# Patient Record
Sex: MALE | Race: WHITE | NOT HISPANIC OR LATINO | URBAN - METROPOLITAN AREA
[De-identification: names, ages, dates, MRNs, and addresses within clinical notes are randomized per-mention and may not be internally consistent; named-entity substitution may affect disease eponyms.]

---

## 2018-03-05 ENCOUNTER — INPATIENT (INPATIENT)
Facility: HOSPITAL | Age: 53
LOS: 1 days | Discharge: HOME HEALTH | DRG: 563 | End: 2018-03-07
Attending: HOSPITALIST | Admitting: HOSPITALIST
Payer: MEDICARE

## 2018-03-05 VITALS
HEART RATE: 78 BPM | RESPIRATION RATE: 18 BRPM | SYSTOLIC BLOOD PRESSURE: 137 MMHG | OXYGEN SATURATION: 100 % | TEMPERATURE: 98 F | DIASTOLIC BLOOD PRESSURE: 96 MMHG

## 2018-03-05 DIAGNOSIS — S82.899A OTHER FRACTURE OF UNSPECIFIED LOWER LEG, INITIAL ENCOUNTER FOR CLOSED FRACTURE: Chronic | ICD-10-CM

## 2018-03-05 DIAGNOSIS — M79.606 PAIN IN LEG, UNSPECIFIED: ICD-10-CM

## 2018-03-05 LAB
ALBUMIN SERPL ELPH-MCNC: 4.2 G/DL — SIGNIFICANT CHANGE UP (ref 3.3–5.2)
ALP SERPL-CCNC: 40 U/L — SIGNIFICANT CHANGE UP (ref 40–120)
ALT FLD-CCNC: 30 U/L — SIGNIFICANT CHANGE UP
ANION GAP SERPL CALC-SCNC: 14 MMOL/L — SIGNIFICANT CHANGE UP (ref 5–17)
ANISOCYTOSIS BLD QL: SIGNIFICANT CHANGE UP
AST SERPL-CCNC: 26 U/L — SIGNIFICANT CHANGE UP
BASOPHILS # BLD AUTO: 0 K/UL — SIGNIFICANT CHANGE UP (ref 0–0.2)
BASOPHILS NFR BLD AUTO: 0.5 % — SIGNIFICANT CHANGE UP (ref 0–2)
BILIRUB SERPL-MCNC: 0.3 MG/DL — LOW (ref 0.4–2)
BUN SERPL-MCNC: 12 MG/DL — SIGNIFICANT CHANGE UP (ref 8–20)
CALCIUM SERPL-MCNC: 9.1 MG/DL — SIGNIFICANT CHANGE UP (ref 8.6–10.2)
CHLORIDE SERPL-SCNC: 94 MMOL/L — LOW (ref 98–107)
CO2 SERPL-SCNC: 26 MMOL/L — SIGNIFICANT CHANGE UP (ref 22–29)
CREAT SERPL-MCNC: 0.53 MG/DL — SIGNIFICANT CHANGE UP (ref 0.5–1.3)
DACRYOCYTES BLD QL SMEAR: SLIGHT — SIGNIFICANT CHANGE UP
EOSINOPHIL # BLD AUTO: 0.2 K/UL — SIGNIFICANT CHANGE UP (ref 0–0.5)
EOSINOPHIL NFR BLD AUTO: 2.1 % — SIGNIFICANT CHANGE UP (ref 0–5)
GLUCOSE BLDC GLUCOMTR-MCNC: 141 MG/DL — HIGH (ref 70–99)
GLUCOSE BLDC GLUCOMTR-MCNC: 153 MG/DL — HIGH (ref 70–99)
GLUCOSE SERPL-MCNC: 124 MG/DL — HIGH (ref 70–115)
HCT VFR BLD CALC: 34.4 % — LOW (ref 42–52)
HGB BLD-MCNC: 10.8 G/DL — LOW (ref 14–18)
HYPOCHROMIA BLD QL: SLIGHT — SIGNIFICANT CHANGE UP
LYMPHOCYTES # BLD AUTO: 2.2 K/UL — SIGNIFICANT CHANGE UP (ref 1–4.8)
LYMPHOCYTES # BLD AUTO: 24.2 % — SIGNIFICANT CHANGE UP (ref 20–55)
MCHC RBC-ENTMCNC: 24.4 PG — LOW (ref 27–31)
MCHC RBC-ENTMCNC: 31.4 G/DL — LOW (ref 32–36)
MCV RBC AUTO: 77.8 FL — LOW (ref 80–94)
MONOCYTES # BLD AUTO: 0.9 K/UL — HIGH (ref 0–0.8)
MONOCYTES NFR BLD AUTO: 9.7 % — SIGNIFICANT CHANGE UP (ref 3–10)
NEUTROPHILS # BLD AUTO: 5.9 K/UL — SIGNIFICANT CHANGE UP (ref 1.8–8)
NEUTROPHILS NFR BLD AUTO: 63.2 % — SIGNIFICANT CHANGE UP (ref 37–73)
PLAT MORPH BLD: NORMAL — SIGNIFICANT CHANGE UP
PLATELET # BLD AUTO: 237 K/UL — SIGNIFICANT CHANGE UP (ref 150–400)
POIKILOCYTOSIS BLD QL AUTO: SLIGHT — SIGNIFICANT CHANGE UP
POTASSIUM SERPL-MCNC: 3.9 MMOL/L — SIGNIFICANT CHANGE UP (ref 3.5–5.3)
POTASSIUM SERPL-SCNC: 3.9 MMOL/L — SIGNIFICANT CHANGE UP (ref 3.5–5.3)
PROT SERPL-MCNC: 7 G/DL — SIGNIFICANT CHANGE UP (ref 6.6–8.7)
RBC # BLD: 4.42 M/UL — LOW (ref 4.6–6.2)
RBC # FLD: 21.5 % — HIGH (ref 11–15.6)
RBC BLD AUTO: ABNORMAL
SODIUM SERPL-SCNC: 134 MMOL/L — LOW (ref 135–145)
WBC # BLD: 9.3 K/UL — SIGNIFICANT CHANGE UP (ref 4.8–10.8)
WBC # FLD AUTO: 9.3 K/UL — SIGNIFICANT CHANGE UP (ref 4.8–10.8)

## 2018-03-05 PROCEDURE — 99223 1ST HOSP IP/OBS HIGH 75: CPT | Mod: AI

## 2018-03-05 PROCEDURE — 73590 X-RAY EXAM OF LOWER LEG: CPT | Mod: 26,LT

## 2018-03-05 PROCEDURE — 99285 EMERGENCY DEPT VISIT HI MDM: CPT

## 2018-03-05 PROCEDURE — 73562 X-RAY EXAM OF KNEE 3: CPT | Mod: 26,LT

## 2018-03-05 RX ORDER — SIMVASTATIN 20 MG/1
40 TABLET, FILM COATED ORAL AT BEDTIME
Qty: 0 | Refills: 0 | Status: DISCONTINUED | OUTPATIENT
Start: 2018-03-05 | End: 2018-03-07

## 2018-03-05 RX ORDER — OMEGA-3 ACID ETHYL ESTERS 1 G
2 CAPSULE ORAL
Qty: 0 | Refills: 0 | Status: DISCONTINUED | OUTPATIENT
Start: 2018-03-05 | End: 2018-03-07

## 2018-03-05 RX ORDER — MORPHINE SULFATE 50 MG/1
2 CAPSULE, EXTENDED RELEASE ORAL EVERY 6 HOURS
Qty: 0 | Refills: 0 | Status: DISCONTINUED | OUTPATIENT
Start: 2018-03-05 | End: 2018-03-07

## 2018-03-05 RX ORDER — LEVOTHYROXINE SODIUM 125 MCG
100 TABLET ORAL DAILY
Qty: 0 | Refills: 0 | Status: DISCONTINUED | OUTPATIENT
Start: 2018-03-05 | End: 2018-03-07

## 2018-03-05 RX ORDER — ENOXAPARIN SODIUM 100 MG/ML
40 INJECTION SUBCUTANEOUS EVERY 24 HOURS
Qty: 0 | Refills: 0 | Status: DISCONTINUED | OUTPATIENT
Start: 2018-03-05 | End: 2018-03-07

## 2018-03-05 RX ORDER — IBUPROFEN 200 MG
600 TABLET ORAL ONCE
Qty: 0 | Refills: 0 | Status: COMPLETED | OUTPATIENT
Start: 2018-03-05 | End: 2018-03-05

## 2018-03-05 RX ORDER — IBUPROFEN 200 MG
400 TABLET ORAL EVERY 6 HOURS
Qty: 0 | Refills: 0 | Status: DISCONTINUED | OUTPATIENT
Start: 2018-03-05 | End: 2018-03-07

## 2018-03-05 RX ORDER — DEXTROSE 50 % IN WATER 50 %
25 SYRINGE (ML) INTRAVENOUS ONCE
Qty: 0 | Refills: 0 | Status: DISCONTINUED | OUTPATIENT
Start: 2018-03-05 | End: 2018-03-07

## 2018-03-05 RX ORDER — PINDOLOL 10 MG
1 TABLET ORAL
Qty: 0 | Refills: 0 | COMMUNITY

## 2018-03-05 RX ORDER — RISPERIDONE 4 MG/1
3 TABLET ORAL AT BEDTIME
Qty: 0 | Refills: 0 | Status: DISCONTINUED | OUTPATIENT
Start: 2018-03-05 | End: 2018-03-07

## 2018-03-05 RX ORDER — DEXTROSE 50 % IN WATER 50 %
1 SYRINGE (ML) INTRAVENOUS ONCE
Qty: 0 | Refills: 0 | Status: DISCONTINUED | OUTPATIENT
Start: 2018-03-05 | End: 2018-03-07

## 2018-03-05 RX ORDER — ASCORBIC ACID 60 MG
500 TABLET,CHEWABLE ORAL DAILY
Qty: 0 | Refills: 0 | Status: DISCONTINUED | OUTPATIENT
Start: 2018-03-05 | End: 2018-03-07

## 2018-03-05 RX ORDER — DEXTROSE 50 % IN WATER 50 %
12.5 SYRINGE (ML) INTRAVENOUS ONCE
Qty: 0 | Refills: 0 | Status: DISCONTINUED | OUTPATIENT
Start: 2018-03-05 | End: 2018-03-07

## 2018-03-05 RX ORDER — SODIUM CHLORIDE 9 MG/ML
1000 INJECTION, SOLUTION INTRAVENOUS
Qty: 0 | Refills: 0 | Status: DISCONTINUED | OUTPATIENT
Start: 2018-03-05 | End: 2018-03-07

## 2018-03-05 RX ORDER — CLOZAPINE 150 MG/1
200 TABLET, ORALLY DISINTEGRATING ORAL AT BEDTIME
Qty: 0 | Refills: 0 | Status: DISCONTINUED | OUTPATIENT
Start: 2018-03-05 | End: 2018-03-07

## 2018-03-05 RX ORDER — FERROUS SULFATE 325(65) MG
325 TABLET ORAL DAILY
Qty: 0 | Refills: 0 | Status: DISCONTINUED | OUTPATIENT
Start: 2018-03-05 | End: 2018-03-07

## 2018-03-05 RX ORDER — METOPROLOL TARTRATE 50 MG
25 TABLET ORAL
Qty: 0 | Refills: 0 | Status: DISCONTINUED | OUTPATIENT
Start: 2018-03-05 | End: 2018-03-07

## 2018-03-05 RX ORDER — MORPHINE SULFATE 50 MG/1
4 CAPSULE, EXTENDED RELEASE ORAL ONCE
Qty: 0 | Refills: 0 | Status: DISCONTINUED | OUTPATIENT
Start: 2018-03-05 | End: 2018-03-05

## 2018-03-05 RX ORDER — SENNA PLUS 8.6 MG/1
2 TABLET ORAL AT BEDTIME
Qty: 0 | Refills: 0 | Status: DISCONTINUED | OUTPATIENT
Start: 2018-03-05 | End: 2018-03-07

## 2018-03-05 RX ORDER — DOCUSATE SODIUM 100 MG
1 CAPSULE ORAL
Qty: 0 | Refills: 0 | COMMUNITY

## 2018-03-05 RX ORDER — INSULIN LISPRO 100/ML
VIAL (ML) SUBCUTANEOUS
Qty: 0 | Refills: 0 | Status: DISCONTINUED | OUTPATIENT
Start: 2018-03-05 | End: 2018-03-07

## 2018-03-05 RX ORDER — DOCUSATE SODIUM 100 MG
100 CAPSULE ORAL AT BEDTIME
Qty: 0 | Refills: 0 | Status: DISCONTINUED | OUTPATIENT
Start: 2018-03-05 | End: 2018-03-07

## 2018-03-05 RX ORDER — GLUCAGON INJECTION, SOLUTION 0.5 MG/.1ML
1 INJECTION, SOLUTION SUBCUTANEOUS ONCE
Qty: 0 | Refills: 0 | Status: DISCONTINUED | OUTPATIENT
Start: 2018-03-05 | End: 2018-03-07

## 2018-03-05 RX ADMIN — SENNA PLUS 2 TABLET(S): 8.6 TABLET ORAL at 22:46

## 2018-03-05 RX ADMIN — Medication 400 MILLIGRAM(S): at 20:00

## 2018-03-05 RX ADMIN — Medication 400 MILLIGRAM(S): at 18:33

## 2018-03-05 RX ADMIN — Medication 600 MILLIGRAM(S): at 14:03

## 2018-03-05 RX ADMIN — Medication 100 MILLIGRAM(S): at 22:46

## 2018-03-05 RX ADMIN — Medication 25 MILLIGRAM(S): at 18:33

## 2018-03-05 RX ADMIN — RISPERIDONE 3 MILLIGRAM(S): 4 TABLET ORAL at 22:46

## 2018-03-05 RX ADMIN — Medication 0.25 MILLIGRAM(S): at 19:40

## 2018-03-05 RX ADMIN — CLOZAPINE 200 MILLIGRAM(S): 150 TABLET, ORALLY DISINTEGRATING ORAL at 22:47

## 2018-03-05 RX ADMIN — SIMVASTATIN 40 MILLIGRAM(S): 20 TABLET, FILM COATED ORAL at 22:46

## 2018-03-05 NOTE — ED PROVIDER NOTE - OBJECTIVE STATEMENT
51 y/o M presents to the ED s/p trip and fall c/o L calf pain. Unable to ambulate. Pt heard a pop when he went down. No cp, no head trauma/hit, and no abd pain. No other complaints at this time.

## 2018-03-05 NOTE — CONSULT NOTE ADULT - ATTENDING COMMENTS
I made multiple visits over several hours including placement of ACE compression and positioning of ICE pack - most likely soft tissue injury without compartment syndrome.    Call [phone] with additional questions or concerns.

## 2018-03-05 NOTE — ED ADULT NURSE REASSESSMENT NOTE - NS ED NURSE REASSESS COMMENT FT1
MSo4 offered  pt declined,  pt very anxious about whether or not his leg is fractured  reassurance prov.     waiting Dr Hammonds urinal at bedside

## 2018-03-05 NOTE — CONSULT NOTE ADULT - ASSESSMENT
Attending note: See my separate progress note.  No mechanism, history or exam findings that would be consistent with compartment syndrome in my view.  CALEB

## 2018-03-05 NOTE — H&P ADULT - NSHPPHYSICALEXAM_GEN_ALL_CORE
PHYSICAL EXAM:    GENERAL: NAD, short stature  HEAD:  Atraumatic, Normocephalic  EYES: EOMI, PERRLA, conjunctiva and sclera clear  ENMT: Moist mucous membranes, poor dentition, No lesions  NERVOUS SYSTEM:  Alert & Oriented X3, Good concentration;   CHEST/LUNG: Clear to percussion bilaterally; No rales, rhonchi  HEART: Regular rate and rhythm; No murmurs  ABDOMEN: Soft, Nontender, Nondistended; Bowel sounds present  EXTREMITIES:  2+ Peripheral Pulses, No clubbing, cyanosis. non-pitting edema noted on his calf, wrapped in ACE wrap  SKIN: No rashes or lesions

## 2018-03-05 NOTE — ED ADULT NURSE NOTE - OBJECTIVE STATEMENT
pt was walking in apt and tripped over an [employer] box, 'all my weight went onto my left leg" "I cant bend it all the way straight' 'I cannot put weight on it'  denies  hitting head

## 2018-03-05 NOTE — ED ADULT NURSE REASSESSMENT NOTE - NS ED NURSE REASSESS COMMENT FT1
dr archer at bedside to r/o capartment syndrome    as per Dr Archer  'he doesn't have that"  dr florence aware

## 2018-03-05 NOTE — H&P ADULT - HISTORY OF PRESENT ILLNESS
52yr old male with PMH of HTN, DM< HPL, Osteogenesis imperfecta, h/o Disruptive behaviour, schizoaffective disorder presented to ER after he tripped over a box and hurt his LEft leg. He immediately noted swelling and severe pain inhis calf muscle. He was evaluated by vascular and ortho in ER< xrays Leg and Knee were negative for any fracture but there is a concern for gastrocnemius muscle rupture and possible Compartment syndrome. Advised to Observe overnight. He is being admitted for further care.

## 2018-03-05 NOTE — ED ADULT TRIAGE NOTE - CHIEF COMPLAINT QUOTE
PT BIBA s/p trip and fall. pt state he tripped over a box. Pt c/o left calf pain, states he felt something pop. Pt denies LOC, denies hitting head, denies blood thinners. Pt is A & Ox3, respirations even & unlabored.

## 2018-03-05 NOTE — PROGRESS NOTE ADULT - SUBJECTIVE AND OBJECTIVE BOX
Patient is a 52y Male presenting to the emergency department with a chief complaint of left calf pain after falling around 11a.m. this morning at his community where he lives in [facility] apartment by himself. Patient states when he fell his leg went out from under him and stretched out the back of his leg and pulled the muscle. Patient does note some mild tingling in foot. Patient has h/o of osteogenic imperfecta. Patient denies pain anywhere else in his body, denies knee pain.      REVIEW OF SYSTEMS    General: denies fever, chills    Skin/Breast: denies rash  	  Respiratory and Thorax: denies SOB  	  Cardiovascular:	denies CP    Gastrointestinal:	denies abdominal pain  	  Musculoskeletal:	 left calf pain    Neurological:	+paresthesias left foot    Psychiatric:	schizoaffective disorder      PAST MEDICAL & SURGICAL HISTORY:  Hypothyroidism  High cholesterol  Diabetes  Osteogenesis imperfecta  Schizo-affective psychosis  Tachycardia      Allergies: amoxicillin (Unknown)  Lamictal (Unknown)  penicillin (Unknown)      Medications: see med rec    FAMILY HISTORY:  : non-contributory    Social History: see above  ETOH: denies  Smoking: denies    Ambulation: independent                          10.8   9.3   )-----------( 237      ( 05 Mar 2018 12:32 )             34.4     03-05    134<L>  |  94<L>  |  12.0  ----------------------------<  124<H>  3.9   |  26.0  |  0.53    PHYSICAL EXAM:    Vital Signs Last 24 Hrs  T(C): 36.6 (05 Mar 2018 11:45), Max: 36.6 (05 Mar 2018 11:45)  T(F): 97.9 (05 Mar 2018 11:45), Max: 97.9 (05 Mar 2018 11:45)  HR: 78 (05 Mar 2018 11:45) (78 - 78)  BP: 137/96 (05 Mar 2018 11:45) (137/96 - 137/96)  RR: 18 (05 Mar 2018 11:45) (18 - 18)  SpO2: 100% (05 Mar 2018 11:45) (100% - 100%)    Appearance: Alert, responsive, in no acute distress.     Left Lower extremity: Left calf + mod-severe swelling, no ecchymosis noted, no erythema or open wounds. Calf feels moderately tense but is still compressible without much discomfort. Patient does have pain with dorsiflexion of ankle in calf described as tightness  EHL/TA/GS/FHL intact, Gross SILT distally. No pain with PROM of left hallux  DP pulse 2+ PT pulse 1+     Imaging Studies: < from: Xray Knee 3 Views, Left (03.05.18 @ 12:56) >     EXAM:  XR KNEE 3 VIEWS LT                         EXAM:  TIBIA FIBULA-LEFT                          PROCEDURE DATE:  03/05/2018      INTERPRETATION:  Left tibia-fibula and left knee    5 views      History: Posttraumatic pain. Patient fell    Osseous structures are intact. No evidence of acute fracture or   dislocation.  There is no significant joint space narrowing. The   articular surfaces appear intact.. Soft tissues are normal.    Impression:  Normal study.    PETR MARCELINO M.D., ATTENDING RADIOLOGIST  This document has been electronically signed. Mar  5 2018  1:18PM    < end of copied text >      A/P:  Pt is a  52y Male with left calf tear    PLAN:   No concern for compartment syndrome at this point however if swelling persists, unlikely but possible compartment syndrome may develop. Recommend holding patient overnight to monitor. Patient is a 52y Male presenting to the emergency department with a chief complaint of left calf pain after falling around 11a.m. this morning at his community where he lives in [facility] apartment by himself. Patient states when he fell his leg went out from under him and stretched out the back of his leg and pulled the muscle. Patient does note some mild tingling in foot. Patient has h/o of osteogenic imperfecta. Patient denies pain anywhere else in his body, denies knee pain.      REVIEW OF SYSTEMS    General: denies fever, chills    Skin/Breast: denies rash  	  Respiratory and Thorax: denies SOB  	  Cardiovascular:	denies CP    Gastrointestinal:	denies abdominal pain  	  Musculoskeletal:	 left calf pain    Neurological:	+paresthesias left foot    Psychiatric:	schizoaffective disorder      PAST MEDICAL & SURGICAL HISTORY:  Hypothyroidism  High cholesterol  Diabetes  Osteogenesis imperfecta  Schizo-affective psychosis  Tachycardia      Allergies: amoxicillin (Unknown)  Lamictal (Unknown)  penicillin (Unknown)      Medications: see med rec    FAMILY HISTORY:  : non-contributory    Social History: see above  ETOH: denies  Smoking: denies    Ambulation: independent                          10.8   9.3   )-----------( 237      ( 05 Mar 2018 12:32 )             34.4     03-05    134<L>  |  94<L>  |  12.0  ----------------------------<  124<H>  3.9   |  26.0  |  0.53    PHYSICAL EXAM:    Vital Signs Last 24 Hrs  T(C): 36.6 (05 Mar 2018 11:45), Max: 36.6 (05 Mar 2018 11:45)  T(F): 97.9 (05 Mar 2018 11:45), Max: 97.9 (05 Mar 2018 11:45)  HR: 78 (05 Mar 2018 11:45) (78 - 78)  BP: 137/96 (05 Mar 2018 11:45) (137/96 - 137/96)  RR: 18 (05 Mar 2018 11:45) (18 - 18)  SpO2: 100% (05 Mar 2018 11:45) (100% - 100%)    Appearance: Alert, responsive, in no acute distress.     Left Lower extremity: Left calf + mod-severe swelling, no ecchymosis noted, no erythema or open wounds. Calf feels moderately tense but is still compressible without much discomfort. Patient does have pain with dorsiflexion of ankle in calf described as tightness  EHL/TA/GS/FHL intact, Gross SILT distally. No pain with PROM of left hallux  DP pulse 2+ PT pulse 1+     Imaging Studies: < from: Xray Knee 3 Views, Left (03.05.18 @ 12:56) >     EXAM:  XR KNEE 3 VIEWS LT                         EXAM:  TIBIA FIBULA-LEFT                          PROCEDURE DATE:  03/05/2018      INTERPRETATION:  Left tibia-fibula and left knee    5 views      History: Posttraumatic pain. Patient fell    Osseous structures are intact. No evidence of acute fracture or   dislocation.  There is no significant joint space narrowing. The   articular surfaces appear intact.. Soft tissues are normal.    Impression:  Normal study.    PETR MARCELINO M.D., ATTENDING RADIOLOGIST  This document has been electronically signed. Mar  5 2018  1:18PM    < end of copied text >      A/P:  Pt is a  52y Male with left calf tear    PLAN:   No concern for compartment syndrome at this point however if swelling persists, unlikely but possible compartment syndrome may develop. Recommend holding patient overnight to monitor.   Posterior splint left lower leg applied  Pain control  strict elevation left lower leg  Admit to medicine  Will follow  D/w Dr. Zamarripa

## 2018-03-05 NOTE — CONSULT NOTE ADULT - SUBJECTIVE AND OBJECTIVE BOX
Vascular Attending:  Susan AVILES       HPI:  Consulted to see patient secondary to concerns of left lower extremity acute edema concerns for compartment syndrome. Patient reports recent trip and fall where his left lower leg collided with an object around 11 am.  Reports hearing a "pop" with pain and edema following.  X-Ray's obtained in the ED, negative for fracture.  Reports tingling to the bottom of his feet, no pain at rest.  Patient seen on second evaluation with Vascular Attending Dr. Davis.     patient with no complaint of chest pain, sob, n/v fever or chills       PAST MEDICAL & SURGICAL HISTORY:  Hypothyroidism  High cholesterol  Diabetes  Osteogenesis imperfecta  Schizo-affective psychosis      REVIEW OF SYSTEMS:  See HPI        Endocrine:	    Allergic/Immunologic:	    MEDICATIONS  (STANDING):    MEDICATIONS  (PRN):      Allergies    amoxicillin (Unknown)  Lamictal (Unknown)  penicillin (Unknown)    Intolerances        SOCIAL HISTORY:  no toxic habits       Vital Signs Last 24 Hrs  T(C): 36.6 (05 Mar 2018 11:45), Max: 36.6 (05 Mar 2018 11:45)  T(F): 97.9 (05 Mar 2018 11:45), Max: 97.9 (05 Mar 2018 11:45)  HR: 78 (05 Mar 2018 11:45) (78 - 78)  BP: 137/96 (05 Mar 2018 11:45) (137/96 - 137/96)  BP(mean): --  RR: 18 (05 Mar 2018 11:45) (18 - 18)  SpO2: 100% (05 Mar 2018 11:45) (100% - 100%)    PHYSICAL EXAM:      Constitutional:  no distress, small in stature, obese     Eyes:  no jaundice     ENMT:    Neck:  no bruits       Respiratory:  clear     Cardiovascular:  s1/s2    Gastrointestinal:  distended, soft, NT      Extremities: Left le edema, mostly affecting the calf region.  Anterior compartment firm with some compressibility,  Moving all toes, Dorsi flex> Plantar flex.  Reported generalized numbness to plantar aspect of foot     Vascular: 2+ femoral and 2+ PT/DP bilateral LE         Psychiatric:        LABS:                        10.8   9.3   )-----------( 237      ( 05 Mar 2018 12:32 )             34.4     03-05    134<L>  |  94<L>  |  12.0  ----------------------------<  124<H>  3.9   |  26.0  |  0.53    Ca    9.1      05 Mar 2018 12:32    TPro  7.0  /  Alb  4.2  /  TBili  0.3<L>  /  DBili  x   /  AST  26  /  ALT  30  /  AlkPhos  40  03-05          RADIOLOGY & ADDITIONAL STUDIES    Impression and Plan:    Left lower extremity edema form acute soft tissue low energy injury    Clinically negative for compartment syndrome based on physical examination    - Will reevaluate in 1-2 hours   - elevate extremity  - Ice packs and NSAIDs Vascular Attending:  MARIKA Davis MD       HPI:  Asked to see patient because of ER MD concern for left lower extremity acute edema and pain after fall, specifically the possibility of compartment syndrome.    Patient reports recent trip and fall where his left lower leg collided with an object around 11 am on 3/5/18.  Reports hearing a "pop" with pain and edema following.  X-Ray's obtained in the ED, negative for fracture.  Reports tingling of the plantar aspect of his left (affected side) foot, no pain in lower extremity or foot at rest.  Patient seen on second evaluation with Vascular Attending Dr. Davis (please see his separate note).     Patient without complaint of chest pain, sob, n/v fever or chills       PAST MEDICAL & SURGICAL HISTORY:  Hypothyroidism  High cholesterol  Diabetes  Osteogenesis imperfecta  Schizo-affective psychosis    REVIEW OF SYSTEMS:  See HPI    Endocrine:	    Allergic/Immunologic:	    MEDICATIONS  (STANDING):    MEDICATIONS  (PRN):    Allergies    amoxicillin (Unknown)  Lamictal (Unknown)  penicillin (Unknown)    SOCIAL HISTORY:  no toxic habits     Vital Signs Last 24 Hrs  T(C): 36.6 (05 Mar 2018 11:45), Max: 36.6 (05 Mar 2018 11:45)  T(F): 97.9 (05 Mar 2018 11:45), Max: 97.9 (05 Mar 2018 11:45)  HR: 78 (05 Mar 2018 11:45) (78 - 78)  BP: 137/96 (05 Mar 2018 11:45) (137/96 - 137/96)  BP(mean): --  RR: 18 (05 Mar 2018 11:45) (18 - 18)  SpO2: 100% (05 Mar 2018 11:45) (100% - 100%)    PHYSICAL EXAM:    Constitutional:  no distress, small in stature, obese     Eyes:  no jaundice     ENMT: normal speech and facial expressions    Neck:  no bruits     Respiratory:  clear     Cardiovascular:  s1/s2    Abdomen: obese, soft, NT    Extremities: Left posterior calf and some ankle edema. Posterior calf most tender and firm with firm but less tender anterior compartment,  Spontaneously moves all toes, dorsi flex and Plantar flex without c/o pain.  Reports waxing and waning left plantar dysesthesias, parathesia and numbness.     Vascular: 2+ femoral and 2+ PT/DP bilateral LE     Psychiatric:  LABS:                        10.8   9.3   )-----------( 237      ( 05 Mar 2018 12:32 )             34.4     03-05    134<L>  |  94<L>  |  12.0  ----------------------------<  124<H>  3.9   |  26.0  |  0.53    Ca    9.1      05 Mar 2018 12:32    TPro  7.0  /  Alb  4.2  /  TBili  0.3<L>  /  DBili  x   /  AST  26  /  ALT  30  /  AlkPhos  40  03-05          RADIOLOGY & ADDITIONAL STUDIES    Impression and Plan:    Left lower extremity edema form acute soft tissue low energy injury    Clinically negative for compartment syndrome based on physical examination    - Will reevaluate in 1-2 hours   - elevate extremity  - Ice packs and NSAIDs

## 2018-03-05 NOTE — H&P ADULT - ASSESSMENT
52yr old male with PMH of HTN, DM< HPL, Osteogenesis imperfecta, h/o Disruptive behaviour, schizoaffective disorder presented to ER after he tripped over a box and hurt his LEft leg. He immediately noted swelling and severe pain inhis calf muscle. He was evaluated by vascular and ortho in ER< xrays Leg and Knee were negative for any fracture but there is a concern for gastrocnemius muscle rupture and possible Compartment syndrome. Advised to Observe overnight. He is being admitted for further care.    1. traumatic Swelling of left calf - Possibly 2/2 Muscle rupture  Observe for compartment syndrome  check for pedal pulses - q4hrs  Pain control  Vascular follow up     2. DM - hold OHA, SSI while in house    3. Schizoaffective - stable at present - ct home meds    4. h/o irregular Heart beat in past - Ct metoprolol with holding paramenters - pindolol not available on formulary, resume pindolol on discharge    5. Anemia - ct Iron supplement    6. Osteogenesis Imperfecta - Supportive care, PT eval if pain controlled.   7. DVT px - lovenox

## 2018-03-05 NOTE — PROGRESS NOTE ADULT - ASSESSMENT
No mechanism, history or physical exam findings consistent with compartment syndrome  ICE, ACE, elevate  Ortho follow-up as indicated for any tendinous or ligamentous injuries affecting function  Weight bearing per ortho

## 2018-03-05 NOTE — PROGRESS NOTE ADULT - SUBJECTIVE AND OBJECTIVE BOX
52 year old male s/p fall with complaint of leg injury. No fracture, no dislocation, no history of bleeding diathesis or anticoagulation. Asked to rule out compartment syndrome.    Vital Signs Last 24 Hrs  T(C): 36.6 (05 Mar 2018 11:45), Max: 36.6 (05 Mar 2018 11:45)  T(F): 97.9 (05 Mar 2018 11:45), Max: 97.9 (05 Mar 2018 11:45)  HR: 78 (05 Mar 2018 11:45) (78 - 78)  BP: 137/96 (05 Mar 2018 11:45) (137/96 - 137/96)  BP(mean): --  RR: 18 (05 Mar 2018 11:45) (18 - 18)  SpO2: 100% (05 Mar 2018 11:45) (100% - 100%)    PAST MEDICAL & SURGICAL HISTORY:  Hypothyroidism  High cholesterol  Diabetes  Osteogenesis imperfecta  Schizo-affective psychosis    FAMILY HISTORY: no history of thrombophelia or bleeding diathesis    ROS: denies SOB, CP, LOPEZ    Comprehensive Metabolic Panel (03.05.18 @ 12:32)    Sodium, Serum: 134 mmol/L    Potassium, Serum: 3.9 mmol/L    Chloride, Serum: 94 mmol/L    Carbon Dioxide, Serum: 26.0 mmol/L    Anion Gap, Serum: 14 mmol/L    Blood Urea Nitrogen, Serum: 12.0 mg/dL    Creatinine, Serum: 0.53 mg/dL    Glucose, Serum: 124 mg/dL    Calcium, Total Serum: 9.1 mg/dL    Protein Total, Serum: 7.0 g/dL    Albumin, Serum: 4.2 g/dL    Bilirubin Total, Serum: 0.3 mg/dL    Alkaline Phosphatase, Serum: 40 U/L    Aspartate Aminotransferase (AST/SGOT): 26 U/L    Alanine Aminotransferase (ALT/SGPT): 30 U/L    Complete Blood Count + Automated Diff (03.05.18 @ 12:32)    WBC Count: 9.3 K/uL    RBC Count: 4.42 M/uL    Hemoglobin: 10.8 g/dL    Hematocrit: 34.4 %    Mean Cell Volume: 77.8 fl    Mean Cell Hemoglobin: 24.4 pg    Mean Cell Hemoglobin Conc: 31.4 g/dL    Red Cell Distrib Width: 21.5 %    Platelet Count - Automated: 237 K/uL    < from: Xray Tibia + Fibula 2 Views, Left (03.05.18 @ 12:56) >   EXAM:  XR KNEE 3 VIEWS LT                         EXAM:  TIBIA FIBULA-LEFT                          PROCEDURE DATE:  03/05/2018      INTERPRETATION:  Left tibia-fibula and left knee  5 views    History: Posttraumatic pain. Patient fell  Osseous structures are intact. No evidence of acute fracture or   dislocation.  There is no significant joint space narrowing. The   articular surfaces appear intact.. Soft tissues are normal.  Impression:  Normal study.    PETR MARCELINO M.D., ATTENDING RADIOLOGIST  This document has been electronically signed. Mar  5 2018  1:18PM  < end of copied text >        < from: Xray Tibia + Fibula 2 Views, Left (03.05.18 @ 12:56) >   EXAM:  XR KNEE 3 VIEWS LT                         EXAM:  TIBIA FIBULA-LEFT                        PROCEDURE DATE:  03/05/2018    INTERPRETATION:  Left tibia-fibula and left knee  5 views    History: Posttraumatic pain. Patient fell  Osseous structures are intact. No evidence of acute fracture or   dislocation.  There is no significant joint space narrowing. The   articular surfaces appear intact.. Soft tissues are normal.  Impression:  Normal study.    PETR MARCELINO M.D., ATTENDING RADIOLOGIST  This document has been electronically signed. Mar  5 2018  1:18PM  < end of copied text >    Physical exam:  Patient lying on stretcher - NAD, denies worsening pain - in fact he notes pain has gradually improved with elevation.  Bilateral palable pedal pulses  Edema and what appears to be early signs of ecchymosis noted left anterior leg  Normal and pain free active and passive range of motion of the toes bilaterally.

## 2018-03-05 NOTE — ED ADULT NURSE NOTE - PMH
Diabetes    High cholesterol    Hypothyroidism    Osteogenesis imperfecta    Schizo-affective psychosis

## 2018-03-06 LAB
ANION GAP SERPL CALC-SCNC: 13 MMOL/L — SIGNIFICANT CHANGE UP (ref 5–17)
BASOPHILS # BLD AUTO: 0 K/UL — SIGNIFICANT CHANGE UP (ref 0–0.2)
BASOPHILS NFR BLD AUTO: 0.2 % — SIGNIFICANT CHANGE UP (ref 0–2)
BUN SERPL-MCNC: 10 MG/DL — SIGNIFICANT CHANGE UP (ref 8–20)
CALCIUM SERPL-MCNC: 9.1 MG/DL — SIGNIFICANT CHANGE UP (ref 8.6–10.2)
CHLORIDE SERPL-SCNC: 94 MMOL/L — LOW (ref 98–107)
CK MB CFR SERPL CALC: 4.9 NG/ML — SIGNIFICANT CHANGE UP (ref 0–6.7)
CK SERPL-CCNC: 1429 U/L — HIGH (ref 30–200)
CO2 SERPL-SCNC: 27 MMOL/L — SIGNIFICANT CHANGE UP (ref 22–29)
CREAT SERPL-MCNC: 0.61 MG/DL — SIGNIFICANT CHANGE UP (ref 0.5–1.3)
EOSINOPHIL # BLD AUTO: 0.1 K/UL — SIGNIFICANT CHANGE UP (ref 0–0.5)
EOSINOPHIL NFR BLD AUTO: 1.2 % — SIGNIFICANT CHANGE UP (ref 0–5)
GLUCOSE BLDC GLUCOMTR-MCNC: 141 MG/DL — HIGH (ref 70–99)
GLUCOSE BLDC GLUCOMTR-MCNC: 173 MG/DL — HIGH (ref 70–99)
GLUCOSE BLDC GLUCOMTR-MCNC: 175 MG/DL — HIGH (ref 70–99)
GLUCOSE SERPL-MCNC: 166 MG/DL — HIGH (ref 70–115)
HBA1C BLD-MCNC: 5.1 % — SIGNIFICANT CHANGE UP (ref 4–5.6)
HCT VFR BLD CALC: 30.8 % — LOW (ref 42–52)
HGB BLD-MCNC: 9.8 G/DL — LOW (ref 14–18)
LYMPHOCYTES # BLD AUTO: 19.8 % — LOW (ref 20–55)
LYMPHOCYTES # BLD AUTO: 2.2 K/UL — SIGNIFICANT CHANGE UP (ref 1–4.8)
MCHC RBC-ENTMCNC: 24.3 PG — LOW (ref 27–31)
MCHC RBC-ENTMCNC: 31.8 G/DL — LOW (ref 32–36)
MCV RBC AUTO: 76.4 FL — LOW (ref 80–94)
MONOCYTES # BLD AUTO: 1 K/UL — HIGH (ref 0–0.8)
MONOCYTES NFR BLD AUTO: 8.9 % — SIGNIFICANT CHANGE UP (ref 3–10)
NEUTROPHILS # BLD AUTO: 7.7 K/UL — SIGNIFICANT CHANGE UP (ref 1.8–8)
NEUTROPHILS NFR BLD AUTO: 69.7 % — SIGNIFICANT CHANGE UP (ref 37–73)
PLAT MORPH BLD: NORMAL — SIGNIFICANT CHANGE UP
PLATELET # BLD AUTO: 227 K/UL — SIGNIFICANT CHANGE UP (ref 150–400)
POTASSIUM SERPL-MCNC: 3.3 MMOL/L — LOW (ref 3.5–5.3)
POTASSIUM SERPL-SCNC: 3.3 MMOL/L — LOW (ref 3.5–5.3)
RBC # BLD: 4.03 M/UL — LOW (ref 4.6–6.2)
RBC # FLD: 22.3 % — HIGH (ref 11–15.6)
RBC BLD AUTO: SIGNIFICANT CHANGE UP
SODIUM SERPL-SCNC: 134 MMOL/L — LOW (ref 135–145)
WBC # BLD: 11.1 K/UL — HIGH (ref 4.8–10.8)
WBC # FLD AUTO: 11.1 K/UL — HIGH (ref 4.8–10.8)

## 2018-03-06 PROCEDURE — 99233 SBSQ HOSP IP/OBS HIGH 50: CPT

## 2018-03-06 RX ORDER — POTASSIUM CHLORIDE 20 MEQ
40 PACKET (EA) ORAL ONCE
Qty: 0 | Refills: 0 | Status: DISCONTINUED | OUTPATIENT
Start: 2018-03-06 | End: 2018-03-06

## 2018-03-06 RX ORDER — POTASSIUM CHLORIDE 20 MEQ
40 PACKET (EA) ORAL ONCE
Qty: 0 | Refills: 0 | Status: COMPLETED | OUTPATIENT
Start: 2018-03-06 | End: 2018-03-06

## 2018-03-06 RX ADMIN — Medication 400 MILLIGRAM(S): at 07:55

## 2018-03-06 RX ADMIN — Medication 25 MILLIGRAM(S): at 05:23

## 2018-03-06 RX ADMIN — MORPHINE SULFATE 2 MILLIGRAM(S): 50 CAPSULE, EXTENDED RELEASE ORAL at 03:56

## 2018-03-06 RX ADMIN — Medication 100 MICROGRAM(S): at 05:23

## 2018-03-06 RX ADMIN — RISPERIDONE 3 MILLIGRAM(S): 4 TABLET ORAL at 22:10

## 2018-03-06 RX ADMIN — Medication 400 MILLIGRAM(S): at 18:24

## 2018-03-06 RX ADMIN — Medication 500 MILLIGRAM(S): at 12:18

## 2018-03-06 RX ADMIN — Medication 2 GRAM(S): at 17:23

## 2018-03-06 RX ADMIN — Medication 325 MILLIGRAM(S): at 12:18

## 2018-03-06 RX ADMIN — Medication 40 MILLIEQUIVALENT(S): at 12:19

## 2018-03-06 RX ADMIN — Medication 400 MILLIGRAM(S): at 00:41

## 2018-03-06 RX ADMIN — SIMVASTATIN 40 MILLIGRAM(S): 20 TABLET, FILM COATED ORAL at 22:10

## 2018-03-06 RX ADMIN — MORPHINE SULFATE 2 MILLIGRAM(S): 50 CAPSULE, EXTENDED RELEASE ORAL at 04:11

## 2018-03-06 RX ADMIN — Medication 400 MILLIGRAM(S): at 01:41

## 2018-03-06 RX ADMIN — Medication 400 MILLIGRAM(S): at 06:55

## 2018-03-06 RX ADMIN — Medication 400 MILLIGRAM(S): at 17:24

## 2018-03-06 RX ADMIN — CLOZAPINE 200 MILLIGRAM(S): 150 TABLET, ORALLY DISINTEGRATING ORAL at 22:10

## 2018-03-06 RX ADMIN — Medication 25 MILLIGRAM(S): at 17:23

## 2018-03-06 NOTE — PROGRESS NOTE ADULT - SUBJECTIVE AND OBJECTIVE BOX
52yr old male with PMH of HTN, DM, HPL, Osteogenesis imperfecta, h/o Disruptive behaviour, schizoaffective disorder being seen at [facility] for gastrocnemius tear s/p trip/fall over box at  home.     Pt seen and examined at bedside. Pt ANO x 3, laying in bed. Pt states pain has improved since yesterday. Denies other medical complaints. Pt has yet to ambulate. Denies n/v, fever, chills, chest pain, SOB, numbness/tingling, increase/wrosening pain.     Interval: seen by ortho, no s/o compartment syndrome, WBAT, follow up as o/p in 2 weeks.     MEDICATIONS  (STANDING):  ascorbic acid 500 milliGRAM(s) Oral daily  cloZAPine 200 milliGRAM(s) Oral at bedtime  dextrose 5%. 1000 milliLiter(s) (50 mL/Hr) IV Continuous <Continuous>  dextrose 50% Injectable 12.5 Gram(s) IV Push once  dextrose 50% Injectable 25 Gram(s) IV Push once  dextrose 50% Injectable 25 Gram(s) IV Push once  docusate sodium 100 milliGRAM(s) Oral at bedtime  enoxaparin Injectable 40 milliGRAM(s) SubCutaneous every 24 hours  ferrous    sulfate 325 milliGRAM(s) Oral daily  insulin lispro (HumaLOG) corrective regimen sliding scale   SubCutaneous three times a day before meals  levothyroxine 100 MICROGram(s) Oral daily  metoprolol     tartrate 25 milliGRAM(s) Oral two times a day  omega-3-Acid Ethyl Esters 2 Gram(s) Oral two times a day  potassium chloride   Powder 40 milliEquivalent(s) Oral once  risperiDONE   Tablet 3 milliGRAM(s) Oral at bedtime  senna 2 Tablet(s) Oral at bedtime  simvastatin 40 milliGRAM(s) Oral at bedtime    MEDICATIONS  (PRN):  dextrose Gel 1 Dose(s) Oral once PRN Blood Glucose LESS THAN 70 milliGRAM(s)/deciliter  glucagon  Injectable 1 milliGRAM(s) IntraMuscular once PRN Glucose LESS THAN 70 milligrams/deciliter  ibuprofen  Tablet 400 milliGRAM(s) Oral every 6 hours PRN Mild pain  LORazepam     Tablet 0.25 milliGRAM(s) Oral two times a day PRN Anxiety  morphine  - Injectable 2 milliGRAM(s) IV Push every 6 hours PRN Severe Pain (7 - 10)      Allergies  amoxicillin (Unknown)  Lamictal (Unknown)  penicillin (Unknown)    PMH:     REVIEW OF SYSTEMS:  CONSTITUTIONAL: No fever, weight loss, or fatigue  RESPIRATORY: No cough, wheezing, chills or hemoptysis; No shortness of breath  CARDIOVASCULAR: No chest pain, palpitations, dizziness, or leg swelling  GASTROINTESTINAL: No abdominal or epigastric pain. No nausea, vomiting, or hematemesis; No diarrhea or constipation. No melena or hematochezia.  GENITOURINARY: No dysuria, frequency, hematuria, or incontinence  NEUROLOGICAL: No headaches, memory loss, loss of strength, numbness, or tremors  MUSCULOSKELETAL: see HPI    Vital Signs Last 24 Hrs  T(C): 37.1 (06 Mar 2018 07:36), Max: 37.4 (06 Mar 2018 05:17)  T(F): 98.8 (06 Mar 2018 07:36), Max: 99.4 (06 Mar 2018 05:17)  HR: 83 (06 Mar 2018 07:36) (78 - 90)  BP: 114/69 (06 Mar 2018 07:36) (114/69 - 137/96)  BP(mean): --  RR: 18 (06 Mar 2018 07:36) (18 - 22)  SpO2: 98% (06 Mar 2018 07:36) (96% - 100%)    PHYSICAL EXAM:  GENERAL: NAD, well-groomed, well-developed  HEAD:  Atraumatic, Normocephalic  NERVOUS SYSTEM:  Alert & Oriented X3, no neuro deficits   CHEST/LUNG: Clear to auscultation bilaterally; No rales, rhonchi, wheezing, or rubs  HEART: Regular rate and rhythm; No murmurs, rubs, or gallops  ABDOMEN: Soft, Nontender, Nondistended; Bowel sounds present  EXTREMITIES: posterior splint to LLE, calf swollen, though nontender to palpation. Palpable distal pulses.       LABS:                        9.8    11.1  )-----------( 227      ( 06 Mar 2018 08:39 )             30.8     06 Mar 2018 08:39    134    |  94     |  10.0   ----------------------------<  166    3.3     |  27.0   |  0.61     Ca    9.1        06 Mar 2018 08:39    TPro  7.0    /  Alb  4.2    /  TBili  0.3    /  DBili  x      /  AST  26     /  ALT  30     /  AlkPhos  40     05 Mar 2018 12:32      CAPILLARY BLOOD GLUCOSE  POCT Blood Glucose.: 173 mg/dL (06 Mar 2018 08:17)  POCT Blood Glucose.: 153 mg/dL (05 Mar 2018 22:45)  POCT Blood Glucose.: 141 mg/dL (05 Mar 2018 18:37)      RADIOLOGY & ADDITIONAL TESTS: 52yr old male with PMH of HTN, DM, HPL, Osteogenesis imperfecta, h/o Disruptive behaviour, schizoaffective disorder being seen at [facility] for gastrocnemius tear s/p trip/fall over box at  home.     Pt seen and examined at bedside. Pt ANO x 3, laying in bed. Pt states pain has improved since yesterday. Denies other medical complaints. Pt has yet to ambulate. Denies n/v, fever, chills, chest pain, SOB, numbness/tingling, increase/wrosening pain.     Interval: seen by ortho, no s/o compartment syndrome, WBAT, follow up as o/p in 2 weeks.     MEDICATIONS  (STANDING):  ascorbic acid 500 milliGRAM(s) Oral daily  cloZAPine 200 milliGRAM(s) Oral at bedtime  dextrose 5%. 1000 milliLiter(s) (50 mL/Hr) IV Continuous <Continuous>  dextrose 50% Injectable 12.5 Gram(s) IV Push once  dextrose 50% Injectable 25 Gram(s) IV Push once  dextrose 50% Injectable 25 Gram(s) IV Push once  docusate sodium 100 milliGRAM(s) Oral at bedtime  enoxaparin Injectable 40 milliGRAM(s) SubCutaneous every 24 hours  ferrous    sulfate 325 milliGRAM(s) Oral daily  insulin lispro (HumaLOG) corrective regimen sliding scale   SubCutaneous three times a day before meals  levothyroxine 100 MICROGram(s) Oral daily  metoprolol     tartrate 25 milliGRAM(s) Oral two times a day  omega-3-Acid Ethyl Esters 2 Gram(s) Oral two times a day  potassium chloride   Powder 40 milliEquivalent(s) Oral once  risperiDONE   Tablet 3 milliGRAM(s) Oral at bedtime  senna 2 Tablet(s) Oral at bedtime  simvastatin 40 milliGRAM(s) Oral at bedtime    MEDICATIONS  (PRN):  dextrose Gel 1 Dose(s) Oral once PRN Blood Glucose LESS THAN 70 milliGRAM(s)/deciliter  glucagon  Injectable 1 milliGRAM(s) IntraMuscular once PRN Glucose LESS THAN 70 milligrams/deciliter  ibuprofen  Tablet 400 milliGRAM(s) Oral every 6 hours PRN Mild pain  LORazepam     Tablet 0.25 milliGRAM(s) Oral two times a day PRN Anxiety  morphine  - Injectable 2 milliGRAM(s) IV Push every 6 hours PRN Severe Pain (7 - 10)      Allergies  amoxicillin (Unknown)  Lamictal (Unknown)  penicillin (Unknown)    PMH:     REVIEW OF SYSTEMS:  CONSTITUTIONAL: No fever, weight loss, or fatigue  RESPIRATORY: No cough, wheezing, chills or hemoptysis; No shortness of breath  CARDIOVASCULAR: No chest pain, palpitations, dizziness, or leg swelling  GASTROINTESTINAL: No abdominal or epigastric pain. No nausea, vomiting, or hematemesis; No diarrhea or constipation. No melena or hematochezia.  GENITOURINARY: No dysuria, frequency, hematuria, or incontinence  NEUROLOGICAL: No headaches, memory loss, loss of strength, numbness, or tremors  MUSCULOSKELETAL: see HPI    Vital Signs Last 24 Hrs  T(C): 37.1 (06 Mar 2018 07:36), Max: 37.4 (06 Mar 2018 05:17)  T(F): 98.8 (06 Mar 2018 07:36), Max: 99.4 (06 Mar 2018 05:17)  HR: 83 (06 Mar 2018 07:36) (78 - 90)  BP: 114/69 (06 Mar 2018 07:36) (114/69 - 137/96)  BP(mean): --  RR: 18 (06 Mar 2018 07:36) (18 - 22)  SpO2: 98% (06 Mar 2018 07:36) (96% - 100%)    PHYSICAL EXAM:  GENERAL: NAD, well-groomed, well-developed  HEAD:  Atraumatic, Normocephalic  NERVOUS SYSTEM:  Alert & Oriented X3, no neuro deficits   CHEST/LUNG: Clear to auscultation bilaterally; No rales, rhonchi, wheezing, or rubs  HEART: Regular rate and rhythm; No murmurs, rubs, or gallops  ABDOMEN: Soft, Nontender, Nondistended; Bowel sounds present  EXTREMITIES: posterior splint to LLE, calf swollen, though nontender to palpation,  Palpable distal pulses, able to move all toes.       LABS:                        9.8    11.1  )-----------( 227      ( 06 Mar 2018 08:39 )             30.8     06 Mar 2018 08:39    134    |  94     |  10.0   ----------------------------<  166    3.3     |  27.0   |  0.61     Ca    9.1        06 Mar 2018 08:39    TPro  7.0    /  Alb  4.2    /  TBili  0.3    /  DBili  x      /  AST  26     /  ALT  30     /  AlkPhos  40     05 Mar 2018 12:32      CAPILLARY BLOOD GLUCOSE  POCT Blood Glucose.: 173 mg/dL (06 Mar 2018 08:17)  POCT Blood Glucose.: 153 mg/dL (05 Mar 2018 22:45)  POCT Blood Glucose.: 141 mg/dL (05 Mar 2018 18:37)      RADIOLOGY & ADDITIONAL TESTS:

## 2018-03-06 NOTE — PROGRESS NOTE ADULT - ASSESSMENT
52yr old male with PMH of HTN, DM, HPL, Osteogenesis imperfecta, h/o Disruptive behaviour, schizoaffective disorder being seen at [facility] for gastrocnemius tear s/p trip/fall over box at  home. Xray negative for fracture. Evaluated by vascular ortho, no signs of compartment syndrome. As per ortho, WBAT and follow up as o/p in 2 weeks.     1. Traumatic Swelling of left calf   Likely secondary to gastrocnemius Muscle rupture  Xrays negative for fracture  Pain control  Ortho eval appreciated - wbat, no s/o compartment syndrome, f/u o/p in 2 weeks.  PT to see patient  Likely home after physical therapy evaluates pt    2. Hypokalemia  Will replete    3. Leukocytosis  Likely reactive  Pt asymptomatic  VSS, afebrile  Will monitor/trend    4. DM - hold OHA, SSI while in house    5. Schizoaffective - stable at present - ct home meds    6. h/o irregular Heart beat in past - Ct metoprolol with holding paramenters - pindolol not available on formulary, resume pindolol on discharge    7. Anemia - ct Iron supplement    8. Osteogenesis Imperfecta - Supportive care, PT eval if pain controlled.     9. DVT px - lovenox 52yr old male with PMH of HTN, DM, HPL, Osteogenesis imperfecta, h/o Disruptive behaviour, schizoaffective disorder being seen at [facility] for gastrocnemius tear s/p trip/fall over box at  home. Xray negative for fracture. Evaluated by vascular ortho, no signs of compartment syndrome. As per ortho, WBAT and follow up as o/p in 2 weeks.     1. Traumatic Swelling of left calf   Likely secondary to gastrocnemius Muscle rupture  Xrays negative for fracture  Pain control  Ortho eval appreciated - wbat, no s/o compartment syndrome, f/u o/p in 2 weeks. Ortho signed off.   PT to see patient  Likely home after physical therapy evaluates pt    2. Hypokalemia  Will replete    3. Leukocytosis  Likely reactive  Pt asymptomatic  VSS, afebrile  Will monitor/trend    4. DM - hold OHA, SSI while in house    5. Schizoaffective - stable at present - ct home meds    6. h/o irregular Heart beat in past - Ct metoprolol with holding paramenters - pindolol not available on formulary, resume pindolol on discharge    7. Anemia - ct Iron supplement    8. Osteogenesis Imperfecta - Supportive care, PT eval if pain controlled.     9. DVT px - lovenox

## 2018-03-06 NOTE — PROGRESS NOTE ADULT - SUBJECTIVE AND OBJECTIVE BOX
Patient seen and eval at bedside. Patient states the left calf/lower leg feels much better. Patient states numbness he had felt in left foot is improved. No other complaints.    Vital Signs Last 24 Hrs  T(C): 37.1 (06 Mar 2018 07:36), Max: 37.4 (06 Mar 2018 05:17)  T(F): 98.8 (06 Mar 2018 07:36), Max: 99.4 (06 Mar 2018 05:17)  HR: 83 (06 Mar 2018 07:36) (78 - 90)  BP: 114/69 (06 Mar 2018 07:36) (114/69 - 137/96)  BP(mean): --  RR: 18 (06 Mar 2018 07:36) (18 - 22)  SpO2: 98% (06 Mar 2018 07:36) (96% - 100%)    PE: NAD, alert awake  Left LE: Posterior splint applied. Calf swollen, compressible, significantly softer than yesterday.  EHL/TA/GS/FHL intact, no pain or discomfort with passive ROM of hallux. Gross SILT distally  Cap refill brisk, < 2 sec, no cyanosis    A/P: left calf tear/strain, observe for compartment syndrome    ·	PT - WBAT left LE, may need CAM boot, will d/w Attending  ·	No s/o compartment syndrome  ·	Cont elevation left LE as much as possible  ·	Ortho stable for DC  ·	F/u outpatient in office in 2 weeks Dr. Zamarripa

## 2018-03-07 ENCOUNTER — TRANSCRIPTION ENCOUNTER (OUTPATIENT)
Age: 53
End: 2018-03-07

## 2018-03-07 VITALS
OXYGEN SATURATION: 99 % | SYSTOLIC BLOOD PRESSURE: 133 MMHG | RESPIRATION RATE: 18 BRPM | TEMPERATURE: 98 F | DIASTOLIC BLOOD PRESSURE: 78 MMHG | HEART RATE: 58 BPM

## 2018-03-07 LAB
ALBUMIN SERPL ELPH-MCNC: 4.1 G/DL — SIGNIFICANT CHANGE UP (ref 3.3–5.2)
ALP SERPL-CCNC: 40 U/L — SIGNIFICANT CHANGE UP (ref 40–120)
ALT FLD-CCNC: 36 U/L — SIGNIFICANT CHANGE UP
ANION GAP SERPL CALC-SCNC: 14 MMOL/L — SIGNIFICANT CHANGE UP (ref 5–17)
AST SERPL-CCNC: 62 U/L — HIGH
BILIRUB SERPL-MCNC: 0.2 MG/DL — LOW (ref 0.4–2)
BUN SERPL-MCNC: 20 MG/DL — SIGNIFICANT CHANGE UP (ref 8–20)
CALCIUM SERPL-MCNC: 8.9 MG/DL — SIGNIFICANT CHANGE UP (ref 8.6–10.2)
CHLORIDE SERPL-SCNC: 100 MMOL/L — SIGNIFICANT CHANGE UP (ref 98–107)
CO2 SERPL-SCNC: 26 MMOL/L — SIGNIFICANT CHANGE UP (ref 22–29)
CREAT SERPL-MCNC: 0.71 MG/DL — SIGNIFICANT CHANGE UP (ref 0.5–1.3)
GLUCOSE BLDC GLUCOMTR-MCNC: 146 MG/DL — HIGH (ref 70–99)
GLUCOSE BLDC GLUCOMTR-MCNC: 247 MG/DL — HIGH (ref 70–99)
GLUCOSE SERPL-MCNC: 158 MG/DL — HIGH (ref 70–115)
HCT VFR BLD CALC: 30.2 % — LOW (ref 42–52)
HGB BLD-MCNC: 9.4 G/DL — LOW (ref 14–18)
MCHC RBC-ENTMCNC: 24.4 PG — LOW (ref 27–31)
MCHC RBC-ENTMCNC: 31.1 G/DL — LOW (ref 32–36)
MCV RBC AUTO: 78.2 FL — LOW (ref 80–94)
PLATELET # BLD AUTO: 229 K/UL — SIGNIFICANT CHANGE UP (ref 150–400)
POTASSIUM SERPL-MCNC: 3.4 MMOL/L — LOW (ref 3.5–5.3)
POTASSIUM SERPL-SCNC: 3.4 MMOL/L — LOW (ref 3.5–5.3)
PROT SERPL-MCNC: 6.8 G/DL — SIGNIFICANT CHANGE UP (ref 6.6–8.7)
RBC # BLD: 3.86 M/UL — LOW (ref 4.6–6.2)
RBC # FLD: 23.3 % — HIGH (ref 11–15.6)
SODIUM SERPL-SCNC: 140 MMOL/L — SIGNIFICANT CHANGE UP (ref 135–145)
WBC # BLD: 9.9 K/UL — SIGNIFICANT CHANGE UP (ref 4.8–10.8)
WBC # FLD AUTO: 9.9 K/UL — SIGNIFICANT CHANGE UP (ref 4.8–10.8)

## 2018-03-07 PROCEDURE — 36415 COLL VENOUS BLD VENIPUNCTURE: CPT

## 2018-03-07 PROCEDURE — 73562 X-RAY EXAM OF KNEE 3: CPT

## 2018-03-07 PROCEDURE — 80053 COMPREHEN METABOLIC PANEL: CPT

## 2018-03-07 PROCEDURE — 99285 EMERGENCY DEPT VISIT HI MDM: CPT | Mod: 25

## 2018-03-07 PROCEDURE — 97163 PT EVAL HIGH COMPLEX 45 MIN: CPT

## 2018-03-07 PROCEDURE — 85027 COMPLETE CBC AUTOMATED: CPT

## 2018-03-07 PROCEDURE — 82962 GLUCOSE BLOOD TEST: CPT

## 2018-03-07 PROCEDURE — 73590 X-RAY EXAM OF LOWER LEG: CPT

## 2018-03-07 PROCEDURE — 82550 ASSAY OF CK (CPK): CPT

## 2018-03-07 PROCEDURE — 99238 HOSP IP/OBS DSCHRG MGMT 30/<: CPT

## 2018-03-07 PROCEDURE — 82553 CREATINE MB FRACTION: CPT

## 2018-03-07 PROCEDURE — 83036 HEMOGLOBIN GLYCOSYLATED A1C: CPT

## 2018-03-07 PROCEDURE — 80048 BASIC METABOLIC PNL TOTAL CA: CPT

## 2018-03-07 RX ORDER — MAGNESIUM HYDROXIDE 400 MG/1
30 TABLET, CHEWABLE ORAL DAILY
Qty: 0 | Refills: 0 | Status: DISCONTINUED | OUTPATIENT
Start: 2018-03-07 | End: 2018-03-07

## 2018-03-07 RX ORDER — POTASSIUM CHLORIDE 20 MEQ
40 PACKET (EA) ORAL ONCE
Qty: 0 | Refills: 0 | Status: COMPLETED | OUTPATIENT
Start: 2018-03-07 | End: 2018-03-07

## 2018-03-07 RX ADMIN — Medication 400 MILLIGRAM(S): at 12:57

## 2018-03-07 RX ADMIN — Medication 500 MILLIGRAM(S): at 11:58

## 2018-03-07 RX ADMIN — MAGNESIUM HYDROXIDE 30 MILLILITER(S): 400 TABLET, CHEWABLE ORAL at 11:58

## 2018-03-07 RX ADMIN — Medication 40 MILLIEQUIVALENT(S): at 15:09

## 2018-03-07 RX ADMIN — Medication 2 GRAM(S): at 05:31

## 2018-03-07 RX ADMIN — Medication 400 MILLIGRAM(S): at 11:57

## 2018-03-07 RX ADMIN — Medication 25 MILLIGRAM(S): at 05:31

## 2018-03-07 RX ADMIN — ENOXAPARIN SODIUM 40 MILLIGRAM(S): 100 INJECTION SUBCUTANEOUS at 11:58

## 2018-03-07 RX ADMIN — Medication 100 MICROGRAM(S): at 05:31

## 2018-03-07 RX ADMIN — Medication 325 MILLIGRAM(S): at 11:58

## 2018-03-07 NOTE — PROGRESS NOTE ADULT - ATTENDING COMMENTS
pt assessed at bedside.   agree with above.   Dc today if able to ambulate with PT
pt seen and examined at bedside  Pain some improved but very anxious to bear weight and walk.  Will get PT eval to see if can be safely discharged. He lives in [place] by self.  ct pain control.  hypokalemia - replete

## 2018-03-07 NOTE — DISCHARGE NOTE ADULT - PATIENT PORTAL LINK FT
You can access the [facility] Portal, offered by [facility], by registering with the following website: [email or web address]

## 2018-03-07 NOTE — PHYSICAL THERAPY INITIAL EVALUATION ADULT - MUSCLE TONE ASSESSMENT, REHAB EVAL
bilateral lower extremities/bilateral upper extremities/normal
bilateral lower extremities/bilateral upper extremities/normal

## 2018-03-07 NOTE — DISCHARGE NOTE ADULT - PLAN OF CARE
improving Pt to follow up with Dr. Andres in 2 weeks from discharge  Pt to wear CAM walker for ambulation and use rolling walker  CAM walker only to be taken off when bathing  WBAT with cam walker Continue all home medications as prescribed  Follow up with PMD as needed Pt to follow up with Dr. Andres in 2 weeks from discharge  Pt cleared by physical therapy. Recommends home with home PT and ambulation with CAM boot and rolling walker. Pt instructed how to use rolling walker/cam boot.   Pt to wear CAM walker for ambulation and use rolling walker  CAM walker only to be taken off when bathing  WBAT with cam walker

## 2018-03-07 NOTE — PHYSICAL THERAPY INITIAL EVALUATION ADULT - GROSSLY INTACT, SENSORY
left lower leg,foot,ankle NT due to CAM boot/Left UE/Right UE/Right LE/Grossly Intact
Grossly Intact/left lower leg,foot,ankle NT due to CAM boot/Left UE/Right UE/Right LE

## 2018-03-07 NOTE — DISCHARGE NOTE ADULT - HOSPITAL COURSE
52yr old male with PMH of HTN, DM, HPL, Osteogenesis imperfecta, h/o Disruptive behaviour, schizoaffective disorder being seen at [facility] for gastrocnemius tear s/p trip/fall over box at  home. Xray negative for fracture. Evaluated by vascular ortho, no signs of compartment syndrome, cleared from ortho standpoint. As per ortho, WBAT with CAM boot and follow up as o/p in 2 weeks. Pt evaluated by physical thearpy, pt to WBAT with cam boot and rolling walker and to go home with home PT. Pt medically stable for dishcharge. 52yr old male with PMH of HTN, DM, HPL, Osteogenesis imperfecta, h/o Disruptive behaviour, schizoaffective disorder being seen at [facility] for gastrocnemius tear s/p trip/fall over box at  home. Xray negative for fracture. Evaluated by vascular ortho, no signs of compartment syndrome, cleared from ortho standpoint. As per ortho, WBAT with CAM boot and follow up as o/p in 2 weeks. Pt evaluated by physical thearpy, pt to WBAT with cam boot and rolling walker and to go home with home PT. Pt medically stable for discharge.

## 2018-03-07 NOTE — DISCHARGE NOTE ADULT - MEDICATION SUMMARY - MEDICATIONS TO TAKE
I will START or STAY ON the medications listed below when I get home from the hospital:    rolling walker  -- Rolling Walker for ambulation  -- Indication: For Gastronemius tear     Ativan 0.5 mg oral tablet  -- 0.5 tab(s) by mouth 2 times a day, As Needed  -- Indication: For anxiety    Janumet 50 mg-1000 mg oral tablet  -- 1 tab(s) by mouth 2 times a day  -- Indication: For DM    Zocor 40 mg oral tablet  -- 1 tab(s) by mouth once a day (at bedtime)  -- Indication: For HLD    RisperDAL 3 mg oral tablet  -- 1 tab(s) by mouth once a day (at bedtime)  -- Indication: For schizoaffective disorder    cloZAPine 200 mg oral tablet  -- 1 tab(s) by mouth once a day (at bedtime)  -- Indication: For schizoaffective disorder    pindolol 5 mg oral tablet  -- 0.5 tab(s) by mouth 2 times a day  -- Indication: For HTN    alendronate 70 mg oral tablet  -- 1 tab(s) by mouth once a week  -- Indication: For Osteogenesis imperfecta    ferrous sulfate 324 mg (65 mg elemental iron) oral delayed release tablet  -- orally once a day  -- Indication: For Osteogenesis imperfecta    Senna 8.6 mg oral tablet  -- 1 tab(s) by mouth once a day (at bedtime)  -- Indication: For constipation    Colace 100 mg oral capsule  -- 1 cap(s) by mouth once a day (at bedtime)  -- Indication: For constipation    Lovaza 1000 mg oral capsule  -- 2 cap(s) by mouth 2 times a day  -- Indication: For HLD    Synthroid 100 mcg (0.1 mg) oral tablet  -- 1 tab(s) by mouth once a day  -- Indication: For Hypothyroidism    Calcium 500+D oral tablet, chewable  -- 1 tab(s) by mouth 2 times a day  -- Indication: For Osteogenesis imperfecta    Vitamin D3 1000 intl units oral tablet  -- 1 tab(s) by mouth once a day  -- Indication: For Osteogenesis imperfecta    Vitamin C 500 mg oral capsule  -- 1 cap(s) by mouth once a day  -- Indication: For Osteogenesis imperfecta I will START or STAY ON the medications listed below when I get home from the hospital:    rolling walker  -- Rolling Walker for ambulation  -- Indication: For Gastronemius tear     Tylenol 500 mg oral tablet  -- 1 tab(s) by mouth every 6 hours x 3 days  -- Indication: For Gastrocnemius muscle tear    Ativan 0.5 mg oral tablet  -- 0.5 tab(s) by mouth 2 times a day, As Needed  -- Indication: For anxiety    Janumet 50 mg-1000 mg oral tablet  -- 1 tab(s) by mouth 2 times a day  -- Indication: For DM    Zocor 40 mg oral tablet  -- 1 tab(s) by mouth once a day (at bedtime)  -- Indication: For HLD    RisperDAL 3 mg oral tablet  -- 1 tab(s) by mouth once a day (at bedtime)  -- Indication: For schizoaffective disorder    cloZAPine 200 mg oral tablet  -- 1 tab(s) by mouth once a day (at bedtime)  -- Indication: For schizoaffective disorder    pindolol 5 mg oral tablet  -- 0.5 tab(s) by mouth 2 times a day  -- Indication: For HTN    alendronate 70 mg oral tablet  -- 1 tab(s) by mouth once a week  -- Indication: For Osteogenesis imperfecta    ferrous sulfate 324 mg (65 mg elemental iron) oral delayed release tablet  -- orally once a day  -- Indication: For Osteogenesis imperfecta    Senna 8.6 mg oral tablet  -- 1 tab(s) by mouth once a day (at bedtime)  -- Indication: For constipation    Colace 100 mg oral capsule  -- 1 cap(s) by mouth once a day (at bedtime)  -- Indication: For constipation    Lovaza 1000 mg oral capsule  -- 2 cap(s) by mouth 2 times a day  -- Indication: For HLD    Synthroid 100 mcg (0.1 mg) oral tablet  -- 1 tab(s) by mouth once a day  -- Indication: For Hypothyroidism    Calcium 500+D oral tablet, chewable  -- 1 tab(s) by mouth 2 times a day  -- Indication: For Osteogenesis imperfecta    Vitamin D3 1000 intl units oral tablet  -- 1 tab(s) by mouth once a day  -- Indication: For Osteogenesis imperfecta    Vitamin C 500 mg oral capsule  -- 1 cap(s) by mouth once a day  -- Indication: For Osteogenesis imperfecta

## 2018-03-07 NOTE — PHYSICAL THERAPY INITIAL EVALUATION ADULT - PERTINENT HX OF CURRENT PROBLEM, REHAB EVAL
52yr old male with PMH of HTN, DM< HPL, Osteogenesis imperfecta, h/o Disruptive behaviour, schizoaffective disorder presented to ER after he tripped over a box and hurt his LEft leg. He immediately noted swelling and severe pain inhis calf muscle. He was evaluated by vascular and ortho in ER< xrays Leg and Knee were negative for any fracture but there is a concern for gastrocnemius muscle rupture and possible Compartment syndrome.
52yr old male with PMH of HTN, DM< HPL, Osteogenesis imperfecta, h/o Disruptive behaviour, schizoaffective disorder presented to ER after he tripped over a box and hurt his LEft leg. He immediately noted swelling and severe pain inhis calf muscle. He was evaluated by vascular and ortho in ER< xrays Leg and Knee were negative for any fracture but there is a concern for gastrocnemius muscle rupture and possible Compartment syndrome.

## 2018-03-07 NOTE — PHYSICAL THERAPY INITIAL EVALUATION ADULT - ASSISTIVE DEVICE FOR TRANSFER: SIT/STAND, REHAB EVAL
[facility] Progress Note    Date: 2017    Name: Kuldip Rowan    MRN: [id number]         : 1978      Ms. Venu Segovia was assessed and education was provided. Ms. Klever Wallace vitals were reviewed and patient was observed for 5 minutes prior to treatment. Visit Vitals    /77 (BP 1 Location: Left arm, BP Patient Position: At rest;Sitting)    Pulse 92    Temp 97.6 °F (36.4 °C)    Resp 16    Breastfeeding No       Lab results were obtained and reviewed. Recent Results (from the past 12 hour(s))   CBC WITH 3 PART DIFF    Collection Time: 17  1:24 PM   Result Value Ref Range    WBC 6.0 4.5 - 13.0 K/uL    RBC 4.83 4.10 - 5.10 M/uL    HGB 15.0 12.0 - 16.0 g/dL    HCT 44.2 36 - 48 %    MCV 91.5 78 - 102 FL    MCH 31.1 25.0 - 35.0 PG    MCHC 33.9 31 - 37 g/dL    RDW 12.1 11.5 - 14.5 %    PLATELET 872 006 - 598 K/uL    NEUTROPHILS 54 40 - 70 %    MIXED CELLS 14 0.1 - 17 %    LYMPHOCYTES 32 14 - 44 %    ABS. NEUTROPHILS 3.3 1.8 - 9.5 K/UL    ABS. MIXED CELLS 0.8 0.0 - 2.3 K/uL    ABS. LYMPHOCYTES 1.9 1.1 - 5.9 K/UL    DF AUTOMATED         Therapeutic phlebotomy held for HCT 44.2. Patient armband removed and shredded. Ms. Venu Segovia was discharged from [place] [address] in stable condition at [phone]. She is to return on 17 at 1300 for her next appointment for CBC/phlebotomy (monthly status).     Whitney Zhang RN  2017  4:13 PM
rolling walker
rolling walker

## 2018-03-07 NOTE — PHYSICAL THERAPY INITIAL EVALUATION ADULT - DIAGNOSIS, PT EVAL
gait dysfunction due to left LE muscle injury
Pt is functionally independent and not in need of skilled PT, will no longer follow

## 2018-03-07 NOTE — PHYSICAL THERAPY INITIAL EVALUATION ADULT - ADDITIONAL COMMENTS
pt states he lives in his own studio apartment in supportive housing. apt on 1st floor with no stairs. pt amb w/cane at times prior to admit. +driving. goes to day program. states he needs to go to the med room 3x/day for meds and to cafeteria for meals, both in building.
pt states he lives in his own studio apartment in supportive housing. apt on 1st floor with no stairs. pt amb w/cane at times prior to admit. +driving. goes to day program. states he needs to go to the med room 3x/day for meds and to cafeteria for meals, both in building.

## 2018-03-07 NOTE — DISCHARGE NOTE ADULT - CARE PROVIDER_API CALL
Dashawn Zamarripa (DO), Orthopaedic Surgery  [address] [place] [address]  Phone: [phone]  Fax: [phone]

## 2018-03-07 NOTE — PROGRESS NOTE ADULT - SUBJECTIVE AND OBJECTIVE BOX
52yr old male with PMH of HTN, DM, HPL, Osteogenesis imperfecta, h/o Disruptive behaviour, schizoaffective disorder being seen at [facility] for gastrocnemius tear s/p trip/fall over box at  home.     Pt seen and examined at bedside. Pt ANO x 3, laying in bed. Pt states pain has improved since yesterday. Anxious about walking/weight bearing. Denies other medical complaints. Pt has yet to ambulate. Denies n/v, fever, chills, chest pain, SOB, numbness/tingling, increase/wrosening pain.     Interval: seen by ortho, no s/o compartment syndrome, WBAT, follow up as o/p in 2 weeks. Seen by PT, home with home PT, CAM boot and rolling walker.     MEDICATIONS  (STANDING):  ascorbic acid 500 milliGRAM(s) Oral daily  cloZAPine 200 milliGRAM(s) Oral at bedtime  dextrose 5%. 1000 milliLiter(s) (50 mL/Hr) IV Continuous <Continuous>  dextrose 50% Injectable 12.5 Gram(s) IV Push once  dextrose 50% Injectable 25 Gram(s) IV Push once  dextrose 50% Injectable 25 Gram(s) IV Push once  docusate sodium 100 milliGRAM(s) Oral at bedtime  enoxaparin Injectable 40 milliGRAM(s) SubCutaneous every 24 hours  ferrous    sulfate 325 milliGRAM(s) Oral daily  insulin lispro (HumaLOG) corrective regimen sliding scale   SubCutaneous three times a day before meals  levothyroxine 100 MICROGram(s) Oral daily  metoprolol     tartrate 25 milliGRAM(s) Oral two times a day  omega-3-Acid Ethyl Esters 2 Gram(s) Oral two times a day  potassium chloride   Powder 40 milliEquivalent(s) Oral once  risperiDONE   Tablet 3 milliGRAM(s) Oral at bedtime  senna 2 Tablet(s) Oral at bedtime  simvastatin 40 milliGRAM(s) Oral at bedtime    MEDICATIONS  (PRN):  dextrose Gel 1 Dose(s) Oral once PRN Blood Glucose LESS THAN 70 milliGRAM(s)/deciliter  glucagon  Injectable 1 milliGRAM(s) IntraMuscular once PRN Glucose LESS THAN 70 milligrams/deciliter  ibuprofen  Tablet 400 milliGRAM(s) Oral every 6 hours PRN Mild pain  LORazepam     Tablet 0.25 milliGRAM(s) Oral two times a day PRN Anxiety  morphine  - Injectable 2 milliGRAM(s) IV Push every 6 hours PRN Severe Pain (7 - 10)    Allergies  amoxicillin (Unknown)  Lamictal (Unknown)  penicillin (Unknown)    REVIEW OF SYSTEMS:  CONSTITUTIONAL: No fever, weight loss, or fatigue  RESPIRATORY: No cough, wheezing, chills or hemoptysis; No shortness of breath  CARDIOVASCULAR: No chest pain, palpitations, dizziness, or leg swelling  GASTROINTESTINAL: No abdominal or epigastric pain. No nausea, vomiting, or hematemesis; No diarrhea or constipation. No melena or hematochezia.  GENITOURINARY: No dysuria, frequency, hematuria, or incontinence  NEUROLOGICAL: No headaches, memory loss, loss of strength, numbness, or tremors  MUSCULOSKELETAL: see HPI    Vital Signs Last 24 Hrs  T(C): 37.1 (06 Mar 2018 07:36), Max: 37.4 (06 Mar 2018 05:17)  T(F): 98.8 (06 Mar 2018 07:36), Max: 99.4 (06 Mar 2018 05:17)  HR: 83 (06 Mar 2018 07:36) (78 - 90)  BP: 114/69 (06 Mar 2018 07:36) (114/69 - 137/96)  BP(mean): --  RR: 18 (06 Mar 2018 07:36) (18 - 22)  SpO2: 98% (06 Mar 2018 07:36) (96% - 100%)    PHYSICAL EXAM:  GENERAL: NAD, well-groomed, well-developed  HEAD:  Atraumatic, Normocephalic  NERVOUS SYSTEM:  Alert & Oriented X3, no neuro deficits   CHEST/LUNG: Clear to auscultation bilaterally; No rales, rhonchi, wheezing, or rubs  HEART: Regular rate and rhythm; No murmurs, rubs, or gallops  ABDOMEN: Soft, Nontender, Nondistended; Bowel sounds present  EXTREMITIES: posterior splint to LLE, calf swollen, though nontender to palpation,  Palpable distal pulses, able to move all toes.       LABS:                        9.8    11.1  )-----------( 227      ( 06 Mar 2018 08:39 )             30.8     06 Mar 2018 08:39    134    |  94     |  10.0   ----------------------------<  166    3.3     |  27.0   |  0.61     Ca    9.1        06 Mar 2018 08:39    TPro  7.0    /  Alb  4.2    /  TBili  0.3    /  DBili  x      /  AST  26     /  ALT  30     /  AlkPhos  40     05 Mar 2018 12:32      CAPILLARY BLOOD GLUCOSE  POCT Blood Glucose.: 173 mg/dL (06 Mar 2018 08:17)  POCT Blood Glucose.: 153 mg/dL (05 Mar 2018 22:45)  POCT Blood Glucose.: 141 mg/dL (05 Mar 2018 18:37)      RADIOLOGY & ADDITIONAL TESTS:

## 2018-03-07 NOTE — PHYSICAL THERAPY INITIAL EVALUATION ADULT - PATIENT/FAMILY/SIGNIFICANT OTHER GOALS STATEMENT, PT EVAL
I don't know how I am going to manage with this boot.
I don't know how I am going to manage with this boot.

## 2018-03-07 NOTE — PHYSICAL THERAPY INITIAL EVALUATION ADULT - RANGE OF MOTION EXAMINATION, REHAB EVAL
**left foot/ankle NT due to CAM boot/bilateral upper extremity ROM was WNL (within normal limits)/bilateral lower extremity was ROM was WNL (within normal limits)
except left ankle limited/no ROM deficits were identified

## 2018-03-07 NOTE — PROGRESS NOTE ADULT - ASSESSMENT
52yr old male with PMH of HTN, DM, HPL, Osteogenesis imperfecta, h/o Disruptive behaviour, schizoaffective disorder being seen at [facility] for gastrocnemius tear s/p trip/fall over box at  home. Xray negative for fracture. Evaluated by vascular ortho, no signs of compartment syndrome. As per ortho, WBAT and follow up as o/p in 2 weeks. Cleared by PT, home with home PT, cam and rolling walker.     1. Traumatic Swelling of left calf   Likely secondary to gastrocnemius Muscle tear  Xrays negative for fracture  Pain control  Ortho eval appreciated - wbat, no s/o compartment syndrome, f/u o/p in 2 weeks. Ortho signed off.   PT - home with home PT, cam and rolling walker    2. Hypokalemia  Will replete    3. Leukocytosis  Likely reactive  Pt asymptomatic  VSS, afebrile  Will monitor/trend    4. DM - hold OHA, SSI while in house    5. Schizoaffective - stable at present - ct home meds    6. h/o irregular Heart beat in past - Ct metoprolol with holding paramenters - pindolol not available on formulary, resume pindolol on discharge    7. Anemia - ct Iron supplement    8. Osteogenesis Imperfecta - Supportive care, PT eval if pain controlled.     9. DVT px - lovenox

## 2018-03-07 NOTE — DISCHARGE NOTE ADULT - CARE PLAN
Principal Discharge DX:	Gastrocnemius muscle tear  Goal:	improving  Assessment and plan of treatment:	Pt to follow up with Dr. Andres in 2 weeks from discharge  Pt to wear CAM walker for ambulation and use rolling walker  CAM walker only to be taken off when bathing  WBAT with cam walker  Secondary Diagnosis:	Diabetes  Assessment and plan of treatment:	Continue all home medications as prescribed  Follow up with PMD as needed  Secondary Diagnosis:	High cholesterol  Assessment and plan of treatment:	Continue all home medications as prescribed  Follow up with PMD as needed  Secondary Diagnosis:	Osteogenesis imperfecta  Assessment and plan of treatment:	Continue all home medications as prescribed  Follow up with PMD as needed  Secondary Diagnosis:	Schizo-affective psychosis  Assessment and plan of treatment:	Continue all home medications as prescribed  Follow up with PMD as needed  Secondary Diagnosis:	Hypothyroidism  Assessment and plan of treatment:	Continue all home medications as prescribed  Follow up with PMD as needed Principal Discharge DX:	Gastrocnemius muscle tear  Goal:	improving  Assessment and plan of treatment:	Pt to follow up with Dr. Andres in 2 weeks from discharge  Pt cleared by physical therapy. Recommends home with home PT and ambulation with CAM boot and rolling walker. Pt instructed how to use rolling walker/cam boot.   Pt to wear CAM walker for ambulation and use rolling walker  CAM walker only to be taken off when bathing  WBAT with cam walker  Secondary Diagnosis:	Diabetes  Assessment and plan of treatment:	Continue all home medications as prescribed  Follow up with PMD as needed  Secondary Diagnosis:	High cholesterol  Assessment and plan of treatment:	Continue all home medications as prescribed  Follow up with PMD as needed  Secondary Diagnosis:	Osteogenesis imperfecta  Assessment and plan of treatment:	Continue all home medications as prescribed  Follow up with PMD as needed  Secondary Diagnosis:	Schizo-affective psychosis  Assessment and plan of treatment:	Continue all home medications as prescribed  Follow up with PMD as needed  Secondary Diagnosis:	Hypothyroidism  Assessment and plan of treatment:	Continue all home medications as prescribed  Follow up with PMD as needed

## 2018-03-07 NOTE — PHYSICAL THERAPY INITIAL EVALUATION ADULT - PREDICTED DURATION OF THERAPY (DAYS/WKS), PT EVAL
pt independent with mobility on level surfaces with RW and left CAM boot. will not follow.
pt independent with mobility on level surfaces with RW and left CAM boot. will not follow.

## 2018-07-18 PROBLEM — E78.00 PURE HYPERCHOLESTEROLEMIA, UNSPECIFIED: Chronic | Status: ACTIVE | Noted: 2018-03-05

## 2018-07-18 PROBLEM — E03.9 HYPOTHYROIDISM, UNSPECIFIED: Chronic | Status: ACTIVE | Noted: 2018-03-05

## 2018-07-18 PROBLEM — E11.9 TYPE 2 DIABETES MELLITUS WITHOUT COMPLICATIONS: Chronic | Status: ACTIVE | Noted: 2018-03-05

## 2018-09-12 ENCOUNTER — RECORD ABSTRACTING (OUTPATIENT)
Age: 53
End: 2018-09-12

## 2018-09-12 DIAGNOSIS — E10.65 TYPE 1 DIABETES MELLITUS WITH HYPERGLYCEMIA: ICD-10-CM

## 2018-09-20 ENCOUNTER — RX RENEWAL (OUTPATIENT)
Age: 53
End: 2018-09-20

## 2018-09-27 ENCOUNTER — APPOINTMENT (OUTPATIENT)
Dept: GASTROENTEROLOGY | Facility: CLINIC | Age: 53
End: 2018-09-27
Payer: MEDICARE

## 2018-09-27 VITALS
WEIGHT: 159 LBS | DIASTOLIC BLOOD PRESSURE: 70 MMHG | BODY MASS INDEX: 31.22 KG/M2 | HEIGHT: 60 IN | RESPIRATION RATE: 16 BRPM | OXYGEN SATURATION: 98 % | HEART RATE: 85 BPM | SYSTOLIC BLOOD PRESSURE: 110 MMHG

## 2018-09-27 PROCEDURE — 82272 OCCULT BLD FECES 1-3 TESTS: CPT

## 2018-09-27 PROCEDURE — 99204 OFFICE O/P NEW MOD 45 MIN: CPT

## 2018-10-15 ENCOUNTER — APPOINTMENT (OUTPATIENT)
Dept: ENDOCRINOLOGY | Facility: CLINIC | Age: 53
End: 2018-10-15

## 2018-12-03 ENCOUNTER — RESULT REVIEW (OUTPATIENT)
Age: 53
End: 2018-12-03

## 2018-12-06 ENCOUNTER — RESULT REVIEW (OUTPATIENT)
Age: 53
End: 2018-12-06

## 2018-12-07 ENCOUNTER — RESULT REVIEW (OUTPATIENT)
Age: 53
End: 2018-12-07

## 2018-12-07 PROBLEM — R94.5 ABNORMAL LFTS: Status: ACTIVE | Noted: 2018-12-07

## 2018-12-11 ENCOUNTER — RESULT REVIEW (OUTPATIENT)
Age: 53
End: 2018-12-11

## 2018-12-11 ENCOUNTER — APPOINTMENT (OUTPATIENT)
Dept: GASTROENTEROLOGY | Facility: GI CENTER | Age: 53
End: 2018-12-11
Payer: MEDICARE

## 2018-12-11 ENCOUNTER — OUTPATIENT (OUTPATIENT)
Dept: OUTPATIENT SERVICES | Facility: HOSPITAL | Age: 53
LOS: 1 days | End: 2018-12-11
Payer: MEDICARE

## 2018-12-11 DIAGNOSIS — R94.5 ABNORMAL RESULTS OF LIVER FUNCTION STUDIES: ICD-10-CM

## 2018-12-11 DIAGNOSIS — D50.9 IRON DEFICIENCY ANEMIA, UNSPECIFIED: ICD-10-CM

## 2018-12-11 DIAGNOSIS — K58.9 IRRITABLE BOWEL SYNDROME WITHOUT DIARRHEA: ICD-10-CM

## 2018-12-11 DIAGNOSIS — K58.9 IRRITABLE BOWEL SYNDROME W/OUT DIARRHEA: ICD-10-CM

## 2018-12-11 DIAGNOSIS — S82.899A OTHER FRACTURE OF UNSPECIFIED LOWER LEG, INITIAL ENCOUNTER FOR CLOSED FRACTURE: Chronic | ICD-10-CM

## 2018-12-11 PROCEDURE — 45380 COLONOSCOPY AND BIOPSY: CPT

## 2018-12-11 PROCEDURE — 82962 GLUCOSE BLOOD TEST: CPT

## 2018-12-11 PROCEDURE — 88305 TISSUE EXAM BY PATHOLOGIST: CPT

## 2018-12-11 PROCEDURE — 88305 TISSUE EXAM BY PATHOLOGIST: CPT | Mod: 26

## 2018-12-12 ENCOUNTER — FORM ENCOUNTER (OUTPATIENT)
Age: 53
End: 2018-12-12

## 2018-12-13 ENCOUNTER — OUTPATIENT (OUTPATIENT)
Dept: OUTPATIENT SERVICES | Facility: HOSPITAL | Age: 53
LOS: 1 days | End: 2018-12-13
Payer: MEDICARE

## 2018-12-13 ENCOUNTER — APPOINTMENT (OUTPATIENT)
Dept: CT IMAGING | Facility: CLINIC | Age: 53
End: 2018-12-13
Payer: MEDICARE

## 2018-12-13 DIAGNOSIS — S82.899A OTHER FRACTURE OF UNSPECIFIED LOWER LEG, INITIAL ENCOUNTER FOR CLOSED FRACTURE: Chronic | ICD-10-CM

## 2018-12-13 DIAGNOSIS — D49.0 NEOPLASM OF UNSPECIFIED BEHAVIOR OF DIGESTIVE SYSTEM: ICD-10-CM

## 2018-12-13 LAB — SURGICAL PATHOLOGY FINAL REPORT - CH: SIGNIFICANT CHANGE UP

## 2018-12-13 PROCEDURE — 74177 CT ABD & PELVIS W/CONTRAST: CPT | Mod: 26

## 2018-12-13 PROCEDURE — 74177 CT ABD & PELVIS W/CONTRAST: CPT

## 2018-12-17 ENCOUNTER — RESULT REVIEW (OUTPATIENT)
Age: 53
End: 2018-12-17

## 2018-12-19 ENCOUNTER — MOBILE ON CALL (OUTPATIENT)
Age: 53
End: 2018-12-19

## 2018-12-20 ENCOUNTER — APPOINTMENT (OUTPATIENT)
Dept: COLORECTAL SURGERY | Facility: CLINIC | Age: 53
End: 2018-12-20
Payer: MEDICARE

## 2018-12-20 ENCOUNTER — RESULT REVIEW (OUTPATIENT)
Age: 53
End: 2018-12-20

## 2018-12-20 VITALS
SYSTOLIC BLOOD PRESSURE: 110 MMHG | HEIGHT: 60 IN | DIASTOLIC BLOOD PRESSURE: 64 MMHG | WEIGHT: 159 LBS | HEART RATE: 84 BPM | TEMPERATURE: 98.1 F | BODY MASS INDEX: 31.22 KG/M2

## 2018-12-20 PROCEDURE — 99205 OFFICE O/P NEW HI 60 MIN: CPT

## 2018-12-23 RX ORDER — SODIUM SULFATE, POTASSIUM SULFATE, MAGNESIUM SULFATE 17.5; 3.13; 1.6 G/ML; G/ML; G/ML
17.5-3.13-1.6 SOLUTION, CONCENTRATE ORAL
Qty: 1 | Refills: 0 | Status: COMPLETED | COMMUNITY
Start: 2018-09-27 | End: 2018-12-23

## 2018-12-27 ENCOUNTER — RX RENEWAL (OUTPATIENT)
Age: 53
End: 2018-12-27

## 2018-12-27 ENCOUNTER — APPOINTMENT (OUTPATIENT)
Dept: CT IMAGING | Facility: CLINIC | Age: 53
End: 2018-12-27

## 2018-12-31 ENCOUNTER — OUTPATIENT (OUTPATIENT)
Dept: OUTPATIENT SERVICES | Facility: HOSPITAL | Age: 53
LOS: 1 days | Discharge: HOME | End: 2018-12-31

## 2018-12-31 DIAGNOSIS — C18.9 MALIGNANT NEOPLASM OF COLON, UNSPECIFIED: ICD-10-CM

## 2018-12-31 DIAGNOSIS — S82.899A OTHER FRACTURE OF UNSPECIFIED LOWER LEG, INITIAL ENCOUNTER FOR CLOSED FRACTURE: Chronic | ICD-10-CM

## 2019-01-02 ENCOUNTER — RECORD ABSTRACTING (OUTPATIENT)
Age: 54
End: 2019-01-02

## 2019-01-02 DIAGNOSIS — Z86.39 PERSONAL HISTORY OF OTHER ENDOCRINE, NUTRITIONAL AND METABOLIC DISEASE: ICD-10-CM

## 2019-01-02 DIAGNOSIS — Z83.49 FAMILY HISTORY OF OTHER ENDOCRINE, NUTRITIONAL AND METABOLIC DISEASES: ICD-10-CM

## 2019-01-02 DIAGNOSIS — Z86.59 PERSONAL HISTORY OF OTHER MENTAL AND BEHAVIORAL DISORDERS: ICD-10-CM

## 2019-01-02 DIAGNOSIS — Z82.69 FAMILY HISTORY OF OTHER DISEASES OF THE MUSCULOSKELETAL SYSTEM AND CONNECTIVE TISSUE: ICD-10-CM

## 2019-01-02 DIAGNOSIS — Z87.39 PERSONAL HISTORY OF OTHER DISEASES OF THE MUSCULOSKELETAL SYSTEM AND CONNECTIVE TISSUE: ICD-10-CM

## 2019-01-02 DIAGNOSIS — Z78.9 OTHER SPECIFIED HEALTH STATUS: ICD-10-CM

## 2019-01-07 ENCOUNTER — APPOINTMENT (OUTPATIENT)
Dept: ENDOCRINOLOGY | Facility: CLINIC | Age: 54
End: 2019-01-07
Payer: MEDICARE

## 2019-01-07 VITALS
WEIGHT: 163 LBS | HEART RATE: 94 BPM | HEIGHT: 60 IN | BODY MASS INDEX: 32 KG/M2 | SYSTOLIC BLOOD PRESSURE: 112 MMHG | DIASTOLIC BLOOD PRESSURE: 70 MMHG

## 2019-01-07 LAB — GLUCOSE BLDC GLUCOMTR-MCNC: 103

## 2019-01-07 PROCEDURE — 82962 GLUCOSE BLOOD TEST: CPT

## 2019-01-07 PROCEDURE — 99214 OFFICE O/P EST MOD 30 MIN: CPT | Mod: 25

## 2019-01-07 RX ORDER — ATORVASTATIN CALCIUM 40 MG/1
40 TABLET, FILM COATED ORAL
Refills: 0 | Status: DISCONTINUED | COMMUNITY
End: 2019-01-07

## 2019-01-07 NOTE — HISTORY OF PRESENT ILLNESS
[FreeTextEntry1] : Patient is seen today for a routine diabetic follow up.  Also with hypothyroidism and osteogenesis imperfecta.  \par Quality:  type 2\par Severity:  moderate\par Duration of diabetes:  since 2007\par Associated Complications/ Symptoms:  no known microvascular complications\par Modifying Factors:  Better with medication\par \par Patient tests blood glucose 1 times per day.\par \par Current Diabetic Medication Regimen:\par Janumet 50/500 mg BID\par \par Seeing Rheumatology for OI (Dr. Underwood).  Now back on ALendronate and may be switching to IV formulation.  \par Also hx of hyperprolactinemia from psych meds.  \par \par Recently went for C-scope and has been diagnosed with colon cancer and has seen surgery.  May have a liver met.

## 2019-01-07 NOTE — ASSESSMENT
[FreeTextEntry1] : 53 year old male with type 2 DM, hypothyroidism, hyperlipidemia and Osteogenesis imperfecta.  His diabetes is well controlled.  \par \par 1.  Type 2 DM-   continue current dose of Janumet. \par 2.  Hypothyroidism-  euthyroid, continue current dose of Synthroid. \par 3.  Hyperlipidemia-  Lipitor was D/C'ed in setting of elevated LFTs, which are now normal.  Will resume lower dose of Atorvastatin at 20 mg daily. \par 4.  OI-  as per rheum.

## 2019-01-07 NOTE — PHYSICAL EXAM
[No Acute Distress] : no acute distress [Well Nourished] : well nourished [Well Developed] : well developed [No Proptosis] : no proptosis [No Neck Mass] : no neck mass was observed [No LAD] : no lymphadenopathy [Thyroid Not Enlarged] : the thyroid was not enlarged [No Thyroid Nodules] : there were no palpable thyroid nodules [Normal Rate and Effort] : normal respiratory rhythm and effort [Clear to Auscultation] : lungs were clear to auscultation bilaterally [Normal Rate] : heart rate was normal  [Normal S1, S2] : normal S1 and S2 [Regular Rhythm] : with a regular rhythm [Murmurs] : no murmurs [No Edema] : there was no peripheral edema [Normal Affect] : the affect was normal [Normal Mood] : the mood was normal [Acanthosis Nigricans] : no acanthosis nigricans [de-identified] : blue sclera

## 2019-01-07 NOTE — REVIEW OF SYSTEMS
[Recent Weight Gain (___ Lbs)] : recent [unfilled] ~Ulb weight gain [Palpitations] : palpitations [Constipation] : constipation [Shortness Of Breath] : no shortness of breath [Polyuria] : no polyuria [Polydipsia] : no polydipsia [FreeTextEntry5] : seeing cardiology

## 2019-01-08 ENCOUNTER — FORM ENCOUNTER (OUTPATIENT)
Age: 54
End: 2019-01-08

## 2019-01-09 ENCOUNTER — OUTPATIENT (OUTPATIENT)
Dept: OUTPATIENT SERVICES | Facility: HOSPITAL | Age: 54
LOS: 1 days | End: 2019-01-09
Payer: MEDICARE

## 2019-01-09 ENCOUNTER — APPOINTMENT (OUTPATIENT)
Dept: ULTRASOUND IMAGING | Facility: CLINIC | Age: 54
End: 2019-01-09
Payer: MEDICARE

## 2019-01-09 ENCOUNTER — APPOINTMENT (OUTPATIENT)
Dept: CT IMAGING | Facility: CLINIC | Age: 54
End: 2019-01-09
Payer: MEDICARE

## 2019-01-09 DIAGNOSIS — S82.899A OTHER FRACTURE OF UNSPECIFIED LOWER LEG, INITIAL ENCOUNTER FOR CLOSED FRACTURE: Chronic | ICD-10-CM

## 2019-01-09 DIAGNOSIS — C18.2 MALIGNANT NEOPLASM OF ASCENDING COLON: ICD-10-CM

## 2019-01-09 DIAGNOSIS — R94.5 ABNORMAL RESULTS OF LIVER FUNCTION STUDIES: ICD-10-CM

## 2019-01-09 PROCEDURE — 76700 US EXAM ABDOM COMPLETE: CPT

## 2019-01-09 PROCEDURE — 76700 US EXAM ABDOM COMPLETE: CPT | Mod: 26

## 2019-01-09 PROCEDURE — 71260 CT THORAX DX C+: CPT | Mod: 26

## 2019-01-09 PROCEDURE — 71260 CT THORAX DX C+: CPT

## 2019-01-11 ENCOUNTER — OTHER (OUTPATIENT)
Age: 54
End: 2019-01-11

## 2019-01-14 ENCOUNTER — RESULT REVIEW (OUTPATIENT)
Age: 54
End: 2019-01-14

## 2019-01-14 ENCOUNTER — APPOINTMENT (OUTPATIENT)
Dept: HEMATOLOGY ONCOLOGY | Facility: CLINIC | Age: 54
End: 2019-01-14
Payer: MEDICARE

## 2019-01-14 ENCOUNTER — OUTPATIENT (OUTPATIENT)
Dept: OUTPATIENT SERVICES | Facility: HOSPITAL | Age: 54
LOS: 1 days | End: 2019-01-14
Payer: MEDICARE

## 2019-01-14 VITALS
SYSTOLIC BLOOD PRESSURE: 108 MMHG | TEMPERATURE: 98.3 F | DIASTOLIC BLOOD PRESSURE: 69 MMHG | HEIGHT: 60 IN | OXYGEN SATURATION: 100 % | HEART RATE: 93 BPM | BODY MASS INDEX: 32.39 KG/M2 | WEIGHT: 165 LBS

## 2019-01-14 DIAGNOSIS — Z80.42 FAMILY HISTORY OF MALIGNANT NEOPLASM OF PROSTATE: ICD-10-CM

## 2019-01-14 DIAGNOSIS — Z80.9 FAMILY HISTORY OF MALIGNANT NEOPLASM, UNSPECIFIED: ICD-10-CM

## 2019-01-14 DIAGNOSIS — D64.9 ANEMIA, UNSPECIFIED: ICD-10-CM

## 2019-01-14 DIAGNOSIS — K62.5 HEMORRHAGE OF ANUS AND RECTUM: ICD-10-CM

## 2019-01-14 DIAGNOSIS — C18.2 MALIGNANT NEOPLASM OF ASCENDING COLON: ICD-10-CM

## 2019-01-14 DIAGNOSIS — R94.5 ABNORMAL RESULTS OF LIVER FUNCTION STUDIES: ICD-10-CM

## 2019-01-14 DIAGNOSIS — S82.899A OTHER FRACTURE OF UNSPECIFIED LOWER LEG, INITIAL ENCOUNTER FOR CLOSED FRACTURE: Chronic | ICD-10-CM

## 2019-01-14 LAB
BASOPHILS # BLD AUTO: 0.1 K/UL — SIGNIFICANT CHANGE UP (ref 0–0.2)
BASOPHILS NFR BLD AUTO: 0.8 % — SIGNIFICANT CHANGE UP (ref 0–2)
BLD GP AB SCN SERPL QL: SIGNIFICANT CHANGE UP
EOSINOPHIL # BLD AUTO: 0.3 K/UL — SIGNIFICANT CHANGE UP (ref 0–0.5)
EOSINOPHIL NFR BLD AUTO: 2.8 % — SIGNIFICANT CHANGE UP (ref 0–6)
HCT VFR BLD CALC: 24.9 % — LOW (ref 39–50)
HGB BLD-MCNC: 7.7 G/DL — LOW (ref 13–17)
LYMPHOCYTES # BLD AUTO: 2 K/UL — SIGNIFICANT CHANGE UP (ref 1–3.3)
LYMPHOCYTES # BLD AUTO: 20.8 % — SIGNIFICANT CHANGE UP (ref 13–44)
MCHC RBC-ENTMCNC: 21.2 PG — LOW (ref 27–34)
MCHC RBC-ENTMCNC: 31 GM/DL — LOW (ref 32–36)
MCV RBC AUTO: 68.4 FL — LOW (ref 80–100)
MONOCYTES # BLD AUTO: 0.8 K/UL — SIGNIFICANT CHANGE UP (ref 0–0.9)
MONOCYTES NFR BLD AUTO: 8.2 % — SIGNIFICANT CHANGE UP (ref 2–14)
NEUTROPHILS # BLD AUTO: 6.5 K/UL — SIGNIFICANT CHANGE UP (ref 1.8–7.4)
NEUTROPHILS NFR BLD AUTO: 67.4 % — SIGNIFICANT CHANGE UP (ref 43–77)
PLATELET # BLD AUTO: 332 K/UL — SIGNIFICANT CHANGE UP (ref 150–400)
RBC # BLD: 3.64 M/UL — LOW (ref 4.2–5.8)
RBC # FLD: 14.4 % — SIGNIFICANT CHANGE UP (ref 10.3–14.5)
TYPE + AB SCN PNL BLD: SIGNIFICANT CHANGE UP
WBC # BLD: 9.6 K/UL — SIGNIFICANT CHANGE UP (ref 3.8–10.5)
WBC # FLD AUTO: 9.6 K/UL — SIGNIFICANT CHANGE UP (ref 3.8–10.5)

## 2019-01-14 PROCEDURE — 99205 OFFICE O/P NEW HI 60 MIN: CPT

## 2019-01-14 RX ORDER — ACETAMINOPHEN 500 MG
650 TABLET ORAL ONCE
Qty: 0 | Refills: 0 | Status: DISCONTINUED | OUTPATIENT
Start: 2019-01-15 | End: 2019-01-30

## 2019-01-14 RX ORDER — DIPHENHYDRAMINE HCL 50 MG
25 CAPSULE ORAL ONCE
Qty: 0 | Refills: 0 | Status: DISCONTINUED | OUTPATIENT
Start: 2019-01-15 | End: 2019-01-30

## 2019-01-14 NOTE — REVIEW OF SYSTEMS
[Fatigue] : fatigue [Palpitations] : palpitations [Shortness Of Breath] : shortness of breath [Constipation] : constipation [Anxiety] : anxiety [Negative] : Allergic/Immunologic

## 2019-01-15 ENCOUNTER — OUTPATIENT (OUTPATIENT)
Dept: OUTPATIENT SERVICES | Facility: HOSPITAL | Age: 54
LOS: 1 days | End: 2019-01-15
Payer: MEDICARE

## 2019-01-15 ENCOUNTER — RESULT REVIEW (OUTPATIENT)
Age: 54
End: 2019-01-15

## 2019-01-15 DIAGNOSIS — D64.9 ANEMIA, UNSPECIFIED: ICD-10-CM

## 2019-01-15 DIAGNOSIS — C18.2 MALIGNANT NEOPLASM OF ASCENDING COLON: ICD-10-CM

## 2019-01-15 DIAGNOSIS — S82.899A OTHER FRACTURE OF UNSPECIFIED LOWER LEG, INITIAL ENCOUNTER FOR CLOSED FRACTURE: Chronic | ICD-10-CM

## 2019-01-15 DIAGNOSIS — R94.5 ABNORMAL RESULTS OF LIVER FUNCTION STUDIES: ICD-10-CM

## 2019-01-15 PROCEDURE — 36430 TRANSFUSION BLD/BLD COMPNT: CPT

## 2019-01-15 PROCEDURE — P9016: CPT

## 2019-01-15 PROCEDURE — 36415 COLL VENOUS BLD VENIPUNCTURE: CPT

## 2019-01-15 PROCEDURE — 86923 COMPATIBILITY TEST ELECTRIC: CPT

## 2019-01-15 PROCEDURE — 86901 BLOOD TYPING SEROLOGIC RH(D): CPT

## 2019-01-15 PROCEDURE — 86850 RBC ANTIBODY SCREEN: CPT

## 2019-01-15 PROCEDURE — 86900 BLOOD TYPING SEROLOGIC ABO: CPT

## 2019-01-16 LAB — SURGICAL PATHOLOGY STUDY: SIGNIFICANT CHANGE UP

## 2019-01-17 PROBLEM — K62.5 RECTAL BLEEDING: Status: ACTIVE | Noted: 2018-09-27

## 2019-01-17 NOTE — HISTORY OF PRESENT ILLNESS
[de-identified] : The patient was diagnosed with adenocarcinoma of the cecum in December 2018 at the age of 53.  He has a history of irregular bowel movements with alternating diarrhea and constipation and recently saw blood on the toilet paper.  He saw GI, Dr. Thuan Cassidy, who performed a colonoscopy on 12/11/18.  Pathology c/w adenocarcinoma.  On 12/13/18 a CT A/P showed a soft tissue mass in the region of the ileocecal  junction measuring 3.7 x 3.5 cm.  There are  multiple peritoneal nodules in the abdomen and pelvis. For example, a  nodule in the pelvis measures 1.4 cm. A nodule in the left lower quadrant  measures 1.3 cm. There are omental nodules measuring up to 3.2 x 2.9 cm.  There are ileocolic lymph nodes measuring up to 1.3 cm.  1.1 cm lesion in the right lobe of the liver is suspicious for metastatic  disease.    Two splenic lesions are suspicious for metastatic disease.  He was referred to Colorectal surgery, Dr. Livia Talavera, who first ordered a CT Chest to complete staging.  It showed no suspicious pulmonary nodules.  Dr. Talavera recommended systemic chemotherapy given extent of disease.   [de-identified] : PMH of HTN, DM, HPL, Osteogenesis imperfecta, h/o Disruptive behavior, schizoaffective disorder [de-identified] : Patient presents for initial evaluation.  + Constipation but improving. He has been using Senekot and Benefiber. Intermittent melena.  No BRBPR.  No abdominal pain.  No fevers.  He has intermittent palpitations. Denies any headaches, nausea or vomiting.  He presents today with Hb 7.7 gm/dL.

## 2019-01-17 NOTE — RESULTS/DATA
[FreeTextEntry1] : 1/9/19 CT Chest:  No lung mass or consolidation. No suspicious pulmonary nodule. 3 mm  triangular density within the minor fissure most  likely benign fissural lymph node.\par \par 1/9/19 U/S Abdomen:  Mild fatty infiltration of the liver.    1.7 cm right lobe liver lesion is indeterminate but for which metastatic  disease is in the differential diagnosis.    Two solid splenic lesions measuring 3.1 cm and 1.8 cm.  The liver lesion and splenic lesions may be further evaluated with PET/CT  scan to evaluate for metastatic disease.  1.2 x 0.4 cm gallbladder polyp.\par \par 12/13/18 CT A/P:  There is a soft tissue mass in the region of the ileocecal  junction measuring 3.7 x 3.5 cm. There is no bowel obstruction. There are  multiple peritoneal nodules in the abdomen and pelvis. For example, a  nodule in the pelvis measures 1.4 cm. A nodule in the left lower quadrant  measures 1.3 cm. There are omental nodules measuring up to 3.2 x 2.9 cm.  There are ileocolic lymph nodes measuring up to 1.3 cm.  1.1 cm lesion in the right lobe of the liver is suspicious for metastatic  disease.    Two splenic lesions are suspicious for metastatic disease.\par \par 12/11/18 Pathology:  Cecal mass, biopsy:   Adenocarcinoma, at least intramucosal, superficial mucosal fragments.\par \par 12/11/18 Colonoscopy:  Cecal mass.  Hemorrhoids

## 2019-01-17 NOTE — ADDENDUM
[FreeTextEntry1] : I, Karrie Shea, acted solely as a scribe for Dr. Sarina Diaz on this date 1/14/19.

## 2019-01-17 NOTE — CONSULT LETTER
[Dear  ___] : Dear  [unfilled], [Consult Letter:] : I had the pleasure of evaluating your patient, [unfilled]. [Please see my note below.] : Please see my note below. [Consult Closing:] : Thank you very much for allowing me to participate in the care of this patient.  If you have any questions, please do not hesitate to contact me. [Sincerely,] : Sincerely, [DrGeovanni  ___] : Dr. LUGO [FreeTextEntry3] : Dr. Sarina Diaz

## 2019-01-20 LAB
ALBUMIN SERPL ELPH-MCNC: 4.5 G/DL
ALP BLD-CCNC: 214 U/L
ALT SERPL-CCNC: 237 U/L
ANION GAP SERPL CALC-SCNC: 12 MMOL/L
AST SERPL-CCNC: 55 U/L
BILIRUB SERPL-MCNC: 0.3 MG/DL
BUN SERPL-MCNC: 16 MG/DL
CALCIUM SERPL-MCNC: 9.5 MG/DL
CEA SERPL-MCNC: 86.5 NG/ML
CHLORIDE SERPL-SCNC: 98 MMOL/L
CO2 SERPL-SCNC: 28 MMOL/L
CREAT SERPL-MCNC: 0.9 MG/DL
GLUCOSE SERPL-MCNC: 148 MG/DL
HBV CORE IGG+IGM SER QL: NONREACTIVE
HBV SURFACE AB SER QL: NONREACTIVE
HBV SURFACE AG SER QL: NONREACTIVE
HCV AB SER QL: NONREACTIVE
HCV S/CO RATIO: 0.12 S/CO
INR PPP: 0.9 RATIO
MAGNESIUM SERPL-MCNC: 2.3 MG/DL
POTASSIUM SERPL-SCNC: 4 MMOL/L
PROT SERPL-MCNC: 7.5 G/DL
PT BLD: 10.1 SEC
SODIUM SERPL-SCNC: 137 MMOL/L

## 2019-01-23 ENCOUNTER — EMERGENCY (EMERGENCY)
Facility: HOSPITAL | Age: 54
LOS: 1 days | Discharge: HOME | End: 2019-01-23
Attending: STUDENT IN AN ORGANIZED HEALTH CARE EDUCATION/TRAINING PROGRAM
Payer: MEDICARE

## 2019-01-23 VITALS
DIASTOLIC BLOOD PRESSURE: 88 MMHG | HEIGHT: 60 IN | OXYGEN SATURATION: 97 % | HEART RATE: 80 BPM | WEIGHT: 160.06 LBS | SYSTOLIC BLOOD PRESSURE: 139 MMHG | RESPIRATION RATE: 16 BRPM | TEMPERATURE: 98 F

## 2019-01-23 DIAGNOSIS — F25.9 SCHIZOAFFECTIVE DISORDER, UNSPECIFIED: ICD-10-CM

## 2019-01-23 DIAGNOSIS — S82.899A OTHER FRACTURE OF UNSPECIFIED LOWER LEG, INITIAL ENCOUNTER FOR CLOSED FRACTURE: Chronic | ICD-10-CM

## 2019-01-23 LAB
AMPHET UR-MCNC: NEGATIVE — SIGNIFICANT CHANGE UP
ANION GAP SERPL CALC-SCNC: 13 MMOL/L — SIGNIFICANT CHANGE UP (ref 5–17)
APPEARANCE UR: CLEAR — SIGNIFICANT CHANGE UP
BARBITURATES UR SCN-MCNC: NEGATIVE — SIGNIFICANT CHANGE UP
BENZODIAZ UR-MCNC: NEGATIVE — SIGNIFICANT CHANGE UP
BILIRUB UR-MCNC: NEGATIVE — SIGNIFICANT CHANGE UP
BUN SERPL-MCNC: 16 MG/DL — SIGNIFICANT CHANGE UP (ref 8–20)
CALCIUM SERPL-MCNC: 9.2 MG/DL — SIGNIFICANT CHANGE UP (ref 8.6–10.2)
CHLORIDE SERPL-SCNC: 102 MMOL/L — SIGNIFICANT CHANGE UP (ref 98–107)
CO2 SERPL-SCNC: 24 MMOL/L — SIGNIFICANT CHANGE UP (ref 22–29)
COCAINE METAB.OTHER UR-MCNC: NEGATIVE — SIGNIFICANT CHANGE UP
COLOR SPEC: YELLOW — SIGNIFICANT CHANGE UP
CREAT SERPL-MCNC: 0.59 MG/DL — SIGNIFICANT CHANGE UP (ref 0.5–1.3)
DIFF PNL FLD: NEGATIVE — SIGNIFICANT CHANGE UP
EPI CELLS # UR: SIGNIFICANT CHANGE UP
GLUCOSE SERPL-MCNC: 113 MG/DL — SIGNIFICANT CHANGE UP (ref 70–115)
GLUCOSE UR QL: NEGATIVE MG/DL — SIGNIFICANT CHANGE UP
HCT VFR BLD CALC: 32.1 % — LOW (ref 42–52)
HGB BLD-MCNC: 9.7 G/DL — LOW (ref 14–18)
KETONES UR-MCNC: NEGATIVE — SIGNIFICANT CHANGE UP
LEUKOCYTE ESTERASE UR-ACNC: ABNORMAL
MCHC RBC-ENTMCNC: 22 PG — LOW (ref 27–31)
MCHC RBC-ENTMCNC: 30.2 G/DL — LOW (ref 32–36)
MCV RBC AUTO: 72.8 FL — LOW (ref 80–94)
METHADONE UR-MCNC: NEGATIVE — SIGNIFICANT CHANGE UP
NITRITE UR-MCNC: NEGATIVE — SIGNIFICANT CHANGE UP
OPIATES UR-MCNC: NEGATIVE — SIGNIFICANT CHANGE UP
PCP SPEC-MCNC: SIGNIFICANT CHANGE UP
PCP UR-MCNC: NEGATIVE — SIGNIFICANT CHANGE UP
PH UR: 6 — SIGNIFICANT CHANGE UP (ref 5–8)
PLATELET # BLD AUTO: 305 K/UL — SIGNIFICANT CHANGE UP (ref 150–400)
POTASSIUM SERPL-MCNC: 3.8 MMOL/L — SIGNIFICANT CHANGE UP (ref 3.5–5.3)
POTASSIUM SERPL-SCNC: 3.8 MMOL/L — SIGNIFICANT CHANGE UP (ref 3.5–5.3)
PROT UR-MCNC: NEGATIVE MG/DL — SIGNIFICANT CHANGE UP
RBC # BLD: 4.41 M/UL — LOW (ref 4.6–6.2)
RBC # FLD: 18.5 % — HIGH (ref 11–15.6)
RBC CASTS # UR COMP ASSIST: NEGATIVE /HPF — SIGNIFICANT CHANGE UP (ref 0–4)
SODIUM SERPL-SCNC: 139 MMOL/L — SIGNIFICANT CHANGE UP (ref 135–145)
SP GR SPEC: 1.01 — SIGNIFICANT CHANGE UP (ref 1.01–1.02)
THC UR QL: NEGATIVE — SIGNIFICANT CHANGE UP
UROBILINOGEN FLD QL: NEGATIVE MG/DL — SIGNIFICANT CHANGE UP
WBC # BLD: 8.8 K/UL — SIGNIFICANT CHANGE UP (ref 4.8–10.8)
WBC # FLD AUTO: 8.8 K/UL — SIGNIFICANT CHANGE UP (ref 4.8–10.8)
WBC UR QL: SIGNIFICANT CHANGE UP

## 2019-01-23 PROCEDURE — 99284 EMERGENCY DEPT VISIT MOD MDM: CPT

## 2019-01-23 PROCEDURE — 90792 PSYCH DIAG EVAL W/MED SRVCS: CPT

## 2019-01-23 PROCEDURE — 93010 ELECTROCARDIOGRAM REPORT: CPT

## 2019-01-23 RX ORDER — PROPRANOLOL HCL 160 MG
10 CAPSULE, EXTENDED RELEASE 24HR ORAL ONCE
Qty: 0 | Refills: 0 | Status: COMPLETED | OUTPATIENT
Start: 2019-01-23 | End: 2019-01-24

## 2019-01-23 RX ORDER — RISPERIDONE 4 MG/1
3 TABLET ORAL AT BEDTIME
Qty: 0 | Refills: 0 | Status: DISCONTINUED | OUTPATIENT
Start: 2019-01-23 | End: 2019-01-28

## 2019-01-23 RX ORDER — SODIUM CHLORIDE 9 MG/ML
1000 INJECTION INTRAMUSCULAR; INTRAVENOUS; SUBCUTANEOUS ONCE
Qty: 0 | Refills: 0 | Status: COMPLETED | OUTPATIENT
Start: 2019-01-23 | End: 2019-01-23

## 2019-01-23 RX ORDER — SENNA PLUS 8.6 MG/1
2 TABLET ORAL ONCE
Qty: 0 | Refills: 0 | Status: COMPLETED | OUTPATIENT
Start: 2019-01-23 | End: 2019-01-23

## 2019-01-23 RX ORDER — DOCUSATE SODIUM 100 MG
100 CAPSULE ORAL
Qty: 0 | Refills: 0 | Status: DISCONTINUED | OUTPATIENT
Start: 2019-01-23 | End: 2019-01-28

## 2019-01-23 RX ORDER — CLOZAPINE 150 MG/1
200 TABLET, ORALLY DISINTEGRATING ORAL AT BEDTIME
Qty: 0 | Refills: 0 | Status: DISCONTINUED | OUTPATIENT
Start: 2019-01-23 | End: 2019-01-28

## 2019-01-23 RX ADMIN — SENNA PLUS 2 TABLET(S): 8.6 TABLET ORAL at 21:09

## 2019-01-23 RX ADMIN — RISPERIDONE 3 MILLIGRAM(S): 4 TABLET ORAL at 21:09

## 2019-01-23 RX ADMIN — CLOZAPINE 200 MILLIGRAM(S): 150 TABLET, ORALLY DISINTEGRATING ORAL at 21:10

## 2019-01-23 RX ADMIN — SODIUM CHLORIDE 2000 MILLILITER(S): 9 INJECTION INTRAMUSCULAR; INTRAVENOUS; SUBCUTANEOUS at 13:19

## 2019-01-23 RX ADMIN — SODIUM CHLORIDE 1000 MILLILITER(S): 9 INJECTION INTRAMUSCULAR; INTRAVENOUS; SUBCUTANEOUS at 13:20

## 2019-01-23 NOTE — ED PROVIDER NOTE - MEDICAL DECISION MAKING DETAILS
patient with decreased urine outpt, admits to decrease PO intake, feeling sad/depressed due to recent CA diagnosis. no infectious sx. post void 84mL no obstruction. will check basic labs r/o CARMEN. hydration. psych consult. outpt Follow up

## 2019-01-23 NOTE — ED BEHAVIORAL HEALTH ASSESSMENT NOTE - SUMMARY
Patient is a 53  year old, male; [facility]ic[facility]Concern for independent living) ; ;  noncaregiver; past psychiatric history of schizoaffective disorder ; multiple prior psychiatric hospitalizations (last time 2 years ago); no known suicide attempts; no known history of violence or arrests; no active substance abuse or known history of complicated withdrawal; PMH of osteogenesis imperfecta, HTN, hypothyroidism, DM type 2 recent dx of cecal mass, patient presented via EMS for evaluation of declining urinary output and psychiatric evaluation for decompensation and increasing paranoia.   Since diagnosis of Cecal mass, patient has been more irritable and paranoid. He admits to feeling that staff want to hurt him and man in cafeteria wanting to kill him.  He admits to hearing voice of God and although is vague about content, he does what he says at times.  He also admitted to feeling depressed with some passive suicidal ideation, but denies any active intent. He also believe doctors are lying about his diagnosis.  As per staff at home he missed psychiatric appointment last week and his increase in paranoia and concern raise concern about his safety at home. Would note that patient does have oncology appointment this upcoming monday and family is worried about patient missing that appointment.  At this time he requires inpatient psychiatric hospitalization secondary to paranoia, +A hallucinations, poor insight/judgment, irritability, putting him at high risk

## 2019-01-23 NOTE — ED PROVIDER NOTE - CARE PLAN
Principal Discharge DX:	Dehydration  Secondary Diagnosis:	Psychosis Principal Discharge DX:	Dehydration  Secondary Diagnosis:	Depression

## 2019-01-23 NOTE — ED BEHAVIORAL HEALTH ASSESSMENT NOTE - DESCRIPTION
Patient was calm, with intense in appropriate affect at times, delusional. He did not appear to be in acute distress. Patient was calm, with intense in appropriate affect at times, delusional. He did not appear to be in acute distress. He does not appear to be in physical distress. Patient was medically evaluated and cleared.   Vital Signs Last 24 Hrs  T(C): 36.6 (2019 15:23), Max: 36.7 (2019 09:55)  T(F): 97.9 (2019 15:23), Max: 98 (2019 09:55)  HR: 88 (2019 15:23) (80 - 88)  BP: 133/72 (2019 15:23) (133/72 - 139/88)  BP(mean): --  RR: 17 (2019 15:23) (16 - 17)  SpO2: 100% (2019 15:23) (97% - 100%)                9.7    8.8   )-----------( 305      ( 2019 12:54 )             32.1   139  |  102  |  16.0  ----------------------------<  113  3.8   |  24.0  |  0.59    Ca    9.2      2019 12:54  Urinalysis Basic - ( 2019 12:36 )  Color: Yellow / Appearance: Clear / S.010 / pH: x  Gluc: x / Ketone: Negative  / Bili: Negative / Urobili: Negative mg/dL   Blood: x / Protein: Negative mg/dL / Nitrite: Negative   Leuk Esterase: Trace / RBC: Negative /HPF / WBC 3-5   Sq Epi: x / Non Sq Epi: Occasional / Bacteria: x osteogenesis imperfecta, HTN, hypothyroidism, DM type 2 Patient was born in [place]. Raised by parents. Father is a psychologist. Patient with two brothers.  He reports he has a BE from [facility]

## 2019-01-23 NOTE — ED ADULT NURSE NOTE - OBJECTIVE STATEMENT
Pt c/o urinating less past few days, denies any retention, pt reports being worried about renal failure after receiving contrast for the first time 10 days ago. Newly diagnosed cecal mass according to patient. Pt voided, post void residual 84mL with bladder scan. Denies any other symptoms. Pt in no apparent distress.

## 2019-01-23 NOTE — ED PROVIDER NOTE - OBJECTIVE STATEMENT
54yo M with recent dx cecal mass, had 2 CT with contrast last 2 days decreased urine outpt, no change in color. no dysuria. no fever/chills. no back pain. patient sad over diagnosis, has schizoaffective disorder, OI has had a hard life and has had thuoghts of 'ending it' 'who wouldn't' but patient has no plan or intent to hurt himself or others, he is here 'because I want to leave' concerned about renal failure from contrast. no h/o CKD in past.

## 2019-01-23 NOTE — ED PROVIDER NOTE - NS ED ROS FT
ROS: no CP/SOB. no cough. no fever. no n/v/d/c. no abd pain. no rash. no bleeding. +urinary complaints. no weakness. no vision changes. no HA. no neck/back pain. no extremity swelling/deformity. No change in mental status.

## 2019-01-23 NOTE — ED BEHAVIORAL HEALTH ASSESSMENT NOTE - HPI (INCLUDE ILLNESS QUALITY, SEVERITY, DURATION, TIMING, CONTEXT, MODIFYING FACTORS, ASSOCIATED SIGNS AND SYMPTOMS)
Patient is a 53  year old, male; [facility] ; ;  noncaregiver; past psychiatric history of schizoaffective disorder ; multiple prior psychiatric hospitalizations (last time 2 years ago); no known suicide attempts; no known history of violence or arrests; no active substance abuse or known history of complicated withdrawal; PMH of osteogenesis imperfecta, HTN, hypothyroidism, DM type 2 recent dx of cecal mass, patient presented via EMS for evaluation of declining urinary output and concern for wor Patient is a 53  year old, male; [facility] in [facility]) ; ;  noncaregiver; past psychiatric history of schizoaffective disorder ; multiple prior psychiatric hospitalizations (last time 2 years ago); no known suicide attempts; no known history of violence or arrests; no active substance abuse or known history of complicated withdrawal; PMH of osteogenesis imperfecta, HTN, hypothyroidism, DM type 2 recent dx of cecal mass, patient presented via EMS for evaluation of declining urinary output and psychiatric evaluation for decompensation and increasing paranoia.       Patient is currnently followedy by Dr. Claudio at [facility]. He has been functioning at baseline until 4 weeks ago when was diagnosed with a cecal tumor. Of note he has an upcoming appointment with oncologist this upcoming monday to determine chemotherapy regimen and schedule.  For the last several weeks patient has been increasingly paranoid, irriatable and agitated. During interview, patient appeared calm, however affect was intense and inappropriate at times. Patient reported that when he first received diagnosis he planned to kill himself but he no longer feels that way. He states that he is not sure if doctor's were right and does not believe he has a diagnosed tumor.  He also reports believing that he was wrongly diagnosed as schizoaffective disorder.  He reported that he believes he hears people's thoughts but its probably because he is psychic.  He reports that he feel people at his home want to hurt him because the they are jealous of his superior intelligence.  He also reports that a person in the cafeteria wanted to kill him.  He admitted that he has been hearing the voice of god more that tells him what to do and he sometimes listens to him as an alternate "thinking process". He also mentioned [place] stayed [place] for the safety of humanity. He admits to being a little depressed.   He admitted to writer that he still gets suicidal thoughts but that he would not act on them and wants to live.       Collateral was obtained by staff, Reba () who reported that there has been a very clear decompensation over the last several weeks, and that patient is not at baseline.  He has been calling staff and leaving multiple emails.  He has been accusing nurses of being "drug dealers". She also  reports that patient thought router was emitting harmful radiation and that microwave has been poisoning his food.  At baseline patient is not delusional like this.  She reports that patient missed appointment with psychiatrist last week and that he has not been coming down for medications without reminders which are both out of character for patient.  He has been irritable and intense and has mentioned about [place] to visit the brother.  Patient reportedly has prior history of fleeing when he decompensates.  She reports that she does not feel patient is currently safe at home given increase in symptoms and increasingly erratic behavior.   Writer left message for psychiatrist (Dr. Claudio). Writer also spoke to father.  Family is concerned that patient get to his upcoming oncology appointment.  Father states that at baseline patient is calm, dependable and rarely makes any delusional statements although they happen at times are fleeting. Patient is a 53  year old, male; [facility] in [facility] ; ;  noncaregiver; past psychiatric history of schizoaffective disorder ; multiple prior psychiatric hospitalizations (last time 2 years ago); no known suicide attempts; no known history of violence or arrests; no active substance abuse or known history of complicated withdrawal; PMH of osteogenesis imperfecta, HTN, hypothyroidism, DM type 2 recent dx of cecal mass, patient presented via EMS for evaluation of declining urinary output and psychiatric evaluation for decompensation and increasing paranoia.       Patient is currently followedy by Dr. Claudio at [facility]. He has been functioning at baseline until 4 weeks ago when was diagnosed with a cecal tumor. Of note he has an upcoming appointment with oncologist this upcoming monday to determine chemotherapy regimen and schedule.  For the last several weeks patient has been increasingly paranoid, irriatable and agitated. During interview, patient appeared calm, however affect was intense and inappropriate at times. Patient reported that when he first received diagnosis he planned to kill himself but he no longer feels that way. He states that he is not sure if doctor's were right and does not believe he has a diagnosed tumor.  He also reports believing that he was wrongly diagnosed as schizoaffective disorder.  He reported that he believes he hears people's thoughts but its probably because he is psychic.  He reports that he feel people at his home want to hurt him because the they are jealous of his superior intelligence.  He also reports that a person in the cafeteria wanted to kill him.  He admitted that he has been hearing the voice of god more that tells him what to do and he sometimes listens to him as an alternate "thinking process". He also mentioned that [place] have stayed [place] for the safety of humanity. He admits to being a little depressed.   He admitted to writer that he still gets suicidal thoughts but that he would not act on them and wants to live.       Collateral was obtained by staff, Reba () who reported that there has been a very clear decompensation over the last several weeks, and that patient is not at baseline.  He has been calling staff and leaving multiple emails.  He has been accusing nurses of being "drug dealers". She also  reports that patient thought router was emitting harmful radiation and that microwave has been poisoning his food.  At baseline patient is not delusional like this.  She reports that patient missed appointment with psychiatrist last week and that he has not been coming down for medications without reminders which are both out of character for patient.  He has been irritable and intense and has mentioned about driving upstate to visit the brother.  Patient reportedly has prior history of fleeing when he decompensates.  She reports that she does not feel patient is currently safe at home given increase in symptoms and increasingly erratic behavior.   Writer left message for psychiatrist (Dr. Claudio). Writer also spoke to father.  Family is concerned that patient get to his upcoming oncology appointment.  Father states that at baseline patient is calm, dependable and rarely makes any delusional statements although they happen at times are fleeting.

## 2019-01-23 NOTE — ED ADULT NURSE NOTE - CAS DISCH CONDITION
Stable/Pt alert and oriented. Has an appointment with [facility] on Monday @ 7486.  Pt denies S/I or H/I. gait is steady walking with cane. All Belongings returned including valuables from Security off. Affect is bright. Makes good eye contact.. Reports feeling well.

## 2019-01-23 NOTE — ED PROVIDER NOTE - PROGRESS NOTE DETAILS
Case signed out to me by Dr. Dolan pending psychiatric reassessment. Pt now psychiatrically cleared to f/up with oncology as an outpatient.

## 2019-01-23 NOTE — ED BEHAVIORAL HEALTH ASSESSMENT NOTE - OTHER PAST PSYCHIATRIC HISTORY (INCLUDE DETAILS REGARDING ONSET, COURSE OF ILLNESS, INPATIENT/OUTPATIENT TREATMENT)
Multiple prior hospitalizations for psychotic sx's.  last time was reportedly 2 years ago.  Currently follows as an oupatient by Dr. Claudio.

## 2019-01-23 NOTE — ED BEHAVIORAL HEALTH ASSESSMENT NOTE - DETAILS
NA unclear content, reports God talks to him and he sometimes does what he says looking for area beds. spoke with staff

## 2019-01-23 NOTE — ED BEHAVIORAL HEALTH NOTE - BEHAVIORAL HEALTH NOTE
SW Note -   pt requires inpatient admission for safety and stability. 9.37 completed and on chart. SW called [facility] was told there were beds, clinicals on sunrise, faxed over face sheet, legals and EKG at 20:28 for review.   pt has been reviewed by Dr Mancia at [facility] and Dr Mancia declined the pt for admission. SW reached out to Attending in ER - Dr Bey who spoke with Dr Mancia who stated that the pt presented for urinary issues and the depression is 2/2 CA dx. MD explained the course of events but [facility] Doc still declined admission - will present again in AM.  SW completed necessary paperwork and will follow for transfer when appropriate bed is available.

## 2019-01-23 NOTE — ED ADULT NURSE NOTE - NURSING GU BLADDER
Wellbutrin filled 03/10/17 with 5 refills. Sent request for all other meds to MD.    non-distended/non-tender

## 2019-01-23 NOTE — ED ADULT TRIAGE NOTE - CHIEF COMPLAINT QUOTE
Pt brought in from [facility] for eval of possible urinary retention secondary to CT dye. As per facility, pt also requires psych eval. States that he has only been voiding small amounts.

## 2019-01-23 NOTE — ED BEHAVIORAL HEALTH ASSESSMENT NOTE - MEDICAL ISSUES AND PLAN (INCLUDE STANDING AND PRN MEDICATION)
no acute issues, however patient does have upcoming oncology appointment for recently diagnosed cecal tumor

## 2019-01-23 NOTE — ED PROVIDER NOTE - PHYSICAL EXAMINATION
Gen: NAD, AOx3  Head: NCAT  HEENT: +blue sclera, EOMI, oral mucosa moist, normal conjunctiva, neck supple  Lung: CTAB, no respiratory distress  CV: rrr, no murmur, Normal perfusion  Abd: soft, NTND  MSK: No edema, no visible deformities  Neuro: No focal neurologic deficits  Skin: No rash   Psych: normal affect

## 2019-01-23 NOTE — ED ADULT NURSE NOTE - CAS ELECT INFOMATION PROVIDED
Notified  [facility] from [facility] he is being discharged and any further difficulty to send back to ER. Pt stable at this time/DC instructions

## 2019-01-23 NOTE — ED BEHAVIORAL HEALTH ASSESSMENT NOTE - RISK ASSESSMENT
High-recent stressor (diagnosis of cancer), reported CAH, patient reportedly has been following what voices say, increase in paranoia, poor insight, judgment, suicidal ideation (although currently denies plan or intent)

## 2019-01-23 NOTE — ED ADULT NURSE NOTE - NSIMPLEMENTINTERV_GEN_ALL_ED
Implemented All Fall with Harm Risk Interventions:  [place] to call system. Call bell, personal items and telephone within reach. Instruct patient to call for assistance. Room bathroom lighting operational. Non-slip footwear when patient is off stretcher. Physically safe environment: no spills, clutter or unnecessary equipment. Stretcher in lowest position, wheels locked, appropriate side rails in place. Provide visual cue, wrist band, yellow gown, etc. Monitor gait and stability. Monitor for mental status changes and reorient to person, place, and time. Review medications for side effects contributing to fall risk. Reinforce activity limits and safety measures with patient and family. Provide visual clues: red socks.

## 2019-01-23 NOTE — ED BEHAVIORAL HEALTH ASSESSMENT NOTE - OTHER
from home staff from home residents diagnosis of cecal tumor. walks with cane mildly irritability intense affect at times reports vague passive suicidal ideation looking for area beds increasing paranoia, hallucinations, irritability, high risk to self and others [facility] billing in [place]

## 2019-01-24 VITALS
RESPIRATION RATE: 18 BRPM | DIASTOLIC BLOOD PRESSURE: 74 MMHG | OXYGEN SATURATION: 100 % | SYSTOLIC BLOOD PRESSURE: 110 MMHG | TEMPERATURE: 98 F | HEART RATE: 97 BPM

## 2019-01-24 LAB
CULTURE RESULTS: NO GROWTH — SIGNIFICANT CHANGE UP
SPECIMEN SOURCE: SIGNIFICANT CHANGE UP

## 2019-01-24 PROCEDURE — 80307 DRUG TEST PRSMV CHEM ANLYZR: CPT

## 2019-01-24 PROCEDURE — 93005 ELECTROCARDIOGRAM TRACING: CPT

## 2019-01-24 PROCEDURE — 36415 COLL VENOUS BLD VENIPUNCTURE: CPT

## 2019-01-24 PROCEDURE — 80048 BASIC METABOLIC PNL TOTAL CA: CPT

## 2019-01-24 PROCEDURE — 87086 URINE CULTURE/COLONY COUNT: CPT

## 2019-01-24 PROCEDURE — 85027 COMPLETE CBC AUTOMATED: CPT

## 2019-01-24 PROCEDURE — 82962 GLUCOSE BLOOD TEST: CPT

## 2019-01-24 PROCEDURE — 99284 EMERGENCY DEPT VISIT MOD MDM: CPT

## 2019-01-24 PROCEDURE — 81001 URINALYSIS AUTO W/SCOPE: CPT

## 2019-01-24 RX ORDER — METFORMIN HYDROCHLORIDE 850 MG/1
1000 TABLET ORAL
Qty: 0 | Refills: 0 | Status: DISCONTINUED | OUTPATIENT
Start: 2019-01-24 | End: 2019-01-28

## 2019-01-24 RX ORDER — LEVOTHYROXINE SODIUM 125 MCG
100 TABLET ORAL DAILY
Qty: 0 | Refills: 0 | Status: DISCONTINUED | OUTPATIENT
Start: 2019-01-24 | End: 2019-01-28

## 2019-01-24 RX ADMIN — Medication 100 MICROGRAM(S): at 06:31

## 2019-01-24 RX ADMIN — Medication 10 MILLIGRAM(S): at 07:01

## 2019-01-24 RX ADMIN — Medication 100 MILLIGRAM(S): at 07:01

## 2019-01-24 RX ADMIN — METFORMIN HYDROCHLORIDE 1000 MILLIGRAM(S): 850 TABLET ORAL at 08:39

## 2019-01-24 NOTE — ED ADULT NURSE REASSESSMENT NOTE - CONDITION
Pt given lunch. ate well. Accucheck pre lunch 137. Pt's mother called asking for Dr. Suero. Made clear would advise when he arrived. Very concerned replacement.  Pt demonstrates limited  boundry control. at Nurses room often for various items. Pt is friendly and cooporative. Is a fall risk with harm. Red socks and fall wrist band in place. Spoke of need to use caution when ambulating . stated he would be careful. aware of his physical condition/unchanged

## 2019-01-24 NOTE — ED ADULT NURSE REASSESSMENT NOTE - NS ED NURSE REASSESS COMMENT FT1
pt is now sleeping with no acute distress noted, order requested for pindolol 2.5mg per pt, medication not on formulary and propranolol 10mg ordered, pt made aware and stated "it's too late to take tonight" pt offered to take medication in am and agreed. will continue to monitor for pt safety.
pt received awake alert and oriented, pt sitting at table in the transition area, pt requesting medications for bed time and made aware that the md with provide orders and they will be dispensed. pt aware of plan of care. will continue to monitor for safety
pt status unchanged, refer to flowsheet and chart, pt safety maintained, pt hemodynamically stable
pt status unchanged, refer to flowsheet and chart, pt safety maintained, pt hemodynamically stable, pt to be transferred to  for further evaluation, report given to Joao JIN
pt status unchanged, refer to flowsheet and chart, pt safety maintained, pt hemodynamically stable. Pt resting comfortably awaiting consult. CTM
urine requested and pt voided In urinal 400ml, specimen obtained for tox.
pt is sleeping intermittently awaking at times and requesting nourishment and fluids, pt accommodated. pt safety monitored. pt uses cane for mobility assistance.
Patient presented in  area with ED staff.  Patient ambulating with a cane and gait is steady.  Patient argumentative with staff about remaining in  area or changing in to hospital gowns.  Patient anxious and claiming his rights are being violated.  Dr. Suero called to meet with patient as requested with patient.  Patient denies need for treatment in psychiatric unit.  Patient perseverating about not having access to his belongings.  Patient provided verbal support and reassurance of his safety and his patient rights. Patient agreed to secure belongings in  locker but is holding his wallet till it can be secured in security safe.  Patients offered and accepted PO fluids.  No attempts to harm self or others and safety maintained.

## 2019-01-24 NOTE — ED BEHAVIORAL HEALTH NOTE - BEHAVIORAL HEALTH NOTE
SW Note: Dr. Suero met with pt again and talked to his parents over the phone. The parents are not in agreement for hospitalizations and after re-eval of pt Dr. Suero agreed with discharging the pt. The plan is that the pt will stay with his parents till at least Monday so they can take him to his oncology appt. Called [facility] ( [facility]) [phone], spoke with Charline Vallejo. She is aware of above. Requested they pack 5 days worth of meds of the pt and that the family plans to pick them up after they  there son.

## 2019-01-24 NOTE — ED ADULT NURSE REASSESSMENT NOTE - COMFORT CARE
ate 100% of lunch/meal provided/po fluids offered
meal provided/plan of care explained/po fluids offered
ambulated to bathroom
plan of care explained

## 2019-01-24 NOTE — ED BEHAVIORAL HEALTH NOTE - BEHAVIORAL HEALTH NOTE
PROGRESS NOTE: 19 @ 14:20  	  • Reason for Ongoing Consultation: 	Psychosis     ID: [id number] Male with HEALTH ISSUES - PROBLEM Dx:  Schizoaffective disorder, unspecified type          INTERVAL DATA:   • Interval Chief Complaint:  Feeling better  • Interval History:  Patient was observed over night and this morning. There was no evidence of any bizarre or erratic behavior.  Patient was needy at times with staff.  He was not aggressive or agitated. He reports feeling calm, and slept well overnight.  He is not currently endorsing any overtly delusional material. He was able to give clear account of his recent medical problems. He denies any beliefs that doctor's are lying to him.  He denies feeling that staff or others against him.  He admits to some anxiety given his diagnosis and situation.  He denies any S/H I/I/P. He also denies hearing voices-including voice of god.  Spoke to pt's mother and father who communicated with patient on the phone extensively today.  They feel that his mental status appears to be close to his baseline.  The are comfortable with his discharge home with them until oncology appointment on Monday.  They both agree that if there is signs of decompensation they will bring patient to ER. They report that Nick enjoys time at home and that it is a stress free environment for him.   They also report that pt's outpatient psychiatrist is willing to see patient more frequently on a weekly basis to more closely monitor his medications and make adjustments as needed.  Discussed with patient who is in agreement with plan and does not feel that he needs to be in in patient psychiatry unit at this time.     REVIEW OF SYSTEMS:   • Constitutional Symptoms	No complaints  • Eyes	No complaints  • Ears / Nose / Throat / Mouth	No complaints  • Cardiovascular	No complaints  • Respiratory	No complaints  • Gastrointestinal	No complaints  • Genitourinary	No complaints  • Musculoskeletal	No complaints  • Skin	No complaints  • Neurological	No complaints  • Psychiatric (see HPI)	See HPI  • Endocrine	No complaints  • Hematologic / Lymphatic	No complaints  • Allergic / Immunologic	No complaints    REVIEW OF VITALS/LABS/IMAGING/INVESTIGATIONS:   • Vital signs reviewed: Yes  • Vital Signs:	    T(C): 36.8 (19 @ 11:19), Max: 36.9 (19 @ 21:25)  HR: 93 (19 @ 11:19) (82 - 93)  BP: 113/78 (19 @ 11:19) (113/78 - 133/72)  RR: 20 (19 @ 11:19) (17 - 20)  SpO2: 100% (19 @ 11:19) (99% - 100%)    • Available labs reviewed: Yes  • Available Lab Results:                           9.7    8.8   )-----------( 305      ( 2019 12:54 )             32.1         139  |  102  |  16.0  ----------------------------<  113  3.8   |  24.0  |  0.59    Ca    9.2      2019 12:54          Urinalysis Basic - ( 2019 12:36 )    Color: Yellow / Appearance: Clear / S.010 / pH: x  Gluc: x / Ketone: Negative  / Bili: Negative / Urobili: Negative mg/dL   Blood: x / Protein: Negative mg/dL / Nitrite: Negative   Leuk Esterase: Trace / RBC: Negative /HPF / WBC 3-5   Sq Epi: x / Non Sq Epi: Occasional / Bacteria: x          MEDICATIONS:      PRN Medications: none given   • PRN Medications since last evaluation	  • PRN Details	    Current Medications:   cloZAPine 200 milliGRAM(s) Oral at bedtime  docusate sodium 100 milliGRAM(s) Oral two times a day  levothyroxine 100 MICROGram(s) Oral daily  LORazepam     Tablet 0.5 milliGRAM(s) Oral every 6 hours PRN  metFORMIN 1000 milliGRAM(s) Oral two times a day  risperiDONE   Tablet 3 milliGRAM(s) Oral at bedtime  sitaGLIPtin 100 milliGRAM(s) Oral daily     Medication Side Effects:  • Medication Side Effects or Adverse Reactions (new or ongoing)	None known    MENTAL STATUS EXAM:   • Level of Consciousness	Alert  • General Appearance	Well developed  • Body Habitus	Well nourished  • Hygiene	Good  • Grooming	Fair   • Behavior	Cooperative  • Eye Contact	Good  • Relatedness	Good  • Impulse Control	Normal  • Muscle Tone / Strength	Normal muscle tone/strength  • Abnormal Movements	No abnormal movements  • Gait / Station	Normal gait / station  • Speech Volume	Normal  • Speech Rate	Normal  • Speech Spontaneity	Normal  • Speech Articulation	Normal  • Mood	"Calm"   • Affect Quality	Midly anxious   • Affect Range	Constricted   • Affect Congruence	Congruent  • Thought Process	Linear, tangential at times   • Thought Associations	Normal  • Thought Content No overt delusional material   • Perceptions	Denies any auditory/visual hallucinations   • Oriented to Time	Yes  • Oriented to Place	Yes  • Oriented to Situation	Yes  • Oriented to Person	Yes  • Attention / Concentration	Mildly impaired   • Estimated Intelligence	Average  • Recent Memory	Normal  • Remote Memory	Normal  • Fund of Knowledge	Normal  • Language	No abnormalities noted  • Judgment (regarding everyday events)	Fair  • Insight (regarding psychiatric illness)	Fair    SUICIDALITY:   • Suicidality (Interval)	none known    HOMICIDALITY/AGGRESSION:   • Homicidality/Aggression	none known    DIAGNOSIS DSM-V:    Psychiatric Diagnosis (Corresponds to DSM-IV Axis I, II):   HEALTH ISSUES - PROBLEM Dx:  Schizoaffective disorder, unspecified type           Medical Diagnosis (Corresponds to DSM-IV Axis III):  • Axis III	    steogenesis imperfecta, HTN, hypothyroidism, DM type 2 recent dx of cecal mass  ASSESSMENT OF CURRENT CONDITION:   · Summary	Patient is a 53  year old, male; [facility] ; ;  noncaregiver; past psychiatric history of schizoaffective disorder ; multiple prior psychiatric hospitalizations (last time 2 years ago); no known suicide attempts; no known history of violence or arrests; no active substance abuse or known history of complicated withdrawal; PMH of osteogenesis imperfecta, HTN, hypothyroidism, DM type 2 recent dx of cecal mass, patient presented via EMS for evaluation of declining urinary output and psychiatric evaluation for decompensation and increasing paranoia.   Since diagnosis of Cecal mass, patient has been more irritable and and expressing more paranoid material.  Would not that there was no report of patient engaging in physical aggression or making any suicidal statements.  As a result he was observed overnight in ER.  Patient maintained good behavioral control without any evidence of bizarre or erratic behavior.  His took all of his medications and his mental status improved.  Patient continued to deny any S/H I/I/P. Today denies any overtly delusional material, he exhibited better insight into illness and denied hearing voices. Writer spoke to both psychiatrist and family. Family feels safe with pat at home and feels its important for patient to make it to oncology appointment on Monday.  Patient is appropriate for outpatient tx at this time. Will plan to Dc from ER. Pt's psychiatrist is reportedly willing to see patient on a weekly basis during time of increased stress.   · Differential	Schizoaffective disorder  · Risk Assessment	High-recent stressor (diagnosis of cancer), reported CAH, patient reportedly has been following what voices say, increase in paranoia, poor insight, judgment, suicidal ideation (although currently denies plan or intent)    AXIS:    Axis I:  Primary Dx Schizoaffective disorder, unspecified type F25.9.     Axis III:  · Axis III	DM, HTN, ostegenesis imperfecta, cecal tumor     Axis IV:  · Axis IV	None known     Axis V:  · GAF	55    PLAN:    Plan:  D/c from ER  Follow up with Dr. Claudio  Continue current medications.   Patient to discharge back home with family   Return to ER for worsening symptoms, suicidality, homicidality or dangerous behavior.

## 2019-01-24 NOTE — ED ADULT NURSE REASSESSMENT NOTE - NSIMPLEMENTINTERV_GEN_ALL_ED
Implemented All Fall with Harm Risk Interventions:  [place] to call system. Call bell, personal items and telephone within reach. Instruct patient to call for assistance. Room bathroom lighting operational. Non-slip footwear when patient is off stretcher. Physically safe environment: no spills, clutter or unnecessary equipment. Stretcher in lowest position, wheels locked, appropriate side rails in place. Provide visual cue, wrist band, yellow gown, etc. Monitor gait and stability. Monitor for mental status changes and reorient to person, place, and time. Review medications for side effects contributing to fall risk. Reinforce activity limits and safety measures with patient and family. Provide visual clues: red socks.

## 2019-01-24 NOTE — ED ADULT NURSE REASSESSMENT NOTE - CONDITION
Pt up upon initial rounds. Alert and oriented. Friendly and cooperative.. Morning accucheclk 209. Call placed to Dr. Louie. Order placed for Januvia and metformin. Pt offers no difficulties. Behavior in control/unchanged

## 2019-01-28 ENCOUNTER — RESULT REVIEW (OUTPATIENT)
Age: 54
End: 2019-01-28

## 2019-01-28 ENCOUNTER — APPOINTMENT (OUTPATIENT)
Dept: HEMATOLOGY ONCOLOGY | Facility: CLINIC | Age: 54
End: 2019-01-28
Payer: MEDICARE

## 2019-01-28 VITALS
DIASTOLIC BLOOD PRESSURE: 81 MMHG | HEIGHT: 60 IN | HEART RATE: 89 BPM | SYSTOLIC BLOOD PRESSURE: 123 MMHG | TEMPERATURE: 98.4 F | WEIGHT: 167.04 LBS | OXYGEN SATURATION: 96 % | BODY MASS INDEX: 32.8 KG/M2

## 2019-01-28 LAB
BASOPHILS # BLD AUTO: 0.1 K/UL — SIGNIFICANT CHANGE UP (ref 0–0.2)
BASOPHILS NFR BLD AUTO: 1.2 % — SIGNIFICANT CHANGE UP (ref 0–2)
EOSINOPHIL # BLD AUTO: 0.4 K/UL — SIGNIFICANT CHANGE UP (ref 0–0.5)
EOSINOPHIL NFR BLD AUTO: 2.9 % — SIGNIFICANT CHANGE UP (ref 0–6)
HCT VFR BLD CALC: 31.4 % — LOW (ref 39–50)
HGB BLD-MCNC: 10 G/DL — LOW (ref 13–17)
LYMPHOCYTES # BLD AUTO: 2.6 K/UL — SIGNIFICANT CHANGE UP (ref 1–3.3)
LYMPHOCYTES # BLD AUTO: 21.7 % — SIGNIFICANT CHANGE UP (ref 13–44)
MCHC RBC-ENTMCNC: 22.6 PG — LOW (ref 27–34)
MCHC RBC-ENTMCNC: 31.7 G/DL — LOW (ref 32–36)
MCV RBC AUTO: 71.1 FL — LOW (ref 80–100)
MONOCYTES # BLD AUTO: 1.1 K/UL — HIGH (ref 0–0.9)
MONOCYTES NFR BLD AUTO: 9.2 % — SIGNIFICANT CHANGE UP (ref 2–14)
NEUTROPHILS # BLD AUTO: 7.8 K/UL — HIGH (ref 1.8–7.4)
NEUTROPHILS NFR BLD AUTO: 64.9 % — SIGNIFICANT CHANGE UP (ref 43–77)
PLATELET # BLD AUTO: 357 K/UL — SIGNIFICANT CHANGE UP (ref 150–400)
RBC # BLD: 4.42 M/UL — SIGNIFICANT CHANGE UP (ref 4.2–5.8)
RBC # FLD: 17 % — HIGH (ref 10.3–14.5)
WBC # BLD: 12 K/UL — HIGH (ref 3.8–10.5)
WBC # FLD AUTO: 12 K/UL — HIGH (ref 3.8–10.5)

## 2019-01-28 PROCEDURE — 99215 OFFICE O/P EST HI 40 MIN: CPT

## 2019-01-29 NOTE — RESULTS/DATA
[FreeTextEntry1] : 1/19/19 Pathology:\par 1. Cecal mass, biopsy ([id number]):\par - At least intramucosal adenocarcinoma in background of high-grade dysplasia, see note.\par -Note: Dr. Goodwin concur(s) with diagnosis. The findings may not represent the entire mass lesion.\par \par 1/9/19 CT Chest:  No lung mass or consolidation. No suspicious pulmonary nodule. 3 mm  triangular density within the minor fissure most  likely benign fissural lymph node.\par \par 1/9/19 U/S Abdomen:  Mild fatty infiltration of the liver.    1.7 cm right lobe liver lesion is indeterminate but for which metastatic  disease is in the differential diagnosis.    Two solid splenic lesions measuring 3.1 cm and 1.8 cm.  The liver lesion and splenic lesions may be further evaluated with PET/CT  scan to evaluate for metastatic disease.  1.2 x 0.4 cm gallbladder polyp.\par \par 12/13/18 CT A/P:  There is a soft tissue mass in the region of the ileocecal  junction measuring 3.7 x 3.5 cm. There is no bowel obstruction. There are  multiple peritoneal nodules in the abdomen and pelvis. For example, a  nodule in the pelvis measures 1.4 cm. A nodule in the left lower quadrant  measures 1.3 cm. There are omental nodules measuring up to 3.2 x 2.9 cm.  There are ileocolic lymph nodes measuring up to 1.3 cm.  1.1 cm lesion in the right lobe of the liver is suspicious for metastatic  disease.    Two splenic lesions are suspicious for metastatic disease.\par \par 12/11/18 Pathology:  Cecal mass, biopsy:   Adenocarcinoma, at least intramucosal, superficial mucosal fragments.\par \par 12/11/18 Colonoscopy:  Cecal mass.  Hemorrhoids

## 2019-01-29 NOTE — HISTORY OF PRESENT ILLNESS
[de-identified] : The patient was diagnosed with adenocarcinoma of the cecum in December 2018 at the age of 53.  He has a history of irregular bowel movements with alternating diarrhea and constipation and recently saw blood on the toilet paper.  He saw GI, Dr. Thuan Cassidy, who performed a colonoscopy on 12/11/18.  Pathology c/w adenocarcinoma.  On 12/13/18 a CT A/P showed a soft tissue mass in the region of the ileocecal  junction measuring 3.7 x 3.5 cm.  There are  multiple peritoneal nodules in the abdomen and pelvis. For example, a  nodule in the pelvis measures 1.4 cm. A nodule in the left lower quadrant  measures 1.3 cm. There are omental nodules measuring up to 3.2 x 2.9 cm.  There are ileocolic lymph nodes measuring up to 1.3 cm.  1.1 cm lesion in the right lobe of the liver is suspicious for metastatic  disease.    Two splenic lesions are suspicious for metastatic disease.  He was referred to Colorectal surgery, Dr. Livia Talavera, who first ordered a CT Chest to complete staging.  It showed no suspicious pulmonary nodules.  Dr. Talavera recommended systemic chemotherapy given extent of disease.   [de-identified] : PMH of HTN, DM, HPL, Osteogenesis imperfecta, h/o Disruptive behavior, schizoaffective disorder [de-identified] : Patient presents for initial evaluation.  + Constipation but improving. He has been using Senekot and Benefiber. Intermittent melena.  No BRBPR.  No abdominal pain.  No fevers.  He has intermittent palpitations. Denies any headaches, nausea or vomiting.  He presents today with Hgb 10.0 gm/dL. \par \par WIll be splitting his time between his parents' house and his residential living facility

## 2019-01-29 NOTE — ADDENDUM
[FreeTextEntry1] : I, Karrie Shea, acted solely as a scribe for Dr. Sarina Diaz on this date 1/28/19.

## 2019-02-03 ENCOUNTER — MOBILE ON CALL (OUTPATIENT)
Age: 54
End: 2019-02-03

## 2019-02-06 ENCOUNTER — INPATIENT (INPATIENT)
Facility: HOSPITAL | Age: 54
LOS: 7 days | Discharge: HOME | DRG: 330 | End: 2019-02-14
Attending: SURGERY | Admitting: SURGERY
Payer: MEDICARE

## 2019-02-06 VITALS
RESPIRATION RATE: 18 BRPM | OXYGEN SATURATION: 98 % | WEIGHT: 164.91 LBS | SYSTOLIC BLOOD PRESSURE: 125 MMHG | TEMPERATURE: 98 F | HEIGHT: 60 IN | HEART RATE: 82 BPM | DIASTOLIC BLOOD PRESSURE: 86 MMHG

## 2019-02-06 DIAGNOSIS — S82.899A OTHER FRACTURE OF UNSPECIFIED LOWER LEG, INITIAL ENCOUNTER FOR CLOSED FRACTURE: Chronic | ICD-10-CM

## 2019-02-06 LAB
ALBUMIN SERPL ELPH-MCNC: 3.5 G/DL — SIGNIFICANT CHANGE UP (ref 3.3–5.2)
ALP SERPL-CCNC: 81 U/L — SIGNIFICANT CHANGE UP (ref 40–120)
ALT FLD-CCNC: 18 U/L — SIGNIFICANT CHANGE UP
ANION GAP SERPL CALC-SCNC: 15 MMOL/L — SIGNIFICANT CHANGE UP (ref 5–17)
ANISOCYTOSIS BLD QL: SLIGHT — SIGNIFICANT CHANGE UP
APTT BLD: 29.6 SEC — SIGNIFICANT CHANGE UP (ref 27.5–36.3)
AST SERPL-CCNC: 18 U/L — SIGNIFICANT CHANGE UP
BILIRUB SERPL-MCNC: 0.4 MG/DL — SIGNIFICANT CHANGE UP (ref 0.4–2)
BLD GP AB SCN SERPL QL: SIGNIFICANT CHANGE UP
BUN SERPL-MCNC: 22 MG/DL — HIGH (ref 8–20)
CALCIUM SERPL-MCNC: 8.9 MG/DL — SIGNIFICANT CHANGE UP (ref 8.6–10.2)
CHLORIDE SERPL-SCNC: 85 MMOL/L — LOW (ref 98–107)
CO2 SERPL-SCNC: 26 MMOL/L — SIGNIFICANT CHANGE UP (ref 22–29)
CREAT SERPL-MCNC: 0.6 MG/DL — SIGNIFICANT CHANGE UP (ref 0.5–1.3)
GLUCOSE SERPL-MCNC: 173 MG/DL — HIGH (ref 70–115)
HCT VFR BLD CALC: 30.2 % — LOW (ref 42–52)
HGB BLD-MCNC: 9.4 G/DL — LOW (ref 14–18)
INR BLD: 1.12 RATIO — SIGNIFICANT CHANGE UP (ref 0.88–1.16)
LYMPHOCYTES # BLD AUTO: 12 % — LOW (ref 20–55)
MACROCYTES BLD QL: SLIGHT — SIGNIFICANT CHANGE UP
MCHC RBC-ENTMCNC: 21.3 PG — LOW (ref 27–31)
MCHC RBC-ENTMCNC: 31.1 G/DL — LOW (ref 32–36)
MCV RBC AUTO: 68.5 FL — LOW (ref 80–94)
MICROCYTES BLD QL: SIGNIFICANT CHANGE UP
MONOCYTES NFR BLD AUTO: 17 % — HIGH (ref 3–10)
NEUTROPHILS NFR BLD AUTO: 69 % — SIGNIFICANT CHANGE UP (ref 37–73)
NEUTS BAND # BLD: 2 % — SIGNIFICANT CHANGE UP (ref 0–8)
OVALOCYTES BLD QL SMEAR: SLIGHT — SIGNIFICANT CHANGE UP
PLAT MORPH BLD: NORMAL — SIGNIFICANT CHANGE UP
PLATELET # BLD AUTO: 339 K/UL — SIGNIFICANT CHANGE UP (ref 150–400)
POLYCHROMASIA BLD QL SMEAR: SLIGHT — SIGNIFICANT CHANGE UP
POTASSIUM SERPL-MCNC: 3.1 MMOL/L — LOW (ref 3.5–5.3)
POTASSIUM SERPL-SCNC: 3.1 MMOL/L — LOW (ref 3.5–5.3)
PROT SERPL-MCNC: 6.9 G/DL — SIGNIFICANT CHANGE UP (ref 6.6–8.7)
PROTHROM AB SERPL-ACNC: 12.9 SEC — SIGNIFICANT CHANGE UP (ref 10–12.9)
RBC # BLD: 4.41 M/UL — LOW (ref 4.6–6.2)
RBC # FLD: 18.8 % — HIGH (ref 11–15.6)
RBC BLD AUTO: ABNORMAL
SODIUM SERPL-SCNC: 126 MMOL/L — LOW (ref 135–145)
TYPE + AB SCN PNL BLD: SIGNIFICANT CHANGE UP
WBC # BLD: 9.9 K/UL — SIGNIFICANT CHANGE UP (ref 4.8–10.8)
WBC # FLD AUTO: 9.9 K/UL — SIGNIFICANT CHANGE UP (ref 4.8–10.8)

## 2019-02-06 PROCEDURE — 99285 EMERGENCY DEPT VISIT HI MDM: CPT

## 2019-02-06 RX ORDER — SODIUM CHLORIDE 9 MG/ML
3 INJECTION INTRAMUSCULAR; INTRAVENOUS; SUBCUTANEOUS ONCE
Qty: 0 | Refills: 0 | Status: COMPLETED | OUTPATIENT
Start: 2019-02-06 | End: 2019-02-06

## 2019-02-06 RX ADMIN — SODIUM CHLORIDE 3 MILLILITER(S): 9 INJECTION INTRAMUSCULAR; INTRAVENOUS; SUBCUTANEOUS at 21:36

## 2019-02-06 NOTE — ASU PATIENT PROFILE, ADULT - LEARNING ASSESSMENT (PATIENT) ADDITIONAL COMMENTS
Telephone interview: 2/6/19 Pt reports does not have medical clearance. Appointment 2/6/19 1400 with Dr Noel. Ce at [facility] for Dr Diaz notified of same. Telephone interview with pt: instructed on pre-op instructions, tips for safer surgery, pain management scale, take Levothyroxine & Pindolol with a sip of water the morning of surgery, do not take Janumet the morning of surgery, Cardiac Clearance with Dr Harris - pending, Medical clearance - Dr Noel on chart, understanding of all instructions verbalized.

## 2019-02-06 NOTE — ED ADULT NURSE NOTE - OBJECTIVE STATEMENT
pt reports nausea, vomiting, abdominal pain since sunday. pt has hx of colon ca. pt reports suicidal thoughts earlier with no plan. hx of schizoaffective disorder. pt reports "I was frustrated earlier which made me say bad things. now I am surrounded by people that are helping me and I no longer feel that way."  a and o x3. breathing even and unlabored. sitting calm in bed. 1 to 1 at bedside. abdomen is firm and distended. will continue to monitor.

## 2019-02-06 NOTE — ED ADULT TRIAGE NOTE - CHIEF COMPLAINT QUOTE
pt came to the ED for c/o abd pain.  pt was sent by PMD to have a CT scan for the abd pain.  the radiologist at the CT scan found a bowel obstruction and send the pt to the ED.  pt also stated that wants to kill him self and has plans to do so.  pt stated that is being treated for colon CA.  pt also c/o of nausea.  pt is A/Ox3.  CT scan result given to ED RN by EMS.

## 2019-02-06 NOTE — ASU PATIENT PROFILE, ADULT - VISION (WITH CORRECTIVE LENSES IF THE PATIENT USUALLY WEARS THEM):
Normal vision: sees adequately in most situations; can see medication labels, newsprint Partially impaired: cannot see medication labels or newsprint, but can see obstacles in path, and the surrounding layout; can count fingers at arm's length/magnifiers

## 2019-02-06 NOTE — ASU PATIENT PROFILE, ADULT - ABILITY TO HEAR (WITH HEARING AID OR HEARING APPLIANCE IF NORMALLY USED):
Mildly to Moderately Impaired: difficulty hearing in some environments or speaker may need to increase volume or speak distinctly Mildly to Moderately Impaired: difficulty hearing in some environments or speaker may need to increase volume or speak distinctly/hearing aids, bilateral - does not wears them

## 2019-02-06 NOTE — ED ADULT NURSE NOTE - NSIMPLEMENTINTERV_GEN_ALL_ED
Implemented All Universal Safety Interventions:  [place] to call system. Call bell, personal items and telephone within reach. Instruct patient to call for assistance. Room bathroom lighting operational. Non-slip footwear when patient is off stretcher. Physically safe environment: no spills, clutter or unnecessary equipment. Stretcher in lowest position, wheels locked, appropriate side rails in place.

## 2019-02-06 NOTE — CHART NOTE - NSCHARTNOTEFT_GEN_A_CORE
ALINA Note - Reba  Concern for independent living for a long time - was in [facility] [place], [phone] [phone] 10-6 Lives in apartment in [place] alone, staff 24/7 Psychiatrist Dr Claudio [employer] 1.5/2yrs (monthly ) schizoaffective (since early twenties) - PMD Dr Noel (1 yr) - [facility]. Family Meds. Compliant with meds - from staff.  pt came CT-Scans from [facility] - Colon CA  Dec 2018 - port scheduled 2/7/18 [facility] Dr Loyd. Reba notified family, A&O x4 No sig delays. CT Scan showed bowel obstruction and pt is suicidal with plan (possible 2/2 cancer DX)  to electrocute himself. pt is  and is aware of how electrocution happens.

## 2019-02-06 NOTE — ED PROVIDER NOTE - GASTROINTESTINAL, MLM
Abdomen soft, non-tender, no guarding. Abdominal distension, mild tenderness to lower abdomen, hypoactive bowel sounds, no G/R/R.

## 2019-02-06 NOTE — ED PROVIDER NOTE - OBJECTIVE STATEMENT
A 53 year old male pt with a hx of Colon CA diagnosed 2 months, followed by Dr. Galarza, currently on PO chemotherapy agents presents to the ED c/o abdominal pain. The pt states that over the past few days he has had increased abdominal distension, nausea, vomiting, and constipation. The pt was seen by his PMD today and sent for an outpatient CT, which showed an SBO. Pt was subsequently sent to the ED. He denies any fevers or chills. As per EMS, pt had reportedly made suicidal statements PTA. In the ED, pt denies any suicidal ideations. He does have a hx of schizoaffective disorder. No further complaints at this time.

## 2019-02-06 NOTE — ED PROVIDER NOTE - PROGRESS NOTE DETAILS
Labs and CT results as noted.  Surgery called to abhi pt.  IV KCl ordered Pt evaluated by Surgery and reportedly passed flatus and had BM in ED.  Surgery requesting po trial and if pt without vomiting, feel pt can be dc'd with close f/u by  Surgical Oncology Pt tolerating po without vomiting.  Will repeat BMP and have psych eval for reported SI

## 2019-02-07 ENCOUNTER — TRANSCRIPTION ENCOUNTER (OUTPATIENT)
Age: 54
End: 2019-02-07

## 2019-02-07 DIAGNOSIS — C18.0 MALIGNANT NEOPLASM OF CECUM: ICD-10-CM

## 2019-02-07 LAB
ANION GAP SERPL CALC-SCNC: 13 MMOL/L — SIGNIFICANT CHANGE UP (ref 5–17)
BUN SERPL-MCNC: 17 MG/DL — SIGNIFICANT CHANGE UP (ref 8–20)
CALCIUM SERPL-MCNC: 8.3 MG/DL — LOW (ref 8.6–10.2)
CHLORIDE SERPL-SCNC: 91 MMOL/L — LOW (ref 98–107)
CO2 SERPL-SCNC: 25 MMOL/L — SIGNIFICANT CHANGE UP (ref 22–29)
CREAT SERPL-MCNC: 0.54 MG/DL — SIGNIFICANT CHANGE UP (ref 0.5–1.3)
GLUCOSE BLDC GLUCOMTR-MCNC: 155 MG/DL — HIGH (ref 70–99)
GLUCOSE SERPL-MCNC: 164 MG/DL — HIGH (ref 70–115)
LACTATE BLDV-MCNC: 1.2 MMOL/L — SIGNIFICANT CHANGE UP (ref 0.5–2)
MAGNESIUM SERPL-MCNC: 2 MG/DL — SIGNIFICANT CHANGE UP (ref 1.6–2.6)
PHOSPHATE SERPL-MCNC: 1.8 MG/DL — LOW (ref 2.4–4.7)
POTASSIUM SERPL-MCNC: 3 MMOL/L — LOW (ref 3.5–5.3)
POTASSIUM SERPL-SCNC: 3 MMOL/L — LOW (ref 3.5–5.3)
RAPID RVP RESULT: SIGNIFICANT CHANGE UP
SODIUM SERPL-SCNC: 129 MMOL/L — LOW (ref 135–145)

## 2019-02-07 PROCEDURE — 99222 1ST HOSP IP/OBS MODERATE 55: CPT

## 2019-02-07 PROCEDURE — 99223 1ST HOSP IP/OBS HIGH 75: CPT

## 2019-02-07 PROCEDURE — 74176 CT ABD & PELVIS W/O CONTRAST: CPT | Mod: 26

## 2019-02-07 RX ORDER — DEXTROSE 50 % IN WATER 50 %
15 SYRINGE (ML) INTRAVENOUS ONCE
Qty: 0 | Refills: 0 | Status: DISCONTINUED | OUTPATIENT
Start: 2019-02-07 | End: 2019-02-14

## 2019-02-07 RX ORDER — POTASSIUM CHLORIDE 20 MEQ
10 PACKET (EA) ORAL ONCE
Qty: 0 | Refills: 0 | Status: COMPLETED | OUTPATIENT
Start: 2019-02-07 | End: 2019-02-07

## 2019-02-07 RX ORDER — GLUCAGON INJECTION, SOLUTION 0.5 MG/.1ML
1 INJECTION, SOLUTION SUBCUTANEOUS ONCE
Qty: 0 | Refills: 0 | Status: DISCONTINUED | OUTPATIENT
Start: 2019-02-07 | End: 2019-02-14

## 2019-02-07 RX ORDER — SODIUM CHLORIDE 9 MG/ML
1000 INJECTION, SOLUTION INTRAVENOUS
Qty: 0 | Refills: 0 | Status: DISCONTINUED | OUTPATIENT
Start: 2019-02-07 | End: 2019-02-09

## 2019-02-07 RX ORDER — CEFTRIAXONE 500 MG/1
1 INJECTION, POWDER, FOR SOLUTION INTRAMUSCULAR; INTRAVENOUS ONCE
Qty: 0 | Refills: 0 | Status: COMPLETED | OUTPATIENT
Start: 2019-02-07 | End: 2019-02-07

## 2019-02-07 RX ORDER — CLOZAPINE 150 MG/1
1 TABLET, ORALLY DISINTEGRATING ORAL
Qty: 0 | Refills: 0 | COMMUNITY

## 2019-02-07 RX ORDER — DEXTROSE 50 % IN WATER 50 %
25 SYRINGE (ML) INTRAVENOUS ONCE
Qty: 0 | Refills: 0 | Status: DISCONTINUED | OUTPATIENT
Start: 2019-02-07 | End: 2019-02-14

## 2019-02-07 RX ORDER — POTASSIUM CHLORIDE 20 MEQ
10 PACKET (EA) ORAL
Qty: 0 | Refills: 0 | Status: COMPLETED | OUTPATIENT
Start: 2019-02-07 | End: 2019-02-07

## 2019-02-07 RX ORDER — CHOLECALCIFEROL (VITAMIN D3) 125 MCG
1 CAPSULE ORAL
Qty: 0 | Refills: 0 | COMMUNITY

## 2019-02-07 RX ORDER — ACETAMINOPHEN 500 MG
1000 TABLET ORAL EVERY 6 HOURS
Qty: 0 | Refills: 0 | Status: COMPLETED | OUTPATIENT
Start: 2019-02-08 | End: 2019-02-08

## 2019-02-07 RX ORDER — FERROUS SULFATE 325(65) MG
0 TABLET ORAL
Qty: 0 | Refills: 0 | COMMUNITY

## 2019-02-07 RX ORDER — SITAGLIPTIN AND METFORMIN HYDROCHLORIDE 500; 50 MG/1; MG/1
1 TABLET, FILM COATED ORAL
Qty: 0 | Refills: 0 | COMMUNITY

## 2019-02-07 RX ORDER — ALENDRONATE SODIUM 70 MG/1
1 TABLET ORAL
Qty: 0 | Refills: 0 | COMMUNITY

## 2019-02-07 RX ORDER — LEVOTHYROXINE SODIUM 125 MCG
1 TABLET ORAL
Qty: 0 | Refills: 0 | COMMUNITY

## 2019-02-07 RX ORDER — INSULIN LISPRO 100/ML
VIAL (ML) SUBCUTANEOUS EVERY 6 HOURS
Qty: 0 | Refills: 0 | Status: DISCONTINUED | OUTPATIENT
Start: 2019-02-07 | End: 2019-02-14

## 2019-02-07 RX ORDER — HYDROMORPHONE HYDROCHLORIDE 2 MG/ML
0.5 INJECTION INTRAMUSCULAR; INTRAVENOUS; SUBCUTANEOUS EVERY 4 HOURS
Qty: 0 | Refills: 0 | Status: DISCONTINUED | OUTPATIENT
Start: 2019-02-07 | End: 2019-02-12

## 2019-02-07 RX ORDER — OMEGA-3 ACID ETHYL ESTERS 1 G
2 CAPSULE ORAL
Qty: 0 | Refills: 0 | COMMUNITY

## 2019-02-07 RX ORDER — DEXTROSE 50 % IN WATER 50 %
12.5 SYRINGE (ML) INTRAVENOUS ONCE
Qty: 0 | Refills: 0 | Status: DISCONTINUED | OUTPATIENT
Start: 2019-02-07 | End: 2019-02-14

## 2019-02-07 RX ORDER — LEVOTHYROXINE SODIUM 125 MCG
50 TABLET ORAL
Qty: 0 | Refills: 0 | Status: DISCONTINUED | OUTPATIENT
Start: 2019-02-07 | End: 2019-02-12

## 2019-02-07 RX ORDER — SENNA PLUS 8.6 MG/1
1 TABLET ORAL
Qty: 0 | Refills: 0 | COMMUNITY

## 2019-02-07 RX ORDER — CLOZAPINE 150 MG/1
200 TABLET, ORALLY DISINTEGRATING ORAL AT BEDTIME
Qty: 0 | Refills: 0 | Status: DISCONTINUED | OUTPATIENT
Start: 2019-02-07 | End: 2019-02-14

## 2019-02-07 RX ORDER — ENOXAPARIN SODIUM 100 MG/ML
40 INJECTION SUBCUTANEOUS EVERY 24 HOURS
Qty: 0 | Refills: 0 | Status: DISCONTINUED | OUTPATIENT
Start: 2019-02-07 | End: 2019-02-14

## 2019-02-07 RX ORDER — PINDOLOL 10 MG
0.5 TABLET ORAL
Qty: 0 | Refills: 0 | COMMUNITY

## 2019-02-07 RX ORDER — ASCORBIC ACID 60 MG
1 TABLET,CHEWABLE ORAL
Qty: 0 | Refills: 0 | COMMUNITY

## 2019-02-07 RX ORDER — POTASSIUM PHOSPHATE, MONOBASIC POTASSIUM PHOSPHATE, DIBASIC 236; 224 MG/ML; MG/ML
15 INJECTION, SOLUTION INTRAVENOUS EVERY 6 HOURS
Qty: 0 | Refills: 0 | Status: COMPLETED | OUTPATIENT
Start: 2019-02-07 | End: 2019-02-08

## 2019-02-07 RX ORDER — ONDANSETRON 8 MG/1
4 TABLET, FILM COATED ORAL EVERY 6 HOURS
Qty: 0 | Refills: 0 | Status: DISCONTINUED | OUTPATIENT
Start: 2019-02-07 | End: 2019-02-14

## 2019-02-07 RX ORDER — AZITHROMYCIN 500 MG/1
500 TABLET, FILM COATED ORAL ONCE
Qty: 0 | Refills: 0 | Status: COMPLETED | OUTPATIENT
Start: 2019-02-07 | End: 2019-02-07

## 2019-02-07 RX ORDER — SODIUM CHLORIDE 9 MG/ML
1000 INJECTION, SOLUTION INTRAVENOUS
Qty: 0 | Refills: 0 | Status: DISCONTINUED | OUTPATIENT
Start: 2019-02-07 | End: 2019-02-14

## 2019-02-07 RX ORDER — METOPROLOL TARTRATE 50 MG
5 TABLET ORAL EVERY 6 HOURS
Qty: 0 | Refills: 0 | Status: DISCONTINUED | OUTPATIENT
Start: 2019-02-07 | End: 2019-02-11

## 2019-02-07 RX ORDER — RISPERIDONE 4 MG/1
3 TABLET ORAL AT BEDTIME
Qty: 0 | Refills: 0 | Status: DISCONTINUED | OUTPATIENT
Start: 2019-02-07 | End: 2019-02-14

## 2019-02-07 RX ORDER — ACETAMINOPHEN 500 MG
1000 TABLET ORAL ONCE
Qty: 0 | Refills: 0 | Status: COMPLETED | OUTPATIENT
Start: 2019-02-07 | End: 2019-02-07

## 2019-02-07 RX ORDER — RISPERIDONE 4 MG/1
1 TABLET ORAL
Qty: 0 | Refills: 0 | COMMUNITY

## 2019-02-07 RX ORDER — MORPHINE SULFATE 50 MG/1
4 CAPSULE, EXTENDED RELEASE ORAL ONCE
Qty: 0 | Refills: 0 | Status: DISCONTINUED | OUTPATIENT
Start: 2019-02-07 | End: 2019-02-07

## 2019-02-07 RX ORDER — POTASSIUM CHLORIDE 20 MEQ
20 PACKET (EA) ORAL
Qty: 0 | Refills: 0 | Status: DISCONTINUED | OUTPATIENT
Start: 2019-02-07 | End: 2019-02-07

## 2019-02-07 RX ORDER — DOCUSATE SODIUM 100 MG
1 CAPSULE ORAL
Qty: 0 | Refills: 0 | COMMUNITY

## 2019-02-07 RX ORDER — SIMVASTATIN 20 MG/1
1 TABLET, FILM COATED ORAL
Qty: 0 | Refills: 0 | COMMUNITY

## 2019-02-07 RX ORDER — ACETAMINOPHEN 500 MG
1 TABLET ORAL
Qty: 0 | Refills: 0 | COMMUNITY

## 2019-02-07 RX ORDER — KETOROLAC TROMETHAMINE 30 MG/ML
15 SYRINGE (ML) INJECTION EVERY 6 HOURS
Qty: 0 | Refills: 0 | Status: DISCONTINUED | OUTPATIENT
Start: 2019-02-07 | End: 2019-02-10

## 2019-02-07 RX ADMIN — Medication 50 MICROGRAM(S): at 13:19

## 2019-02-07 RX ADMIN — CEFTRIAXONE 1 GRAM(S): 500 INJECTION, POWDER, FOR SOLUTION INTRAMUSCULAR; INTRAVENOUS at 06:09

## 2019-02-07 RX ADMIN — Medication 0.25 MILLIGRAM(S): at 17:55

## 2019-02-07 RX ADMIN — Medication 100 MILLIEQUIVALENT(S): at 05:51

## 2019-02-07 RX ADMIN — Medication 5 MILLIGRAM(S): at 19:29

## 2019-02-07 RX ADMIN — AZITHROMYCIN 255 MILLIGRAM(S): 500 TABLET, FILM COATED ORAL at 05:51

## 2019-02-07 RX ADMIN — CEFTRIAXONE 100 GRAM(S): 500 INJECTION, POWDER, FOR SOLUTION INTRAMUSCULAR; INTRAVENOUS at 05:51

## 2019-02-07 RX ADMIN — Medication 10 MILLIEQUIVALENT(S): at 05:00

## 2019-02-07 RX ADMIN — Medication 1000 MILLIGRAM(S): at 18:25

## 2019-02-07 RX ADMIN — Medication 400 MILLIGRAM(S): at 18:23

## 2019-02-07 RX ADMIN — AZITHROMYCIN 500 MILLIGRAM(S): 500 TABLET, FILM COATED ORAL at 07:30

## 2019-02-07 RX ADMIN — Medication 100 MILLIEQUIVALENT(S): at 13:20

## 2019-02-07 RX ADMIN — Medication 100 MILLIEQUIVALENT(S): at 05:18

## 2019-02-07 RX ADMIN — Medication 10 MILLIEQUIVALENT(S): at 04:00

## 2019-02-07 RX ADMIN — Medication 100 MILLIEQUIVALENT(S): at 03:53

## 2019-02-07 NOTE — CONSULT NOTE ADULT - SUBJECTIVE AND OBJECTIVE BOX
53 year old male presents to ED with 4 day history of diffuse abdominal pain associated with nausea and vomiting as well as obstipation. Patient has never experienced abdominal pain like this before. Constant in nature and nonradiating. Reported last vomited 30 minutes prior to evaluation. Upon further questioning, patient was actively passing flatus and stated he had a small solid bowel movement right before surgical team arrived bedside. 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 Recently started oral chemotherapy about 1 week ago.    ROS: denies fevers, chills, chest pain, shortness of breath, palpitations, dysuria    PMH: schizoaffective, osteogenesis imperfecta, DM, HTN  PSH: denies  Medications: as per med rec  Allergies: amoxicillin, lamictal, penicillin,tegretol, zetia  FH: father-prostate cancer  SH: denies toxic habits      MEDICATIONS  (STANDING):  potassium chloride  10 mEq/100 mL IVPB 10 milliEquivalent(s) IV Intermittent every 1 hour    MEDICATIONS  (PRN):      Vital Signs Last 24 Hrs  T(C): 37 (07 Feb 2019 01:55), Max: 37.2 (06 Feb 2019 19:11)  T(F): 98.6 (07 Feb 2019 01:55), Max: 99 (06 Feb 2019 19:11)  HR: 84 (07 Feb 2019 01:55) (81 - 84)  BP: 105/66 (07 Feb 2019 01:55) (105/66 - 129/78)  BP(mean): --  RR: 19 (07 Feb 2019 01:55) (18 - 19)  SpO2: 98% (07 Feb 2019 01:55) (98% - 98%)    Physical Exam:  General: no acute distress, AOX3  Respiratory: Breath Sounds equal & clear to auscultation, no accessory muscle use  Cardiovascular: Regular rate & rhythm, normal S1, S2; no murmurs, gallops or rubs  Gastrointestinal: Soft, mildly distended, non-tender    I&O's Detail    06 Feb 2019 07:01  -  07 Feb 2019 04:53  --------------------------------------------------------  IN:  Total IN: 0 mL    OUT:    Voided: 700 mL  Total OUT: 700 mL    Total NET: -700 mL          LABS:                        9.4    9.9   )-----------( 339      ( 06 Feb 2019 21:56 )             30.2     02-06    126<L>  |  85<L>  |  22.0<H>  ----------------------------<  173<H>  3.1<L>   |  26.0  |  0.60    Ca    8.9      06 Feb 2019 21:56    TPro  6.9  /  Alb  3.5  /  TBili  0.4  /  DBili  x   /  AST  18  /  ALT  18  /  AlkPhos  81  02-06    PT/INR - ( 06 Feb 2019 21:56 )   PT: 12.9 sec;   INR: 1.12 ratio         PTT - ( 06 Feb 2019 21:56 )  PTT:29.6 sec      RADIOLOGY & ADDITIONAL STUDIES:    CTAP: There is a 3.7 x 4.5 cm sized soft tissue density in the ileocecal   valve region with extensive proximal dilatation of entire small bowel   loops.  PERITONEUM: Trace ascites.. There are multiple soft tissue nodules   especially in the right anterior abdomen (3-69). There is mesenteric   edema.

## 2019-02-07 NOTE — DISCHARGE NOTE ADULT - CARE PLAN
Principal Discharge DX:	Other partial intestinal obstruction  Goal:	Alleviate pain and symptoms  Assessment and plan of treatment:	Patient presented w/ a SBO 2/2 to cecal mass. Patient decided to leave AMA.

## 2019-02-07 NOTE — CONSULT NOTE ADULT - SUBJECTIVE AND OBJECTIVE BOX
REASON FOR CONSULTATION - Cololn cancer/SBO    HPI: 54 y/o male with schizoaffective disorder, seen by Dr. Diaz for colon cancer metastatic to liver and peritoneum. Planned for xeloda chemotherapy, but admitted now with SBO - seen by surgery, with plans for palliative right hemicolectomy to address the intermittent SBO that has developed.  CT abdomen shows ileocecal primary mass 3.7 x 4.5 cm, with multiple peritoneal nodules, and hi-grade SBO.      REVIEW OF SYSTEMS:    CONSTITUTIONAL: fatigue  RESPIRATORY: No cough, wheezing, chills or hemoptysis; No shortness of breath  CARDIOVASCULAR: No chest pain, palpitations, dizziness, or leg swelling  GASTROINTESTINAL: Passing flatus intermittently  GENITOURINARY: No dysuria, frequency, hematuria, or incontinence  NEUROLOGICAL: No headaches, memory loss, loss of strength, numbness, or tremors  SKIN: No itching, burning, rashes, or lesions   LYMPH NODES: No enlarged glands  MUSCULOSKELETAL: No joint pain or swelling; No muscle, back, or extremity pain  PSYCHIATRIC: Schizoaffective disorder      PAST MEDICAL & SURGICAL HISTORY:  Hypothyroidism  High cholesterol  Diabetes  Osteogenesis imperfecta  Schizo-affective psychosis  Fracture of ankle      SOCIAL HISTORY:    FAMILY HISTORY:  No pertinent family history in first degree relatives      SOCIAL HISTORY:    Allergies    amoxicillin (Unknown)  Lamictal (Unknown)  penicillin (Unknown)  Tegretol (Unknown)  Zetia (Unknown)    Intolerances        MEDICATIONS  (STANDING):  cloZAPine 200 milliGRAM(s) Oral at bedtime  dextrose 5%. 1000 milliLiter(s) (50 mL/Hr) IV Continuous <Continuous>  dextrose 50% Injectable 12.5 Gram(s) IV Push once  dextrose 50% Injectable 25 Gram(s) IV Push once  dextrose 50% Injectable 25 Gram(s) IV Push once  enoxaparin Injectable 40 milliGRAM(s) SubCutaneous every 24 hours  insulin lispro (HumaLOG) corrective regimen sliding scale   SubCutaneous every 6 hours  lactated ringers. 1000 milliLiter(s) (100 mL/Hr) IV Continuous <Continuous>  levothyroxine Injectable 50 MICROGram(s) IV Push <User Schedule>  metoprolol tartrate Injectable 5 milliGRAM(s) IV Push every 6 hours  potassium phosphate IVPB 15 milliMole(s) IV Intermittent every 6 hours  risperiDONE   Tablet 3 milliGRAM(s) Oral at bedtime    MEDICATIONS  (PRN):  dextrose 40% Gel 15 Gram(s) Oral once PRN Blood Glucose LESS THAN 70 milliGRAM(s)/deciliter  glucagon  Injectable 1 milliGRAM(s) IntraMuscular once PRN Glucose LESS THAN 70 milligrams/deciliter  HYDROmorphone  Injectable 0.5 milliGRAM(s) IV Push every 4 hours PRN Severe Pain (7 - 10)  ketorolac   Injectable 15 milliGRAM(s) IV Push every 6 hours PRN Moderate Pain (4 - 6)  LORazepam   Injectable 0.25 milliGRAM(s) IV Push every 12 hours PRN Agitation  ondansetron Injectable 4 milliGRAM(s) IV Push every 6 hours PRN Nausea      Vital Signs Last 24 Hrs  T(C): 36.6 (07 Feb 2019 20:59), Max: 37.8 (07 Feb 2019 15:21)  T(F): 97.9 (07 Feb 2019 20:59), Max: 100.1 (07 Feb 2019 15:21)  HR: 90 (07 Feb 2019 22:19) (84 - 108)  BP: 113/65 (07 Feb 2019 22:19) (90/51 - 126/86)  BP(mean): --  RR: 20 (07 Feb 2019 22:19) (17 - 20)  SpO2: 95% (07 Feb 2019 22:19) (93% - 99%)    PHYSICAL EXAM:    GENERAL: NAD, afebrile  NECK: Supple, No JVD, Normal thyroid  NERVOUS SYSTEM:  Alert & Oriented X3, Good concentration; Motor Strength 5/5 B/L upper and lower extremities; DTRs 2+ intact and symmetric  CHEST/LUNG: Clear to percussion bilaterally; No rales, rhonchi, wheezing, or rubs  HEART: Regular rate and rhythm; No murmurs, rubs, or gallops  ABDOMEN: Distended. Decreased bowel sounds  EXTREMITIES:  2+ Peripheral Pulses, No clubbing, cyanosis, or edema  LYMPH: No lymphadenopathy noted  SKIN: No rashes or lesions      LABS:                        9.4    9.9   )-----------( 339      ( 06 Feb 2019 21:56 )             30.2     02-07    129<L>  |  91<L>  |  17.0  ----------------------------<  164<H>  3.0<L>   |  25.0  |  0.54    Ca    8.3<L>      07 Feb 2019 08:46  Phos  1.8     02-07  Mg     2.0     02-07    TPro  6.9  /  Alb  3.5  /  TBili  0.4  /  DBili  x   /  AST  18  /  ALT  18  /  AlkPhos  81  02-06    PT/INR - ( 06 Feb 2019 21:56 )   PT: 12.9 sec;   INR: 1.12 ratio         PTT - ( 06 Feb 2019 21:56 )  PTT:29.6

## 2019-02-07 NOTE — CONSULT NOTE ADULT - PROBLEM SELECTOR RECOMMENDATION 9
-recommend PO challenge  -if tolerates, stable for discharge with close follow up in surgical oncology office outpatient  -if unable to tolerate oral liquids/food, will admit for non operative management of small bowel obstruction secondary to malignancy -recommend PO challenge  -if tolerates, stable for discharge with close follow up in surgical oncology office outpatient with Dr. Giang [address] [place] [address]  [phone]  -if unable to tolerate oral liquids/food, will admit for non operative management of small bowel obstruction secondary to malignancy

## 2019-02-07 NOTE — CONSULT NOTE ADULT - ASSESSMENT
53 year old male with signs and symptoms of small bowel obstruction secondary to cecal mass. However, when surgical team bedside to evaluate, patient was actively passing gas and just had a small bowel movement with improvement in symptoms.

## 2019-02-07 NOTE — PROGRESS NOTE ADULT - SUBJECTIVE AND OBJECTIVE BOX
SURGICAL ONCOLOGY PROGRESS NOTE:     SUBJECTIVE: Pt seen and examined at bedside. No acute overnight events. Pain well controlled. Tolerating diet, no n/v. Voiding well. Passing flatus, normal BM. Denies f/c/sob/cp. Ambulatory OOB    Vital Signs Last 24 Hrs  T(C): 36.9 (07 Feb 2019 08:25), Max: 37.2 (06 Feb 2019 19:11)  T(F): 98.4 (07 Feb 2019 08:25), Max: 99 (06 Feb 2019 19:11)  HR: 93 (07 Feb 2019 08:25) (81 - 93)  BP: 100/60 (07 Feb 2019 08:25) (100/60 - 129/78)  BP(mean): --  RR: 17 (07 Feb 2019 08:25) (17 - 19)  SpO2: 99% (07 Feb 2019 08:25) (98% - 99%)    OBJECTIVE:  Gen: NAD  HEENT: NC/AT  CV: RRR  R: No respiratory distress  Abd: soft, nondistended, nontender, no guarding or rebound. No peritoneal signs.  Extrem: No pedal edema    I&O's Detail    06 Feb 2019 07:01  -  07 Feb 2019 07:00  --------------------------------------------------------  IN:  Total IN: 0 mL    OUT:    Voided: 700 mL  Total OUT: 700 mL    Total NET: -700 mL      07 Feb 2019 07:01  -  07 Feb 2019 09:16  --------------------------------------------------------  IN:  Total IN: 0 mL    OUT:    Voided: 500 mL  Total OUT: 500 mL    Total NET: -500 mL          MEDICATIONS  (STANDING):    MEDICATIONS  (PRN):      LABS:                        9.4    9.9   )-----------( 339      ( 06 Feb 2019 21:56 )             30.2     02-06    126<L>  |  85<L>  |  22.0<H>  ----------------------------<  173<H>  3.1<L>   |  26.0  |  0.60    Ca    8.9      06 Feb 2019 21:56    TPro  6.9  /  Alb  3.5  /  TBili  0.4  /  DBili  x   /  AST  18  /  ALT  18  /  AlkPhos  81  02-06    PT/INR - ( 06 Feb 2019 21:56 )   PT: 12.9 sec;   INR: 1.12 ratio         PTT - ( 06 Feb 2019 21:56 )  PTT:29.6 sec        RADIOLOGY & ADDITIONAL STUDIES: SURGICAL ONCOLOGY PROGRESS NOTE:     SUBJECTIVE: Pt seen and examined at bedside. +emesis overnight, continues to be nauseaous this AM. Passing flatus, states passing BM. Pain well controlled.  Denies f/c/sob/cp. Ambulatory OOB    Vital Signs Last 24 Hrs  T(C): 36.9 (07 Feb 2019 08:25), Max: 37.2 (06 Feb 2019 19:11)  T(F): 98.4 (07 Feb 2019 08:25), Max: 99 (06 Feb 2019 19:11)  HR: 93 (07 Feb 2019 08:25) (81 - 93)  BP: 100/60 (07 Feb 2019 08:25) (100/60 - 129/78)  BP(mean): --  RR: 17 (07 Feb 2019 08:25) (17 - 19)  SpO2: 99% (07 Feb 2019 08:25) (98% - 99%)    OBJECTIVE:  Gen: NAD  HEENT: NC/AT  CV: RRR  R: No respiratory distress  Abd: distended, +tympanitic, nontender, no guarding or rebound. No peritoneal signs.  Extrem: No pedal edema    I&O's Detail    06 Feb 2019 07:01  -  07 Feb 2019 07:00  --------------------------------------------------------  IN:  Total IN: 0 mL    OUT:    Voided: 700 mL  Total OUT: 700 mL    Total NET: -700 mL      07 Feb 2019 07:01  -  07 Feb 2019 09:16  --------------------------------------------------------  IN:  Total IN: 0 mL    OUT:    Voided: 500 mL  Total OUT: 500 mL    Total NET: -500 mL          MEDICATIONS  (STANDING):    MEDICATIONS  (PRN):      LABS:                        9.4    9.9   )-----------( 339      ( 06 Feb 2019 21:56 )             30.2     02-06    126<L>  |  85<L>  |  22.0<H>  ----------------------------<  173<H>  3.1<L>   |  26.0  |  0.60    Ca    8.9      06 Feb 2019 21:56    TPro  6.9  /  Alb  3.5  /  TBili  0.4  /  DBili  x   /  AST  18  /  ALT  18  /  AlkPhos  81  02-06    PT/INR - ( 06 Feb 2019 21:56 )   PT: 12.9 sec;   INR: 1.12 ratio         PTT - ( 06 Feb 2019 21:56 )  PTT:29.6 sec        RADIOLOGY & ADDITIONAL STUDIES: SURGICAL ONCOLOGY PROGRESS NOTE:     SUBJECTIVE: Pt seen and examined at bedside. +emesis overnight, continues to be nauseaous this AM. Passing flatus, states passing BM. Pain well controlled.  Denies f/c/sob/cp.     Vital Signs Last 24 Hrs  T(C): 36.9 (07 Feb 2019 08:25), Max: 37.2 (06 Feb 2019 19:11)  T(F): 98.4 (07 Feb 2019 08:25), Max: 99 (06 Feb 2019 19:11)  HR: 93 (07 Feb 2019 08:25) (81 - 93)  BP: 100/60 (07 Feb 2019 08:25) (100/60 - 129/78)  BP(mean): --  RR: 17 (07 Feb 2019 08:25) (17 - 19)  SpO2: 99% (07 Feb 2019 08:25) (98% - 99%)    OBJECTIVE:  Gen: NAD  HEENT: NC/AT  CV: RRR  R: No respiratory distress  Abd: distended, +tympanitic, nontender, no guarding or rebound. No peritoneal signs.  Extrem: No pedal edema    I&O's Detail    06 Feb 2019 07:01  -  07 Feb 2019 07:00  --------------------------------------------------------  IN:  Total IN: 0 mL    OUT:    Voided: 700 mL  Total OUT: 700 mL    Total NET: -700 mL      07 Feb 2019 07:01  -  07 Feb 2019 09:16  --------------------------------------------------------  IN:  Total IN: 0 mL    OUT:    Voided: 500 mL  Total OUT: 500 mL    Total NET: -500 mL          MEDICATIONS  (STANDING):    MEDICATIONS  (PRN):      LABS:                        9.4    9.9   )-----------( 339      ( 06 Feb 2019 21:56 )             30.2     02-06    126<L>  |  85<L>  |  22.0<H>  ----------------------------<  173<H>  3.1<L>   |  26.0  |  0.60    Ca    8.9      06 Feb 2019 21:56    TPro  6.9  /  Alb  3.5  /  TBili  0.4  /  DBili  x   /  AST  18  /  ALT  18  /  AlkPhos  81  02-06    PT/INR - ( 06 Feb 2019 21:56 )   PT: 12.9 sec;   INR: 1.12 ratio         PTT - ( 06 Feb 2019 21:56 )  PTT:29.6 sec        RADIOLOGY & ADDITIONAL STUDIES:

## 2019-02-07 NOTE — BEHAVIORAL HEALTH ASSESSMENT NOTE - NSBHCHARTREVIEWVS_PSY_A_CORE FT
Vital Signs Last 24 Hrs  T(C): 37.8 (07 Feb 2019 15:21), Max: 37.8 (07 Feb 2019 15:21)  T(F): 100.1 (07 Feb 2019 15:21), Max: 100.1 (07 Feb 2019 15:21)  HR: 107 (07 Feb 2019 15:21) (81 - 107)  BP: 126/86 (07 Feb 2019 15:21) (100/60 - 129/78)  BP(mean): --  RR: 18 (07 Feb 2019 15:21) (17 - 19)  SpO2: 96% (07 Feb 2019 15:21) (96% - 99%)

## 2019-02-07 NOTE — DISCHARGE NOTE ADULT - PLAN OF CARE
Alleviate pain and symptoms Patient presented w/ a SBO 2/2 to cecal mass. Patient decided to leave AMA.

## 2019-02-07 NOTE — BEHAVIORAL HEALTH ASSESSMENT NOTE - OTHER
not observed, uses cane at home intense affect at times unable to assess.  from home residents hospitalization, malignant neoplasm and SBO

## 2019-02-07 NOTE — BEHAVIORAL HEALTH ASSESSMENT NOTE - NSBHCHARTREVIEWLAB_PSY_A_CORE FT
9.4    9.9   )-----------( 339      ( 06 Feb 2019 21:56 )             30.2     02-07    129<L>  |  91<L>  |  17.0  ----------------------------<  164<H>  3.0<L>   |  25.0  |  0.54    Ca    8.3<L>      07 Feb 2019 08:46  Phos  1.8     02-07  Mg     2.0     02-07    TPro  6.9  /  Alb  3.5  /  TBili  0.4  /  DBili  x   /  AST  18  /  ALT  18  /  AlkPhos  81  02-06    LIVER FUNCTIONS - ( 06 Feb 2019 21:56 )  Alb: 3.5 g/dL / Pro: 6.9 g/dL / ALK PHOS: 81 U/L / ALT: 18 U/L / AST: 18 U/L / GGT: x           PT/INR - ( 06 Feb 2019 21:56 )   PT: 12.9 sec;   INR: 1.12 ratio         PTT - ( 06 Feb 2019 21:56 )  PTT:29.6 sec

## 2019-02-07 NOTE — ED ADULT NURSE REASSESSMENT NOTE - NS ED NURSE REASSESS COMMENT FT1
pt signing out AMA, pt cleared by psych and deemed capable of making informed decisions, pt a&o x4. Pt is refusing all forms of care at this point. Surgery team and residents bedside. Pt educated on risks of leaving AMA. Pt verbalized understanding. Pt refused vitals. Pt signed AMA form, pt will be released and picked up by family/friends. pt signing out AMA, pt cleared by psych and deemed capable of making informed decisions, pt a&o x4. Pt is refusing all forms of care at this point. Surgery team and residents bedside. Pt educated on risks of leaving AMA. Pt verbalized understanding. Pt refused vitals. Pt signed AMA form, pt will be released

## 2019-02-07 NOTE — ED ADULT NURSE REASSESSMENT NOTE - NS ED NURSE REASSESS COMMENT FT1
pt remains on 1:1, pt states he had BM potassium IVPB started. pt educated on plan of care, pt able to successfully teach back plan of care to RN, RN will continue to reeducate pt during hospital stay.

## 2019-02-07 NOTE — PROGRESS NOTE ADULT - ASSESSMENT
53M with malignant SBO will likely require operative management    -NGT/NPO/IVF  -serial abd exams  -will preop for likely OR tomorrow resection vs small bowel bypass 53M with malignant SBO will likely require operative management    -NGT/NPO/IVF  -serial abd exams  -will preop for likely OR tomorrow resection vs small bowel bypass with Dr. Giang  -OOB/amb/IS  -DVT ppx  -pain control  -seen with Dr. Stacy 53M with malignant SBO will likely require operative management. Patient will be recurrently hospitalized due to this issue despite currently passing flatus. Will benefit from surgical intervention to allow for uninterrupted chemotherapy. Patient excluded from trial [id number]-1316 due to psychiatric comorbidity and poor capability for followup    -NGT/NPO/IVF  -NGT attempted x 2 with patient immediatiely self-discontinuing. Will reattempt only if persistent emesis.  -serial abd exams  -will preop for likely OR tomorrow R hemicolectomy and cytoreduction without HIPEC, possible small bowel bypass with Dr. Giang  -will obtain HCP information and let HCP know  -replete electrolytes  -OOB/amb/IS  -DVT ppx  -pain control  -seen with Dr. Stacy

## 2019-02-07 NOTE — DISCHARGE NOTE ADULT - HOSPITAL COURSE
53 year old male presents to ED with 4 day history of diffuse abdominal pain associated with nausea and vomiting as well as obstipation. Patient has never experienced abdominal pain like this before. Constant in nature and non-radiating. Reported last vomited 30 minutes prior to evaluation. Upon further questioning, patient was actively passing flatus and stated he had a small solid bowel movement right before surgical team arrived bedside. 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 Recently started oral chemotherapy about 1 week ago. Patient will be recurrently hospitalized due to this issue despite currently passing flatus. Will benefit from surgical intervention to allow for uninterrupted chemotherapy. Patient excluded from trial [id number]-1316 due to psychiatric comorbidity and poor capability for followup. The patient was informed about the possibility of undergoing  R hemicolectomy and cytoreduction without HIPEC, possible small bowel bypass with Dr. Giang but he refused adamantly. Patient refused all treatment including NGT. Patient was instructed about the consequences of not undergoing surgical intervention that included perforation, sepsis and death. Patient acknowledge and decided to leave [place] after being properly educated.

## 2019-02-07 NOTE — ED ADULT NURSE REASSESSMENT NOTE - NS ED NURSE REASSESS COMMENT FT1
Pt re-evaluated by Psych and surgery team, pt not cleared by psych for discharge, Surgery team bedside to re-educate pt, AMA/discharge rescinded, plan to re-evaluate by psych in the am, pt cooperative for now, will reinstate pt psych meds. Pt agreeable to POC, pt status unchanged, refer to flowsheet and chart, pt safety maintained, pt hemodynamically stable

## 2019-02-07 NOTE — CONSULT NOTE ADULT - ASSESSMENT
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    Plan - Palliative right hemicolectomy.           Dr. Diaz will follow as outpatient after recovery from surgery.

## 2019-02-07 NOTE — BEHAVIORAL HEALTH ASSESSMENT NOTE - HPI (INCLUDE ILLNESS QUALITY, SEVERITY, DURATION, TIMING, CONTEXT, MODIFYING FACTORS, ASSOCIATED SIGNS AND SYMPTOMS)
Patient is a 53  year old, male; [facility]ic[facility] ; ;  noncaregiver; past psychiatric history of schizoaffective disorder ; multiple prior psychiatric hospitalizations (last time 2 years ago); no known suicide attempts; no known history of violence or arrests; no active substance abuse or known history of complicated withdrawal; PMH of osteogenesis imperfecta, HTN, hypothyroidism, DM type 2 recent dx of cecal mass, admitted to [facility] fpr nausea and vomiting. Patient has neoplastic SBO and will likely need surgery.      Patient is currently depressed, and guarded with disorganized and paranoid thought process. He denies S/H/I/I/P but will not follow cooperate with mental status eval. Patient reports he does not need psychiatric medication stating, " I am not crazy, I don't need that medication." Patient is  followed by Dr. Claudio at [facility] clinic. He had been functioning at baseline until 4 weeks ago when was diagnosed with a cecal tumor. He was seen by this service on 1/23/2019 for increasing paranoia, irritability and agitation in the context of recent cancer diagnosis. At that time collateral was obtained by staff, Reba () who reported that there has been a very clear decompensation over the last several weeks, and that patient is not at baseline. He has been calling staff and leaving multiple emails.  He has been accusing nurses of being "drug dealers".   Received collateral from  staff Sekou at [facility]. Sekou reports patient went to his PCP and told him he was suicidal with a plan to electrocute himself. His psychiatrist was notified and recommended going to [facility] for psychiatric evaluation. Sekou reports patient has been noncompliant with medical treatment. This week patient was scheduled to have a port inserted for chemotherapy and a liver biopsy and cancelled both procedures.   Received collateral from psychiatrist Dr Luong  [phone] at [facility] who is recommending inpatient psychiatric admission if patient is medically cleared. Dr Luong is also in agreement to stop Clozaril for now.

## 2019-02-07 NOTE — ED ADULT NURSE REASSESSMENT NOTE - NS ED NURSE REASSESS COMMENT FT1
assumed care of patient 2300 charting as noted. pt alert and oriented x4 in bed with yellow gown. pt denies si/hi ah/vh at this time. respirations even unlabored. pt denies pain. awaiting CT scan. pt educated on plan of care, pt able to successfully teach back plan of care to RN, RN will continue to reeducate pt during hospital stay.

## 2019-02-07 NOTE — BEHAVIORAL HEALTH ASSESSMENT NOTE - SUMMARY
Patient is a 53  year old, male; domic[facility] in [facility] ; ;  noncaregiver; past psychiatric history of schizoaffective disorder ; multiple prior psychiatric hospitalizations (last time 2 years ago); no known suicide attempts; no known history of violence or arrests; no active substance abuse or known history of complicated withdrawal; PMH of osteogenesis imperfecta, HTN, hypothyroidism, DM type 2 recent dx of malignant cecal mass, admitted to [facility] for bowl obstruction and now facing surgical intervention. Yesterday while at PCP office patient admitted to suicidal ideation with a plan to electrocute himself. Patient has been decompensating over the past several weeks after receiving malignant diagnosis. Patient is currently guarded, paranoid and disorganized. Patients psychiatrist is recommending inpatient psychiatric admission once medically cleared.   Recommend  Would stop Clozaril which may contribute to SBO.  If patient is NPO change Risperdal 3 mg to Risperdal M tab 3 mg qhs  Continue Ativan as ordered.

## 2019-02-07 NOTE — BEHAVIORAL HEALTH ASSESSMENT NOTE - NSBHCHARTREVIEWINVESTIGATE_PSY_A_CORE FT
< from: 12 Lead ECG (01.23.19 @ 20:35) >      Ventricular Rate 81 BPM    Atrial Rate 81 BPM    P-R Interval 152 ms    QRS Duration 88 ms    Q-T Interval 408 ms    QTC Calculation(Bezet) 473 ms    P Axis 30 degrees    R Axis 24 degrees    T Axis 72 degrees    < end of copied text >

## 2019-02-07 NOTE — ED ADULT NURSE REASSESSMENT NOTE - NS ED NURSE REASSESS COMMENT FT1
Pt care assumed at this, pt sleeping on stretcher, appears comfortable, easy arousability, 1:1 at bedside, pt safety maintained, call placed to admitting team s/w RUTHIE gomez regarding pt BP 90/51, no new orders at this time.

## 2019-02-08 ENCOUNTER — APPOINTMENT (OUTPATIENT)
Dept: SURGICAL ONCOLOGY | Facility: HOSPITAL | Age: 54
End: 2019-02-08

## 2019-02-08 DIAGNOSIS — F25.8 OTHER SCHIZOAFFECTIVE DISORDERS: ICD-10-CM

## 2019-02-08 LAB
AMPHET UR-MCNC: NEGATIVE — SIGNIFICANT CHANGE UP
ANION GAP SERPL CALC-SCNC: 11 MMOL/L — SIGNIFICANT CHANGE UP (ref 5–17)
ANION GAP SERPL CALC-SCNC: 11 MMOL/L — SIGNIFICANT CHANGE UP (ref 5–17)
ANISOCYTOSIS BLD QL: SLIGHT — SIGNIFICANT CHANGE UP
BARBITURATES UR SCN-MCNC: NEGATIVE — SIGNIFICANT CHANGE UP
BENZODIAZ UR-MCNC: NEGATIVE — SIGNIFICANT CHANGE UP
BUN SERPL-MCNC: 12 MG/DL — SIGNIFICANT CHANGE UP (ref 8–20)
BUN SERPL-MCNC: 15 MG/DL — SIGNIFICANT CHANGE UP (ref 8–20)
CALCIUM SERPL-MCNC: 7.3 MG/DL — LOW (ref 8.6–10.2)
CALCIUM SERPL-MCNC: 7.6 MG/DL — LOW (ref 8.6–10.2)
CHLORIDE SERPL-SCNC: 101 MMOL/L — SIGNIFICANT CHANGE UP (ref 98–107)
CHLORIDE SERPL-SCNC: 97 MMOL/L — LOW (ref 98–107)
CO2 SERPL-SCNC: 24 MMOL/L — SIGNIFICANT CHANGE UP (ref 22–29)
CO2 SERPL-SCNC: 26 MMOL/L — SIGNIFICANT CHANGE UP (ref 22–29)
COCAINE METAB.OTHER UR-MCNC: NEGATIVE — SIGNIFICANT CHANGE UP
CREAT SERPL-MCNC: 0.63 MG/DL — SIGNIFICANT CHANGE UP (ref 0.5–1.3)
CREAT SERPL-MCNC: 0.7 MG/DL — SIGNIFICANT CHANGE UP (ref 0.5–1.3)
EOSINOPHIL NFR BLD AUTO: 1 % — SIGNIFICANT CHANGE UP (ref 0–6)
GLUCOSE BLDC GLUCOMTR-MCNC: 101 MG/DL — HIGH (ref 70–99)
GLUCOSE BLDC GLUCOMTR-MCNC: 110 MG/DL — HIGH (ref 70–99)
GLUCOSE BLDC GLUCOMTR-MCNC: 112 MG/DL — HIGH (ref 70–99)
GLUCOSE BLDC GLUCOMTR-MCNC: 117 MG/DL — HIGH (ref 70–99)
GLUCOSE BLDC GLUCOMTR-MCNC: 95 MG/DL — SIGNIFICANT CHANGE UP (ref 70–99)
GLUCOSE SERPL-MCNC: 107 MG/DL — SIGNIFICANT CHANGE UP (ref 70–115)
GLUCOSE SERPL-MCNC: 111 MG/DL — SIGNIFICANT CHANGE UP (ref 70–115)
HBA1C BLD-MCNC: 5.6 % — SIGNIFICANT CHANGE UP (ref 4–5.6)
HCT VFR BLD CALC: 26.9 % — LOW (ref 42–52)
HGB BLD-MCNC: 8.2 G/DL — LOW (ref 14–18)
HYPOCHROMIA BLD QL: SLIGHT — SIGNIFICANT CHANGE UP
LYMPHOCYTES # BLD AUTO: 13 % — LOW (ref 20–55)
MACROCYTES BLD QL: SLIGHT — SIGNIFICANT CHANGE UP
MAGNESIUM SERPL-MCNC: 2 MG/DL — SIGNIFICANT CHANGE UP (ref 1.6–2.6)
MAGNESIUM SERPL-MCNC: 2.1 MG/DL — SIGNIFICANT CHANGE UP (ref 1.6–2.6)
MCHC RBC-ENTMCNC: 21.5 PG — LOW (ref 27–31)
MCHC RBC-ENTMCNC: 30.5 G/DL — LOW (ref 32–36)
MCV RBC AUTO: 70.4 FL — LOW (ref 80–94)
METHADONE UR-MCNC: NEGATIVE — SIGNIFICANT CHANGE UP
MICROCYTES BLD QL: SLIGHT — SIGNIFICANT CHANGE UP
MONOCYTES NFR BLD AUTO: 16 % — HIGH (ref 3–10)
NEUTROPHILS NFR BLD AUTO: 70 % — SIGNIFICANT CHANGE UP (ref 37–73)
NRBC # BLD: 1 /100 — HIGH (ref 0–0)
OPIATES UR-MCNC: NEGATIVE — SIGNIFICANT CHANGE UP
OVALOCYTES BLD QL SMEAR: SLIGHT — SIGNIFICANT CHANGE UP
PCP SPEC-MCNC: SIGNIFICANT CHANGE UP
PCP UR-MCNC: NEGATIVE — SIGNIFICANT CHANGE UP
PHOSPHATE SERPL-MCNC: 1.1 MG/DL — LOW (ref 2.4–4.7)
PHOSPHATE SERPL-MCNC: 3.1 MG/DL — SIGNIFICANT CHANGE UP (ref 2.4–4.7)
PLAT MORPH BLD: NORMAL — SIGNIFICANT CHANGE UP
PLATELET # BLD AUTO: 296 K/UL — SIGNIFICANT CHANGE UP (ref 150–400)
POIKILOCYTOSIS BLD QL AUTO: SLIGHT — SIGNIFICANT CHANGE UP
POTASSIUM SERPL-MCNC: 3.1 MMOL/L — LOW (ref 3.5–5.3)
POTASSIUM SERPL-MCNC: 3.7 MMOL/L — SIGNIFICANT CHANGE UP (ref 3.5–5.3)
POTASSIUM SERPL-SCNC: 3.1 MMOL/L — LOW (ref 3.5–5.3)
POTASSIUM SERPL-SCNC: 3.7 MMOL/L — SIGNIFICANT CHANGE UP (ref 3.5–5.3)
RBC # BLD: 3.82 M/UL — LOW (ref 4.6–6.2)
RBC # FLD: 18.8 % — HIGH (ref 11–15.6)
RBC BLD AUTO: ABNORMAL
SODIUM SERPL-SCNC: 134 MMOL/L — LOW (ref 135–145)
SODIUM SERPL-SCNC: 136 MMOL/L — SIGNIFICANT CHANGE UP (ref 135–145)
THC UR QL: NEGATIVE — SIGNIFICANT CHANGE UP
WBC # BLD: 11.7 K/UL — HIGH (ref 4.8–10.8)
WBC # FLD AUTO: 11.7 K/UL — HIGH (ref 4.8–10.8)

## 2019-02-08 PROCEDURE — 71045 X-RAY EXAM CHEST 1 VIEW: CPT | Mod: 26

## 2019-02-08 PROCEDURE — 93010 ELECTROCARDIOGRAM REPORT: CPT

## 2019-02-08 PROCEDURE — 99233 SBSQ HOSP IP/OBS HIGH 50: CPT

## 2019-02-08 PROCEDURE — 44310 ILEOSTOMY/JEJUNOSTOMY: CPT

## 2019-02-08 RX ORDER — ONDANSETRON 8 MG/1
4 TABLET, FILM COATED ORAL ONCE
Qty: 0 | Refills: 0 | Status: DISCONTINUED | OUTPATIENT
Start: 2019-02-08 | End: 2019-02-08

## 2019-02-08 RX ORDER — POTASSIUM PHOSPHATE, MONOBASIC POTASSIUM PHOSPHATE, DIBASIC 236; 224 MG/ML; MG/ML
30 INJECTION, SOLUTION INTRAVENOUS ONCE
Qty: 0 | Refills: 0 | Status: COMPLETED | OUTPATIENT
Start: 2019-02-08 | End: 2019-02-08

## 2019-02-08 RX ORDER — FENTANYL CITRATE 50 UG/ML
50 INJECTION INTRAVENOUS
Qty: 0 | Refills: 0 | Status: DISCONTINUED | OUTPATIENT
Start: 2019-02-08 | End: 2019-02-14

## 2019-02-08 RX ORDER — POTASSIUM CHLORIDE 20 MEQ
20 PACKET (EA) ORAL ONCE
Qty: 0 | Refills: 0 | Status: COMPLETED | OUTPATIENT
Start: 2019-02-08 | End: 2019-02-08

## 2019-02-08 RX ORDER — POTASSIUM CHLORIDE 20 MEQ
10 PACKET (EA) ORAL
Qty: 0 | Refills: 0 | Status: COMPLETED | OUTPATIENT
Start: 2019-02-08 | End: 2019-02-08

## 2019-02-08 RX ORDER — SODIUM CHLORIDE 9 MG/ML
1000 INJECTION, SOLUTION INTRAVENOUS
Qty: 0 | Refills: 0 | Status: DISCONTINUED | OUTPATIENT
Start: 2019-02-08 | End: 2019-02-09

## 2019-02-08 RX ORDER — SODIUM CHLORIDE 9 MG/ML
1000 INJECTION, SOLUTION INTRAVENOUS
Qty: 0 | Refills: 0 | Status: DISCONTINUED | OUTPATIENT
Start: 2019-02-08 | End: 2019-02-08

## 2019-02-08 RX ADMIN — Medication 50 MILLIEQUIVALENT(S): at 10:37

## 2019-02-08 RX ADMIN — SODIUM CHLORIDE 100 MILLILITER(S): 9 INJECTION, SOLUTION INTRAVENOUS at 22:11

## 2019-02-08 RX ADMIN — Medication 5 MILLIGRAM(S): at 06:07

## 2019-02-08 RX ADMIN — POTASSIUM PHOSPHATE, MONOBASIC POTASSIUM PHOSPHATE, DIBASIC 83.33 MILLIMOLE(S): 236; 224 INJECTION, SOLUTION INTRAVENOUS at 01:32

## 2019-02-08 RX ADMIN — Medication 100 MILLIEQUIVALENT(S): at 02:39

## 2019-02-08 RX ADMIN — CLOZAPINE 200 MILLIGRAM(S): 150 TABLET, ORALLY DISINTEGRATING ORAL at 22:09

## 2019-02-08 RX ADMIN — Medication 100 MILLIEQUIVALENT(S): at 04:55

## 2019-02-08 RX ADMIN — Medication 100 MILLIEQUIVALENT(S): at 01:20

## 2019-02-08 RX ADMIN — RISPERIDONE 3 MILLIGRAM(S): 4 TABLET ORAL at 22:09

## 2019-02-08 RX ADMIN — Medication 5 MILLIGRAM(S): at 13:18

## 2019-02-08 RX ADMIN — POTASSIUM PHOSPHATE, MONOBASIC POTASSIUM PHOSPHATE, DIBASIC 62.5 MILLIMOLE(S): 236; 224 INJECTION, SOLUTION INTRAVENOUS at 01:39

## 2019-02-08 RX ADMIN — Medication 5 MILLIGRAM(S): at 01:20

## 2019-02-08 RX ADMIN — FENTANYL CITRATE 50 MICROGRAM(S): 50 INJECTION INTRAVENOUS at 19:23

## 2019-02-08 RX ADMIN — FENTANYL CITRATE 50 MICROGRAM(S): 50 INJECTION INTRAVENOUS at 19:15

## 2019-02-08 RX ADMIN — Medication 50 MICROGRAM(S): at 14:32

## 2019-02-08 NOTE — PROGRESS NOTE BEHAVIORAL HEALTH - ADDITIONAL DETAILS / COMMENTS
medical decision making : basically understands his condition, treatment recommendations, treatment options and ramifications of his decisions

## 2019-02-08 NOTE — ED ADULT NURSE REASSESSMENT NOTE - NS ED NURSE REASSESS COMMENT FT1
Report given to accepting unit Becky JIN, LIAM discussed with pt who verbalized understanding, pt 1:1 in place, transport at bedside, pt placed on transfer cardiac monitor and safely transferred to unit at this time.

## 2019-02-08 NOTE — PROGRESS NOTE ADULT - ASSESSMENT
54 y/o M w/ Malignant SBO and Peritoneal Carcinomatosis.    Plan:  - f/u Psych Recs  - f/u Bowel fuction  - Possible Surgical Intervention  - NPO, IVF  - 1:1

## 2019-02-08 NOTE — BRIEF OPERATIVE NOTE - OPERATION/FINDINGS
dilated small bowel  small bowel run and terminal ileum identified  muscle splitting ostomy created at preoperatively marked site to right of midline  red rubber catheter placed under loop as bridge  fascia closed, skin closed using 4-0 monocryl

## 2019-02-08 NOTE — ED ADULT NURSE REASSESSMENT NOTE - NS ED NURSE REASSESS COMMENT FT1
Pt resting comfortably on stretcher, calm and cooperative to care, medicated as ordered, VSS 1:1 in place, safety maintained, awaiting bed assignment, pt verbalize understanding.

## 2019-02-08 NOTE — PROGRESS NOTE BEHAVIORAL HEALTH - NSBHCHARTREVIEWIMAGING_PSY_A_CORE FT
< from: CT Abdomen and Pelvis w/ Oral Cont (02.07.19 @ 01:59) >    IMPRESSION: High-grade small bowel obstruction with known cecal/ileocecal   valve mass.    Multiple peritoneal nodules, likely peritoneal metastasis.    Diffusely coarse trabeculation of entire skeleton with biconcave   vertebrae. These findings can be seen in severe osteoporosis,renal   osteodystrophy or hematologic disorder.    < end of copied text >

## 2019-02-08 NOTE — BRIEF OPERATIVE NOTE - PROCEDURE
<<-----Click on this checkbox to enter Procedure Loop ileostomy  02/08/2019    Active  CSULZBACK  Exploratory laparotomy  02/08/2019    Active  CSULZBACK

## 2019-02-08 NOTE — PROGRESS NOTE ADULT - SUBJECTIVE AND OBJECTIVE BOX
The patient is a 53y old  Male who presents with a chief complaint of SBO (07 Feb 2019 22:36)      PAST MEDICAL HISTORY:  Hypothyroidism  High cholesterol  Diabetes  Osteogenesis imperfecta  Schizo-affective psychosis      PAST SURGICAL HISTORY:  Fracture of ankle      MEDICATIONS  (STANDING):  cloZAPine 200 milliGRAM(s) Oral at bedtime  dextrose 5%. 1000 milliLiter(s) (50 mL/Hr) IV Continuous <Continuous>  dextrose 50% Injectable 12.5 Gram(s) IV Push once  dextrose 50% Injectable 25 Gram(s) IV Push once  dextrose 50% Injectable 25 Gram(s) IV Push once  enoxaparin Injectable 40 milliGRAM(s) SubCutaneous every 24 hours  insulin lispro (HumaLOG) corrective regimen sliding scale   SubCutaneous every 6 hours  lactated ringers. 1000 milliLiter(s) (100 mL/Hr) IV Continuous <Continuous>  levothyroxine Injectable 50 MICROGram(s) IV Push <User Schedule>  metoprolol tartrate Injectable 5 milliGRAM(s) IV Push every 6 hours  risperiDONE   Tablet 3 milliGRAM(s) Oral at bedtime    MEDICATIONS  (PRN):  dextrose 40% Gel 15 Gram(s) Oral once PRN Blood Glucose LESS THAN 70 milliGRAM(s)/deciliter  glucagon  Injectable 1 milliGRAM(s) IntraMuscular once PRN Glucose LESS THAN 70 milligrams/deciliter  HYDROmorphone  Injectable 0.5 milliGRAM(s) IV Push every 4 hours PRN Severe Pain (7 - 10)  ketorolac   Injectable 15 milliGRAM(s) IV Push every 6 hours PRN Moderate Pain (4 - 6)  LORazepam   Injectable 0.25 milliGRAM(s) IV Push every 12 hours PRN Agitation  ondansetron Injectable 4 milliGRAM(s) IV Push every 6 hours PRN Nausea      Allergies    amoxicillin (Unknown)  Lamictal (Unknown)  penicillin (Unknown)  Tegretol (Unknown)  Zetia (Unknown)    Intolerances        SOCIAL HISTORY:    Height (cm): 149.86 (02-08-19 @ 06:56)  Weight (kg): 74.8 (02-08-19 @ 06:56)  BMI (kg/m2): 33.3 (02-08-19 @ 06:56)                          8.2    11.7  )-----------( 296      ( 08 Feb 2019 07:11 )             26.9       PT/INR - ( 06 Feb 2019 21:56 )   PT: 12.9 sec;   INR: 1.12 ratio         PTT - ( 06 Feb 2019 21:56 )  PTT:29.6 sec    02-08    136  |  101  |  12.0  ----------------------------<  107  3.7   |  24.0  |  0.63    Ca    7.3<L>      08 Feb 2019 07:11  Phos  3.1     02-08  Mg     2.1     02-08    TPro  6.9  /  Alb  3.5  /  TBili  0.4  /  DBili  x   /  AST  18  /  ALT  18  /  AlkPhos  81  02-06        EKG:    TT ECHO:    RADIOLOGY:    ASA # =             Mallampati # =

## 2019-02-08 NOTE — PROGRESS NOTE BEHAVIORAL HEALTH - SUMMARY
chronically mentally ill patient now with malignant cecal mass , metastatic disease, SBO - plan for removal of mass, and then future chemotherapy,   In my opinion patient has capacity to make medical decisions as he   basically understands his condition, treatment recommendations, treatment options and ramifications of his decisions  Clozapine to be held due to anti-cholinergic effects which can contribute to bowel dysfunction  Discussed above with  and Mrs Edwards [phone]

## 2019-02-08 NOTE — PROGRESS NOTE ADULT - SUBJECTIVE AND OBJECTIVE BOX
HPI/OVERNIGHT EVENTS:  No acute events overnight. Patient was evaluated at bedside and was found afebrile, HDS and in no acute distress. Patient states he is feeling better and is having bowel movements. Denies nausea, vomiting, fever, chest pain or SOB.    MEDICATIONS  (STANDING):  cloZAPine 200 milliGRAM(s) Oral at bedtime  dextrose 5%. 1000 milliLiter(s) (50 mL/Hr) IV Continuous <Continuous>  dextrose 50% Injectable 12.5 Gram(s) IV Push once  dextrose 50% Injectable 25 Gram(s) IV Push once  dextrose 50% Injectable 25 Gram(s) IV Push once  enoxaparin Injectable 40 milliGRAM(s) SubCutaneous every 24 hours  insulin lispro (HumaLOG) corrective regimen sliding scale   SubCutaneous every 6 hours  lactated ringers. 1000 milliLiter(s) (100 mL/Hr) IV Continuous <Continuous>  levothyroxine Injectable 50 MICROGram(s) IV Push <User Schedule>  metoprolol tartrate Injectable 5 milliGRAM(s) IV Push every 6 hours  risperiDONE   Tablet 3 milliGRAM(s) Oral at bedtime    MEDICATIONS  (PRN):  dextrose 40% Gel 15 Gram(s) Oral once PRN Blood Glucose LESS THAN 70 milliGRAM(s)/deciliter  glucagon  Injectable 1 milliGRAM(s) IntraMuscular once PRN Glucose LESS THAN 70 milligrams/deciliter  HYDROmorphone  Injectable 0.5 milliGRAM(s) IV Push every 4 hours PRN Severe Pain (7 - 10)  ketorolac   Injectable 15 milliGRAM(s) IV Push every 6 hours PRN Moderate Pain (4 - 6)  LORazepam   Injectable 0.25 milliGRAM(s) IV Push every 12 hours PRN Agitation  ondansetron Injectable 4 milliGRAM(s) IV Push every 6 hours PRN Nausea      Vital Signs Last 24 Hrs  T(C): 36.9 (08 Feb 2019 06:56), Max: 37.8 (07 Feb 2019 15:21)  T(F): 98.4 (08 Feb 2019 06:56), Max: 100.1 (07 Feb 2019 15:21)  HR: 82 (08 Feb 2019 06:56) (82 - 108)  BP: 125/86 (08 Feb 2019 06:56) (90/51 - 126/86)  BP(mean): --  RR: 18 (08 Feb 2019 06:56) (17 - 20)  SpO2: 98% (08 Feb 2019 06:56) (93% - 99%)    Constitutional: patient resting comfortably in bed, in no acute distress  HEENT: EOMI / PERRL b/l, no active drainage or redness  Neck: No JVD, full ROM without pain  Respiratory: respirations are unlabored, no accessory muscle use, no conversational dyspnea  Cardiovascular: regular rate & rhythm  Gastrointestinal: Abdomen soft, non-tender, distended, no rebound tenderness / guarding      I&O's Detail    07 Feb 2019 07:01  -  08 Feb 2019 07:00  --------------------------------------------------------  IN:  Total IN: 0 mL    OUT:    Voided: 500 mL  Total OUT: 500 mL    Total NET: -500 mL      08 Feb 2019 07:01  -  08 Feb 2019 08:08  --------------------------------------------------------  IN:  Total IN: 0 mL    OUT:    Voided: 300 mL  Total OUT: 300 mL    Total NET: -300 mL          LABS:                        8.2    11.7  )-----------( 296      ( 08 Feb 2019 07:11 )             26.9     02-07    134<L>  |  97<L>  |  15.0  ----------------------------<  111  3.1<L>   |  26.0  |  0.70    Ca    7.6<L>      07 Feb 2019 23:02  Phos  1.1     02-07  Mg     2.0     02-07    TPro  6.9  /  Alb  3.5  /  TBili  0.4  /  DBili  x   /  AST  18  /  ALT  18  /  AlkPhos  81  02-06    PT/INR - ( 06 Feb 2019 21:56 )   PT: 12.9 sec;   INR: 1.12 ratio         PTT - ( 06 Feb 2019 21:56 )  PTT:29.6 sec

## 2019-02-09 LAB
ANION GAP SERPL CALC-SCNC: 16 MMOL/L — SIGNIFICANT CHANGE UP (ref 5–17)
BUN SERPL-MCNC: 8 MG/DL — SIGNIFICANT CHANGE UP (ref 8–20)
CALCIUM SERPL-MCNC: 7.3 MG/DL — LOW (ref 8.6–10.2)
CHLORIDE SERPL-SCNC: 99 MMOL/L — SIGNIFICANT CHANGE UP (ref 98–107)
CO2 SERPL-SCNC: 18 MMOL/L — LOW (ref 22–29)
CREAT SERPL-MCNC: 0.51 MG/DL — SIGNIFICANT CHANGE UP (ref 0.5–1.3)
GLUCOSE BLDC GLUCOMTR-MCNC: 142 MG/DL — HIGH (ref 70–99)
GLUCOSE BLDC GLUCOMTR-MCNC: 180 MG/DL — HIGH (ref 70–99)
GLUCOSE BLDC GLUCOMTR-MCNC: 181 MG/DL — HIGH (ref 70–99)
GLUCOSE BLDC GLUCOMTR-MCNC: 268 MG/DL — HIGH (ref 70–99)
GLUCOSE SERPL-MCNC: 155 MG/DL — HIGH (ref 70–115)
HCT VFR BLD CALC: 27.6 % — LOW (ref 42–52)
HGB BLD-MCNC: 8.5 G/DL — LOW (ref 14–18)
MAGNESIUM SERPL-MCNC: 2 MG/DL — SIGNIFICANT CHANGE UP (ref 1.6–2.6)
MCHC RBC-ENTMCNC: 21.8 PG — LOW (ref 27–31)
MCHC RBC-ENTMCNC: 30.8 G/DL — LOW (ref 32–36)
MCV RBC AUTO: 70.8 FL — LOW (ref 80–94)
PHOSPHATE SERPL-MCNC: 2.6 MG/DL — SIGNIFICANT CHANGE UP (ref 2.4–4.7)
PLATELET # BLD AUTO: 340 K/UL — SIGNIFICANT CHANGE UP (ref 150–400)
POTASSIUM SERPL-MCNC: 4.2 MMOL/L — SIGNIFICANT CHANGE UP (ref 3.5–5.3)
POTASSIUM SERPL-SCNC: 4.2 MMOL/L — SIGNIFICANT CHANGE UP (ref 3.5–5.3)
RBC # BLD: 3.9 M/UL — LOW (ref 4.6–6.2)
RBC # FLD: 19.3 % — HIGH (ref 11–15.6)
SODIUM SERPL-SCNC: 133 MMOL/L — LOW (ref 135–145)
WBC # BLD: 15.3 K/UL — HIGH (ref 4.8–10.8)
WBC # FLD AUTO: 15.3 K/UL — HIGH (ref 4.8–10.8)

## 2019-02-09 RX ORDER — SODIUM CHLORIDE 9 MG/ML
1000 INJECTION INTRAMUSCULAR; INTRAVENOUS; SUBCUTANEOUS
Qty: 0 | Refills: 0 | Status: DISCONTINUED | OUTPATIENT
Start: 2019-02-09 | End: 2019-02-10

## 2019-02-09 RX ADMIN — Medication 15 MILLIGRAM(S): at 20:34

## 2019-02-09 RX ADMIN — Medication 5 MILLIGRAM(S): at 00:25

## 2019-02-09 RX ADMIN — RISPERIDONE 3 MILLIGRAM(S): 4 TABLET ORAL at 21:10

## 2019-02-09 RX ADMIN — SODIUM CHLORIDE 100 MILLILITER(S): 9 INJECTION, SOLUTION INTRAVENOUS at 05:49

## 2019-02-09 RX ADMIN — Medication 5 MILLIGRAM(S): at 12:56

## 2019-02-09 RX ADMIN — Medication 15 MILLIGRAM(S): at 19:01

## 2019-02-09 RX ADMIN — ENOXAPARIN SODIUM 40 MILLIGRAM(S): 100 INJECTION SUBCUTANEOUS at 12:56

## 2019-02-09 RX ADMIN — SODIUM CHLORIDE 100 MILLILITER(S): 9 INJECTION INTRAMUSCULAR; INTRAVENOUS; SUBCUTANEOUS at 21:10

## 2019-02-09 RX ADMIN — Medication 50 MICROGRAM(S): at 12:56

## 2019-02-09 RX ADMIN — Medication 5 MILLIGRAM(S): at 05:49

## 2019-02-09 RX ADMIN — Medication 2: at 21:10

## 2019-02-09 RX ADMIN — Medication 15 MILLIGRAM(S): at 12:57

## 2019-02-09 RX ADMIN — CLOZAPINE 200 MILLIGRAM(S): 150 TABLET, ORALLY DISINTEGRATING ORAL at 21:10

## 2019-02-09 RX ADMIN — Medication 15 MILLIGRAM(S): at 13:20

## 2019-02-09 NOTE — PROGRESS NOTE ADULT - ASSESSMENT
53 year old male s/p ex lap with diverting loop ileostomy POD 1 for malignant large bowel obstruction

## 2019-02-09 NOTE — PROGRESS NOTE ADULT - SUBJECTIVE AND OBJECTIVE BOX
s/p ex lap with diverting loop ileostomy POD 1 doing well. pain controlled. tolerated clears. voided post operatively. no ostomy function  denies fevers, chills, nausea, vomitng, chest pain or shortness of breath       MEDICATIONS  (STANDING):  cloZAPine 200 milliGRAM(s) Oral at bedtime  dextrose 5%. 1000 milliLiter(s) (50 mL/Hr) IV Continuous <Continuous>  dextrose 50% Injectable 12.5 Gram(s) IV Push once  dextrose 50% Injectable 25 Gram(s) IV Push once  dextrose 50% Injectable 25 Gram(s) IV Push once  enoxaparin Injectable 40 milliGRAM(s) SubCutaneous every 24 hours  insulin lispro (HumaLOG) corrective regimen sliding scale   SubCutaneous every 6 hours  lactated ringers. 1000 milliLiter(s) (100 mL/Hr) IV Continuous <Continuous>  lactated ringers. 1000 milliLiter(s) (100 mL/Hr) IV Continuous <Continuous>  levothyroxine Injectable 50 MICROGram(s) IV Push <User Schedule>  metoprolol tartrate Injectable 5 milliGRAM(s) IV Push every 6 hours  risperiDONE   Tablet 3 milliGRAM(s) Oral at bedtime    MEDICATIONS  (PRN):  dextrose 40% Gel 15 Gram(s) Oral once PRN Blood Glucose LESS THAN 70 milliGRAM(s)/deciliter  fentaNYL    Injectable 50 MICROGram(s) IV Push every 10 minutes PRN Moderate Pain (4 - 6)  glucagon  Injectable 1 milliGRAM(s) IntraMuscular once PRN Glucose LESS THAN 70 milligrams/deciliter  HYDROmorphone  Injectable 0.5 milliGRAM(s) IV Push every 4 hours PRN Severe Pain (7 - 10)  ketorolac   Injectable 15 milliGRAM(s) IV Push every 6 hours PRN Moderate Pain (4 - 6)  LORazepam   Injectable 0.25 milliGRAM(s) IV Push every 12 hours PRN Agitation  ondansetron Injectable 4 milliGRAM(s) IV Push every 6 hours PRN Nausea      Vital Signs Last 24 Hrs  T(C): 37.1 (09 Feb 2019 08:34), Max: 37.9 (08 Feb 2019 16:42)  T(F): 98.7 (09 Feb 2019 08:34), Max: 100.2 (08 Feb 2019 16:42)  HR: 87 (09 Feb 2019 08:34) (74 - 101)  BP: 117/64 (09 Feb 2019 08:34) (104/67 - 160/72)  BP(mean): --  RR: 18 (09 Feb 2019 08:34) (16 - 22)  SpO2: 99% (09 Feb 2019 08:34) (95% - 101%)    Physical Exam:    general: no acute distress, AOx3  Respiratory: Breath Sounds equal & clear to auscultation, no accessory muscle use  Cardiovascular: Regular rate & rhythm, normal S1, S2; no murmurs, gallops or rubs  Gastrointestinal: Soft, non-tender, nondistended. midline wound dressing c/d/i. ostomy with red rubber intact. no ostomy function. edematous stoma, viable and pink   Extremities: No peripheral edema, No cyanosis, clubbing   Vascular: Equal and normal pulses: 2+ peripheral pulses throughout  Musculoskeletal: No joint pain, swelling or deformity; no limitation of movement    Skin: No rashes      I&O's Detail    08 Feb 2019 07:01  -  09 Feb 2019 07:00  --------------------------------------------------------  IN:    lactated ringers.: 1100 mL  Total IN: 1100 mL    OUT:    Ileostomy: 50 mL    Voided: 2350 mL  Total OUT: 2400 mL    Total NET: -1300 mL          LABS:                        8.5    15.3  )-----------( 340      ( 09 Feb 2019 07:31 )             27.6     02-09    133<L>  |  99  |  8.0  ----------------------------<  155<H>  4.2   |  18.0<L>  |  0.51    Ca    7.3<L>      09 Feb 2019 07:31  Phos  2.6     02-09  Mg     2.0     02-09            RADIOLOGY & ADDITIONAL STUDIES:

## 2019-02-09 NOTE — PROGRESS NOTE ADULT - SUBJECTIVE AND OBJECTIVE BOX
POD#0 s/p loop ileostomy .   Currently w/o any complaints except for some difficulty with voiding.   Pt was able to void after standing , 350cc .   Denies nausea, tolerating sips of clears.     VSS, NAD, A&Ox3  CVS: S1, S2  Chest: BS BL  Abd: soft, NT, ileostomy stoma protuberant, moist, viable, red rubber catheter intact, no flatus or stool in bag  Ext: no edema, SCD in place

## 2019-02-09 NOTE — PROGRESS NOTE ADULT - ASSESSMENT
POD#0 as above  - unsecured mittens requested due to patient history of NGT removal / agitation over night, pt agreeable to this plan instead of 1-1   - continue CLD    - dilaudid prn pain   - lovenox for dvt ppx   - check labs in AM

## 2019-02-09 NOTE — PROGRESS NOTE ADULT - PROBLEM SELECTOR PLAN 1
-pain control  -encourage IS use  -continue clear liquid diet  -monitor urine output  -monitor for ileostomy function  -remove red rubber catheter POD 5  -encourage ambulation  -dvt ppx

## 2019-02-10 LAB
ANION GAP SERPL CALC-SCNC: 12 MMOL/L — SIGNIFICANT CHANGE UP (ref 5–17)
BUN SERPL-MCNC: 7 MG/DL — LOW (ref 8–20)
CALCIUM SERPL-MCNC: 7.4 MG/DL — LOW (ref 8.6–10.2)
CHLORIDE SERPL-SCNC: 107 MMOL/L — SIGNIFICANT CHANGE UP (ref 98–107)
CO2 SERPL-SCNC: 24 MMOL/L — SIGNIFICANT CHANGE UP (ref 22–29)
CREAT SERPL-MCNC: 0.53 MG/DL — SIGNIFICANT CHANGE UP (ref 0.5–1.3)
GLUCOSE BLDC GLUCOMTR-MCNC: 133 MG/DL — HIGH (ref 70–99)
GLUCOSE BLDC GLUCOMTR-MCNC: 153 MG/DL — HIGH (ref 70–99)
GLUCOSE BLDC GLUCOMTR-MCNC: 159 MG/DL — HIGH (ref 70–99)
GLUCOSE BLDC GLUCOMTR-MCNC: 176 MG/DL — HIGH (ref 70–99)
GLUCOSE SERPL-MCNC: 150 MG/DL — HIGH (ref 70–115)
HCT VFR BLD CALC: 27.5 % — LOW (ref 42–52)
HGB BLD-MCNC: 8.4 G/DL — LOW (ref 14–18)
LYMPHOCYTES # BLD AUTO: 21 % — SIGNIFICANT CHANGE UP (ref 20–55)
MAGNESIUM SERPL-MCNC: 2.1 MG/DL — SIGNIFICANT CHANGE UP (ref 1.8–2.6)
MCHC RBC-ENTMCNC: 21.5 PG — LOW (ref 27–31)
MCHC RBC-ENTMCNC: 30.5 G/DL — LOW (ref 32–36)
MCV RBC AUTO: 70.5 FL — LOW (ref 80–94)
MONOCYTES NFR BLD AUTO: 7 % — SIGNIFICANT CHANGE UP (ref 3–10)
NEUTROPHILS NFR BLD AUTO: 67 % — SIGNIFICANT CHANGE UP (ref 37–73)
PHOSPHATE SERPL-MCNC: 1.5 MG/DL — LOW (ref 2.4–4.7)
PLATELET # BLD AUTO: 320 K/UL — SIGNIFICANT CHANGE UP (ref 150–400)
POTASSIUM SERPL-MCNC: 3 MMOL/L — LOW (ref 3.5–5.3)
POTASSIUM SERPL-SCNC: 3 MMOL/L — LOW (ref 3.5–5.3)
RBC # BLD: 3.9 M/UL — LOW (ref 4.6–6.2)
RBC # FLD: 19.4 % — HIGH (ref 11–15.6)
SODIUM SERPL-SCNC: 143 MMOL/L — SIGNIFICANT CHANGE UP (ref 135–145)
WBC # BLD: 12.1 K/UL — HIGH (ref 4.8–10.8)
WBC # FLD AUTO: 12.1 K/UL — HIGH (ref 4.8–10.8)

## 2019-02-10 RX ORDER — POTASSIUM CHLORIDE 20 MEQ
20 PACKET (EA) ORAL ONCE
Qty: 0 | Refills: 0 | Status: COMPLETED | OUTPATIENT
Start: 2019-02-10 | End: 2019-02-10

## 2019-02-10 RX ORDER — POTASSIUM CHLORIDE 20 MEQ
40 PACKET (EA) ORAL EVERY 4 HOURS
Qty: 0 | Refills: 0 | Status: COMPLETED | OUTPATIENT
Start: 2019-02-10 | End: 2019-02-10

## 2019-02-10 RX ADMIN — Medication 40 MILLIEQUIVALENT(S): at 16:47

## 2019-02-10 RX ADMIN — Medication 15 MILLIGRAM(S): at 22:25

## 2019-02-10 RX ADMIN — Medication 85 MILLIMOLE(S): at 12:34

## 2019-02-10 RX ADMIN — Medication 15 MILLIGRAM(S): at 22:40

## 2019-02-10 RX ADMIN — ENOXAPARIN SODIUM 40 MILLIGRAM(S): 100 INJECTION SUBCUTANEOUS at 13:04

## 2019-02-10 RX ADMIN — Medication 40 MILLIEQUIVALENT(S): at 11:46

## 2019-02-10 RX ADMIN — Medication 15 MILLIGRAM(S): at 11:55

## 2019-02-10 RX ADMIN — Medication 15 MILLIGRAM(S): at 11:40

## 2019-02-10 RX ADMIN — CLOZAPINE 200 MILLIGRAM(S): 150 TABLET, ORALLY DISINTEGRATING ORAL at 21:28

## 2019-02-10 RX ADMIN — Medication 5 MILLIGRAM(S): at 13:52

## 2019-02-10 RX ADMIN — Medication 15 MILLIGRAM(S): at 04:38

## 2019-02-10 RX ADMIN — Medication 50 MICROGRAM(S): at 11:54

## 2019-02-10 RX ADMIN — SODIUM CHLORIDE 100 MILLILITER(S): 9 INJECTION INTRAMUSCULAR; INTRAVENOUS; SUBCUTANEOUS at 04:37

## 2019-02-10 RX ADMIN — Medication 20 MILLIEQUIVALENT(S): at 20:13

## 2019-02-10 RX ADMIN — Medication 5 MILLIGRAM(S): at 20:13

## 2019-02-10 RX ADMIN — Medication 15 MILLIGRAM(S): at 04:55

## 2019-02-10 RX ADMIN — RISPERIDONE 3 MILLIGRAM(S): 4 TABLET ORAL at 21:28

## 2019-02-10 RX ADMIN — SODIUM CHLORIDE 100 MILLILITER(S): 9 INJECTION INTRAMUSCULAR; INTRAVENOUS; SUBCUTANEOUS at 16:47

## 2019-02-10 RX ADMIN — Medication 2: at 12:35

## 2019-02-10 NOTE — PROVIDER CONTACT NOTE (OTHER) - ACTION/TREATMENT ORDERED:
Dr Griffin said to give only if BP above 180
MD coming to evaluate patient. Patient was placed on CM.

## 2019-02-10 NOTE — PROGRESS NOTE ADULT - ASSESSMENT
53 year old male s/p ex lap with diverting loop ileostomy POD 2 for malignant large bowel obstruction   -pain control  -ostomy dressing change  -IS use  -Advanced to Full liquid diet  -monitor ileostomy function

## 2019-02-10 NOTE — PROGRESS NOTE ADULT - SUBJECTIVE AND OBJECTIVE BOX
53y Male s/p       T(C): 36.8 (02-10-19 @ 15:22), Max: 37.1 (02-10-19 @ 08:47)  HR: 81 (02-10-19 @ 15:22) (76 - 115)  BP: 120/71 (02-10-19 @ 15:22) (86/48 - 131/76)  RR: 18 (02-10-19 @ 15:22) (17 - 18)  SpO2: 98% (02-10-19 @ 15:22) (96% - 100%)      Pt seen, doing well, no anesthesia complications or complaints noted or reported.   No Nausea  Pain well controlled

## 2019-02-11 LAB
ANION GAP SERPL CALC-SCNC: 12 MMOL/L — SIGNIFICANT CHANGE UP (ref 5–17)
ANISOCYTOSIS BLD QL: SIGNIFICANT CHANGE UP
BASOPHILS # BLD AUTO: 0.1 K/UL — SIGNIFICANT CHANGE UP (ref 0–0.2)
BASOPHILS NFR BLD AUTO: 1 % — SIGNIFICANT CHANGE UP (ref 0–2)
BUN SERPL-MCNC: 6 MG/DL — LOW (ref 8–20)
CALCIUM SERPL-MCNC: 8.4 MG/DL — LOW (ref 8.6–10.2)
CHLORIDE SERPL-SCNC: 103 MMOL/L — SIGNIFICANT CHANGE UP (ref 98–107)
CO2 SERPL-SCNC: 25 MMOL/L — SIGNIFICANT CHANGE UP (ref 22–29)
CREAT SERPL-MCNC: 0.5 MG/DL — SIGNIFICANT CHANGE UP (ref 0.5–1.3)
DACRYOCYTES BLD QL SMEAR: SLIGHT — SIGNIFICANT CHANGE UP
EOSINOPHIL # BLD AUTO: 0.3 K/UL — SIGNIFICANT CHANGE UP (ref 0–0.5)
EOSINOPHIL NFR BLD AUTO: 2 % — SIGNIFICANT CHANGE UP (ref 0–6)
GLUCOSE BLDC GLUCOMTR-MCNC: 157 MG/DL — HIGH (ref 70–99)
GLUCOSE BLDC GLUCOMTR-MCNC: 157 MG/DL — HIGH (ref 70–99)
GLUCOSE BLDC GLUCOMTR-MCNC: 170 MG/DL — HIGH (ref 70–99)
GLUCOSE SERPL-MCNC: 136 MG/DL — HIGH (ref 70–115)
HCT VFR BLD CALC: 30.7 % — LOW (ref 42–52)
HGB BLD-MCNC: 9.4 G/DL — LOW (ref 14–18)
HYPOCHROMIA BLD QL: SIGNIFICANT CHANGE UP
LYMPHOCYTES # BLD AUTO: 25 % — SIGNIFICANT CHANGE UP (ref 20–55)
LYMPHOCYTES # BLD AUTO: 3.4 K/UL — SIGNIFICANT CHANGE UP (ref 1–4.8)
MAGNESIUM SERPL-MCNC: 1.9 MG/DL — SIGNIFICANT CHANGE UP (ref 1.6–2.6)
MCHC RBC-ENTMCNC: 21.2 PG — LOW (ref 27–31)
MCHC RBC-ENTMCNC: 30.6 G/DL — LOW (ref 32–36)
MCV RBC AUTO: 69.3 FL — LOW (ref 80–94)
METAMYELOCYTES # FLD: 1 % — HIGH (ref 0–0)
MICROCYTES BLD QL: SIGNIFICANT CHANGE UP
MONOCYTES # BLD AUTO: 1.4 K/UL — HIGH (ref 0–0.8)
MONOCYTES NFR BLD AUTO: 10 % — SIGNIFICANT CHANGE UP (ref 3–10)
NEUTROPHILS # BLD AUTO: 7.3 K/UL — SIGNIFICANT CHANGE UP (ref 1.8–8)
NEUTROPHILS NFR BLD AUTO: 58 % — SIGNIFICANT CHANGE UP (ref 37–73)
NEUTS BAND # BLD: 3 % — SIGNIFICANT CHANGE UP (ref 0–8)
OVALOCYTES BLD QL SMEAR: SLIGHT — SIGNIFICANT CHANGE UP
PHOSPHATE SERPL-MCNC: 3.2 MG/DL — SIGNIFICANT CHANGE UP (ref 2.4–4.7)
PLAT MORPH BLD: NORMAL — SIGNIFICANT CHANGE UP
PLATELET # BLD AUTO: 398 K/UL — SIGNIFICANT CHANGE UP (ref 150–400)
POIKILOCYTOSIS BLD QL AUTO: SLIGHT — SIGNIFICANT CHANGE UP
POLYCHROMASIA BLD QL SMEAR: SLIGHT — SIGNIFICANT CHANGE UP
POTASSIUM SERPL-MCNC: 3.4 MMOL/L — LOW (ref 3.5–5.3)
POTASSIUM SERPL-SCNC: 3.4 MMOL/L — LOW (ref 3.5–5.3)
RBC # BLD: 4.43 M/UL — LOW (ref 4.6–6.2)
RBC # FLD: 20.3 % — HIGH (ref 11–15.6)
RBC BLD AUTO: ABNORMAL
SODIUM SERPL-SCNC: 140 MMOL/L — SIGNIFICANT CHANGE UP (ref 135–145)
WBC # BLD: 13.2 K/UL — HIGH (ref 4.8–10.8)
WBC # FLD AUTO: 13.2 K/UL — HIGH (ref 4.8–10.8)

## 2019-02-11 RX ORDER — METOPROLOL TARTRATE 50 MG
25 TABLET ORAL DAILY
Qty: 0 | Refills: 0 | Status: DISCONTINUED | OUTPATIENT
Start: 2019-02-11 | End: 2019-02-14

## 2019-02-11 RX ORDER — LOPERAMIDE HCL 2 MG
2 TABLET ORAL EVERY 12 HOURS
Qty: 0 | Refills: 0 | Status: DISCONTINUED | OUTPATIENT
Start: 2019-02-11 | End: 2019-02-13

## 2019-02-11 RX ORDER — POTASSIUM CHLORIDE 20 MEQ
40 PACKET (EA) ORAL EVERY 4 HOURS
Qty: 0 | Refills: 0 | Status: COMPLETED | OUTPATIENT
Start: 2019-02-11 | End: 2019-02-11

## 2019-02-11 RX ORDER — ACETAMINOPHEN 500 MG
650 TABLET ORAL ONCE
Qty: 0 | Refills: 0 | Status: COMPLETED | OUTPATIENT
Start: 2019-02-11 | End: 2019-02-11

## 2019-02-11 RX ADMIN — Medication 2: at 09:15

## 2019-02-11 RX ADMIN — Medication 40 MILLIEQUIVALENT(S): at 13:18

## 2019-02-11 RX ADMIN — Medication 2 MILLIGRAM(S): at 18:11

## 2019-02-11 RX ADMIN — Medication 40 MILLIEQUIVALENT(S): at 18:12

## 2019-02-11 RX ADMIN — Medication 50 MICROGRAM(S): at 12:17

## 2019-02-11 RX ADMIN — RISPERIDONE 3 MILLIGRAM(S): 4 TABLET ORAL at 22:09

## 2019-02-11 RX ADMIN — Medication 650 MILLIGRAM(S): at 22:08

## 2019-02-11 RX ADMIN — CLOZAPINE 200 MILLIGRAM(S): 150 TABLET, ORALLY DISINTEGRATING ORAL at 22:09

## 2019-02-11 RX ADMIN — ENOXAPARIN SODIUM 40 MILLIGRAM(S): 100 INJECTION SUBCUTANEOUS at 12:19

## 2019-02-11 RX ADMIN — HYDROMORPHONE HYDROCHLORIDE 0.5 MILLIGRAM(S): 2 INJECTION INTRAMUSCULAR; INTRAVENOUS; SUBCUTANEOUS at 09:20

## 2019-02-11 RX ADMIN — HYDROMORPHONE HYDROCHLORIDE 0.5 MILLIGRAM(S): 2 INJECTION INTRAMUSCULAR; INTRAVENOUS; SUBCUTANEOUS at 09:19

## 2019-02-11 RX ADMIN — Medication 25 MILLIGRAM(S): at 13:17

## 2019-02-11 RX ADMIN — Medication 2: at 12:16

## 2019-02-11 NOTE — PHYSICAL THERAPY INITIAL EVALUATION ADULT - ACTIVE RANGE OF MOTION EXAMINATION, REHAB EVAL
bilateral lower extremity Active ROM was WNL (within normal limits)/kody. upper extremity Active ROM was WNL (within normal limits)

## 2019-02-11 NOTE — PROGRESS NOTE ADULT - SUBJECTIVE AND OBJECTIVE BOX
HPI/OVERNIGHT EVENTS: Patient seen and examined at bedside this AM. No acute events overnight per nursing reports. Patient's pain is well controlled, having ostomy output, voiding, tolerating full liquid diet. Patient had yesterday bout of sinus tachycardia with all other vital signs within normal limits and no clinical findings, denying chest pain, SOB, abd pain. Patient most likely having anxiety due to being worried his ostomy bag was going to leak. At that moment, patient's ostomy and midline dressing were changed. Patient today denies any fever, chills, nausea, vomitting, chest pain, SOB, dizziness, abd pain or any other concerning symptoms.    Vital Signs Last 24 Hrs  T(C): 36.8 (11 Feb 2019 04:25), Max: 37.1 (10 Feb 2019 08:47)  T(F): 98.2 (11 Feb 2019 04:25), Max: 98.7 (10 Feb 2019 08:47)  HR: 92 (11 Feb 2019 04:25) (80 - 120)  BP: 115/76 (11 Feb 2019 04:25) (113/77 - 131/76)  BP(mean): --  RR: 20 (11 Feb 2019 04:25) (18 - 20)  SpO2: 98% (11 Feb 2019 04:25) (96% - 100%)    I&O's Detail    09 Feb 2019 07:01  -  10 Feb 2019 07:00  --------------------------------------------------------  IN:    lactated ringers.: 400 mL    Oral Fluid: 480 mL    sodium chloride 0.9%: 2100 mL  Total IN: 2980 mL    OUT:    Ileostomy: 220 mL    Voided: 2850 mL  Total OUT: 3070 mL    Total NET: -90 mL      10 Feb 2019 07:01  -  11 Feb 2019 06:20  --------------------------------------------------------  IN:    sodium chloride 0.9%: 900 mL  Total IN: 900 mL    OUT:    Ileostomy: 1500 mL    Voided: 2100 mL  Total OUT: 3600 mL    Total NET: -2700 mL          Constitutional: patient resting comfortably in bed, in no acute distress  HEENT: EOMI / PERRL b/l  Neck: No JVD, full ROM without pain  Respiratory: CTAB, respirations are unlabored, no accessory muscle use, no conversational dyspnea  Cardiovascular: regular rate & rhythm  Gastrointestinal: Abdomen soft, non-tender, moderately-distended, no rebound tenderness / guarding, ostomy edematous  Incision/Wound: midline incision clean, dry and intact  Musculoskeletal: No joint pain, swelling or deformity; no limitation of movement    LABS:                        8.4    12.1  )-----------( 320      ( 10 Feb 2019 06:42 )             27.5     02-10    143  |  107  |  7.0<L>  ----------------------------<  150<H>  3.0<L>   |  24.0  |  0.53    Ca    7.4<L>      10 Feb 2019 06:42  Phos  1.5     02-10  Mg     2.1     02-10            MEDICATIONS  (STANDING):  cloZAPine 200 milliGRAM(s) Oral at bedtime  dextrose 5%. 1000 milliLiter(s) (50 mL/Hr) IV Continuous <Continuous>  dextrose 50% Injectable 12.5 Gram(s) IV Push once  dextrose 50% Injectable 25 Gram(s) IV Push once  dextrose 50% Injectable 25 Gram(s) IV Push once  enoxaparin Injectable 40 milliGRAM(s) SubCutaneous every 24 hours  insulin lispro (HumaLOG) corrective regimen sliding scale   SubCutaneous every 6 hours  levothyroxine Injectable 50 MICROGram(s) IV Push <User Schedule>  metoprolol tartrate Injectable 5 milliGRAM(s) IV Push every 6 hours  risperiDONE   Tablet 3 milliGRAM(s) Oral at bedtime    MEDICATIONS  (PRN):  dextrose 40% Gel 15 Gram(s) Oral once PRN Blood Glucose LESS THAN 70 milliGRAM(s)/deciliter  fentaNYL    Injectable 50 MICROGram(s) IV Push every 10 minutes PRN Moderate Pain (4 - 6)  glucagon  Injectable 1 milliGRAM(s) IntraMuscular once PRN Glucose LESS THAN 70 milligrams/deciliter  HYDROmorphone  Injectable 0.5 milliGRAM(s) IV Push every 4 hours PRN Severe Pain (7 - 10)  LORazepam   Injectable 0.25 milliGRAM(s) IV Push every 12 hours PRN Agitation  ondansetron Injectable 4 milliGRAM(s) IV Push every 6 hours PRN Nausea      MICRO:   Cultures     STUDIES:   EKG, CXR, U/S, CT, MRI

## 2019-02-11 NOTE — PROGRESS NOTE ADULT - ASSESSMENT
53 year old male s/p ex lap with diverting loop ileostomy POD 3 for malignant large bowel obstruction   -pain control  -IS use  -Continue Full liquid diet  -monitor ileostomy function

## 2019-02-11 NOTE — PHYSICAL THERAPY INITIAL EVALUATION ADULT - ADDITIONAL COMMENTS
Pt lives in a group home with 0 steps to enter and 0 stairs inside.  Pt owns medical equipment: RW, SAC

## 2019-02-12 ENCOUNTER — TRANSCRIPTION ENCOUNTER (OUTPATIENT)
Age: 54
End: 2019-02-12

## 2019-02-12 LAB
ANION GAP SERPL CALC-SCNC: 11 MMOL/L — SIGNIFICANT CHANGE UP (ref 5–17)
ANISOCYTOSIS BLD QL: SLIGHT — SIGNIFICANT CHANGE UP
BASOPHILS # BLD AUTO: 0 K/UL — SIGNIFICANT CHANGE UP (ref 0–0.2)
BASOPHILS NFR BLD AUTO: 0.3 % — SIGNIFICANT CHANGE UP (ref 0–2)
BUN SERPL-MCNC: 7 MG/DL — LOW (ref 8–20)
CALCIUM SERPL-MCNC: 9.4 MG/DL — SIGNIFICANT CHANGE UP (ref 8.6–10.2)
CHLORIDE SERPL-SCNC: 102 MMOL/L — SIGNIFICANT CHANGE UP (ref 98–107)
CO2 SERPL-SCNC: 25 MMOL/L — SIGNIFICANT CHANGE UP (ref 22–29)
CREAT SERPL-MCNC: 0.6 MG/DL — SIGNIFICANT CHANGE UP (ref 0.5–1.3)
CULTURE RESULTS: SIGNIFICANT CHANGE UP
CULTURE RESULTS: SIGNIFICANT CHANGE UP
DACRYOCYTES BLD QL SMEAR: SLIGHT — SIGNIFICANT CHANGE UP
ELLIPTOCYTES BLD QL SMEAR: SLIGHT — SIGNIFICANT CHANGE UP
EOSINOPHIL # BLD AUTO: 0.4 K/UL — SIGNIFICANT CHANGE UP (ref 0–0.5)
EOSINOPHIL NFR BLD AUTO: 3.8 % — SIGNIFICANT CHANGE UP (ref 0–5)
GLUCOSE BLDC GLUCOMTR-MCNC: 124 MG/DL — HIGH (ref 70–99)
GLUCOSE BLDC GLUCOMTR-MCNC: 134 MG/DL — HIGH (ref 70–99)
GLUCOSE BLDC GLUCOMTR-MCNC: 148 MG/DL — HIGH (ref 70–99)
GLUCOSE BLDC GLUCOMTR-MCNC: 151 MG/DL — HIGH (ref 70–99)
GLUCOSE SERPL-MCNC: 160 MG/DL — HIGH (ref 70–115)
HCT VFR BLD CALC: 30.2 % — LOW (ref 42–52)
HGB BLD-MCNC: 9.4 G/DL — LOW (ref 14–18)
HYPOCHROMIA BLD QL: SLIGHT — SIGNIFICANT CHANGE UP
LYMPHOCYTES # BLD AUTO: 2.4 K/UL — SIGNIFICANT CHANGE UP (ref 1–4.8)
LYMPHOCYTES # BLD AUTO: 20.4 % — SIGNIFICANT CHANGE UP (ref 20–55)
MACROCYTES BLD QL: SLIGHT — SIGNIFICANT CHANGE UP
MAGNESIUM SERPL-MCNC: 1.8 MG/DL — SIGNIFICANT CHANGE UP (ref 1.6–2.6)
MCHC RBC-ENTMCNC: 21.7 PG — LOW (ref 27–31)
MCHC RBC-ENTMCNC: 31.1 G/DL — LOW (ref 32–36)
MCV RBC AUTO: 69.6 FL — LOW (ref 80–94)
MICROCYTES BLD QL: SLIGHT — SIGNIFICANT CHANGE UP
MONOCYTES # BLD AUTO: 1.1 K/UL — HIGH (ref 0–0.8)
MONOCYTES NFR BLD AUTO: 9.6 % — SIGNIFICANT CHANGE UP (ref 3–10)
MYELOCYTES NFR BLD: 1 % — HIGH (ref 0–0)
NEUTROPHILS # BLD AUTO: 7.4 K/UL — SIGNIFICANT CHANGE UP (ref 1.8–8)
NEUTROPHILS NFR BLD AUTO: 62.1 % — SIGNIFICANT CHANGE UP (ref 37–73)
OVALOCYTES BLD QL SMEAR: SLIGHT — SIGNIFICANT CHANGE UP
PHOSPHATE SERPL-MCNC: 5 MG/DL — HIGH (ref 2.4–4.7)
PLAT MORPH BLD: NORMAL — SIGNIFICANT CHANGE UP
PLATELET # BLD AUTO: 334 K/UL — SIGNIFICANT CHANGE UP (ref 150–400)
POIKILOCYTOSIS BLD QL AUTO: SLIGHT — SIGNIFICANT CHANGE UP
POTASSIUM SERPL-MCNC: 4.4 MMOL/L — SIGNIFICANT CHANGE UP (ref 3.5–5.3)
POTASSIUM SERPL-SCNC: 4.4 MMOL/L — SIGNIFICANT CHANGE UP (ref 3.5–5.3)
RBC # BLD: 4.34 M/UL — LOW (ref 4.6–6.2)
RBC # FLD: 20 % — HIGH (ref 11–15.6)
RBC BLD AUTO: ABNORMAL
SODIUM SERPL-SCNC: 138 MMOL/L — SIGNIFICANT CHANGE UP (ref 135–145)
SPECIMEN SOURCE: SIGNIFICANT CHANGE UP
SPECIMEN SOURCE: SIGNIFICANT CHANGE UP
WBC # BLD: 11.8 K/UL — HIGH (ref 4.8–10.8)
WBC # FLD AUTO: 11.8 K/UL — HIGH (ref 4.8–10.8)

## 2019-02-12 PROCEDURE — 99232 SBSQ HOSP IP/OBS MODERATE 35: CPT

## 2019-02-12 RX ORDER — LEVOTHYROXINE SODIUM 125 MCG
100 TABLET ORAL DAILY
Qty: 0 | Refills: 0 | Status: DISCONTINUED | OUTPATIENT
Start: 2019-02-12 | End: 2019-02-14

## 2019-02-12 RX ORDER — IBUPROFEN 200 MG
400 TABLET ORAL ONCE
Qty: 0 | Refills: 0 | Status: COMPLETED | OUTPATIENT
Start: 2019-02-12 | End: 2019-02-12

## 2019-02-12 RX ORDER — MAGNESIUM OXIDE 400 MG ORAL TABLET 241.3 MG
400 TABLET ORAL ONCE
Qty: 0 | Refills: 0 | Status: COMPLETED | OUTPATIENT
Start: 2019-02-12 | End: 2019-02-12

## 2019-02-12 RX ORDER — DIPHENOXYLATE HCL/ATROPINE 2.5-.025MG
1 TABLET ORAL EVERY 12 HOURS
Qty: 0 | Refills: 0 | Status: DISCONTINUED | OUTPATIENT
Start: 2019-02-12 | End: 2019-02-13

## 2019-02-12 RX ORDER — ACETAMINOPHEN 500 MG
650 TABLET ORAL EVERY 6 HOURS
Qty: 0 | Refills: 0 | Status: DISCONTINUED | OUTPATIENT
Start: 2019-02-12 | End: 2019-02-14

## 2019-02-12 RX ADMIN — Medication 650 MILLIGRAM(S): at 11:51

## 2019-02-12 RX ADMIN — Medication 400 MILLIGRAM(S): at 20:45

## 2019-02-12 RX ADMIN — Medication 2 MILLIGRAM(S): at 17:13

## 2019-02-12 RX ADMIN — Medication 400 MILLIGRAM(S): at 19:50

## 2019-02-12 RX ADMIN — Medication 25 MILLIGRAM(S): at 06:05

## 2019-02-12 RX ADMIN — Medication 2 MILLIGRAM(S): at 06:05

## 2019-02-12 RX ADMIN — RISPERIDONE 3 MILLIGRAM(S): 4 TABLET ORAL at 22:03

## 2019-02-12 RX ADMIN — CLOZAPINE 200 MILLIGRAM(S): 150 TABLET, ORALLY DISINTEGRATING ORAL at 22:03

## 2019-02-12 RX ADMIN — ENOXAPARIN SODIUM 40 MILLIGRAM(S): 100 INJECTION SUBCUTANEOUS at 11:52

## 2019-02-12 RX ADMIN — Medication 650 MILLIGRAM(S): at 12:20

## 2019-02-12 RX ADMIN — Medication 2: at 11:53

## 2019-02-12 RX ADMIN — MAGNESIUM OXIDE 400 MG ORAL TABLET 400 MILLIGRAM(S): 241.3 TABLET ORAL at 11:52

## 2019-02-12 NOTE — DISCHARGE NOTE ADULT - ADDITIONAL INSTRUCTIONS
You may shower, but no bathing or swimming allowed. Go to the ED if you have obstipation from ileostomy site, abdominal pain, increased output or profuse diarrhea from ileostomy.

## 2019-02-12 NOTE — PROGRESS NOTE BEHAVIORAL HEALTH - NSBHCONSULTFOLLOWAFTERCARE_PSY_A_CORE FT
psychiatrically stable for discharge For return to prior living and program Continue current medication regime

## 2019-02-12 NOTE — DISCHARGE NOTE ADULT - ABILITY TO HEAR (WITH HEARING AID OR HEARING APPLIANCE IF NORMALLY USED):
Mildly to Moderately Impaired: difficulty hearing in some environments or speaker may need to increase volume or speak distinctly
Adequate: hears normal conversation without difficulty

## 2019-02-12 NOTE — DISCHARGE NOTE ADULT - PATIENT PORTAL LINK FT
You can access the [facility] Patient Portal, offered by , by registering with the following website: [email or web address]
You can access the [facility] Patient Portal, offered by [facility], by registering with the following website: [email or web address]

## 2019-02-12 NOTE — DISCHARGE NOTE ADULT - MEDICATION SUMMARY - MEDICATIONS TO TAKE
I will START or STAY ON the medications listed below when I get home from the hospital:  None
I will START or STAY ON the medications listed below when I get home from the hospital:    acetaminophen 325 mg oral tablet  -- 2 tab(s) by mouth every 6 hours, As needed, Mild Pain (1 - 3)  -- Indication: For per pmd    loperamide 2 mg oral capsule  -- 1 cap(s) by mouth every 6 hours  -- Indication: For per pmd    levothyroxine 100 mcg (0.1 mg) oral tablet  -- 1 tab(s) by mouth once a day  -- Indication: For per pmd

## 2019-02-12 NOTE — DISCHARGE NOTE ADULT - CARE PROVIDER_API CALL
Gustavo Giang (MD)  Surgery  [address] [place] [address]  Phone: [phone]  Fax: [phone]  Follow Up Time:
Gustavo Giang (MD)  Surgery  [address] [place] [address]  Phone: [phone]  Fax: [phone]  Follow Up Time:

## 2019-02-12 NOTE — PROGRESS NOTE BEHAVIORAL HEALTH - NSBHCHARTREVIEWLAB_PSY_A_CORE FT
9.4    11.8  )-----------( 334      ( 12 Feb 2019 07:12 )             30.2     02-12    138  |  102  |  7.0<L>  ----------------------------<  160<H>  4.4   |  25.0  |  0.60    Ca    9.4      12 Feb 2019 07:12  Phos  5.0     02-12  Mg     1.8     02-12
8.2    11.7  )-----------( 296      ( 08 Feb 2019 07:11 )             26.9     02-08    136  |  101  |  12.0  ----------------------------<  107  3.7   |  24.0  |  0.63    Ca    7.3<L>      08 Feb 2019 07:11  Phos  3.1     02-08  Mg     2.1     02-08    TPro  6.9  /  Alb  3.5  /  TBili  0.4  /  DBili  x   /  AST  18  /  ALT  18  /  AlkPhos  81  02-06    LIVER FUNCTIONS - ( 06 Feb 2019 21:56 )  Alb: 3.5 g/dL / Pro: 6.9 g/dL / ALK PHOS: 81 U/L / ALT: 18 U/L / AST: 18 U/L / GGT: x           PT/INR - ( 06 Feb 2019 21:56 )   PT: 12.9 sec;   INR: 1.12 ratio         PTT - ( 06 Feb 2019 21:56 )  PTT:29.6 sec

## 2019-02-12 NOTE — PROGRESS NOTE BEHAVIORAL HEALTH - NSBHCHARTREVIEWVS_PSY_A_CORE FT
Vital Signs Last 24 Hrs  T(C): 36.7 (12 Feb 2019 09:18), Max: 37 (11 Feb 2019 18:02)  T(F): 98 (12 Feb 2019 09:18), Max: 98.6 (11 Feb 2019 18:02)  HR: 92 (12 Feb 2019 09:18) (84 - 113)  BP: 111/74 (12 Feb 2019 09:18) (101/64 - 126/77)  RR: 18 (12 Feb 2019 09:18) (18 - 20)  SpO2: 99% (12 Feb 2019 09:18) (95% - 99%)
Vital Signs Last 24 Hrs  T(C): 36.9 (08 Feb 2019 06:56), Max: 37.8 (07 Feb 2019 15:21)  T(F): 98.4 (08 Feb 2019 06:56), Max: 100.1 (07 Feb 2019 15:21)  HR: 82 (08 Feb 2019 06:56) (82 - 108)  BP: 125/86 (08 Feb 2019 06:56) (90/51 - 126/86)  RR: 18 (08 Feb 2019 06:56) (18 - 20)  SpO2: 98% (08 Feb 2019 06:56) (93% - 98%)

## 2019-02-12 NOTE — DISCHARGE NOTE ADULT - HOSPITAL COURSE
H&P on admission:  53 year old male presents to ED with 4 day history of diffuse abdominal pain associated with nausea and vomiting as well as obstipation. Patient has never experienced abdominal pain like this before. Constant in nature and nonradiating. Reported last vomited 30 minutes prior to evaluation. Upon further questioning, patient was actively passing flatus and stated he had a small solid bowel movement right before surgical team arrived bedside. 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 Recently started oral chemotherapy about 1 week ago.    Patient was taken to OR for exploratory laparotomy with diverting loop ileostomy. Patient had an uncomplicated post-op period. Has full bowel function from ileostomy. Patient has received education on ostomy management and expresses he feels fully competent and independent on the management of his ileostomy. H&P on admission:  53 year old male presents to ED with 4 day history of diffuse abdominal pain associated with nausea and vomiting as well as obstipation. Patient has never experienced abdominal pain like this before. Constant in nature and nonradiating. Reported last vomited 30 minutes prior to evaluation. Upon further questioning, patient was actively passing flatus and stated he had a small solid bowel movement right before surgical team arrived bedside. 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 Recently started oral chemotherapy about 1 week ago.    Patient was taken to OR for exploratory laparotomy with diverting loop ileostomy. Patient had an uncomplicated post-op period. Has full bowel function from ileostomy. Patient has received education on ostomy management and expresses he feels fully competent and independent on the management of his ileostomy.   Proper medications were sent to pharmacy.

## 2019-02-12 NOTE — PROGRESS NOTE ADULT - ASSESSMENT
53 year old male s/p ex lap with diverting loop ileostomy POD 4 for malignant large bowel obstruction     Plan:  - f/u Psych Dispo for d/c  - f/u Ostomy Nurse for ostomy teaching  - D/C Ileostomy Red Rubber on 2/13  - Pain control  - IS/OOB/Ambulate  - DASH Diet  - monitor ileostomy function

## 2019-02-12 NOTE — PROGRESS NOTE BEHAVIORAL HEALTH - NSBHFUPINTERVALHXFT_PSY_A_CORE
s/p colectomy for malignant tumor Patient has learned self care He is somewhat anxious and has an obsessional quality but is not exhibiting overt psychosis  discussed plan to return to his group home and resume program at [facility] Psychiatrist is Dr Claudio
asked to evaluate capacity to give consent for surgery Patient is very aware of cancer diagnosis He discussed plan for a port and resumption of chemotherapy He explained to me the cecal mass causing obstruction and indicates he feels removal is a "good idea" He reasonably asked questions regarding the technical aspects of the surgery and expected recovery time  From a psychiatric perspective he is denying current suicidal wishes explaining his recent statements were an expression of distress and frustration not indicative of true wishes to harm himself In addition he does feel staff are here for his benefit  I do not elicit any paranoia regarding his care here  I informed patient about plan to hold clozapine due to its potential to slow bowel function He was on board with this plan  I addressed his refusal of po medications and encouraged his compliance

## 2019-02-12 NOTE — DISCHARGE NOTE ADULT - NS MD DC FALL RISK RISK
For information on Fall & Injury Prevention, visit [email or web address]
For information on Fall & Injury Prevention, visit [email or web address]

## 2019-02-12 NOTE — PROGRESS NOTE BEHAVIORAL HEALTH - RISK ASSESSMENT
low imminent risk of suicidal behaviors anxiety is reduced, no actively expressing suicidal urges
low risk

## 2019-02-12 NOTE — DISCHARGE NOTE ADULT - CARE PLAN
Principal Discharge DX:	Other partial intestinal obstruction  Goal:	resolution of obstruction  Assessment and plan of treatment:	Patient was taken to OR for exploratory laparotomy and diverting loop ileostomy creation.  Secondary Diagnosis:	Cecal cancer Principal Discharge DX:	Other partial intestinal obstruction  Goal:	resolution of obstruction  Assessment and plan of treatment:	Patient was taken to OR for exploratory laparotomy and diverting loop ileostomy creation.  Please change your ostomy bag as you were educated  Loperamide (anti-diarrheal ) [facility].  Secondary Diagnosis:	Cecal cancer

## 2019-02-12 NOTE — DISCHARGE NOTE ADULT - PLAN OF CARE
resolution of obstruction Patient was taken to OR for exploratory laparotomy and diverting loop ileostomy creation. Patient was taken to OR for exploratory laparotomy and diverting loop ileostomy creation.  Please change your ostomy bag as you were educated  Loperamide (anti-diarrheal ) has been sent [facility].

## 2019-02-12 NOTE — PROGRESS NOTE ADULT - SUBJECTIVE AND OBJECTIVE BOX
HPI/OVERNIGHT EVENTS:  No acute events overnight. patient was evaluated at bedside and was found afebrile, HDS and in NAD. Patient states he is feeling much better, getting out bed, changing ostomy bags by himself, tolerating reg diet and voiding. Denies nausea, vomiting, fever, chest pain or SOB.    MEDICATIONS  (STANDING):  cloZAPine 200 milliGRAM(s) Oral at bedtime  dextrose 5%. 1000 milliLiter(s) (50 mL/Hr) IV Continuous <Continuous>  dextrose 50% Injectable 12.5 Gram(s) IV Push once  dextrose 50% Injectable 25 Gram(s) IV Push once  dextrose 50% Injectable 25 Gram(s) IV Push once  enoxaparin Injectable 40 milliGRAM(s) SubCutaneous every 24 hours  insulin lispro (HumaLOG) corrective regimen sliding scale   SubCutaneous every 6 hours  levothyroxine Injectable 50 MICROGram(s) IV Push <User Schedule>  loperamide 2 milliGRAM(s) Oral every 12 hours  metoprolol tartrate 25 milliGRAM(s) Oral daily  risperiDONE   Tablet 3 milliGRAM(s) Oral at bedtime    MEDICATIONS  (PRN):  dextrose 40% Gel 15 Gram(s) Oral once PRN Blood Glucose LESS THAN 70 milliGRAM(s)/deciliter  fentaNYL    Injectable 50 MICROGram(s) IV Push every 10 minutes PRN Moderate Pain (4 - 6)  glucagon  Injectable 1 milliGRAM(s) IntraMuscular once PRN Glucose LESS THAN 70 milligrams/deciliter  HYDROmorphone  Injectable 0.5 milliGRAM(s) IV Push every 4 hours PRN Severe Pain (7 - 10)  LORazepam   Injectable 0.25 milliGRAM(s) IV Push every 12 hours PRN Agitation  ondansetron Injectable 4 milliGRAM(s) IV Push every 6 hours PRN Nausea      Vital Signs Last 24 Hrs  T(C): 36.6 (12 Feb 2019 04:26), Max: 37 (11 Feb 2019 08:20)  T(F): 97.9 (12 Feb 2019 04:26), Max: 98.6 (11 Feb 2019 08:20)  HR: 107 (12 Feb 2019 04:26) (84 - 113)  BP: 115/82 (12 Feb 2019 04:26) (101/64 - 126/77)  BP(mean): --  RR: 18 (12 Feb 2019 04:26) (18 - 20)  SpO2: 99% (12 Feb 2019 04:26) (95% - 99%)    Constitutional: patient resting comfortably in bed, in no acute distress  HEENT: EOMI / PERRL b/l  Neck: No JVD, full ROM without pain  Respiratory: CTAB, respirations are unlabored, no accessory muscle use, no conversational dyspnea  Cardiovascular: regular rate & rhythm  Gastrointestinal: Abdomen soft, non-tender, moderately-distended, no rebound tenderness / guarding, ostomy edematous w/ red rubber in place and output of 450 cc in the last 24 hours.  Incision/Wound: midline incision clean, dry and intact  Musculoskeletal: No joint pain, swelling or deformity; no limitation of movement      I&O's Detail    10 Feb 2019 07:01  -  11 Feb 2019 07:00  --------------------------------------------------------  IN:    sodium chloride 0.9%: 900 mL  Total IN: 900 mL    OUT:    Ileostomy: 1500 mL    Voided: 2100 mL  Total OUT: 3600 mL    Total NET: -2700 mL      11 Feb 2019 07:01  -  12 Feb 2019 05:41  --------------------------------------------------------  IN:  Total IN: 0 mL    OUT:    Ileostomy: 450 mL    Voided: 1100 mL  Total OUT: 1550 mL    Total NET: -1550 mL          LABS:                        9.4    13.2  )-----------( 398      ( 11 Feb 2019 06:45 )             30.7     02-11    140  |  103  |  6.0<L>  ----------------------------<  136<H>  3.4<L>   |  25.0  |  0.50    Ca    8.4<L>      11 Feb 2019 06:45  Phos  3.2     02-11  Mg     1.9     02-11

## 2019-02-13 LAB
ANION GAP SERPL CALC-SCNC: 12 MMOL/L — SIGNIFICANT CHANGE UP (ref 5–17)
ANISOCYTOSIS BLD QL: SLIGHT — SIGNIFICANT CHANGE UP
BASOPHILS # BLD AUTO: 0 K/UL — SIGNIFICANT CHANGE UP (ref 0–0.2)
BASOPHILS NFR BLD AUTO: 0.4 % — SIGNIFICANT CHANGE UP (ref 0–2)
BUN SERPL-MCNC: 12 MG/DL — SIGNIFICANT CHANGE UP (ref 8–20)
CALCIUM SERPL-MCNC: 10 MG/DL — SIGNIFICANT CHANGE UP (ref 8.6–10.2)
CHLORIDE SERPL-SCNC: 99 MMOL/L — SIGNIFICANT CHANGE UP (ref 98–107)
CO2 SERPL-SCNC: 28 MMOL/L — SIGNIFICANT CHANGE UP (ref 22–29)
CREAT SERPL-MCNC: 0.61 MG/DL — SIGNIFICANT CHANGE UP (ref 0.5–1.3)
DACRYOCYTES BLD QL SMEAR: SLIGHT — SIGNIFICANT CHANGE UP
ELLIPTOCYTES BLD QL SMEAR: SLIGHT — SIGNIFICANT CHANGE UP
EOSINOPHIL # BLD AUTO: 0.4 K/UL — SIGNIFICANT CHANGE UP (ref 0–0.5)
EOSINOPHIL NFR BLD AUTO: 3.8 % — SIGNIFICANT CHANGE UP (ref 0–5)
GLUCOSE BLDC GLUCOMTR-MCNC: 143 MG/DL — HIGH (ref 70–99)
GLUCOSE BLDC GLUCOMTR-MCNC: 151 MG/DL — HIGH (ref 70–99)
GLUCOSE BLDC GLUCOMTR-MCNC: 154 MG/DL — HIGH (ref 70–99)
GLUCOSE BLDC GLUCOMTR-MCNC: 190 MG/DL — HIGH (ref 70–99)
GLUCOSE SERPL-MCNC: 152 MG/DL — HIGH (ref 70–115)
HCT VFR BLD CALC: 30 % — LOW (ref 42–52)
HGB BLD-MCNC: 9.4 G/DL — LOW (ref 14–18)
HYPOCHROMIA BLD QL: SLIGHT — SIGNIFICANT CHANGE UP
LYMPHOCYTES # BLD AUTO: 2.4 K/UL — SIGNIFICANT CHANGE UP (ref 1–4.8)
LYMPHOCYTES # BLD AUTO: 21.4 % — SIGNIFICANT CHANGE UP (ref 20–55)
MACROCYTES BLD QL: SLIGHT — SIGNIFICANT CHANGE UP
MAGNESIUM SERPL-MCNC: 1.9 MG/DL — SIGNIFICANT CHANGE UP (ref 1.6–2.6)
MCHC RBC-ENTMCNC: 21.6 PG — LOW (ref 27–31)
MCHC RBC-ENTMCNC: 31.3 G/DL — LOW (ref 32–36)
MCV RBC AUTO: 68.8 FL — LOW (ref 80–94)
MICROCYTES BLD QL: SLIGHT — SIGNIFICANT CHANGE UP
MONOCYTES # BLD AUTO: 1 K/UL — HIGH (ref 0–0.8)
MONOCYTES NFR BLD AUTO: 9.2 % — SIGNIFICANT CHANGE UP (ref 3–10)
NEUTROPHILS # BLD AUTO: 7.2 K/UL — SIGNIFICANT CHANGE UP (ref 1.8–8)
NEUTROPHILS NFR BLD AUTO: 63 % — SIGNIFICANT CHANGE UP (ref 37–73)
OVALOCYTES BLD QL SMEAR: SLIGHT — SIGNIFICANT CHANGE UP
PHOSPHATE SERPL-MCNC: 5.7 MG/DL — HIGH (ref 2.4–4.7)
PLAT MORPH BLD: NORMAL — SIGNIFICANT CHANGE UP
PLATELET # BLD AUTO: 308 K/UL — SIGNIFICANT CHANGE UP (ref 150–400)
POIKILOCYTOSIS BLD QL AUTO: SLIGHT — SIGNIFICANT CHANGE UP
POTASSIUM SERPL-MCNC: 4.1 MMOL/L — SIGNIFICANT CHANGE UP (ref 3.5–5.3)
POTASSIUM SERPL-SCNC: 4.1 MMOL/L — SIGNIFICANT CHANGE UP (ref 3.5–5.3)
RBC # BLD: 4.36 M/UL — LOW (ref 4.6–6.2)
RBC # FLD: 19.9 % — HIGH (ref 11–15.6)
RBC BLD AUTO: ABNORMAL
SODIUM SERPL-SCNC: 139 MMOL/L — SIGNIFICANT CHANGE UP (ref 135–145)
WBC # BLD: 11.4 K/UL — HIGH (ref 4.8–10.8)
WBC # FLD AUTO: 11.4 K/UL — HIGH (ref 4.8–10.8)

## 2019-02-13 PROCEDURE — 93010 ELECTROCARDIOGRAM REPORT: CPT

## 2019-02-13 RX ORDER — LOPERAMIDE HCL 2 MG
2 TABLET ORAL EVERY 6 HOURS
Qty: 0 | Refills: 0 | Status: DISCONTINUED | OUTPATIENT
Start: 2019-02-13 | End: 2019-02-14

## 2019-02-13 RX ORDER — IBUPROFEN 200 MG
600 TABLET ORAL EVERY 6 HOURS
Qty: 0 | Refills: 0 | Status: DISCONTINUED | OUTPATIENT
Start: 2019-02-13 | End: 2019-02-14

## 2019-02-13 RX ADMIN — ENOXAPARIN SODIUM 40 MILLIGRAM(S): 100 INJECTION SUBCUTANEOUS at 11:48

## 2019-02-13 RX ADMIN — RISPERIDONE 3 MILLIGRAM(S): 4 TABLET ORAL at 21:33

## 2019-02-13 RX ADMIN — Medication 2: at 11:48

## 2019-02-13 RX ADMIN — Medication 2 MILLIGRAM(S): at 06:14

## 2019-02-13 RX ADMIN — CLOZAPINE 200 MILLIGRAM(S): 150 TABLET, ORALLY DISINTEGRATING ORAL at 21:33

## 2019-02-13 RX ADMIN — Medication 100 MICROGRAM(S): at 06:13

## 2019-02-13 RX ADMIN — Medication 1 TABLET(S): at 00:07

## 2019-02-13 RX ADMIN — Medication 2: at 06:13

## 2019-02-13 RX ADMIN — Medication 2 MILLIGRAM(S): at 17:41

## 2019-02-13 RX ADMIN — Medication 600 MILLIGRAM(S): at 21:34

## 2019-02-13 RX ADMIN — Medication 600 MILLIGRAM(S): at 22:30

## 2019-02-13 RX ADMIN — Medication 25 MILLIGRAM(S): at 06:14

## 2019-02-13 NOTE — PROGRESS NOTE ADULT - SUBJECTIVE AND OBJECTIVE BOX
s/p diverting loop ileostomy POD 5 doing well. no pain. tolerating diet. voiding and ambulating. increased ileostomy output in past 2 days so added anti-diarrheals    denies fevers, chills, nausea, vomiting, chest pain, shortness of breath, dysuria, melena      MEDICATIONS  (STANDING):  cloZAPine 200 milliGRAM(s) Oral at bedtime  dextrose 5%. 1000 milliLiter(s) (50 mL/Hr) IV Continuous <Continuous>  dextrose 50% Injectable 12.5 Gram(s) IV Push once  dextrose 50% Injectable 25 Gram(s) IV Push once  dextrose 50% Injectable 25 Gram(s) IV Push once  diphenoxylate/atropine 1 Tablet(s) Oral every 12 hours  enoxaparin Injectable 40 milliGRAM(s) SubCutaneous every 24 hours  insulin lispro (HumaLOG) corrective regimen sliding scale   SubCutaneous every 6 hours  levothyroxine 100 MICROGram(s) Oral daily  loperamide 2 milliGRAM(s) Oral every 12 hours  metoprolol tartrate 25 milliGRAM(s) Oral daily  risperiDONE   Tablet 3 milliGRAM(s) Oral at bedtime    MEDICATIONS  (PRN):  acetaminophen   Tablet .. 650 milliGRAM(s) Oral every 6 hours PRN Mild Pain (1 - 3)  dextrose 40% Gel 15 Gram(s) Oral once PRN Blood Glucose LESS THAN 70 milliGRAM(s)/deciliter  fentaNYL    Injectable 50 MICROGram(s) IV Push every 10 minutes PRN Moderate Pain (4 - 6)  glucagon  Injectable 1 milliGRAM(s) IntraMuscular once PRN Glucose LESS THAN 70 milligrams/deciliter  LORazepam   Injectable 0.25 milliGRAM(s) IV Push every 12 hours PRN Agitation  ondansetron Injectable 4 milliGRAM(s) IV Push every 6 hours PRN Nausea      Vital Signs Last 24 Hrs  T(C): 36.5 (13 Feb 2019 08:15), Max: 36.7 (12 Feb 2019 15:00)  T(F): 97.7 (13 Feb 2019 08:15), Max: 98.1 (13 Feb 2019 04:28)  HR: 91 (13 Feb 2019 08:15) (91 - 120)  BP: 98/68 (13 Feb 2019 08:15) (98/68 - 120/81)  BP(mean): --  RR: 18 (13 Feb 2019 08:15) (18 - 18)  SpO2: 99% (13 Feb 2019 08:15) (93% - 99%)    Physical Exam:    General: no acute distress, AOx3  Respiratory: Breath Sounds equal & clear to auscultation, no accessory muscle use  Cardiovascular: tachycardic & rhythm, normal S1, S2; no murmurs, gallops or rubs  Gastrointestinal: Soft, non-tender, nondistended. ileostomy in right lower quadrant with red rubber. edematous with pink stoma and brown liquid output  Extremities: No peripheral edema, No cyanosis, clubbing   Vascular: Equal and normal pulses: 2+ peripheral pulses throughout  Musculoskeletal: No joint pain, swelling or deformity; no limitation of movement  Skin: No rashes      I&O's Detail    12 Feb 2019 07:01  -  13 Feb 2019 07:00  --------------------------------------------------------  IN:    Oral Fluid: 500 mL  Total IN: 500 mL    OUT:    Ileostomy: 1425 mL  Total OUT: 1425 mL    Total NET: -925 mL          LABS:                        9.4    11.4  )-----------( 308      ( 13 Feb 2019 07:00 )             30.0     02-13    139  |  99  |  12.0  ----------------------------<  152<H>  4.1   |  28.0  |  0.61    Ca    10.0      13 Feb 2019 07:00  Phos  5.7     02-13  Mg     1.9     02-13            RADIOLOGY & ADDITIONAL STUDIES:

## 2019-02-13 NOTE — PROGRESS NOTE ADULT - PROBLEM SELECTOR PLAN 1
-monitor ileostomy output  -increase imodium to decrease output  -dc red rubber under ileostomy  -ostomy teaching to bhavik  -psych cleared for discharge to group home  -continue diet  -encourage ambulation

## 2019-02-14 VITALS
HEART RATE: 108 BPM | SYSTOLIC BLOOD PRESSURE: 109 MMHG | RESPIRATION RATE: 20 BRPM | OXYGEN SATURATION: 98 % | TEMPERATURE: 98 F | DIASTOLIC BLOOD PRESSURE: 78 MMHG

## 2019-02-14 LAB
GLUCOSE BLDC GLUCOMTR-MCNC: 163 MG/DL — HIGH (ref 70–99)
GLUCOSE BLDC GLUCOMTR-MCNC: 163 MG/DL — HIGH (ref 70–99)
GLUCOSE BLDC GLUCOMTR-MCNC: 164 MG/DL — HIGH (ref 70–99)
GLUCOSE BLDC GLUCOMTR-MCNC: 168 MG/DL — HIGH (ref 70–99)

## 2019-02-14 PROCEDURE — 74176 CT ABD & PELVIS W/O CONTRAST: CPT

## 2019-02-14 PROCEDURE — 80048 BASIC METABOLIC PNL TOTAL CA: CPT

## 2019-02-14 PROCEDURE — 96365 THER/PROPH/DIAG IV INF INIT: CPT

## 2019-02-14 PROCEDURE — 83735 ASSAY OF MAGNESIUM: CPT

## 2019-02-14 PROCEDURE — 86901 BLOOD TYPING SEROLOGIC RH(D): CPT

## 2019-02-14 PROCEDURE — 83605 ASSAY OF LACTIC ACID: CPT

## 2019-02-14 PROCEDURE — 84100 ASSAY OF PHOSPHORUS: CPT

## 2019-02-14 PROCEDURE — 82962 GLUCOSE BLOOD TEST: CPT

## 2019-02-14 PROCEDURE — 87633 RESP VIRUS 12-25 TARGETS: CPT

## 2019-02-14 PROCEDURE — 85730 THROMBOPLASTIN TIME PARTIAL: CPT

## 2019-02-14 PROCEDURE — 71045 X-RAY EXAM CHEST 1 VIEW: CPT

## 2019-02-14 PROCEDURE — 97163 PT EVAL HIGH COMPLEX 45 MIN: CPT

## 2019-02-14 PROCEDURE — 85610 PROTHROMBIN TIME: CPT

## 2019-02-14 PROCEDURE — 96366 THER/PROPH/DIAG IV INF ADDON: CPT

## 2019-02-14 PROCEDURE — 87486 CHLMYD PNEUM DNA AMP PROBE: CPT

## 2019-02-14 PROCEDURE — 87040 BLOOD CULTURE FOR BACTERIA: CPT

## 2019-02-14 PROCEDURE — 97116 GAIT TRAINING THERAPY: CPT

## 2019-02-14 PROCEDURE — 85027 COMPLETE CBC AUTOMATED: CPT

## 2019-02-14 PROCEDURE — 93005 ELECTROCARDIOGRAM TRACING: CPT

## 2019-02-14 PROCEDURE — 80307 DRUG TEST PRSMV CHEM ANLYZR: CPT

## 2019-02-14 PROCEDURE — 96376 TX/PRO/DX INJ SAME DRUG ADON: CPT

## 2019-02-14 PROCEDURE — 87581 M.PNEUMON DNA AMP PROBE: CPT

## 2019-02-14 PROCEDURE — 97110 THERAPEUTIC EXERCISES: CPT

## 2019-02-14 PROCEDURE — 87798 DETECT AGENT NOS DNA AMP: CPT

## 2019-02-14 PROCEDURE — 86850 RBC ANTIBODY SCREEN: CPT

## 2019-02-14 PROCEDURE — 99285 EMERGENCY DEPT VISIT HI MDM: CPT | Mod: 25

## 2019-02-14 PROCEDURE — 96368 THER/DIAG CONCURRENT INF: CPT

## 2019-02-14 PROCEDURE — 86923 COMPATIBILITY TEST ELECTRIC: CPT

## 2019-02-14 PROCEDURE — 96375 TX/PRO/DX INJ NEW DRUG ADDON: CPT

## 2019-02-14 PROCEDURE — 83036 HEMOGLOBIN GLYCOSYLATED A1C: CPT

## 2019-02-14 PROCEDURE — 36415 COLL VENOUS BLD VENIPUNCTURE: CPT

## 2019-02-14 PROCEDURE — 86900 BLOOD TYPING SEROLOGIC ABO: CPT

## 2019-02-14 PROCEDURE — 80053 COMPREHEN METABOLIC PANEL: CPT

## 2019-02-14 RX ORDER — LEVOTHYROXINE SODIUM 125 MCG
1 TABLET ORAL
Qty: 0 | Refills: 0 | DISCHARGE
Start: 2019-02-14

## 2019-02-14 RX ORDER — ACETAMINOPHEN 500 MG
2 TABLET ORAL
Qty: 0 | Refills: 0 | DISCHARGE
Start: 2019-02-14

## 2019-02-14 RX ORDER — LOPERAMIDE HCL 2 MG
1 TABLET ORAL
Qty: 28 | Refills: 0 | OUTPATIENT
Start: 2019-02-14 | End: 2019-02-20

## 2019-02-14 RX ORDER — LOPERAMIDE HCL 2 MG
1 TABLET ORAL
Qty: 32 | Refills: 0
Start: 2019-02-14 | End: 2019-02-21

## 2019-02-14 RX ADMIN — Medication 25 MILLIGRAM(S): at 05:27

## 2019-02-14 RX ADMIN — Medication 2 MILLIGRAM(S): at 05:27

## 2019-02-14 RX ADMIN — Medication 2: at 11:50

## 2019-02-14 RX ADMIN — Medication 2: at 05:28

## 2019-02-14 RX ADMIN — Medication 600 MILLIGRAM(S): at 06:25

## 2019-02-14 RX ADMIN — Medication 600 MILLIGRAM(S): at 05:27

## 2019-02-14 RX ADMIN — Medication 100 MICROGRAM(S): at 05:27

## 2019-02-14 RX ADMIN — Medication 2: at 00:22

## 2019-02-14 RX ADMIN — Medication 600 MILLIGRAM(S): at 11:51

## 2019-02-14 RX ADMIN — Medication 2 MILLIGRAM(S): at 00:22

## 2019-02-14 RX ADMIN — Medication 2 MILLIGRAM(S): at 11:54

## 2019-02-14 NOTE — DIETITIAN INITIAL EVALUATION ADULT. - OTHER INFO
53 year old male s/p ex lap with diverting loop ileostomy for malignant large bowel obstruction. PMH hypothyroidism, DM, schizo-affective psychosis, high cholesterol. Pt reports good appetite/po intake currently and PTA. States UBW stable ~160-165 lbs. Denies chewing/swallowing difficulty. States he has stopped checking blood sugars at home because DM well-controlled (HgA1c 5.6%). Provided pt with written/verbal education regarding low fiber diet- also provided with meal planning with plate method education.

## 2019-02-14 NOTE — DIETITIAN INITIAL EVALUATION ADULT. - NS AS NUTRI INTERV ED CONTENT
meal planning with plate method, low fiber/Recommended modifications/Nutrition relationship to health/disease/Purpose of the nutrition education

## 2019-02-14 NOTE — PROGRESS NOTE ADULT - ASSESSMENT
53 year old male s/p diverting loop ileostomy POD 6. Ostomy output now being more formed stool. Patient feels comfortable doing ostomy bag changes.    Plan:  - Possible d/c 2/14  - Monitor ileostomy output  - ostomy teaching to patient  - psych cleared for discharge to group home  - continue diet  - encourage ambulation.

## 2019-02-14 NOTE — PROGRESS NOTE ADULT - PROVIDER SPECIALTY LIST ADULT
Anesthesia
Anesthesia
Surgery
Colorectal Surgery
Surgery

## 2019-02-14 NOTE — PROGRESS NOTE ADULT - SUBJECTIVE AND OBJECTIVE BOX
HPI/OVERNIGHT EVENTS:  No acute events overnight. Patient was evaluated at bedside and was found afebrile, HDS and in no acute distress. Patient states he feels comfortable changing his ostomy bags, tolerating diet, ambulating and pain is well controlled. Denies nausea, vomiting, fever, chest pain or SOB.    MEDICATIONS  (STANDING):  cloZAPine 200 milliGRAM(s) Oral at bedtime  dextrose 5%. 1000 milliLiter(s) (50 mL/Hr) IV Continuous <Continuous>  dextrose 50% Injectable 12.5 Gram(s) IV Push once  dextrose 50% Injectable 25 Gram(s) IV Push once  dextrose 50% Injectable 25 Gram(s) IV Push once  enoxaparin Injectable 40 milliGRAM(s) SubCutaneous every 24 hours  ibuprofen  Tablet. 600 milliGRAM(s) Oral every 6 hours  insulin lispro (HumaLOG) corrective regimen sliding scale   SubCutaneous every 6 hours  levothyroxine 100 MICROGram(s) Oral daily  loperamide 2 milliGRAM(s) Oral every 6 hours  metoprolol tartrate 25 milliGRAM(s) Oral daily  risperiDONE   Tablet 3 milliGRAM(s) Oral at bedtime    MEDICATIONS  (PRN):  acetaminophen   Tablet .. 650 milliGRAM(s) Oral every 6 hours PRN Mild Pain (1 - 3)  dextrose 40% Gel 15 Gram(s) Oral once PRN Blood Glucose LESS THAN 70 milliGRAM(s)/deciliter  fentaNYL    Injectable 50 MICROGram(s) IV Push every 10 minutes PRN Moderate Pain (4 - 6)  glucagon  Injectable 1 milliGRAM(s) IntraMuscular once PRN Glucose LESS THAN 70 milligrams/deciliter  guaiFENesin    Syrup 100 milliGRAM(s) Oral every 6 hours PRN Cough  LORazepam   Injectable 0.25 milliGRAM(s) IV Push every 12 hours PRN Agitation  ondansetron Injectable 4 milliGRAM(s) IV Push every 6 hours PRN Nausea      Vital Signs Last 24 Hrs  T(C): 36.3 (14 Feb 2019 07:45), Max: 37.3 (13 Feb 2019 19:13)  T(F): 97.4 (14 Feb 2019 07:45), Max: 99.1 (13 Feb 2019 19:13)  HR: 84 (14 Feb 2019 07:45) (84 - 116)  BP: 109/73 (14 Feb 2019 07:45) (104/71 - 117/77)  BP(mean): --  RR: 18 (14 Feb 2019 07:45) (18 - 18)  SpO2: 95% (14 Feb 2019 07:45) (93% - 97%)    Physical Exam:    General: no acute distress, AOx3  Respiratory: Breath Sounds equal & clear to auscultation, no accessory muscle use  Cardiovascular: tachycardic & rhythm, normal S1, S2; no murmurs, gallops or rubs  Gastrointestinal: Soft, non-tender, nondistended. ileostomy in right lower quadrant w/ edematous with pink stoma and brown liquid output  Extremities: No peripheral edema, No cyanosis, clubbing   Vascular: Equal and normal pulses: 2+ peripheral pulses throughout  Musculoskeletal: No joint pain, swelling or deformity; no limitation of movement  Skin: No rashes    I&O's Detail    13 Feb 2019 07:01  -  14 Feb 2019 07:00  --------------------------------------------------------  IN:  Total IN: 0 mL    OUT:    Ileostomy: 150 mL  Total OUT: 150 mL    Total NET: -150 mL          LABS:                        9.4    11.4  )-----------( 308      ( 13 Feb 2019 07:00 )             30.0     02-13    139  |  99  |  12.0  ----------------------------<  152<H>  4.1   |  28.0  |  0.61    Ca    10.0      13 Feb 2019 07:00  Phos  5.7     02-13  Mg     1.9     02-13

## 2019-02-26 ENCOUNTER — OUTPATIENT (OUTPATIENT)
Dept: OUTPATIENT SERVICES | Facility: HOSPITAL | Age: 54
LOS: 1 days | Discharge: HOME | End: 2019-02-26

## 2019-02-26 ENCOUNTER — APPOINTMENT (OUTPATIENT)
Dept: SURGICAL ONCOLOGY | Facility: CLINIC | Age: 54
End: 2019-02-26
Payer: MEDICARE

## 2019-02-26 VITALS
HEART RATE: 89 BPM | WEIGHT: 156 LBS | BODY MASS INDEX: 30.63 KG/M2 | DIASTOLIC BLOOD PRESSURE: 67 MMHG | SYSTOLIC BLOOD PRESSURE: 104 MMHG | HEIGHT: 60 IN

## 2019-02-26 DIAGNOSIS — C18.9 MALIGNANT NEOPLASM OF COLON, UNSPECIFIED: ICD-10-CM

## 2019-02-26 DIAGNOSIS — S82.899A OTHER FRACTURE OF UNSPECIFIED LOWER LEG, INITIAL ENCOUNTER FOR CLOSED FRACTURE: Chronic | ICD-10-CM

## 2019-02-26 PROCEDURE — 99024 POSTOP FOLLOW-UP VISIT: CPT

## 2019-02-28 NOTE — PHYSICAL EXAM
[Normal] : supple, no neck mass and thyroid not enlarged [Normal Neck Lymph Nodes] : normal neck lymph nodes  [Normal Supraclavicular Lymph Nodes] : normal supraclavicular lymph nodes [Normal Groin Lymph Nodes] : normal groin lymph nodes [Normal Axillary Lymph Nodes] : normal axillary lymph nodes [Normal] : oriented to person, place and time, with appropriate affect [de-identified] : abdomen soft, non-distended, non-tender. wound healing appropriately. Ostomy, pink, functional with liquid stool. Mild prolapse without evidence of congestion or ischemia.

## 2019-02-28 NOTE — ASSESSMENT
[FreeTextEntry1] : 53 year old man with peritoneal carcinomatosis with a cecal adenocarcinoma now s/p diverting loop ileostomy. Recovering well. The patient was encouraged to continue his good habit of focusing on good oral hydration. He was counseled on titrating his imodium dose up or down based on the thickness and volume of his stool to avoid dehydration. I told him and his mother that other anti-motility agents can be added as needed, if needed for management of stool consistency.\par \par He is to begin systemic chemotherapy shortly with Dr. Diaz. I will contact her so we can arrange for IR placement of a mediport as well as percutaneous liver biopsy for his hepatic lesions. He can follow up with me after several cycles of chemotherapy if Dr. Diaz believes he can be apppropriately considered for cytoreductive surgery and stoma reversal.

## 2019-02-28 NOTE — HISTORY OF PRESENT ILLNESS
[de-identified] : Nick Edwards is a 53 year old man who presents for post-op visit after emergent diverting loop ileostomy on 2/8/2019. He presented with a known cecal mass and peritoneal carcinomatosis that had progressed to cause a small bowel obstruction. Since the operation he has been doing well. He reports his stoma output is estimated to be less than 1.5L/day. He has started to become comfortable managing his ileostomy. His laparotomy wound is healing well and he denies any drainage, fevers, or chiils

## 2019-03-06 ENCOUNTER — APPOINTMENT (OUTPATIENT)
Dept: HEMATOLOGY ONCOLOGY | Facility: CLINIC | Age: 54
End: 2019-03-06
Payer: MEDICARE

## 2019-03-06 ENCOUNTER — RESULT REVIEW (OUTPATIENT)
Age: 54
End: 2019-03-06

## 2019-03-06 VITALS
DIASTOLIC BLOOD PRESSURE: 71 MMHG | HEART RATE: 86 BPM | OXYGEN SATURATION: 99 % | TEMPERATURE: 97.8 F | BODY MASS INDEX: 30.63 KG/M2 | SYSTOLIC BLOOD PRESSURE: 115 MMHG | WEIGHT: 156 LBS | HEIGHT: 60 IN

## 2019-03-06 LAB
BASOPHILS # BLD AUTO: 0.1 K/UL — SIGNIFICANT CHANGE UP (ref 0–0.2)
BASOPHILS NFR BLD AUTO: 1.2 % — SIGNIFICANT CHANGE UP (ref 0–2)
EOSINOPHIL # BLD AUTO: 0.4 K/UL — SIGNIFICANT CHANGE UP (ref 0–0.5)
EOSINOPHIL NFR BLD AUTO: 3.5 % — SIGNIFICANT CHANGE UP (ref 0–6)
HCT VFR BLD CALC: 32.9 % — LOW (ref 39–50)
HGB BLD-MCNC: 9.7 G/DL — LOW (ref 13–17)
LYMPHOCYTES # BLD AUTO: 2.5 K/UL — SIGNIFICANT CHANGE UP (ref 1–3.3)
LYMPHOCYTES # BLD AUTO: 25.2 % — SIGNIFICANT CHANGE UP (ref 13–44)
MCHC RBC-ENTMCNC: 20.8 PG — LOW (ref 27–34)
MCHC RBC-ENTMCNC: 29.5 G/DL — LOW (ref 32–36)
MCV RBC AUTO: 70.5 FL — LOW (ref 80–100)
MONOCYTES # BLD AUTO: 0.9 K/UL — SIGNIFICANT CHANGE UP (ref 0–0.9)
MONOCYTES NFR BLD AUTO: 9.2 % — SIGNIFICANT CHANGE UP (ref 2–14)
NEUTROPHILS # BLD AUTO: 6.1 K/UL — SIGNIFICANT CHANGE UP (ref 1.8–7.4)
NEUTROPHILS NFR BLD AUTO: 60.8 % — SIGNIFICANT CHANGE UP (ref 43–77)
PLATELET # BLD AUTO: 354 K/UL — SIGNIFICANT CHANGE UP (ref 150–400)
RBC # BLD: 4.66 M/UL — SIGNIFICANT CHANGE UP (ref 4.2–5.8)
RBC # FLD: 17.9 % — HIGH (ref 10.3–14.5)
WBC # BLD: 10 K/UL — SIGNIFICANT CHANGE UP (ref 3.8–10.5)
WBC # FLD AUTO: 10 K/UL — SIGNIFICANT CHANGE UP (ref 3.8–10.5)

## 2019-03-06 PROCEDURE — 99215 OFFICE O/P EST HI 40 MIN: CPT

## 2019-03-07 LAB
ALBUMIN SERPL ELPH-MCNC: 4.6 G/DL
ALP BLD-CCNC: 87 U/L
ALT SERPL-CCNC: 25 U/L
ANION GAP SERPL CALC-SCNC: 13 MMOL/L
AST SERPL-CCNC: 27 U/L
BILIRUB SERPL-MCNC: 0.2 MG/DL
BUN SERPL-MCNC: 15 MG/DL
CALCIUM SERPL-MCNC: 9.9 MG/DL
CEA SERPL-MCNC: 106 NG/ML
CHLORIDE SERPL-SCNC: 101 MMOL/L
CO2 SERPL-SCNC: 26 MMOL/L
CREAT SERPL-MCNC: 0.73 MG/DL
GLUCOSE SERPL-MCNC: 106 MG/DL
MAGNESIUM SERPL-MCNC: 2 MG/DL
POTASSIUM SERPL-SCNC: 4.3 MMOL/L
PROT SERPL-MCNC: 7.2 G/DL
SODIUM SERPL-SCNC: 140 MMOL/L

## 2019-03-19 ENCOUNTER — FORM ENCOUNTER (OUTPATIENT)
Age: 54
End: 2019-03-19

## 2019-03-19 RX ORDER — VANCOMYCIN HCL 1 G
1000 VIAL (EA) INTRAVENOUS ONCE
Qty: 0 | Refills: 0 | Status: DISCONTINUED | OUTPATIENT
Start: 2019-03-20 | End: 2019-04-04

## 2019-03-20 ENCOUNTER — OUTPATIENT (OUTPATIENT)
Dept: OUTPATIENT SERVICES | Facility: HOSPITAL | Age: 54
LOS: 1 days | End: 2019-03-20
Payer: MEDICARE

## 2019-03-20 VITALS
SYSTOLIC BLOOD PRESSURE: 140 MMHG | RESPIRATION RATE: 16 BRPM | TEMPERATURE: 97 F | DIASTOLIC BLOOD PRESSURE: 62 MMHG | HEART RATE: 76 BPM | OXYGEN SATURATION: 100 %

## 2019-03-20 VITALS
TEMPERATURE: 98 F | DIASTOLIC BLOOD PRESSURE: 75 MMHG | RESPIRATION RATE: 16 BRPM | WEIGHT: 154.98 LBS | HEIGHT: 60 IN | OXYGEN SATURATION: 100 % | SYSTOLIC BLOOD PRESSURE: 126 MMHG | HEART RATE: 75 BPM

## 2019-03-20 DIAGNOSIS — C18.2 MALIGNANT NEOPLASM OF ASCENDING COLON: ICD-10-CM

## 2019-03-20 DIAGNOSIS — S82.899A OTHER FRACTURE OF UNSPECIFIED LOWER LEG, INITIAL ENCOUNTER FOR CLOSED FRACTURE: Chronic | ICD-10-CM

## 2019-03-20 LAB
APTT BLD: 35.8 SEC — SIGNIFICANT CHANGE UP (ref 27.5–36.3)
GLUCOSE BLDC GLUCOMTR-MCNC: 129 MG/DL — HIGH (ref 70–99)
INR BLD: 0.99 RATIO — SIGNIFICANT CHANGE UP (ref 0.88–1.16)
PROTHROM AB SERPL-ACNC: 11.4 SEC — SIGNIFICANT CHANGE UP (ref 10–12.9)

## 2019-03-20 PROCEDURE — 85730 THROMBOPLASTIN TIME PARTIAL: CPT

## 2019-03-20 PROCEDURE — 76705 ECHO EXAM OF ABDOMEN: CPT | Mod: 26

## 2019-03-20 PROCEDURE — 76942 ECHO GUIDE FOR BIOPSY: CPT

## 2019-03-20 PROCEDURE — 77001 FLUOROGUIDE FOR VEIN DEVICE: CPT

## 2019-03-20 PROCEDURE — 36561 INSERT TUNNELED CV CATH: CPT

## 2019-03-20 PROCEDURE — 76705 ECHO EXAM OF ABDOMEN: CPT

## 2019-03-20 PROCEDURE — 82962 GLUCOSE BLOOD TEST: CPT

## 2019-03-20 PROCEDURE — 36415 COLL VENOUS BLD VENIPUNCTURE: CPT

## 2019-03-20 PROCEDURE — 76937 US GUIDE VASCULAR ACCESS: CPT | Mod: 26

## 2019-03-20 PROCEDURE — C1788: CPT

## 2019-03-20 PROCEDURE — 77001 FLUOROGUIDE FOR VEIN DEVICE: CPT | Mod: 26

## 2019-03-20 PROCEDURE — 85610 PROTHROMBIN TIME: CPT

## 2019-03-20 PROCEDURE — C1769: CPT

## 2019-03-20 RX ORDER — VANCOMYCIN HCL 1 G
1 VIAL (EA) INTRAVENOUS
Qty: 0 | Refills: 0 | DISCHARGE
Start: 2019-03-20

## 2019-03-20 NOTE — BRIEF OPERATIVE NOTE - NSICDXBRIEFPROCEDURE_GEN_ALL_CORE_FT
PROCEDURES:  Insertion of tunneled central venous catheter with port with fluoroscopic guidance 20-Mar-2019 15:02:49  Kuldip Alberto

## 2019-03-22 NOTE — HISTORY OF PRESENT ILLNESS
[de-identified] : The patient was diagnosed with adenocarcinoma of the cecum in December 2018 at the age of 53.  He has a history of irregular bowel movements with alternating diarrhea and constipation and recently saw blood on the toilet paper.  He saw GI, Dr. Thuan Cassidy, who performed a colonoscopy on 12/11/18.  Pathology c/w adenocarcinoma.  On 12/13/18 a CT A/P showed a soft tissue mass in the region of the ileocecal  junction measuring 3.7 x 3.5 cm.  There are  multiple peritoneal nodules in the abdomen and pelvis. For example, a  nodule in the pelvis measures 1.4 cm. A nodule in the left lower quadrant  measures 1.3 cm. There are omental nodules measuring up to 3.2 x 2.9 cm.  There are ileocolic lymph nodes measuring up to 1.3 cm.  1.1 cm lesion in the right lobe of the liver is suspicious for metastatic  disease.    Two splenic lesions are suspicious for metastatic disease.  He was referred to Colorectal surgery, Dr. Livia Talavera, who first ordered a CT Chest to complete staging.  It showed no suspicious pulmonary nodules.  Dr. Talavera recommended systemic chemotherapy given extent of disease.   [de-identified] : PMH of HTN, DM, HPL, Osteogenesis imperfecta, h/o Disruptive behavior, schizoaffective disorder [de-identified] : Patient presents for initial evaluation.  Ostomy skin barrier managing with a nurse.  S/p diverting loop ileostomy 2/8/19 for obstruction with Dr Giang.  He elected to stop Xeloda after the first day due to vomiting.  + Constipation but improving.  He has been using Senekot and Benefiber.  + intermittent melena.  No BRBPR.  Pain resolved.  No fevers.  He has intermittent palpitations.  Denies any headaches, nausea or vomiting.  He presents today with HGB 9.7 gm/dL.  No other new complaints today. \par \par WIll be splitting his time between his parents' house and his residential living facility.

## 2019-03-22 NOTE — RESULTS/DATA
[FreeTextEntry1] : 1/19/19 Pathology:\par 1. Cecal mass, biopsy ([id number]):\par - At least intramucosal adenocarcinoma in background of high-grade dysplasia, see note.\par -Note: Dr. Goodwin concur(s) with diagnosis. The findings may not represent the entire mass lesion.\par \par 1/9/19 CT Chest:  No lung mass or consolidation. No suspicious pulmonary nodule. 3 mm  triangular density within the minor fissure most  likely benign fissural lymph node.\par \par 1/9/19 U/S Abdomen:  Mild fatty infiltration of the liver.    1.7 cm right lobe liver lesion is indeterminate but for which metastatic  disease is in the differential diagnosis.    Two solid splenic lesions measuring 3.1 cm and 1.8 cm.  The liver lesion and splenic lesions may be further evaluated with PET/CT  scan to evaluate for metastatic disease.  1.2 x 0.4 cm gallbladder polyp.\par \par 12/13/18 CT A/P:  There is a soft tissue mass in the region of the ileocecal  junction measuring 3.7 x 3.5 cm. There is no bowel obstruction. There are  multiple peritoneal nodules in the abdomen and pelvis. For example, a  nodule in the pelvis measures 1.4 cm. A nodule in the left lower quadrant  measures 1.3 cm. There are omental nodules measuring up to 3.2 x 2.9 cm.  There are ileocolic lymph nodes measuring up to 1.3 cm.  1.1 cm lesion in the right lobe of the liver is suspicious for metastatic  disease.    Two splenic lesions are suspicious for metastatic disease.\par \par 12/11/18 Pathology:  Cecal mass, biopsy:   Adenocarcinoma, at least intramucosal, superficial mucosal fragments.\par \par 12/11/18 Colonoscopy:  Cecal mass.  Hemorrhoids

## 2019-03-22 NOTE — ADDENDUM
[FreeTextEntry1] : I, Christ Lovelace, acted solely as a scribe for Dr. Sarina Diaz on this date 3/6/19.

## 2019-03-28 ENCOUNTER — APPOINTMENT (OUTPATIENT)
Age: 54
End: 2019-03-28
Payer: MEDICARE

## 2019-03-28 ENCOUNTER — RX RENEWAL (OUTPATIENT)
Age: 54
End: 2019-03-28

## 2019-03-28 ENCOUNTER — RESULT REVIEW (OUTPATIENT)
Age: 54
End: 2019-03-28

## 2019-03-28 VITALS
SYSTOLIC BLOOD PRESSURE: 112 MMHG | WEIGHT: 158.02 LBS | BODY MASS INDEX: 31.02 KG/M2 | HEART RATE: 78 BPM | OXYGEN SATURATION: 98 % | TEMPERATURE: 97.8 F | DIASTOLIC BLOOD PRESSURE: 75 MMHG | HEIGHT: 60 IN

## 2019-03-28 LAB
BASOPHILS # BLD AUTO: 0.1 K/UL — SIGNIFICANT CHANGE UP (ref 0–0.2)
BASOPHILS NFR BLD AUTO: 0.9 % — SIGNIFICANT CHANGE UP (ref 0–2)
EOSINOPHIL # BLD AUTO: 0.2 K/UL — SIGNIFICANT CHANGE UP (ref 0–0.5)
EOSINOPHIL NFR BLD AUTO: 2.4 % — SIGNIFICANT CHANGE UP (ref 0–6)
HCT VFR BLD CALC: 35.9 % — LOW (ref 39–50)
HGB BLD-MCNC: 10.8 G/DL — LOW (ref 13–17)
LYMPHOCYTES # BLD AUTO: 2.2 K/UL — SIGNIFICANT CHANGE UP (ref 1–3.3)
LYMPHOCYTES # BLD AUTO: 23.1 % — SIGNIFICANT CHANGE UP (ref 13–44)
MCHC RBC-ENTMCNC: 22.5 PG — LOW (ref 27–34)
MCHC RBC-ENTMCNC: 30.1 G/DL — LOW (ref 32–36)
MCV RBC AUTO: 74.5 FL — LOW (ref 80–100)
MONOCYTES # BLD AUTO: 0.7 K/UL — SIGNIFICANT CHANGE UP (ref 0–0.9)
MONOCYTES NFR BLD AUTO: 7.7 % — SIGNIFICANT CHANGE UP (ref 2–14)
NEUTROPHILS # BLD AUTO: 6.3 K/UL — SIGNIFICANT CHANGE UP (ref 1.8–7.4)
NEUTROPHILS NFR BLD AUTO: 65.8 % — SIGNIFICANT CHANGE UP (ref 43–77)
PLATELET # BLD AUTO: 310 K/UL — SIGNIFICANT CHANGE UP (ref 150–400)
RBC # BLD: 4.82 M/UL — SIGNIFICANT CHANGE UP (ref 4.2–5.8)
RBC # FLD: 26 % — HIGH (ref 10.3–14.5)
WBC # BLD: 9.6 K/UL — SIGNIFICANT CHANGE UP (ref 3.8–10.5)
WBC # FLD AUTO: 9.6 K/UL — SIGNIFICANT CHANGE UP (ref 3.8–10.5)

## 2019-03-28 PROCEDURE — 99215 OFFICE O/P EST HI 40 MIN: CPT

## 2019-03-29 ENCOUNTER — OUTPATIENT (OUTPATIENT)
Dept: OUTPATIENT SERVICES | Facility: HOSPITAL | Age: 54
LOS: 1 days | Discharge: HOME | End: 2019-03-29

## 2019-03-29 DIAGNOSIS — C18.9 MALIGNANT NEOPLASM OF COLON, UNSPECIFIED: ICD-10-CM

## 2019-03-29 DIAGNOSIS — S82.899A OTHER FRACTURE OF UNSPECIFIED LOWER LEG, INITIAL ENCOUNTER FOR CLOSED FRACTURE: Chronic | ICD-10-CM

## 2019-03-29 LAB
ALBUMIN SERPL ELPH-MCNC: 4.4 G/DL
ALP BLD-CCNC: 58 U/L
ALT SERPL-CCNC: 18 U/L
ANION GAP SERPL CALC-SCNC: 13 MMOL/L
AST SERPL-CCNC: 18 U/L
BILIRUB SERPL-MCNC: 0.3 MG/DL
BUN SERPL-MCNC: 16 MG/DL
CALCIUM SERPL-MCNC: 9.7 MG/DL
CHLORIDE SERPL-SCNC: 101 MMOL/L
CO2 SERPL-SCNC: 26 MMOL/L
CREAT SERPL-MCNC: 0.84 MG/DL
GLUCOSE SERPL-MCNC: 106 MG/DL
MAGNESIUM SERPL-MCNC: 1.8 MG/DL
POTASSIUM SERPL-SCNC: 4.3 MMOL/L
PROT SERPL-MCNC: 7 G/DL
SODIUM SERPL-SCNC: 140 MMOL/L

## 2019-03-31 ENCOUNTER — LABORATORY RESULT (OUTPATIENT)
Age: 54
End: 2019-03-31

## 2019-04-01 ENCOUNTER — RESULT REVIEW (OUTPATIENT)
Age: 54
End: 2019-04-01

## 2019-04-01 ENCOUNTER — APPOINTMENT (OUTPATIENT)
Age: 54
End: 2019-04-01

## 2019-04-01 ENCOUNTER — LABORATORY RESULT (OUTPATIENT)
Age: 54
End: 2019-04-01

## 2019-04-01 LAB
BASOPHILS # BLD AUTO: 0.1 K/UL — SIGNIFICANT CHANGE UP (ref 0–0.2)
BASOPHILS NFR BLD AUTO: 0.8 % — SIGNIFICANT CHANGE UP (ref 0–2)
EOSINOPHIL # BLD AUTO: 0.3 K/UL — SIGNIFICANT CHANGE UP (ref 0–0.5)
EOSINOPHIL NFR BLD AUTO: 3 % — SIGNIFICANT CHANGE UP (ref 0–6)
HCT VFR BLD CALC: 38.4 % — LOW (ref 39–50)
HGB BLD-MCNC: 11.6 G/DL — LOW (ref 13–17)
LYMPHOCYTES # BLD AUTO: 2.2 K/UL — SIGNIFICANT CHANGE UP (ref 1–3.3)
LYMPHOCYTES # BLD AUTO: 20.8 % — SIGNIFICANT CHANGE UP (ref 13–44)
MCHC RBC-ENTMCNC: 22.5 PG — LOW (ref 27–34)
MCHC RBC-ENTMCNC: 30.3 G/DL — LOW (ref 32–36)
MCV RBC AUTO: 74.1 FL — LOW (ref 80–100)
MONOCYTES # BLD AUTO: 0.8 K/UL — SIGNIFICANT CHANGE UP (ref 0–0.9)
MONOCYTES NFR BLD AUTO: 7.6 % — SIGNIFICANT CHANGE UP (ref 2–14)
NEUTROPHILS # BLD AUTO: 7.2 K/UL — SIGNIFICANT CHANGE UP (ref 1.8–7.4)
NEUTROPHILS NFR BLD AUTO: 67.9 % — SIGNIFICANT CHANGE UP (ref 43–77)
PLATELET # BLD AUTO: 296 K/UL — SIGNIFICANT CHANGE UP (ref 150–400)
RBC # BLD: 5.18 M/UL — SIGNIFICANT CHANGE UP (ref 4.2–5.8)
RBC # FLD: 26 % — HIGH (ref 10.3–14.5)
WBC # BLD: 10.6 K/UL — HIGH (ref 3.8–10.5)
WBC # FLD AUTO: 10.6 K/UL — HIGH (ref 3.8–10.5)

## 2019-04-01 RX ORDER — ASCORBIC ACID 60 MG
1 TABLET,CHEWABLE ORAL
Qty: 0 | Refills: 0 | COMMUNITY

## 2019-04-02 DIAGNOSIS — R11.2 NAUSEA WITH VOMITING, UNSPECIFIED: ICD-10-CM

## 2019-04-02 DIAGNOSIS — Z51.11 ENCOUNTER FOR ANTINEOPLASTIC CHEMOTHERAPY: ICD-10-CM

## 2019-04-12 NOTE — REVIEW OF SYSTEMS
[Fatigue] : fatigue [Palpitations] : palpitations [Shortness Of Breath] : shortness of breath [Constipation] : constipation [Anxiety] : anxiety [Negative] : Heme/Lymph

## 2019-04-15 ENCOUNTER — RESULT REVIEW (OUTPATIENT)
Age: 54
End: 2019-04-15

## 2019-04-15 ENCOUNTER — LABORATORY RESULT (OUTPATIENT)
Age: 54
End: 2019-04-15

## 2019-04-15 ENCOUNTER — APPOINTMENT (OUTPATIENT)
Age: 54
End: 2019-04-15
Payer: MEDICARE

## 2019-04-15 VITALS
OXYGEN SATURATION: 98 % | SYSTOLIC BLOOD PRESSURE: 110 MMHG | TEMPERATURE: 97.8 F | HEIGHT: 60 IN | DIASTOLIC BLOOD PRESSURE: 73 MMHG | HEART RATE: 89 BPM | BODY MASS INDEX: 31.42 KG/M2 | WEIGHT: 160.03 LBS

## 2019-04-15 DIAGNOSIS — D64.9 ANEMIA, UNSPECIFIED: ICD-10-CM

## 2019-04-15 LAB
BASOPHILS # BLD AUTO: 0.1 K/UL — SIGNIFICANT CHANGE UP (ref 0–0.2)
BASOPHILS NFR BLD AUTO: 0.8 % — SIGNIFICANT CHANGE UP (ref 0–2)
EOSINOPHIL # BLD AUTO: 0.2 K/UL — SIGNIFICANT CHANGE UP (ref 0–0.5)
EOSINOPHIL NFR BLD AUTO: 2.3 % — SIGNIFICANT CHANGE UP (ref 0–6)
HCT VFR BLD CALC: 37.2 % — LOW (ref 39–50)
HGB BLD-MCNC: 11.4 G/DL — LOW (ref 13–17)
LYMPHOCYTES # BLD AUTO: 2.2 K/UL — SIGNIFICANT CHANGE UP (ref 1–3.3)
LYMPHOCYTES # BLD AUTO: 24.1 % — SIGNIFICANT CHANGE UP (ref 13–44)
MCHC RBC-ENTMCNC: 23.6 PG — LOW (ref 27–34)
MCHC RBC-ENTMCNC: 30.8 G/DL — LOW (ref 32–36)
MCV RBC AUTO: 76.7 FL — LOW (ref 80–100)
MONOCYTES # BLD AUTO: 0.8 K/UL — SIGNIFICANT CHANGE UP (ref 0–0.9)
MONOCYTES NFR BLD AUTO: 8.4 % — SIGNIFICANT CHANGE UP (ref 2–14)
NEUTROPHILS # BLD AUTO: 6 K/UL — SIGNIFICANT CHANGE UP (ref 1.8–7.4)
NEUTROPHILS NFR BLD AUTO: 64.4 % — SIGNIFICANT CHANGE UP (ref 43–77)
PLATELET # BLD AUTO: 198 K/UL — SIGNIFICANT CHANGE UP (ref 150–400)
RBC # BLD: 4.84 M/UL — SIGNIFICANT CHANGE UP (ref 4.2–5.8)
RBC # FLD: 27.2 % — HIGH (ref 10.3–14.5)
WBC # BLD: 9.3 K/UL — SIGNIFICANT CHANGE UP (ref 3.8–10.5)
WBC # FLD AUTO: 9.3 K/UL — SIGNIFICANT CHANGE UP (ref 3.8–10.5)

## 2019-04-15 PROCEDURE — 99215 OFFICE O/P EST HI 40 MIN: CPT

## 2019-04-16 LAB
ALBUMIN SERPL ELPH-MCNC: 4.5 G/DL
ALP BLD-CCNC: 49 U/L
ALT SERPL-CCNC: 30 U/L
ANION GAP SERPL CALC-SCNC: 14 MMOL/L
AST SERPL-CCNC: 24 U/L
BILIRUB SERPL-MCNC: 0.6 MG/DL
BUN SERPL-MCNC: 18 MG/DL
CALCIUM SERPL-MCNC: 9.7 MG/DL
CHLORIDE SERPL-SCNC: 100 MMOL/L
CO2 SERPL-SCNC: 25 MMOL/L
CREAT SERPL-MCNC: 0.9 MG/DL
GLUCOSE SERPL-MCNC: 120 MG/DL
MAGNESIUM SERPL-MCNC: 1.9 MG/DL
POTASSIUM SERPL-SCNC: 4.1 MMOL/L
PROT SERPL-MCNC: 7.2 G/DL
SODIUM SERPL-SCNC: 139 MMOL/L

## 2019-04-22 ENCOUNTER — LABORATORY RESULT (OUTPATIENT)
Age: 54
End: 2019-04-22

## 2019-04-22 ENCOUNTER — RESULT REVIEW (OUTPATIENT)
Age: 54
End: 2019-04-22

## 2019-04-22 ENCOUNTER — APPOINTMENT (OUTPATIENT)
Age: 54
End: 2019-04-22

## 2019-04-22 LAB
BASOPHILS # BLD AUTO: 0.1 K/UL — SIGNIFICANT CHANGE UP (ref 0–0.2)
BASOPHILS NFR BLD AUTO: 0.9 % — SIGNIFICANT CHANGE UP (ref 0–2)
EOSINOPHIL # BLD AUTO: 0.2 K/UL — SIGNIFICANT CHANGE UP (ref 0–0.5)
EOSINOPHIL NFR BLD AUTO: 2.9 % — SIGNIFICANT CHANGE UP (ref 0–6)
HCT VFR BLD CALC: 39.2 % — SIGNIFICANT CHANGE UP (ref 39–50)
HGB BLD-MCNC: 12.2 G/DL — LOW (ref 13–17)
LYMPHOCYTES # BLD AUTO: 2 K/UL — SIGNIFICANT CHANGE UP (ref 1–3.3)
LYMPHOCYTES # BLD AUTO: 24.3 % — SIGNIFICANT CHANGE UP (ref 13–44)
MCHC RBC-ENTMCNC: 24.3 PG — LOW (ref 27–34)
MCHC RBC-ENTMCNC: 31 G/DL — LOW (ref 32–36)
MCV RBC AUTO: 78.4 FL — LOW (ref 80–100)
MONOCYTES # BLD AUTO: 1.2 K/UL — HIGH (ref 0–0.9)
MONOCYTES NFR BLD AUTO: 14.2 % — HIGH (ref 2–14)
NEUTROPHILS # BLD AUTO: 4.8 K/UL — SIGNIFICANT CHANGE UP (ref 1.8–7.4)
NEUTROPHILS NFR BLD AUTO: 57.8 % — SIGNIFICANT CHANGE UP (ref 43–77)
PLATELET # BLD AUTO: 222 K/UL — SIGNIFICANT CHANGE UP (ref 150–400)
RBC # BLD: 5 M/UL — SIGNIFICANT CHANGE UP (ref 4.2–5.8)
RBC # FLD: 26.9 % — HIGH (ref 10.3–14.5)
WBC # BLD: 8.3 K/UL — SIGNIFICANT CHANGE UP (ref 3.8–10.5)
WBC # FLD AUTO: 8.3 K/UL — SIGNIFICANT CHANGE UP (ref 3.8–10.5)

## 2019-04-25 NOTE — HISTORY OF PRESENT ILLNESS
[de-identified] : The patient was diagnosed with adenocarcinoma of the cecum in December 2018 at the age of 53.  He has a history of irregular bowel movements with alternating diarrhea and constipation and recently saw blood on the toilet paper.  He saw GI, Dr. Thuan Cassidy, who performed a colonoscopy on 12/11/18.  Pathology c/w adenocarcinoma.  On 12/13/18 a CT A/P showed a soft tissue mass in the region of the ileocecal  junction measuring 3.7 x 3.5 cm.  There are  multiple peritoneal nodules in the abdomen and pelvis. For example, a  nodule in the pelvis measures 1.4 cm. A nodule in the left lower quadrant  measures 1.3 cm. There are omental nodules measuring up to 3.2 x 2.9 cm.  There are ileocolic lymph nodes measuring up to 1.3 cm.  1.1 cm lesion in the right lobe of the liver is suspicious for metastatic  disease.    Two splenic lesions are suspicious for metastatic disease.  He was referred to Colorectal surgery, Dr. Livia Talavera, who first ordered a CT Chest to complete staging.  It showed no suspicious pulmonary nodules.  Dr. Talavera recommended systemic chemotherapy given extent of disease.   [de-identified] : PMH of HTN, DM, HPL, Osteogenesis imperfecta, h/o Disruptive behavior, schizoaffective disorder [de-identified] : Patient presents for follow up.  S/p C1 Xeloda only. Ostomy skin barrier managing with a nurse.  S/p diverting loop ileostomy 2/8/19 for obstruction with Dr Giang. + Fatigue.  + Loose stool in ostomy, taking Imodium intermittently.  No BRBPR.  Pain resolved.  No fevers.  He has intermittent palpitations.  Denies any headaches, nausea or vomiting.  No other new complaints today. \par \par WIll be splitting his time between his parents' house and his residential living facility.

## 2019-04-25 NOTE — RESULTS/DATA
[FreeTextEntry1] : 1/19/19 Pathology:\par 1. Cecal mass, biopsy ([id number]):\par - At least intramucosal adenocarcinoma in background of high-grade dysplasia, see note.\par -Note: Dr. Goodwin concur(s) with diagnosis. The findings may not represent the entire mass lesion.\par \par 1/9/19 CT Chest:  No lung mass or consolidation. No suspicious pulmonary nodule. 3 mm  triangular density within the minor fissure most  likely benign fissural lymph node.\par \par 1/9/19 U/S Abdomen:  Mild fatty infiltration of the liver.    1.7 cm right lobe liver lesion is indeterminate but for which metastatic  disease is in the differential diagnosis.    Two solid splenic lesions measuring 3.1 cm and 1.8 cm.  The liver lesion and splenic lesions may be further evaluated with PET/CT  scan to evaluate for metastatic disease.  1.2 x 0.4 cm gallbladder polyp.\par \par 12/13/18 CT A/P:  There is a soft tissue mass in the region of the ileocecal  junction measuring 3.7 x 3.5 cm. There is no bowel obstruction. There are  multiple peritoneal nodules in the abdomen and pelvis. For example, a  nodule in the pelvis measures 1.4 cm. A nodule in the left lower quadrant  measures 1.3 cm. There are omental nodules measuring up to 3.2 x 2.9 cm.  There are ileocolic lymph nodes measuring up to 1.3 cm.  1.1 cm lesion in the right lobe of the liver is suspicious for metastatic  disease.    Two splenic lesions are suspicious for metastatic disease.\par \par 12/11/18 Pathology:  Cecal mass, biopsy:   Adenocarcinoma, at least intramucosal, superficial mucosal fragments.\par \par 12/11/18 Colonoscopy:  Cecal mass.  Hemorrhoids

## 2019-04-25 NOTE — ADDENDUM
[FreeTextEntry1] : I, Karrie Shea, acted solely as a scribe for Dr. Sarina Diaz on this date 3/28/19.

## 2019-04-29 ENCOUNTER — FORM ENCOUNTER (OUTPATIENT)
Age: 54
End: 2019-04-29

## 2019-04-30 ENCOUNTER — OUTPATIENT (OUTPATIENT)
Dept: OUTPATIENT SERVICES | Facility: HOSPITAL | Age: 54
LOS: 1 days | End: 2019-04-30
Payer: MEDICARE

## 2019-04-30 ENCOUNTER — APPOINTMENT (OUTPATIENT)
Dept: CT IMAGING | Facility: CLINIC | Age: 54
End: 2019-04-30
Payer: MEDICARE

## 2019-04-30 DIAGNOSIS — S82.899A OTHER FRACTURE OF UNSPECIFIED LOWER LEG, INITIAL ENCOUNTER FOR CLOSED FRACTURE: Chronic | ICD-10-CM

## 2019-04-30 DIAGNOSIS — C18.2 MALIGNANT NEOPLASM OF ASCENDING COLON: ICD-10-CM

## 2019-04-30 PROCEDURE — 74177 CT ABD & PELVIS W/CONTRAST: CPT | Mod: 26

## 2019-04-30 PROCEDURE — 82565 ASSAY OF CREATININE: CPT

## 2019-04-30 PROCEDURE — 71260 CT THORAX DX C+: CPT

## 2019-04-30 PROCEDURE — 71260 CT THORAX DX C+: CPT | Mod: 26

## 2019-04-30 PROCEDURE — 74177 CT ABD & PELVIS W/CONTRAST: CPT

## 2019-05-02 ENCOUNTER — OUTPATIENT (OUTPATIENT)
Dept: OUTPATIENT SERVICES | Facility: HOSPITAL | Age: 54
LOS: 1 days | Discharge: HOME | End: 2019-05-02

## 2019-05-02 DIAGNOSIS — S82.899A OTHER FRACTURE OF UNSPECIFIED LOWER LEG, INITIAL ENCOUNTER FOR CLOSED FRACTURE: Chronic | ICD-10-CM

## 2019-05-02 DIAGNOSIS — C18.9 MALIGNANT NEOPLASM OF COLON, UNSPECIFIED: ICD-10-CM

## 2019-05-06 ENCOUNTER — RESULT REVIEW (OUTPATIENT)
Age: 54
End: 2019-05-06

## 2019-05-06 ENCOUNTER — APPOINTMENT (OUTPATIENT)
Dept: HEMATOLOGY ONCOLOGY | Facility: CLINIC | Age: 54
End: 2019-05-06

## 2019-05-06 ENCOUNTER — APPOINTMENT (OUTPATIENT)
Age: 54
End: 2019-05-06
Payer: MEDICARE

## 2019-05-06 VITALS
OXYGEN SATURATION: 98 % | SYSTOLIC BLOOD PRESSURE: 112 MMHG | TEMPERATURE: 97.9 F | DIASTOLIC BLOOD PRESSURE: 73 MMHG | HEART RATE: 80 BPM | BODY MASS INDEX: 31.02 KG/M2 | HEIGHT: 60 IN | WEIGHT: 158 LBS

## 2019-05-06 LAB
BASOPHILS # BLD AUTO: 0.1 K/UL — SIGNIFICANT CHANGE UP (ref 0–0.2)
BASOPHILS NFR BLD AUTO: 0.7 % — SIGNIFICANT CHANGE UP (ref 0–2)
EOSINOPHIL # BLD AUTO: 0.2 K/UL — SIGNIFICANT CHANGE UP (ref 0–0.5)
EOSINOPHIL NFR BLD AUTO: 1.8 % — SIGNIFICANT CHANGE UP (ref 0–6)
HCT VFR BLD CALC: 39.6 % — SIGNIFICANT CHANGE UP (ref 39–50)
HGB BLD-MCNC: 12.4 G/DL — LOW (ref 13–17)
LYMPHOCYTES # BLD AUTO: 2.2 K/UL — SIGNIFICANT CHANGE UP (ref 1–3.3)
LYMPHOCYTES # BLD AUTO: 22.5 % — SIGNIFICANT CHANGE UP (ref 13–44)
MCHC RBC-ENTMCNC: 24.6 PG — LOW (ref 27–34)
MCHC RBC-ENTMCNC: 31.4 G/DL — LOW (ref 32–36)
MCV RBC AUTO: 78.5 FL — LOW (ref 80–100)
MONOCYTES # BLD AUTO: 1 K/UL — HIGH (ref 0–0.9)
MONOCYTES NFR BLD AUTO: 10.5 % — SIGNIFICANT CHANGE UP (ref 2–14)
NEUTROPHILS # BLD AUTO: 6.2 K/UL — SIGNIFICANT CHANGE UP (ref 1.8–7.4)
NEUTROPHILS NFR BLD AUTO: 64.4 % — SIGNIFICANT CHANGE UP (ref 43–77)
PLATELET # BLD AUTO: 186 K/UL — SIGNIFICANT CHANGE UP (ref 150–400)
RBC # BLD: 5.04 M/UL — SIGNIFICANT CHANGE UP (ref 4.2–5.8)
RBC # FLD: 26.1 % — HIGH (ref 10.3–14.5)
WBC # BLD: 9.7 K/UL — SIGNIFICANT CHANGE UP (ref 3.8–10.5)
WBC # FLD AUTO: 9.7 K/UL — SIGNIFICANT CHANGE UP (ref 3.8–10.5)

## 2019-05-06 PROCEDURE — 99214 OFFICE O/P EST MOD 30 MIN: CPT

## 2019-05-13 ENCOUNTER — LABORATORY RESULT (OUTPATIENT)
Age: 54
End: 2019-05-13

## 2019-05-13 ENCOUNTER — RESULT REVIEW (OUTPATIENT)
Age: 54
End: 2019-05-13

## 2019-05-13 ENCOUNTER — APPOINTMENT (OUTPATIENT)
Age: 54
End: 2019-05-13

## 2019-05-13 LAB
BASOPHILS # BLD AUTO: 0.1 K/UL — SIGNIFICANT CHANGE UP (ref 0–0.2)
BASOPHILS NFR BLD AUTO: 0.6 % — SIGNIFICANT CHANGE UP (ref 0–2)
EOSINOPHIL # BLD AUTO: 0.1 K/UL — SIGNIFICANT CHANGE UP (ref 0–0.5)
EOSINOPHIL NFR BLD AUTO: 1.6 % — SIGNIFICANT CHANGE UP (ref 0–6)
HCT VFR BLD CALC: 40.7 % — SIGNIFICANT CHANGE UP (ref 39–50)
HGB BLD-MCNC: 12.9 G/DL — LOW (ref 13–17)
LYMPHOCYTES # BLD AUTO: 2.3 K/UL — SIGNIFICANT CHANGE UP (ref 1–3.3)
LYMPHOCYTES # BLD AUTO: 25.2 % — SIGNIFICANT CHANGE UP (ref 13–44)
MCHC RBC-ENTMCNC: 25.5 PG — LOW (ref 27–34)
MCHC RBC-ENTMCNC: 31.7 G/DL — LOW (ref 32–36)
MCV RBC AUTO: 80.4 FL — SIGNIFICANT CHANGE UP (ref 80–100)
MONOCYTES # BLD AUTO: 1.1 K/UL — HIGH (ref 0–0.9)
MONOCYTES NFR BLD AUTO: 12.2 % — SIGNIFICANT CHANGE UP (ref 2–14)
NEUTROPHILS # BLD AUTO: 5.4 K/UL — SIGNIFICANT CHANGE UP (ref 1.8–7.4)
NEUTROPHILS NFR BLD AUTO: 60.4 % — SIGNIFICANT CHANGE UP (ref 43–77)
PLATELET # BLD AUTO: 195 K/UL — SIGNIFICANT CHANGE UP (ref 150–400)
RBC # BLD: 5.06 M/UL — SIGNIFICANT CHANGE UP (ref 4.2–5.8)
RBC # FLD: 26.2 % — HIGH (ref 10.3–14.5)
WBC # BLD: 8.9 K/UL — SIGNIFICANT CHANGE UP (ref 3.8–10.5)
WBC # FLD AUTO: 8.9 K/UL — SIGNIFICANT CHANGE UP (ref 3.8–10.5)

## 2019-05-14 DIAGNOSIS — R11.2 NAUSEA WITH VOMITING, UNSPECIFIED: ICD-10-CM

## 2019-05-14 DIAGNOSIS — Z51.11 ENCOUNTER FOR ANTINEOPLASTIC CHEMOTHERAPY: ICD-10-CM

## 2019-05-21 NOTE — ADDENDUM
[FreeTextEntry1] : I, Christ Lovelace, acted solely as a scribe for Dr. Sarina Diaz on this date 4/15/19.

## 2019-05-21 NOTE — RESULTS/DATA
[FreeTextEntry1] : 1/19/19 Pathology:\par 1. Cecal mass, biopsy ([id number]):\par - At least intramucosal adenocarcinoma in background of high-grade dysplasia, see note.\par -Note: Dr. Goodwin concur(s) with diagnosis. The findings may not represent the entire mass lesion.\par \par 1/9/19 CT Chest:  No lung mass or consolidation. No suspicious pulmonary nodule. 3 mm  triangular density within the minor fissure most  likely benign fissural lymph node.\par \par 1/9/19 U/S Abdomen:  Mild fatty infiltration of the liver.    1.7 cm right lobe liver lesion is indeterminate but for which metastatic  disease is in the differential diagnosis.    Two solid splenic lesions measuring 3.1 cm and 1.8 cm.  The liver lesion and splenic lesions may be further evaluated with PET/CT  scan to evaluate for metastatic disease.  1.2 x 0.4 cm gallbladder polyp.\par \par 12/13/18 CT A/P:  There is a soft tissue mass in the region of the ileocecal  junction measuring 3.7 x 3.5 cm. There is no bowel obstruction. There are  multiple peritoneal nodules in the abdomen and pelvis. For example, a  nodule in the pelvis measures 1.4 cm. A nodule in the left lower quadrant  measures 1.3 cm. There are omental nodules measuring up to 3.2 x 2.9 cm.  There are ileocolic lymph nodes measuring up to 1.3 cm.  1.1 cm lesion in the right lobe of the liver is suspicious for metastatic  disease.    Two splenic lesions are suspicious for metastatic disease.\par \par 12/11/18 Pathology:  Cecal mass, biopsy:   Adenocarcinoma, at least intramucosal, superficial mucosal fragments.\par \par 12/11/18 Colonoscopy:  Cecal mass.  Hemorrhoids

## 2019-05-21 NOTE — HISTORY OF PRESENT ILLNESS
[de-identified] : The patient was diagnosed with adenocarcinoma of the cecum in December 2018 at the age of 53.  He has a history of irregular bowel movements with alternating diarrhea and constipation and recently saw blood on the toilet paper.  He saw GI, Dr. Thuan Cassidy, who performed a colonoscopy on 12/11/18.  Pathology c/w adenocarcinoma.  On 12/13/18 a CT A/P showed a soft tissue mass in the region of the ileocecal  junction measuring 3.7 x 3.5 cm.  There are  multiple peritoneal nodules in the abdomen and pelvis. For example, a  nodule in the pelvis measures 1.4 cm. A nodule in the left lower quadrant  measures 1.3 cm. There are omental nodules measuring up to 3.2 x 2.9 cm.  There are ileocolic lymph nodes measuring up to 1.3 cm.  1.1 cm lesion in the right lobe of the liver is suspicious for metastatic  disease.    Two splenic lesions are suspicious for metastatic disease.  He was referred to Colorectal surgery, Dr. Livia Talavera, who first ordered a CT Chest to complete staging.  It showed no suspicious pulmonary nodules.  Dr. Talavera recommended systemic chemotherapy given extent of disease.   [de-identified] : PMH of HTN, DM, HPL, Osteogenesis imperfecta, h/o Disruptive behavior, schizoaffective disorder [de-identified] : Patient presents for follow up C2D15 XELOX.  Ostomy skin barrier managing with a nurse.  S/p diverting loop ileostomy 2/8/19 for obstruction with Dr Giang. + Fatigue.  + Loose stool in ostomy, taking Imodium intermittently.  No BRBPR.  No fevers.  He has intermittent palpitations.  Denies any headaches, nausea or vomiting.  No other new complaints today. \par \par WIll be splitting his time between his parents' house and his residential living facility.

## 2019-05-30 ENCOUNTER — LABORATORY RESULT (OUTPATIENT)
Age: 54
End: 2019-05-30

## 2019-05-30 ENCOUNTER — RESULT REVIEW (OUTPATIENT)
Age: 54
End: 2019-05-30

## 2019-05-30 ENCOUNTER — APPOINTMENT (OUTPATIENT)
Age: 54
End: 2019-05-30
Payer: MEDICARE

## 2019-05-30 VITALS
DIASTOLIC BLOOD PRESSURE: 69 MMHG | TEMPERATURE: 98 F | OXYGEN SATURATION: 98 % | SYSTOLIC BLOOD PRESSURE: 108 MMHG | BODY MASS INDEX: 31.41 KG/M2 | WEIGHT: 160 LBS | HEART RATE: 86 BPM | HEIGHT: 60 IN

## 2019-05-30 LAB
BASOPHILS # BLD AUTO: 0.1 K/UL — SIGNIFICANT CHANGE UP (ref 0–0.2)
BASOPHILS NFR BLD AUTO: 0.5 % — SIGNIFICANT CHANGE UP (ref 0–2)
EOSINOPHIL # BLD AUTO: 0.2 K/UL — SIGNIFICANT CHANGE UP (ref 0–0.5)
EOSINOPHIL NFR BLD AUTO: 1.3 % — SIGNIFICANT CHANGE UP (ref 0–6)
HCT VFR BLD CALC: 37.8 % — LOW (ref 39–50)
HGB BLD-MCNC: 12.7 G/DL — LOW (ref 13–17)
LYMPHOCYTES # BLD AUTO: 17.3 % — SIGNIFICANT CHANGE UP (ref 13–44)
LYMPHOCYTES # BLD AUTO: 2.6 K/UL — SIGNIFICANT CHANGE UP (ref 1–3.3)
MCHC RBC-ENTMCNC: 27.8 PG — SIGNIFICANT CHANGE UP (ref 27–34)
MCHC RBC-ENTMCNC: 33.6 G/DL — SIGNIFICANT CHANGE UP (ref 32–36)
MCV RBC AUTO: 82.6 FL — SIGNIFICANT CHANGE UP (ref 80–100)
MONOCYTES # BLD AUTO: 1.3 K/UL — HIGH (ref 0–0.9)
MONOCYTES NFR BLD AUTO: 8.6 % — SIGNIFICANT CHANGE UP (ref 2–14)
NEUTROPHILS # BLD AUTO: 10.9 K/UL — HIGH (ref 1.8–7.4)
NEUTROPHILS NFR BLD AUTO: 72.4 % — SIGNIFICANT CHANGE UP (ref 43–77)
PLATELET # BLD AUTO: 160 K/UL — SIGNIFICANT CHANGE UP (ref 150–400)
RBC # BLD: 4.58 M/UL — SIGNIFICANT CHANGE UP (ref 4.2–5.8)
RBC # FLD: 25.9 % — HIGH (ref 10.3–14.5)
WBC # BLD: 15 K/UL — HIGH (ref 3.8–10.5)
WBC # FLD AUTO: 15 K/UL — HIGH (ref 3.8–10.5)

## 2019-05-30 PROCEDURE — 99214 OFFICE O/P EST MOD 30 MIN: CPT

## 2019-05-31 ENCOUNTER — OUTPATIENT (OUTPATIENT)
Dept: OUTPATIENT SERVICES | Facility: HOSPITAL | Age: 54
LOS: 1 days | Discharge: HOME | End: 2019-05-31

## 2019-05-31 DIAGNOSIS — C18.9 MALIGNANT NEOPLASM OF COLON, UNSPECIFIED: ICD-10-CM

## 2019-05-31 DIAGNOSIS — S82.899A OTHER FRACTURE OF UNSPECIFIED LOWER LEG, INITIAL ENCOUNTER FOR CLOSED FRACTURE: Chronic | ICD-10-CM

## 2019-05-31 LAB
ALBUMIN SERPL ELPH-MCNC: 4.7 G/DL
ALP BLD-CCNC: 46 U/L
ALT SERPL-CCNC: 30 U/L
ANION GAP SERPL CALC-SCNC: 12 MMOL/L
AST SERPL-CCNC: 25 U/L
BILIRUB SERPL-MCNC: 0.5 MG/DL
BUN SERPL-MCNC: 17 MG/DL
CALCIUM SERPL-MCNC: 9.7 MG/DL
CHLORIDE SERPL-SCNC: 101 MMOL/L
CO2 SERPL-SCNC: 25 MMOL/L
CREAT SERPL-MCNC: 0.78 MG/DL
GLUCOSE SERPL-MCNC: 105 MG/DL
MAGNESIUM SERPL-MCNC: 1.9 MG/DL
POTASSIUM SERPL-SCNC: 4 MMOL/L
PROT SERPL-MCNC: 7.1 G/DL
SODIUM SERPL-SCNC: 138 MMOL/L

## 2019-06-03 ENCOUNTER — LABORATORY RESULT (OUTPATIENT)
Age: 54
End: 2019-06-03

## 2019-06-03 ENCOUNTER — RESULT REVIEW (OUTPATIENT)
Age: 54
End: 2019-06-03

## 2019-06-03 ENCOUNTER — APPOINTMENT (OUTPATIENT)
Age: 54
End: 2019-06-03

## 2019-06-03 LAB
BASOPHILS # BLD AUTO: 0.1 K/UL — SIGNIFICANT CHANGE UP (ref 0–0.2)
BASOPHILS NFR BLD AUTO: 0.8 % — SIGNIFICANT CHANGE UP (ref 0–2)
EOSINOPHIL # BLD AUTO: 0.2 K/UL — SIGNIFICANT CHANGE UP (ref 0–0.5)
EOSINOPHIL NFR BLD AUTO: 2 % — SIGNIFICANT CHANGE UP (ref 0–6)
HCT VFR BLD CALC: 41.5 % — SIGNIFICANT CHANGE UP (ref 39–50)
HGB BLD-MCNC: 13.7 G/DL — SIGNIFICANT CHANGE UP (ref 13–17)
LYMPHOCYTES # BLD AUTO: 2.1 K/UL — SIGNIFICANT CHANGE UP (ref 1–3.3)
LYMPHOCYTES # BLD AUTO: 23.9 % — SIGNIFICANT CHANGE UP (ref 13–44)
MCHC RBC-ENTMCNC: 27.5 PG — SIGNIFICANT CHANGE UP (ref 27–34)
MCHC RBC-ENTMCNC: 33.1 G/DL — SIGNIFICANT CHANGE UP (ref 32–36)
MCV RBC AUTO: 83.2 FL — SIGNIFICANT CHANGE UP (ref 80–100)
MONOCYTES # BLD AUTO: 1.3 K/UL — HIGH (ref 0–0.9)
MONOCYTES NFR BLD AUTO: 15.3 % — HIGH (ref 2–14)
NEUTROPHILS # BLD AUTO: 5.1 K/UL — SIGNIFICANT CHANGE UP (ref 1.8–7.4)
NEUTROPHILS NFR BLD AUTO: 58.1 % — SIGNIFICANT CHANGE UP (ref 43–77)
PLATELET # BLD AUTO: 185 K/UL — SIGNIFICANT CHANGE UP (ref 150–400)
RBC # BLD: 4.98 M/UL — SIGNIFICANT CHANGE UP (ref 4.2–5.8)
RBC # FLD: 25.1 % — HIGH (ref 10.3–14.5)
WBC # BLD: 8.8 K/UL — SIGNIFICANT CHANGE UP (ref 3.8–10.5)
WBC # FLD AUTO: 8.8 K/UL — SIGNIFICANT CHANGE UP (ref 3.8–10.5)

## 2019-06-04 ENCOUNTER — APPOINTMENT (OUTPATIENT)
Dept: ENDOCRINOLOGY | Facility: CLINIC | Age: 54
End: 2019-06-04

## 2019-06-04 DIAGNOSIS — R11.2 NAUSEA WITH VOMITING, UNSPECIFIED: ICD-10-CM

## 2019-06-04 DIAGNOSIS — Z51.11 ENCOUNTER FOR ANTINEOPLASTIC CHEMOTHERAPY: ICD-10-CM

## 2019-06-17 ENCOUNTER — OTHER (OUTPATIENT)
Age: 54
End: 2019-06-17

## 2019-06-17 ENCOUNTER — APPOINTMENT (OUTPATIENT)
Dept: HEMATOLOGY ONCOLOGY | Facility: CLINIC | Age: 54
End: 2019-06-17

## 2019-06-18 ENCOUNTER — RESULT REVIEW (OUTPATIENT)
Age: 54
End: 2019-06-18

## 2019-06-18 ENCOUNTER — APPOINTMENT (OUTPATIENT)
Dept: HEMATOLOGY ONCOLOGY | Facility: CLINIC | Age: 54
End: 2019-06-18
Payer: MEDICARE

## 2019-06-18 ENCOUNTER — APPOINTMENT (OUTPATIENT)
Dept: SURGICAL ONCOLOGY | Facility: CLINIC | Age: 54
End: 2019-06-18
Payer: MEDICARE

## 2019-06-18 VITALS
OXYGEN SATURATION: 97 % | TEMPERATURE: 97.8 F | SYSTOLIC BLOOD PRESSURE: 122 MMHG | HEART RATE: 87 BPM | HEIGHT: 60 IN | DIASTOLIC BLOOD PRESSURE: 80 MMHG | BODY MASS INDEX: 31.61 KG/M2 | WEIGHT: 161 LBS

## 2019-06-18 LAB
BASOPHILS # BLD AUTO: 0.1 K/UL — SIGNIFICANT CHANGE UP (ref 0–0.2)
BASOPHILS NFR BLD AUTO: 0.6 % — SIGNIFICANT CHANGE UP (ref 0–2)
EOSINOPHIL # BLD AUTO: 0.2 K/UL — SIGNIFICANT CHANGE UP (ref 0–0.5)
EOSINOPHIL NFR BLD AUTO: 1.5 % — SIGNIFICANT CHANGE UP (ref 0–6)
HCT VFR BLD CALC: 39.6 % — SIGNIFICANT CHANGE UP (ref 39–50)
HGB BLD-MCNC: 13.2 G/DL — SIGNIFICANT CHANGE UP (ref 13–17)
LYMPHOCYTES # BLD AUTO: 1.9 K/UL — SIGNIFICANT CHANGE UP (ref 1–3.3)
LYMPHOCYTES # BLD AUTO: 18.6 % — SIGNIFICANT CHANGE UP (ref 13–44)
MCHC RBC-ENTMCNC: 28.4 PG — SIGNIFICANT CHANGE UP (ref 27–34)
MCHC RBC-ENTMCNC: 33.4 G/DL — SIGNIFICANT CHANGE UP (ref 32–36)
MCV RBC AUTO: 85 FL — SIGNIFICANT CHANGE UP (ref 80–100)
MONOCYTES # BLD AUTO: 1.2 K/UL — HIGH (ref 0–0.9)
MONOCYTES NFR BLD AUTO: 11.3 % — SIGNIFICANT CHANGE UP (ref 2–14)
NEUTROPHILS # BLD AUTO: 7 K/UL — SIGNIFICANT CHANGE UP (ref 1.8–7.4)
NEUTROPHILS NFR BLD AUTO: 68 % — SIGNIFICANT CHANGE UP (ref 43–77)
PLATELET # BLD AUTO: 169 K/UL — SIGNIFICANT CHANGE UP (ref 150–400)
RBC # BLD: 4.66 M/UL — SIGNIFICANT CHANGE UP (ref 4.2–5.8)
RBC # FLD: 23.7 % — HIGH (ref 10.3–14.5)
WBC # BLD: 10.2 K/UL — SIGNIFICANT CHANGE UP (ref 3.8–10.5)
WBC # FLD AUTO: 10.2 K/UL — SIGNIFICANT CHANGE UP (ref 3.8–10.5)

## 2019-06-18 PROCEDURE — 99215 OFFICE O/P EST HI 40 MIN: CPT

## 2019-06-18 PROCEDURE — 99214 OFFICE O/P EST MOD 30 MIN: CPT

## 2019-06-18 NOTE — REVIEW OF SYSTEMS
[Fatigue] : fatigue [Palpitations] : palpitations [Shortness Of Breath] : shortness of breath [Anxiety] : anxiety [Constipation] : constipation [Negative] : Heme/Lymph

## 2019-06-19 LAB
ALBUMIN SERPL ELPH-MCNC: 4.7 G/DL
ALP BLD-CCNC: 49 U/L
ALT SERPL-CCNC: 34 U/L
ANION GAP SERPL CALC-SCNC: 11 MMOL/L
AST SERPL-CCNC: 27 U/L
BILIRUB SERPL-MCNC: 0.6 MG/DL
BUN SERPL-MCNC: 19 MG/DL
CALCIUM SERPL-MCNC: 9.8 MG/DL
CHLORIDE SERPL-SCNC: 98 MMOL/L
CO2 SERPL-SCNC: 27 MMOL/L
CREAT SERPL-MCNC: 0.81 MG/DL
GLUCOSE SERPL-MCNC: 127 MG/DL
MAGNESIUM SERPL-MCNC: 1.9 MG/DL
POTASSIUM SERPL-SCNC: 3.7 MMOL/L
PROT SERPL-MCNC: 7.4 G/DL
SODIUM SERPL-SCNC: 136 MMOL/L

## 2019-06-19 NOTE — PHYSICAL EXAM
[Normal] : supple, no neck mass and thyroid not enlarged [Normal Neck Lymph Nodes] : normal neck lymph nodes  [Normal Supraclavicular Lymph Nodes] : normal supraclavicular lymph nodes [Normal Groin Lymph Nodes] : normal groin lymph nodes [Normal Axillary Lymph Nodes] : normal axillary lymph nodes [Normal] : oriented to person, place and time, with appropriate affect [de-identified] : abdomen soft, non-distended, non-tender. wound healing appropriately. Ostomy, pink, functional with liquid stool. Mild prolapse without evidence of congestion or ischemia.

## 2019-06-19 NOTE — HISTORY OF PRESENT ILLNESS
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 He was also known at that time to have disease c/w peritoneal carcinomatosis. Since then he has been under the care of Dr. Sarina Diaz who has been administering XELOX with good patient tolerance and good results on his CT from 4/2019 demonstrating tumor abatement. He has learned to manage his ileostomy well and has become proficient in ostomy care and diet management.

## 2019-06-19 NOTE — ASSESSMENT
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 Responding well to XELOX with decreasing CEA levels and improvement on CT. I counseled the patient and his mother that at this point he would be a good candidate for resection of his primary tumor and ostomy reversal, as well as surgical debulking +/- HIPEC. Recommend repeat CT scan after another 2 cycles of chemotherapy and if good results continue with can further discuss operative intervention. \par \par Regarding his ostomy output, I do not believe he has high output, but rather because of the patient's obsession/compulsive behavior he is changing the bag far more frequently than needed. His good hydration status, normal electrolytes and albumin level all suggest normal ostomy output. I advised him to continue his current regimen.\par \par I have asked Dr. Diaz's team to refer the patient back to me in early July after a few my cycles of XELOX and additional CT scan have been completed.

## 2019-06-24 ENCOUNTER — APPOINTMENT (OUTPATIENT)
Age: 54
End: 2019-06-24

## 2019-06-24 ENCOUNTER — LABORATORY RESULT (OUTPATIENT)
Age: 54
End: 2019-06-24

## 2019-06-24 ENCOUNTER — RESULT REVIEW (OUTPATIENT)
Age: 54
End: 2019-06-24

## 2019-06-24 LAB
BASOPHILS # BLD AUTO: 0.1 K/UL — SIGNIFICANT CHANGE UP (ref 0–0.2)
BASOPHILS NFR BLD AUTO: 1 % — SIGNIFICANT CHANGE UP (ref 0–2)
EOSINOPHIL # BLD AUTO: 0.2 K/UL — SIGNIFICANT CHANGE UP (ref 0–0.5)
EOSINOPHIL NFR BLD AUTO: 1.9 % — SIGNIFICANT CHANGE UP (ref 0–6)
HCT VFR BLD CALC: 39.4 % — SIGNIFICANT CHANGE UP (ref 39–50)
HGB BLD-MCNC: 13.7 G/DL — SIGNIFICANT CHANGE UP (ref 13–17)
LYMPHOCYTES # BLD AUTO: 2.2 K/UL — SIGNIFICANT CHANGE UP (ref 1–3.3)
LYMPHOCYTES # BLD AUTO: 26.3 % — SIGNIFICANT CHANGE UP (ref 13–44)
MCHC RBC-ENTMCNC: 29.9 PG — SIGNIFICANT CHANGE UP (ref 27–34)
MCHC RBC-ENTMCNC: 34.8 G/DL — SIGNIFICANT CHANGE UP (ref 32–36)
MCV RBC AUTO: 86 FL — SIGNIFICANT CHANGE UP (ref 80–100)
MONOCYTES # BLD AUTO: 1.1 K/UL — HIGH (ref 0–0.9)
MONOCYTES NFR BLD AUTO: 13.7 % — SIGNIFICANT CHANGE UP (ref 2–14)
NEUTROPHILS # BLD AUTO: 4.7 K/UL — SIGNIFICANT CHANGE UP (ref 1.8–7.4)
NEUTROPHILS NFR BLD AUTO: 57.1 % — SIGNIFICANT CHANGE UP (ref 43–77)
PLATELET # BLD AUTO: 183 K/UL — SIGNIFICANT CHANGE UP (ref 150–400)
RBC # BLD: 4.58 M/UL — SIGNIFICANT CHANGE UP (ref 4.2–5.8)
RBC # FLD: 23.6 % — HIGH (ref 10.3–14.5)
WBC # BLD: 8.3 K/UL — SIGNIFICANT CHANGE UP (ref 3.8–10.5)
WBC # FLD AUTO: 8.3 K/UL — SIGNIFICANT CHANGE UP (ref 3.8–10.5)

## 2019-06-30 ENCOUNTER — FORM ENCOUNTER (OUTPATIENT)
Age: 54
End: 2019-06-30

## 2019-07-01 ENCOUNTER — OUTPATIENT (OUTPATIENT)
Dept: OUTPATIENT SERVICES | Facility: HOSPITAL | Age: 54
LOS: 1 days | End: 2019-07-01
Payer: MEDICARE

## 2019-07-01 ENCOUNTER — APPOINTMENT (OUTPATIENT)
Dept: CT IMAGING | Facility: CLINIC | Age: 54
End: 2019-07-01
Payer: MEDICARE

## 2019-07-01 DIAGNOSIS — S82.899A OTHER FRACTURE OF UNSPECIFIED LOWER LEG, INITIAL ENCOUNTER FOR CLOSED FRACTURE: Chronic | ICD-10-CM

## 2019-07-01 DIAGNOSIS — C18.2 MALIGNANT NEOPLASM OF ASCENDING COLON: ICD-10-CM

## 2019-07-01 PROCEDURE — 71260 CT THORAX DX C+: CPT

## 2019-07-01 PROCEDURE — 74177 CT ABD & PELVIS W/CONTRAST: CPT | Mod: 26

## 2019-07-01 PROCEDURE — 71260 CT THORAX DX C+: CPT | Mod: 26

## 2019-07-01 PROCEDURE — 74177 CT ABD & PELVIS W/CONTRAST: CPT

## 2019-07-01 NOTE — ED PROVIDER NOTE - CARE PLAN
Principal Discharge DX:	Cecal cancer  Secondary Diagnosis:	High cholesterol  Secondary Diagnosis:	Hypothyroidism  Secondary Diagnosis:	Diabetes  Secondary Diagnosis:	Bowel obstruction no

## 2019-07-03 ENCOUNTER — OUTPATIENT (OUTPATIENT)
Dept: OUTPATIENT SERVICES | Facility: HOSPITAL | Age: 54
LOS: 1 days | Discharge: HOME | End: 2019-07-03

## 2019-07-03 DIAGNOSIS — C18.9 MALIGNANT NEOPLASM OF COLON, UNSPECIFIED: ICD-10-CM

## 2019-07-03 DIAGNOSIS — S82.899A OTHER FRACTURE OF UNSPECIFIED LOWER LEG, INITIAL ENCOUNTER FOR CLOSED FRACTURE: Chronic | ICD-10-CM

## 2019-07-05 NOTE — HISTORY OF PRESENT ILLNESS
[de-identified] : The patient was diagnosed with adenocarcinoma of the cecum in December 2018 at the age of 53.  He has a history of irregular bowel movements with alternating diarrhea and constipation and recently saw blood on the toilet paper.  He saw GI, Dr. Thuan Cassidy, who performed a colonoscopy on 12/11/18.  Pathology c/w adenocarcinoma.  On 12/13/18 a CT A/P showed a soft tissue mass in the region of the ileocecal  junction measuring 3.7 x 3.5 cm.  There are  multiple peritoneal nodules in the abdomen and pelvis. For example, a  nodule in the pelvis measures 1.4 cm. A nodule in the left lower quadrant  measures 1.3 cm. There are omental nodules measuring up to 3.2 x 2.9 cm.  There are ileocolic lymph nodes measuring up to 1.3 cm.  1.1 cm lesion in the right lobe of the liver is suspicious for metastatic  disease.    Two splenic lesions are suspicious for metastatic disease.  He was referred to Colorectal surgery, Dr. Livia Talavera, who first ordered a CT Chest to complete staging.  It showed no suspicious pulmonary nodules.  Dr. Talavera recommended systemic chemotherapy given extent of disease.   [de-identified] : Patient presents for follow up C4D18 XELOX (3 doses of Oxaliplatin, started xeloda before oxali). He is s/p emergent diverting loop ileostomy on 2/8/2019 with Dr Giang. + Ileostomy active, empties 10-12x/day, always loose stool, using imodium with mild benefit. + Mucositis. + Fatigue, worse week one, improved week 2-3. + Mild abdominal discomfort, improved, less frequent. + Xerostomia. + Grade 1 H/F syndrome, xeroderma only. + Cold intolerance, lasts 10-12 days after oxaliplatin. + N/V x 2-3 episodes.  No fevers.  No headaches.  Skin around ostomy with only mild erythema.  No neuropathy. \par \par WIll be splitting his time between his parents' house and his residential living facility.\par  [de-identified] : PMH of HTN, DM, HPL, Osteogenesis imperfecta, h/o Disruptive behavior, schizoaffective disorder

## 2019-07-05 NOTE — RESULTS/DATA
[FreeTextEntry1] : 4/30/19 CT CAP:\par Interval loop ileostomy.  The known ileocecal mass is not well visualized on this examination.  Decrease in size of previously identified peritoneal nodules. However, \par there are a few newly noted peritoneal nodules.  Stable splenic lesions.  Stable small bilateral lung nodules.\par \par 1/19/19 Pathology:\par 1. Cecal mass, biopsy ([id number]):\par - At least intramucosal adenocarcinoma in background of high-grade dysplasia, see note.\par -Note: Dr. Goodwin concur(s) with diagnosis. The findings may not represent the entire mass lesion.\par \par 1/9/19 CT Chest:  No lung mass or consolidation. No suspicious pulmonary nodule. 3 mm  triangular density within the minor fissure most  likely benign fissural lymph node.\par \par 1/9/19 U/S Abdomen:  Mild fatty infiltration of the liver.    1.7 cm right lobe liver lesion is indeterminate but for which metastatic  disease is in the differential diagnosis.    Two solid splenic lesions measuring 3.1 cm and 1.8 cm.  The liver lesion and splenic lesions may be further evaluated with PET/CT  scan to evaluate for metastatic disease.  1.2 x 0.4 cm gallbladder polyp.\par \par 12/13/18 CT A/P:  There is a soft tissue mass in the region of the ileocecal  junction measuring 3.7 x 3.5 cm. There is no bowel obstruction. There are  multiple peritoneal nodules in the abdomen and pelvis. For example, a  nodule in the pelvis measures 1.4 cm. A nodule in the left lower quadrant  measures 1.3 cm. There are omental nodules measuring up to 3.2 x 2.9 cm.  There are ileocolic lymph nodes measuring up to 1.3 cm.  1.1 cm lesion in the right lobe of the liver is suspicious for metastatic  disease.    Two splenic lesions are suspicious for metastatic disease.\par \par 12/11/18 Pathology:  Cecal mass, biopsy:   Adenocarcinoma, at least intramucosal, superficial mucosal fragments.\par \par 12/11/18 Colonoscopy:  Cecal mass.  Hemorrhoids

## 2019-07-05 NOTE — RESULTS/DATA
[FreeTextEntry1] : 4/30/19 CT CAP:\par Interval loop ileostomy.  The known ileocecal mass is not well visualized on this examination.  Decrease in size of previously identified peritoneal nodules. However, \par there are a few newly noted peritoneal nodules.  Stable splenic lesions.  Stable small bilateral lung nodules.\par \par 1/19/19 Pathology:\par 1. Cecal mass, biopsy ([id number]):\par - At least intramucosal adenocarcinoma in background of high-grade dysplasia, see note.\par -Note: Dr. Goodwin concur(s) with diagnosis. The findings may not represent the entire mass lesion.\par \par 1/9/19 CT Chest:  No lung mass or consolidation. No suspicious pulmonary nodule. 3 mm  triangular density within the minor fissure most  likely benign fissural lymph node.\par \par 1/9/19 U/S Abdomen:  Mild fatty infiltration of the liver.    1.7 cm right lobe liver lesion is indeterminate but for which metastatic  disease is in the differential diagnosis.    Two solid splenic lesions measuring 3.1 cm and 1.8 cm.  The liver lesion and splenic lesions may be further evaluated with PET/CT  scan to evaluate for metastatic disease.  1.2 x 0.4 cm gallbladder polyp.\par \par 12/13/18 CT A/P:  There is a soft tissue mass in the region of the ileocecal  junction measuring 3.7 x 3.5 cm. There is no bowel obstruction. There are  multiple peritoneal nodules in the abdomen and pelvis. For example, a  nodule in the pelvis measures 1.4 cm. A nodule in the left lower quadrant  measures 1.3 cm. There are omental nodules measuring up to 3.2 x 2.9 cm.  There are ileocolic lymph nodes measuring up to 1.3 cm.  1.1 cm lesion in the right lobe of the liver is suspicious for metastatic  disease.    Two splenic lesions are suspicious for metastatic disease.\par \par 12/11/18 Pathology:  Cecal mass, biopsy:   Adenocarcinoma, at least intramucosal, superficial mucosal fragments.\par \par 12/11/18 Colonoscopy:  Cecal mass.  Hemorrhoids

## 2019-07-05 NOTE — HISTORY OF PRESENT ILLNESS
[de-identified] : The patient was diagnosed with adenocarcinoma of the cecum in December 2018 at the age of 53.  He has a history of irregular bowel movements with alternating diarrhea and constipation and recently saw blood on the toilet paper.  He saw GI, Dr. Thuan Cassidy, who performed a colonoscopy on 12/11/18.  Pathology c/w adenocarcinoma.  On 12/13/18 a CT A/P showed a soft tissue mass in the region of the ileocecal  junction measuring 3.7 x 3.5 cm.  There are  multiple peritoneal nodules in the abdomen and pelvis. For example, a  nodule in the pelvis measures 1.4 cm. A nodule in the left lower quadrant  measures 1.3 cm. There are omental nodules measuring up to 3.2 x 2.9 cm.  There are ileocolic lymph nodes measuring up to 1.3 cm.  1.1 cm lesion in the right lobe of the liver is suspicious for metastatic  disease.    Two splenic lesions are suspicious for metastatic disease.  He was referred to Colorectal surgery, Dr. Livia Talavera, who first ordered a CT Chest to complete staging.  It showed no suspicious pulmonary nodules.  Dr. Talavera recommended systemic chemotherapy given extent of disease.   [de-identified] : PMH of HTN, DM, HPL, Osteogenesis imperfecta, h/o Disruptive behavior, schizoaffective disorder [de-identified] : Patient presents for follow up C3D15 XELOX (2 doses of Oxaliplatin, started xeloda before oxali). He is s/p emergent diverting loop ileostomy on 2/8/2019. + Ileostomy active, empties 10-15x/day, usually loose, using imodium. No BRBPR.  + Fatigue, slightly improved. + Mild abdominal discomfort.  + Grade 1 H/F syndrome, xeroderma only.  + Cold intolerance, lasts 7-10 days after oxaliplatin  No fevers.  He has intermittent palpitations. No mouth sores. Denies any headaches, nausea or vomiting.  Skin around ostomy much improved \par \par WIll be splitting his time between his parents' house and his residential living facility.\par S/p diverting loop ileostomy 2/8/19 for obstruction with Dr Giang.

## 2019-07-05 NOTE — HISTORY OF PRESENT ILLNESS
[de-identified] : The patient was diagnosed with adenocarcinoma of the cecum in December 2018 at the age of 53.  He has a history of irregular bowel movements with alternating diarrhea and constipation and recently saw blood on the toilet paper.  He saw GI, Dr. Thuan Cassidy, who performed a colonoscopy on 12/11/18.  Pathology c/w adenocarcinoma.  On 12/13/18 a CT A/P showed a soft tissue mass in the region of the ileocecal  junction measuring 3.7 x 3.5 cm.  There are  multiple peritoneal nodules in the abdomen and pelvis. For example, a  nodule in the pelvis measures 1.4 cm. A nodule in the left lower quadrant  measures 1.3 cm. There are omental nodules measuring up to 3.2 x 2.9 cm.  There are ileocolic lymph nodes measuring up to 1.3 cm.  1.1 cm lesion in the right lobe of the liver is suspicious for metastatic  disease.    Two splenic lesions are suspicious for metastatic disease.  He was referred to Colorectal surgery, Dr. Livia Talavera, who first ordered a CT Chest to complete staging.  It showed no suspicious pulmonary nodules.  Dr. Talavera recommended systemic chemotherapy given extent of disease.   [de-identified] : PMH of HTN, DM, HPL, Osteogenesis imperfecta, h/o Disruptive behavior, schizoaffective disorder [de-identified] : Patient presents for follow up on C5D16 XELOX (4 doses of Oxaliplatin, started xeloda before oxali). He is s/p emergent diverting loop ileostomy on 2/8/2019 with Dr Giang.  + Fatigue, still worse week one and less severe weeks 2-3. + Ileostomy active, slightly improved, now empties 8-10x/day, always loose stool, using imodium with mild benefit. + Mild abdominal discomfort, very intermittent and resolves quickly. + Xerostomia, very intermittent. + Grade 1 H/F syndrome, xeroderma and mild erythema only. + Cold intolerance, lasts 7-10 days after oxaliplatin. + N/V x 2-3 episodes.  No fevers.  No headaches.  Skin around ostomy with occasional only mild erythema.  No neuropathy. No mucositis. \par \par \par

## 2019-07-08 ENCOUNTER — RESULT REVIEW (OUTPATIENT)
Age: 54
End: 2019-07-08

## 2019-07-08 ENCOUNTER — APPOINTMENT (OUTPATIENT)
Dept: HEMATOLOGY ONCOLOGY | Facility: CLINIC | Age: 54
End: 2019-07-08
Payer: MEDICARE

## 2019-07-08 VITALS
BODY MASS INDEX: 31.62 KG/M2 | DIASTOLIC BLOOD PRESSURE: 67 MMHG | OXYGEN SATURATION: 94 % | HEART RATE: 87 BPM | SYSTOLIC BLOOD PRESSURE: 108 MMHG | TEMPERATURE: 98.4 F | WEIGHT: 161.06 LBS | HEIGHT: 60 IN

## 2019-07-08 LAB
BASOPHILS # BLD AUTO: 0.1 K/UL — SIGNIFICANT CHANGE UP (ref 0–0.2)
BASOPHILS NFR BLD AUTO: 0.5 % — SIGNIFICANT CHANGE UP (ref 0–2)
EOSINOPHIL # BLD AUTO: 0.2 K/UL — SIGNIFICANT CHANGE UP (ref 0–0.5)
EOSINOPHIL NFR BLD AUTO: 1.4 % — SIGNIFICANT CHANGE UP (ref 0–6)
HCT VFR BLD CALC: 39 % — SIGNIFICANT CHANGE UP (ref 39–50)
HGB BLD-MCNC: 13.4 G/DL — SIGNIFICANT CHANGE UP (ref 13–17)
LYMPHOCYTES # BLD AUTO: 2.6 K/UL — SIGNIFICANT CHANGE UP (ref 1–3.3)
LYMPHOCYTES # BLD AUTO: 23.7 % — SIGNIFICANT CHANGE UP (ref 13–44)
MCHC RBC-ENTMCNC: 30.7 PG — SIGNIFICANT CHANGE UP (ref 27–34)
MCHC RBC-ENTMCNC: 34.3 G/DL — SIGNIFICANT CHANGE UP (ref 32–36)
MCV RBC AUTO: 89.6 FL — SIGNIFICANT CHANGE UP (ref 80–100)
MONOCYTES # BLD AUTO: 1.3 K/UL — HIGH (ref 0–0.9)
MONOCYTES NFR BLD AUTO: 12 % — SIGNIFICANT CHANGE UP (ref 2–14)
NEUTROPHILS # BLD AUTO: 6.9 K/UL — SIGNIFICANT CHANGE UP (ref 1.8–7.4)
NEUTROPHILS NFR BLD AUTO: 62.4 % — SIGNIFICANT CHANGE UP (ref 43–77)
PLATELET # BLD AUTO: 142 K/UL — LOW (ref 150–400)
RBC # BLD: 4.36 M/UL — SIGNIFICANT CHANGE UP (ref 4.2–5.8)
RBC # FLD: 21.6 % — HIGH (ref 10.3–14.5)
WBC # BLD: 11.1 K/UL — HIGH (ref 3.8–10.5)
WBC # FLD AUTO: 11.1 K/UL — HIGH (ref 3.8–10.5)

## 2019-07-08 PROCEDURE — 99214 OFFICE O/P EST MOD 30 MIN: CPT

## 2019-07-09 LAB
ALBUMIN SERPL ELPH-MCNC: 4.6 G/DL
ALP BLD-CCNC: 48 U/L
ALT SERPL-CCNC: 41 U/L
ANION GAP SERPL CALC-SCNC: 13 MMOL/L
AST SERPL-CCNC: 34 U/L
BILIRUB SERPL-MCNC: 0.7 MG/DL
BUN SERPL-MCNC: 19 MG/DL
CALCIUM SERPL-MCNC: 10 MG/DL
CEA SERPL-MCNC: 3.9 NG/ML
CHLORIDE SERPL-SCNC: 100 MMOL/L
CO2 SERPL-SCNC: 26 MMOL/L
CREAT SERPL-MCNC: 0.85 MG/DL
GLUCOSE SERPL-MCNC: 108 MG/DL
MAGNESIUM SERPL-MCNC: 1.9 MG/DL
POTASSIUM SERPL-SCNC: 4.3 MMOL/L
PROT SERPL-MCNC: 7.4 G/DL
SODIUM SERPL-SCNC: 139 MMOL/L

## 2019-07-11 NOTE — HISTORY OF PRESENT ILLNESS
[de-identified] : The patient was diagnosed with adenocarcinoma of the cecum in December 2018 at the age of 53.  He has a history of irregular bowel movements with alternating diarrhea and constipation and recently saw blood on the toilet paper.  He saw GI, Dr. Thuan Cassidy, who performed a colonoscopy on 12/11/18.  Pathology c/w adenocarcinoma.  On 12/13/18 a CT A/P showed a soft tissue mass in the region of the ileocecal  junction measuring 3.7 x 3.5 cm.  There are  multiple peritoneal nodules in the abdomen and pelvis. For example, a  nodule in the pelvis measures 1.4 cm. A nodule in the left lower quadrant  measures 1.3 cm. There are omental nodules measuring up to 3.2 x 2.9 cm.  There are ileocolic lymph nodes measuring up to 1.3 cm.  1.1 cm lesion in the right lobe of the liver is suspicious for metastatic  disease.    Two splenic lesions are suspicious for metastatic disease.  He was referred to Colorectal surgery, Dr. Livia Talavera, who first ordered a CT Chest to complete staging.  It showed no suspicious pulmonary nodules.  Dr. Talavera recommended systemic chemotherapy given extent of disease.   [de-identified] : PMH of HTN, DM, HPL, Osteogenesis imperfecta, h/o Disruptive behavior, schizoaffective disorder [de-identified] : Patient presents for follow up on C6D15 XELOX (4 doses of Oxaliplatin, started xeloda before oxali). He is s/p emergent diverting loop ileostomy on 2/8/2019 with Dr Giang.  + Fatigue unchanged. + Ileostomy active.  No fevers. No headaches. No abdominal pain. No N/V/D.  No neuropathy. No mucositis. H/F syndrome resolved. +intermittent cough. sinus pressure, eye pain when sneezing or coughing D2 of cycle. \par Patient and mother have expressed interest in further surgical intervention and reversal of ileostomy. Scheduled to follow up with Dr. Giang 7/23/19. \par \par

## 2019-07-11 NOTE — RESULTS/DATA
[FreeTextEntry1] : 7/1/19 CT C/A/P: Two new right lung nodules. Previously identified lung nodules stable. Increase in size of 1.0 cm lymph node in the cardiophrenic region. The known ileocecal  mass is not well visualized on this examination. Decrease in size of peritoneal nodules. Stable splenic lesions. Decrease in size of ileocolic lymph nodes. \par \par 4/30/19 CT CAP:\par Interval loop ileostomy.  The known ileocecal mass is not well visualized on this examination.  Decrease in size of previously identified peritoneal nodules. However, \par there are a few newly noted peritoneal nodules.  Stable splenic lesions.  Stable small bilateral lung nodules.\par \par 1/19/19 Pathology:\par 1. Cecal mass, biopsy ([id number]):\par - At least intramucosal adenocarcinoma in background of high-grade dysplasia, see note.\par -Note: Dr. Goodwin concrosa(s) with diagnosis. The findings may not represent the entire mass lesion.\par \par 1/9/19 CT Chest:  No lung mass or consolidation. No suspicious pulmonary nodule. 3 mm  triangular density within the minor fissure most  likely benign fissural lymph node.\par \par 1/9/19 U/S Abdomen:  Mild fatty infiltration of the liver.    1.7 cm right lobe liver lesion is indeterminate but for which metastatic  disease is in the differential diagnosis.    Two solid splenic lesions measuring 3.1 cm and 1.8 cm.  The liver lesion and splenic lesions may be further evaluated with PET/CT  scan to evaluate for metastatic disease.  1.2 x 0.4 cm gallbladder polyp.\par \par 12/13/18 CT A/P:  There is a soft tissue mass in the region of the ileocecal  junction measuring 3.7 x 3.5 cm. There is no bowel obstruction. There are  multiple peritoneal nodules in the abdomen and pelvis. For example, a  nodule in the pelvis measures 1.4 cm. A nodule in the left lower quadrant  measures 1.3 cm. There are omental nodules measuring up to 3.2 x 2.9 cm.  There are ileocolic lymph nodes measuring up to 1.3 cm.  1.1 cm lesion in the right lobe of the liver is suspicious for metastatic  disease.    Two splenic lesions are suspicious for metastatic disease.\par \par 12/11/18 Pathology:  Cecal mass, biopsy:   Adenocarcinoma, at least intramucosal, superficial mucosal fragments.\par \par 12/11/18 Colonoscopy:  Cecal mass.  Hemorrhoids

## 2019-07-15 ENCOUNTER — RESULT REVIEW (OUTPATIENT)
Age: 54
End: 2019-07-15

## 2019-07-15 ENCOUNTER — APPOINTMENT (OUTPATIENT)
Age: 54
End: 2019-07-15

## 2019-07-15 LAB
BASOPHILS # BLD AUTO: 0.1 K/UL — SIGNIFICANT CHANGE UP (ref 0–0.2)
BASOPHILS NFR BLD AUTO: 0.7 % — SIGNIFICANT CHANGE UP (ref 0–2)
EOSINOPHIL # BLD AUTO: 0.2 K/UL — SIGNIFICANT CHANGE UP (ref 0–0.5)
EOSINOPHIL NFR BLD AUTO: 2.4 % — SIGNIFICANT CHANGE UP (ref 0–6)
HCT VFR BLD CALC: 37.5 % — LOW (ref 39–50)
HGB BLD-MCNC: 13.6 G/DL — SIGNIFICANT CHANGE UP (ref 13–17)
LYMPHOCYTES # BLD AUTO: 2.2 K/UL — SIGNIFICANT CHANGE UP (ref 1–3.3)
LYMPHOCYTES # BLD AUTO: 25.7 % — SIGNIFICANT CHANGE UP (ref 13–44)
MCHC RBC-ENTMCNC: 32.4 PG — SIGNIFICANT CHANGE UP (ref 27–34)
MCHC RBC-ENTMCNC: 36.1 G/DL — HIGH (ref 32–36)
MCV RBC AUTO: 89.8 FL — SIGNIFICANT CHANGE UP (ref 80–100)
MONOCYTES # BLD AUTO: 1.1 K/UL — HIGH (ref 0–0.9)
MONOCYTES NFR BLD AUTO: 13 % — SIGNIFICANT CHANGE UP (ref 2–14)
NEUTROPHILS # BLD AUTO: 5.1 K/UL — SIGNIFICANT CHANGE UP (ref 1.8–7.4)
NEUTROPHILS NFR BLD AUTO: 58.3 % — SIGNIFICANT CHANGE UP (ref 43–77)
PLATELET # BLD AUTO: 147 K/UL — LOW (ref 150–400)
RBC # BLD: 4.18 M/UL — LOW (ref 4.2–5.8)
RBC # FLD: 20.4 % — HIGH (ref 10.3–14.5)
WBC # BLD: 8.7 K/UL — SIGNIFICANT CHANGE UP (ref 3.8–10.5)
WBC # FLD AUTO: 8.7 K/UL — SIGNIFICANT CHANGE UP (ref 3.8–10.5)

## 2019-07-15 RX ORDER — CALCIUM CARBONATE 500(1250)
1 TABLET ORAL
Qty: 0 | Refills: 0 | DISCHARGE

## 2019-07-16 DIAGNOSIS — R11.2 NAUSEA WITH VOMITING, UNSPECIFIED: ICD-10-CM

## 2019-07-16 DIAGNOSIS — Z51.11 ENCOUNTER FOR ANTINEOPLASTIC CHEMOTHERAPY: ICD-10-CM

## 2019-07-19 NOTE — ED ADULT NURSE NOTE - NSFALLRSKOUTCOME_ED_ALL_ED
54 yo female with no significant hx presents presented to [place] [facility] with 2 day history of abdominal pain. Pt reports she was in her USOH until admission    she waswhen when she developed RLQ pain. Pt reports pain was sharp in nature, with associated nausea and vomiting. Pt underwent CT Abd/    Pelvis which revealed < from: CT Abdomen and Pelvis w/ Oral Cont and w/ IV Cont-Acute appendicitis with suspicion for perforation near the base of the    appendix. No free air or drainable abscess. Pt underwent Laparoscopic Appendectomy with Dr. Peralta which revealed acute inflamed appendicitis    with abscess and localized peritonitis. Pt also had incidental finding of umbilical hernia which was repaired. Pt tolerated procedure well. Pt received jovan-operative     antibiotics (Zosyn). Pt received DVT prophylaxis with lovenox and ambulation. Pt received adequate analgesia. On POD#4, pt was hemodynamically    stable and clinically stable for discharge. Pt to follow up with Dr. Peralta in the office in a week. 54 yo female with no significant hx presents presented to [place] ED with 2 day history of abdominal pain. Pt reports she was in her USOH until admission    she waswhen when she developed RLQ pain. Pt reports pain was sharp in nature, with associated nausea and vomiting. Pt underwent CT Abd/    Pelvis which revealed < from: CT Abdomen and Pelvis w/ Oral Cont and w/ IV Cont-Acute appendicitis with suspicion for perforation near the base of the    appendix. No free air or drainable abscess. Pt underwent Laparoscopic Appendectomy with Dr. Peralta which revealed acute inflamed appendicitis    with abscess and localized peritonitis. Pt also had incidental finding of umbilical hernia which was repaired. Pt tolerated procedure well. Pt received jovan-operative     antibiotics (Zosyn). Pt received DVT prophylaxis with lovenox and ambulation. Pt received adequate analgesia. Pt reported calf tenderness to Right calf on POD #3,    Pt underwent bilateral lower extremity US which  was negative for DVT. POD#4, pt was hemodynamically stable and clinically stable for discharge. Pt to follow up with Dr. Peralta in the office in a week. Fall with Harm Risk

## 2019-07-23 ENCOUNTER — APPOINTMENT (OUTPATIENT)
Dept: SURGICAL ONCOLOGY | Facility: CLINIC | Age: 54
End: 2019-07-23

## 2019-07-24 ENCOUNTER — APPOINTMENT (OUTPATIENT)
Dept: HEMATOLOGY ONCOLOGY | Facility: CLINIC | Age: 54
End: 2019-07-24
Payer: MEDICARE

## 2019-07-31 ENCOUNTER — OUTPATIENT (OUTPATIENT)
Dept: OUTPATIENT SERVICES | Facility: HOSPITAL | Age: 54
LOS: 1 days | Discharge: HOME | End: 2019-07-31

## 2019-07-31 DIAGNOSIS — C18.9 MALIGNANT NEOPLASM OF COLON, UNSPECIFIED: ICD-10-CM

## 2019-07-31 DIAGNOSIS — S82.899A OTHER FRACTURE OF UNSPECIFIED LOWER LEG, INITIAL ENCOUNTER FOR CLOSED FRACTURE: Chronic | ICD-10-CM

## 2019-08-05 ENCOUNTER — RESULT REVIEW (OUTPATIENT)
Age: 54
End: 2019-08-05

## 2019-08-05 ENCOUNTER — LABORATORY RESULT (OUTPATIENT)
Age: 54
End: 2019-08-05

## 2019-08-05 ENCOUNTER — APPOINTMENT (OUTPATIENT)
Age: 54
End: 2019-08-05

## 2019-08-05 LAB
BASOPHILS # BLD AUTO: 0.1 K/UL — SIGNIFICANT CHANGE UP (ref 0–0.2)
BASOPHILS NFR BLD AUTO: 0.8 % — SIGNIFICANT CHANGE UP (ref 0–2)
EOSINOPHIL # BLD AUTO: 0.3 K/UL — SIGNIFICANT CHANGE UP (ref 0–0.5)
EOSINOPHIL NFR BLD AUTO: 2.6 % — SIGNIFICANT CHANGE UP (ref 0–6)
HCT VFR BLD CALC: 39.5 % — SIGNIFICANT CHANGE UP (ref 39–50)
HGB BLD-MCNC: 14 G/DL — SIGNIFICANT CHANGE UP (ref 13–17)
LYMPHOCYTES # BLD AUTO: 2.6 K/UL — SIGNIFICANT CHANGE UP (ref 1–3.3)
LYMPHOCYTES # BLD AUTO: 26.7 % — SIGNIFICANT CHANGE UP (ref 13–44)
MCHC RBC-ENTMCNC: 33.4 PG — SIGNIFICANT CHANGE UP (ref 27–34)
MCHC RBC-ENTMCNC: 35.5 G/DL — SIGNIFICANT CHANGE UP (ref 32–36)
MCV RBC AUTO: 94.2 FL — SIGNIFICANT CHANGE UP (ref 80–100)
MONOCYTES # BLD AUTO: 1.5 K/UL — HIGH (ref 0–0.9)
MONOCYTES NFR BLD AUTO: 15 % — HIGH (ref 2–14)
NEUTROPHILS # BLD AUTO: 5.3 K/UL — SIGNIFICANT CHANGE UP (ref 1.8–7.4)
NEUTROPHILS NFR BLD AUTO: 54.9 % — SIGNIFICANT CHANGE UP (ref 43–77)
PLATELET # BLD AUTO: 170 K/UL — SIGNIFICANT CHANGE UP (ref 150–400)
RBC # BLD: 4.19 M/UL — LOW (ref 4.2–5.8)
RBC # FLD: 18.2 % — HIGH (ref 10.3–14.5)
WBC # BLD: 9.7 K/UL — SIGNIFICANT CHANGE UP (ref 3.8–10.5)
WBC # FLD AUTO: 9.7 K/UL — SIGNIFICANT CHANGE UP (ref 3.8–10.5)

## 2019-08-05 RX ORDER — ATORVASTATIN CALCIUM 80 MG/1
1 TABLET, FILM COATED ORAL
Qty: 0 | Refills: 0 | DISCHARGE

## 2019-08-06 DIAGNOSIS — R11.2 NAUSEA WITH VOMITING, UNSPECIFIED: ICD-10-CM

## 2019-08-06 DIAGNOSIS — Z51.11 ENCOUNTER FOR ANTINEOPLASTIC CHEMOTHERAPY: ICD-10-CM

## 2019-08-20 ENCOUNTER — RX RENEWAL (OUTPATIENT)
Age: 54
End: 2019-08-20

## 2019-08-22 ENCOUNTER — MEDICATION RENEWAL (OUTPATIENT)
Age: 54
End: 2019-08-22

## 2019-08-26 ENCOUNTER — RESULT REVIEW (OUTPATIENT)
Age: 54
End: 2019-08-26

## 2019-08-26 ENCOUNTER — APPOINTMENT (OUTPATIENT)
Age: 54
End: 2019-08-26

## 2019-08-26 ENCOUNTER — APPOINTMENT (OUTPATIENT)
Dept: HEMATOLOGY ONCOLOGY | Facility: CLINIC | Age: 54
End: 2019-08-26
Payer: MEDICARE

## 2019-08-26 ENCOUNTER — LABORATORY RESULT (OUTPATIENT)
Age: 54
End: 2019-08-26

## 2019-08-26 DIAGNOSIS — D50.9 IRON DEFICIENCY ANEMIA, UNSPECIFIED: ICD-10-CM

## 2019-08-26 LAB
BASOPHILS # BLD AUTO: 0.1 K/UL — SIGNIFICANT CHANGE UP (ref 0–0.2)
BASOPHILS NFR BLD AUTO: 0.9 % — SIGNIFICANT CHANGE UP (ref 0–2)
EOSINOPHIL # BLD AUTO: 0.2 K/UL — SIGNIFICANT CHANGE UP (ref 0–0.5)
EOSINOPHIL NFR BLD AUTO: 1.8 % — SIGNIFICANT CHANGE UP (ref 0–6)
HCT VFR BLD CALC: 38.5 % — LOW (ref 39–50)
HGB BLD-MCNC: 14.1 G/DL — SIGNIFICANT CHANGE UP (ref 13–17)
LYMPHOCYTES # BLD AUTO: 2.3 K/UL — SIGNIFICANT CHANGE UP (ref 1–3.3)
LYMPHOCYTES # BLD AUTO: 26.8 % — SIGNIFICANT CHANGE UP (ref 13–44)
MCHC RBC-ENTMCNC: 35 PG — HIGH (ref 27–34)
MCHC RBC-ENTMCNC: 36.6 G/DL — HIGH (ref 32–36)
MCV RBC AUTO: 95.7 FL — SIGNIFICANT CHANGE UP (ref 80–100)
MONOCYTES # BLD AUTO: 1.4 K/UL — HIGH (ref 0–0.9)
MONOCYTES NFR BLD AUTO: 15.5 % — HIGH (ref 2–14)
NEUTROPHILS # BLD AUTO: 4.8 K/UL — SIGNIFICANT CHANGE UP (ref 1.8–7.4)
NEUTROPHILS NFR BLD AUTO: 55 % — SIGNIFICANT CHANGE UP (ref 43–77)
PLATELET # BLD AUTO: 192 K/UL — SIGNIFICANT CHANGE UP (ref 150–400)
RBC # BLD: 4.03 M/UL — LOW (ref 4.2–5.8)
RBC # FLD: 16.8 % — HIGH (ref 10.3–14.5)
WBC # BLD: 8.8 K/UL — SIGNIFICANT CHANGE UP (ref 3.8–10.5)
WBC # FLD AUTO: 8.8 K/UL — SIGNIFICANT CHANGE UP (ref 3.8–10.5)

## 2019-08-26 PROCEDURE — 99215 OFFICE O/P EST HI 40 MIN: CPT

## 2019-08-26 NOTE — RESULTS/DATA
[FreeTextEntry1] : 7/1/19 CT C/A/P: Two new right lung nodules. Previously identified lung nodules stable. Increase in size of 1.0 cm lymph node in the cardiophrenic region. The known ileocecal  mass is not well visualized on this examination. Decrease in size of peritoneal nodules. Stable splenic lesions. Decrease in size of ileocolic lymph nodes. \par \par 4/30/19 CT CAP:\par Interval loop ileostomy.  The known ileocecal mass is not well visualized on this examination.  Decrease in size of previously identified peritoneal nodules. However, \par there are a few newly noted peritoneal nodules.  Stable splenic lesions.  Stable small bilateral lung nodules.\par \par 1/19/19 Pathology:\par 1. Cecal mass, biopsy ([id number]):\par - At least intramucosal adenocarcinoma in background of high-grade dysplasia, see note.\par -Note: Dr. Goodwin concrosa(s) with diagnosis. The findings may not represent the entire mass lesion.\par \par 1/9/19 CT Chest:  No lung mass or consolidation. No suspicious pulmonary nodule. 3 mm  triangular density within the minor fissure most  likely benign fissural lymph node.\par \par 1/9/19 U/S Abdomen:  Mild fatty infiltration of the liver.    1.7 cm right lobe liver lesion is indeterminate but for which metastatic  disease is in the differential diagnosis.    Two solid splenic lesions measuring 3.1 cm and 1.8 cm.  The liver lesion and splenic lesions may be further evaluated with PET/CT  scan to evaluate for metastatic disease.  1.2 x 0.4 cm gallbladder polyp.\par \par 12/13/18 CT A/P:  There is a soft tissue mass in the region of the ileocecal  junction measuring 3.7 x 3.5 cm. There is no bowel obstruction. There are  multiple peritoneal nodules in the abdomen and pelvis. For example, a  nodule in the pelvis measures 1.4 cm. A nodule in the left lower quadrant  measures 1.3 cm. There are omental nodules measuring up to 3.2 x 2.9 cm.  There are ileocolic lymph nodes measuring up to 1.3 cm.  1.1 cm lesion in the right lobe of the liver is suspicious for metastatic  disease.    Two splenic lesions are suspicious for metastatic disease.\par \par 12/11/18 Pathology:  Cecal mass, biopsy:   Adenocarcinoma, at least intramucosal, superficial mucosal fragments.\par \par 12/11/18 Colonoscopy:  Cecal mass.  Hemorrhoids

## 2019-08-26 NOTE — REVIEW OF SYSTEMS
[Palpitations] : palpitations [Fatigue] : fatigue [Constipation] : constipation [Shortness Of Breath] : shortness of breath [Anxiety] : anxiety [Negative] : Allergic/Immunologic

## 2019-08-26 NOTE — HISTORY OF PRESENT ILLNESS
[de-identified] : The patient was diagnosed with adenocarcinoma of the cecum in December 2018 at the age of 53.  He has a history of irregular bowel movements with alternating diarrhea and constipation and recently saw blood on the toilet paper.  He saw GI, Dr. Thuan Cassidy, who performed a colonoscopy on 12/11/18.  Pathology c/w adenocarcinoma.  On 12/13/18 a CT A/P showed a soft tissue mass in the region of the ileocecal  junction measuring 3.7 x 3.5 cm.  There are  multiple peritoneal nodules in the abdomen and pelvis. For example, a  nodule in the pelvis measures 1.4 cm. A nodule in the left lower quadrant  measures 1.3 cm. There are omental nodules measuring up to 3.2 x 2.9 cm.  There are ileocolic lymph nodes measuring up to 1.3 cm.  1.1 cm lesion in the right lobe of the liver is suspicious for metastatic  disease.    Two splenic lesions are suspicious for metastatic disease.  He was referred to Colorectal surgery, Dr. Livia Talavera, who first ordered a CT Chest to complete staging.  It showed no suspicious pulmonary nodules.  Dr. Talavera recommended systemic chemotherapy given extent of disease.   [de-identified] : PMH of HTN, DM, HPL, Osteogenesis imperfecta, h/o Disruptive behavior, schizoaffective disorder [de-identified] : Patient presents for follow up on C8D1 XELOX (4 doses of Oxaliplatin, started xeloda before oxali). He is s/p emergent diverting loop ileostomy on 2/8/2019 with Dr Giang. + Fatigue unchanged. + Ileostomy active.  No fevers. No headaches. No abdominal pain. No N/V/D.  No neuropathy. No mucositis. H/F syndrome resolved. + intermittent cough. + rash, right hip. Treated with hydrocortisone but only temporarily resolved. Will use athlete's foot powder. + loose, constant stools. Patient will take more Imodium and will consider Lomotil if needed\par Patient and mother have expressed interest in further surgical intervention and reversal of ileostomy. Followed up with Dr. Giang on 7/23/19 and will schedule to see him in October 2019.\par \par

## 2019-08-26 NOTE — PHYSICAL EXAM
[Restricted in physically strenuous activity but ambulatory and able to carry out work of a light or sedentary nature] : Status 1- Restricted in physically strenuous activity but ambulatory and able to carry out work of a light or sedentary nature, e.g., light house work, office work [Normal] : RRR, normal S1S2, no murmurs, rubs, gallops

## 2019-09-12 ENCOUNTER — OUTPATIENT (OUTPATIENT)
Dept: OUTPATIENT SERVICES | Facility: HOSPITAL | Age: 54
LOS: 1 days | Discharge: HOME | End: 2019-09-12

## 2019-09-12 DIAGNOSIS — S82.899A OTHER FRACTURE OF UNSPECIFIED LOWER LEG, INITIAL ENCOUNTER FOR CLOSED FRACTURE: Chronic | ICD-10-CM

## 2019-09-12 DIAGNOSIS — C18.9 MALIGNANT NEOPLASM OF COLON, UNSPECIFIED: ICD-10-CM

## 2019-09-16 ENCOUNTER — RESULT REVIEW (OUTPATIENT)
Age: 54
End: 2019-09-16

## 2019-09-16 ENCOUNTER — LABORATORY RESULT (OUTPATIENT)
Age: 54
End: 2019-09-16

## 2019-09-16 ENCOUNTER — APPOINTMENT (OUTPATIENT)
Age: 54
End: 2019-09-16

## 2019-09-16 ENCOUNTER — APPOINTMENT (OUTPATIENT)
Dept: HEMATOLOGY ONCOLOGY | Facility: CLINIC | Age: 54
End: 2019-09-16
Payer: MEDICARE

## 2019-09-16 LAB
BASOPHILS # BLD AUTO: 0.1 K/UL — SIGNIFICANT CHANGE UP (ref 0–0.2)
BASOPHILS NFR BLD AUTO: 0.9 % — SIGNIFICANT CHANGE UP (ref 0–2)
EOSINOPHIL # BLD AUTO: 0.2 K/UL — SIGNIFICANT CHANGE UP (ref 0–0.5)
EOSINOPHIL NFR BLD AUTO: 2.1 % — SIGNIFICANT CHANGE UP (ref 0–6)
HCT VFR BLD CALC: 39.5 % — SIGNIFICANT CHANGE UP (ref 39–50)
HGB BLD-MCNC: 14.1 G/DL — SIGNIFICANT CHANGE UP (ref 13–17)
LYMPHOCYTES # BLD AUTO: 2.3 K/UL — SIGNIFICANT CHANGE UP (ref 1–3.3)
LYMPHOCYTES # BLD AUTO: 27.5 % — SIGNIFICANT CHANGE UP (ref 13–44)
MCHC RBC-ENTMCNC: 34.2 PG — HIGH (ref 27–34)
MCHC RBC-ENTMCNC: 35.8 G/DL — SIGNIFICANT CHANGE UP (ref 32–36)
MCV RBC AUTO: 95.8 FL — SIGNIFICANT CHANGE UP (ref 80–100)
MONOCYTES # BLD AUTO: 1.4 K/UL — HIGH (ref 0–0.9)
MONOCYTES NFR BLD AUTO: 16.6 % — HIGH (ref 2–14)
NEUTROPHILS # BLD AUTO: 4.4 K/UL — SIGNIFICANT CHANGE UP (ref 1.8–7.4)
NEUTROPHILS NFR BLD AUTO: 52.8 % — SIGNIFICANT CHANGE UP (ref 43–77)
PLATELET # BLD AUTO: 163 K/UL — SIGNIFICANT CHANGE UP (ref 150–400)
RBC # BLD: 4.13 M/UL — LOW (ref 4.2–5.8)
RBC # FLD: 14.9 % — HIGH (ref 10.3–14.5)
WBC # BLD: 8.3 K/UL — SIGNIFICANT CHANGE UP (ref 3.8–10.5)
WBC # FLD AUTO: 8.3 K/UL — SIGNIFICANT CHANGE UP (ref 3.8–10.5)

## 2019-09-16 PROCEDURE — 99214 OFFICE O/P EST MOD 30 MIN: CPT

## 2019-09-17 DIAGNOSIS — R11.2 NAUSEA WITH VOMITING, UNSPECIFIED: ICD-10-CM

## 2019-09-17 DIAGNOSIS — Z51.11 ENCOUNTER FOR ANTINEOPLASTIC CHEMOTHERAPY: ICD-10-CM

## 2019-10-02 ENCOUNTER — FORM ENCOUNTER (OUTPATIENT)
Age: 54
End: 2019-10-02

## 2019-10-03 ENCOUNTER — APPOINTMENT (OUTPATIENT)
Dept: CT IMAGING | Facility: CLINIC | Age: 54
End: 2019-10-03
Payer: MEDICARE

## 2019-10-03 ENCOUNTER — OUTPATIENT (OUTPATIENT)
Dept: OUTPATIENT SERVICES | Facility: HOSPITAL | Age: 54
LOS: 1 days | End: 2019-10-03
Payer: MEDICARE

## 2019-10-03 DIAGNOSIS — C18.2 MALIGNANT NEOPLASM OF ASCENDING COLON: ICD-10-CM

## 2019-10-03 DIAGNOSIS — S82.899A OTHER FRACTURE OF UNSPECIFIED LOWER LEG, INITIAL ENCOUNTER FOR CLOSED FRACTURE: Chronic | ICD-10-CM

## 2019-10-03 PROCEDURE — 71260 CT THORAX DX C+: CPT

## 2019-10-03 PROCEDURE — 74177 CT ABD & PELVIS W/CONTRAST: CPT | Mod: 26

## 2019-10-03 PROCEDURE — 71260 CT THORAX DX C+: CPT | Mod: 26

## 2019-10-03 PROCEDURE — 74177 CT ABD & PELVIS W/CONTRAST: CPT

## 2019-10-04 ENCOUNTER — RX RENEWAL (OUTPATIENT)
Age: 54
End: 2019-10-04

## 2019-10-04 ENCOUNTER — MEDICATION RENEWAL (OUTPATIENT)
Age: 54
End: 2019-10-04

## 2019-10-07 ENCOUNTER — RESULT REVIEW (OUTPATIENT)
Age: 54
End: 2019-10-07

## 2019-10-07 ENCOUNTER — LABORATORY RESULT (OUTPATIENT)
Age: 54
End: 2019-10-07

## 2019-10-07 ENCOUNTER — APPOINTMENT (OUTPATIENT)
Dept: HEMATOLOGY ONCOLOGY | Facility: CLINIC | Age: 54
End: 2019-10-07
Payer: MEDICARE

## 2019-10-07 ENCOUNTER — APPOINTMENT (OUTPATIENT)
Age: 54
End: 2019-10-07

## 2019-10-07 VITALS
WEIGHT: 170 LBS | SYSTOLIC BLOOD PRESSURE: 116 MMHG | DIASTOLIC BLOOD PRESSURE: 80 MMHG | HEIGHT: 60 IN | TEMPERATURE: 98 F | OXYGEN SATURATION: 98 % | BODY MASS INDEX: 33.38 KG/M2 | HEART RATE: 87 BPM

## 2019-10-07 LAB
BASOPHILS # BLD AUTO: 0.1 K/UL — SIGNIFICANT CHANGE UP (ref 0–0.2)
BASOPHILS NFR BLD AUTO: 0.8 % — SIGNIFICANT CHANGE UP (ref 0–2)
EOSINOPHIL # BLD AUTO: 0.2 K/UL — SIGNIFICANT CHANGE UP (ref 0–0.5)
EOSINOPHIL NFR BLD AUTO: 2.5 % — SIGNIFICANT CHANGE UP (ref 0–6)
HCT VFR BLD CALC: 40 % — SIGNIFICANT CHANGE UP (ref 39–50)
HGB BLD-MCNC: 14.2 G/DL — SIGNIFICANT CHANGE UP (ref 13–17)
LYMPHOCYTES # BLD AUTO: 2 K/UL — SIGNIFICANT CHANGE UP (ref 1–3.3)
LYMPHOCYTES # BLD AUTO: 24.2 % — SIGNIFICANT CHANGE UP (ref 13–44)
MCHC RBC-ENTMCNC: 34.1 PG — HIGH (ref 27–34)
MCHC RBC-ENTMCNC: 35.6 G/DL — SIGNIFICANT CHANGE UP (ref 32–36)
MCV RBC AUTO: 96 FL — SIGNIFICANT CHANGE UP (ref 80–100)
MONOCYTES # BLD AUTO: 1.1 K/UL — HIGH (ref 0–0.9)
MONOCYTES NFR BLD AUTO: 13.7 % — SIGNIFICANT CHANGE UP (ref 2–14)
NEUTROPHILS # BLD AUTO: 4.8 K/UL — SIGNIFICANT CHANGE UP (ref 1.8–7.4)
NEUTROPHILS NFR BLD AUTO: 58.7 % — SIGNIFICANT CHANGE UP (ref 43–77)
PLATELET # BLD AUTO: 176 K/UL — SIGNIFICANT CHANGE UP (ref 150–400)
RBC # BLD: 4.16 M/UL — LOW (ref 4.2–5.8)
RBC # FLD: 14.8 % — HIGH (ref 10.3–14.5)
WBC # BLD: 8.2 K/UL — SIGNIFICANT CHANGE UP (ref 3.8–10.5)
WBC # FLD AUTO: 8.2 K/UL — SIGNIFICANT CHANGE UP (ref 3.8–10.5)

## 2019-10-07 PROCEDURE — 99215 OFFICE O/P EST HI 40 MIN: CPT

## 2019-10-07 NOTE — PHYSICAL EXAM
[Restricted in physically strenuous activity but ambulatory and able to carry out work of a light or sedentary nature] : Status 1- Restricted in physically strenuous activity but ambulatory and able to carry out work of a light or sedentary nature, e.g., light house work, office work [Normal] : affect appropriate [de-identified] : prolapsed ileostomy

## 2019-10-07 NOTE — RESULTS/DATA
[FreeTextEntry1] : 10/3/19 CT:  New small opacities right upper lobe favored to be infectious or inflammatory in etiology. Decrease in size of a nonenlarged cardiophrenic lymph node. \par Decrease in size of the splenic lesions. Decrease in size of the ileocolic lymph nodes. Increase in size of a small bowel mesenteric lymph node in the mid abdomen. \par Stable peritoneal nodules in the right upper quadrant. Stable portacaval adenopathy.\par \par 7/1/19 CT C/A/P: Two new right lung nodules. Previously identified lung nodules stable. Increase in size of 1.0 cm lymph node in the cardiophrenic region. The known ileocecal  mass is not well visualized on this examination. Decrease in size of peritoneal nodules. Stable splenic lesions. Decrease in size of ileocolic lymph nodes. \par \par 4/30/19 CT CAP:\par Interval loop ileostomy.  The known ileocecal mass is not well visualized on this examination.  Decrease in size of previously identified peritoneal nodules. However, \par there are a few newly noted peritoneal nodules.  Stable splenic lesions.  Stable small bilateral lung nodules.\par \par 1/19/19 Pathology:\par 1. Cecal mass, biopsy ([id number]):\par - At least intramucosal adenocarcinoma in background of high-grade dysplasia, see note.\par -Note: Dr. Goodwin concur(s) with diagnosis. The findings may not represent the entire mass lesion.\par \par 1/9/19 CT Chest:  No lung mass or consolidation. No suspicious pulmonary nodule. 3 mm  triangular density within the minor fissure most  likely benign fissural lymph node.\par \par 1/9/19 U/S Abdomen:  Mild fatty infiltration of the liver.    1.7 cm right lobe liver lesion is indeterminate but for which metastatic  disease is in the differential diagnosis.    Two solid splenic lesions measuring 3.1 cm and 1.8 cm.  The liver lesion and splenic lesions may be further evaluated with PET/CT  scan to evaluate for metastatic disease.  1.2 x 0.4 cm gallbladder polyp.\par \par 12/13/18 CT A/P:  There is a soft tissue mass in the region of the ileocecal  junction measuring 3.7 x 3.5 cm. There is no bowel obstruction. There are  multiple peritoneal nodules in the abdomen and pelvis. For example, a  nodule in the pelvis measures 1.4 cm. A nodule in the left lower quadrant  measures 1.3 cm. There are omental nodules measuring up to 3.2 x 2.9 cm.  There are ileocolic lymph nodes measuring up to 1.3 cm.  1.1 cm lesion in the right lobe of the liver is suspicious for metastatic  disease.    Two splenic lesions are suspicious for metastatic disease.\par \par 12/11/18 Pathology:  Cecal mass, biopsy:   Adenocarcinoma, at least intramucosal, superficial mucosal fragments.\par \par 12/11/18 Colonoscopy:  Cecal mass.  Hemorrhoids

## 2019-10-07 NOTE — HISTORY OF PRESENT ILLNESS
[de-identified] : PMH of HTN, DM, HPL, Osteogenesis imperfecta, h/o Disruptive behavior, schizoaffective disorder [de-identified] : The patient was diagnosed with adenocarcinoma of the cecum in December 2018 at the age of 53.  He has a history of irregular bowel movements with alternating diarrhea and constipation and recently saw blood on the toilet paper.  He saw GI, Dr. Thuan Cassidy, who performed a colonoscopy on 12/11/18.  Pathology c/w adenocarcinoma.  On 12/13/18 a CT A/P showed a soft tissue mass in the region of the ileocecal  junction measuring 3.7 x 3.5 cm.  There are  multiple peritoneal nodules in the abdomen and pelvis. For example, a  nodule in the pelvis measures 1.4 cm. A nodule in the left lower quadrant  measures 1.3 cm. There are omental nodules measuring up to 3.2 x 2.9 cm.  There are ileocolic lymph nodes measuring up to 1.3 cm.  1.1 cm lesion in the right lobe of the liver is suspicious for metastatic  disease.    Two splenic lesions are suspicious for metastatic disease.  He was referred to Colorectal surgery, Dr. Livia Talavera, who first ordered a CT Chest to complete staging.  It showed no suspicious pulmonary nodules.  Dr. Talavera recommended systemic chemotherapy given extent of disease.   [de-identified] : Patient presents for follow up on C10D1 XELOX (10th cycle of xeloda as he received one cycle as monotherapy before starting oxaliplatin). He is s/p emergent diverting loop ileostomy on 2/8/2019 with Dr Giang. + Fatigue improved, less severe. + Ileostomy remains active. + Cold intolerance.  No fevers. No headaches. No abdominal pain. No N/V/D.  No neuropathy. No mucositis. No H/F syndrome. No other complaints today.\par \par \par

## 2019-10-09 NOTE — RESULTS/DATA
[FreeTextEntry1] : 7/1/19 CT C/A/P: Two new right lung nodules. Previously identified lung nodules stable. Increase in size of 1.0 cm lymph node in the cardiophrenic region. The known ileocecal  mass is not well visualized on this examination. Decrease in size of peritoneal nodules. Stable splenic lesions. Decrease in size of ileocolic lymph nodes. \par \par 4/30/19 CT CAP:\par Interval loop ileostomy.  The known ileocecal mass is not well visualized on this examination.  Decrease in size of previously identified peritoneal nodules. However, \par there are a few newly noted peritoneal nodules.  Stable splenic lesions.  Stable small bilateral lung nodules.\par \par 1/19/19 Pathology:\par 1. Cecal mass, biopsy ([id number]):\par - At least intramucosal adenocarcinoma in background of high-grade dysplasia, see note.\par -Note: Dr. Goodwin concrosa(s) with diagnosis. The findings may not represent the entire mass lesion.\par \par 1/9/19 CT Chest:  No lung mass or consolidation. No suspicious pulmonary nodule. 3 mm  triangular density within the minor fissure most  likely benign fissural lymph node.\par \par 1/9/19 U/S Abdomen:  Mild fatty infiltration of the liver.    1.7 cm right lobe liver lesion is indeterminate but for which metastatic  disease is in the differential diagnosis.    Two solid splenic lesions measuring 3.1 cm and 1.8 cm.  The liver lesion and splenic lesions may be further evaluated with PET/CT  scan to evaluate for metastatic disease.  1.2 x 0.4 cm gallbladder polyp.\par \par 12/13/18 CT A/P:  There is a soft tissue mass in the region of the ileocecal  junction measuring 3.7 x 3.5 cm. There is no bowel obstruction. There are  multiple peritoneal nodules in the abdomen and pelvis. For example, a  nodule in the pelvis measures 1.4 cm. A nodule in the left lower quadrant  measures 1.3 cm. There are omental nodules measuring up to 3.2 x 2.9 cm.  There are ileocolic lymph nodes measuring up to 1.3 cm.  1.1 cm lesion in the right lobe of the liver is suspicious for metastatic  disease.    Two splenic lesions are suspicious for metastatic disease.\par \par 12/11/18 Pathology:  Cecal mass, biopsy:   Adenocarcinoma, at least intramucosal, superficial mucosal fragments.\par \par 12/11/18 Colonoscopy:  Cecal mass.  Hemorrhoids

## 2019-10-09 NOTE — HISTORY OF PRESENT ILLNESS
[de-identified] : The patient was diagnosed with adenocarcinoma of the cecum in December 2018 at the age of 53.  He has a history of irregular bowel movements with alternating diarrhea and constipation and recently saw blood on the toilet paper.  He saw GI, Dr. Thuan Cassidy, who performed a colonoscopy on 12/11/18.  Pathology c/w adenocarcinoma.  On 12/13/18 a CT A/P showed a soft tissue mass in the region of the ileocecal  junction measuring 3.7 x 3.5 cm.  There are  multiple peritoneal nodules in the abdomen and pelvis. For example, a  nodule in the pelvis measures 1.4 cm. A nodule in the left lower quadrant  measures 1.3 cm. There are omental nodules measuring up to 3.2 x 2.9 cm.  There are ileocolic lymph nodes measuring up to 1.3 cm.  1.1 cm lesion in the right lobe of the liver is suspicious for metastatic  disease.    Two splenic lesions are suspicious for metastatic disease.  He was referred to Colorectal surgery, Dr. Livia Talavera, who first ordered a CT Chest to complete staging.  It showed no suspicious pulmonary nodules.  Dr. Talavera recommended systemic chemotherapy given extent of disease.   [de-identified] : PMH of HTN, DM, HPL, Osteogenesis imperfecta, h/o Disruptive behavior, schizoaffective disorder [de-identified] : Patient presents for follow up on C9D1 XELOX (10th cycle of xeloda as he received one cycle as monotherapy before starting oxaliplatin). He is s/p emergent diverting loop ileostomy on 2/8/2019 with Dr Giang. + Fatigue improved, less severe. + Ileostomy remains active. + Cold intolerance.  No fevers. No headaches. No abdominal pain. No N/V/D.  No neuropathy. No mucositis. No H/F syndrome.  Cough resolved.  Rash on right hip resolved. \par \par \par

## 2019-10-24 ENCOUNTER — OUTPATIENT (OUTPATIENT)
Dept: OUTPATIENT SERVICES | Facility: HOSPITAL | Age: 54
LOS: 1 days | Discharge: HOME | End: 2019-10-24

## 2019-10-24 DIAGNOSIS — S82.899A OTHER FRACTURE OF UNSPECIFIED LOWER LEG, INITIAL ENCOUNTER FOR CLOSED FRACTURE: Chronic | ICD-10-CM

## 2019-10-24 DIAGNOSIS — C18.9 MALIGNANT NEOPLASM OF COLON, UNSPECIFIED: ICD-10-CM

## 2019-10-28 ENCOUNTER — APPOINTMENT (OUTPATIENT)
Age: 54
End: 2019-10-28

## 2019-10-29 ENCOUNTER — APPOINTMENT (OUTPATIENT)
Dept: SURGICAL ONCOLOGY | Facility: CLINIC | Age: 54
End: 2019-10-29
Payer: MEDICARE

## 2019-10-29 VITALS
WEIGHT: 167.05 LBS | BODY MASS INDEX: 32.8 KG/M2 | TEMPERATURE: 98.3 F | OXYGEN SATURATION: 97 % | HEART RATE: 85 BPM | SYSTOLIC BLOOD PRESSURE: 127 MMHG | DIASTOLIC BLOOD PRESSURE: 82 MMHG | HEIGHT: 60 IN

## 2019-10-29 PROCEDURE — 99215 OFFICE O/P EST HI 40 MIN: CPT

## 2019-11-01 ENCOUNTER — RESULT REVIEW (OUTPATIENT)
Age: 54
End: 2019-11-01

## 2019-11-01 ENCOUNTER — APPOINTMENT (OUTPATIENT)
Age: 54
End: 2019-11-01

## 2019-11-01 ENCOUNTER — APPOINTMENT (OUTPATIENT)
Dept: HEMATOLOGY ONCOLOGY | Facility: CLINIC | Age: 54
End: 2019-11-01
Payer: MEDICARE

## 2019-11-01 ENCOUNTER — LABORATORY RESULT (OUTPATIENT)
Age: 54
End: 2019-11-01

## 2019-11-01 VITALS
TEMPERATURE: 98.2 F | WEIGHT: 169.38 LBS | DIASTOLIC BLOOD PRESSURE: 87 MMHG | HEIGHT: 60 IN | SYSTOLIC BLOOD PRESSURE: 131 MMHG | HEART RATE: 87 BPM | OXYGEN SATURATION: 98 % | BODY MASS INDEX: 33.25 KG/M2

## 2019-11-01 LAB
BASOPHILS # BLD AUTO: 0.1 K/UL — SIGNIFICANT CHANGE UP (ref 0–0.2)
BASOPHILS NFR BLD AUTO: 0.7 % — SIGNIFICANT CHANGE UP (ref 0–2)
EOSINOPHIL # BLD AUTO: 0.1 K/UL — SIGNIFICANT CHANGE UP (ref 0–0.5)
EOSINOPHIL NFR BLD AUTO: 1.8 % — SIGNIFICANT CHANGE UP (ref 0–6)
HCT VFR BLD CALC: 39.9 % — SIGNIFICANT CHANGE UP (ref 39–50)
HGB BLD-MCNC: 13.8 G/DL — SIGNIFICANT CHANGE UP (ref 13–17)
LYMPHOCYTES # BLD AUTO: 1.5 K/UL — SIGNIFICANT CHANGE UP (ref 1–3.3)
LYMPHOCYTES # BLD AUTO: 17.9 % — SIGNIFICANT CHANGE UP (ref 13–44)
MCHC RBC-ENTMCNC: 33.8 PG — SIGNIFICANT CHANGE UP (ref 27–34)
MCHC RBC-ENTMCNC: 34.7 G/DL — SIGNIFICANT CHANGE UP (ref 32–36)
MCV RBC AUTO: 97.6 FL — SIGNIFICANT CHANGE UP (ref 80–100)
MONOCYTES # BLD AUTO: 1 K/UL — HIGH (ref 0–0.9)
MONOCYTES NFR BLD AUTO: 12.3 % — SIGNIFICANT CHANGE UP (ref 2–14)
NEUTROPHILS # BLD AUTO: 5.5 K/UL — SIGNIFICANT CHANGE UP (ref 1.8–7.4)
NEUTROPHILS NFR BLD AUTO: 67.3 % — SIGNIFICANT CHANGE UP (ref 43–77)
PLATELET # BLD AUTO: 135 K/UL — LOW (ref 150–400)
RBC # BLD: 4.08 M/UL — LOW (ref 4.2–5.8)
RBC # FLD: 14.1 % — SIGNIFICANT CHANGE UP (ref 10.3–14.5)
WBC # BLD: 8.2 K/UL — SIGNIFICANT CHANGE UP (ref 3.8–10.5)
WBC # FLD AUTO: 8.2 K/UL — SIGNIFICANT CHANGE UP (ref 3.8–10.5)

## 2019-11-01 PROCEDURE — 99215 OFFICE O/P EST HI 40 MIN: CPT

## 2019-11-01 NOTE — RESULTS/DATA
[FreeTextEntry1] : 10/3/19 CT:  New small opacities right upper lobe favored to be infectious or inflammatory in etiology. Decrease in size of a nonenlarged cardiophrenic lymph node. \par Decrease in size of the splenic lesions. Decrease in size of the ileocolic lymph nodes. Increase in size of a small bowel mesenteric lymph node in the mid abdomen. \par Stable peritoneal nodules in the right upper quadrant. Stable portacaval adenopathy.\par \par 7/1/19 CT C/A/P: Two new right lung nodules. Previously identified lung nodules stable. Increase in size of 1.0 cm lymph node in the cardiophrenic region. The known ileocecal mass is not well visualized on this examination. Decrease in size of peritoneal nodules. Stable splenic lesions. Decrease in size of ileocolic lymph nodes. \par \par 4/30/19 CT CAP:\par Interval loop ileostomy.  The known ileocecal mass is not well visualized on this examination.  Decrease in size of previously identified peritoneal nodules. However, \par there are a few newly noted peritoneal nodules.  Stable splenic lesions.  Stable small bilateral lung nodules.\par \par 1/19/19 Pathology:\par 1. Cecal mass, biopsy ([id number]):\par - At least intramucosal adenocarcinoma in background of high-grade dysplasia, see note.\par -Note: Dr. Goodwin concur(s) with diagnosis. The findings may not represent the entire mass lesion.\par \par 1/9/19 CT Chest:  No lung mass or consolidation. No suspicious pulmonary nodule. 3 mm  triangular density within the minor fissure most  likely benign fissural lymph node.\par \par 1/9/19 U/S Abdomen:  Mild fatty infiltration of the liver.    1.7 cm right lobe liver lesion is indeterminate but for which metastatic  disease is in the differential diagnosis.    Two solid splenic lesions measuring 3.1 cm and 1.8 cm.  The liver lesion and splenic lesions may be further evaluated with PET/CT  scan to evaluate for metastatic disease.  1.2 x 0.4 cm gallbladder polyp.\par \par 12/13/18 CT A/P:  There is a soft tissue mass in the region of the ileocecal  junction measuring 3.7 x 3.5 cm. There is no bowel obstruction. There are  multiple peritoneal nodules in the abdomen and pelvis. For example, a  nodule in the pelvis measures 1.4 cm. A nodule in the left lower quadrant  measures 1.3 cm. There are omental nodules measuring up to 3.2 x 2.9 cm.  There are ileocolic lymph nodes measuring up to 1.3 cm.  1.1 cm lesion in the right lobe of the liver is suspicious for metastatic  disease.    Two splenic lesions are suspicious for metastatic disease.\par \par 12/11/18 Pathology:  Cecal mass, biopsy:   Adenocarcinoma, at least intramucosal, superficial mucosal fragments.\par \par 12/11/18 Colonoscopy:  Cecal mass.  Hemorrhoids

## 2019-11-01 NOTE — PHYSICAL EXAM
[Restricted in physically strenuous activity but ambulatory and able to carry out work of a light or sedentary nature] : Status 1- Restricted in physically strenuous activity but ambulatory and able to carry out work of a light or sedentary nature, e.g., light house work, office work [Normal] : affect appropriate [de-identified] : prolapsed ileostomy

## 2019-11-01 NOTE — HISTORY OF PRESENT ILLNESS
[de-identified] : The patient was diagnosed with adenocarcinoma of the cecum in December 2018 at the age of 53.  He has a history of irregular bowel movements with alternating diarrhea and constipation and recently saw blood on the toilet paper.  He saw GI, Dr. Thuan Cassidy, who performed a colonoscopy on 12/11/18.  Pathology c/w adenocarcinoma.  On 12/13/18 a CT A/P showed a soft tissue mass in the region of the ileocecal  junction measuring 3.7 x 3.5 cm.  There are  multiple peritoneal nodules in the abdomen and pelvis. For example, a  nodule in the pelvis measures 1.4 cm. A nodule in the left lower quadrant  measures 1.3 cm. There are omental nodules measuring up to 3.2 x 2.9 cm.  There are ileocolic lymph nodes measuring up to 1.3 cm.  1.1 cm lesion in the right lobe of the liver is suspicious for metastatic  disease.    Two splenic lesions are suspicious for metastatic disease.  He was referred to Colorectal surgery, Dr. Livia Talavera, who first ordered a CT Chest to complete staging.  It showed no suspicious pulmonary nodules.  Dr. Talavera recommended systemic chemotherapy given extent of disease.   [de-identified] : PMH of HTN, DM, HPL, Osteogenesis imperfecta, h/o Disruptive behavior, schizoaffective disorder [de-identified] : Patient presents for follow up on C11D5 XELOX (10th cycle of xeloda as he received one cycle as monotherapy before starting oxaliplatin). He is s/p emergent diverting loop ileostomy on 2/8/2019 with Dr Giang. + Fatigue improved, less severe. + Ileostomy remains active. + Cold intolerance.  No fevers. No headaches. No abdominal pain. No N/V/D.  + Neuropathy, tingling in fingertips. No mucositis. No H/F syndrome. No other complaints today.\par \par \par

## 2019-11-05 NOTE — ASSESSMENT
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 Patient managing well with his ostomy and systemic XELOX. CT shows improvement of overall disease burden and cecal mass now not imaged. CEA is normal after being very elevated.\par \par We discussed at length the surgical options, including aggressive CRS + HIPEC vs. only performing a right hemicolectomy and reversing his ileostomy. We discussed the perhaps given his retroperitoneal/lymphatic burden, that HIPEC might not be truly indicated/successful,  however good palliation and QOL can be provided by removing the primary tumor and reversing his ileostomy. \par \par Mr. Edwards and his wife wound like to see Dr. Galarza again for consultation before deciding on the plan going forward.

## 2019-11-05 NOTE — PHYSICAL EXAM
[Normal] : supple, no neck mass and thyroid not enlarged [Normal Neck Lymph Nodes] : normal neck lymph nodes  [Normal Supraclavicular Lymph Nodes] : normal supraclavicular lymph nodes [Normal Groin Lymph Nodes] : normal groin lymph nodes [Normal Axillary Lymph Nodes] : normal axillary lymph nodes [Normal] : oriented to person, place and time, with appropriate affect [de-identified] : abdomen soft, non-distended, non-tender. wound healing appropriately. Ostomy, pink, functional with liquid stool. Mild prolapse without evidence of congestion or ischemia.

## 2019-11-05 NOTE — HISTORY OF PRESENT ILLNESS
[de-identified] : Nick Edwrads presents for 4-month follow up following emergent loop ileostomy for a cecal adenocarcinoma causing complete small bowel obstruction. He was also known at that time to have disease c/w peritoneal carcinomatosis. Since then he has been under the care of Dr. Sarina Diaz who has been administering XELOX with good patient tolerance and good results on his CT from 4/2019 demonstrating tumor abatement. He has learned to manage his ileostomy well and has become proficient in ostomy care and diet management.\par \par 10/29/19: Mr. Edwards returns with his mother to discuss his ostomy status and possible surgical management of his disease. He continues to receive systemic chemotherapy under the care of Dr. Galarza (XELOX) with his recent imaging demonstrating good tumor control. He experiences intermittent, mild, painless prolapse of his ostomy, which he easily reduces with a change in position. He is uncertain at this point if he would like to consider right colectomy with ostomy reversal, or simply continue with systemic treatment.

## 2019-11-15 NOTE — REVIEW OF SYSTEMS
[Shortness Of Breath] : shortness of breath [Palpitations] : palpitations [Fatigue] : fatigue [Anxiety] : anxiety [Constipation] : constipation [Negative] : Allergic/Immunologic

## 2019-11-18 ENCOUNTER — RESULT REVIEW (OUTPATIENT)
Age: 54
End: 2019-11-18

## 2019-11-18 ENCOUNTER — APPOINTMENT (OUTPATIENT)
Dept: HEMATOLOGY ONCOLOGY | Facility: CLINIC | Age: 54
End: 2019-11-18
Payer: MEDICARE

## 2019-11-18 ENCOUNTER — APPOINTMENT (OUTPATIENT)
Age: 54
End: 2019-11-18

## 2019-11-18 ENCOUNTER — MEDICATION RENEWAL (OUTPATIENT)
Age: 54
End: 2019-11-18

## 2019-11-18 VITALS
BODY MASS INDEX: 32.8 KG/M2 | DIASTOLIC BLOOD PRESSURE: 71 MMHG | OXYGEN SATURATION: 98 % | TEMPERATURE: 98 F | HEART RATE: 89 BPM | WEIGHT: 167.06 LBS | SYSTOLIC BLOOD PRESSURE: 105 MMHG | HEIGHT: 60 IN

## 2019-11-18 LAB
BASOPHILS # BLD AUTO: 0 K/UL — SIGNIFICANT CHANGE UP (ref 0–0.2)
BASOPHILS NFR BLD AUTO: 0.5 % — SIGNIFICANT CHANGE UP (ref 0–2)
EOSINOPHIL # BLD AUTO: 0.2 K/UL — SIGNIFICANT CHANGE UP (ref 0–0.5)
EOSINOPHIL NFR BLD AUTO: 2.3 % — SIGNIFICANT CHANGE UP (ref 0–6)
HCT VFR BLD CALC: 42 % — SIGNIFICANT CHANGE UP (ref 39–50)
HGB BLD-MCNC: 14.3 G/DL — SIGNIFICANT CHANGE UP (ref 13–17)
LYMPHOCYTES # BLD AUTO: 2 K/UL — SIGNIFICANT CHANGE UP (ref 1–3.3)
LYMPHOCYTES # BLD AUTO: 22.4 % — SIGNIFICANT CHANGE UP (ref 13–44)
MCHC RBC-ENTMCNC: 34.1 G/DL — SIGNIFICANT CHANGE UP (ref 32–36)
MCHC RBC-ENTMCNC: 34.3 PG — HIGH (ref 27–34)
MCV RBC AUTO: 100.6 FL — HIGH (ref 80–100)
MONOCYTES # BLD AUTO: 0.9 K/UL — SIGNIFICANT CHANGE UP (ref 0–0.9)
MONOCYTES NFR BLD AUTO: 9.9 % — SIGNIFICANT CHANGE UP (ref 2–14)
NEUTROPHILS # BLD AUTO: 5.7 K/UL — SIGNIFICANT CHANGE UP (ref 1.8–7.4)
NEUTROPHILS NFR BLD AUTO: 65 % — SIGNIFICANT CHANGE UP (ref 43–77)
PLATELET # BLD AUTO: 176 K/UL — SIGNIFICANT CHANGE UP (ref 150–400)
RBC # BLD: 4.18 M/UL — LOW (ref 4.2–5.8)
RBC # FLD: 14.6 % — HIGH (ref 10.3–14.5)
WBC # BLD: 8.8 K/UL — SIGNIFICANT CHANGE UP (ref 3.8–10.5)
WBC # FLD AUTO: 8.8 K/UL — SIGNIFICANT CHANGE UP (ref 3.8–10.5)

## 2019-11-18 PROCEDURE — 99213 OFFICE O/P EST LOW 20 MIN: CPT

## 2019-11-19 LAB
ALBUMIN SERPL ELPH-MCNC: 4.9 G/DL
ALP BLD-CCNC: 54 U/L
ALT SERPL-CCNC: 58 U/L
ANION GAP SERPL CALC-SCNC: 16 MMOL/L
AST SERPL-CCNC: 40 U/L
BILIRUB SERPL-MCNC: 0.7 MG/DL
BUN SERPL-MCNC: 16 MG/DL
CALCIUM SERPL-MCNC: 10.3 MG/DL
CHLORIDE SERPL-SCNC: 98 MMOL/L
CO2 SERPL-SCNC: 24 MMOL/L
CREAT SERPL-MCNC: 0.81 MG/DL
GLUCOSE SERPL-MCNC: 125 MG/DL
MAGNESIUM SERPL-MCNC: 2 MG/DL
POTASSIUM SERPL-SCNC: 3.8 MMOL/L
PROT SERPL-MCNC: 7.9 G/DL
SODIUM SERPL-SCNC: 138 MMOL/L

## 2019-11-22 NOTE — HISTORY OF PRESENT ILLNESS
[de-identified] : The patient was diagnosed with adenocarcinoma of the cecum in December 2018 at the age of 53.  He has a history of irregular bowel movements with alternating diarrhea and constipation and recently saw blood on the toilet paper.  He saw GI, Dr. Thuan Cassidy, who performed a colonoscopy on 12/11/18.  Pathology c/w adenocarcinoma.  On 12/13/18 a CT A/P showed a soft tissue mass in the region of the ileocecal  junction measuring 3.7 x 3.5 cm.  There are  multiple peritoneal nodules in the abdomen and pelvis. For example, a  nodule in the pelvis measures 1.4 cm. A nodule in the left lower quadrant  measures 1.3 cm. There are omental nodules measuring up to 3.2 x 2.9 cm.  There are ileocolic lymph nodes measuring up to 1.3 cm.  1.1 cm lesion in the right lobe of the liver is suspicious for metastatic  disease.    Two splenic lesions are suspicious for metastatic disease.  He was referred to Colorectal surgery, Dr. Livia Talavera, who first ordered a CT Chest to complete staging.  It showed no suspicious pulmonary nodules.  Dr. Talavera recommended systemic chemotherapy given extent of disease.   [de-identified] : PMH of HTN, DM, HPL, Osteogenesis imperfecta, h/o Disruptive behavior, schizoaffective disorder [de-identified] : Patient presents for follow up on C13D1 Xeloda alone for adenocarcinoma of the cecum.  He is s/p emergent diverting loop ileostomy on 2/8/2019 with Dr Giang.  He is s/p C10 XELOX.  + Fatigue improved. + Ileostomy remains active, still using imodium. + Neuropathy, describes as a sensitivity in fingertips. + Sleep disturbances mostly improved.  + Grade 1 H/F - xeroderma. + Xerostomia. No fevers. No headaches. No abdominal pain. No N/V. No mucositis. Cold intolerance resolved. \par \par

## 2019-11-22 NOTE — RESULTS/DATA
[FreeTextEntry1] : 10/3/19 CT:  New small opacities right upper lobe favored to be infectious or inflammatory in etiology. Decrease in size of a nonenlarged cardiophrenic lymph node. \par Decrease in size of the splenic lesions. Decrease in size of the ileocolic lymph nodes. Increase in size of a small bowel mesenteric lymph node in the mid abdomen. \par Stable peritoneal nodules in the right upper quadrant. Stable portacaval adenopathy.\par \par 7/1/19 CT C/A/P: Two new right lung nodules. Previously identified lung nodules stable. Increase in size of 1.0 cm lymph node in the cardiophrenic region. The known ileocecal mass is not well visualized on this examination. Decrease in size of peritoneal nodules. Stable splenic lesions. Decrease in size of ileocolic lymph nodes. \par \par 4/30/19 CT CAP:\par Interval loop ileostomy.  The known ileocecal mass is not well visualized on this examination.  Decrease in size of previously identified peritoneal nodules. However, \par there are a few newly noted peritoneal nodules.  Stable splenic lesions.  Stable small bilateral lung nodules.\par \par 1/19/19 Pathology:\par 1. Cecal mass, biopsy ([id number]):\par - At least intramucosal adenocarcinoma in background of high-grade dysplasia, see note.\par -Note: Dr. Goodwin concur(s) with diagnosis. The findings may not represent the entire mass lesion.\par \par 1/9/19 CT Chest:  No lung mass or consolidation. No suspicious pulmonary nodule. 3 mm  triangular density within the minor fissure most  likely benign fissural lymph node.\par \par 1/9/19 U/S Abdomen:  Mild fatty infiltration of the liver.    1.7 cm right lobe liver lesion is indeterminate but for which metastatic  disease is in the differential diagnosis.    Two solid splenic lesions measuring 3.1 cm and 1.8 cm.  The liver lesion and splenic lesions may be further evaluated with PET/CT  scan to evaluate for metastatic disease.  1.2 x 0.4 cm gallbladder polyp.\par \par 12/13/18 CT A/P:  There is a soft tissue mass in the region of the ileocecal  junction measuring 3.7 x 3.5 cm. There is no bowel obstruction. There are  multiple peritoneal nodules in the abdomen and pelvis. For example, a  nodule in the pelvis measures 1.4 cm. A nodule in the left lower quadrant  measures 1.3 cm. There are omental nodules measuring up to 3.2 x 2.9 cm.  There are ileocolic lymph nodes measuring up to 1.3 cm.  1.1 cm lesion in the right lobe of the liver is suspicious for metastatic  disease.    Two splenic lesions are suspicious for metastatic disease.\par \par 12/11/18 Pathology:  Cecal mass, biopsy:   Adenocarcinoma, at least intramucosal, superficial mucosal fragments.\par \par 12/11/18 Colonoscopy:  Cecal mass.  Hemorrhoids

## 2019-11-22 NOTE — PHYSICAL EXAM
[Restricted in physically strenuous activity but ambulatory and able to carry out work of a light or sedentary nature] : Status 1- Restricted in physically strenuous activity but ambulatory and able to carry out work of a light or sedentary nature, e.g., light house work, office work [Normal] : grossly intact [de-identified] : prolapsed ileostomy

## 2019-11-25 ENCOUNTER — OTHER (OUTPATIENT)
Age: 54
End: 2019-11-25

## 2019-12-04 ENCOUNTER — OUTPATIENT (OUTPATIENT)
Dept: OUTPATIENT SERVICES | Facility: HOSPITAL | Age: 54
LOS: 1 days | Discharge: HOME | End: 2019-12-04

## 2019-12-04 ENCOUNTER — MOBILE ON CALL (OUTPATIENT)
Age: 54
End: 2019-12-04

## 2019-12-04 DIAGNOSIS — S82.899A OTHER FRACTURE OF UNSPECIFIED LOWER LEG, INITIAL ENCOUNTER FOR CLOSED FRACTURE: Chronic | ICD-10-CM

## 2019-12-04 DIAGNOSIS — C18.9 MALIGNANT NEOPLASM OF COLON, UNSPECIFIED: ICD-10-CM

## 2019-12-09 ENCOUNTER — LABORATORY RESULT (OUTPATIENT)
Age: 54
End: 2019-12-09

## 2019-12-09 ENCOUNTER — APPOINTMENT (OUTPATIENT)
Dept: HEMATOLOGY ONCOLOGY | Facility: CLINIC | Age: 54
End: 2019-12-09
Payer: MEDICARE

## 2019-12-09 ENCOUNTER — APPOINTMENT (OUTPATIENT)
Age: 54
End: 2019-12-09

## 2019-12-09 VITALS
DIASTOLIC BLOOD PRESSURE: 80 MMHG | SYSTOLIC BLOOD PRESSURE: 129 MMHG | BODY MASS INDEX: 33.18 KG/M2 | HEIGHT: 60 IN | WEIGHT: 169 LBS | TEMPERATURE: 98.4 F | OXYGEN SATURATION: 98 % | HEART RATE: 91 BPM

## 2019-12-09 PROCEDURE — 99215 OFFICE O/P EST HI 40 MIN: CPT

## 2019-12-09 NOTE — PHYSICAL EXAM
[Restricted in physically strenuous activity but ambulatory and able to carry out work of a light or sedentary nature] : Status 1- Restricted in physically strenuous activity but ambulatory and able to carry out work of a light or sedentary nature, e.g., light house work, office work [Normal] : affect appropriate [de-identified] : prolapsed ileostomy

## 2019-12-09 NOTE — HISTORY OF PRESENT ILLNESS
[de-identified] : The patient was diagnosed with adenocarcinoma of the cecum in December 2018 at the age of 53.  He has a history of irregular bowel movements with alternating diarrhea and constipation and recently saw blood on the toilet paper.  He saw GI, Dr. Thuan Cassidy, who performed a colonoscopy on 12/11/18.  Pathology c/w adenocarcinoma.  On 12/13/18 a CT A/P showed a soft tissue mass in the region of the ileocecal  junction measuring 3.7 x 3.5 cm.  There are  multiple peritoneal nodules in the abdomen and pelvis. For example, a  nodule in the pelvis measures 1.4 cm. A nodule in the left lower quadrant  measures 1.3 cm. There are omental nodules measuring up to 3.2 x 2.9 cm.  There are ileocolic lymph nodes measuring up to 1.3 cm.  1.1 cm lesion in the right lobe of the liver is suspicious for metastatic  disease.    Two splenic lesions are suspicious for metastatic disease.  He was referred to Colorectal surgery, Dr. Livia Talavera, who first ordered a CT Chest to complete staging.  It showed no suspicious pulmonary nodules.  Dr. Talavera recommended systemic chemotherapy given extent of disease.   [de-identified] : PMH of HTN, DM, HPL, Osteogenesis imperfecta, h/o Disruptive behavior, schizoaffective disorder [de-identified] : Patient presents for follow up on C14D1 Xeloda alone for adenocarcinoma of the cecum.  He is s/p emergent diverting loop ileostomy on 2/8/2019 with Dr Giang.  He is s/p C10 XELOX.  + Fatigue improved. + Ileostomy remains active, still using imodium. + Neuropathy, describes as a sensitivity in fingertips. + Sleep disturbances mostly improved.  + Grade 1 H/F - xeroderma. + Xerostomia. No fevers. No headaches. No abdominal pain. No N/V. No mucositis. Cold intolerance resolved. No other complaints today.

## 2019-12-09 NOTE — RESULTS/DATA
[FreeTextEntry1] : 10/3/19 CT C/A/P:  New small opacities right upper lobe favored to be infectious or inflammatory in etiology. Decrease in size of a nonenlarged cardiophrenic lymph node. \par Decrease in size of the splenic lesions. Decrease in size of the ileocolic lymph nodes. Increase in size of a small bowel mesenteric lymph node in the mid abdomen. \par Stable peritoneal nodules in the right upper quadrant. Stable portacaval adenopathy.\par \par 7/1/19 CT C/A/P: Two new right lung nodules. Previously identified lung nodules stable. Increase in size of 1.0 cm lymph node in the cardiophrenic region. The known ileocecal mass is not well visualized on this examination. Decrease in size of peritoneal nodules. Stable splenic lesions. Decrease in size of ileocolic lymph nodes. \par \par 4/30/19 CT CAP:\par Interval loop ileostomy.  The known ileocecal mass is not well visualized on this examination.  Decrease in size of previously identified peritoneal nodules. However, \par there are a few newly noted peritoneal nodules.  Stable splenic lesions.  Stable small bilateral lung nodules.\par \par 1/19/19 Pathology:\par 1. Cecal mass, biopsy ([id number]):\par - At least intramucosal adenocarcinoma in background of high-grade dysplasia, see note.\par -Note: Dr. Goodwin concur(s) with diagnosis. The findings may not represent the entire mass lesion.\par \par 1/9/19 CT Chest:  No lung mass or consolidation. No suspicious pulmonary nodule. 3 mm  triangular density within the minor fissure most  likely benign fissural lymph node.\par \par 1/9/19 U/S Abdomen:  Mild fatty infiltration of the liver.    1.7 cm right lobe liver lesion is indeterminate but for which metastatic  disease is in the differential diagnosis.    Two solid splenic lesions measuring 3.1 cm and 1.8 cm.  The liver lesion and splenic lesions may be further evaluated with PET/CT  scan to evaluate for metastatic disease.  1.2 x 0.4 cm gallbladder polyp.\par \par 12/13/18 CT A/P:  There is a soft tissue mass in the region of the ileocecal  junction measuring 3.7 x 3.5 cm. There is no bowel obstruction. There are  multiple peritoneal nodules in the abdomen and pelvis. For example, a  nodule in the pelvis measures 1.4 cm. A nodule in the left lower quadrant  measures 1.3 cm. There are omental nodules measuring up to 3.2 x 2.9 cm.  There are ileocolic lymph nodes measuring up to 1.3 cm.  1.1 cm lesion in the right lobe of the liver is suspicious for metastatic  disease.    Two splenic lesions are suspicious for metastatic disease.\par \par 12/11/18 Pathology:  Cecal mass, biopsy:   Adenocarcinoma, at least intramucosal, superficial mucosal fragments.\par \par 12/11/18 Colonoscopy:  Cecal mass.  Hemorrhoids

## 2019-12-19 NOTE — ED ADULT NURSE NOTE - DOES PATIENT HAVE ADVANCE DIRECTIVE
[Alert] : alert [No Acute Distress] : no acute distress [Normocephalic] : normocephalic No [Playful] : playful [Conjunctivae with no discharge] : conjunctivae with no discharge [PERRL] : PERRL [EOMI Bilateral] : EOMI bilateral [Auricles Well Formed] : auricles well formed [Clear Tympanic membranes with present light reflex and bony landmarks] : clear tympanic membranes with present light reflex and bony landmarks [No Discharge] : no discharge [Nares Patent] : nares patent [Pink Nasal Mucosa] : pink nasal mucosa [Nonerythematous Oropharynx] : nonerythematous oropharynx [Palate Intact] : palate intact [Symmetric Chest Rise] : symmetric chest rise [Trachea Midline] : trachea midline [Clear to Ausculatation Bilaterally] : clear to auscultation bilaterally [Regular Rate and Rhythm] : regular rate and rhythm [Normoactive Precordium] : normoactive precordium [Normal S1, S2 present] : normal S1, S2 present [No Murmurs] : no murmurs [Soft] : soft [Non Distended] : non distended [NonTender] : non tender [Devon 1] : Devon 1 [No Abnormal Lymph Nodes Palpated] : no abnormal lymph nodes palpated [No Gait Asymmetry] : no gait asymmetry [+2 Patella DTR] : +2 patella DTR [Cranial Nerves Grossly Intact] : cranial nerves grossly intact [No Rash or Lesions] : no rash or lesions

## 2020-01-06 ENCOUNTER — FORM ENCOUNTER (OUTPATIENT)
Age: 55
End: 2020-01-06

## 2020-01-07 ENCOUNTER — APPOINTMENT (OUTPATIENT)
Dept: CT IMAGING | Facility: CLINIC | Age: 55
End: 2020-01-07
Payer: MEDICARE

## 2020-01-07 ENCOUNTER — OUTPATIENT (OUTPATIENT)
Dept: OUTPATIENT SERVICES | Facility: HOSPITAL | Age: 55
LOS: 1 days | End: 2020-01-07
Payer: MEDICARE

## 2020-01-07 DIAGNOSIS — S82.899A OTHER FRACTURE OF UNSPECIFIED LOWER LEG, INITIAL ENCOUNTER FOR CLOSED FRACTURE: Chronic | ICD-10-CM

## 2020-01-07 DIAGNOSIS — C18.2 MALIGNANT NEOPLASM OF ASCENDING COLON: ICD-10-CM

## 2020-01-07 PROCEDURE — 74177 CT ABD & PELVIS W/CONTRAST: CPT | Mod: 26

## 2020-01-07 PROCEDURE — 74177 CT ABD & PELVIS W/CONTRAST: CPT

## 2020-01-07 PROCEDURE — 71260 CT THORAX DX C+: CPT | Mod: 26

## 2020-01-07 PROCEDURE — 71260 CT THORAX DX C+: CPT

## 2020-01-13 LAB
HBA1C MFR BLD HPLC: 6.4
LDLC SERPL DIRECT ASSAY-MCNC: 65
MICROALBUMIN/CREAT UR-RTO: 7

## 2020-01-14 ENCOUNTER — APPOINTMENT (OUTPATIENT)
Age: 55
End: 2020-01-14

## 2020-01-15 ENCOUNTER — OUTPATIENT (OUTPATIENT)
Dept: OUTPATIENT SERVICES | Facility: HOSPITAL | Age: 55
LOS: 1 days | Discharge: HOME | End: 2020-01-15

## 2020-01-15 DIAGNOSIS — S82.899A OTHER FRACTURE OF UNSPECIFIED LOWER LEG, INITIAL ENCOUNTER FOR CLOSED FRACTURE: Chronic | ICD-10-CM

## 2020-01-15 DIAGNOSIS — C18.9 MALIGNANT NEOPLASM OF COLON, UNSPECIFIED: ICD-10-CM

## 2020-01-21 ENCOUNTER — APPOINTMENT (OUTPATIENT)
Age: 55
End: 2020-01-21

## 2020-01-25 ENCOUNTER — EMERGENCY (EMERGENCY)
Facility: HOSPITAL | Age: 55
LOS: 1 days | Discharge: HOME | End: 2020-01-25
Attending: EMERGENCY MEDICINE
Payer: MEDICARE

## 2020-01-25 VITALS
SYSTOLIC BLOOD PRESSURE: 115 MMHG | RESPIRATION RATE: 20 BRPM | HEART RATE: 86 BPM | DIASTOLIC BLOOD PRESSURE: 80 MMHG | TEMPERATURE: 98 F | OXYGEN SATURATION: 98 %

## 2020-01-25 VITALS
HEIGHT: 60 IN | DIASTOLIC BLOOD PRESSURE: 77 MMHG | TEMPERATURE: 98 F | RESPIRATION RATE: 20 BRPM | OXYGEN SATURATION: 96 % | WEIGHT: 169.98 LBS | SYSTOLIC BLOOD PRESSURE: 123 MMHG | HEART RATE: 83 BPM

## 2020-01-25 DIAGNOSIS — F25.9 SCHIZOAFFECTIVE DISORDER, UNSPECIFIED: ICD-10-CM

## 2020-01-25 DIAGNOSIS — S82.899A OTHER FRACTURE OF UNSPECIFIED LOWER LEG, INITIAL ENCOUNTER FOR CLOSED FRACTURE: Chronic | ICD-10-CM

## 2020-01-25 DIAGNOSIS — R69 ILLNESS, UNSPECIFIED: ICD-10-CM

## 2020-01-25 LAB
ALBUMIN SERPL ELPH-MCNC: 4.6 G/DL — SIGNIFICANT CHANGE UP (ref 3.3–5.2)
ALP SERPL-CCNC: 55 U/L — SIGNIFICANT CHANGE UP (ref 40–120)
ALT FLD-CCNC: 64 U/L — HIGH
AMPHET UR-MCNC: NEGATIVE — SIGNIFICANT CHANGE UP
ANION GAP SERPL CALC-SCNC: 13 MMOL/L — SIGNIFICANT CHANGE UP (ref 5–17)
APTT BLD: 32.4 SEC — SIGNIFICANT CHANGE UP (ref 27.5–36.3)
AST SERPL-CCNC: 56 U/L — HIGH
BARBITURATES UR SCN-MCNC: NEGATIVE — SIGNIFICANT CHANGE UP
BASOPHILS # BLD AUTO: 0.03 K/UL — SIGNIFICANT CHANGE UP (ref 0–0.2)
BASOPHILS NFR BLD AUTO: 0.3 % — SIGNIFICANT CHANGE UP (ref 0–2)
BENZODIAZ UR-MCNC: NEGATIVE — SIGNIFICANT CHANGE UP
BILIRUB SERPL-MCNC: 0.9 MG/DL — SIGNIFICANT CHANGE UP (ref 0.4–2)
BLD GP AB SCN SERPL QL: SIGNIFICANT CHANGE UP
BUN SERPL-MCNC: 15 MG/DL — SIGNIFICANT CHANGE UP (ref 8–20)
CALCIUM SERPL-MCNC: 9.9 MG/DL — SIGNIFICANT CHANGE UP (ref 8.6–10.2)
CHLORIDE SERPL-SCNC: 97 MMOL/L — LOW (ref 98–107)
CO2 SERPL-SCNC: 24 MMOL/L — SIGNIFICANT CHANGE UP (ref 22–29)
COCAINE METAB.OTHER UR-MCNC: NEGATIVE — SIGNIFICANT CHANGE UP
CREAT SERPL-MCNC: 0.63 MG/DL — SIGNIFICANT CHANGE UP (ref 0.5–1.3)
EOSINOPHIL # BLD AUTO: 0.12 K/UL — SIGNIFICANT CHANGE UP (ref 0–0.5)
EOSINOPHIL NFR BLD AUTO: 1.1 % — SIGNIFICANT CHANGE UP (ref 0–6)
GLUCOSE SERPL-MCNC: 134 MG/DL — HIGH (ref 70–99)
HCT VFR BLD CALC: 39.8 % — SIGNIFICANT CHANGE UP (ref 39–50)
HGB BLD-MCNC: 13.5 G/DL — SIGNIFICANT CHANGE UP (ref 13–17)
IMM GRANULOCYTES NFR BLD AUTO: 0.3 % — SIGNIFICANT CHANGE UP (ref 0–1.5)
INR BLD: 1.07 RATIO — SIGNIFICANT CHANGE UP (ref 0.88–1.16)
LYMPHOCYTES # BLD AUTO: 19.2 % — SIGNIFICANT CHANGE UP (ref 13–44)
LYMPHOCYTES # BLD AUTO: 2.09 K/UL — SIGNIFICANT CHANGE UP (ref 1–3.3)
MCHC RBC-ENTMCNC: 33.2 PG — SIGNIFICANT CHANGE UP (ref 27–34)
MCHC RBC-ENTMCNC: 33.9 GM/DL — SIGNIFICANT CHANGE UP (ref 32–36)
MCV RBC AUTO: 97.8 FL — SIGNIFICANT CHANGE UP (ref 80–100)
METHADONE UR-MCNC: NEGATIVE — SIGNIFICANT CHANGE UP
MONOCYTES # BLD AUTO: 0.94 K/UL — HIGH (ref 0–0.9)
MONOCYTES NFR BLD AUTO: 8.6 % — SIGNIFICANT CHANGE UP (ref 2–14)
NEUTROPHILS # BLD AUTO: 7.67 K/UL — HIGH (ref 1.8–7.4)
NEUTROPHILS NFR BLD AUTO: 70.5 % — SIGNIFICANT CHANGE UP (ref 43–77)
OB PNL STL: POSITIVE
OPIATES UR-MCNC: NEGATIVE — SIGNIFICANT CHANGE UP
PCP SPEC-MCNC: SIGNIFICANT CHANGE UP
PCP UR-MCNC: NEGATIVE — SIGNIFICANT CHANGE UP
PLATELET # BLD AUTO: 163 K/UL — SIGNIFICANT CHANGE UP (ref 150–400)
POTASSIUM SERPL-MCNC: 3.6 MMOL/L — SIGNIFICANT CHANGE UP (ref 3.5–5.3)
POTASSIUM SERPL-SCNC: 3.6 MMOL/L — SIGNIFICANT CHANGE UP (ref 3.5–5.3)
PROT SERPL-MCNC: 7.4 G/DL — SIGNIFICANT CHANGE UP (ref 6.6–8.7)
PROTHROM AB SERPL-ACNC: 12.3 SEC — SIGNIFICANT CHANGE UP (ref 10–12.9)
RBC # BLD: 4.07 M/UL — LOW (ref 4.2–5.8)
RBC # FLD: 14.2 % — SIGNIFICANT CHANGE UP (ref 10.3–14.5)
SODIUM SERPL-SCNC: 134 MMOL/L — LOW (ref 135–145)
THC UR QL: NEGATIVE — SIGNIFICANT CHANGE UP
WBC # BLD: 10.88 K/UL — HIGH (ref 3.8–10.5)
WBC # FLD AUTO: 10.88 K/UL — HIGH (ref 3.8–10.5)

## 2020-01-25 PROCEDURE — 99284 EMERGENCY DEPT VISIT MOD MDM: CPT

## 2020-01-25 PROCEDURE — 86850 RBC ANTIBODY SCREEN: CPT

## 2020-01-25 PROCEDURE — 82272 OCCULT BLD FECES 1-3 TESTS: CPT

## 2020-01-25 PROCEDURE — 36415 COLL VENOUS BLD VENIPUNCTURE: CPT

## 2020-01-25 PROCEDURE — 85730 THROMBOPLASTIN TIME PARTIAL: CPT

## 2020-01-25 PROCEDURE — 86901 BLOOD TYPING SEROLOGIC RH(D): CPT

## 2020-01-25 PROCEDURE — 80307 DRUG TEST PRSMV CHEM ANLYZR: CPT

## 2020-01-25 PROCEDURE — 93005 ELECTROCARDIOGRAM TRACING: CPT

## 2020-01-25 PROCEDURE — 86900 BLOOD TYPING SEROLOGIC ABO: CPT

## 2020-01-25 PROCEDURE — 93010 ELECTROCARDIOGRAM REPORT: CPT

## 2020-01-25 PROCEDURE — 85027 COMPLETE CBC AUTOMATED: CPT

## 2020-01-25 PROCEDURE — 80053 COMPREHEN METABOLIC PANEL: CPT

## 2020-01-25 PROCEDURE — 99284 EMERGENCY DEPT VISIT MOD MDM: CPT | Mod: 25

## 2020-01-25 PROCEDURE — 90792 PSYCH DIAG EVAL W/MED SRVCS: CPT

## 2020-01-25 PROCEDURE — 85610 PROTHROMBIN TIME: CPT

## 2020-01-25 RX ADMIN — Medication 1 MILLIGRAM(S): at 17:23

## 2020-01-25 NOTE — ED BEHAVIORAL HEALTH ASSESSMENT NOTE - DESCRIPTION
Patient was somatically preoccupied, responds well to redirection. osteogenesis imperfecta, HTN, hypothyroidism, DM type 2 Patient was born in [place]. Raised by parents. Father is a psychologist. Patient with two brothers.  He reports he has a BE from [facility]

## 2020-01-25 NOTE — ED PROVIDER NOTE - OBJECTIVE STATEMENT
54y/oM with PMHx of colon CA, DM, HLD, hypothyroidism, osteogenesis imperfecta, schizo-affective psychosis (pt has not been taking meds); presents to ED c/o hematochezia since today. Pt reports that he is anxious because he is not able to change his ileostomy bag. Pt came in today for an eval. Denies SI/HI. Pt requested to see psych. Pt states he has hematochezia. Denies fever, chills, N/V/D.

## 2020-01-25 NOTE — ED ADULT TRIAGE NOTE - CHIEF COMPLAINT QUOTE
Pt BIBA from [facility] assisted living c/o dark stools to his colostomy site. Pt denies any abdominal pain or discomfort.

## 2020-01-25 NOTE — ED ADULT NURSE NOTE - CHIEF COMPLAINT QUOTE
Pt BIBA from [facility] assisted living c/o dark stools to his colostomy site. Pt denies any abdominal pain or discomfort.

## 2020-01-25 NOTE — ED PROVIDER NOTE - PMH
Colon cancer    Diabetes    High cholesterol    Hypothyroidism    Osteogenesis imperfecta    Schizo-affective psychosis

## 2020-01-25 NOTE — ED BEHAVIORAL HEALTH ASSESSMENT NOTE - SUMMARY
Patient is a 54  year old, male; domiciled in Community Residence (Concern for independent living) ; ;  noncaregiver; past psychiatric history of schizoaffective disorder ; multiple prior psychiatric hospitalizations (last time approx 2 years ago); no known suicide attempts; no known history of violence or arrests; no active substance abuse or known history of complicated withdrawal; PMH of osteogenesis imperfecta, HTN, hypothyroidism, DM type 2 recent dx and bowel resection with colostomy due to cecal mass, came to ED with concerns about his colostomy bag.  Referred to psych due to anxiety and disorganized thinking and by Pt request.  Pt is presenting with mild disorganization and preoccupation with colostomy.  Pt is oriented fully and no evidence of psychosis of cristy, denies SI/HI/AH/delusions.  Pt reports missed one dose of Clozapine and Risperdal but per EMR not on Risperdal and meds are supervised.  Attempted calling residence for collateral but no answer.  Pt is psychiatrically stable for discharge to residence.  Pt directed to call Dr Claudio with questions about meds and follow up.

## 2020-01-25 NOTE — ED BEHAVIORAL HEALTH ASSESSMENT NOTE - HPI (INCLUDE ILLNESS QUALITY, SEVERITY, DURATION, TIMING, CONTEXT, MODIFYING FACTORS, ASSOCIATED SIGNS AND SYMPTOMS)
Patient is a 54  year old, male; domiciled in Community [facility] (Concern for independent living) ; ;  noncaregiver; past psychiatric history of schizoaffective disorder ; multiple prior psychiatric hospitalizations (last time approx 2 years ago); no known suicide attempts; no known history of violence or arrests; no active substance abuse or known history of complicated withdrawal; PMH of osteogenesis imperfecta, HTN, hypothyroidism, DM type 2 recent dx and bowel resection with colostomy due to cecal mass, came to ED with concerns about his colostomy bag.  Referred to psych due to anxiety and disorganized thinking and by Pt request.     Patient is currently followed by Dr. Claudio at [facility]. He has been psychiatrically stable since surgery and came to ED for concerns about colostomy.  Pt asked to speak to psych because he states he help his Risperdal and Clozaril after missing a dose last night,  and was asking if he should come off of Clozaril.  Of note per records from [facility], Risperdal is not listed on med list.  Attempted to call to clarify, but no answer at residence.  Meds ARE supervised at residence per med list.  Pt was redirected to Dr Claudio and advised not to stop meds without speaking to him.  ANC is 7.67.  Pt denies depressive symptoms, SI/HI and he is having difficulty making decision about when to change his colostomy bag.  Pt demonstrating mild disorganization and perseverating on when to change his colostomy bag as he feels it should follow a certain order with regard to taking his pills.  Pt advised he could have the bag changed anytime, or while in hospital.  Pt denies AH and no evidence of delusions.  Pt oriented to H, month and year.  Thinking is slowed and is distracted but redirectable.  Responds well to directions and reassurance.  Pt advised to NOT miss any meds without speaking to MD and admits to missing only one dose of Risperdal and Clozaril.  Pt advised to follow up with psychiatrist on Monday for med clarification.  Pt is well relating good eye contact somatically preoccupied, denies depression SI/HI/AH and no evidence of delusions or cristy.

## 2020-01-25 NOTE — ED PROVIDER NOTE - PATIENT PORTAL LINK FT
You can access the FollowMyHealth Patient Portal offered by [facility] by registering at the following website: [email or web address]. By joining [facility] FollowMyHealth portal, you will also be able to view your health information using other applications (apps) compatible with our system.

## 2020-01-25 NOTE — ED BEHAVIORAL HEALTH ASSESSMENT NOTE - CURRENT MEDICATION
Vit D 3 tabs qd, loperamide 2 mg qid, Xeloda 500 mg 3 tabs bid with food, Odansetron 8 mg 1 q8h nausea, Prochlorperazine 10 mg nausea, Janumet 50 mg-500 bid, Ativan 0.5 bid, Pindolol 0.5 mg, alendronate 70 mg, Synthroid 100 mcg qd, Clozapine 200 hs, Colace 40 mg hs.

## 2020-01-25 NOTE — ED ADULT NURSE REASSESSMENT NOTE - NS ED NURSE REASSESS COMMENT FT1
Patient exponentially improved from initial presentation.  Much less anxious, stable for dc per Dr. Richard.  S/w Danika counselor at [facility].  Educated patient on taking psych meds and utilizing PRNs as needed for anxiety.  Patient expressed understanding.  Patient ambulated out of ED without issue, awaiting cab.

## 2020-01-25 NOTE — ED ADULT NURSE NOTE - OBJECTIVE STATEMENT
Patient comes in on the advice of "someone" at his community residence to get checked out for "dehydration".  Patient with ostomy bag draining green-brown fecal matter. Stoma red, no blood, no dark stool/melena.  Patient is very anxious, states he hasn't had his medication in a few nights because his doctor changed it.  Intermittently requesting to go home.  Encouraging patient to stay and get checked out by a doctor.  Patient states "I came here to see an ostomy nurse".  Provided po fluids, Encouraged deep breathing and utilized therapeutic communication.  Ambulated patient to restroom to change ostomy independently, steady gait with walker.

## 2020-01-25 NOTE — ED BEHAVIORAL HEALTH ASSESSMENT NOTE - RISK ASSESSMENT
RF past psychosis and psych admissions  PF stable housing, in tx supportive family Low Acute Suicide Risk

## 2020-01-28 ENCOUNTER — APPOINTMENT (OUTPATIENT)
Dept: ENDOCRINOLOGY | Facility: CLINIC | Age: 55
End: 2020-01-28

## 2020-02-14 ENCOUNTER — OUTPATIENT (OUTPATIENT)
Dept: OUTPATIENT SERVICES | Facility: HOSPITAL | Age: 55
LOS: 1 days | Discharge: HOME | End: 2020-02-14

## 2020-02-14 DIAGNOSIS — S82.899A OTHER FRACTURE OF UNSPECIFIED LOWER LEG, INITIAL ENCOUNTER FOR CLOSED FRACTURE: Chronic | ICD-10-CM

## 2020-02-14 DIAGNOSIS — C18.9 MALIGNANT NEOPLASM OF COLON, UNSPECIFIED: ICD-10-CM

## 2020-02-20 ENCOUNTER — APPOINTMENT (OUTPATIENT)
Dept: HEMATOLOGY ONCOLOGY | Facility: CLINIC | Age: 55
End: 2020-02-20

## 2020-02-25 ENCOUNTER — EMERGENCY (EMERGENCY)
Facility: HOSPITAL | Age: 55
LOS: 1 days | Discharge: HOME | End: 2020-02-25
Attending: EMERGENCY MEDICINE
Payer: MEDICARE

## 2020-02-25 VITALS
HEART RATE: 102 BPM | DIASTOLIC BLOOD PRESSURE: 76 MMHG | RESPIRATION RATE: 18 BRPM | WEIGHT: 199.96 LBS | OXYGEN SATURATION: 98 % | HEIGHT: 68 IN | SYSTOLIC BLOOD PRESSURE: 121 MMHG | TEMPERATURE: 98 F

## 2020-02-25 VITALS
OXYGEN SATURATION: 99 % | RESPIRATION RATE: 20 BRPM | DIASTOLIC BLOOD PRESSURE: 70 MMHG | TEMPERATURE: 98 F | SYSTOLIC BLOOD PRESSURE: 122 MMHG | HEART RATE: 89 BPM

## 2020-02-25 DIAGNOSIS — S82.899A OTHER FRACTURE OF UNSPECIFIED LOWER LEG, INITIAL ENCOUNTER FOR CLOSED FRACTURE: Chronic | ICD-10-CM

## 2020-02-25 PROCEDURE — 99283 EMERGENCY DEPT VISIT LOW MDM: CPT

## 2020-02-25 NOTE — ED ADULT TRIAGE NOTE - CHIEF COMPLAINT QUOTE
EMS states prolaspsed illeostomy, middle right side, EMS states prolaspsed illeostomy, middle right side, has progressively gotten worse,  PT resident of [facility],has aid with him,   elopement risk, can be aggressive

## 2020-02-25 NOTE — ED PROVIDER NOTE - PHYSICAL EXAMINATION
Head: atraumatic, normacephalic  Face: atraumatic, no crepitus no orbiral/maxillary/mandibular ttp  throat: uvula midline no exudates  eyes: perrla eomi  heart: rrr s1s2  lungs: ctab  abd: soft, nt nd +stoma HAS OUTPUT no erythema around it +bs no rebound/guarding no cva ttp  skin: warm  LE: no swelling, no calf ttp  back: no midline cervical/thoracic/lumbar ttp

## 2020-02-25 NOTE — ED PROVIDER NOTE - NS ED ROS FT
Denies f/c/n/v/cp/sob/palpitations/ cough/rash/headache/dizziness/abd.pain/d/c/dysuria/hematuria  ENLARGED STOMA

## 2020-02-25 NOTE — ED PROVIDER NOTE - NSFOLLOWUPINSTRUCTIONS_ED_ALL_ED_FT
ILEOSTOMY EVALUATED BY COLORECTAL SURGERY CLEARED FOR DISCHARGE. FOLLOW UP WITH DR. RESENDIZ (COLORECTAL) NEXT WEEK IN THE OFFICE. return to the ED if symptoms worsen fever/chills abdominal pain no stoma output

## 2020-02-25 NOTE — ED PROVIDER NOTE - PATIENT PORTAL LINK FT
You can access the FollowMyHealth Patient Portal offered by [facility] by registering at the following website: [email or web address]. By joining [facility]’s FollowMyHealth portal, you will also be able to view your health information using other applications (apps) compatible with our system.

## 2020-02-25 NOTE — ED ADULT NURSE NOTE - CHIEF COMPLAINT QUOTE
EMS states prolaspsed illeostomy, middle right side, has progressively gotten worse,  PT resident of [facility],has aid with him,   elopement risk, can be aggressive

## 2020-02-25 NOTE — ED PROVIDER NOTE - CLINICAL SUMMARY MEDICAL DECISION MAKING FREE TEXT BOX
here for evaluation of a stoma no discharge no redness + enlargment will have colorectal surgery eval pt--pt in no distress at this time.-- reassess

## 2020-02-25 NOTE — CONSULT NOTE ADULT - ASSESSMENT
53 yo M w/ Hx of metastatic cecal adenocarcinoma s/p ex-lap, loop ileostomy in 2/19 now presenting with prolapsed loop ilesotomy, fully functioning    Ileostomy is functioning without difficulty, appears viable with no issues  Patient is to follow up with Dr. Giang in 2 weeks

## 2020-02-25 NOTE — CONSULT NOTE ADULT - SUBJECTIVE AND OBJECTIVE BOX
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 Known to our Surgical oncology service back in Feb 2019 presented with SBO secondary to cecal mass. Was taken for palliative ex-lap and loop ileostomy on 2/8/19. Coming in today because he noticed it had prolapsed in the last couple of days. Denies any abdominal pain. Tolerating regular diet, denies nausea/vomiting. Continues to have adequate output from ostomy - stool and gas daily. Voiding at baseline. Denies fevers/chills.     Pmhx: Schizoaffective disorder, DM, HTN  Pshx: Ex-Lap, Loop ileostomy  Meds: As per med rec  Allergies: NKDA  Shx: Denies x 3      MEDICATIONS  (STANDING):    MEDICATIONS  (PRN):      Vital Signs Last 24 Hrs  T(C): 36.7 (25 Feb 2020 14:08), Max: 36.7 (25 Feb 2020 14:08)  T(F): 98 (25 Feb 2020 14:08), Max: 98 (25 Feb 2020 14:08)  HR: 102 (25 Feb 2020 14:08) (102 - 102)  BP: 121/76 (25 Feb 2020 14:08) (121/76 - 121/76)  BP(mean): --  RR: 18 (25 Feb 2020 14:08) (18 - 18)  SpO2: 98% (25 Feb 2020 14:08) (98% - 98%)    PE  Gen: AOX3, NAD  Pulm: Non labored breathing  CV: RRR, s1 and s2  Abd: Soft, ND, NT, +Prolapsed loop ileostomy - viable, patent, pink  Ext: Moving all 4 extremities  Vasc: +2 dp/pt pulses bilaterally  Neuro: Good affect      I&O's Detail      LABS:                RADIOLOGY & ADDITIONAL STUDIES:

## 2020-02-25 NOTE — ED PROVIDER NOTE - OBJECTIVE STATEMENT
55yo M PMHx of colon CA, DM, HLD, hypothyroidism, osteogenesis imperfecta, schizo-affective psychosis, sp ostemy in 2019 pw enlargement of stoma, currently at [facility] states that over the past month the stoma has gotten bigger. still has output. pts mother saw him today and asked the nurses to evaluate the stoma so pt was send to the ED. Pt and mother Denies f/c/n/v/cp/sob/palpitations/ cough/rash/headache/dizziness/abd.pain/d/c/dysuria/hematuria. no sick contacts no recent travel

## 2020-02-25 NOTE — ED PROVIDER NOTE - PROGRESS NOTE DETAILS
spoke to colorectal who evaluated the pt note no need for labs evaluated ostemy--ok to dc with outpatient follow up in 1 week with Dr. Persaud

## 2020-03-02 ENCOUNTER — APPOINTMENT (OUTPATIENT)
Dept: HEMATOLOGY ONCOLOGY | Facility: CLINIC | Age: 55
End: 2020-03-02
Payer: MEDICARE

## 2020-03-03 ENCOUNTER — RESULT REVIEW (OUTPATIENT)
Age: 55
End: 2020-03-03

## 2020-03-03 ENCOUNTER — APPOINTMENT (OUTPATIENT)
Age: 55
End: 2020-03-03

## 2020-03-03 ENCOUNTER — LABORATORY RESULT (OUTPATIENT)
Age: 55
End: 2020-03-03

## 2020-03-03 ENCOUNTER — APPOINTMENT (OUTPATIENT)
Dept: HEMATOLOGY ONCOLOGY | Facility: CLINIC | Age: 55
End: 2020-03-03
Payer: MEDICARE

## 2020-03-03 ENCOUNTER — APPOINTMENT (OUTPATIENT)
Dept: SURGICAL ONCOLOGY | Facility: CLINIC | Age: 55
End: 2020-03-03
Payer: MEDICARE

## 2020-03-03 VITALS
DIASTOLIC BLOOD PRESSURE: 82 MMHG | HEART RATE: 74 BPM | HEIGHT: 68 IN | SYSTOLIC BLOOD PRESSURE: 128 MMHG | TEMPERATURE: 97.5 F | BODY MASS INDEX: 25.01 KG/M2 | OXYGEN SATURATION: 94 % | WEIGHT: 165 LBS

## 2020-03-03 LAB
BASOPHILS # BLD AUTO: 0.1 K/UL — SIGNIFICANT CHANGE UP (ref 0–0.2)
BASOPHILS NFR BLD AUTO: 0.7 % — SIGNIFICANT CHANGE UP (ref 0–2)
EOSINOPHIL # BLD AUTO: 0.1 K/UL — SIGNIFICANT CHANGE UP (ref 0–0.5)
EOSINOPHIL NFR BLD AUTO: 1.6 % — SIGNIFICANT CHANGE UP (ref 0–6)
HCT VFR BLD CALC: 38 % — LOW (ref 39–50)
HGB BLD-MCNC: 13.1 G/DL — SIGNIFICANT CHANGE UP (ref 13–17)
LYMPHOCYTES # BLD AUTO: 1.8 K/UL — SIGNIFICANT CHANGE UP (ref 1–3.3)
LYMPHOCYTES # BLD AUTO: 20 % — SIGNIFICANT CHANGE UP (ref 13–44)
MCHC RBC-ENTMCNC: 33.5 PG — SIGNIFICANT CHANGE UP (ref 27–34)
MCHC RBC-ENTMCNC: 34.4 G/DL — SIGNIFICANT CHANGE UP (ref 32–36)
MCV RBC AUTO: 97.3 FL — SIGNIFICANT CHANGE UP (ref 80–100)
MONOCYTES # BLD AUTO: 0.6 K/UL — SIGNIFICANT CHANGE UP (ref 0–0.9)
MONOCYTES NFR BLD AUTO: 6.3 % — SIGNIFICANT CHANGE UP (ref 2–14)
NEUTROPHILS # BLD AUTO: 6.4 K/UL — SIGNIFICANT CHANGE UP (ref 1.8–7.4)
NEUTROPHILS NFR BLD AUTO: 71.4 % — SIGNIFICANT CHANGE UP (ref 43–77)
PLATELET # BLD AUTO: 196 K/UL — SIGNIFICANT CHANGE UP (ref 150–400)
RBC # BLD: 3.91 M/UL — LOW (ref 4.2–5.8)
RBC # FLD: 15.1 % — HIGH (ref 10.3–14.5)
WBC # BLD: 8.9 K/UL — SIGNIFICANT CHANGE UP (ref 3.8–10.5)
WBC # FLD AUTO: 8.9 K/UL — SIGNIFICANT CHANGE UP (ref 3.8–10.5)

## 2020-03-03 PROCEDURE — 99214 OFFICE O/P EST MOD 30 MIN: CPT

## 2020-03-03 PROCEDURE — 99215 OFFICE O/P EST HI 40 MIN: CPT

## 2020-03-03 RX ORDER — RISPERIDONE 3 MG/1
3 TABLET ORAL
Refills: 0 | Status: DISCONTINUED | COMMUNITY
End: 2020-03-03

## 2020-03-03 RX ORDER — SITAGLIPTIN AND METFORMIN HYDROCHLORIDE 50; 500 MG/1; MG/1
50-500 TABLET, FILM COATED ORAL
Qty: 180 | Refills: 0 | Status: DISCONTINUED | COMMUNITY
Start: 2018-12-27 | End: 2020-03-03

## 2020-03-03 RX ORDER — ATORVASTATIN CALCIUM 20 MG/1
20 TABLET, FILM COATED ORAL
Qty: 90 | Refills: 0 | Status: DISCONTINUED | COMMUNITY
Start: 2019-01-07 | End: 2020-03-03

## 2020-03-03 RX ORDER — LORAZEPAM 0.5 MG/1
0.5 TABLET ORAL
Refills: 0 | Status: DISCONTINUED | COMMUNITY
End: 2020-03-03

## 2020-03-03 NOTE — HISTORY OF PRESENT ILLNESS
[de-identified] : The patient was diagnosed with adenocarcinoma of the cecum in December 2018 at the age of 53.  He has a history of irregular bowel movements with alternating diarrhea and constipation and recently saw blood on the toilet paper.  He saw GI, Dr. Thuan Cassidy, who performed a colonoscopy on 12/11/18.  Pathology c/w adenocarcinoma.  On 12/13/18 a CT A/P showed a soft tissue mass in the region of the ileocecal  junction measuring 3.7 x 3.5 cm.  There are  multiple peritoneal nodules in the abdomen and pelvis. For example, a  nodule in the pelvis measures 1.4 cm. A nodule in the left lower quadrant  measures 1.3 cm. There are omental nodules measuring up to 3.2 x 2.9 cm.  There are ileocolic lymph nodes measuring up to 1.3 cm.  1.1 cm lesion in the right lobe of the liver is suspicious for metastatic  disease.    Two splenic lesions are suspicious for metastatic disease.  He was referred to Colorectal surgery, Dr. Livia Talavera, who first ordered a CT Chest to complete staging.  It showed no suspicious pulmonary nodules.  Dr. Talavera recommended systemic chemotherapy given extent of disease.   [de-identified] : PMH of HTN, DM, HPL, Osteogenesis imperfecta, h/o Disruptive behavior, schizoaffective disorder [de-identified] : Patient presents for follow up on Xeloda alone for adenocarcinoma of the cecum.  He is s/p C10 XELOX (C10 was on 10/7/19).  He is s/p emergent diverting loop ileostomy on 2/8/2019 with Dr Giang.  Patient recently had a psychotic break and is now in-patient at [facility].  + Pruritus around stoma.  + Fatigue improved.  + Neuropathy improved. + Sleep disturbances, worse recently.  + Grade 1 H/F - xeroderma. + Xerostomia. No fevers. No headaches. No abdominal pain. No N/V. No mucositis.

## 2020-03-03 NOTE — RESULTS/DATA
[FreeTextEntry1] : 1/7/20 CT CAP:  Previously identified inflammatory opacity in the right upper lobe has resolved. Stable bilateral lung nodules. Stable portacaval adenopathy. \par Decrease in size of the 2 splenic lesions which are favored to be capsular in location. Stable peritoneal nodules and mesenteric lymph nodes.\par \par 10/3/19 CT C/A/P:  New small opacities right upper lobe favored to be infectious or inflammatory in etiology. Decrease in size of a nonenlarged cardiophrenic lymph node. \par Decrease in size of the splenic lesions. Decrease in size of the ileocolic lymph nodes. Increase in size of a small bowel mesenteric lymph node in the mid abdomen. \par Stable peritoneal nodules in the right upper quadrant. Stable portacaval adenopathy.\par \par 7/1/19 CT C/A/P: Two new right lung nodules. Previously identified lung nodules stable. Increase in size of 1.0 cm lymph node in the cardiophrenic region. The known ileocecal mass is not well visualized on this examination. Decrease in size of peritoneal nodules. Stable splenic lesions. Decrease in size of ileocolic lymph nodes. \par \par 4/30/19 CT CAP:\par Interval loop ileostomy.  The known ileocecal mass is not well visualized on this examination.  Decrease in size of previously identified peritoneal nodules. However, \par there are a few newly noted peritoneal nodules.  Stable splenic lesions.  Stable small bilateral lung nodules.\par \par 1/19/19 Pathology:\par 1. Cecal mass, biopsy ([id number]):\par - At least intramucosal adenocarcinoma in background of high-grade dysplasia, see note.\par -Note: Dr. Goodwin concur(s) with diagnosis. The findings may not represent the entire mass lesion.\par \par 1/9/19 CT Chest:  No lung mass or consolidation. No suspicious pulmonary nodule. 3 mm  triangular density within the minor fissure most  likely benign fissural lymph node.\par \par 1/9/19 U/S Abdomen:  Mild fatty infiltration of the liver.    1.7 cm right lobe liver lesion is indeterminate but for which metastatic  disease is in the differential diagnosis.    Two solid splenic lesions measuring 3.1 cm and 1.8 cm.  The liver lesion and splenic lesions may be further evaluated with PET/CT  scan to evaluate for metastatic disease.  1.2 x 0.4 cm gallbladder polyp.\par \par 12/13/18 CT A/P:  There is a soft tissue mass in the region of the ileocecal  junction measuring 3.7 x 3.5 cm. There is no bowel obstruction. There are  multiple peritoneal nodules in the abdomen and pelvis. For example, a  nodule in the pelvis measures 1.4 cm. A nodule in the left lower quadrant  measures 1.3 cm. There are omental nodules measuring up to 3.2 x 2.9 cm.  There are ileocolic lymph nodes measuring up to 1.3 cm.  1.1 cm lesion in the right lobe of the liver is suspicious for metastatic  disease.    Two splenic lesions are suspicious for metastatic disease.\par \par 12/11/18 Pathology:  Cecal mass, biopsy:   Adenocarcinoma, at least intramucosal, superficial mucosal fragments.\par \par 12/11/18 Colonoscopy:  Cecal mass.  Hemorrhoids

## 2020-03-05 NOTE — REVIEW OF SYSTEMS
[Negative] : Heme/Lymph [FreeTextEntry8] : worsening ileostomy prolapse [de-identified] : worsening tremors, hyperactivity/anxiety/psychoses

## 2020-03-05 NOTE — PHYSICAL EXAM
[Normal] : supple, no neck mass and thyroid not enlarged [Normal Neck Lymph Nodes] : normal neck lymph nodes  [Normal Supraclavicular Lymph Nodes] : normal supraclavicular lymph nodes [Normal Groin Lymph Nodes] : normal groin lymph nodes [Normal Axillary Lymph Nodes] : normal axillary lymph nodes [Normal] : oriented to person, place and time, with appropriate affect [de-identified] : abdomen soft, non-distended, non-tender. wound healing appropriately. Ostomy, pink, functional with liquid stool. moderate prolapse without evidence of congestion or ischemia.

## 2020-03-05 NOTE — ASSESSMENT
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 Disease with good response to XELOX and now Xeloda. Unfortunately due to patient's worsening psychosis he was unable to take part in surgical decision making but is worried about his ileostomy.\par \par I discussed with his mother that while the prolapse is worsening and is unsightly, at this time there is no compromise of the stoma and it is functioning well. I discussed reversal of the ileostomy with Nick and his mother once he is better from a psychiatric standpoint. We are in clear agreement the aggressive CRS-HIPEC would not be appopriate for Nick. I asked them to return when Nick is ready to discusss ileostomy reversal.

## 2020-03-05 NOTE — REASON FOR VISIT
[Follow-Up Visit] : a follow-up visit for [Colon Cancer] : colon cancer [FreeTextEntry2] : with peritoneal carcinomatosis

## 2020-04-13 ENCOUNTER — APPOINTMENT (OUTPATIENT)
Dept: HEMATOLOGY ONCOLOGY | Facility: CLINIC | Age: 55
End: 2020-04-13

## 2020-04-13 ENCOUNTER — OUTPATIENT (OUTPATIENT)
Dept: OUTPATIENT SERVICES | Facility: HOSPITAL | Age: 55
LOS: 1 days | Discharge: HOME | End: 2020-04-13

## 2020-04-13 DIAGNOSIS — C18.9 MALIGNANT NEOPLASM OF COLON, UNSPECIFIED: ICD-10-CM

## 2020-04-13 DIAGNOSIS — S82.899A OTHER FRACTURE OF UNSPECIFIED LOWER LEG, INITIAL ENCOUNTER FOR CLOSED FRACTURE: Chronic | ICD-10-CM

## 2020-04-28 ENCOUNTER — APPOINTMENT (OUTPATIENT)
Dept: HEMATOLOGY ONCOLOGY | Facility: CLINIC | Age: 55
End: 2020-04-28

## 2020-04-28 ENCOUNTER — APPOINTMENT (OUTPATIENT)
Age: 55
End: 2020-04-28

## 2020-04-29 NOTE — ED ADULT NURSE NOTE - PAIN RATING/NUMBER SCALE (0-10): ACTIVITY
Detail Level: Detailed
Quality 431: Preventive Care And Screening: Unhealthy Alcohol Use - Screening: Patient screened for unhealthy alcohol use using a single question and scores less than 2 times per year
Quality 226: Preventive Care And Screening: Tobacco Use: Screening And Cessation Intervention: Patient screened for tobacco use and is an ex/non-smoker
0

## 2020-05-04 ENCOUNTER — APPOINTMENT (OUTPATIENT)
Dept: CT IMAGING | Facility: CLINIC | Age: 55
End: 2020-05-04

## 2020-05-11 ENCOUNTER — APPOINTMENT (OUTPATIENT)
Dept: CT IMAGING | Facility: CLINIC | Age: 55
End: 2020-05-11
Payer: MEDICARE

## 2020-05-11 ENCOUNTER — OUTPATIENT (OUTPATIENT)
Dept: OUTPATIENT SERVICES | Facility: HOSPITAL | Age: 55
LOS: 1 days | End: 2020-05-11
Payer: MEDICARE

## 2020-05-11 ENCOUNTER — RESULT REVIEW (OUTPATIENT)
Age: 55
End: 2020-05-11

## 2020-05-11 ENCOUNTER — OUTPATIENT (OUTPATIENT)
Dept: OUTPATIENT SERVICES | Facility: HOSPITAL | Age: 55
LOS: 1 days | Discharge: HOME | End: 2020-05-11

## 2020-05-11 DIAGNOSIS — C18.9 MALIGNANT NEOPLASM OF COLON, UNSPECIFIED: ICD-10-CM

## 2020-05-11 DIAGNOSIS — Z00.00 ENCOUNTER FOR GENERAL ADULT MEDICAL EXAMINATION WITHOUT ABNORMAL FINDINGS: ICD-10-CM

## 2020-05-11 DIAGNOSIS — S82.899A OTHER FRACTURE OF UNSPECIFIED LOWER LEG, INITIAL ENCOUNTER FOR CLOSED FRACTURE: Chronic | ICD-10-CM

## 2020-05-11 DIAGNOSIS — C18.2 MALIGNANT NEOPLASM OF ASCENDING COLON: ICD-10-CM

## 2020-05-11 PROCEDURE — 74177 CT ABD & PELVIS W/CONTRAST: CPT

## 2020-05-11 PROCEDURE — 74177 CT ABD & PELVIS W/CONTRAST: CPT | Mod: 26

## 2020-05-11 PROCEDURE — 71260 CT THORAX DX C+: CPT | Mod: 26

## 2020-05-11 PROCEDURE — 71260 CT THORAX DX C+: CPT

## 2020-05-15 ENCOUNTER — APPOINTMENT (OUTPATIENT)
Dept: HEMATOLOGY ONCOLOGY | Facility: CLINIC | Age: 55
End: 2020-05-15

## 2020-05-18 ENCOUNTER — APPOINTMENT (OUTPATIENT)
Dept: HEMATOLOGY ONCOLOGY | Facility: CLINIC | Age: 55
End: 2020-05-18
Payer: MEDICARE

## 2020-05-18 PROCEDURE — 99214 OFFICE O/P EST MOD 30 MIN: CPT | Mod: 95

## 2020-05-18 NOTE — ASSESSMENT
[FreeTextEntry1] : Phone consult for 25 minutes conducted for patient in lieu of scheduled in person visit in order to minimize nonessential travel/exposure for the patient during COVID 19 pandemic.  The patient gave verbal consent.

## 2020-05-18 NOTE — HISTORY OF PRESENT ILLNESS
[de-identified] : The patient was diagnosed with adenocarcinoma of the cecum in December 2018 at the age of 53.  He has a history of irregular bowel movements with alternating diarrhea and constipation and recently saw blood on the toilet paper.  He saw GI, Dr. Thuan Cassidy, who performed a colonoscopy on 12/11/18.  Pathology c/w adenocarcinoma.  On 12/13/18 a CT A/P showed a soft tissue mass in the region of the ileocecal  junction measuring 3.7 x 3.5 cm.  There are  multiple peritoneal nodules in the abdomen and pelvis. For example, a  nodule in the pelvis measures 1.4 cm. A nodule in the left lower quadrant  measures 1.3 cm. There are omental nodules measuring up to 3.2 x 2.9 cm.  There are ileocolic lymph nodes measuring up to 1.3 cm.  1.1 cm lesion in the right lobe of the liver is suspicious for metastatic  disease.    Two splenic lesions are suspicious for metastatic disease.  He was referred to Colorectal surgery, Dr. Livia Talavera, who first ordered a CT Chest to complete staging.  It showed no suspicious pulmonary nodules.  Dr. Talaevra recommended systemic chemotherapy given extent of disease.   [de-identified] : PMH of HTN, DM, HPL, Osteogenesis imperfecta, h/o Disruptive behavior, schizoaffective disorder [de-identified] : Patient presents for follow up on Xeloda alone for adenocarcinoma of the cecum.  He is s/p C10 XELOX (C10 was on 10/7/19).  He is s/p emergent diverting loop ileostomy on 2/8/2019 with Dr Giang.  Patient recently had a psychotic break and is now in-patient at [facility].  + Pruritus around stoma.  + Fatigue improved.  + Neuropathy improved. + Sleep disturbances, worse recently.  + Grade 1 H/F - xeroderma. + Xerostomia. No fevers. No headaches. No abdominal pain. No N/V. No mucositis.

## 2020-05-18 NOTE — RESULTS/DATA
[FreeTextEntry1] : 5/11/20 CT:  Stable right lung nodule.  Decrease in size of the portacaval lymph node.  Stable splenic lesions.  Stable peritoneal nodules.  Previously identified small bowel mesenteric lymph node has resolved.\par \par 1/7/20 CT CAP:  Previously identified inflammatory opacity in the right upper lobe has resolved. Stable bilateral lung nodules. Stable portacaval adenopathy. \par Decrease in size of the 2 splenic lesions which are favored to be capsular in location. Stable peritoneal nodules and mesenteric lymph nodes.\par \par 10/3/19 CT C/A/P:  New small opacities right upper lobe favored to be infectious or inflammatory in etiology. Decrease in size of a nonenlarged cardiophrenic lymph node. \par Decrease in size of the splenic lesions. Decrease in size of the ileocolic lymph nodes. Increase in size of a small bowel mesenteric lymph node in the mid abdomen. \par Stable peritoneal nodules in the right upper quadrant. Stable portacaval adenopathy.\par \par 7/1/19 CT C/A/P: Two new right lung nodules. Previously identified lung nodules stable. Increase in size of 1.0 cm lymph node in the cardiophrenic region. The known ileocecal mass is not well visualized on this examination. Decrease in size of peritoneal nodules. Stable splenic lesions. Decrease in size of ileocolic lymph nodes. \par \par 4/30/19 CT CAP:\par Interval loop ileostomy.  The known ileocecal mass is not well visualized on this examination.  Decrease in size of previously identified peritoneal nodules. However, \par there are a few newly noted peritoneal nodules.  Stable splenic lesions.  Stable small bilateral lung nodules.\par \par 1/19/19 Pathology:\par 1. Cecal mass, biopsy ([id number]):\par - At least intramucosal adenocarcinoma in background of high-grade dysplasia, see note.\par -Note: Dr. Goodwin concur(s) with diagnosis. The findings may not represent the entire mass lesion.\par \par 1/9/19 CT Chest:  No lung mass or consolidation. No suspicious pulmonary nodule. 3 mm  triangular density within the minor fissure most  likely benign fissural lymph node.\par \par 1/9/19 U/S Abdomen:  Mild fatty infiltration of the liver.    1.7 cm right lobe liver lesion is indeterminate but for which metastatic  disease is in the differential diagnosis.    Two solid splenic lesions measuring 3.1 cm and 1.8 cm.  The liver lesion and splenic lesions may be further evaluated with PET/CT  scan to evaluate for metastatic disease.  1.2 x 0.4 cm gallbladder polyp.\par \par 12/13/18 CT A/P:  There is a soft tissue mass in the region of the ileocecal  junction measuring 3.7 x 3.5 cm. There is no bowel obstruction. There are  multiple peritoneal nodules in the abdomen and pelvis. For example, a  nodule in the pelvis measures 1.4 cm. A nodule in the left lower quadrant  measures 1.3 cm. There are omental nodules measuring up to 3.2 x 2.9 cm.  There are ileocolic lymph nodes measuring up to 1.3 cm.  1.1 cm lesion in the right lobe of the liver is suspicious for metastatic  disease.    Two splenic lesions are suspicious for metastatic disease.\par \par 12/11/18 Pathology:  Cecal mass, biopsy:   Adenocarcinoma, at least intramucosal, superficial mucosal fragments.\par \par 12/11/18 Colonoscopy:  Cecal mass.  Hemorrhoids

## 2020-05-27 ENCOUNTER — LABORATORY RESULT (OUTPATIENT)
Age: 55
End: 2020-05-27

## 2020-05-27 ENCOUNTER — RESULT REVIEW (OUTPATIENT)
Age: 55
End: 2020-05-27

## 2020-05-27 ENCOUNTER — APPOINTMENT (OUTPATIENT)
Age: 55
End: 2020-05-27

## 2020-05-27 LAB
BASOPHILS # BLD AUTO: 0.1 K/UL — SIGNIFICANT CHANGE UP (ref 0–0.2)
BASOPHILS NFR BLD AUTO: 0.7 % — SIGNIFICANT CHANGE UP (ref 0–2)
EOSINOPHIL # BLD AUTO: 0.2 K/UL — SIGNIFICANT CHANGE UP (ref 0–0.5)
EOSINOPHIL NFR BLD AUTO: 2.1 % — SIGNIFICANT CHANGE UP (ref 0–6)
HCT VFR BLD CALC: 45 % — SIGNIFICANT CHANGE UP (ref 39–50)
HGB BLD-MCNC: 15 G/DL — SIGNIFICANT CHANGE UP (ref 13–17)
LYMPHOCYTES # BLD AUTO: 1.6 K/UL — SIGNIFICANT CHANGE UP (ref 1–3.3)
LYMPHOCYTES # BLD AUTO: 18.4 % — SIGNIFICANT CHANGE UP (ref 13–44)
MCHC RBC-ENTMCNC: 31.2 PG — SIGNIFICANT CHANGE UP (ref 27–34)
MCHC RBC-ENTMCNC: 33.4 G/DL — SIGNIFICANT CHANGE UP (ref 32–36)
MCV RBC AUTO: 93.3 FL — SIGNIFICANT CHANGE UP (ref 80–100)
MONOCYTES # BLD AUTO: 0.8 K/UL — SIGNIFICANT CHANGE UP (ref 0–0.9)
MONOCYTES NFR BLD AUTO: 9 % — SIGNIFICANT CHANGE UP (ref 2–14)
NEUTROPHILS # BLD AUTO: 5.9 K/UL — SIGNIFICANT CHANGE UP (ref 1.8–7.4)
NEUTROPHILS NFR BLD AUTO: 69.7 % — SIGNIFICANT CHANGE UP (ref 43–77)
PLATELET # BLD AUTO: 186 K/UL — SIGNIFICANT CHANGE UP (ref 150–400)
RBC # BLD: 4.82 M/UL — SIGNIFICANT CHANGE UP (ref 4.2–5.8)
RBC # FLD: 12.6 % — SIGNIFICANT CHANGE UP (ref 10.3–14.5)
WBC # BLD: 8.5 K/UL — SIGNIFICANT CHANGE UP (ref 3.8–10.5)
WBC # FLD AUTO: 8.5 K/UL — SIGNIFICANT CHANGE UP (ref 3.8–10.5)

## 2020-06-15 ENCOUNTER — OUTPATIENT (OUTPATIENT)
Dept: OUTPATIENT SERVICES | Facility: HOSPITAL | Age: 55
LOS: 1 days | End: 2020-06-15
Payer: MEDICARE

## 2020-06-15 VITALS
RESPIRATION RATE: 20 BRPM | TEMPERATURE: 99 F | HEART RATE: 86 BPM | DIASTOLIC BLOOD PRESSURE: 93 MMHG | WEIGHT: 172.84 LBS | SYSTOLIC BLOOD PRESSURE: 119 MMHG | HEIGHT: 60 IN

## 2020-06-15 DIAGNOSIS — F20.9 SCHIZOPHRENIA, UNSPECIFIED: ICD-10-CM

## 2020-06-15 DIAGNOSIS — Z01.818 ENCOUNTER FOR OTHER PREPROCEDURAL EXAMINATION: ICD-10-CM

## 2020-06-15 DIAGNOSIS — S82.899A OTHER FRACTURE OF UNSPECIFIED LOWER LEG, INITIAL ENCOUNTER FOR CLOSED FRACTURE: Chronic | ICD-10-CM

## 2020-06-15 DIAGNOSIS — Z29.9 ENCOUNTER FOR PROPHYLACTIC MEASURES, UNSPECIFIED: ICD-10-CM

## 2020-06-15 DIAGNOSIS — C18.2 MALIGNANT NEOPLASM OF ASCENDING COLON: ICD-10-CM

## 2020-06-15 DIAGNOSIS — R94.31 ABNORMAL ELECTROCARDIOGRAM [ECG] [EKG]: ICD-10-CM

## 2020-06-15 DIAGNOSIS — Z93.2 ILEOSTOMY STATUS: Chronic | ICD-10-CM

## 2020-06-15 DIAGNOSIS — E11.9 TYPE 2 DIABETES MELLITUS WITHOUT COMPLICATIONS: ICD-10-CM

## 2020-06-15 LAB
A1C WITH ESTIMATED AVERAGE GLUCOSE RESULT: 6.3 % — HIGH (ref 4–5.6)
ALBUMIN SERPL ELPH-MCNC: 4.5 G/DL — SIGNIFICANT CHANGE UP (ref 3.3–5.2)
ALP SERPL-CCNC: 46 U/L — SIGNIFICANT CHANGE UP (ref 40–120)
ALT FLD-CCNC: 30 U/L — SIGNIFICANT CHANGE UP
ANION GAP SERPL CALC-SCNC: 9 MMOL/L — SIGNIFICANT CHANGE UP (ref 5–17)
APTT BLD: 32.6 SEC — SIGNIFICANT CHANGE UP (ref 27.5–36.3)
AST SERPL-CCNC: 23 U/L — SIGNIFICANT CHANGE UP
BASOPHILS # BLD AUTO: 0.05 K/UL — SIGNIFICANT CHANGE UP (ref 0–0.2)
BASOPHILS NFR BLD AUTO: 0.6 % — SIGNIFICANT CHANGE UP (ref 0–2)
BILIRUB SERPL-MCNC: 0.3 MG/DL — LOW (ref 0.4–2)
BLD GP AB SCN SERPL QL: SIGNIFICANT CHANGE UP
BUN SERPL-MCNC: 12 MG/DL — SIGNIFICANT CHANGE UP (ref 8–20)
CALCIUM SERPL-MCNC: 9.5 MG/DL — SIGNIFICANT CHANGE UP (ref 8.6–10.2)
CHLORIDE SERPL-SCNC: 96 MMOL/L — LOW (ref 98–107)
CO2 SERPL-SCNC: 29 MMOL/L — SIGNIFICANT CHANGE UP (ref 22–29)
CREAT SERPL-MCNC: 0.59 MG/DL — SIGNIFICANT CHANGE UP (ref 0.5–1.3)
EOSINOPHIL # BLD AUTO: 0.16 K/UL — SIGNIFICANT CHANGE UP (ref 0–0.5)
EOSINOPHIL NFR BLD AUTO: 1.9 % — SIGNIFICANT CHANGE UP (ref 0–6)
ESTIMATED AVERAGE GLUCOSE: 134 MG/DL — HIGH (ref 68–114)
GLUCOSE SERPL-MCNC: 162 MG/DL — HIGH (ref 70–99)
HCT VFR BLD CALC: 45.4 % — SIGNIFICANT CHANGE UP (ref 39–50)
HGB BLD-MCNC: 15.1 G/DL — SIGNIFICANT CHANGE UP (ref 13–17)
IMM GRANULOCYTES NFR BLD AUTO: 0.7 % — SIGNIFICANT CHANGE UP (ref 0–1.5)
INR BLD: 0.96 RATIO — SIGNIFICANT CHANGE UP (ref 0.88–1.16)
LYMPHOCYTES # BLD AUTO: 1.74 K/UL — SIGNIFICANT CHANGE UP (ref 1–3.3)
LYMPHOCYTES # BLD AUTO: 20.4 % — SIGNIFICANT CHANGE UP (ref 13–44)
MCHC RBC-ENTMCNC: 30.9 PG — SIGNIFICANT CHANGE UP (ref 27–34)
MCHC RBC-ENTMCNC: 33.3 GM/DL — SIGNIFICANT CHANGE UP (ref 32–36)
MCV RBC AUTO: 92.8 FL — SIGNIFICANT CHANGE UP (ref 80–100)
MONOCYTES # BLD AUTO: 0.87 K/UL — SIGNIFICANT CHANGE UP (ref 0–0.9)
MONOCYTES NFR BLD AUTO: 10.2 % — SIGNIFICANT CHANGE UP (ref 2–14)
NEUTROPHILS # BLD AUTO: 5.64 K/UL — SIGNIFICANT CHANGE UP (ref 1.8–7.4)
NEUTROPHILS NFR BLD AUTO: 66.2 % — SIGNIFICANT CHANGE UP (ref 43–77)
PLATELET # BLD AUTO: 183 K/UL — SIGNIFICANT CHANGE UP (ref 150–400)
POTASSIUM SERPL-MCNC: 3.8 MMOL/L — SIGNIFICANT CHANGE UP (ref 3.5–5.3)
POTASSIUM SERPL-SCNC: 3.8 MMOL/L — SIGNIFICANT CHANGE UP (ref 3.5–5.3)
PROT SERPL-MCNC: 7.2 G/DL — SIGNIFICANT CHANGE UP (ref 6.6–8.7)
PROTHROM AB SERPL-ACNC: 10.8 SEC — SIGNIFICANT CHANGE UP (ref 10–12.9)
RBC # BLD: 4.89 M/UL — SIGNIFICANT CHANGE UP (ref 4.2–5.8)
RBC # FLD: 12.8 % — SIGNIFICANT CHANGE UP (ref 10.3–14.5)
SODIUM SERPL-SCNC: 134 MMOL/L — LOW (ref 135–145)
WBC # BLD: 8.52 K/UL — SIGNIFICANT CHANGE UP (ref 3.8–10.5)
WBC # FLD AUTO: 8.52 K/UL — SIGNIFICANT CHANGE UP (ref 3.8–10.5)

## 2020-06-15 PROCEDURE — G0463: CPT

## 2020-06-15 PROCEDURE — 93005 ELECTROCARDIOGRAM TRACING: CPT

## 2020-06-15 PROCEDURE — 93010 ELECTROCARDIOGRAM REPORT: CPT

## 2020-06-15 RX ORDER — RISPERIDONE 4 MG/1
1 TABLET ORAL
Qty: 0 | Refills: 0 | DISCHARGE

## 2020-06-15 RX ORDER — ALENDRONATE SODIUM 70 MG/1
1 TABLET ORAL
Qty: 0 | Refills: 0 | DISCHARGE

## 2020-06-15 RX ORDER — CLOZAPINE 150 MG/1
0 TABLET, ORALLY DISINTEGRATING ORAL
Qty: 0 | Refills: 0 | DISCHARGE

## 2020-06-15 RX ORDER — PINDOLOL 10 MG
2.5 TABLET ORAL
Qty: 0 | Refills: 0 | DISCHARGE

## 2020-06-15 RX ORDER — SODIUM CHLORIDE 9 MG/ML
3 INJECTION INTRAMUSCULAR; INTRAVENOUS; SUBCUTANEOUS EVERY 8 HOURS
Refills: 0 | Status: DISCONTINUED | OUTPATIENT
Start: 2020-06-22 | End: 2020-06-22

## 2020-06-15 RX ORDER — CAPECITABINE 500 MG/1
1500 TABLET ORAL
Qty: 0 | Refills: 0 | DISCHARGE

## 2020-06-15 RX ORDER — ONDANSETRON 8 MG/1
1 TABLET, FILM COATED ORAL
Qty: 0 | Refills: 0 | DISCHARGE

## 2020-06-15 RX ORDER — CIPROFLOXACIN LACTATE 400MG/40ML
400 VIAL (ML) INTRAVENOUS ONCE
Refills: 0 | Status: DISCONTINUED | OUTPATIENT
Start: 2020-06-22 | End: 2020-06-22

## 2020-06-15 RX ORDER — PROCHLORPERAZINE MALEATE 5 MG
1 TABLET ORAL
Qty: 0 | Refills: 0 | DISCHARGE

## 2020-06-15 NOTE — ASU PATIENT PROFILE, ADULT - ABLE TO REACH PT
[facility]  [phone] [place] [address] yes/[facility]  [phone] [place] [address]

## 2020-06-15 NOTE — H&P PST ADULT - NSICDXFAMILYHX_GEN_ALL_CORE_FT
FAMILY HISTORY:  Father  Still living? Yes, Estimated age: Age Unknown  FH: prostate cancer, Age at diagnosis: Age Unknown    Mother  Still living? Yes, Estimated age: Age Unknown  FHx: hypertension, Age at diagnosis: Age Unknown

## 2020-06-15 NOTE — H&P PST ADULT - NSICDXPASTMEDICALHX_GEN_ALL_CORE_FT
PAST MEDICAL HISTORY:  Colon cancer     Diabetes     High cholesterol     Hypothyroidism     Osteogenesis imperfecta     Schizo-affective psychosis

## 2020-06-15 NOTE — ASU PATIENT PROFILE, ADULT - VISION (WITH CORRECTIVE LENSES IF THE PATIENT USUALLY WEARS THEM):
wears glasses/Partially impaired: cannot see medication labels or newsprint, but can see obstacles in path, and the surrounding layout; can count fingers at arm's length Normal vision: sees adequately in most situations; can see medication labels, newsprint

## 2020-06-15 NOTE — H&P PST ADULT - GENERAL
Chief Complaint   Patient presents with   • Cough     2 weeks       HISTORY OF PRESENT ILLNESS: Patient is a 21 y.o. female who presents today for 2 weeks of feeling unwell.  She feels her cough is worsening.   Her voice is really raspy.  Her sinuses feel very congested on both sinuses and bilateral ears are aching as well.   She has fevers on and off.  She had fever of 102 this morning she states.   She took some Tylenol and that helped.  She is feeling tightness in her chest.   She has been using her rescue inhaler without relief.    She has no smoking hx.  History of mild asthma that is really well controlled when she is not sick.     Patient Active Problem List    Diagnosis Date Noted   • Obesity, morbid, BMI 40.0-49.9 (CMS-[facility]) 12/28/2016   • Epidermal cyst 09/20/2016   • Depression 09/06/2016   • Pain at surgical site 09/06/2016   • Mid back pain 09/06/2016       Allergies:Review of patient's allergies indicates no known allergies.    Current Outpatient Prescriptions Ordered in [facility]   Medication Sig Dispense Refill   • hydrocodone-acetaminophen (NORCO) 5-325 MG Tab per tablet Take 1-2 Tabs by mouth every 6 hours as needed. 20 Tab 0   • fluticasone (FLONASE) 50 MCG/ACT nasal spray Spray 2 Sprays in nose every day. 16 g 1   • promethazine-codeine (PHENERGAN-CODEINE) 6.25-10 MG/5ML Syrup Take 5 mL by mouth at bedtime as needed for Cough. 120 mL 0   • azithromycin (ZITHROMAX) 250 MG Tab As directed 6 Tab 0   • albuterol 108 (90 BASE) MCG/ACT Aero Soln inhalation aerosol Inhale 2 Puffs by mouth every 6 hours as needed. 8.5 g 0   • polyethylene glycol/lytes (MIRALAX) Pack Take 1 Packet by mouth every day. 7 Each 0     No current [facility]-ordered facility-administered medications on file.       Past Medical History   Diagnosis Date   • Anxiety    • Depression        Social History   Substance Use Topics   • Smoking status: Never Smoker    • Smokeless tobacco: Never Used   • Alcohol Use: No       Family Status   Relation  Status Death Age   • Daughter Alive    • Daughter Alive      Family History   Problem Relation Age of Onset   • Diabetes Father    • Diabetes Paternal Uncle    • Cancer Maternal Grandfather    • No Known Problems Daughter    • No Known Problems Daughter        ROS:  Review of Systems:  Constitutional: SEE HPI  HENT: SEE HPI  Eyes: Negative for blurred vision.   Respiratory: SEE HPI  Cardiovascular: Negative for chest pain, palpitations, orthopnea and leg swelling.   Gastrointestinal: Negative for heartburn, nausea, vomiting and abdominal pain.   All other systems reviewed and are negative.       Exam:  Blood pressure 124/76, pulse 96, temperature 36.7 °C (98.1 °F), resp. rate 16, SpO2 98 %, unknown if currently breastfeeding.  General:  Well nourished, well developed female in NAD  Eyes: PERRLA, EOM within normal limits, no conjunctival injection, no scleral icterus, visual fields and acuity grossly intact.  Ears: Normal shape and symmetry, no tenderness, no discharge. External canals are without any significant edema or erythema. Tympanic membranes are without any inflammation, no effusion. Gross auditory acuity is intact  Nose: Symmetrical, bilateral maxillary sinus TTP, clear rhinorrhea.   Mouth: reasonable hygiene, no erythema exudates or tonsillar enlargement.  Neck: no masses, range of motion within normal limits, no tracheal deviation. No lymphadenopathy  Pulmonary: Normal respiratory effort, no wheezes, crackles, or rhonchi.  Cardiovascular: regular rate and rhythm without murmurs, rubs, or gallops.  Skin: No visible rashes or lesion. Warm, pink, dry.   Extremities: no clubbing, cyanosis, or edema.  Neuro: A&O x 3. Speech normal/clear.  Normal gait.         Assessment/Plan:  1. Acute non-recurrent maxillary sinusitis  doxycycline (VIBRAMYCIN) 100 MG Tab    MethylPREDNISolone (MEDROL DOSEPAK) 4 MG Tablet Therapy Pack   2. Cough  guaifenesin-codeine (CHERATUSSIN AC) Solution oral solution       -2 weeks of  worsening sinus pressure, congestion, new fevers reported today and yesterday  -worsening cough, hx of asthma.  Not responsive to rescue inhaler.   -tx as above.  Sinus care discussed  -codeine cough syrup as above.  Emphasized drowsy and no driving on this medicine.  Patient expressed understanding of this.   -keep PCP follow up in 1 week please     Supportive care, differential diagnoses, and indications for immediate follow-up discussed with patient.   Pathogenesis of diagnosis discussed including typical length and natural progression.   Instructed to return to clinic or nearest emergency department for any change in condition, further concerns, or worsening of symptoms.  Patient states understanding of the plan of care and discharge instructions..      Melida Kimbrough PA-C       negative

## 2020-06-15 NOTE — ASU PATIENT PROFILE, ADULT - NS PRO INFO GIVEN TO
juan c aranad rn ext [phone] juan c aranda rn ext [phone]  due to pt uncontrolled behavior, Dr. Augustine from [facility] requested that patient transported directly to [facility], [place] in admitting aware, and will provide admiting paperwork for patients signature in [facility], Fawn Lobo can be reach monday morning at  and the nurse Rekha walsh can be reach at  juan c aranda rn ext [phone]  due to pt having potential problematic  behavior, Dr. Augustine from [facility] requested that the patient should be  transported directly to \[facility]\"", [place] in admitting aware, and will provide admiting paperwork for patients signature in \[facility]\"", Fawn Lobo can be reach monday morning at  and the nurse Rekha walsh can be reach at  juan c aranda rn ext [phone]  due to pt having potential problematic  behavior, Dr. Augustine from [facility] requested that the patient should be  transported directly to [facility], by passing admittingS[place] in admitting aware, and will provide admiting paperwork for patients signature in [facility], Fawn Lobo can be reach monday morning at  and the nurse Rekha walsh can be reach at

## 2020-06-15 NOTE — H&P PST ADULT - NSICDXPROBLEM_GEN_ALL_CORE_FT
PROBLEM DIAGNOSES  Problem: Malignant neoplasm of ascending colon  Assessment and Plan: Robotic assisted partial colectomy and reversal of ileostomy     Problem: Schizophrenia  Assessment and Plan: f/u with PCP    Problem: DM (diabetes mellitus)  Assessment and Plan: f/u with PCP    Problem: Abnormal EKG  Assessment and Plan: f/u with cardiologist for clearance     Problem: Need for prophylactic measure  Assessment and Plan: high risk surgical team to determine prophylactic intervention

## 2020-06-15 NOTE — ASU PATIENT PROFILE, ADULT - LEARNING ASSESSMENT (PATIENT) ADDITIONAL COMMENTS
Pre op teaching surgical scrub pain management instructions Covid swab to be done at [facility] June 20

## 2020-06-15 NOTE — H&P PST ADULT - ASSESSMENT
53 y/o male with DM, HLD, HTN, Schizophrenia, Psychosis, colon cancer(ileostomy 2019), seen today pre-op for Robotic assisted partial colectomy and reversal of ileostomy for malignant neoplasm of ascending colon. Surgery protocol reviewed with pt and , phone call to [facility] today spoke to Manager Rosetta regarding instructions. Pt noted with periods of agitation, noncompliance with interview and assessment, re-directed as needed to maintain safety  Pt to follow-up with PCP and cardiologist for clearances  МАРИЯI VTE 2.0 SCORE [CLOT updated 2019]    AGE RELATED RISK FACTORS                                                       MOBILITY RELATED FACTORS  [x ] Age 41-60 years                                            (1 Point)                    [ ] Bed rest                                                        (1 Point)  [ ] Age: 61-74 years                                           (2 Points)                  [ ] Plaster cast                                                   (2 Points)  [ ] Age= 75 years                                              (3 Points)                    [ ] Bed bound for more than 72 hours                 (2 Points)    DISEASE RELATED RISK FACTORS                                               GENDER SPECIFIC FACTORS  [ ] Edema in the lower extremities                       (1 Point)              [ ] Pregnancy                                                     (1 Point)  [ ] Varicose veins                                               (1 Point)                     [ ] Post-partum < 6 weeks                                   (1 Point)             [ x] BMI > 25 Kg/m2                                            (1 Point)                     [ ] Hormonal therapy  or oral contraception          (1 Point)                 [ ] Sepsis (in the previous month)                        (1 Point)               [ ] History of pregnancy complications                 (1 point)  [ ] Pneumonia or serious lung disease                                               [ ] Unexplained or recurrent                     (1 Point)           (in the previous month)                               (1 Point)  [ ] Abnormal pulmonary function test                     (1 Point)                 SURGERY RELATED RISK FACTORS  [ ] Acute myocardial infarction                              (1 Point)               [ ]  Section                                             (1 Point)  [ ] Congestive heart failure (in the previous month)  (1 Point)      [ ] Minor surgery                                                  (1 Point)   [ ] Inflammatory bowel disease                             (1 Point)               [ ] Arthroscopic surgery                                        (2 Points)  [ ] Central venous access                                      (2 Points)                [x ] General surgery lasting more than 45 minutes (2 points)  [x ] Malignancy- Present or previous                   (2 Points)                [ ] Elective arthroplasty                                         (5 points)    [ ] Stroke (in the previous month)                          (5 Points)                                                                                                                                                           HEMATOLOGY RELATED FACTORS                                                 TRAUMA RELATED RISK FACTORS  [ ] Prior episodes of VTE                                     (3 Points)                [ ] Fracture of the hip, pelvis, or leg                       (5 Points)  [ ] Positive family history for VTE                         (3 Points)             [ ] Acute spinal cord injury (in the previous month)  (5 Points)  [ ] Prothrombin  A                                     (3 Points)               [ ] Paralysis  (less than 1 month)                             (5 Points)  [ ] Factor V Leiden                                             (3 Points)                  [ ] Multiple Trauma within 1 month                        (5 Points)  [ ] Lupus anticoagulants                                     (3 Points)                                                           [ ] Anticardiolipin antibodies                               (3 Points)                                                       [ ] High homocysteine in the blood                      (3 Points)                                             [ ] Other congenital or acquired thrombophilia      (3 Points)                                                [ ] Heparin induced thrombocytopenia                  (3 Points)                                     Total Score [   6      ]   OPIOID RISK TOOL    [place] EACH BOX THAT APPLIES AND ADD TOTALS AT THE END    FAMILY HISTORY OF SUBSTANCE ABUSE                 FEMALE         MALE                                                Alcohol                             [  ]1 pt          [  ]3pts                                               Illegal Durgs                     [  ]2 pts        [  ]3pts                                               Rx Drugs                           [  ]4 pts        [  ]4 pts    PERSONAL HISTORY OF SUBSTANCE ABUSE                                                                                          Alcohol                             [  ]3 pts       [  ]3 pts                                               Illegal Drugs                     [  ]4 pts        [  ]4 pts                                               Rx Drugs                           [  ]5 pts        [  ]5 pts    AGE BETWEEN 16-45 YEARS                                      [  ]1 pt         [  ]1 pt    HISTORY OF PREADOLESCENT   SEXUAL ABUSE                                                             [  ]3 pts        [  ]0pts    PSYCHOLOGICAL DISEASE                     ADD, OCD, Bipolar, Schizophrenia        [ x ]2 pts         [  ]2 pts                      Depression                                               [  ]1 pt           [  ]1 pt           SCORING TOTAL   (add numbers and type here)              (**2*)                                     A score of 3 or lower indicated LOW risk for future opioid abuse  A score of 4 to 7 indicated moderate risk for future opioid abuse  A score of 8 or higher indicates a high risk for opioid abuse

## 2020-06-15 NOTE — ASU PATIENT PROFILE, ADULT - TRANSFUSION REACTION, PREVIOUS, PROFILE
After Visit Summary   7/27/2017    Etta Cervantes    MRN: [id number]           Patient Information     Date Of Birth          1958        Visit Information        Provider Department      7/27/2017 9:00 AM Brodie Cassidy MD [facility] ONCOLOGY      Today's Diagnoses     Bilateral malignant neoplasm of breast in female, estrogen receptor positive, unspecified site of breast (H)    -  1    Carcinoma of breast metastatic to liver, unspecified laterality (H)        Bone metastasis (H)        Breast cancer metastasized to axillary lymph node, right (H)        Secondary malignant neoplasm of liver (H)        Hypothyroidism, unspecified type        Moderate persistent asthma without complication        Hyperlipidemia LDL goal <130          Care Instructions    You will need to have a chest xray today.  We will call you with the results. We would like to see you back in clinic with Dr. Cassidy in 3 weeks with chemotherapy to be scheduled per treatment plan; lab draw prior (labs from xeloda plan).  Your prescription (Oxycodone) has been given to you to hand carry to the pharmacy of your choice. Your prescription (Reglan) has been sent to [facility] pharmacy. When you are in need of a refill, please call your pharmacy and they will send us a request.  Copy of appointments, and after visit summary (AVS) given to patient.  If you have any questions during business hours (M-F 8 AM- 4PM), please call Funmi Ray RN, BSN, OCN Oncology Hematology /Breast Cancer Navigator at [facility] [phone].   For questions after business hours, or on holidays/weekends, please call our after hours Nurse Triage line [phone]. Thank you.            Follow-ups after your visit        Follow-up notes from your care team     Return in about 3 weeks (around 8/17/2017) for Schedule for chemotherapy as per treatment plan, Imaging ordered today.      Your next 10  appointments already scheduled     Aug 17, 2017  2:00 PM CDT   Level O with ROOM 2 [facility] ([facility])    [facility]  [address]  [facility] [address]   [phone]            Aug 17, 2017  3:00 PM CDT   Return Visit with Brodie Cassidy MD   [facility] ([facility])    [facility]  [address]  [facility] [address]   [phone]            Sep 28, 2017  9:00 AM CDT   Level O with ROOM 8 [facility] ([facility])    [facility]  [address]  [facility] [address]   [phone]              Who to contact     If you have questions or need follow up information about today's clinic visit or your schedule please contact [facility] directly at [phone].  Normal or non-critical lab and imaging results will be communicated to you by STEARCLEARhart, letter or phone within 4 business days after the clinic has received the results. If you do not hear from us within 7 days, please contact the clinic through iFlipd or phone. If you have a critical or abnormal lab result, we will notify you by phone as soon as possible.  Submit refill requests through iFlipd or call your pharmacy and they will forward the refill request to us. Please allow 3 business days for your refill to be completed.          Additional Information About Your Visit        iFlipd Information     iFlipd gives you secure access to your electronic health record. If you see a primary care provider, you can also send messages to your care team and make appointments. If you have questions, please call your primary care clinic.  If you do not have a primary care provider, please call [phone] and they will assist you.        Care EveryWhere ID     This is your Care EveryWhere ID. This could be used by other organizations to access your [facility]  "records  [id number]        Your Vitals Were     Pulse Temperature Respirations Height Pulse Oximetry Breastfeeding?    94 100.1  F (37.8  C) (Tympanic) 18 1.638 m (5' 4.49\") 94% No    BMI (Body Mass Index)                   32.39 kg/m2            Blood Pressure from Last 3 Encounters:   07/27/17 119/71   07/24/17 114/69   07/12/17 137/79    Weight from Last 3 Encounters:   07/27/17 86.9 kg (191 lb 9.6 oz)   07/24/17 86.6 kg (190 lb 14.7 oz)   06/28/17 90.6 kg (199 lb 11.2 oz)              We Performed the Following     XR Chest 2 Views          Today's Medication Changes          These changes are accurate as of: 7/27/17  9:59 AM.  If you have any questions, ask your nurse or doctor.               Start taking these medicines.        Dose/Directions    metoclopramide 10 MG tablet   Commonly known as:  REGLAN   Used for:  Bilateral malignant neoplasm of breast in female, estrogen receptor positive, unspecified site of breast (H)        Dose:  10 mg   Take 1 tablet (10 mg) by mouth 2 times daily as needed   Quantity:  120 tablet   Refills:  1            Where to get your medicines      These medications were sent to [facility] [address]  [address] [address]     Phone:  [phone]     metoclopramide 10 MG tablet         Some of these will need a paper prescription and others can be bought over the counter.  Ask your nurse if you have questions.     Bring a paper prescription for each of these medications     oxyCODONE 5 MG IR tablet                Primary Care Provider Office Phone # Fax #    Johana Fairbanks [phone]       [facility] [address] [address] [address]        Equal Access to Services     JACKIE MIX AH: Rupali Rodriguez, wamanuelada juventino, qaybta kaalmada lexi, hari guardado. So [facility] [phone].    ATENCIÓN: Si habla español, tiene a parekh disposición servicios " soni de asistencia lingüística. [place] [facility] [phone].    We comply with applicable federal civil rights laws and [place] laws. We do not discriminate on the basis of race, color, national origin, age, disability sex, sexual orientation or gender identity.            Thank you!     Thank you for choosing [facility] [place]  for your care. Our goal is always to provide you with excellent care. Hearing back from our patients is one way we can continue to improve our services. Please take a few minutes to complete the written survey that you may receive in the mail after your visit with us. Thank you!             Your Updated Medication List - Protect others around you: Learn how to safely use, store and throw away your medicines at www.disposemymeds.org.          This list is accurate as of: 7/27/17  9:59 AM.  Always use your most recent med list.                   Brand Name Dispense Instructions for use Diagnosis    * albuterol 108 (90 BASE) MCG/ACT Inhaler    VENTOLIN HFA    1 Inhaler    Inhale 2 puffs into the lungs every 4 hours as needed    Moderate persistent asthma, uncomplicated       * albuterol (2.5 MG/3ML) 0.083% neb solution     30 vial    Take 1 vial (2.5 mg) by nebulization every 4 hours as needed for shortness of breath / dyspnea    Moderate persistent asthma, uncomplicated       ALLEGRA ALLERGY 180 MG tablet   Generic drug:  fexofenadine      Take 180 mg by mouth daily as needed for allergies        atorvastatin 10 MG tablet    LIPITOR    30 tablet    Take 1 tablet (10 mg) by mouth daily    Hyperlipidemia LDL goal <100       azelastine 0.1 % spray    ASTELIN    3 Bottle    Spray 1-2 sprays into both nostrils 2 times daily as needed for rhinitis    Chronic rhinitis       beclomethasone 80 MCG/ACT Inhaler    QVAR    3 Inhaler    Inhale 2 puffs into the lungs 2 times daily    Chronic rhinitis       capecitabine 500 MG tablet CHEMO    XELODA    126 tablet    Take 5 cap in AM and 4 cap in PM on  Days 1 through 14, then off for 7 days. Take within 30 mins after meal.    Secondary malignant neoplasm of liver (H), Carcinoma of breast metastatic to liver, unspecified laterality (H), Bone metastasis (H), Breast cancer metastasized to axillary lymph node, right (H), Bilateral malignant neoplasm of breast in female, estrogen receptor positive, unspecified site of breast (H), Hyperlipidemia LDL goal <130, Hypothyroidism, unspecified type, Chemotherapy-induced neutropenia (H), Mucositis due to chemotherapy       CRANBERRY PO      Take 1 capsule by mouth daily        KP CALCIUM 600+D3 600-500 MG-UNIT Caps   Generic drug:  calcium carb-cholecalciferol      Take 2 tablets by mouth daily        levothyroxine 25 MCG tablet    SYNTHROID/LEVOTHROID    90 tablet    Take 1 tablet (25 mcg) by mouth daily    Hypothyroidism due to acquired atrophy of thyroid       lidocaine visc 2% 2.5mL/5mL & maalox/mylanta w/ simeth 2.5mL/5mL & diphenhydrAMINE 5mg/5mL Susp suspension    MAGIC Mouthwash HOSPITAL    240 mL    Swish and swallow 10 mLs in mouth every 6 hours as needed for mouth sores    Mucositis due to chemotherapy, Breast cancer metastasized to axillary lymph node, right (H)       LORazepam 0.5 MG tablet    ATIVAN    30 tablet    Take 1 tablet (0.5 mg) by mouth every 4 hours as needed (Anxiety, Nausea/Vomiting or Sleep)    Secondary malignant neoplasm of liver (H), Carcinoma of breast metastatic to liver, unspecified laterality (H), Bone metastasis (H), Breast cancer metastasized to axillary lymph node, right (H)       metoclopramide 10 MG tablet    REGLAN    120 tablet    Take 1 tablet (10 mg) by mouth 2 times daily as needed    Bilateral malignant neoplasm of breast in female, estrogen receptor positive, unspecified site of breast (H)       mometasone-formoterol 200-5 MCG/ACT oral inhaler    DULERA    3 Inhaler    Inhale 2 puffs into the lungs 2 times daily    Moderate persistent asthma without complication       oxybutynin  10 MG 24 hr tablet    DITROPAN XL    90 tablet    Take 1 tablet (10 mg) by mouth daily (Needs follow-up appointment for this medication)    Mixed incontinence urge and stress (male)(female)       oxyCODONE 5 MG IR tablet    ROXICODONE    30 tablet    Take 1 tablet (5 mg) by mouth every 4 hours as needed for moderate to severe pain    Secondary malignant neoplasm of liver (H)       predniSONE 20 MG tablet    DELTASONE    10 tablet    Take 1 tablet (20 mg) by mouth 2 times daily For Yellow or Red zone on Asthma Action Plan.    Moderate persistent asthma, uncomplicated       prochlorperazine 10 MG tablet    COMPAZINE    30 tablet    Take 1 tablet (10 mg) by mouth every 6 hours as needed (Nausea/Vomiting)    Secondary malignant neoplasm of liver (H), Carcinoma of breast metastatic to liver, unspecified laterality (H), Bone metastasis (H), Breast cancer metastasized to axillary lymph node, right (H)       STOOL SOFTENER PO      Take 100 mg by mouth daily        TYLENOL PO      Take 650 mg by mouth every 4 hours as needed for mild pain or fever        zolpidem 10 MG tablet    AMBIEN    30 tablet    Take 1 tablet 30 minutes prior to bedtime    Insomnia, unspecified       * Notice:  This list has 2 medication(s) that are the same as other medications prescribed for you. Read the directions carefully, and ask your doctor or other care provider to review them with you.       no

## 2020-06-15 NOTE — ASU PATIENT PROFILE, ADULT - ABILITY TO HEAR (WITH HEARING AID OR HEARING APPLIANCE IF NORMALLY USED):
Adequate: hears normal conversation without difficulty wears hearing aids/Mildly to Moderately Impaired: difficulty hearing in some environments or speaker may need to increase volume or speak distinctly

## 2020-06-15 NOTE — H&P PST ADULT - HISTORY OF PRESENT ILLNESS
53 y/o male with DM, HLD, HTN, Schizophrenia, Psychosis, colon cancer(ileostomy 2019), seen today pre-op for Robotic assisted partial colectomy and reversal of ileostomy for malignant neoplasm of ascending colon.

## 2020-06-18 DIAGNOSIS — Z86.59 PERSONAL HISTORY OF OTHER MENTAL AND BEHAVIORAL DISORDERS: ICD-10-CM

## 2020-06-18 DIAGNOSIS — Z86.79 PERSONAL HISTORY OF OTHER DISEASES OF THE CIRCULATORY SYSTEM: ICD-10-CM

## 2020-06-18 DIAGNOSIS — Z85.038 PERSONAL HISTORY OF OTHER MALIGNANT NEOPLASM OF LARGE INTESTINE: ICD-10-CM

## 2020-06-18 RX ORDER — DOCUSATE SODIUM 100 MG/1
100 CAPSULE ORAL
Refills: 0 | Status: ACTIVE | COMMUNITY

## 2020-06-18 RX ORDER — LEVOTHYROXINE SODIUM 0.1 MG/1
100 TABLET ORAL
Qty: 30 | Refills: 1 | Status: DISCONTINUED | COMMUNITY
End: 2020-06-18

## 2020-06-19 ENCOUNTER — APPOINTMENT (OUTPATIENT)
Dept: CARDIOLOGY | Facility: CLINIC | Age: 55
End: 2020-06-19
Payer: MEDICARE

## 2020-06-19 VITALS
DIASTOLIC BLOOD PRESSURE: 73 MMHG | RESPIRATION RATE: 16 BRPM | TEMPERATURE: 98.5 F | HEART RATE: 79 BPM | OXYGEN SATURATION: 97 % | SYSTOLIC BLOOD PRESSURE: 112 MMHG

## 2020-06-19 VITALS — BODY MASS INDEX: 25.76 KG/M2 | WEIGHT: 170 LBS | HEIGHT: 68 IN

## 2020-06-19 VITALS — DIASTOLIC BLOOD PRESSURE: 75 MMHG | SYSTOLIC BLOOD PRESSURE: 114 MMHG

## 2020-06-19 DIAGNOSIS — Z01.810 ENCOUNTER FOR PREPROCEDURAL CARDIOVASCULAR EXAMINATION: ICD-10-CM

## 2020-06-19 PROCEDURE — 93000 ELECTROCARDIOGRAM COMPLETE: CPT | Mod: NC

## 2020-06-19 PROCEDURE — 93015 CV STRESS TEST SUPVJ I&R: CPT

## 2020-06-19 PROCEDURE — 99205 OFFICE O/P NEW HI 60 MIN: CPT

## 2020-06-19 PROCEDURE — A9500: CPT

## 2020-06-19 PROCEDURE — 78452 HT MUSCLE IMAGE SPECT MULT: CPT

## 2020-06-19 NOTE — ADDENDUM
[FreeTextEntry1] : 6/19/2020:  4:07 pm: nuclear stress test performed.  Mild ischemia in RCA territory. Cannot tule out diaphragmatic attenuation.\par Overall, low risk of cardiac complication dominik operatively. No further cardiac work up is needed. Proceed for surgery as indicated.  Any further work up will not change risk of patient. Especially in this patient, here patients apprehension and anxiety for procedures.\par

## 2020-06-19 NOTE — DISCUSSION/SUMMARY
[Additional Diagnostics Recommended] : additional diagnostics recommended [As per surgery] : as per surgery [Continue] : Continue medications as currently directed [FreeTextEntry1] : This is a 54 year old male with history of hypertension and Diabetes Mellitus and dyslipidemia , here  for Pre-operative cardiovascular risk evaluation and management \par \par 1) Pre-operative cardiovascular risk evaluation and management : abnormal EKG. no cardiac symptoms. howeveri risk factors  for coronary artery disease and elective surgeyr  echocardiogram with contrast if needed.   Nuclear stress test with IV technetium radiotracer. \par 2D echo \par 2) hypertension : ct current meds.\par 3 ) dyslipidemia  ct stateins\par 4) encounter for cardiotoxic therapeutic agents : with risk factors,. echo.\par \par

## 2020-06-19 NOTE — HISTORY OF PRESENT ILLNESS
[Scheduled Procedure ___] : a [unfilled] [Preoperative Visit] : for a medical evaluation prior to surgery [Date of Surgery ___] : on [unfilled] [Surgeon Name ___] : surgeon: [unfilled] [Good] : Good [FreeTextEntry1] : Pre-operative cardiovascular risk evaluation and management  for elective  re anastomosis of the colostomy.\par \par The patient with PMH of hypertension and Diabetes Mellitus and dyslipidemia , Osteogenesis imperfecta, h/o Disruptive behavior, schizoaffective disorder. The patient was diagnosed with adenocarcinoma of the cecum in December 2018 at the age of 53.  Dr. Talavera recommended systemic chemotherapy given extent of disease. \par Patient had on Xeloda alone for adenocarcinoma of the cecum. He is s/p C10 XELOX (C10 was on 10/7/19). He is s/p emergent diverting loop ileostomy on 2/8/2019 with Dr Giang. Patient recently had a psychotic break and is now in-patient at [facility]. \par No chest pain and no dyspnea on exertion .

## 2020-06-19 NOTE — PHYSICAL EXAM
[General Appearance - Well Developed] : well developed [Normal Appearance] : normal appearance [Well Groomed] : well groomed [General Appearance - Well Nourished] : well nourished [No Deformities] : no deformities [General Appearance - In No Acute Distress] : no acute distress [Normal Conjunctiva] : the conjunctiva exhibited no abnormalities [Eyelids - No Xanthelasma] : the eyelids demonstrated no xanthelasmas [Normal Oral Mucosa] : normal oral mucosa [No Oral Cyanosis] : no oral cyanosis [No Oral Pallor] : no oral pallor [Normal Jugular Venous A Waves Present] : normal jugular venous A waves present [Normal Jugular Venous V Waves Present] : normal jugular venous V waves present [No Jugular Venous Mccray A Waves] : no jugular venous mccray A waves [Respiration, Rhythm And Depth] : normal respiratory rhythm and effort [Exaggerated Use Of Accessory Muscles For Inspiration] : no accessory muscle use [Heart Rate And Rhythm] : heart rate and rhythm were normal [Auscultation Breath Sounds / Voice Sounds] : lungs were clear to auscultation bilaterally [Heart Sounds] : normal S1 and S2 [Murmurs] : no murmurs present [Abdomen Soft] : soft [Abdomen Tenderness] : non-tender [Abdomen Mass (___ Cm)] : no abdominal mass palpated [Abnormal Walk] : normal gait [Gait - Sufficient For Exercise Testing] : the gait was sufficient for exercise testing [Nail Clubbing] : no clubbing of the fingernails [Cyanosis, Localized] : no localized cyanosis [Petechial Hemorrhages (___cm)] : no petechial hemorrhages [Skin Color & Pigmentation] : normal skin color and pigmentation [] : no rash [No Venous Stasis] : no venous stasis [Skin Lesions] : no skin lesions [No Skin Ulcers] : no skin ulcer [No Xanthoma] : no  xanthoma was observed [Oriented To Time, Place, And Person] : oriented to person, place, and time [Affect] : the affect was normal [Mood] : the mood was normal [No Anxiety] : not feeling anxious

## 2020-06-22 ENCOUNTER — APPOINTMENT (OUTPATIENT)
Dept: SURGICAL ONCOLOGY | Facility: HOSPITAL | Age: 55
End: 2020-06-22

## 2020-06-22 ENCOUNTER — EMERGENCY (EMERGENCY)
Facility: HOSPITAL | Age: 55
LOS: 1 days | Discharge: OTHER INSTITUTION | End: 2020-06-22
Attending: EMERGENCY MEDICINE
Payer: MEDICARE

## 2020-06-22 ENCOUNTER — INPATIENT (INPATIENT)
Facility: HOSPITAL | Age: 55
LOS: 0 days | Discharge: HOME | DRG: 376 | End: 2020-06-22
Attending: SURGERY | Admitting: SURGERY
Payer: MEDICARE

## 2020-06-22 VITALS
HEART RATE: 73 BPM | OXYGEN SATURATION: 98 % | DIASTOLIC BLOOD PRESSURE: 86 MMHG | RESPIRATION RATE: 18 BRPM | TEMPERATURE: 98 F | SYSTOLIC BLOOD PRESSURE: 125 MMHG

## 2020-06-22 VITALS
OXYGEN SATURATION: 97 % | HEIGHT: 60 IN | DIASTOLIC BLOOD PRESSURE: 79 MMHG | HEART RATE: 72 BPM | RESPIRATION RATE: 16 BRPM | TEMPERATURE: 98 F | SYSTOLIC BLOOD PRESSURE: 96 MMHG | WEIGHT: 172.84 LBS

## 2020-06-22 VITALS
HEIGHT: 60 IN | TEMPERATURE: 99 F | HEART RATE: 85 BPM | OXYGEN SATURATION: 95 % | DIASTOLIC BLOOD PRESSURE: 81 MMHG | RESPIRATION RATE: 20 BRPM | SYSTOLIC BLOOD PRESSURE: 120 MMHG

## 2020-06-22 DIAGNOSIS — Z93.2 ILEOSTOMY STATUS: Chronic | ICD-10-CM

## 2020-06-22 DIAGNOSIS — S82.899A OTHER FRACTURE OF UNSPECIFIED LOWER LEG, INITIAL ENCOUNTER FOR CLOSED FRACTURE: Chronic | ICD-10-CM

## 2020-06-22 DIAGNOSIS — C18.2 MALIGNANT NEOPLASM OF ASCENDING COLON: ICD-10-CM

## 2020-06-22 DIAGNOSIS — F25.9 SCHIZOAFFECTIVE DISORDER, UNSPECIFIED: ICD-10-CM

## 2020-06-22 LAB — GLUCOSE BLDC GLUCOMTR-MCNC: 145 MG/DL — HIGH (ref 70–99)

## 2020-06-22 PROCEDURE — 82962 GLUCOSE BLOOD TEST: CPT

## 2020-06-22 PROCEDURE — 36415 COLL VENOUS BLD VENIPUNCTURE: CPT

## 2020-06-22 PROCEDURE — 99285 EMERGENCY DEPT VISIT HI MDM: CPT

## 2020-06-22 NOTE — CHART NOTE - NSCHARTNOTEFT_GEN_A_CORE
SW Note: pt has been a pt at [facility] since 1/29/20. came to [facility] today for an elective surgery which he declined after he arrived. Pt sent to the ED from PACU for assistance in getting pt back to [facility]. Dr. Suero called [facility] MD, Dr. Sue and discussed plan to return to [facility] for continued psych tx.   Spoke with Cori at [facility] admissions, [phone]. Confirmed pt will return to [facility] today. No psych eval or legals needed. Ambulance arranged with [facility].

## 2020-06-22 NOTE — ED BEHAVIORAL HEALTH ASSESSMENT NOTE - ADDITIONAL DETAILS / COMMENTS
PT VOICES UNDERSTANDING REGARDING DC INSTRUCTIONS.  NO CONCERNS VOICED. PT ambulated TO THE LOBBY.    current decision making capacity is impaired due to delusional beliefs about the proposed treatment

## 2020-06-22 NOTE — BEHAVIORAL HEALTH ASSESSMENT NOTE - AXIS III
osteogenesis imperfecta, HTN, hypothyroidism, DM type 2, ileostomy s/p colectomy for neoplasm of colon

## 2020-06-22 NOTE — ED BEHAVIORAL HEALTH ASSESSMENT NOTE - PAST PSYCHOTROPIC MEDICATION
[facility] hospitalist H+P  PCP Kari Ashley MD  CC blood in Urine  HPI 71 yo male with hx of BPH with LUTS s/p Cystoscopy, Transurethral Resection of Prostate on 8/21/18 presents with hematuria.    Currently no pain, pain quantity, quality, duration, radiation ab Alcohol/week: 0.0 oz      Drug use: No      Sexual activity: Not on file    Other Topics      Concerns:        Not on file    Social History Narrative      Not on file    All No Known Allergies    meds    No current facility-administered medications on f clozapine, Geodon, Zyprexa, others

## 2020-06-22 NOTE — ED PROVIDER NOTE - OBJECTIVE STATEMENT
54yom w/ schizoaffective, colon CA with ileostomy, was an inpatient at [facility], sent to Ambulatory Surgery today for an ileostomy reversal, however the patient refused the surgery, and now patient cannot return to [facility] because he apparently has been discharged from the facility. He was evaluated by Dr. Mason (Psychiatry) while in [facility], and felt to still require admission for psychiatric stabilization. Pt denies any acute complaints.

## 2020-06-22 NOTE — BEHAVIORAL HEALTH ASSESSMENT NOTE - NSBHCHARTREVIEWVS_PSY_A_CORE FT
Vital Signs Last 24 Hrs  T(C): 37.1 (22 Jun 2020 13:08), Max: 37.1 (22 Jun 2020 13:08)  T(F): 98.7 (22 Jun 2020 13:08), Max: 98.7 (22 Jun 2020 13:08)  HR: 85 (22 Jun 2020 13:08) (72 - 85)  BP: 120/81 (22 Jun 2020 13:08) (96/79 - 120/81)  RR: 20 (22 Jun 2020 13:08) (16 - 20)  SpO2: 95% (22 Jun 2020 13:08) (95% - 97%)

## 2020-06-22 NOTE — BEHAVIORAL HEALTH ASSESSMENT NOTE - SUMMARY
Pt is a 53 YO M w/ pmh of schizoaffective disorder refusing ileostomy reversal surgery. Pt had previously agreed to have the procedure but today decided he no longer wants it. Exhibiting agitation and anger. Evidence of paranoid delusions towards the medical staff including statements that the surgeon will kill him and the RN is profiting off of him. Pt would like to return to [facility]. [facility] refusing to take the patient back and Dr. Augustine from [facility] is recommending transfer to [facility] inpatient unit for ECT.

## 2020-06-22 NOTE — ED BEHAVIORAL HEALTH ASSESSMENT NOTE - DETAILS
[place] last 4 months na Denies current suicidal ideations ileostomy bag admissions [facility] see above

## 2020-06-22 NOTE — BEHAVIORAL HEALTH ASSESSMENT NOTE - HPI (INCLUDE ILLNESS QUALITY, SEVERITY, DURATION, TIMING, CONTEXT, MODIFYING FACTORS, ASSOCIATED SIGNS AND SYMPTOMS)
Consult on patient due to refusing previously agreed upon ileostomy reversal surgery. Pt is agitated and verbally aggressive upon initial consult. Pt is stating paranoid delusions regarding medical staff and accusing the RN of pressuring him to get the surgery in order to profit off of him. He also believes that he will die on the table from the surgeon "ripping my guts out". Pt believes that staff at [facility] are conspiring with [facility] staff to prevent him from returning. Dr. Augustine from [facility] is refusing the patient and requesting that he is transferred to [facility] psychiatric unit.

## 2020-06-22 NOTE — ED BEHAVIORAL HEALTH ASSESSMENT NOTE - SUMMARY
Pt is a 53 YO M w/ pmh of schizoaffective disorder refusing ileostomy reversal surgery. Pt had previously agreed to have the procedure but today decided he no longer wants it. Exhibiting agitation and anger. Evidence of paranoid delusions towards the medical staff including statements that the surgeon will kill him and the RN is profiting off of him. Pt would like to return to [facility]. [facility] refusing to take the patient back and Dr. Augustine from [facility] is recommending transfer to [facility] inpatient unit for ECT.

## 2020-06-22 NOTE — ED BEHAVIORAL HEALTH ASSESSMENT NOTE - DESCRIPTION
Vital Signs Last 24 Hrs  T(C): 36.6 (22 Jun 2020 14:33), Max: 37.1 (22 Jun 2020 13:08)  T(F): 97.8 (22 Jun 2020 14:33), Max: 98.7 (22 Jun 2020 13:08)  HR: 75 (22 Jun 2020 14:33) (72 - 85)  BP: 106/72 (22 Jun 2020 14:33) (96/79 - 120/81)  BP(mean): --  RR: 18 (22 Jun 2020 14:33) (16 - 20)  SpO2: 97% (22 Jun 2020 14:33) (95% - 97%) osteogenesis imperfecta, HTN, hypothyroidism, DM type 2 resides in adult home

## 2020-06-22 NOTE — ED BEHAVIORAL HEALTH ASSESSMENT NOTE - OTHER PAST PSYCHIATRIC HISTORY (INCLUDE DETAILS REGARDING ONSET, COURSE OF ILLNESS, INPATIENT/OUTPATIENT TREATMENT)
schizoaffective disorder  admission to [facility] for last 4 months  past outpatient at [facility]

## 2020-06-22 NOTE — BEHAVIORAL HEALTH ASSESSMENT NOTE - OTHER PAST PSYCHIATRIC HISTORY (INCLUDE DETAILS REGARDING ONSET, COURSE OF ILLNESS, INPATIENT/OUTPATIENT TREATMENT)
schizoaffective disorder  admission to [facility] for last 4 months  past outpatient at [facility]

## 2020-06-22 NOTE — ED PROVIDER NOTE - CLINICAL SUMMARY MEDICAL DECISION MAKING FREE TEXT BOX
Pt discharged from [facility] for ambulatory surgery, however pt is refusing procedure and now will need readmission for psychiatric stabilization.

## 2020-06-22 NOTE — ED BEHAVIORAL HEALTH ASSESSMENT NOTE - HPI (INCLUDE ILLNESS QUALITY, SEVERITY, DURATION, TIMING, CONTEXT, MODIFYING FACTORS, ASSOCIATED SIGNS AND SYMPTOMS)
Consult on patient due to refusing previously agreed upon ileostomy reversal surgery. Pt is agitated and verbally aggressive upon initial consult. Pt is stating paranoid delusions regarding medical staff and accusing the RN of pressuring him to get the surgery in order to profit off of him. He also believes that he will die on the table from the surgeon "ripping my guts out". Pt believes that staff at [facility] are conspiring with [facility] staff to prevent him from returning. Dr. Augustine from [facility] is refusing the patient and requesting that he is transferred to [facility] psychiatric unit.

## 2020-06-22 NOTE — ED ADULT NURSE REASSESSMENT NOTE - NS ED NURSE REASSESS COMMENT FT1
Patient was returned via ambulance at this time. Left with [facility] at 1600. Returning to same unit at [facility] he was previously assigned to prior to coming to [facility] where he refused scheduled operation. No complaints at time of departure, all personal belongings returned to pt to take back to [facility].

## 2020-06-22 NOTE — ED ADULT TRIAGE NOTE - CHIEF COMPLAINT QUOTE
pt brought down by security from PACU with own personal walker. as per PACU RN, pt was discharged from [place] to come here for ileostomy closure. pt arrived in PACU and refused sx. Richardson Mercado stated that they will only reaccept the pt if he is seen in the ER first.

## 2020-06-30 ENCOUNTER — OUTPATIENT (OUTPATIENT)
Dept: OUTPATIENT SERVICES | Facility: HOSPITAL | Age: 55
LOS: 1 days | Discharge: HOME | End: 2020-06-30

## 2020-06-30 DIAGNOSIS — Z93.2 ILEOSTOMY STATUS: Chronic | ICD-10-CM

## 2020-06-30 DIAGNOSIS — S82.899A OTHER FRACTURE OF UNSPECIFIED LOWER LEG, INITIAL ENCOUNTER FOR CLOSED FRACTURE: Chronic | ICD-10-CM

## 2020-06-30 DIAGNOSIS — C18.9 MALIGNANT NEOPLASM OF COLON, UNSPECIFIED: ICD-10-CM

## 2020-07-07 ENCOUNTER — APPOINTMENT (OUTPATIENT)
Dept: SURGICAL ONCOLOGY | Facility: CLINIC | Age: 55
End: 2020-07-07

## 2020-07-23 ENCOUNTER — EMERGENCY (EMERGENCY)
Facility: HOSPITAL | Age: 55
LOS: 1 days | Discharge: HOME | End: 2020-07-23
Attending: EMERGENCY MEDICINE
Payer: MEDICARE

## 2020-07-23 VITALS
DIASTOLIC BLOOD PRESSURE: 70 MMHG | RESPIRATION RATE: 17 BRPM | TEMPERATURE: 98 F | HEART RATE: 74 BPM | OXYGEN SATURATION: 100 % | SYSTOLIC BLOOD PRESSURE: 105 MMHG

## 2020-07-23 VITALS
DIASTOLIC BLOOD PRESSURE: 67 MMHG | RESPIRATION RATE: 18 BRPM | SYSTOLIC BLOOD PRESSURE: 108 MMHG | TEMPERATURE: 98 F | HEART RATE: 80 BPM | OXYGEN SATURATION: 95 %

## 2020-07-23 DIAGNOSIS — Z93.2 ILEOSTOMY STATUS: Chronic | ICD-10-CM

## 2020-07-23 DIAGNOSIS — S82.899A OTHER FRACTURE OF UNSPECIFIED LOWER LEG, INITIAL ENCOUNTER FOR CLOSED FRACTURE: Chronic | ICD-10-CM

## 2020-07-23 PROCEDURE — 99283 EMERGENCY DEPT VISIT LOW MDM: CPT | Mod: CS

## 2020-07-23 PROCEDURE — 99285 EMERGENCY DEPT VISIT HI MDM: CPT

## 2020-07-23 PROCEDURE — 73564 X-RAY EXAM KNEE 4 OR MORE: CPT | Mod: 26,RT

## 2020-07-23 PROCEDURE — 73564 X-RAY EXAM KNEE 4 OR MORE: CPT

## 2020-07-23 PROCEDURE — U0003: CPT

## 2020-07-23 RX ORDER — ACETAMINOPHEN 500 MG
650 TABLET ORAL ONCE
Refills: 0 | Status: COMPLETED | OUTPATIENT
Start: 2020-07-23 | End: 2020-07-23

## 2020-07-23 NOTE — ED ADULT NURSE REASSESSMENT NOTE - NS ED NURSE REASSESS COMMENT FT1
Covid swab done and sent to lab, at this time pt asleep arousable to speech, tylonel rescheduled and available for pt if in pain at a later time.  Surgery at bedside.

## 2020-07-23 NOTE — ED PROVIDER NOTE - PROGRESS NOTE DETAILS
Patient evaluated by surgery- no acute interventions at this time. XR reviewed- no fracture. Report given to [place]- patient does NOT require COVID result in order to be transferred back as he was in the ED for a short period of time. D/w SW who will arrange for transport back. Emi Corado DO

## 2020-07-23 NOTE — ED PROVIDER NOTE - OBJECTIVE STATEMENT
55yo male with PMH colon ca, ileostomy in place, hypothyroid, DM, schizoaffective disorder presenting from [facility] (inpatient) with R knee pain and bleeding from ostomy site. Patient had pulling off his ileostomy bag, threw it at a staff member, then slipped in his own feces and fell onto R knee. DId not hit head. Patient agitated at [facility] and received sedation (per EMS bendryl, haldol, and ativan).

## 2020-07-23 NOTE — CONSULT NOTE ADULT - SUBJECTIVE AND OBJECTIVE BOX
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 Per chart, patient in [facility] got agitated and pulled-off his ostomy bag and threw it to staff and then fell over his feces onto his right knee. Patient sedated but arousable, report feeling well but complaining of right leg pain.     PAST MEDICAL HISTORY:  Colon cancer  Hypothyroidism  High cholesterol  Diabetes  Osteogenesis imperfecta  Schizo-affective psychosis      PAST SURGICAL HISTORY:  Ileostomy status  Fracture of ankle      ALLERGIES:  amoxicillin (Unknown)  Lamictal (Unknown)  penicillin (Unknown)  Tegretol (Unknown)  Zetia (Unknown)      MEDICATIONS  (STANDING):  acetaminophen   Tablet .. 650 milliGRAM(s) Oral once    MEDICATIONS  (PRN):      VITALS & I/Os:  Vital Signs Last 24 Hrs  T(C): 36.9 (23 Jul 2020 11:08), Max: 36.9 (23 Jul 2020 11:08)  T(F): 98.4 (23 Jul 2020 11:08), Max: 98.4 (23 Jul 2020 11:08)  HR: 80 (23 Jul 2020 11:08) (80 - 80)  BP: 108/67 (23 Jul 2020 11:08) (108/67 - 108/67)  BP(mean): --  RR: 18 (23 Jul 2020 11:08) (18 - 18)  SpO2: 95% (23 Jul 2020 11:08) (95% - 95%)  CAPILLARY BLOOD GLUCOSE          I&O's Summary      GENERAL: Alert, well developed, in no acute distress.  MENTAL STATUS: AAOx3. Appropriate affect.  HEENT: PERRLA. EOMI. MMM.  Trachea midline.   RESPIRATORY: CTAB. No wheezing, rales or rhonchi.  CARDIOVASCULAR: RRR. No audible murmurs, rubs or gallops.   GASTROINTESTINAL: abdomen non-distended, soft and depressible, non-tender right sided ileostomy red with 2cm prolapse, fully functional, no blood but stool in bag.  MUSCULOSKELETAL: No cyanosis or clubbing. No gross deformities.       Lactate: acetaminophen   Tablet .. 650 milliGRAM(s) Oral once

## 2020-07-23 NOTE — CHART NOTE - NSCHARTNOTEFT_GEN_A_CORE
Ion: p/c from pt's EDMD (Ce) requesting assistance to send pt back to [facility].  [facility] transport (Emile) called ,fitz arranged ETA within the hour. TITUS left with pt's RN (Kenna) . No other concerns reported at this moment.

## 2020-07-23 NOTE — ED ADULT NURSE REASSESSMENT NOTE - NS ED NURSE REASSESS COMMENT FT1
Pt returned from x-ray of left knee, asleep comfortably in bed. !:1 at bedside.  Will continue to monitor Pt returned from x-ray of right knee, asleep comfortably in bed. 1:1 at bedside.  Will continue to monitor

## 2020-07-23 NOTE — ED PROVIDER NOTE - CLINICAL SUMMARY MEDICAL DECISION MAKING FREE TEXT BOX
53yo male with h/o colon ca, ileostomy, schizoaffective disorder p/w R knee pain s/p mechanical fall and prolapsed ileostomy. Surgery consulted- to evaluate stoma, XR knee, reassess. Per [place], if patient not admitted to hospital may not need COVID result to be discharged back however will clarify. Emi Croado DO

## 2020-07-23 NOTE — ED PROVIDER NOTE - PHYSICAL EXAMINATION
Gen: sleeping but arouses to voice, NAD  Head: NCAT  Lung: CTAB, no respiratory distress, no wheezing, rales, rhonchi  CV: normal s1/s2, rrr, no murmurs, Normal perfusion, pulses 2+ throughout  Abd: soft, prolapsed ileostomy, red/friable bowel in bag  MSK: +TTP over R patella, No edema, no visible deformities, full range of motion in all 4 extremities  Neuro: No focal neurologic deficits  Skin: No rash   Psych: normal affect Gen: sleeping but arouses to voice, NAD  Head: NCAT  Lung: CTAB, no respiratory distress, no wheezing, rales, rhonchi  CV: normal s1/s2, rrr, no murmurs, Normal perfusion, pulses 2+ throughout  Abd: soft, prolapsed ileostomy, red/friable bowel in bag  MSK: +TTP over R patella, No edema, no visible deformities, full range of motion in all 4 extremities, no TTP over R hip, pelvis stable  Neuro: No focal neurologic deficits  Skin: No rash   Psych: normal affect

## 2020-07-23 NOTE — CONSULT NOTE ADULT - ASSESSMENT
55 yo M w/ Hx of metastatic cecal adenocarcinoma s/p ex-lap, loop ileostomy in 2/19 presenting after episode of agitation and concerns of ostomy injury    Ileostomy is fully functional, red, stool in bag  No surgical intervention indicated at the moment

## 2020-07-23 NOTE — ED ADULT NURSE REASSESSMENT NOTE - NS ED NURSE REASSESS COMMENT FT1
Report given to Emile at [facility], pt will be discharged back to [facility]

## 2020-07-23 NOTE — ED ADULT TRIAGE NOTE - CHIEF COMPLAINT QUOTE
rescue states from [facility], in patient, pt ripped off colostomy bag & threw it at the MD, then slipped on feces and hurt right knee, he was B52 at 945am,   pt arouseable, lethargic, resp wnl,

## 2020-07-23 NOTE — ED ADULT NURSE NOTE - CHIEF COMPLAINT QUOTE
rescue states from [facility], in patient, pt ripped off colostomy bag & threw it at the MD, then slipped on feces and hurt right knee, he was B52 at 945am,   pt arouseable, lethargic, resp wnl,

## 2020-07-23 NOTE — ED PROVIDER NOTE - PATIENT PORTAL LINK FT
You can access the FollowMyHealth Patient Portal offered by [facility] by registering at the following website: [email or web address]. By joining [facility]’s FollowMyHealth portal, you will also be able to view your health information using other applications (apps) compatible with our system.

## 2020-07-23 NOTE — ED PROVIDER NOTE - NSFOLLOWUPINSTRUCTIONS_ED_ALL_ED_FT
1. Follow up with your primary care physician within 2-3days for reevaluation.  2.  Return to the Emergency Department for worsening, progressive or any other concerning symptoms.   3.  Please take tylenol 650 mg every 4 hours as needed for pain. Please do not exceed more than 4,000mg of Tylenol in a day   4.  Follow up with general surgery as planned for reassessment of your colostomy.     Contusion    A contusion is a deep bruise. Contusions are the result of a blunt injury to tissues and muscle fibers under the skin. The skin overlying the contusion may turn blue, purple, or yellow. Symptoms also include pain and swelling in the injured area.    SEEK IMMEDIATE MEDICAL CARE IF YOU HAVE ANY OF THE FOLLOWING SYMPTOMS: severe pain, numbness, tingling, pain, weakness, or skin color/temperature change in any part of your body distal to the injury. 1. Follow up with your primary care physician within 2-3days for reevaluation.  2.  Return to the Emergency Department for worsening, progressive or any other concerning symptoms.   3.  Please take tylenol 650 mg every 4 hours as needed for pain. Please do not exceed more than 4,000mg of Tylenol in a day   4.  Rest, apply ice over covered skin for no more than 15 minutes at a time, keep affected extremity elevated, use compressive dressing or splint as provided and instructed.   5.  Follow up with general surgery as planned for reassessment of your colostomy.     Contusion    A contusion is a deep bruise. Contusions are the result of a blunt injury to tissues and muscle fibers under the skin. The skin overlying the contusion may turn blue, purple, or yellow. Symptoms also include pain and swelling in the injured area.    SEEK IMMEDIATE MEDICAL CARE IF YOU HAVE ANY OF THE FOLLOWING SYMPTOMS: severe pain, numbness, tingling, pain, weakness, or skin color/temperature change in any part of your body distal to the injury.

## 2020-07-23 NOTE — ED ADULT NURSE NOTE - OBJECTIVE STATEMENT
Pt arrived from [facility], as per EMS patient was medicated for agitation with 50mg Benadryl, 2mg Ativan & 5mg Haldol at 0945.  Pt has stoma in place, noted to be beefy red, but prolapsed, colostomy bag in place. Pt also noted to have bilateral +2 edema.  Redness and abrasions noted to pt left shin.  Pt responsive to verbal stimuli, however at this time refusing to speak.  1:1 at bedside. Respirations even and unlabored, no non-verbal pain indicators noted. As per EMS pt ripped off colostomy bag and slipped on feces hurting left knee. Will continue to monitor. Pt arrived from [facility], as per EMS patient was medicated for agitation with 50mg Benadryl, 2mg Ativan & 5mg Haldol at 0945.  Pt has stoma in place, noted to be beefy red, but prolapsed, colostomy bag in place. Pt also noted to have bilateral +2 edema.  Redness and abrasions noted to pt left shin.  Pt responsive to verbal stimuli, however at this time refusing to speak.  1:1 at bedside. Respirations even and unlabored, no non-verbal pain indicators noted. As per EMS pt ripped off colostomy bag and slipped on feces hurting right knee. Will continue to monitor.

## 2020-07-24 LAB — SARS-COV-2 RNA SPEC QL NAA+PROBE: SIGNIFICANT CHANGE UP

## 2020-08-09 ENCOUNTER — NON-APPOINTMENT (OUTPATIENT)
Age: 55
End: 2020-08-09

## 2020-08-12 ENCOUNTER — APPOINTMENT (OUTPATIENT)
Dept: CT IMAGING | Facility: CLINIC | Age: 55
End: 2020-08-12

## 2020-08-13 ENCOUNTER — OUTPATIENT (OUTPATIENT)
Dept: OUTPATIENT SERVICES | Facility: HOSPITAL | Age: 55
LOS: 1 days | Discharge: HOME | End: 2020-08-13

## 2020-08-13 DIAGNOSIS — C18.9 MALIGNANT NEOPLASM OF COLON, UNSPECIFIED: ICD-10-CM

## 2020-08-13 DIAGNOSIS — S82.899A OTHER FRACTURE OF UNSPECIFIED LOWER LEG, INITIAL ENCOUNTER FOR CLOSED FRACTURE: Chronic | ICD-10-CM

## 2020-08-13 DIAGNOSIS — Z93.2 ILEOSTOMY STATUS: Chronic | ICD-10-CM

## 2020-08-18 ENCOUNTER — APPOINTMENT (OUTPATIENT)
Dept: HEMATOLOGY ONCOLOGY | Facility: CLINIC | Age: 55
End: 2020-08-18
Payer: MEDICARE

## 2020-08-18 ENCOUNTER — APPOINTMENT (OUTPATIENT)
Age: 55
End: 2020-08-18

## 2020-08-18 VITALS
TEMPERATURE: 97.6 F | OXYGEN SATURATION: 96 % | DIASTOLIC BLOOD PRESSURE: 82 MMHG | HEIGHT: 60 IN | BODY MASS INDEX: 34.37 KG/M2 | WEIGHT: 175.06 LBS | HEART RATE: 111 BPM | SYSTOLIC BLOOD PRESSURE: 130 MMHG

## 2020-08-18 DIAGNOSIS — R19.8 OTHER SPECIFIED SYMPTOMS AND SIGNS INVOLVING THE DIGESTIVE SYSTEM AND ABDOMEN: ICD-10-CM

## 2020-08-18 DIAGNOSIS — F25.9 SCHIZOAFFECTIVE DISORDER, UNSPECIFIED: ICD-10-CM

## 2020-08-18 DIAGNOSIS — Z93.2 OTHER SPECIFIED SYMPTOMS AND SIGNS INVOLVING THE DIGESTIVE SYSTEM AND ABDOMEN: ICD-10-CM

## 2020-08-18 PROCEDURE — 99213 OFFICE O/P EST LOW 20 MIN: CPT

## 2020-08-18 NOTE — HISTORY OF PRESENT ILLNESS
[de-identified] : The patient was diagnosed with adenocarcinoma of the cecum in December 2018 at the age of 53.  He has a history of irregular bowel movements with alternating diarrhea and constipation and recently saw blood on the toilet paper.  He saw GI, Dr. Thuan Cassidy, who performed a colonoscopy on 12/11/18.  Pathology c/w adenocarcinoma.  On 12/13/18 a CT A/P showed a soft tissue mass in the region of the ileocecal  junction measuring 3.7 x 3.5 cm.  There are  multiple peritoneal nodules in the abdomen and pelvis. For example, a  nodule in the pelvis measures 1.4 cm. A nodule in the left lower quadrant  measures 1.3 cm. There are omental nodules measuring up to 3.2 x 2.9 cm.  There are ileocolic lymph nodes measuring up to 1.3 cm.  1.1 cm lesion in the right lobe of the liver is suspicious for metastatic  disease.    Two splenic lesions are suspicious for metastatic disease.  He was referred to Colorectal surgery, Dr. Livia Talavera, who first ordered a CT Chest to complete staging.  It showed no suspicious pulmonary nodules.  Dr. Talavera recommended systemic chemotherapy given extent of disease.   [de-identified] : PMH of HTN, DM, HPL, Osteogenesis imperfecta, h/o Disruptive behavior, schizoaffective disorder [de-identified] : Patient presents for follow up on Xeloda alone for adenocarcinoma of the cecum.  He is s/p C10 XELOX (C10 was on 10/7/19).  He is s/p emergent diverting loop ileostomy on 2/8/2019 with Dr Giang.  Patient recently had a psychotic break and is now in-patient at [facility].  + Pruritus around stoma.  + Fatigue improved.  + Neuropathy improved. + Sleep disturbances, worse recently.  + Grade 1 H/F - xeroderma. + Xerostomia. No fevers. No headaches. No abdominal pain. No N/V. No mucositis.

## 2020-08-18 NOTE — REVIEW OF SYSTEMS
[Fatigue] : fatigue [Palpitations] : palpitations [Constipation] : constipation [Shortness Of Breath] : shortness of breath [Anxiety] : anxiety [Negative] : Allergic/Immunologic

## 2020-08-18 NOTE — RESULTS/DATA
[FreeTextEntry1] : 5/11/20 CT:  Stable right lung nodule.  Decrease in size of the portacaval lymph node.  Stable splenic lesions.  Stable peritoneal nodules.  Previously identified small bowel mesenteric lymph node has resolved.\par \par 1/7/20 CT CAP:  Previously identified inflammatory opacity in the right upper lobe has resolved. Stable bilateral lung nodules. Stable portacaval adenopathy. \par Decrease in size of the 2 splenic lesions which are favored to be capsular in location. Stable peritoneal nodules and mesenteric lymph nodes.\par \par 10/3/19 CT C/A/P:  New small opacities right upper lobe favored to be infectious or inflammatory in etiology. Decrease in size of a nonenlarged cardiophrenic lymph node. \par Decrease in size of the splenic lesions. Decrease in size of the ileocolic lymph nodes. Increase in size of a small bowel mesenteric lymph node in the mid abdomen. \par Stable peritoneal nodules in the right upper quadrant. Stable portacaval adenopathy.\par \par 7/1/19 CT C/A/P: Two new right lung nodules. Previously identified lung nodules stable. Increase in size of 1.0 cm lymph node in the cardiophrenic region. The known ileocecal mass is not well visualized on this examination. Decrease in size of peritoneal nodules. Stable splenic lesions. Decrease in size of ileocolic lymph nodes. \par \par 4/30/19 CT CAP:\par Interval loop ileostomy.  The known ileocecal mass is not well visualized on this examination.  Decrease in size of previously identified peritoneal nodules. However, \par there are a few newly noted peritoneal nodules.  Stable splenic lesions.  Stable small bilateral lung nodules.\par \par 1/19/19 Pathology:\par 1. Cecal mass, biopsy ([id number]):\par - At least intramucosal adenocarcinoma in background of high-grade dysplasia, see note.\par -Note: Dr. Goodwin concur(s) with diagnosis. The findings may not represent the entire mass lesion.\par \par 1/9/19 CT Chest:  No lung mass or consolidation. No suspicious pulmonary nodule. 3 mm  triangular density within the minor fissure most  likely benign fissural lymph node.\par \par 1/9/19 U/S Abdomen:  Mild fatty infiltration of the liver.    1.7 cm right lobe liver lesion is indeterminate but for which metastatic  disease is in the differential diagnosis.    Two solid splenic lesions measuring 3.1 cm and 1.8 cm.  The liver lesion and splenic lesions may be further evaluated with PET/CT  scan to evaluate for metastatic disease.  1.2 x 0.4 cm gallbladder polyp.\par \par 12/13/18 CT A/P:  There is a soft tissue mass in the region of the ileocecal  junction measuring 3.7 x 3.5 cm. There is no bowel obstruction. There are  multiple peritoneal nodules in the abdomen and pelvis. For example, a  nodule in the pelvis measures 1.4 cm. A nodule in the left lower quadrant  measures 1.3 cm. There are omental nodules measuring up to 3.2 x 2.9 cm.  There are ileocolic lymph nodes measuring up to 1.3 cm.  1.1 cm lesion in the right lobe of the liver is suspicious for metastatic  disease.    Two splenic lesions are suspicious for metastatic disease.\par \par 12/11/18 Pathology:  Cecal mass, biopsy:   Adenocarcinoma, at least intramucosal, superficial mucosal fragments.\par \par 12/11/18 Colonoscopy:  Cecal mass.  Hemorrhoids

## 2020-08-18 NOTE — PHYSICAL EXAM
[Restricted in physically strenuous activity but ambulatory and able to carry out work of a light or sedentary nature] : Status 1- Restricted in physically strenuous activity but ambulatory and able to carry out work of a light or sedentary nature, e.g., light house work, office work [Normal] : affect appropriate [de-identified] : prolapsed ileostomy

## 2020-11-03 NOTE — HISTORY OF PRESENT ILLNESS
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 He was also known at that time to have disease c/w peritoneal carcinomatosis. Since then he has been under the care of Dr. Sarina Diaz who has been administering XELOX with good patient tolerance and good results on his CT from 4/2019 demonstrating tumor abatement. He has learned to manage his ileostomy well and has become proficient in ostomy care and diet management.\par \par 10/29/19: Mr. Edwards returns with his mother to discuss his ostomy status and possible surgical management of his disease. He continues to receive systemic chemotherapy under the care of Dr. Galarza (XELOX) with his recent imaging demonstrating good tumor control. He experiences intermittent, mild, painless prolapse of his ostomy, which he easily reduces with a change in position. He is uncertain at this point if he would like to consider right colectomy with ostomy reversal, or simply continue with systemic treatment.\par \par INTERVAL 3/3/20: Mr. Edwards returns with his mother. Unfortunately he has had an exacerbation of his psychiatric disorders and has required involuntary admission at a psychiatric center. He and his mother present for me to evaluate his ileostomy and to discuss surgical options moving forward. I discussed with his mother that while the prolapse is worsening and is unsightly, at this time there is no compromise of the stoma and it is functioning well. I discussed reversal of the ileostomy with Nick and his mother once he is better from a psychiatric standpoint. We are in clear agreement the aggressive CRS-HIPEC would not be appopriate for Nick. I asked them to return when Nick is ready to discusss ileostomy reversal.\par \par 5/11/20 CT: Stable right lung nodule. Decrease in size of the portacaval lymph node. Stable splenic lesions. Stable peritoneal nodules. Previously identified small bowel mesenteric lymph node has resolved.\par \par 11/4/20- He completed 10 cycles of XELOX.  He remains off Xeloda per his preference.  According to Dr. Diaz, he refused his last CT and it was to be rescheduled. He has requested that his Mediport be removed and Dr. Diaz has no objection.

## 2020-11-03 NOTE — ASSESSMENT
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 Disease with good response to XELOX and now Xeloda, however, patient is currently off Xeloda per his own preference. Unfortunately due to patient's worsening psychosis he was unable to take part in surgical decision making but is worried about his ileostomy.\par \par

## 2020-11-04 ENCOUNTER — APPOINTMENT (OUTPATIENT)
Dept: SURGICAL ONCOLOGY | Facility: CLINIC | Age: 55
End: 2020-11-04

## 2020-11-30 ENCOUNTER — INPATIENT (INPATIENT)
Facility: HOSPITAL | Age: 55
LOS: 20 days | Discharge: NURSING, REHAB OR LONG-TERM CARE | DRG: 982 | End: 2020-12-21
Attending: STUDENT IN AN ORGANIZED HEALTH CARE EDUCATION/TRAINING PROGRAM | Admitting: INTERNAL MEDICINE
Payer: MEDICARE

## 2020-11-30 VITALS
OXYGEN SATURATION: 99 % | SYSTOLIC BLOOD PRESSURE: 115 MMHG | RESPIRATION RATE: 18 BRPM | TEMPERATURE: 100 F | HEART RATE: 69 BPM | WEIGHT: 164.91 LBS | HEIGHT: 60 IN | DIASTOLIC BLOOD PRESSURE: 80 MMHG

## 2020-11-30 DIAGNOSIS — T56.891A TOXIC EFFECT OF OTHER METALS, ACCIDENTAL (UNINTENTIONAL), INITIAL ENCOUNTER: ICD-10-CM

## 2020-11-30 DIAGNOSIS — Z93.2 ILEOSTOMY STATUS: Chronic | ICD-10-CM

## 2020-11-30 DIAGNOSIS — S82.899A OTHER FRACTURE OF UNSPECIFIED LOWER LEG, INITIAL ENCOUNTER FOR CLOSED FRACTURE: Chronic | ICD-10-CM

## 2020-11-30 LAB
AMPHET UR-MCNC: NEGATIVE — SIGNIFICANT CHANGE UP
ANION GAP SERPL CALC-SCNC: 11 MMOL/L — SIGNIFICANT CHANGE UP (ref 5–17)
ANION GAP SERPL CALC-SCNC: 13 MMOL/L — SIGNIFICANT CHANGE UP (ref 5–17)
APAP SERPL-MCNC: <3 UG/ML — LOW (ref 10–26)
APPEARANCE UR: CLEAR — SIGNIFICANT CHANGE UP
BARBITURATES UR SCN-MCNC: NEGATIVE — SIGNIFICANT CHANGE UP
BASE EXCESS BLDV CALC-SCNC: 3.4 MMOL/L — HIGH (ref -2–2)
BASOPHILS # BLD AUTO: 0.11 K/UL — SIGNIFICANT CHANGE UP (ref 0–0.2)
BASOPHILS NFR BLD AUTO: 0.7 % — SIGNIFICANT CHANGE UP (ref 0–2)
BENZODIAZ UR-MCNC: NEGATIVE — SIGNIFICANT CHANGE UP
BILIRUB UR-MCNC: NEGATIVE — SIGNIFICANT CHANGE UP
BUN SERPL-MCNC: 12 MG/DL — SIGNIFICANT CHANGE UP (ref 8–20)
BUN SERPL-MCNC: 16 MG/DL — SIGNIFICANT CHANGE UP (ref 8–20)
CA-I SERPL-SCNC: 1.32 MMOL/L — SIGNIFICANT CHANGE UP (ref 1.15–1.33)
CALCIUM SERPL-MCNC: 10.4 MG/DL — HIGH (ref 8.6–10.2)
CALCIUM SERPL-MCNC: 11.2 MG/DL — HIGH (ref 8.6–10.2)
CHLORIDE BLDV-SCNC: 100 MMOL/L — SIGNIFICANT CHANGE UP (ref 98–107)
CHLORIDE SERPL-SCNC: 95 MMOL/L — LOW (ref 98–107)
CHLORIDE SERPL-SCNC: 97 MMOL/L — LOW (ref 98–107)
CO2 SERPL-SCNC: 22 MMOL/L — SIGNIFICANT CHANGE UP (ref 22–29)
CO2 SERPL-SCNC: 24 MMOL/L — SIGNIFICANT CHANGE UP (ref 22–29)
COCAINE METAB.OTHER UR-MCNC: NEGATIVE — SIGNIFICANT CHANGE UP
COLOR SPEC: YELLOW — SIGNIFICANT CHANGE UP
CREAT SERPL-MCNC: 0.77 MG/DL — SIGNIFICANT CHANGE UP (ref 0.5–1.3)
CREAT SERPL-MCNC: 0.92 MG/DL — SIGNIFICANT CHANGE UP (ref 0.5–1.3)
DIFF PNL FLD: NEGATIVE — SIGNIFICANT CHANGE UP
EOSINOPHIL # BLD AUTO: 0.24 K/UL — SIGNIFICANT CHANGE UP (ref 0–0.5)
EOSINOPHIL NFR BLD AUTO: 1.6 % — SIGNIFICANT CHANGE UP (ref 0–6)
ETHANOL SERPL-MCNC: <10 MG/DL — HIGH (ref 0–9)
GAS PNL BLDV: 134 MMOL/L — LOW (ref 135–145)
GAS PNL BLDV: SIGNIFICANT CHANGE UP
GAS PNL BLDV: SIGNIFICANT CHANGE UP
GLUCOSE BLDV-MCNC: 221 MG/DL — HIGH (ref 70–99)
GLUCOSE SERPL-MCNC: 243 MG/DL — HIGH (ref 70–99)
GLUCOSE SERPL-MCNC: 293 MG/DL — HIGH (ref 70–99)
GLUCOSE UR QL: NEGATIVE MG/DL — SIGNIFICANT CHANGE UP
HCO3 BLDV-SCNC: 27 MMOL/L — SIGNIFICANT CHANGE UP (ref 21–29)
HCT VFR BLD CALC: 48.4 % — SIGNIFICANT CHANGE UP (ref 39–50)
HCT VFR BLDA CALC: 43 — SIGNIFICANT CHANGE UP (ref 39–50)
HGB BLD CALC-MCNC: 13.9 G/DL — SIGNIFICANT CHANGE UP (ref 13–17)
HGB BLD-MCNC: 16.2 G/DL — SIGNIFICANT CHANGE UP (ref 13–17)
IMM GRANULOCYTES NFR BLD AUTO: 0.4 % — SIGNIFICANT CHANGE UP (ref 0–1.5)
KETONES UR-MCNC: NEGATIVE — SIGNIFICANT CHANGE UP
LACTATE BLDV-MCNC: 1.4 MMOL/L — SIGNIFICANT CHANGE UP (ref 0.5–2)
LEUKOCYTE ESTERASE UR-ACNC: NEGATIVE — SIGNIFICANT CHANGE UP
LITHIUM SERPL-MCNC: 1.8 MMOL/L — CRITICAL HIGH (ref 0.5–1.5)
LITHIUM SERPL-MCNC: 2.37 MMOL/L — CRITICAL HIGH (ref 0.5–1.5)
LITHIUM SERPL-MCNC: 3.28 MMOL/L — CRITICAL HIGH (ref 0.5–1.5)
LYMPHOCYTES # BLD AUTO: 1.77 K/UL — SIGNIFICANT CHANGE UP (ref 1–3.3)
LYMPHOCYTES # BLD AUTO: 11.9 % — LOW (ref 13–44)
MAGNESIUM SERPL-MCNC: 1.2 MG/DL — LOW (ref 1.6–2.6)
MAGNESIUM SERPL-MCNC: 1.5 MG/DL — LOW (ref 1.6–2.6)
MCHC RBC-ENTMCNC: 29.7 PG — SIGNIFICANT CHANGE UP (ref 27–34)
MCHC RBC-ENTMCNC: 33.5 GM/DL — SIGNIFICANT CHANGE UP (ref 32–36)
MCV RBC AUTO: 88.8 FL — SIGNIFICANT CHANGE UP (ref 80–100)
METHADONE UR-MCNC: NEGATIVE — SIGNIFICANT CHANGE UP
MONOCYTES # BLD AUTO: 1.01 K/UL — HIGH (ref 0–0.9)
MONOCYTES NFR BLD AUTO: 6.8 % — SIGNIFICANT CHANGE UP (ref 2–14)
NEUTROPHILS # BLD AUTO: 11.68 K/UL — HIGH (ref 1.8–7.4)
NEUTROPHILS NFR BLD AUTO: 78.6 % — HIGH (ref 43–77)
NITRITE UR-MCNC: NEGATIVE — SIGNIFICANT CHANGE UP
OPIATES UR-MCNC: NEGATIVE — SIGNIFICANT CHANGE UP
OTHER CELLS CSF MANUAL: 16 ML/DL — LOW (ref 18–22)
PCO2 BLDV: 47 MMHG — SIGNIFICANT CHANGE UP (ref 35–50)
PCP SPEC-MCNC: SIGNIFICANT CHANGE UP
PCP UR-MCNC: NEGATIVE — SIGNIFICANT CHANGE UP
PH BLDV: 7.4 — SIGNIFICANT CHANGE UP (ref 7.32–7.43)
PH UR: 7 — SIGNIFICANT CHANGE UP (ref 5–8)
PLATELET # BLD AUTO: 236 K/UL — SIGNIFICANT CHANGE UP (ref 150–400)
PO2 BLDV: 47 MMHG — HIGH (ref 25–45)
POTASSIUM BLDV-SCNC: 3.6 MMOL/L — SIGNIFICANT CHANGE UP (ref 3.4–4.5)
POTASSIUM SERPL-MCNC: 3.7 MMOL/L — SIGNIFICANT CHANGE UP (ref 3.5–5.3)
POTASSIUM SERPL-MCNC: 3.9 MMOL/L — SIGNIFICANT CHANGE UP (ref 3.5–5.3)
POTASSIUM SERPL-SCNC: 3.7 MMOL/L — SIGNIFICANT CHANGE UP (ref 3.5–5.3)
POTASSIUM SERPL-SCNC: 3.9 MMOL/L — SIGNIFICANT CHANGE UP (ref 3.5–5.3)
PROT UR-MCNC: NEGATIVE MG/DL — SIGNIFICANT CHANGE UP
RBC # BLD: 5.45 M/UL — SIGNIFICANT CHANGE UP (ref 4.2–5.8)
RBC # FLD: 13 % — SIGNIFICANT CHANGE UP (ref 10.3–14.5)
SALICYLATES SERPL-MCNC: <0.6 MG/DL — LOW (ref 10–20)
SAO2 % BLDV: 84 % — SIGNIFICANT CHANGE UP
SARS-COV-2 RNA SPEC QL NAA+PROBE: SIGNIFICANT CHANGE UP
SODIUM SERPL-SCNC: 130 MMOL/L — LOW (ref 135–145)
SODIUM SERPL-SCNC: 132 MMOL/L — LOW (ref 135–145)
SP GR SPEC: 1 — LOW (ref 1.01–1.02)
THC UR QL: NEGATIVE — SIGNIFICANT CHANGE UP
TSH SERPL-MCNC: 12.63 UIU/ML — HIGH (ref 0.27–4.2)
UROBILINOGEN FLD QL: NEGATIVE MG/DL — SIGNIFICANT CHANGE UP
WBC # BLD: 14.87 K/UL — HIGH (ref 3.8–10.5)
WBC # FLD AUTO: 14.87 K/UL — HIGH (ref 3.8–10.5)

## 2020-11-30 PROCEDURE — 99223 1ST HOSP IP/OBS HIGH 75: CPT

## 2020-11-30 PROCEDURE — 93010 ELECTROCARDIOGRAM REPORT: CPT | Mod: 76

## 2020-11-30 PROCEDURE — 99223 1ST HOSP IP/OBS HIGH 75: CPT | Mod: AI

## 2020-11-30 PROCEDURE — 70450 CT HEAD/BRAIN W/O DYE: CPT | Mod: 26

## 2020-11-30 PROCEDURE — 99285 EMERGENCY DEPT VISIT HI MDM: CPT | Mod: CS

## 2020-11-30 RX ORDER — DIPHENHYDRAMINE HCL 50 MG
25 CAPSULE ORAL ONCE
Refills: 0 | Status: COMPLETED | OUTPATIENT
Start: 2020-11-30 | End: 2020-11-30

## 2020-11-30 RX ORDER — ACETAMINOPHEN 500 MG
650 TABLET ORAL EVERY 6 HOURS
Refills: 0 | Status: DISCONTINUED | OUTPATIENT
Start: 2020-11-30 | End: 2020-12-04

## 2020-11-30 RX ORDER — MAGNESIUM SULFATE 500 MG/ML
2 VIAL (ML) INJECTION ONCE
Refills: 0 | Status: COMPLETED | OUTPATIENT
Start: 2020-11-30 | End: 2020-11-30

## 2020-11-30 RX ORDER — CLOZAPINE 150 MG/1
1 TABLET, ORALLY DISINTEGRATING ORAL
Qty: 0 | Refills: 0 | DISCHARGE

## 2020-11-30 RX ORDER — SODIUM CHLORIDE 9 MG/ML
1000 INJECTION, SOLUTION INTRAVENOUS ONCE
Refills: 0 | Status: COMPLETED | OUTPATIENT
Start: 2020-11-30 | End: 2020-11-30

## 2020-11-30 RX ORDER — ATORVASTATIN CALCIUM 80 MG/1
40 TABLET, FILM COATED ORAL DAILY
Refills: 0 | Status: DISCONTINUED | OUTPATIENT
Start: 2020-11-30 | End: 2020-11-30

## 2020-11-30 RX ORDER — ATORVASTATIN CALCIUM 80 MG/1
40 TABLET, FILM COATED ORAL AT BEDTIME
Refills: 0 | Status: DISCONTINUED | OUTPATIENT
Start: 2020-11-30 | End: 2020-12-04

## 2020-11-30 RX ORDER — ENOXAPARIN SODIUM 100 MG/ML
40 INJECTION SUBCUTANEOUS DAILY
Refills: 0 | Status: DISCONTINUED | OUTPATIENT
Start: 2020-11-30 | End: 2020-12-03

## 2020-11-30 RX ORDER — SODIUM CHLORIDE 9 MG/ML
1000 INJECTION INTRAMUSCULAR; INTRAVENOUS; SUBCUTANEOUS
Refills: 0 | Status: DISCONTINUED | OUTPATIENT
Start: 2020-11-30 | End: 2020-12-02

## 2020-11-30 RX ORDER — CLOZAPINE 150 MG/1
100 TABLET, ORALLY DISINTEGRATING ORAL AT BEDTIME
Refills: 0 | Status: COMPLETED | OUTPATIENT
Start: 2020-11-30 | End: 2020-11-30

## 2020-11-30 RX ORDER — AMILORIDE HCL 5 MG
5 TABLET ORAL
Refills: 0 | Status: COMPLETED | OUTPATIENT
Start: 2020-11-30 | End: 2020-12-03

## 2020-11-30 RX ADMIN — SODIUM CHLORIDE 1000 MILLILITER(S): 9 INJECTION, SOLUTION INTRAVENOUS at 14:36

## 2020-11-30 RX ADMIN — ATORVASTATIN CALCIUM 40 MILLIGRAM(S): 80 TABLET, FILM COATED ORAL at 22:39

## 2020-11-30 RX ADMIN — SODIUM CHLORIDE 1000 MILLILITER(S): 9 INJECTION, SOLUTION INTRAVENOUS at 12:21

## 2020-11-30 RX ADMIN — SODIUM CHLORIDE 200 MILLILITER(S): 9 INJECTION INTRAMUSCULAR; INTRAVENOUS; SUBCUTANEOUS at 17:38

## 2020-11-30 RX ADMIN — ENOXAPARIN SODIUM 40 MILLIGRAM(S): 100 INJECTION SUBCUTANEOUS at 22:19

## 2020-11-30 RX ADMIN — Medication 50 GRAM(S): at 22:17

## 2020-11-30 RX ADMIN — SODIUM CHLORIDE 1000 MILLILITER(S): 9 INJECTION, SOLUTION INTRAVENOUS at 13:33

## 2020-11-30 RX ADMIN — CLOZAPINE 100 MILLIGRAM(S): 150 TABLET, ORALLY DISINTEGRATING ORAL at 22:19

## 2020-11-30 RX ADMIN — Medication 25 MILLIGRAM(S): at 12:21

## 2020-11-30 NOTE — ED BEHAVIORAL HEALTH ASSESSMENT NOTE - RISK ASSESSMENT
RF past psychosis and psych admissions, poor ADLs, poor insight/judgment,  PF stable housing, in tx supportive family, reported compliance with medications Low Acute Suicide Risk

## 2020-11-30 NOTE — H&P ADULT - NSHPPHYSICALEXAM_GEN_ALL_CORE
GENERAL:  obese , pleasant , not in distress   RESP:  Non-labored breathing pattern, lungs clear to ausculation in anterior fields  CV: Regular rate and rhythm, no murmurs appreciated  GI: Soft, non-tender, ostomy bag with protruding stoma , normal stool output   EXT: no pitting edema

## 2020-11-30 NOTE — ED ADULT NURSE NOTE - INTERVENTIONS DEFINITIONS
Call bell, personal items and telephone within reach/Stretcher in lowest position, wheels locked, appropriate side rails in place/Non-slip footwear when patient is off stretcher/[place] to call system

## 2020-11-30 NOTE — ED BEHAVIORAL HEALTH ASSESSMENT NOTE - OTHER PAST PSYCHIATRIC HISTORY (INCLUDE DETAILS REGARDING ONSET, COURSE OF ILLNESS, INPATIENT/OUTPATIENT TREATMENT)
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  Last hospitalized at [facility] from 4/30-11/2 of this year. No known suicide attempts

## 2020-11-30 NOTE — ED BEHAVIORAL HEALTH ASSESSMENT NOTE - DESCRIPTION
Pt was laying in bed with tremors in 4 extremities.  Patient was generally forthcoming on interview, became irritable when psychiatric condition was discussed.  Pt denied any S/H I/I/P. Pt did not appear to be responding to internal stimuli at time of interview. UDS was negative . Covid was not detected. CT head was unremarkable.   Lithium level was found to 3.28 at 12:33 and 2.37 at 16:39.      Vital Signs Last 24 Hrs  T(C): 37.5 (30 Nov 2020 19:47), Max: 37.5 (30 Nov 2020 11:01)  T(F): 99.5 (30 Nov 2020 19:47), Max: 99.5 (30 Nov 2020 11:01)  HR: 72 (30 Nov 2020 19:47) (69 - 90)  BP: 124/68 (30 Nov 2020 19:47) (106/70 - 124/68)  BP(mean): --  RR: 20 (30 Nov 2020 19:47) (18 - 20)  SpO2: 96% (30 Nov 2020 19:47) (95% - 99%) osteogenesis imperfecta, HTN, hypothyroidism, DM type 2 Pt has been residing at [facility] since discharge from [facility]

## 2020-11-30 NOTE — CONSULT NOTE ADULT - ASSESSMENT
Pt is a 55 y.o. M PMH schizoaffective disorder, hypothyroidism, osteogenesis imperfecta, colon cancer s/p resection and terminal ileostomy creation, presenting with tremors found to have lithium toxicity. QTc 528. Recommendations:    Lithium Toxicity  - Aggressive hydration, 2L NS bolus, subsequent IVF at 200 mL/hr or 2x maintenance rate  - Trend [place] level q2h  - Recommend medicine admission, telemetry bed   - Recommend follow up with poison control or toxicology service    Seen by MICU resident to be discussed with attending, Dr. Weber. Pt is a 55 y.o. M PMH schizoaffective disorder, hypothyroidism, osteogenesis imperfecta, colon cancer s/p resection and terminal ileostomy creation, presenting with tremors found to have lithium toxicity. QTc 528. Recommendations:    Lithium Toxicity  - Aggressive hydration, 2L NS bolus, subsequent IVF at 200 mL/hr or 2x maintenance rate  - Trend [place] level q2h  - Recommend medicine admission, telemetry bed   - Recommend follow up with poison control or toxicology service    Seen by MICU resident to be discussed with attending, Dr. Hall. Pt is a 55 y.o. M PMH schizoaffective disorder, hypothyroidism, osteogenesis imperfecta, colon cancer s/p resection and terminal ileostomy creation, presenting with tremors found to have lithium toxicity. QTc 528. Recommendations:    Lithium Toxicity  - Aggressive hydration, 2L NS bolus, subsequent IVF at 200 mL/hr or 2x maintenance rate  - Trend [place] level q2h  - Recommend medicine admission, telemetry bed   - Recommend evaluation by psychiatry service  - Recommend follow up with poison control or toxicology service    Seen by MICU resident to be discussed with attending, Dr. Hall. Pt is a 55 y.o. M PMH schizoaffective disorder, hypothyroidism, osteogenesis imperfecta, colon cancer s/p resection and terminal ileostomy creation, presenting with tremors found to have lithium toxicity. QTc 528. Recommendations:    Lithium Toxicity  - Aggressive hydration, 2L NS bolus, subsequent IVF at 200 mL/hr or 2x maintenance rate  - Trend [place] level q2h  - Recommend medicine admission, telemetry bed   - Recommend evaluation by psychiatry service  - Recommend follow up with poison control or toxicology service    Seen by MICU resident and attending, Dr. Hall. Pt is a 55 y.o. M PMH schizoaffective disorder, hypothyroidism, osteogenesis imperfecta, colon cancer s/p resection and terminal ileostomy creation, presenting with tremors found to have lithium toxicity. QTc 528. Recommendations:    Lithium Toxicity  - Aggressive hydration, 2L NS bolus, subsequent IVF at 200 mL/hr or 2x maintenance rate  - Trend [place] level q2h  - Recommend medicine admission, telemetry bed   - Recommend evaluation by psychiatry service concerning multiple antipsychotic medication use, new onset tremors and new prolonged QTc  - Recommend nephrology consultation  - Recommend follow up with poison control or toxicology service    Seen by MICU resident and attending, Dr. Hall.

## 2020-11-30 NOTE — CONSULT NOTE ADULT - SUBJECTIVE AND OBJECTIVE BOX
Patient is a 55y old  Male who presents with a chief complaint of  difficulty walking with some shaking yesterday, better today.    HPI:  Pt has psych. illness and has been on lithium up to 5 pills a day. pt vague on how long. pt also on several other meds.  he denies any urine issues or kidney disease. prior Hx colon ca with residual right ostomy. No cardiac history pt states. past hx as below.    PAST MEDICAL & SURGICAL HISTORY:  Colon cancer    Hypothyroidism    High cholesterol    Diabetes    Osteogenesis imperfecta    Schizo-affective psychosis    Ileostomy status    Fracture of ankle        FAMILY HISTORY:  FHx: hypertension (Mother)    FH: prostate cancer (Father)    NC    Social History: Non smoker    MEDICATIONS  (STANDING):  sodium chloride 0.9%. 1000 milliLiter(s) (200 mL/Hr) IV Continuous <Continuous>    MEDICATIONS  (PRN):  acetaminophen   Tablet .. 650 milliGRAM(s) Oral every 6 hours PRN Mild Pain (1 - 3), Moderate Pain (4 - 6)   Meds reviewed    Allergies    amoxicillin (Unknown)  Lamictal (Unknown)  penicillin (Unknown)  Tegretol (Unknown)  Zetia (Unknown)        REVIEW OF SYSTEMS:    CONSTITUTIONAL:  feels tired  EYES: No eye pain, visual disturbances, or discharge  ENMT:  No difficulty hearing, tinnitus, vertigo; No sinus or throat pain  NECK: No pain or stiffness  BREASTS: No pain, masses, or nipple discharge  RESPIRATORY: neg  CARDIOVASCULAR: No chest pain, palpitations, dizziness,   GASTROINTESTINAL: No abdominal or epigastric pain. No nausea, vomiting, or hematemesis; No diarrhea or constipation.   GENITOURINARY: No dysuria, frequency, hematuria, or incontinence  NEUROLOGICAL: No headaches, memory loss, loss of strength, numbness, or tremors  SKIN: Neg  LYMPH NODES: No enlarged glands  ENDOCRINE: No heat or cold intolerance; No hair loss  MUSCULOSKELETAL: Chronic changes  PSYCHIATRIC: + Disorder  HEME/LYMPH: No easy bruising, or bleeding gums  ALLERY AND IMMUNOLOGIC: No hives or eczema      Vital Signs Last 24 Hrs  T(C): 36.6 (2020 17:20), Max: 37.5 (2020 11:01)  T(F): 97.8 (2020 17:20), Max: 99.5 (2020 11:01)  HR: 90 (2020 17:20) (69 - 90)  BP: 106/70 (2020 17:20) (106/70 - 115/80)  BP(mean): --  RR: 19 (2020 17:20) (18 - 19)  SpO2: 95% (2020 17:20) (95% - 99%)  Daily Height in cm: 147.32 (2020 11:01)        PHYSICAL EXAM:    GENERAL: appears chronically ill, oriented.  HEAD:  Atraumatic, Normocephalic  EYES: EOMI, PERRLA, conjunctiva and sclera clear  ENMT: No tonsillar erythema, exudates, or enlargement; Moist mucous membranes, Good dentition, No lesions  NECK: Supple, neck  veins wnl  NERVOUS SYSTEM:  Alert & verbal, Good concentration; Motor Strength wnl upper and lower extremities; occasional shaking pt states better  CHEST/LUNG: Clear to percussion bilaterally; No rales, rhonchi, wheezing, or rubs  HEART: Regular rate and rhythm; No murmurs, rubs, or gallops  ABDOMEN: Soft, Nontender, Nondistended; Bowel sounds present, RLQ with ostomy with hernia  EXTREMITIES:  Trace edema  LYMPH: No lymphadenopathy noted  SKIN: No rashes or lesions, pale  : Neg    LABS:                        16.2   14.87 )-----------( 236      ( 2020 12:32 )             48.4         132<L>  |  97<L>  |  12.0  ----------------------------<  243<H>  3.7   |  24.0  |  0.77    Ca    10.4<H>      2020 16:39  Phos  4.6       Mg     1.7     30        Urinalysis Basic - ( 2020 16:32 )    Color: Yellow / Appearance: Clear / S.005 / pH: x  Gluc: x / Ketone: Negative  / Bili: Negative / Urobili: Negative mg/dL   Blood: x / Protein: Negative mg/dL / Nitrite: Negative   Leuk Esterase: Negative / RBC: x / WBC x   Sq Epi: x / Non Sq Epi: x / Bacteria: x      Magnesium, Serum: 1.7 mg/dL ( @ 12:32)  Phosphorus Level, Serum: 4.6 mg/dL ( @ 12:32)          RADIOLOGY & ADDITIONAL TESTS:

## 2020-11-30 NOTE — ED BEHAVIORAL HEALTH ASSESSMENT NOTE - AXIS III
osteogenesis imperfecta, HTN, hypothyroidism, DM type 2, ileostomy s/p colectomy for stage IV colorectal cancer

## 2020-11-30 NOTE — H&P ADULT - ASSESSMENT
# Lithium toxicity / tremors / weakness   level trending down   poison control and ICU were called  nephro consult called  will repeat level every 2 hours , aggressive fluids   stop medication     # schizoaffective disorder   psych consult requested  will hold off on meds     # prolonged QTC   monitor on tele   check magnesium   follow ekg in am     # Hyponatremia   probably related to anti psychotic meds   will hydrate and monitor       # DLP  on statin     #hx of colon cancer   stage 4   supposed to follow with Dr. Diaz , their service was notified   ileostomy care     # DVT prophylaxis  lovenox      mother was called and updated   i also called and spoke to kyle PELAYO

## 2020-11-30 NOTE — CONSULT NOTE ADULT - SUBJECTIVE AND OBJECTIVE BOX
Pt is a 55 y.o. M PMH schizoaffective disorder, hypothyroidism, osteogenesis imperfecta, colon cancer s/p resection and terminal ileostomy creation, presenting with tremors for five days. The pt states he feels tremors in his arms and legs, causing him difficulty ambulating with his walker. Pt recently discharge from [facility] one month ago, states he was suppose to be on lithium however started clozapine and then returned to taking lithium subsequently and is also taking risperidone. Poor historian and cannot provide a timeline of events. States he takes 200 mg lithium at bedtime. EKG with QTc 528. Denies fever, sweats, chills, chest pain, shortness of breath, abdominal pain, nausea, vomiting, diarrhea, numbness, tingling or weakness.      PAST MEDICAL & SURGICAL HISTORY:  Colon cancer    Hypothyroidism    High cholesterol    Diabetes    Osteogenesis imperfecta    Schizo-affective psychosis    Ileostomy status    Fracture of ankle      Allergies    amoxicillin (Unknown)  Lamictal (Unknown)  penicillin (Unknown)  Tegretol (Unknown)  Zetia (Unknown)    Intolerances      FAMILY HISTORY:  FHx: hypertension (Mother)    FH: prostate cancer (Father)        Family history otherwise noncontributory.    Social History: denies EtOH, drug use or smoking use  Review of Systems:    ALL OTHER REVIEW OF SYSTEMS EXCEPT PER HPI NEGATIVE.                  lactated ringers Bolus 1000 milliLiter(s) IV Bolus once                ICU Vital Signs Last 24 Hrs  T(C): 37.5 (30 Nov 2020 11:01), Max: 37.5 (30 Nov 2020 11:01)  T(F): 99.5 (30 Nov 2020 11:01), Max: 99.5 (30 Nov 2020 11:01)  HR: 69 (30 Nov 2020 11:01) (69 - 69)  BP: 115/80 (30 Nov 2020 11:01) (115/80 - 115/80)  BP(mean): --  ABP: --  ABP(mean): --  RR: 18 (30 Nov 2020 11:01) (18 - 18)  SpO2: 99% (30 Nov 2020 11:01) (99% - 99%)    Vital Signs Last 24 Hrs  T(C): 37.5 (30 Nov 2020 11:01), Max: 37.5 (30 Nov 2020 11:01)  T(F): 99.5 (30 Nov 2020 11:01), Max: 99.5 (30 Nov 2020 11:01)  HR: 69 (30 Nov 2020 11:01) (69 - 69)  BP: 115/80 (30 Nov 2020 11:01) (115/80 - 115/80)  BP(mean): --  RR: 18 (30 Nov 2020 11:01) (18 - 18)  SpO2: 99% (30 Nov 2020 11:01) (99% - 99%)        I&O's Detail        LABS:                        16.2   14.87 )-----------( 236      ( 30 Nov 2020 12:32 )             48.4     11-30    130<L>  |  95<L>  |  16.0  ----------------------------<  293<H>  3.9   |  22.0  |  0.92    Ca    11.2<H>      30 Nov 2020 12:32  Phos  4.6     11-30  Mg     1.7     11-30            CAPILLARY BLOOD GLUCOSE            CULTURES:      Physical Examination:    GENERAL: No acute distress. Tremulous    EYES: Pupils equal, reactive to light.  Symmetric.    EARS, NOSE, THROAT: Normal; supple neck, no lymphadenopathy; trachea midline    PULM: Clear to auscultation bilaterally, no significant sputum production    CVS: Regular rate and rhythm, no murmurs, rubs, or gallops    GI: Soft, nondistended, nontender, normoactive bowel sounds, no masses, no guarding, ostomy pink and healthy appearing with gas and stool in the bag    EXTREMITIES: No edema    SKIN: Warm and well perfused, no rashes noted.    NEURO: Alert, oriented, interactive, nonfocal    DEVICES: ostomy appliance in place in [facility]    RADIOLOGY: reviewed

## 2020-11-30 NOTE — H&P ADULT - ATTENDING COMMENTS
55 year old male with PMH of colon cancer stage 4 status post ileostomy 6 months ago , schizoaffective disorder , on lithium , prolonged stay at [facility] recently , DLP was sent from [facility] with c/o weakness, tremulousness with Lithium level of 3.28 which improved with IVF boluses *3 L to 2.37 and clinical improvement. Appreciate Nephro input. Agree with more aggressive maintenance IVF. Trend Lithium level. Need Pysch eval. Amiloride for now. Clozaril continued for now. Needs Mg for Hypomagnesemia, Repeat TSH with free t3 t4 and HbA1C in AM. Trend EkG as well. Tele monitor for now. Would not need MICU for now but please call us back with any concerns or questions.   I have personally provided  60 minutes of critical care time excluding time spent on sperate procedures

## 2020-11-30 NOTE — H&P ADULT - HISTORY OF PRESENT ILLNESS
55 year old male with PMH of colon cancer stage 4 status post ileostomy 6 months ago , schizoaffective disorder , on lithium , prolonged stay at [facility] recently , DLP was sent from [facility] with c/o weakness . details were obtained from patient and his [facility] staff as well. as per staff he has been at [facility] for a month after his [facility] stay and for last few days he has been weak , tremulous , unable to care for himself , today he was unable to walk and decision was made to transfer patient to ER . he denies SB , no chest pain , he mentioned feeling relaxed , he seems awake oriented but parts of medical history provided were wrong   labs revealed elevated lithium level, patient was seen by ICU team , poison control was called, as per recs will need fluids and monitoring of levels as per hand off from ED   nephrology consult has been obtained as well.

## 2020-11-30 NOTE — ED BEHAVIORAL HEALTH ASSESSMENT NOTE - DETAILS
Discharged from [facility] on 11/2/20 NA Denies current suicidal ideations ileostomy bag Team aware spoke with director from home unavailable

## 2020-11-30 NOTE — ED ADULT NURSE NOTE - OBJECTIVE STATEMENT
assumed care of pt @ this time. received pt a&ox3, no acute distress, breaths even and unlabored, with ileostomy in place. pt reports pouch was changed yesterday. pt presents c/o generalized weakness and needing a "ambulatory consult". pt reports diff ambulating at home worsening over past month. tremors noted, as per provider note, tremors are new. pt unable to give accurate history. pt denies pain at this time.

## 2020-11-30 NOTE — ED ADULT NURSE NOTE - CHIEF COMPLAINT QUOTE
from group home, for generalized weakness, pt has ileostomy unable to take care of by self, pt unkept, family requesting SW. pt poor historian, recently at [facility] for 6 months as per EMS. pt denies SOB, chest pain

## 2020-11-30 NOTE — ED BEHAVIORAL HEALTH ASSESSMENT NOTE - SUMMARY
Patient is a 55  year old, male; domiciled in [facility]) ; ;  noncaregiver; past psychiatric history of schizoaffective disorder ; multiple prior psychiatric hospitalizations ([facility] on 4/30/20- 11/2/20 ); ; no known suicide attempts; no known history of violence or arrests; no active substance abuse or known history of complicated withdrawal; PMH of osteogenesis imperfecta, HTN, hypothyroidism, DM type 2 recent dx and bowel resection with ileostomy, with Stage IV colorectal cancer presented to ER from residence secondary to tremors.  Patient presented with approximately 10 day history of intermittent worsening gross tremor.  He was  found to have elevated lithium level. Patient presented with Lithium level 3.28 decreased to 2.37.  Patient was found to have some mild disorganization, w/some irritability and grandiosity which may be baseline.  Pt denies any S/H I/I/P and has reportedly been compliant with tx and medication. He has poor insight into psychiatric illness and reportedly has been taking poor care of his ADLs.  Collateral was obtained from home.  Plan to obtain further collateral from psychiatrist,  and family.  Would hold lithium for now, decreased Clozaril to 100 mg at night and send for Clozaril level. Psychiatry to continue to follow   Pt is presenting with mild disorganization and preoccupation with colostomy.

## 2020-11-30 NOTE — ED BEHAVIORAL HEALTH ASSESSMENT NOTE - HPI (INCLUDE ILLNESS QUALITY, SEVERITY, DURATION, TIMING, CONTEXT, MODIFYING FACTORS, ASSOCIATED SIGNS AND SYMPTOMS)
Patient is a 55  year old, male; domiciled in [facility] ([facility]) ; ;  noncaregiver; past psychiatric history of schizoaffective disorder ; multiple prior psychiatric hospitalizations ([facility] on 4/30/20- 11/2/20 ); ; no known suicide attempts; no known history of violence or arrests; no active substance abuse or known history of complicated withdrawal; PMH of osteogenesis imperfecta, HTN, hypothyroidism, DM type 2 recent dx and bowel resection with ileostomy, with Stage IV colorectal cancer presented to ER from residence secondary to tremors and found to have elevated lithium level.  Psychiatric consultation requested for evaluation and medication management.      Writer interviewed patient. Reviewed most recent discharge summary from [facility] and spoke to Director of independent living.  Pt has h/o treatment resistant psychosis.   During last hospitalization pt was admitted because of suicidal ideation , command AH telling him to  kill himself., and delusional thoughts that god was telling him to do things, paranoid and had reported that he had created a system to electrocute himself.   Pt had been an .   He was described as anxious, thought blocked, disorganized and internally preoccupied.  He had a protracted hospitalization with multiple trials of medications. Pt was taken for medications over objections and was eventually stabilized on Clozapine 200 mg at night and Lithium 600 mg in am and 900 mg at night.  As per records at [facility] [place]re level on 10/13 was .84, Clozapine on 10/21 391, Norclozapine 143 Total 534, ANC on 11/2 8.03 and was tested Covid negative on 10/29,            On interview in ER  pt was laying in stretcher with course tremor evident on four extremities.  Patient reported that he had been taking his psychiatric medications consistently since discharge.  He reported worsening tremors for the last 10 days.  He stated that tremors have been "on and off".  He reports that for last several days an aide from home would bring him his medications and meals because he was having difficulty ambulating and almost fell on multiple occasions.  He is currently followed by Dr. Claudio at [facility].  He denies any recent changes in medications, nausea or diarrhea.   He feels that he may have been dehydrated because he was not drinking as much fluid.        Patient has little insight into psychiatric illness.  Pt did not feel that psychiatric medication aggreed with him and that they served no purpose.   There was some grandiosity and mild lability in is mood.  He mentioned that writer looked like his brother.  When asked if he communicated with god he reported that sometimes but not at this current time. He denies feeling that there is anyone after him or wanting to hurt him.  He became mildly irritable when medications was discussed and he stated he that he likely did not need any of his psychiatric medications.  He stated that he did not feel that writer and him were on the same wave length.  He denied overtly hearing voices.  He denied any S/H I/I/P at this time.      Spoke with Director from [place] who reported that patient was complaining about not being able to walk or ambulate for several days and that arms and legs were shaking badly. She states that pt has been taking poor care of his ADLs since coming to home.   She reported that he has not been showering and this unit smelled of feces and blood was found in his toilet.  She notes that at times patient has been making some delusional statements that he wants to build things to save the earth and can be verbally aggressive.  She denied that pt has been physically aggressive and denies that pt has making any suicidal statements. She corroborated that she believes pt has been taking medications as prescribed.  She does not feel patient is capably of functioning independently without assistance.  Pt has a mother who is very involved. Pt has a care coordinator Lisbeth [phone], and has been following up with [employer] [phone].

## 2020-11-30 NOTE — ED ADULT TRIAGE NOTE - CHIEF COMPLAINT QUOTE
from group home, for generalized weakness, pt has ileostomy unable to take care of by self, pt unkept, family requesting SW. pt poor historian, recently at [facility] for 6 months as per EMS. pt denies SOB, chest pain

## 2020-11-30 NOTE — H&P ADULT - NSHPLABSRESULTS_GEN_ALL_CORE
LABS:                        16.2   14.87 )-----------( 236      ( 2020 12:32 )             48.4     11-30    132<L>  |  97<L>  |  12.0  ----------------------------<  243<H>  3.7   |  24.0  |  0.77    Ca    10.4<H>      2020 16:39  Phos  4.6       Mg     1.7     30            Urinalysis Basic - ( 2020 16:32 )    Color: Yellow / Appearance: Clear / S.005 / pH: x  Gluc: x / Ketone: Negative  / Bili: Negative / Urobili: Negative mg/dL   Blood: x / Protein: Negative mg/dL / Nitrite: Negative   Leuk Esterase: Negative / RBC: x / WBC x   Sq Epi: x / Non Sq Epi: x / Bacteria: x

## 2020-11-30 NOTE — ED PROVIDER NOTE - OBJECTIVE STATEMENT
54 yo  male pmh schizoaffective disorder comes to ed with with tremors and shakes x5 days; pt recent discharge from [facility] 1 month ago; pt was to have ileostomy reversed in June; pt is seen by [facility] Dr Galarza for colon cancer; pt was refusing to have reversal of ileostomy;

## 2020-11-30 NOTE — ED ADULT NURSE REASSESSMENT NOTE - NS ED NURSE REASSESS COMMENT FT1
report given to ANNABEL Sanchez in ED holding room. pt transported to [place] [id number] in stable condition by KENNY.

## 2020-11-30 NOTE — CONSULT NOTE ADULT - ASSESSMENT
Hi lithium level improving with iv fluid. Hypothyroid, hypercalcemia, dilute urine all lithium effects.

## 2020-11-30 NOTE — ED PROVIDER NOTE - PROGRESS NOTE DETAILS
Lithium level noted. ICU consulted. NYTox consult placed, recommending fluids and q2 lithium levels until levels begin to decrease - Carmelo Lora, PGY-2 ICU down to evaluate the pt, recommending medicine admit. Reviewed all results with pt as well as plans for admission. Pt is comfortable with plan for admission. Questions answered. - Carmelo Lora, PGY-2

## 2020-12-01 LAB
ANION GAP SERPL CALC-SCNC: 7 MMOL/L — SIGNIFICANT CHANGE UP (ref 5–17)
ANION GAP SERPL CALC-SCNC: 8 MMOL/L — SIGNIFICANT CHANGE UP (ref 5–17)
BUN SERPL-MCNC: 10 MG/DL — SIGNIFICANT CHANGE UP (ref 8–20)
BUN SERPL-MCNC: 8 MG/DL — SIGNIFICANT CHANGE UP (ref 8–20)
CALCIUM SERPL-MCNC: 8.9 MG/DL — SIGNIFICANT CHANGE UP (ref 8.6–10.2)
CALCIUM SERPL-MCNC: 9.2 MG/DL — SIGNIFICANT CHANGE UP (ref 8.6–10.2)
CALCIUM SERPL-MCNC: 9.4 MG/DL — SIGNIFICANT CHANGE UP (ref 8.4–10.5)
CHLORIDE SERPL-SCNC: 103 MMOL/L — SIGNIFICANT CHANGE UP (ref 98–107)
CHLORIDE SERPL-SCNC: 103 MMOL/L — SIGNIFICANT CHANGE UP (ref 98–107)
CO2 SERPL-SCNC: 20 MMOL/L — LOW (ref 22–29)
CO2 SERPL-SCNC: 24 MMOL/L — SIGNIFICANT CHANGE UP (ref 22–29)
CREAT SERPL-MCNC: 0.61 MG/DL — SIGNIFICANT CHANGE UP (ref 0.5–1.3)
CREAT SERPL-MCNC: 0.72 MG/DL — SIGNIFICANT CHANGE UP (ref 0.5–1.3)
GLUCOSE BLDC GLUCOMTR-MCNC: 217 MG/DL — HIGH (ref 70–99)
GLUCOSE BLDC GLUCOMTR-MCNC: 273 MG/DL — HIGH (ref 70–99)
GLUCOSE BLDC GLUCOMTR-MCNC: 323 MG/DL — HIGH (ref 70–99)
GLUCOSE SERPL-MCNC: 213 MG/DL — HIGH (ref 70–99)
GLUCOSE SERPL-MCNC: 297 MG/DL — HIGH (ref 70–99)
HCT VFR BLD CALC: 37.1 % — LOW (ref 39–50)
HCV AB S/CO SERPL IA: 0.09 S/CO — SIGNIFICANT CHANGE UP (ref 0–0.99)
HCV AB SERPL-IMP: SIGNIFICANT CHANGE UP
HGB BLD-MCNC: 12.4 G/DL — LOW (ref 13–17)
LITHIUM SERPL-MCNC: 1.4 MMOL/L — SIGNIFICANT CHANGE UP (ref 0.5–1.5)
LITHIUM SERPL-MCNC: 1.6 MMOL/L — CRITICAL HIGH (ref 0.5–1.5)
LITHIUM SERPL-MCNC: 1.84 MMOL/L — CRITICAL HIGH (ref 0.5–1.5)
MAGNESIUM SERPL-MCNC: 1.8 MG/DL — SIGNIFICANT CHANGE UP (ref 1.6–2.6)
MCHC RBC-ENTMCNC: 29.9 PG — SIGNIFICANT CHANGE UP (ref 27–34)
MCHC RBC-ENTMCNC: 33.4 GM/DL — SIGNIFICANT CHANGE UP (ref 32–36)
MCV RBC AUTO: 89.4 FL — SIGNIFICANT CHANGE UP (ref 80–100)
PLATELET # BLD AUTO: 167 K/UL — SIGNIFICANT CHANGE UP (ref 150–400)
POTASSIUM SERPL-MCNC: 2.8 MMOL/L — CRITICAL LOW (ref 3.5–5.3)
POTASSIUM SERPL-MCNC: 4.3 MMOL/L — SIGNIFICANT CHANGE UP (ref 3.5–5.3)
POTASSIUM SERPL-SCNC: 2.8 MMOL/L — CRITICAL LOW (ref 3.5–5.3)
POTASSIUM SERPL-SCNC: 4.3 MMOL/L — SIGNIFICANT CHANGE UP (ref 3.5–5.3)
PTH-INTACT FLD-MCNC: 21 PG/ML — SIGNIFICANT CHANGE UP (ref 15–65)
RBC # BLD: 4.15 M/UL — LOW (ref 4.2–5.8)
RBC # FLD: 13.1 % — SIGNIFICANT CHANGE UP (ref 10.3–14.5)
SARS-COV-2 IGG SERPL QL IA: NEGATIVE — SIGNIFICANT CHANGE UP
SARS-COV-2 IGM SERPL IA-ACNC: 0.08 INDEX — SIGNIFICANT CHANGE UP
SODIUM SERPL-SCNC: 131 MMOL/L — LOW (ref 135–145)
SODIUM SERPL-SCNC: 134 MMOL/L — LOW (ref 135–145)
T3 SERPL-MCNC: 79 NG/DL — LOW (ref 80–200)
T4 AB SER-ACNC: 4.9 UG/DL — SIGNIFICANT CHANGE UP (ref 4.5–12)
TSH SERPL-MCNC: 19.18 UIU/ML — HIGH (ref 0.27–4.2)
WBC # BLD: 12.25 K/UL — HIGH (ref 3.8–10.5)
WBC # FLD AUTO: 12.25 K/UL — HIGH (ref 3.8–10.5)

## 2020-12-01 PROCEDURE — 99223 1ST HOSP IP/OBS HIGH 75: CPT

## 2020-12-01 PROCEDURE — 99233 SBSQ HOSP IP/OBS HIGH 50: CPT

## 2020-12-01 PROCEDURE — 93010 ELECTROCARDIOGRAM REPORT: CPT

## 2020-12-01 PROCEDURE — 99232 SBSQ HOSP IP/OBS MODERATE 35: CPT

## 2020-12-01 RX ORDER — INSULIN GLARGINE 100 [IU]/ML
10 INJECTION, SOLUTION SUBCUTANEOUS AT BEDTIME
Refills: 0 | Status: DISCONTINUED | OUTPATIENT
Start: 2020-12-01 | End: 2020-12-03

## 2020-12-01 RX ORDER — SODIUM CHLORIDE 9 MG/ML
1000 INJECTION, SOLUTION INTRAVENOUS
Refills: 0 | Status: DISCONTINUED | OUTPATIENT
Start: 2020-12-01 | End: 2020-12-04

## 2020-12-01 RX ORDER — MAGNESIUM SULFATE 500 MG/ML
2 VIAL (ML) INJECTION ONCE
Refills: 0 | Status: COMPLETED | OUTPATIENT
Start: 2020-12-01 | End: 2020-12-01

## 2020-12-01 RX ORDER — POTASSIUM CHLORIDE 20 MEQ
40 PACKET (EA) ORAL EVERY 4 HOURS
Refills: 0 | Status: COMPLETED | OUTPATIENT
Start: 2020-12-01 | End: 2020-12-01

## 2020-12-01 RX ORDER — DEXTROSE 50 % IN WATER 50 %
15 SYRINGE (ML) INTRAVENOUS ONCE
Refills: 0 | Status: DISCONTINUED | OUTPATIENT
Start: 2020-12-01 | End: 2020-12-04

## 2020-12-01 RX ORDER — DEXTROSE 50 % IN WATER 50 %
12.5 SYRINGE (ML) INTRAVENOUS ONCE
Refills: 0 | Status: DISCONTINUED | OUTPATIENT
Start: 2020-12-01 | End: 2020-12-04

## 2020-12-01 RX ORDER — DEXTROSE 50 % IN WATER 50 %
25 SYRINGE (ML) INTRAVENOUS ONCE
Refills: 0 | Status: DISCONTINUED | OUTPATIENT
Start: 2020-12-01 | End: 2020-12-04

## 2020-12-01 RX ORDER — POTASSIUM CHLORIDE 20 MEQ
10 PACKET (EA) ORAL
Refills: 0 | Status: COMPLETED | OUTPATIENT
Start: 2020-12-01 | End: 2020-12-01

## 2020-12-01 RX ORDER — GLUCAGON INJECTION, SOLUTION 0.5 MG/.1ML
1 INJECTION, SOLUTION SUBCUTANEOUS ONCE
Refills: 0 | Status: DISCONTINUED | OUTPATIENT
Start: 2020-12-01 | End: 2020-12-04

## 2020-12-01 RX ORDER — INSULIN LISPRO 100/ML
VIAL (ML) SUBCUTANEOUS
Refills: 0 | Status: DISCONTINUED | OUTPATIENT
Start: 2020-12-01 | End: 2020-12-04

## 2020-12-01 RX ORDER — INSULIN LISPRO 100/ML
10 VIAL (ML) SUBCUTANEOUS
Refills: 0 | Status: DISCONTINUED | OUTPATIENT
Start: 2020-12-01 | End: 2020-12-02

## 2020-12-01 RX ORDER — CLOZAPINE 150 MG/1
150 TABLET, ORALLY DISINTEGRATING ORAL AT BEDTIME
Refills: 0 | Status: DISCONTINUED | OUTPATIENT
Start: 2020-12-01 | End: 2020-12-03

## 2020-12-01 RX ADMIN — CLOZAPINE 150 MILLIGRAM(S): 150 TABLET, ORALLY DISINTEGRATING ORAL at 22:31

## 2020-12-01 RX ADMIN — ATORVASTATIN CALCIUM 40 MILLIGRAM(S): 80 TABLET, FILM COATED ORAL at 22:33

## 2020-12-01 RX ADMIN — ENOXAPARIN SODIUM 40 MILLIGRAM(S): 100 INJECTION SUBCUTANEOUS at 09:34

## 2020-12-01 RX ADMIN — Medication 100 MILLIEQUIVALENT(S): at 09:33

## 2020-12-01 RX ADMIN — Medication 4: at 09:32

## 2020-12-01 RX ADMIN — Medication 4: at 12:37

## 2020-12-01 RX ADMIN — Medication 40 MILLIEQUIVALENT(S): at 06:04

## 2020-12-01 RX ADMIN — Medication 50 GRAM(S): at 09:33

## 2020-12-01 RX ADMIN — Medication 100 MILLIEQUIVALENT(S): at 06:04

## 2020-12-01 RX ADMIN — Medication 5 MILLIGRAM(S): at 09:33

## 2020-12-01 RX ADMIN — Medication 10 UNIT(S): at 17:22

## 2020-12-01 RX ADMIN — INSULIN GLARGINE 10 UNIT(S): 100 INJECTION, SOLUTION SUBCUTANEOUS at 22:33

## 2020-12-01 RX ADMIN — Medication 100 MILLIEQUIVALENT(S): at 12:35

## 2020-12-01 RX ADMIN — Medication 3: at 17:22

## 2020-12-01 RX ADMIN — Medication 5 MILLIGRAM(S): at 17:22

## 2020-12-01 RX ADMIN — Medication 40 MILLIEQUIVALENT(S): at 09:34

## 2020-12-01 NOTE — PROGRESS NOTE BEHAVIORAL HEALTH - SUMMARY
Patient is a 55  year old, male; domiciled in [facility]) ; ;  noncaregiver; past psychiatric history of schizoaffective disorder ; multiple prior psychiatric hospitalizations ([facility] on 4/30/20- 11/2/20 ); ; no known suicide attempts; no known history of violence or arrests; no active substance abuse or known history of complicated withdrawal; PMH of osteogenesis imperfecta, HTN, hypothyroidism, DM type 2 recent dx and bowel resection with ileostomy, with Stage IV colorectal cancer presented to ER from residence secondary to tremors.  Patient presented with approximately 10 day history of intermittent worsening gross tremor.  He was  found to have elevated lithium level. Patient presented with Lithium level 3.28 decreased to 2.37.  Patient was found to have some mild disorganization, w/some irritability and grandiosity which may be baseline.  Pt denies any S/H I/I/P and has reportedly been compliant with tx and medication. He has poor insight into psychiatric illness and reportedly has been taking poor care of his ADLs.  Collateral was obtained from home.  Plan to obtain further collateral from psychiatrist,  and family.  Would hold lithium for now, decreased Clozaril to 100 mg first night and send for Clozaril level. Psychiatry to continue to follow   Patient appears to be psychiatrically at baseline.     Plan  1) Optimize medical conditions  2) Increase Clozaril to 150 mg at night with plan to increase to 200 mg at night tomorrow if no signs of toxicity  3) Continue to hold Lithium.  Will plan to change to Risperdal  4) Will continue to follow

## 2020-12-01 NOTE — CONSULT NOTE ADULT - ASSESSMENT
55 F admitted to medicine for Lithium toxicity. Will be followed and consider by Oncologic surgery to consider ileostomy reversal in this admission.     - Problems / plan:     -History of Stage 4 Colon Cancer: Surgical oncology following. Will consider ileostomy reversal on this admission.   - Lithium toxicity / tremors / weakness being treated by Medicine with Amiloride.   - Schizoaffective disorder  psych on board. Clozapine at reduced dose , lithium held   -Hypothyroidism: lithium induced. Endocrine following.   - DM: Lantus and lispro started, on sliding scale.   - HCL: on statin.   - On DVT prophylaxis: lov

## 2020-12-01 NOTE — PROGRESS NOTE BEHAVIORAL HEALTH - NSBHFUPINTERVALHXFT_PSY_A_CORE
Pt's lithium level is decreasing.  He did receive Clozaril 100 mg at night.  Patient reports he has not been communicating with god today.  He is questioning need for Clozaril.  He reports that he could not concentrate on television after he took it last night.  Continued to advise patient to take medications.  No clear delusions were elicited.  Patient denies any S/H I/I/P, denied anxiety or mood symptoms.       Writer attempted to call  on record (Lisbeth [phone]) and telephone kept ringing.  Writer also called clinic [phone] and left message to call writer back.  Writer also spoke at length to mother  (cell [phone]) who has been in frequent contact with patient and very involved with his care.  She gave account of pt's history. She reported that pt's initial decompensation occurred when he stopped taking Clozaril. He has been stable for many years on Clozaril 200 mg at night and Risperidone 2 mg daily prior ot hospitalization.  She reported that pt has been complaining about unsteady gait, difficulty walking, weakness, lethargy and tremors for approximately 10 days.  She feels that this was interfering with his self care.  She feels pt has been psychiatrically doing very well and at his baseline since discharge from [facility] and that he was hospitalized for over 10 months.       She reported that he was scheduled to have his ileostomy reversed and attempt was made in June but patient refused. She feels he was more psychiatrically unstable at the time.  He has been in tx for colorectal cancer in [facility].  As per mother, pt was dx with Stage IV cancer in December 2019 and was given a prognosis of 4 years at the time.  He had ileostomy in Feg 2019 and in March 2019 he started treatment with infusion (10 treatments) and oral medications and she reports he had a positive response.  He has been refusing further tx since May/Rosana.  Mother reports that she has reached out to surgeon and oncologist at [facility] who will explore possibility of reversing ileostomy.  Mother is concerned that patient would decompensate if he did not take Clozaril.  She reports that pt has been living at [facility] for 13 years and she has been advocating for him continuing to live there. She reports that Dr. Claudio spoke with jony last on November 25th but it was a phone interview and he did not see patient's symptoms.        No evidence of Clozapine toxicity was exhibited during interview.   Patient Clozaril level is pending.  Endocrine consult is pending.

## 2020-12-01 NOTE — PROGRESS NOTE ADULT - ASSESSMENT
Lithium toxicity: no neurologic sequelae, normal renal function  Li level improving  Hypokalemia  - cont IVF  - hold Li  - supplement K+  - monitor labs

## 2020-12-01 NOTE — PROGRESS NOTE ADULT - ASSESSMENT
# Lithium toxicity / tremors / weakness   level trending down   nephrology following   will repeat EKG   monitor on tele   will monitor for DI , hypothyroidism   amiloride has been started     # schizoaffective disorder   psych on board   clozapine at reduced dose , lithium held     # hypothyroidism   lithium induced   as per medications obtained from [facility] home , not on maintenance meds   will involve endocrine     # DM   will check A1C   patient was not on any medications   start lantus and lispro   sliding scale     # prolonged QTC   related to lithium toxicity   magnesium supplemented   will repeat another EKG     # Hypokalemia   improved after supplementation   will monitor      # Hyponatremia   probably related to anti psychotic meds   will hydrate and monitor   monitor for SIADH       # DLP  on statin     #hx of colon cancer   s/p ileostomy   stage 4   supposed to follow with Dr. Diaz ,   patient to follow outpatient     # DVT prophylaxis  lovenox      mother was called and updated

## 2020-12-01 NOTE — PROGRESS NOTE ADULT - SUBJECTIVE AND OBJECTIVE BOX
NEPHROLOGY INTERVAL HPI/OVERNIGHT EVENTS:  pt resting comfortably  no acute distress noted    MEDICATIONS  (STANDING):  aMILoride 5 milliGRAM(s) Oral two times a day  atorvastatin 40 milliGRAM(s) Oral at bedtime  dextrose 40% Gel 15 Gram(s) Oral once  dextrose 5%. 1000 milliLiter(s) (50 mL/Hr) IV Continuous <Continuous>  dextrose 5%. 1000 milliLiter(s) (100 mL/Hr) IV Continuous <Continuous>  dextrose 50% Injectable 25 Gram(s) IV Push once  dextrose 50% Injectable 12.5 Gram(s) IV Push once  dextrose 50% Injectable 25 Gram(s) IV Push once  enoxaparin Injectable 40 milliGRAM(s) SubCutaneous daily  glucagon  Injectable 1 milliGRAM(s) IntraMuscular once  insulin lispro (ADMELOG) corrective regimen sliding scale   SubCutaneous three times a day before meals  magnesium sulfate  IVPB 2 Gram(s) IV Intermittent once  potassium chloride    Tablet ER 40 milliEquivalent(s) Oral every 4 hours  potassium chloride  10 mEq/100 mL IVPB 10 milliEquivalent(s) IV Intermittent every 1 hour  sodium chloride 0.9%. 1000 milliLiter(s) (200 mL/Hr) IV Continuous <Continuous>    MEDICATIONS  (PRN):  acetaminophen   Tablet .. 650 milliGRAM(s) Oral every 6 hours PRN Mild Pain (1 - 3), Moderate Pain (4 - 6)      Allergies    amoxicillin (Unknown)  Lamictal (Unknown)  penicillin (Unknown)  Tegretol (Unknown)  Zetia (Unknown)          Vital Signs Last 24 Hrs  T(C): 36.7 (01 Dec 2020 07:35), Max: 37.5 (2020 11:01)  T(F): 98 (01 Dec 2020 07:35), Max: 99.5 (2020 11:01)  HR: 70 (01 Dec 2020 07:35) (69 - 90)  BP: 116/71 (01 Dec 2020 04:17) (106/70 - 124/68)  BP(mean): --  RR: 17 (01 Dec 2020 07:35) (17 - 20)  SpO2: 97% (01 Dec 2020 07:35) (94% - 99%)    PHYSICAL EXAM:  GENERAL: Comfortable  ENMT: Moist mucous membranes  NECK: Supple, neck  veins wnl  NERVOUS SYSTEM:  Alert & alert; non focal; no tremors  CHEST/LUNG: Clear bilaterally  HEART: Regular rate and rhythm; No rub  ABDOMEN: Soft, Nontender, Nondistended, BS+; R ostomy  EXTREMITIES:  Trace edema  SKIN: No rashes or lesions, pale    LABS:                        12.4   12.25 )-----------( 167      ( 01 Dec 2020 04:07 )             37.1         134<L>  |  103  |  10.0  ----------------------------<  213<H>  2.8<LL>   |  24.0  |  0.72    Ca    8.9      01 Dec 2020 04:07  Phos  4.6       Mg     1.8           Lithium Level, Serum: 1.84: Critical value: mmol/L (20)  Urinalysis Basic - ( 2020 16:32 )    Color: Yellow / Appearance: Clear / S.005 / pH: x  Gluc: x / Ketone: Negative  / Bili: Negative / Urobili: Negative mg/dL   Blood: x / Protein: Negative mg/dL / Nitrite: Negative   Leuk Esterase: Negative / RBC: x / WBC x   Sq Epi: x / Non Sq Epi: x / Bacteria: x      Magnesium, Serum: 1.8 mg/dL ( @ 04:07)  Magnesium, Serum: 1.2 mg/dL ( @ 20:58)  Magnesium, Serum: 1.5 mg/dL ( @ 16:39)  Magnesium, Serum: 1.7 mg/dL ( @ 12:32)  Phosphorus Level, Serum: 4.6 mg/dL ( @ 12:32)      RADIOLOGY & ADDITIONAL TESTS:  < from: CT Head No Cont (20 @ 11:41) >     EXAM:  CT BRAIN                          PROCEDURE DATE:  2020          INTERPRETATION:  INDICATIONS:  Mental status change    TECHNIQUE:  Serial axial images were obtained from the skull base to the vertex without intravenous contrast. Sagittal and Coronal reformats were performed    COMPARISON EXAMINATION: None.    FINDINGS:  VENTRICLES AND SULCI:  Age-appropriate involutional change.  INTRA-AXIAL:  No mass, blood or abnormal attenuation is seen.  EXTRA-AXIAL:  No mass or collection isseen.  VISUALIZED SINUSES: Suspicion of a congenitally smaller left maxillary sinus though incompletely imaged on this study  VISUALIZED MASTOIDS:  Clear.  CALVARIUM:  Normal.  MISCELLANEOUS:  None.    IMPRESSION:  Mild involutional changes. The study is otherwise unremarkable    < end of copied text >

## 2020-12-01 NOTE — CONSULT NOTE ADULT - SUBJECTIVE AND OBJECTIVE BOX
REASON FOR CONSULTATION:     HPI:  55 year old male with PMH of colon cancer stage 4 status post ileostomy 19 for obstruction, was on single agent Xeloda but stopped several months ago , schizoaffective disorder / psychosis, on lithium , prolonged stay at [facility] recently , DLP was sent from [facility] with c/o weakness . details were obtained from patient and his [facility] staff as well. as per staff he has been at [facility] for a month after his [facility] stay and for last few days he has been weak , tremulous , unable to care for himself , today he was unable to walk and decision was made to transfer patient to ER . he denies SB , no chest pain , he mentioned feeling relaxed , he seems awake oriented but parts of medical history provided were wrong   labs revealed elevated lithium level, patient was seen by ICU team , poison control was called, as per recs will need fluids and monitoring of levels as per hand off from ED   nephrology consult has been obtained as well.  (2020 17:34)      REVIEW OF SYSTEMS:  Constitutional, Eyes, ENT, Cardiovascular, Respiratory, Gastrointestinal, Genitourinary, Musculoskeletal, Integumentary, Neurological, Psychiatric, Endocrine, Heme/Lymph, and Allergic/Immunologic review of systems are otherwise negative except as noted in the HPI.    PAST MEDICAL & SURGICAL HISTORY:  Colon cancer    Hypothyroidism    High cholesterol    Diabetes    Osteogenesis imperfecta    Schizo-affective psychosis    Ileostomy status    Fracture of ankle        FAMILY HISTORY:  FHx: hypertension (Mother)    FH: prostate cancer (Father)        SOCIAL HISTORY:    Allergies    amoxicillin (Unknown)  Lamictal (Unknown)  penicillin (Unknown)  Tegretol (Unknown)  Zetia (Unknown)    Intolerances        MEDICATIONS  (STANDING):  aMILoride 5 milliGRAM(s) Oral two times a day  atorvastatin 40 milliGRAM(s) Oral at bedtime  dextrose 40% Gel 15 Gram(s) Oral once  dextrose 5%. 1000 milliLiter(s) (50 mL/Hr) IV Continuous <Continuous>  dextrose 5%. 1000 milliLiter(s) (100 mL/Hr) IV Continuous <Continuous>  dextrose 50% Injectable 25 Gram(s) IV Push once  dextrose 50% Injectable 12.5 Gram(s) IV Push once  dextrose 50% Injectable 25 Gram(s) IV Push once  enoxaparin Injectable 40 milliGRAM(s) SubCutaneous daily  glucagon  Injectable 1 milliGRAM(s) IntraMuscular once  insulin lispro (ADMELOG) corrective regimen sliding scale   SubCutaneous three times a day before meals  potassium chloride  10 mEq/100 mL IVPB 10 milliEquivalent(s) IV Intermittent every 1 hour  sodium chloride 0.9%. 1000 milliLiter(s) (200 mL/Hr) IV Continuous <Continuous>    MEDICATIONS  (PRN):  acetaminophen   Tablet .. 650 milliGRAM(s) Oral every 6 hours PRN Mild Pain (1 - 3), Moderate Pain (4 - 6)      Vital Signs Last 24 Hrs  T(C): 36.7 (01 Dec 2020 07:35), Max: 37.5 (2020 11:01)  T(F): 98 (01 Dec 2020 07:35), Max: 99.5 (2020 11:01)  HR: 70 (01 Dec 2020 09:39) (69 - 90)  BP: 100/70 (01 Dec 2020 09:39) (100/70 - 124/68)  BP(mean): --  RR: 20 (01 Dec 2020 09:39) (17 - 20)  SpO2: 98% (01 Dec 2020 09:39) (94% - 99%)    PHYSICAL EXAM:    GENERAL: NAD, well-groomed, well-developed  HEAD:  Atraumatic, Normocephalic  EYES: EOMI, PERRLA, conjunctiva and sclera clear  ENMT: No tonsillar erythema, exudates, or enlargement; Moist mucous membranes, Good dentition, No lesions  NECK: Supple, No JVD, Normal thyroid  NERVOUS SYSTEM:  Alert & Oriented X3, Good concentration; Motor Strength 5/5 B/L upper and lower extremities; DTRs 2+ intact and symmetric  CHEST/LUNG: Clear to auscultation bilaterally; No rales, rhonchi, wheezing, or rubs  HEART: Regular rate and rhythm; No murmurs, rubs, or gallops  ABDOMEN: Soft, Nontender, Nondistended; Bowel sounds present  EXTREMITIES:  2+ Peripheral Pulses, No clubbing, cyanosis, or edema  LYMPH: No lymphadenopathy noted  SKIN: No rashes or lesions      LABS:                        12.4   12.25 )-----------( 167      ( 01 Dec 2020 04:07 )             37.1     12-    134<L>  |  102  |  8.0  ----------------------------<  272<H>  3.7   |  24.0  |  0.69    Ca    9.3      01 Dec 2020 09:29  Phos  4.6     11-30  Mg     1.8     12-        Urinalysis Basic - ( 2020 16:32 )    Color: Yellow / Appearance: Clear / S.005 / pH: x  Gluc: x / Ketone: Negative  / Bili: Negative / Urobili: Negative mg/dL   Blood: x / Protein: Negative mg/dL / Nitrite: Negative   Leuk Esterase: Negative / RBC: x / WBC x   Sq Epi: x / Non Sq Epi: x / Bacteria: x          RADIOLOGY & ADDITIONAL STUDIES:    PATHOLOGY:       REASON FOR CONSULTATION:     HPI:  55 year old male with PMH of colon cancer stage 4 status post ileostomy 19 for obstruction, was on single agent Xeloda but stopped several months ago , schizoaffective disorder / psychosis, on lithium , prolonged stay at [facility] recently , DLP was sent from [facility] with c/o weakness . details were obtained from patient and his [facility] staff as well. as per staff he has been at [facility] for a month after his [facility] stay and for last few days he has been weak , tremulous , unable to care for himself , today he was unable to walk and decision was made to transfer patient to ER . he denies SB , no chest pain , he mentioned feeling relaxed , he seems awake oriented but parts of medical history provided were wrong   labs revealed elevated lithium level, patient was seen by ICU team , poison control was called, as per recs will need fluids and monitoring of levels as per hand off from ED   nephrology consult has been obtained as well.  (2020 17:34)      REVIEW OF SYSTEMS:  Constitutional, Eyes, ENT, Cardiovascular, Respiratory, Gastrointestinal, Genitourinary, Musculoskeletal, Integumentary, Neurological, Psychiatric, Endocrine, Heme/Lymph, and Allergic/Immunologic review of systems are otherwise negative except as noted in the HPI.    PAST MEDICAL & SURGICAL HISTORY:  Colon cancer    Hypothyroidism    High cholesterol    Diabetes    Osteogenesis imperfecta    Schizo-affective psychosis    Ileostomy status    Fracture of ankle        FAMILY HISTORY:  FHx: hypertension (Mother)    FH: prostate cancer (Father)        SOCIAL HISTORY:    Allergies    amoxicillin (Unknown)  Lamictal (Unknown)  penicillin (Unknown)  Tegretol (Unknown)  Zetia (Unknown)    Intolerances        MEDICATIONS  (STANDING):  aMILoride 5 milliGRAM(s) Oral two times a day  atorvastatin 40 milliGRAM(s) Oral at bedtime  dextrose 40% Gel 15 Gram(s) Oral once  dextrose 5%. 1000 milliLiter(s) (50 mL/Hr) IV Continuous <Continuous>  dextrose 5%. 1000 milliLiter(s) (100 mL/Hr) IV Continuous <Continuous>  dextrose 50% Injectable 25 Gram(s) IV Push once  dextrose 50% Injectable 12.5 Gram(s) IV Push once  dextrose 50% Injectable 25 Gram(s) IV Push once  enoxaparin Injectable 40 milliGRAM(s) SubCutaneous daily  glucagon  Injectable 1 milliGRAM(s) IntraMuscular once  insulin lispro (ADMELOG) corrective regimen sliding scale   SubCutaneous three times a day before meals  potassium chloride  10 mEq/100 mL IVPB 10 milliEquivalent(s) IV Intermittent every 1 hour  sodium chloride 0.9%. 1000 milliLiter(s) (200 mL/Hr) IV Continuous <Continuous>    MEDICATIONS  (PRN):  acetaminophen   Tablet .. 650 milliGRAM(s) Oral every 6 hours PRN Mild Pain (1 - 3), Moderate Pain (4 - 6)      Vital Signs Last 24 Hrs  T(C): 36.7 (01 Dec 2020 07:35), Max: 37.5 (2020 11:01)  T(F): 98 (01 Dec 2020 07:35), Max: 99.5 (2020 11:01)  HR: 70 (01 Dec 2020 09:39) (69 - 90)  BP: 100/70 (01 Dec 2020 09:39) (100/70 - 124/68)  BP(mean): --  RR: 20 (01 Dec 2020 09:39) (17 - 20)  SpO2: 98% (01 Dec 2020 09:39) (94% - 99%)    PHYSICAL EXAM:    GENERAL: NAD,   HEAD:  Atraumatic, Normocephalic  EYES: sclera clear  NECK: Supple,   NERVOUS SYSTEM:  Alert & Awake  CHEST/LUNG: Clear to auscultation bilaterally;  HEART: Regular rate and rhythm;   ABDOMEN: Soft, Nontender,   EXTREMITIES: no edema  SKIN: No rashes or lesions      LABS:                        12.4   12.25 )-----------( 167      ( 01 Dec 2020 04:07 )             37.1     12-    134<L>  |  102  |  8.0  ----------------------------<  272<H>  3.7   |  24.0  |  0.69    Ca    9.3      01 Dec 2020 09:29  Phos  4.6     11-30  Mg     1.8     12-        Urinalysis Basic - ( 2020 16:32 )    Color: Yellow / Appearance: Clear / S.005 / pH: x  Gluc: x / Ketone: Negative  / Bili: Negative / Urobili: Negative mg/dL   Blood: x / Protein: Negative mg/dL / Nitrite: Negative   Leuk Esterase: Negative / RBC: x / WBC x   Sq Epi: x / Non Sq Epi: x / Bacteria: x          RADIOLOGY & ADDITIONAL STUDIES:    PATHOLOGY:

## 2020-12-01 NOTE — CONSULT NOTE ADULT - ASSESSMENT
DM    uncontrolled with A1c 9.9    pt thinks he has been off janumet for a long time   to start Lantus     Hypothryoid- long standing    can resume Synthroid 0.05 mg qd

## 2020-12-01 NOTE — CONSULT NOTE ADULT - SUBJECTIVE AND OBJECTIVE BOX
HPI:  55 year old male with PMH of colon cancer stage 4 status post ileostomy 6 months ago , schizoaffective disorder , on lithium , prolonged stay at [facility] recently , DLP was sent from [facility] with c/o weakness . details were obtained from patient and his [facility] staff as well. as per staff he has been at [facility] for a month after his [facility] stay and for last few days he has been weak , tremulous , unable to care for himself , today he was unable to walk and decision was made to transfer patient to ER . he denies SB , no chest pain , he mentioned feeling relaxed , he seems awake oriented but parts of medical history provided were wrong   labs revealed elevated lithium level, patient was seen by ICU team , poison control was called, as per recs will need fluids and monitoring of levels as per hand off from ED  consult called for elevated TSH  lizzyley due to Lithium l  pt had been seen by karen john last in  2019    wason syntrhoid 0.088 mg qd and Jnaumet 50/500 mg bid  but pt did not followup     pt had been off synrhodi he thinks for at least 6 months           PAST MEDICAL & SURGICAL HISTORY:  Colon cancer    Hypothyroidism    High cholesterol    Diabetes    Osteogenesis imperfecta    Schizo-affective psychosis    Ileostomy status    Fracture of ankle        FAMILY HISTORY:  FHx: hypertension (Mother)    FH: prostate cancer (Father)        SOCIAL HISTORY:  not smoking   REVIEW OF SYSTEMS:    Constitutional: No fever, no chills, no change in weight.    Eyes: No eye swelling,no  blurry vision, no redness, no loss of vision.    Neck: No neck pain, no change in voice.    Lungs: No shortness of breath, no wheezing, no cough    CV: No chest pain, no palpitations, no pain with walking.    GI: No nausea, no vomiting, no constipation, no diarrhea, no abdominal pain    : No urinary frequency, no blood in urine, no urinary burning, no difficulty voiding.    Musculoskeletal: No muscle pain, no joint pain, no swelling.    Skin: No rash, no infections.    Neurologic: No headaches, no weakness, no burning or pain in feet, no tremor.    Endocrine: No heat intolerance, no cold intolerance, no increased sweating, no shakiness between meals.    Psych: No depression, no anxiety, no trouble concentrating    MEDICATIONS  (STANDING):  aMILoride 5 milliGRAM(s) Oral two times a day  atorvastatin 40 milliGRAM(s) Oral at bedtime  cloZAPine 150 milliGRAM(s) Oral at bedtime  dextrose 40% Gel 15 Gram(s) Oral once  dextrose 5%. 1000 milliLiter(s) (50 mL/Hr) IV Continuous <Continuous>  dextrose 5%. 1000 milliLiter(s) (100 mL/Hr) IV Continuous <Continuous>  dextrose 50% Injectable 25 Gram(s) IV Push once  dextrose 50% Injectable 12.5 Gram(s) IV Push once  dextrose 50% Injectable 25 Gram(s) IV Push once  enoxaparin Injectable 40 milliGRAM(s) SubCutaneous daily  glucagon  Injectable 1 milliGRAM(s) IntraMuscular once  insulin glargine Injectable (LANTUS) 10 Unit(s) SubCutaneous at bedtime  insulin lispro (ADMELOG) corrective regimen sliding scale   SubCutaneous three times a day before meals  insulin lispro Injectable (ADMELOG) 10 Unit(s) SubCutaneous three times a day before meals  sodium chloride 0.9%. 1000 milliLiter(s) (200 mL/Hr) IV Continuous <Continuous>    MEDICATIONS  (PRN):  acetaminophen   Tablet .. 650 milliGRAM(s) Oral every 6 hours PRN Mild Pain (1 - 3), Moderate Pain (4 - 6)      Allergies    amoxicillin (Unknown)  Lamictal (Unknown)  penicillin (Unknown)  Tegretol (Unknown)  Zetia (Unknown)    Intolerances          CAPILLARY BLOOD GLUCOSE      POCT Blood Glucose.: 217 mg/dL (01 Dec 2020 22:32)      PHYSICAL EXAM:    Vital Signs Last 24 Hrs  T(C): 36.9 (01 Dec 2020 23:28), Max: 36.9 (01 Dec 2020 11:11)  T(F): 98.4 (01 Dec 2020 23:28), Max: 98.5 (01 Dec 2020 19:02)  HR: 96 (01 Dec 2020 23:28) (70 - 96)  BP: 100/62 (01 Dec 2020 23:28) (100/62 - 116/71)  BP(mean): --  RR: 18 (01 Dec 2020 23:28) (17 - 20)  SpO2: 95% (01 Dec 2020 23:28) (95% - 98%)    General appearance: Well developed, well nourished.    Eyes: Pupils equal and reactive to light. EOM full. No exophthalmos.    Neck: Trachea midline. No thyroid enlargement.    Lungs: Normal respiratory excursion. Lungs clear.    CV: Regular cardiac rhythm. No murmur. Carotid and pedal pulses intact.    Abdomen: Soft, non tender, no organomegaly or mass.    Musculoskeletal: No cyanosis, clubbing, or edema. No pedal lesions.    Skin: Warm and moist. No rash. No acanthosis.    Neuro: Cranial nerves intact. Normal motor and sensory function. DTR's normal.    Psych: Normal affect, good judgement.            LABS:                        12.4   12.25 )-----------( 167      ( 01 Dec 2020 04:07 )             37.1         131<L>  |  103  |  8.0  ----------------------------<  297<H>  4.3   |  20.0<L>  |  0.61    Ca    9.2      01 Dec 2020 16:24  Phos  4.6     11-30  Mg     1.8           Urinalysis Basic - ( 2020 16:32 )    Color: Yellow / Appearance: Clear / S.005 / pH: x  Gluc: x / Ketone: Negative  / Bili: Negative / Urobili: Negative mg/dL   Blood: x / Protein: Negative mg/dL / Nitrite: Negative   Leuk Esterase: Negative / RBC: x / WBC x   Sq Epi: x / Non Sq Epi: x / Bacteria: x      Thyroid Stimulating Hormone, Serum: 19.18 uIU/mL (.20 @ 04:07)       Historical Values  Thyroid Stimulating Hormone, Serum: 19.18 uIU/mL (.20 @ 04:07)   Thyroid Stimulating Hormone, Serum: 12.63 uIU/mL (11.30.20 @ 16:39)     T4, Serum: 4.9 ug/dL ( @ 04:07)    A1C with Estimated Average Glucose (20 @ 09:30)   A1C with Estimated Average Glucose Result: 9.9 %   Estimated Average Glucose: 237 mg/dL       Historical Values  A1C with Estimated Average Glucose (20 @ 09:30)   A1C with Estimated Average Glucose Result: 9.9 %   Estimated Average Glucose: 237 mg/dL   A1C with Estimated Average Glucose (15 @ 16:12)   A1C with Estimated Average Glucose Result: 6.3 %   Estimated Average Glucose: 134 mg/dL   CAPILLARY BLOOD GLUCOSE      RADIOLOGY & ADDITIONAL STUDIES:

## 2020-12-01 NOTE — CONSULT NOTE ADULT - SUBJECTIVE AND OBJECTIVE BOX
ONCOLOGY SURGERY CONSULT    Consulting surgical team: ONCOLOGIC Surgery  Consulting attending: Hung AVILES  Patient seen and examined: 20 @ 16:06    HPI:  55 year old male with PMH of colon cancer stage 4 status post ileostomy 6 months ago , schizoaffective disorder , on lithium , prolonged stay at [facility] recently , DLP was sent from [facility] with c/o weakness . details were obtained from patient and his [facility] staff as well. as per staff he has been at [facility] for a month after his [facility] stay and for last few days he has been weak , tremulous , unable to care for himself , today he was unable to walk and decision was made to transfer patient to ER . he denies SB , no chest pain , he mentioned feeling relaxed , he seems awake oriented but parts of medical history provided were wrong   labs revealed elevated lithium level, patient was seen by ICU team , poison control was called, as per recs will need fluids and monitoring of levels as per hand off from ED   nephrology consult has been obtained as well.  (2020 17:34)    Patient seen today by Oncologic Surgery to consider ileostomy reversal in this admission. Pt has psychiatric illness and has been on lithium, admitted to medicine for lithium toxicity. Pt denies urinary issues or kidney disease. Prior history of colon Ca with residual R ileostomy. Surgical history as below.     PAST MEDICAL HISTORY:  Colon cancer    Hypothyroidism    High cholesterol    Diabetes    Osteogenesis imperfecta    Schizo-affective psychosis        PAST SURGICAL HISTORY:  Ileostomy status    Fracture of ankle        ALLERGIES:  amoxicillin (Unknown)  Lamictal (Unknown)  penicillin (Unknown)  Tegretol (Unknown)  Zetia (Unknown)      MEDICATIONS  (STANDING):  aMILoride 5 milliGRAM(s) Oral two times a day  atorvastatin 40 milliGRAM(s) Oral at bedtime  cloZAPine 150 milliGRAM(s) Oral at bedtime  dextrose 40% Gel 15 Gram(s) Oral once  dextrose 5%. 1000 milliLiter(s) (50 mL/Hr) IV Continuous <Continuous>  dextrose 5%. 1000 milliLiter(s) (100 mL/Hr) IV Continuous <Continuous>  dextrose 50% Injectable 25 Gram(s) IV Push once  dextrose 50% Injectable 12.5 Gram(s) IV Push once  dextrose 50% Injectable 25 Gram(s) IV Push once  enoxaparin Injectable 40 milliGRAM(s) SubCutaneous daily  glucagon  Injectable 1 milliGRAM(s) IntraMuscular once  insulin glargine Injectable (LANTUS) 10 Unit(s) SubCutaneous at bedtime  insulin lispro (ADMELOG) corrective regimen sliding scale   SubCutaneous three times a day before meals  insulin lispro Injectable (ADMELOG) 10 Unit(s) SubCutaneous three times a day before meals  sodium chloride 0.9%. 1000 milliLiter(s) (200 mL/Hr) IV Continuous <Continuous>    MEDICATIONS  (PRN):  acetaminophen   Tablet .. 650 milliGRAM(s) Oral every 6 hours PRN Mild Pain (1 - 3), Moderate Pain (4 - 6)      VITALS & I/Os:  Vital Signs Last 24 Hrs  T(C): 36.8 (01 Dec 2020 15:34), Max: 37.5 (2020 19:47)  T(F): 98.2 (01 Dec 2020 15:34), Max: 99.5 (2020 19:47)  HR: 72 (01 Dec 2020 15:34) (70 - 90)  BP: 100/67 (01 Dec 2020 15:34) (100/67 - 124/68)  BP(mean): --  RR: 18 (01 Dec 2020 15:34) (17 - 20)  SpO2: 97% (01 Dec 2020 15:34) (94% - 98%)  CAPILLARY BLOOD GLUCOSE      POCT Blood Glucose.: 323 mg/dL (01 Dec 2020 12:36)  POCT Blood Glucose.: 323 mg/dL (01 Dec 2020 09:32)      I&O's Summary        GEN: NAD, alert and oriented x 3  HEENT: WNL  CHEST: Symmetrical chest rise, breath sounds CTAB  HEART: RRR, non-muffled heart sounds  ABD: Soft, non-tender, non-distended. Ileostomy in the R abdomen, mucosa is pink, no bleeding, stool in the bag.       LABS:                        12.4   12.25 )-----------( 167      ( 01 Dec 2020 04:07 )             37.1         134<L>  |  102  |  8.0  ----------------------------<  272<H>  3.7   |  24.0  |  0.69    Ca    9.3      01 Dec 2020 09:29  Phos  4.6     11-30  Mg     1.8     12- @ 16:29  1.4    Urinalysis Basic - ( 2020 16:32 )    Color: Yellow / Appearance: Clear / S.005 / pH: x  Gluc: x / Ketone: Negative  / Bili: Negative / Urobili: Negative mg/dL   Blood: x / Protein: Negative mg/dL / Nitrite: Negative   Leuk Esterase: Negative / RBC: x / WBC x   Sq Epi: x / Non Sq Epi: x / Bacteria: x        IMAGING:

## 2020-12-01 NOTE — CONSULT NOTE ADULT - ASSESSMENT
55 year old male stage IV colon ca s/p ileostomy for obstruction, not on any active chemotherapy, schizoaffective disorder, resides at inpatient psych facility who is admitted with tremors and elevated lithium level    1)elevated lithium level - now trending down, nephrology on board, management as per primary team  2)stage IV colon ca- not on any active chemotherapy, poor insight,  no oncology intervention during this hospitalization, can follow up with Dr. Diaz on discharge 55 year old male stage IV colon ca s/p ileostomy for obstruction, not on any active chemotherapy, schizoaffective disorder, resides at inpatient psych facility who is admitted with tremors and elevated lithium level    1)elevated lithium level - now trending down, nephrology on board, management as per primary team  2)stage IV colon ca- not on any active chemotherapy, poor insight,  no oncology intervention during this hospitalization, can follow up with Dr. Diaz on discharge

## 2020-12-01 NOTE — PROGRESS NOTE ADULT - SUBJECTIVE AND OBJECTIVE BOX
CC: lithium toxicity     INTERVAL HPI/OVERNIGHT EVENTS: seen and examined , feels ok , still has fluctuating attention , appears shaky , jittery , restless   no SOB , but did mention feeling nauseous           Vital Signs Last 24 Hrs  T(C): 36.9 (01 Dec 2020 11:11), Max: 37.5 (2020 19:47)  T(F): 98.4 (01 Dec 2020 11:11), Max: 99.5 (2020 19:47)  HR: 79 (01 Dec 2020 11:11) (70 - 90)  BP: 110/66 (01 Dec 2020 11:11) (100/70 - 124/68)  BP(mean): --  RR: 20 (01 Dec 2020 11:11) (17 - 20)  SpO2: 97% (01 Dec 2020 11:11) (94% - 98%)    PHYSICAL EXAM:    GENERAL: appears restless   CHEST/LUNG: CTAB , no wheezing , rales, rhonchi heard   HEART: S1S2+, Regular rate and rhythm; No murmurs, rubs, or gallops  ABDOMEN: Soft, Nontender, obese , colostomy bag has stool in bag , stoma is red but no infection ; Bowel sounds present  EXTREMITIES:  mild  pitting edema , pulses palpated BL.        LABS:                        12.4   12.25 )-----------( 167      ( 01 Dec 2020 04:07 )             37.1     12    134<L>  |  102  |  8.0  ----------------------------<  272<H>  3.7   |  24.0  |  0.69    Ca    9.3      01 Dec 2020 09:29  Phos  4.6     11-30  Mg     1.8     12-        Urinalysis Basic - ( 2020 16:32 )    Color: Yellow / Appearance: Clear / S.005 / pH: x  Gluc: x / Ketone: Negative  / Bili: Negative / Urobili: Negative mg/dL   Blood: x / Protein: Negative mg/dL / Nitrite: Negative   Leuk Esterase: Negative / RBC: x / WBC x   Sq Epi: x / Non Sq Epi: x / Bacteria: x          MEDICATIONS  (STANDING):  aMILoride 5 milliGRAM(s) Oral two times a day  atorvastatin 40 milliGRAM(s) Oral at bedtime  dextrose 40% Gel 15 Gram(s) Oral once  dextrose 5%. 1000 milliLiter(s) (50 mL/Hr) IV Continuous <Continuous>  dextrose 5%. 1000 milliLiter(s) (100 mL/Hr) IV Continuous <Continuous>  dextrose 50% Injectable 25 Gram(s) IV Push once  dextrose 50% Injectable 12.5 Gram(s) IV Push once  dextrose 50% Injectable 25 Gram(s) IV Push once  enoxaparin Injectable 40 milliGRAM(s) SubCutaneous daily  glucagon  Injectable 1 milliGRAM(s) IntraMuscular once  insulin glargine Injectable (LANTUS) 10 Unit(s) SubCutaneous at bedtime  insulin lispro (ADMELOG) corrective regimen sliding scale   SubCutaneous three times a day before meals  insulin lispro Injectable (ADMELOG) 10 Unit(s) SubCutaneous three times a day before meals  sodium chloride 0.9%. 1000 milliLiter(s) (200 mL/Hr) IV Continuous <Continuous>    MEDICATIONS  (PRN):  acetaminophen   Tablet .. 650 milliGRAM(s) Oral every 6 hours PRN Mild Pain (1 - 3), Moderate Pain (4 - 6)      RADIOLOGY & ADDITIONAL TESTS:

## 2020-12-01 NOTE — PROVIDER CONTACT NOTE (CRITICAL VALUE NOTIFICATION) - SITUATION
Potassium 2.8 and lithium 1.4 ( lithium trending down from admission). Pt stable and in no acute distress. NSR on the monitor. Pt asymptomatic; denies chest pain ,sob, dizziness and headache. Will continue to monitor; awaiting further orders.

## 2020-12-02 DIAGNOSIS — Z93.2 ILEOSTOMY STATUS: ICD-10-CM

## 2020-12-02 LAB
A1C WITH ESTIMATED AVERAGE GLUCOSE RESULT: 10.1 % — HIGH (ref 4–5.6)
ANION GAP SERPL CALC-SCNC: 5 MMOL/L — SIGNIFICANT CHANGE UP (ref 5–17)
BUN SERPL-MCNC: 7 MG/DL — LOW (ref 8–20)
CALCIUM SERPL-MCNC: 8.7 MG/DL — SIGNIFICANT CHANGE UP (ref 8.6–10.2)
CHLORIDE SERPL-SCNC: 109 MMOL/L — HIGH (ref 98–107)
CO2 SERPL-SCNC: 21 MMOL/L — LOW (ref 22–29)
CREAT SERPL-MCNC: 0.67 MG/DL — SIGNIFICANT CHANGE UP (ref 0.5–1.3)
ESTIMATED AVERAGE GLUCOSE: 243 MG/DL — HIGH (ref 68–114)
GLUCOSE BLDC GLUCOMTR-MCNC: 211 MG/DL — HIGH (ref 70–99)
GLUCOSE BLDC GLUCOMTR-MCNC: 229 MG/DL — HIGH (ref 70–99)
GLUCOSE BLDC GLUCOMTR-MCNC: 284 MG/DL — HIGH (ref 70–99)
GLUCOSE BLDC GLUCOMTR-MCNC: 386 MG/DL — HIGH (ref 70–99)
GLUCOSE SERPL-MCNC: 230 MG/DL — HIGH (ref 70–99)
HCT VFR BLD CALC: 38.5 % — LOW (ref 39–50)
HGB BLD-MCNC: 12.2 G/DL — LOW (ref 13–17)
MCHC RBC-ENTMCNC: 29 PG — SIGNIFICANT CHANGE UP (ref 27–34)
MCHC RBC-ENTMCNC: 31.7 GM/DL — LOW (ref 32–36)
MCV RBC AUTO: 91.4 FL — SIGNIFICANT CHANGE UP (ref 80–100)
PLATELET # BLD AUTO: 162 K/UL — SIGNIFICANT CHANGE UP (ref 150–400)
POTASSIUM SERPL-MCNC: 3.8 MMOL/L — SIGNIFICANT CHANGE UP (ref 3.5–5.3)
POTASSIUM SERPL-SCNC: 3.8 MMOL/L — SIGNIFICANT CHANGE UP (ref 3.5–5.3)
RBC # BLD: 4.21 M/UL — SIGNIFICANT CHANGE UP (ref 4.2–5.8)
RBC # FLD: 13.1 % — SIGNIFICANT CHANGE UP (ref 10.3–14.5)
SODIUM SERPL-SCNC: 135 MMOL/L — SIGNIFICANT CHANGE UP (ref 135–145)
WBC # BLD: 10.4 K/UL — SIGNIFICANT CHANGE UP (ref 3.8–10.5)
WBC # FLD AUTO: 10.4 K/UL — SIGNIFICANT CHANGE UP (ref 3.8–10.5)

## 2020-12-02 PROCEDURE — 99232 SBSQ HOSP IP/OBS MODERATE 35: CPT

## 2020-12-02 PROCEDURE — 99221 1ST HOSP IP/OBS SF/LOW 40: CPT

## 2020-12-02 PROCEDURE — 93010 ELECTROCARDIOGRAM REPORT: CPT

## 2020-12-02 PROCEDURE — 99233 SBSQ HOSP IP/OBS HIGH 50: CPT

## 2020-12-02 PROCEDURE — 99223 1ST HOSP IP/OBS HIGH 75: CPT

## 2020-12-02 RX ORDER — INSULIN LISPRO 100/ML
13 VIAL (ML) SUBCUTANEOUS
Refills: 0 | Status: DISCONTINUED | OUTPATIENT
Start: 2020-12-02 | End: 2020-12-03

## 2020-12-02 RX ORDER — INSULIN GLARGINE 100 [IU]/ML
5 INJECTION, SOLUTION SUBCUTANEOUS EVERY MORNING
Refills: 0 | Status: DISCONTINUED | OUTPATIENT
Start: 2020-12-02 | End: 2020-12-02

## 2020-12-02 RX ORDER — LEVOTHYROXINE SODIUM 125 MCG
50 TABLET ORAL DAILY
Refills: 0 | Status: DISCONTINUED | OUTPATIENT
Start: 2020-12-02 | End: 2020-12-04

## 2020-12-02 RX ORDER — INSULIN LISPRO 100/ML
2 VIAL (ML) SUBCUTANEOUS ONCE
Refills: 0 | Status: COMPLETED | OUTPATIENT
Start: 2020-12-02 | End: 2020-12-02

## 2020-12-02 RX ORDER — SODIUM CHLORIDE 9 MG/ML
1000 INJECTION INTRAMUSCULAR; INTRAVENOUS; SUBCUTANEOUS
Refills: 0 | Status: DISCONTINUED | OUTPATIENT
Start: 2020-12-02 | End: 2020-12-03

## 2020-12-02 RX ORDER — INSULIN GLARGINE 100 [IU]/ML
10 INJECTION, SOLUTION SUBCUTANEOUS EVERY MORNING
Refills: 0 | Status: DISCONTINUED | OUTPATIENT
Start: 2020-12-02 | End: 2020-12-03

## 2020-12-02 RX ADMIN — ATORVASTATIN CALCIUM 40 MILLIGRAM(S): 80 TABLET, FILM COATED ORAL at 21:24

## 2020-12-02 RX ADMIN — SODIUM CHLORIDE 150 MILLILITER(S): 9 INJECTION INTRAMUSCULAR; INTRAVENOUS; SUBCUTANEOUS at 17:39

## 2020-12-02 RX ADMIN — ENOXAPARIN SODIUM 40 MILLIGRAM(S): 100 INJECTION SUBCUTANEOUS at 12:29

## 2020-12-02 RX ADMIN — Medication 50 MICROGRAM(S): at 05:31

## 2020-12-02 RX ADMIN — INSULIN GLARGINE 10 UNIT(S): 100 INJECTION, SOLUTION SUBCUTANEOUS at 21:24

## 2020-12-02 RX ADMIN — Medication 2: at 08:14

## 2020-12-02 RX ADMIN — SODIUM CHLORIDE 200 MILLILITER(S): 9 INJECTION INTRAMUSCULAR; INTRAVENOUS; SUBCUTANEOUS at 05:31

## 2020-12-02 RX ADMIN — Medication 13 UNIT(S): at 17:40

## 2020-12-02 RX ADMIN — Medication 5 MILLIGRAM(S): at 17:41

## 2020-12-02 RX ADMIN — SODIUM CHLORIDE 200 MILLILITER(S): 9 INJECTION INTRAMUSCULAR; INTRAVENOUS; SUBCUTANEOUS at 12:30

## 2020-12-02 RX ADMIN — Medication 5 MILLIGRAM(S): at 05:32

## 2020-12-02 RX ADMIN — CLOZAPINE 150 MILLIGRAM(S): 150 TABLET, ORALLY DISINTEGRATING ORAL at 21:24

## 2020-12-02 RX ADMIN — Medication 5: at 12:30

## 2020-12-02 RX ADMIN — Medication 10 UNIT(S): at 08:13

## 2020-12-02 RX ADMIN — Medication 10 UNIT(S): at 12:29

## 2020-12-02 RX ADMIN — Medication 2: at 17:39

## 2020-12-02 RX ADMIN — Medication 2 UNIT(S): at 21:48

## 2020-12-02 NOTE — PROGRESS NOTE ADULT - ASSESSMENT
55 F admitted to medicine for Lithium toxicity. Will be followed and consider by Oncologic surgery to consider ileostomy reversal in this admission.       -History of Stage 4 Colon Cancer:  - Surgical oncology following. Will consider ileostomy reversal on this admission.   -Hypothyroidism: lithium induced. Endocrine following.   - DM: Lantus and lispro started, on sliding scale.   - HCL: on statin.   - On DVT prophylaxis: lov    Lithium toxicity / tremors / weakness   -being treated by Medicine with Amiloride.   - lithium held     Schizoaffective disorder    - Psych on board.  - Clozapine at reduced dose      55 F admitted to medicine for Lithium toxicity. Will be followed and consider by Oncologic surgery to consider ileostomy reversal in this admission.       History of Stage 4 Colon Cancer s/p loop ileostomy  -Plan for loop ileostomy reversal and right hemicolectomy 12/7  -GI called for colonoscopy pre-op this Friday 12/4  -also requesting medical and psych clearances pre-op    Hypothyroidism  -lithium induced. Endocrine following.     DM  -Lantus and lispro started, on sliding scale.     HCL  -on statin.     Lithium toxicity / tremors / weakness   -being treated by Medicine with Amiloride.   - lithium held     Schizoaffective disorder    - Psych on board.  - Clozapine at reduced dose

## 2020-12-02 NOTE — PROGRESS NOTE ADULT - SUBJECTIVE AND OBJECTIVE BOX
NEPHROLOGY INTERVAL HPI/OVERNIGHT EVENTS:    Feels better   IVF noted     MEDICATIONS  (STANDING):  aMILoride 5 milliGRAM(s) Oral two times a day  atorvastatin 40 milliGRAM(s) Oral at bedtime  cloZAPine 150 milliGRAM(s) Oral at bedtime  dextrose 40% Gel 15 Gram(s) Oral once  dextrose 5%. 1000 milliLiter(s) (50 mL/Hr) IV Continuous <Continuous>  dextrose 5%. 1000 milliLiter(s) (100 mL/Hr) IV Continuous <Continuous>  dextrose 50% Injectable 25 Gram(s) IV Push once  dextrose 50% Injectable 12.5 Gram(s) IV Push once  dextrose 50% Injectable 25 Gram(s) IV Push once  enoxaparin Injectable 40 milliGRAM(s) SubCutaneous daily  glucagon  Injectable 1 milliGRAM(s) IntraMuscular once  insulin glargine Injectable (LANTUS) 10 Unit(s) SubCutaneous at bedtime  insulin glargine Injectable (LANTUS) 5 Unit(s) SubCutaneous every morning  insulin lispro (ADMELOG) corrective regimen sliding scale   SubCutaneous three times a day before meals  insulin lispro Injectable (ADMELOG) 10 Unit(s) SubCutaneous three times a day before meals  levothyroxine 50 MICROGram(s) Oral daily  sodium chloride 0.9%. 1000 milliLiter(s) (200 mL/Hr) IV Continuous <Continuous>    MEDICATIONS  (PRN):  acetaminophen   Tablet .. 650 milliGRAM(s) Oral every 6 hours PRN Mild Pain (1 - 3), Moderate Pain (4 - 6)      Allergies    amoxicillin (Unknown)  Lamictal (Unknown)  penicillin (Unknown)  Tegretol (Unknown)  Zetia (Unknown)    Intolerances      Vital Signs Last 24 Hrs  T(C): 36.9 (02 Dec 2020 11:20), Max: 36.9 (01 Dec 2020 19:02)  T(F): 98.4 (02 Dec 2020 11:20), Max: 98.5 (01 Dec 2020 19:02)  HR: 96 (02 Dec 2020 11:20) (72 - 96)  BP: 110/65 (02 Dec 2020 11:20) (100/60 - 110/65)  BP(mean): --  RR: 20 (02 Dec 2020 11:20) (18 - 20)  SpO2: 95% (02 Dec 2020 11:20) (95% - 98%)  Daily     Daily   I&O's Detail    I&O's Summary      PHYSICAL EXAM:    GENERAL: Comfortable  ENMT: Moist mucous membranes  NECK: Supple, neck  veins wnl  NERVOUS SYSTEM:  Alert & alert; non focal; no tremors  CHEST/LUNG: Clear bilaterally  HEART: Regular rate and rhythm; No rub  ABDOMEN: Soft, Nontender, Nondistended, BS+; R ostomy  EXTREMITIES:  Trace edema  LABS:                        12.2   10.40 )-----------( 162      ( 02 Dec 2020 07:50 )             38.5     12-    135  |  109<H>  |  7.0<L>  ----------------------------<  230<H>  3.8   |  21.0<L>  |  0.67    Ca    8.7      02 Dec 2020 07:50  Mg     1.8     12-        Urinalysis Basic - ( 2020 16:32 )    Color: Yellow / Appearance: Clear / S.005 / pH: x  Gluc: x / Ketone: Negative  / Bili: Negative / Urobili: Negative mg/dL   Blood: x / Protein: Negative mg/dL / Nitrite: Negative   Leuk Esterase: Negative / RBC: x / WBC x   Sq Epi: x / Non Sq Epi: x / Bacteria: x              RADIOLOGY & ADDITIONAL TESTS:

## 2020-12-02 NOTE — PROGRESS NOTE BEHAVIORAL HEALTH - SUMMARY
Patient is a 55  year old, male; [facility] ; ;  noncaregiver; past psychiatric history of schizoaffective disorder ; multiple prior psychiatric hospitalizations ([facility] on 4/30/20- 11/2/20 ); ; no known suicide attempts; no known history of violence or arrests; no active substance abuse or known history of complicated withdrawal; PMH of osteogenesis imperfecta, HTN, hypothyroidism, DM type 2 recent dx and bowel resection with ileostomy, with Stage IV colorectal cancer presented to ER from residence secondary to tremors.  Patient presented with approximately 10 day history of intermittent worsening gross tremor.  He was  found to have elevated lithium level. Patient presented with Lithium level 3.28 decreased to 2.37.  Patient was found to have some mild disorganization, w/some irritability and grandiosity which may be baseline.  Pt denies any S/H I/I/P and has reportedly been compliant with tx and medication. He has poor insight into psychiatric illness and reportedly has been taking poor care of his ADLs.  Collateral was obtained from home.  Plan to obtain further collateral from psychiatrist,  and family.  Would hold lithium for now, decreased Clozaril to 100 mg first night and send for Clozaril level. Psychiatry to continue to follow   Patient appears to be psychiatrically at baseline.     Plan  1) Optimize medical conditions   2) Increase Clozaril to 200 mg at night   3) Dc lithium.  Will plan to change to Risperdal  4) Will continue to follow

## 2020-12-02 NOTE — PROGRESS NOTE ADULT - ASSESSMENT
Improved Lithium toxicity: no neurologic sequelae, normal renal function  Li level improving  Hypokalemia  - cont IVF  - hold Li    - monitor labs

## 2020-12-02 NOTE — CONSULT NOTE ADULT - REASON FOR ADMISSION
weakness , shakes , tremor

## 2020-12-02 NOTE — PROGRESS NOTE ADULT - SUBJECTIVE AND OBJECTIVE BOX
INTERVAL HPI/OVERNIGHT EVENTS:  Patient evaluated at bedside. No acute distress. No acute events overnight. Patient sleeping comfortably, denies any new complaints.    MEDICATIONS  (STANDING):  aMILoride 5 milliGRAM(s) Oral two times a day  atorvastatin 40 milliGRAM(s) Oral at bedtime  cloZAPine 150 milliGRAM(s) Oral at bedtime  dextrose 40% Gel 15 Gram(s) Oral once  dextrose 5%. 1000 milliLiter(s) (50 mL/Hr) IV Continuous <Continuous>  dextrose 5%. 1000 milliLiter(s) (100 mL/Hr) IV Continuous <Continuous>  dextrose 50% Injectable 25 Gram(s) IV Push once  dextrose 50% Injectable 12.5 Gram(s) IV Push once  dextrose 50% Injectable 25 Gram(s) IV Push once  enoxaparin Injectable 40 milliGRAM(s) SubCutaneous daily  glucagon  Injectable 1 milliGRAM(s) IntraMuscular once  insulin glargine Injectable (LANTUS) 10 Unit(s) SubCutaneous at bedtime  insulin lispro (ADMELOG) corrective regimen sliding scale   SubCutaneous three times a day before meals  insulin lispro Injectable (ADMELOG) 10 Unit(s) SubCutaneous three times a day before meals  levothyroxine 50 MICROGram(s) Oral daily  sodium chloride 0.9%. 1000 milliLiter(s) (200 mL/Hr) IV Continuous <Continuous>    MEDICATIONS  (PRN):  acetaminophen   Tablet .. 650 milliGRAM(s) Oral every 6 hours PRN Mild Pain (1 - 3), Moderate Pain (4 - 6)      Vital Signs Last 24 Hrs  T(C): 36.9 (01 Dec 2020 23:28), Max: 36.9 (01 Dec 2020 11:11)  T(F): 98.4 (01 Dec 2020 23:28), Max: 98.5 (01 Dec 2020 19:02)  HR: 96 (01 Dec 2020 23:28) (70 - 96)  BP: 100/62 (01 Dec 2020 23:28) (100/62 - 116/71)  BP(mean): --  RR: 18 (01 Dec 2020 23:28) (17 - 20)  SpO2: 95% (01 Dec 2020 23:28) (95% - 98%)    GEN: NAD, sleeping  CHEST: Symmetrical chest rise, breath sounds CTAB  HEART: RRR  ABD: Soft, non-tender, non-distended. Ileostomy in the R abdomen, mucosa is pink, no bleeding, stool and air in the bag. no rebound, guarding, or rigidity.    I&O's Detail      LABS:                        12.4   12.25 )-----------( 167      ( 01 Dec 2020 04:07 )             37.1         131<L>  |  103  |  8.0  ----------------------------<  297<H>  4.3   |  20.0<L>  |  0.61    Ca    9.2      01 Dec 2020 16:24  Phos  4.6     11-30  Mg     1.8             Urinalysis Basic - ( 2020 16:32 )    Color: Yellow / Appearance: Clear / S.005 / pH: x  Gluc: x / Ketone: Negative  / Bili: Negative / Urobili: Negative mg/dL   Blood: x / Protein: Negative mg/dL / Nitrite: Negative   Leuk Esterase: Negative / RBC: x / WBC x   Sq Epi: x / Non Sq Epi: x / Bacteria: x        RADIOLOGY & ADDITIONAL STUDIES:

## 2020-12-02 NOTE — PROGRESS NOTE ADULT - SUBJECTIVE AND OBJECTIVE BOX
INTERVAL HPI/OVERNIGHT EVENTS:  follow up T2DM   Hypothyroidism   pt with persistnet elevation in BS    PMD added Am alntus to start tomorrow   Premeal Humalog inc to 13 untis      MEDICATIONS  (STANDING):  aMILoride 5 milliGRAM(s) Oral two times a day  atorvastatin 40 milliGRAM(s) Oral at bedtime  cloZAPine 150 milliGRAM(s) Oral at bedtime  dextrose 40% Gel 15 Gram(s) Oral once  dextrose 5%. 1000 milliLiter(s) (50 mL/Hr) IV Continuous <Continuous>  dextrose 5%. 1000 milliLiter(s) (100 mL/Hr) IV Continuous <Continuous>  dextrose 50% Injectable 25 Gram(s) IV Push once  dextrose 50% Injectable 12.5 Gram(s) IV Push once  dextrose 50% Injectable 25 Gram(s) IV Push once  enoxaparin Injectable 40 milliGRAM(s) SubCutaneous daily  glucagon  Injectable 1 milliGRAM(s) IntraMuscular once  insulin glargine Injectable (LANTUS) 10 Unit(s) SubCutaneous at bedtime  insulin glargine Injectable (LANTUS) 5 Unit(s) SubCutaneous every morning  insulin lispro (ADMELOG) corrective regimen sliding scale   SubCutaneous three times a day before meals  insulin lispro Injectable (ADMELOG) 13 Unit(s) SubCutaneous three times a day before meals  levothyroxine 50 MICROGram(s) Oral daily  sodium chloride 0.9%. 1000 milliLiter(s) (150 mL/Hr) IV Continuous <Continuous>    MEDICATIONS  (PRN):  acetaminophen   Tablet .. 650 milliGRAM(s) Oral every 6 hours PRN Mild Pain (1 - 3), Moderate Pain (4 - 6)      Allergies    amoxicillin (Unknown)  Lamictal (Unknown)  penicillin (Unknown)  Tegretol (Unknown)  Zetia (Unknown)    Intolerances        Review of systems:  No CP n pressure no dyspnea  appetite ahs been ok today   Vital Signs Last 24 Hrs  T(C): 36.9 (02 Dec 2020 19:11), Max: 36.9 (02 Dec 2020 11:20)  T(F): 98.4 (02 Dec 2020 19:11), Max: 98.4 (02 Dec 2020 11:20)  HR: 97 (02 Dec 2020 19:11) (73 - 97)  BP: 131/78 (02 Dec 2020 19:11) (100/60 - 131/78)  BP(mean): --  RR: 19 (02 Dec 2020 19:11) (19 - 20)  SpO2: 93% (02 Dec 2020 19:11) (93% - 100%)    PHYSICAL EXAM:      Constitutional: NAD, well-groomed, well-developed  HEENT: PERRLA, EOMI, no exophalmos  Neck: No LAD, No JVD, trachea midline, no thyroid enlargement  Back: Normal spine flexure, No CVA tenderness  Respiratory: CTAB  Cardiovascular: S1 and S2, RRR, no M/G/R  Gastrointestinal: BS+, soft, no organomeglag or mass  Extremities: No peripheral edema, no pedal lesions          LABS:                        12.2   10.40 )-----------( 162      ( 02 Dec 2020 07:50 )             38.5     12-02    135  |  109<H>  |  7.0<L>  ----------------------------<  230<H>  3.8   |  21.0<L>  |  0.67    Ca    8.7      02 Dec 2020 07:50  Mg     1.8     12-01            CAPILLARY BLOOD GLUCOSE    CAPILLARY BLOOD GLUCOSE      POCT Blood Glucose.: 284 mg/dL (02 Dec 2020 21:23)  POCT Blood Glucose.: 211 mg/dL (02 Dec 2020 17:38)  POCT Blood Glucose.: 386 mg/dL (02 Dec 2020 12:28)  POCT Blood Glucose.: 229 mg/dL (02 Dec 2020 08:13)    RADIOLOGY & ADDITIONAL TESTS:

## 2020-12-02 NOTE — CONSULT NOTE ADULT - SUBJECTIVE AND OBJECTIVE BOX
[place] [facility]                                                               Office:  [address] [address]                                                              Telephone: [phone]. Fax:[phone]                                                                        CARDIOLOGY CONSULTATION NOTE                                                                                             Consult requested by:    Reason for Consultation:   History obtained by: Patient and medical record   obtained: No    Chief complaint:    Patient is a 55y old  Male who presents with a chief complaint of weakness , shakes , tremor (02 Dec 2020 15:46)        HPI:  55 year old male with PMH of colon cancer stage 4 status post ileostomy 6 months ago , schizoaffective disorder (on lithium) , prolonged stay at [facility] recently , DLP was sent from [facility] with c/o weakness . details were obtained from patient and his [facility] staff as well. as per staff he has been at [facility] for a month after his [facility] stay and for last few days he has been weak , tremulous , unable to care for himself , today he was unable to walk and decision was made to transfer patient to ER . he denies SB , no chest pain , he mentioned feeling relaxed , he seems awake oriented but parts of medical history provided were wrong   labs revealed elevated lithium level, patient was seen by ICU team , poison control was called, as per recs will need fluids and monitoring of levels as per hand off from ED   nephrology consult has been obtained as well.  (2020 17:34)        REVIEW OF SYMPTOMS:     CONSTITUTIONAL: No fever, weight loss, or fatigue  ENMT:  No difficulty hearing, tinnitus, vertigo; No sinus or throat pain  NECK: No pain or stiffness  CARDIOVASCULAR: No chest pain, dyspnea, syncope, palpitations, dizziness, Orthopnea, Paroxsymal nocturnal dyspnea  RESPIRATORY: No Dyspnea on exertion, Shortness of breath, cough, wheezing  : No dysuria, no hematuria   GI: No dark color stool, no melena, no diarrhea, no constipation, no abdominal pain   NEURO: No headache, no dizziness, no slurred speech   MUSCULOSKELETAL: No joint pain or swelling; No muscle, back, or extremity pain  PSYCH: No agitation, no anxiety.    ALL OTHER REVIEW OF SYSTEMS ARE NEGATIVE.      PREVIOUS DIAGNOSTIC TESTING  ECHO FINDINGS:      STRESS FINDINGS:      CATHETERIZATION FINDINGS:         ALLERGIES: Allergies    amoxicillin (Unknown)  Lamictal (Unknown)  penicillin (Unknown)  Tegretol (Unknown)  Zetia (Unknown)    Intolerances          PAST MEDICAL HISTORY  Colon cancer    Hypothyroidism    High cholesterol    Diabetes    Osteogenesis imperfecta    Schizo-affective psychosis        PAST SURGICAL HISTORY  Ileostomy status    Fracture of ankle        FAMILY HISTORY:  FHx: hypertension (Mother)    FH: prostate cancer (Father)        SOCIAL HISTORY:  Denies smoking/alcohol/drugs  CIGARETTES:     ALCOHOL:  DRUGS:      CURRENT MEDICATIONS:  aMILoride 5 milliGRAM(s) Oral two times a day     cloZAPine  atorvastatin  dextrose 40% Gel  dextrose 5%.  dextrose 5%.  dextrose 50% Injectable  dextrose 50% Injectable  dextrose 50% Injectable  enoxaparin Injectable  glucagon  Injectable  insulin glargine Injectable (LANTUS)  insulin glargine Injectable (LANTUS)  insulin lispro (ADMELOG) corrective regimen sliding scale  insulin lispro Injectable (ADMELOG)  levothyroxine  sodium chloride 0.9%.        HOME MEDICATIONS:      Vital Signs Last 24 Hrs  T(C): 36.9 (02 Dec 2020 15:14), Max: 36.9 (01 Dec 2020 19:02)  T(F): 98.4 (02 Dec 2020 15:14), Max: 98.5 (01 Dec 2020 19:02)  HR: 83 (02 Dec 2020 15:14) (73 - 96)  BP: 129/84 (02 Dec 2020 15:14) (100/60 - 129/84)  BP(mean): --  RR: 20 (02 Dec 2020 15:14) (18 - 20)  SpO2: 100% (02 Dec 2020 15:14) (95% - 100%)      PHYSICAL EXAM:  Constitutional: Comfortable . No acute distress.   HEENT: Atraumatic and normocephalic , neck is supple . no JVD. No carotid bruit. PEERL   CNS: A&Ox3. No focal deficits. EOMI. Cranial nerves II-IX are intact.   Lymph Nodes: Cervical : Not palpable.  Respiratory: CTAB  Cardiovascular: S1S2 RRR. No murmur/rubs or gallop.  Gastrointestinal: Soft non-tender and non distended . +Bowel sounds. negative Nance's sign.  Extremities: No edema.   Psychiatric: Calm . no agitation.  Skin: No skin rash/ulcers visualized to face, hands or feet.    Intake and output:     LABS:                        12.2   10.40 )-----------( 162      ( 02 Dec 2020 07:50 )             38.5     12-02    135  |  109<H>  |  7.0<L>  ----------------------------<  230<H>  3.8   |  21.0<L>  |  0.67    Ca    8.7      02 Dec 2020 07:50  Mg     1.8     12-        ;p-BNP=    Urinalysis Basic - ( 2020 16:32 )    Color: Yellow / Appearance: Clear / S.005 / pH: x  Gluc: x / Ketone: Negative  / Bili: Negative / Urobili: Negative mg/dL   Blood: x / Protein: Negative mg/dL / Nitrite: Negative   Leuk Esterase: Negative / RBC: x / WBC x   Sq Epi: x / Non Sq Epi: x / Bacteria: x        INTERPRETATION OF TELEMETRY: Reviewed by me.   ECG: Reviewed by me.     RADIOLOGY & ADDITIONAL STUDIES:    X-ray:  reviewed by me.   CT scan:   MRI:      [place] [facility]                                                               Office:  [address] [address]                                                              Telephone: [phone]. Fax:[phone]                                                                        CARDIOLOGY CONSULTATION NOTE                                                                                             Consult requested by:  Dr. Avendano   Reason for Consultation: Preoperative cardiovascular optimization   History obtained by: Patient and medical record   obtained: No    Chief complaint:    Patient is a 55y old  Male who presents with a chief complaint of weakness , shakes , tremor (02 Dec 2020 15:46)        HPI:  55 year old male with PMH of colon cancer stage 4 status post ileostomy 6 months ago , schizoaffective disorder (on lithium) , prolonged stay at [facility] recently , DLP was sent from [facility] with c/o weakness . details were obtained from patient and his [facility] staff as well. as per staff he has been at [facility] for a month after his [facility] stay and for last few days he has been weak , tremulous , unable to care for himself , today he was unable to walk and decision was made to transfer patient to ER . he denies SB , no chest pain , he mentioned feeling relaxed , he seems awake oriented but parts of medical history provided were wrong   labs revealed elevated lithium level, patient was seen by ICU team , poison control was called, as per recs will need fluids and monitoring of levels as per hand off from ED   nephrology consult has been obtained as well.  (2020 17:34)  Patient denies any chest pain or shortness of breath at rest or upon exertion. Notes that he has had a previous TTE done and unsure of what the results are. Otherwise no lower extremity edema, orthopnea or PND.   Patient EKG reviewed and noted to have T wave changes     REVIEW OF SYMPTOMS:     CONSTITUTIONAL: No fever, weight loss, or fatigue  ENMT:  No difficulty hearing, tinnitus, vertigo; No sinus or throat pain  NECK: No pain or stiffness  CARDIOVASCULAR: No chest pain, dyspnea, syncope, palpitations, dizziness, Orthopnea, Paroxsymal nocturnal dyspnea  RESPIRATORY: No Dyspnea on exertion, Shortness of breath, cough, wheezing  : No dysuria, no hematuria   GI: No dark color stool, no melena, no diarrhea, no constipation, no abdominal pain   NEURO: No headache, no dizziness, no slurred speech   MUSCULOSKELETAL: No joint pain or swelling; No muscle, back, or extremity pain  PSYCH: No agitation, no anxiety.    ALL OTHER REVIEW OF SYSTEMS ARE NEGATIVE.      PREVIOUS DIAGNOSTIC TESTING  ECHO FINDINGS:      STRESS FINDINGS:      CATHETERIZATION FINDINGS:         ALLERGIES: Allergies    amoxicillin (Unknown)  Lamictal (Unknown)  penicillin (Unknown)  Tegretol (Unknown)  Zetia (Unknown)    Intolerances          PAST MEDICAL HISTORY  Colon cancer    Hypothyroidism    High cholesterol    Diabetes    Osteogenesis imperfecta    Schizo-affective psychosis        PAST SURGICAL HISTORY  Ileostomy status    Fracture of ankle        FAMILY HISTORY:  FHx: hypertension (Mother)    FH: prostate cancer (Father)        SOCIAL HISTORY:  Denies smoking/alcohol/drugs  CIGARETTES:     ALCOHOL:  DRUGS:      CURRENT MEDICATIONS:  aMILoride 5 milliGRAM(s) Oral two times a day     cloZAPine  atorvastatin  dextrose 40% Gel  dextrose 5%.  dextrose 5%.  dextrose 50% Injectable  dextrose 50% Injectable  dextrose 50% Injectable  enoxaparin Injectable  glucagon  Injectable  insulin glargine Injectable (LANTUS)  insulin glargine Injectable (LANTUS)  insulin lispro (ADMELOG) corrective regimen sliding scale  insulin lispro Injectable (ADMELOG)  levothyroxine  sodium chloride 0.9%.        HOME MEDICATIONS:      Vital Signs Last 24 Hrs  T(C): 36.9 (02 Dec 2020 15:14), Max: 36.9 (01 Dec 2020 19:02)  T(F): 98.4 (02 Dec 2020 15:14), Max: 98.5 (01 Dec 2020 19:02)  HR: 83 (02 Dec 2020 15:14) (73 - 96)  BP: 129/84 (02 Dec 2020 15:14) (100/60 - 129/84)  BP(mean): --  RR: 20 (02 Dec 2020 15:14) (18 - 20)  SpO2: 100% (02 Dec 2020 15:14) (95% - 100%)      PHYSICAL EXAM:  Constitutional: Comfortable . No acute distress.   HEENT: Atraumatic and normocephalic , neck is supple . no JVD. No carotid bruit. PEERL   CNS: A&Ox3. No focal deficits. EOMI. Cranial nerves II-IX are intact.   Lymph Nodes: Cervical : Not palpable.  Respiratory: CTAB  Cardiovascular: S1S2 RRR. No murmur/rubs or gallop.  Gastrointestinal: Soft non-tender and non distended . +Bowel sounds. negative Nance's sign.  Extremities: No edema.   Psychiatric: Calm . no agitation.  Skin: No skin rash/ulcers visualized to face, hands or feet.    Intake and output:     LABS:                        12.2   10.40 )-----------( 162      ( 02 Dec 2020 07:50 )             38.5     12-    135  |  109<H>  |  7.0<L>  ----------------------------<  230<H>  3.8   |  21.0<L>  |  0.67    Ca    8.7      02 Dec 2020 07:50  Mg     1.8             ;p-BNP=    Urinalysis Basic - ( 2020 16:32 )    Color: Yellow / Appearance: Clear / S.005 / pH: x  Gluc: x / Ketone: Negative  / Bili: Negative / Urobili: Negative mg/dL   Blood: x / Protein: Negative mg/dL / Nitrite: Negative   Leuk Esterase: Negative / RBC: x / WBC x   Sq Epi: x / Non Sq Epi: x / Bacteria: x        INTERPRETATION OF TELEMETRY: Reviewed by me. Sinus rhythm and sinus tachycardia   ECG: Reviewed by me.     RADIOLOGY & ADDITIONAL STUDIES:    X-ray:  reviewed by me.   CT scan:   MRI:

## 2020-12-02 NOTE — PROGRESS NOTE ADULT - SUBJECTIVE AND OBJECTIVE BOX
CC: lithium toxicity     INTERVAL HPI/OVERNIGHT EVENTS:  seen and examined , feels well, denies any SOB , no chest pain   tremors still present           Vital Signs Last 24 Hrs  T(C): 36.9 (02 Dec 2020 15:14), Max: 36.9 (01 Dec 2020 19:02)  T(F): 98.4 (02 Dec 2020 15:14), Max: 98.5 (01 Dec 2020 19:02)  HR: 83 (02 Dec 2020 15:14) (73 - 96)  BP: 129/84 (02 Dec 2020 15:14) (100/60 - 129/84)  BP(mean): --  RR: 20 (02 Dec 2020 15:14) (18 - 20)  SpO2: 100% (02 Dec 2020 15:14) (95% - 100%)    PHYSICAL EXAM:    GENERAL: NAD, resting comfortable in bed , obese  CHEST/LUNG: CTAB , no wheezing , rales, rhonchi heard   HEART: S1S2+, Regular rate and rhythm; No murmurs, rubs, or gallops  ABDOMEN: Soft, Nontender, ileostomy bag with stool ; Bowel sounds present  EXTREMITIES:  no pitting edema , pulses palpated BL.        LABS:                        12.2   10.40 )-----------( 162      ( 02 Dec 2020 07:50 )             38.5     12    135  |  109<H>  |  7.0<L>  ----------------------------<  230<H>  3.8   |  21.0<L>  |  0.67    Ca    8.7      02 Dec 2020 07:50  Mg     1.8     12        Urinalysis Basic - ( 2020 16:32 )    Color: Yellow / Appearance: Clear / S.005 / pH: x  Gluc: x / Ketone: Negative  / Bili: Negative / Urobili: Negative mg/dL   Blood: x / Protein: Negative mg/dL / Nitrite: Negative   Leuk Esterase: Negative / RBC: x / WBC x   Sq Epi: x / Non Sq Epi: x / Bacteria: x          MEDICATIONS  (STANDING):  aMILoride 5 milliGRAM(s) Oral two times a day  atorvastatin 40 milliGRAM(s) Oral at bedtime  cloZAPine 150 milliGRAM(s) Oral at bedtime  dextrose 40% Gel 15 Gram(s) Oral once  dextrose 5%. 1000 milliLiter(s) (50 mL/Hr) IV Continuous <Continuous>  dextrose 5%. 1000 milliLiter(s) (100 mL/Hr) IV Continuous <Continuous>  dextrose 50% Injectable 25 Gram(s) IV Push once  dextrose 50% Injectable 12.5 Gram(s) IV Push once  dextrose 50% Injectable 25 Gram(s) IV Push once  enoxaparin Injectable 40 milliGRAM(s) SubCutaneous daily  glucagon  Injectable 1 milliGRAM(s) IntraMuscular once  insulin glargine Injectable (LANTUS) 10 Unit(s) SubCutaneous at bedtime  insulin glargine Injectable (LANTUS) 5 Unit(s) SubCutaneous every morning  insulin lispro (ADMELOG) corrective regimen sliding scale   SubCutaneous three times a day before meals  insulin lispro Injectable (ADMELOG) 13 Unit(s) SubCutaneous three times a day before meals  levothyroxine 50 MICROGram(s) Oral daily  sodium chloride 0.9%. 1000 milliLiter(s) (150 mL/Hr) IV Continuous <Continuous>    MEDICATIONS  (PRN):  acetaminophen   Tablet .. 650 milliGRAM(s) Oral every 6 hours PRN Mild Pain (1 - 3), Moderate Pain (4 - 6)      RADIOLOGY & ADDITIONAL TESTS:

## 2020-12-02 NOTE — PROGRESS NOTE BEHAVIORAL HEALTH - NSBHCHARTREVIEWVS_PSY_A_CORE FT
Vital Signs Last 24 Hrs  T(C): 36.9 (02 Dec 2020 11:20), Max: 36.9 (01 Dec 2020 19:02)  T(F): 98.4 (02 Dec 2020 11:20), Max: 98.5 (01 Dec 2020 19:02)  HR: 96 (02 Dec 2020 11:20) (72 - 96)  BP: 110/65 (02 Dec 2020 11:20) (100/60 - 110/65)  BP(mean): --  RR: 20 (02 Dec 2020 11:20) (18 - 20)  SpO2: 95% (02 Dec 2020 11:20) (95% - 98%)

## 2020-12-02 NOTE — CONSULT NOTE ADULT - SUBJECTIVE AND OBJECTIVE BOX
HISTORY OF PRESENT ILLNESS: This is a 55y old man with a past medical history significant for hypothyroidism, HLD, DM, osteogenesis imperfecta and stage 4 colon cancer status post ileostomy 6 months ago, schizoaffective disorder(on lithium), prolonged stay at [facility] recently, and now at [facility], As per staff for last few days he has been weak , tremulous , unable to care for himself . Patient was brought in on  because symptoms got worse, to a point that patient was unable to walk. Upon arrival labs revealed elevated lithium  level, patient was seen by ICU team , poison control also called. Gastroenterology being consult today to evaluated patient for colonoscopy for possible ileostomy reversal. At present Denies nausea, vomiting, abdominal pain, chest pain, shortness of breath, hematemesis, hematochezia, melena.  ROS: A 14-point review of systems was completed and was otherwise negative save what was reported in the HPI.    PAST MEDICAL/SURGICAL HISTORY:  Colon cancer    Hypothyroidism    High cholesterol    Diabetes    Osteogenesis imperfecta    Schizo-affective psychosis    Ileostomy status    Fracture of ankle      SOCIAL HISTORY:  - TOBACCO: Denies  - ALCOHOL: Denies  - ILLICIT DRUG USE: Denies    FAMILY HISTORY:  No known history of gastrointestinal or liver disease;  FHx: hypertension (Mother)    FH: prostate cancer (Father)        HOME MEDICATIONS:  acetaminophen 325 mg oral tablet: 2 tab(s) orally every 6 hours, As needed, Mild Pain (1 - 3) (2020 08:50)  Ativan 1 mg oral tablet: orally prn (2020 09:51)  atorvastatin 40 mg oral tablet: 1 tab(s) orally once a day (2020 09:51)  Benadryl 50 mg oral capsule: 1 cap(s) orally once, As Needed (2020 08:50)  cholecalciferol 1000 intl units oral capsule: 1 cap(s) orally 3 times a day (2020 08:50)  cloZAPine 200 mg oral tablet: 1 tab(s) orally once a day (at bedtime) (2020 18:16)  Depakote  mg oral tablet, extended release: 1 tab(s) orally once a day at 2pm (2020 09:51)  Depakote  mg oral tablet, extended release: 1 tab(s) orally once a day (at bedtime) (2020 08:50)  Glucophage 500 mg oral tablet: 1 tab(s) orally 2 times a day (:51)  Janumet 50 mg-500 mg oral tablet: 1 tab(s) orally 2 times a day with meals ()  levothyroxine 88 mcg (0.088 mg) oral tablet: 1 tab(s) orally once a day (51)  lithium carbonate: 300 milligram(s) orally 2 times a day ()  loperamide 2 mg oral capsule: 2 cap(s) orally every 6 hours, As Needed (50)  loxapine 10 mg oral capsule: orally once a day (at bedtime) ()  Milk of Magnesia:  ()  Mylanta oral suspension:  ()  pindolol 5 mg oral tablet: 1 tab(s) orally 2 times a day (2020 18:13)  Sodium Chloride 1 g oral tablet:  ()  Symmetrel 100 mg oral tablet: orally once a day (at bedtime) (50)  Tums 500 mg oral tablet, chewable: 2 tab(s) orally once a day ()  Visken 5 mg oral tablet: 0.5  orally once a day (50)  ZyPREXA 20 mg oral tablet: 1 tab(s) orally once a day (at bedtime) ()  ZyPREXA 5 mg oral tablet: 1 tab(s) orally every 12 hours, As Needed ()    INPATIENT MEDICATIONS:  MEDICATIONS  (STANDING):  aMILoride 5 milliGRAM(s) Oral two times a day  atorvastatin 40 milliGRAM(s) Oral at bedtime  cloZAPine 150 milliGRAM(s) Oral at bedtime  dextrose 40% Gel 15 Gram(s) Oral once  dextrose 5%. 1000 milliLiter(s) (50 mL/Hr) IV Continuous <Continuous>  dextrose 5%. 1000 milliLiter(s) (100 mL/Hr) IV Continuous <Continuous>  dextrose 50% Injectable 25 Gram(s) IV Push once  dextrose 50% Injectable 12.5 Gram(s) IV Push once  dextrose 50% Injectable 25 Gram(s) IV Push once  enoxaparin Injectable 40 milliGRAM(s) SubCutaneous daily  glucagon  Injectable 1 milliGRAM(s) IntraMuscular once  insulin glargine Injectable (LANTUS) 10 Unit(s) SubCutaneous at bedtime  insulin glargine Injectable (LANTUS) 5 Unit(s) SubCutaneous every morning  insulin lispro (ADMELOG) corrective regimen sliding scale   SubCutaneous three times a day before meals  insulin lispro Injectable (ADMELOG) 10 Unit(s) SubCutaneous three times a day before meals  levothyroxine 50 MICROGram(s) Oral daily  sodium chloride 0.9%. 1000 milliLiter(s) (200 mL/Hr) IV Continuous <Continuous>    MEDICATIONS  (PRN):  acetaminophen   Tablet .. 650 milliGRAM(s) Oral every 6 hours PRN Mild Pain (1 - 3), Moderate Pain (4 - 6)    ALLERGIES:  amoxicillin (Unknown)  Lamictal (Unknown)  penicillin (Unknown)  Tegretol (Unknown)  Zetia (Unknown)    T(C): 36.9 (20 @ 11:20), Max: 36.9 (20 @ 19:02)  HR: 96 (20 @ 11:20) (72 - 96)  BP: 110/65 (20 @ 11:20) (100/60 - 110/65)  RR: 20 (20 @ 11:20) (18 - 20)  SpO2: 95% (20 @ 11:20) (95% - 98%)      PHYSICAL EXAM:  Constitutional: Morbidly obese, afebrile man, in no apparent distress  Eyes: Sclerae anicteric, conjunctivae normal  ENMT: Mucus membranes moist, no oropharyngeal thrush noted  Respiratory: Breathing nonlabored; clear to auscultation  Cardiovascular: Regular rate and rhythm  Gastrointestinal: Soft, nontender, nondistended, diminished bowel sounds, ileostomy draining yellow liquid stool.  Extremities: No clubbing, cyanosis or edema  Neurological: Alert and oriented to person, place and time; no asterixis  Skin: No jaundice  Musculoskeletal: No significant peripheral atrophy  Psychiatric: Affect and mood appropriate      LABS:             12.2   10.40 )-----------( 162      (  @ 07:50 )             38.5                12.4   12.25 )-----------( 167      (  @ 04:07 )             37.1                16.2   14.87 )-----------( 236      (  @ 12:32 )             48.4             135  |  109<H>  |  7.0<L>  ----------------------------<  230<H>  3.8   |  21.0<L>  |  0.67    Ca    8.7      02 Dec 2020 07:50  Mg     1.8                 Urinalysis Basic - ( 2020 16:32 )    Color: Yellow / Appearance: Clear / S.005 / pH: x  Gluc: x / Ketone: Negative  / Bili: Negative / Urobili: Negative mg/dL   Blood: x / Protein: Negative mg/dL / Nitrite: Negative   Leuk Esterase: Negative / RBC: x / WBC x   Sq Epi: x / Non Sq Epi: x / Bacteria: x        IMAGING: I personally reviewed the CT of abdomen and pelvis, and I agree with the radiologist's interpretation as described below:  FINDINGS:  VENTRICLES AND SULCI:  Age-appropriate involutional change.  INTRA-AXIAL:  No mass, blood or abnormal attenuation is seen.  EXTRA-AXIAL:  No mass or collection isseen.  VISUALIZED SINUSES: Suspicion of a congenitally smaller left maxillary sinus though incompletely imaged on this study  VISUALIZED MASTOIDS:  Clear.  CALVARIUM:  Normal.  MISCELLANEOUS:  None.    IMPRESSION:  Mild involutional changes. The study is otherwise unremarkable

## 2020-12-02 NOTE — PROGRESS NOTE BEHAVIORAL HEALTH - NSBHFUPINTERVALHXFT_PSY_A_CORE
Chart reviewed. Endocrine note appreciated.  Patient to be restarted on synthroid and lantus to address hypothyroidism and uncontrolled DM. Patient also being followed by surgery and revision of ileostomy is being considered during hospital stay.  Patient last lithium level yesterday at 15:33 was 1.40.  There is no evidence of clozapine toxicity. Tremors still present but improved. Patient denies any acute complaints.  No overt delusions elicited on interview.  Patient reports he slept overnight. Denies any S/H I/I/P.  He has been compliant w/ medications and no PRNs have been given.  Pt has not been aggressive or agitated. Writer also called clinic [phone] and spoke with staff. Pt is followed by Dr. Chase Song who is not in today.  Left message to call writer back.  Clinic to fax over medications history.

## 2020-12-02 NOTE — PROGRESS NOTE ADULT - ASSESSMENT
# Lithium toxicity / tremors / weakness   level trending down , almost normalized   nephrology following   EKG repeated still shows prolonged QTC   will monitor for DI , hypothyroidism   amiloride has been started     # schizoaffective disorder   psych on board   clozapine at reduced dose , lithium held     # hypothyroidism   lithium induced   as per medications obtained from group home , not on maintenance meds   seen by endocrine started on levothyroxine     # DM   uncontrolled   patient has not been on medications   adjust insulin for hyperglycemia     # prolonged QTC   related to lithium toxicity   magnesium supplemented   monitor EKG periodically       # Hyponatremia   probably related to anti psychotic meds   will hydrate and monitor   monitor for SIADH       # DLP  on statin     #hx of colon cancer   s/p ileostomy   stage 4   supposed to follow with Dr. Diaz ,   patient to follow outpatient   ileostomy to be reversed during this hospital stay     # DVT prophylaxis  lovenox      mother was called and updated

## 2020-12-02 NOTE — CONSULT NOTE ADULT - ASSESSMENT
55 year old male with PMH of colon cancer stage 4 status post ileostomy 6 months ago , schizoaffective disorder (on lithium) , prolonged stay at [facility] recently , DLP was sent from [facility] with c/o weakness. Pre-operative optimization for ileostomy reversal.  EKG reviewed: NSR @ 79 bpm with non specific ST-T segment changes     A/P   1. Preoperative cardiovascular optimization prior to ileostomy reversal   - patient currently denies any chest pain or shortness of breath   - blood pressure controlled and EKG showing no specific ST - t segment changes   - based on risk factors patient has a RCRI of 1 (based on intraperitoneal surgery) with 6.0 % 30-day risk of death, MI, or cardiac arrest and thus considered elevated risk for an intermediate to high risk procedure   - recommend TTE to assess LV function and valvulopathy; if LV function is within normal limits with no evidence of regional wall motion abnormalities, thus patient is optimized and can proceed with planned procedure   - close dominik-procedural monitoring       Thank You   Shalonda Moy D.O.  Cardiology    55 year old male with PMH of colon cancer stage 4 status post ileostomy 6 months ago , schizoaffective disorder (on lithium) , prolonged stay at [facility] recently , DLP was sent from [facility] with c/o weakness. Pre-operative optimization for ileostomy reversal.  EKG reviewed: NSR @ 79 bpm with non specific ST-T segment changes     A/P   1. Preoperative cardiovascular optimization prior to ileostomy reversal   - patient currently denies any chest pain or shortness of breath   - blood pressure controlled and EKG showing no specific ST - t segment changes   - based on risk factors patient has a RCRI of 2 (based on intraperitoneal surgery and insulin dependent DM2) with 10.1% 30-day risk of death, MI, or cardiac arrest and thus considered elevated risk for an intermediate to high risk procedure   - recommend TTE to assess LV function and valvulopathy; if LV function is within normal limits with no evidence of regional wall motion abnormalities, thus patient is optimized and can proceed with planned procedure   - close dominik-procedural monitoring       Thank You   Shalonda Moy D.O.  Cardiology

## 2020-12-02 NOTE — CONSULT NOTE ADULT - PROBLEM SELECTOR RECOMMENDATION 9
Unable to be prep for colonoscopy, as patient has ileostomy. Colonoscopy not a possible choice at this point

## 2020-12-02 NOTE — CONSULT NOTE ADULT - ATTENDING COMMENTS
IV fluid, PTH, thyroid repeat in am, amiloride to block renal up take distally; follow up labs.
Pt seen with NP.  Patient with schizoaffective disorder. Admitted with lithium toxicity.  Hx of cecal cancer in 2018. S/P Loop ileostomy. Surgery planning for loop ileostomy reversal. Unable to do colonoscopy as unable to prep secondary to the ileostomy.   - may F/U with patient's GI Dr Cassidy . Can do colonoscopy post op  ( as outpatient ) when cleared by surgery

## 2020-12-03 ENCOUNTER — TRANSCRIPTION ENCOUNTER (OUTPATIENT)
Age: 55
End: 2020-12-03

## 2020-12-03 LAB
ANION GAP SERPL CALC-SCNC: 10 MMOL/L — SIGNIFICANT CHANGE UP (ref 5–17)
BUN SERPL-MCNC: 6 MG/DL — LOW (ref 8–20)
CALCIUM SERPL-MCNC: 9.4 MG/DL — SIGNIFICANT CHANGE UP (ref 8.6–10.2)
CHLORIDE SERPL-SCNC: 105 MMOL/L — SIGNIFICANT CHANGE UP (ref 98–107)
CO2 SERPL-SCNC: 25 MMOL/L — SIGNIFICANT CHANGE UP (ref 22–29)
CREAT SERPL-MCNC: 0.61 MG/DL — SIGNIFICANT CHANGE UP (ref 0.5–1.3)
GLUCOSE BLDC GLUCOMTR-MCNC: 167 MG/DL — HIGH (ref 70–99)
GLUCOSE BLDC GLUCOMTR-MCNC: 205 MG/DL — HIGH (ref 70–99)
GLUCOSE BLDC GLUCOMTR-MCNC: 211 MG/DL — HIGH (ref 70–99)
GLUCOSE BLDC GLUCOMTR-MCNC: 335 MG/DL — HIGH (ref 70–99)
GLUCOSE SERPL-MCNC: 204 MG/DL — HIGH (ref 70–99)
LITHIUM SERPL-MCNC: 0.47 MMOL/L — LOW (ref 0.5–1.5)
MAGNESIUM SERPL-MCNC: 1.8 MG/DL — SIGNIFICANT CHANGE UP (ref 1.6–2.6)
POTASSIUM SERPL-MCNC: 3.2 MMOL/L — LOW (ref 3.5–5.3)
POTASSIUM SERPL-SCNC: 3.2 MMOL/L — LOW (ref 3.5–5.3)
SODIUM SERPL-SCNC: 140 MMOL/L — SIGNIFICANT CHANGE UP (ref 135–145)

## 2020-12-03 PROCEDURE — 99233 SBSQ HOSP IP/OBS HIGH 50: CPT

## 2020-12-03 PROCEDURE — 93306 TTE W/DOPPLER COMPLETE: CPT | Mod: 26

## 2020-12-03 PROCEDURE — 99232 SBSQ HOSP IP/OBS MODERATE 35: CPT

## 2020-12-03 RX ORDER — NEOMYCIN SULFATE 500 MG/1
1000 TABLET ORAL
Refills: 0 | Status: COMPLETED | OUTPATIENT
Start: 2020-12-03 | End: 2020-12-03

## 2020-12-03 RX ORDER — ERYTHROMYCIN ETHYLSUCCINATE 400 MG
1000 TABLET ORAL
Refills: 0 | Status: COMPLETED | OUTPATIENT
Start: 2020-12-03 | End: 2020-12-03

## 2020-12-03 RX ORDER — POTASSIUM CHLORIDE 20 MEQ
20 PACKET (EA) ORAL
Refills: 0 | Status: COMPLETED | OUTPATIENT
Start: 2020-12-03 | End: 2020-12-03

## 2020-12-03 RX ORDER — INSULIN LISPRO 100/ML
18 VIAL (ML) SUBCUTANEOUS
Refills: 0 | Status: DISCONTINUED | OUTPATIENT
Start: 2020-12-03 | End: 2020-12-03

## 2020-12-03 RX ORDER — POTASSIUM CHLORIDE 20 MEQ
40 PACKET (EA) ORAL DAILY
Refills: 0 | Status: DISCONTINUED | OUTPATIENT
Start: 2020-12-03 | End: 2020-12-03

## 2020-12-03 RX ORDER — SODIUM CHLORIDE 9 MG/ML
1000 INJECTION, SOLUTION INTRAVENOUS
Refills: 0 | Status: DISCONTINUED | OUTPATIENT
Start: 2020-12-03 | End: 2020-12-04

## 2020-12-03 RX ORDER — CLOZAPINE 150 MG/1
200 TABLET, ORALLY DISINTEGRATING ORAL AT BEDTIME
Refills: 0 | Status: DISCONTINUED | OUTPATIENT
Start: 2020-12-03 | End: 2020-12-04

## 2020-12-03 RX ORDER — RISPERIDONE 4 MG/1
1 TABLET ORAL AT BEDTIME
Refills: 0 | Status: DISCONTINUED | OUTPATIENT
Start: 2020-12-04 | End: 2020-12-04

## 2020-12-03 RX ORDER — INSULIN GLARGINE 100 [IU]/ML
15 INJECTION, SOLUTION SUBCUTANEOUS AT BEDTIME
Refills: 0 | Status: DISCONTINUED | OUTPATIENT
Start: 2020-12-03 | End: 2020-12-04

## 2020-12-03 RX ORDER — POTASSIUM CHLORIDE 20 MEQ
40 PACKET (EA) ORAL DAILY
Refills: 0 | Status: DISCONTINUED | OUTPATIENT
Start: 2020-12-04 | End: 2020-12-04

## 2020-12-03 RX ORDER — POTASSIUM CHLORIDE 20 MEQ
40 PACKET (EA) ORAL EVERY 4 HOURS
Refills: 0 | Status: COMPLETED | OUTPATIENT
Start: 2020-12-03 | End: 2020-12-03

## 2020-12-03 RX ADMIN — Medication 4: at 11:01

## 2020-12-03 RX ADMIN — Medication 50 MILLIEQUIVALENT(S): at 15:45

## 2020-12-03 RX ADMIN — Medication 13 UNIT(S): at 11:01

## 2020-12-03 RX ADMIN — Medication 1000 MILLIGRAM(S): at 17:56

## 2020-12-03 RX ADMIN — Medication 1000 MILLIGRAM(S): at 15:45

## 2020-12-03 RX ADMIN — Medication 13 UNIT(S): at 08:10

## 2020-12-03 RX ADMIN — ENOXAPARIN SODIUM 40 MILLIGRAM(S): 100 INJECTION SUBCUTANEOUS at 08:53

## 2020-12-03 RX ADMIN — Medication 5 MILLIGRAM(S): at 17:56

## 2020-12-03 RX ADMIN — Medication 2: at 15:55

## 2020-12-03 RX ADMIN — Medication 40 MILLIEQUIVALENT(S): at 15:45

## 2020-12-03 RX ADMIN — ATORVASTATIN CALCIUM 40 MILLIGRAM(S): 80 TABLET, FILM COATED ORAL at 22:26

## 2020-12-03 RX ADMIN — CLOZAPINE 200 MILLIGRAM(S): 150 TABLET, ORALLY DISINTEGRATING ORAL at 22:21

## 2020-12-03 RX ADMIN — Medication 50 MILLIEQUIVALENT(S): at 08:54

## 2020-12-03 RX ADMIN — NEOMYCIN SULFATE 1000 MILLIGRAM(S): 500 TABLET ORAL at 15:45

## 2020-12-03 RX ADMIN — Medication 1000 MILLIGRAM(S): at 22:22

## 2020-12-03 RX ADMIN — INSULIN GLARGINE 10 UNIT(S): 100 INJECTION, SOLUTION SUBCUTANEOUS at 08:59

## 2020-12-03 RX ADMIN — NEOMYCIN SULFATE 1000 MILLIGRAM(S): 500 TABLET ORAL at 17:58

## 2020-12-03 RX ADMIN — Medication 50 MICROGRAM(S): at 05:31

## 2020-12-03 RX ADMIN — Medication 50 MILLIEQUIVALENT(S): at 11:22

## 2020-12-03 RX ADMIN — Medication 2: at 08:10

## 2020-12-03 RX ADMIN — NEOMYCIN SULFATE 1000 MILLIGRAM(S): 500 TABLET ORAL at 22:22

## 2020-12-03 RX ADMIN — Medication 5 MILLIGRAM(S): at 05:31

## 2020-12-03 RX ADMIN — Medication 18 UNIT(S): at 15:55

## 2020-12-03 RX ADMIN — Medication 40 MILLIEQUIVALENT(S): at 08:53

## 2020-12-03 RX ADMIN — SODIUM CHLORIDE 150 MILLILITER(S): 9 INJECTION INTRAMUSCULAR; INTRAVENOUS; SUBCUTANEOUS at 08:54

## 2020-12-03 NOTE — PROGRESS NOTE ADULT - ASSESSMENT
Improved Lithium toxicity: no neurologic sequelae, normal renal function  Li level improving  Hypokalemia --- Being supplemented , Amiloride   - cont IVF  - hold Li  -Surgery planning on OR tomorrow   - monitor labs

## 2020-12-03 NOTE — PROGRESS NOTE ADULT - ATTENDING COMMENTS
We would proceed with colonoscopy preop if we could prep the patient. But all the prep will come out the ileostomy and none will reach the colon making an adequate impossible. Enemas will not, in of themselves, be an adequate prep. We would proceed with colonoscopy preop if we could prep the patient. But all the prep will come out the ileostomy and none will reach the colon making an adequate impossible. Enemas will not, in of themselves, be an adequate prep.    ADDENDUM- CORINNE BELL DO  I spoke to  Dr Giang. Pt had complete colonoscopy in 2018 . No mass in the rectum at that time. May procede with ileostomy reversal We would proceed with colonoscopy preop if we could prep the patient. But all the prep will come out the ileostomy and none will reach the colon making an adequate impossible. Enemas will not, in of themselves, be an adequate prep    ADDENDUM- CORINNE BELL DO  I spoke to  Dr Giang. Pt had complete colonoscopy in 2018 . No mass in the rectum at that time. May procede with ileostomy reversal

## 2020-12-03 NOTE — PROGRESS NOTE ADULT - SUBJECTIVE AND OBJECTIVE BOX
CC: lithium toxicity     INTERVAL HPI/OVERNIGHT EVENTS: seen and examined , feels ok , tremors are better   denies SOB , no chest pain , anxious about surgery but wants to move forward           Vital Signs Last 24 Hrs  T(C): 36.9 (03 Dec 2020 11:16), Max: 36.9 (02 Dec 2020 15:14)  T(F): 98.4 (03 Dec 2020 11:16), Max: 98.4 (02 Dec 2020 15:14)  HR: 88 (03 Dec 2020 11:16) (78 - 97)  BP: 104/68 (03 Dec 2020 11:16) (104/68 - 131/78)  BP(mean): --  RR: 20 (03 Dec 2020 11:16) (19 - 20)  SpO2: 96% (03 Dec 2020 11:16) (93% - 100%)    PHYSICAL EXAM:    GENERAL: NAD, resting comfortable in bed , obese   CHEST/LUNG: CTAB , no wheezing , rales, rhonchi heard   HEART: S1S2+, Regular rate and rhythm; No murmurs, rubs, or gallops  ABDOMEN: Soft, ileostomy in place   EXTREMITIES:  no pitting edema , pulses palpated BL.        LABS:                        12.2   10.40 )-----------( 162      ( 02 Dec 2020 07:50 )             38.5     12-03    140  |  105  |  6.0<L>  ----------------------------<  204<H>  3.2<L>   |  25.0  |  0.61    Ca    9.4      03 Dec 2020 07:17  Mg     1.8     12-03              MEDICATIONS  (STANDING):  aMILoride 5 milliGRAM(s) Oral two times a day  atorvastatin 40 milliGRAM(s) Oral at bedtime  cloZAPine 150 milliGRAM(s) Oral at bedtime  dextrose 40% Gel 15 Gram(s) Oral once  dextrose 5%. 1000 milliLiter(s) (50 mL/Hr) IV Continuous <Continuous>  dextrose 5%. 1000 milliLiter(s) (100 mL/Hr) IV Continuous <Continuous>  dextrose 50% Injectable 25 Gram(s) IV Push once  dextrose 50% Injectable 12.5 Gram(s) IV Push once  dextrose 50% Injectable 25 Gram(s) IV Push once  erythromycin     enteric coated 1000 milliGRAM(s) Oral <User Schedule>  glucagon  Injectable 1 milliGRAM(s) IntraMuscular once  insulin glargine Injectable (LANTUS) 10 Unit(s) SubCutaneous at bedtime  insulin glargine Injectable (LANTUS) 10 Unit(s) SubCutaneous every morning  insulin lispro (ADMELOG) corrective regimen sliding scale   SubCutaneous three times a day before meals  insulin lispro Injectable (ADMELOG) 13 Unit(s) SubCutaneous three times a day before meals  levothyroxine 50 MICROGram(s) Oral daily  multiple electrolytes Injection Type 1 1000 milliLiter(s) (125 mL/Hr) IV Continuous <Continuous>  neomycin 1000 milliGRAM(s) Oral <User Schedule>  potassium chloride    Tablet ER 40 milliEquivalent(s) Oral every 4 hours  potassium chloride  20 mEq/100 mL IVPB 20 milliEquivalent(s) IV Intermittent every 2 hours    MEDICATIONS  (PRN):  acetaminophen   Tablet .. 650 milliGRAM(s) Oral every 6 hours PRN Mild Pain (1 - 3), Moderate Pain (4 - 6)      RADIOLOGY & ADDITIONAL TESTS:

## 2020-12-03 NOTE — PROGRESS NOTE ADULT - SUBJECTIVE AND OBJECTIVE BOX
Anesthesia pre op  54 yo male for reversal of ileostomy in am  chart reviewed  pmh significant for schizophrenia, prolonged qtc interval, hypothyroidism  dm, hld  npo post midnight  Quan Stevenson MD

## 2020-12-03 NOTE — PROGRESS NOTE BEHAVIORAL HEALTH - SUMMARY
Patient is a 55  year old, male; [facility]) ; ;  noncaregiver; past psychiatric history of schizoaffective disorder ; multiple prior psychiatric hospitalizations ([facility] on 4/30/20- 11/2/20 ); ; no known suicide attempts; no known history of violence or arrests; no active substance abuse or known history of complicated withdrawal; PMH of osteogenesis imperfecta, HTN, hypothyroidism, DM type 2 recent dx and bowel resection with ileostomy, with Stage IV colorectal cancer presented to ER from residence secondary to tremors.  Patient presented with approximately 10 day history of intermittent worsening gross tremor.  He was  found to have elevated lithium level. Patient presented with Lithium level 3.28 decreased to 2.37.  Patient was found to have some mild disorganization, w/some irritability and grandiosity which may be baseline.  Pt denies any S/H I/I/P and has reportedly been compliant with tx and medication. He has poor insight into psychiatric illness and reportedly has been taking poor care of his ADLs.  Collateral was obtained from home.  Plan to obtain further collateral from psychiatrist,  and family.  Would hold lithium for now, decreased Clozaril to 100 mg first night and send for Clozaril level. Psychiatry to continue to follow   Patient appears to be psychiatrically at baseline.     Plan  1) Optimize medical conditions   2) Increase Clozaril to 200 mg at night starting tonight   3) Dc lithium.  With plan to start Risperdal 1mg QHS starting tomorrow night.   4) Patient currently anxious, can start Ativan 1mg PO Q6 hours PRN   5) Will continue to follow

## 2020-12-03 NOTE — PROGRESS NOTE ADULT - ASSESSMENT
55 F admitted to medicine for Lithium toxicity, with improving clinical picture. Currently being worked-up medically for ileostomy reversal in this admission.       History of Stage 4 Colon Cancer s/p loop ileostomy  -Plan for loop ileostomy reversal and right hemicolectomy 12/7  -GI - denied c-scope possible  -psych - cleared for procedure, medications changed  - cards - RCRI of 2, elevated risk for cardiac event with intermediate to high risk procedure    Hypothyroidism  -lithium induced. Endocrine following. - now levels resolved    DM  -Lantus and lispro started, on sliding scale.     HCL  -on statin.     Lithium toxicity / tremors / weakness   -being treated by Medicine with Amiloride.   - lithium held     Schizoaffective disorder    - Psych on board. with changes to medications  - Clozapine 200, lithium changed to risperdal

## 2020-12-03 NOTE — CHART NOTE - NSCHARTNOTEFT_GEN_A_CORE
PREOPERATIVE EVALUATION    Planned Procedure: Ileostomy reversal and right hemicolectomy    PAST MEDICAL & SURGICAL HISTORY:  Colon cancer  Hypothyroidism  High cholesterol  Diabetes  Osteogenesis imperfecta  Schizo-affective psychosis  Ileostomy status  Fracture of ankle      MEDICATIONS  (STANDING):  atorvastatin 40 milliGRAM(s) Oral at bedtime  cloZAPine 200 milliGRAM(s) Oral at bedtime  dextrose 40% Gel 15 Gram(s) Oral once  dextrose 5%. 1000 milliLiter(s) (50 mL/Hr) IV Continuous <Continuous>  dextrose 5%. 1000 milliLiter(s) (100 mL/Hr) IV Continuous <Continuous>  dextrose 50% Injectable 25 Gram(s) IV Push once  dextrose 50% Injectable 12.5 Gram(s) IV Push once  dextrose 50% Injectable 25 Gram(s) IV Push once  glucagon  Injectable 1 milliGRAM(s) IntraMuscular once  insulin glargine Injectable (LANTUS) 15 Unit(s) SubCutaneous at bedtime  insulin lispro (ADMELOG) corrective regimen sliding scale   SubCutaneous three times a day before meals  levothyroxine 50 MICROGram(s) Oral daily  multiple electrolytes Injection Type 1 1000 milliLiter(s) (125 mL/Hr) IV Continuous <Continuous>  potassium chloride   Powder 40 milliEquivalent(s) Oral daily  risperiDONE   Tablet 1 milliGRAM(s) Oral at bedtime    MEDICATIONS  (PRN):  acetaminophen   Tablet .. 650 milliGRAM(s) Oral every 6 hours PRN Mild Pain (1 - 3), Moderate Pain (4 - 6)  LORazepam     Tablet 1 milliGRAM(s) Oral four times a day PRN Anxiety      Labs: Coags, Type and screen, BMP, CMP to be drawn this morning      12-03    140  |  105  |  6.0<L>  ----------------------------<  204<H>  3.2<L>   |  25.0  |  0.61    Ca    9.4      03 Dec 2020 07:17  Mg     1.8     12-03      CBC Full  -  ( 02 Dec 2020 07:50 )  WBC Count : 10.40 K/uL  RBC Count : 4.21 M/uL  Hemoglobin : 12.2 g/dL  Hematocrit : 38.5 %  Platelet Count - Automated : 162 K/uL  Mean Cell Volume : 91.4 fl  Mean Cell Hemoglobin : 29.0 pg  Mean Cell Hemoglobin Concentration : 31.7 gm/dL  Auto Neutrophil # : x  Auto Lymphocyte # : x  Auto Monocyte # : x  Auto Eosinophil # : x  Auto Basophil # : x  Auto Neutrophil % : x  Auto Lymphocyte % : x  Auto Monocyte % : x  Auto Eosinophil % : x  Auto Basophil % : x    Medical/cardiac recommendations: Cardiac states elevated risk for intermediate to high risk procedure  Sepideh prep completed    COVID-19 PCR: NotDetec (30 Nov 2020 12:26)  COVID-19 PCR: NotDetec (23 Jul 2020 12:15)      Patient is optimized to go to the operating room.

## 2020-12-03 NOTE — PROGRESS NOTE ADULT - ASSESSMENT
# Lithium toxicity / tremors / weakness   level normalized   nephrology following   EKG repeated still shows prolonged QTC   will monitor for DI , hypothyroidism   amiloride has been stopped     # schizoaffective disorder   psych on board   clozapine at home dose , Risperdal added     # hypothyroidism   lithium induced   as per medications obtained from group home , not on maintenance meds   seen by endocrine started on levothyroxine     # DM   uncontrolled   patient has not been on medications   adjust insulin for hyperglycemia   will hold off bedtime lantus as patient remains at risk of hypoglycemia if NPO for procedure     # prolonged QTC   related to lithium toxicity   magnesium supplemented   monitor EKG periodically     # Hypokalemia   oral supplementation   amiloride stopped         # DLP  on statin     #hx of colon cancer   s/p ileostomy   stage 4   supposed to follow with Dr. Diaz ,   patient to follow outpatient   ileostomy to be reversed during this hospital stay   surgery planned for tomorrow , cleared by cardio   surgery team to take over as primary tomorrow     # DVT prophylaxis  lovenox      mother was called and updated

## 2020-12-03 NOTE — PROGRESS NOTE BEHAVIORAL HEALTH - NSBHFUPINTERVALHXFT_PSY_A_CORE
Patient seen today for follow up, chart reviewed and case discussed with team    Patient seen and evaluated and found to be calm and cooperative. patient states he is doing "ok" but does feel anxious about having his procedure tomorrow. patient worried what his chances of death are and wonders if he should just "keep things the way they are" but states his family encouraged him to go through with the procedure. patient states he does not want to die but is nervous. patient reports fair sleep and appetite and denies any s/h ideation as well as any A V hallucinations.

## 2020-12-03 NOTE — PROGRESS NOTE ADULT - ASSESSMENT
55 year old male with PMH of colon cancer stage 4 status post ileostomy 6 months ago , schizoaffective disorder (on lithium) , prolonged stay at [facility] recently , DLP was sent from [facility] with c/o weakness. Pre-operative optimization for ileostomy reversal.  EKG reviewed: NSR @ 79 bpm with non specific ST-T segment changes   TTE: 12/3/2020: 1. Endocardial visualization was enhanced with intravenous echo contrast. The left atrium is normal in size. Left ventricular ejection fraction, by visual estimation, is 65 to 70%. Grade I diastolic dysfunction. The right atrium is normal in size. Normal right ventricular size and function. Mild aortic regurgitation. There is no evidence of pericardial effusion.    A/P   1. Preoperative cardiovascular optimization prior to ileostomy reversal   - patient currently denies any chest pain or shortness of breath   - blood pressure controlled and EKG showing no specific ST - t segment changes   - based on risk factors patient has a RCRI of 2 (based on intraperitoneal surgery and insulin dependent DM2) with 10.1% 30-day risk of death, MI, or cardiac arrest and thus considered elevated risk for an intermediate to high risk procedure   - TTE reviewed and shows normal LVEF with mild AI but otherwise within normal limits thus patient is optimized and can proceed with planned procedure   - close dominik-procedural monitoring     2. QTc prolongation, likely secondary to antipsychotics   - EKG shows QTC of 528 ms with no increase in QTC on serial EKG   - no prior episodes of syncope or dizziness   - Anesthetic drugs cause QTc prolongation via cardiac ion channel block or sympathetic stimulation, this is seen w/ desflurane or sevoflurane and during induction   - close intraprocedural monitoring    will sign off and reconsult PRN   Thank You   Shalonda Moy D.O.  Cardiology    55 year old male with PMH of colon cancer stage 4 status post ileostomy 6 months ago , schizoaffective disorder (on lithium) , prolonged stay at [facility] recently , DLP was sent from [facility] with c/o weakness. Pre-operative optimization for ileostomy reversal.  EKG reviewed: NSR @ 79 bpm with non specific ST-T segment changes   TTE: 12/3/2020: 1. Endocardial visualization was enhanced with intravenous echo contrast. The left atrium is normal in size. Left ventricular ejection fraction, by visual estimation, is 65 to 70%. Grade I diastolic dysfunction. The right atrium is normal in size. Normal right ventricular size and function. Mild aortic regurgitation. There is no evidence of pericardial effusion.    A/P   1. Preoperative cardiovascular optimization prior to ileostomy reversal   - patient currently denies any chest pain or shortness of breath   - blood pressure controlled and EKG showing no specific ST - t segment changes   - based on risk factors patient has a RCRI of 2 (based on intraperitoneal surgery and insulin dependent DM2) with 10.1% 30-day risk of death, MI, or cardiac arrest and thus considered elevated risk for an intermediate to high risk procedure   - TTE reviewed and shows normal LVEF with mild AI but otherwise within normal limits thus patient is optimized and can proceed with planned procedure   - close dominik-procedural monitoring     2. QTc prolongation, likely secondary to antipsychotics   - EKG shows QTC of 528 ms with no increase in QTC on serial EKG   - no prior episodes of syncope or dizziness   - Anesthetic drugs cause QTc prolongation via cardiac ion channel block or sympathetic stimulation, this is seen w/ desflurane or sevoflurane and during induction   - close intraprocedural monitoring  - monitor electrolytes keep K~ 4 and check Mag and keep ~2     will sign off and reconsult PRN   Thank You   Shalonda Moy D.O.  Cardiology

## 2020-12-03 NOTE — CHART NOTE - NSCHARTNOTEFT_GEN_A_CORE
Plan for colonoscopy with GI tomorrow. Made patient NPO, stopped lovenox (received today's dose), and ordered rectal enemas x2 tonight. Thank you Patient to be taken to OR tomorrow PM for ileostomy reversal and right hemicolectomy. Plan for barium enemas in AM Patient to be taken to OR tomorrow PM for ileostomy reversal and right hemicolectomy. Sepideh motley ordered for this PM. NPO after midnight, IVFs, gastrograffin enemas in AM.

## 2020-12-03 NOTE — PROGRESS NOTE ADULT - SUBJECTIVE AND OBJECTIVE BOX
NEPHROLOGY INTERVAL HPI/OVERNIGHT EVENTS:    Interim noted   Surgery planning OTR tomorrow PM   Reversal ileostomy and R Hemicolectomy   Meds noted , Lithium level down to 0.4     MEDICATIONS  (STANDING):  aMILoride 5 milliGRAM(s) Oral two times a day  atorvastatin 40 milliGRAM(s) Oral at bedtime  cloZAPine 150 milliGRAM(s) Oral at bedtime  dextrose 40% Gel 15 Gram(s) Oral once  dextrose 5%. 1000 milliLiter(s) (50 mL/Hr) IV Continuous <Continuous>  dextrose 5%. 1000 milliLiter(s) (100 mL/Hr) IV Continuous <Continuous>  dextrose 50% Injectable 25 Gram(s) IV Push once  dextrose 50% Injectable 12.5 Gram(s) IV Push once  dextrose 50% Injectable 25 Gram(s) IV Push once  erythromycin     enteric coated 1000 milliGRAM(s) Oral <User Schedule>  glucagon  Injectable 1 milliGRAM(s) IntraMuscular once  insulin glargine Injectable (LANTUS) 10 Unit(s) SubCutaneous at bedtime  insulin glargine Injectable (LANTUS) 10 Unit(s) SubCutaneous every morning  insulin lispro (ADMELOG) corrective regimen sliding scale   SubCutaneous three times a day before meals  insulin lispro Injectable (ADMELOG) 13 Unit(s) SubCutaneous three times a day before meals  levothyroxine 50 MICROGram(s) Oral daily  multiple electrolytes Injection Type 1 1000 milliLiter(s) (125 mL/Hr) IV Continuous <Continuous>  neomycin 1000 milliGRAM(s) Oral <User Schedule>  potassium chloride    Tablet ER 40 milliEquivalent(s) Oral every 4 hours  potassium chloride  20 mEq/100 mL IVPB 20 milliEquivalent(s) IV Intermittent every 2 hours    MEDICATIONS  (PRN):  acetaminophen   Tablet .. 650 milliGRAM(s) Oral every 6 hours PRN Mild Pain (1 - 3), Moderate Pain (4 - 6)      Allergies    amoxicillin (Unknown)  Lamictal (Unknown)  penicillin (Unknown)  Tegretol (Unknown)  Zetia (Unknown)    Intolerances        Vital Signs Last 24 Hrs  T(C): 36.9 (03 Dec 2020 11:16), Max: 36.9 (02 Dec 2020 15:14)  T(F): 98.4 (03 Dec 2020 11:16), Max: 98.4 (02 Dec 2020 15:14)  HR: 88 (03 Dec 2020 11:16) (78 - 97)  BP: 104/68 (03 Dec 2020 11:16) (104/68 - 131/78)  BP(mean): --  RR: 20 (03 Dec 2020 11:16) (19 - 20)  SpO2: 96% (03 Dec 2020 11:16) (93% - 100%)  Daily     Daily   I&O's Detail    I&O's Summary        GENERAL: Comfortable  ENMT: Moist mucous membranes  NECK: Supple, neck  veins wnl  NERVOUS SYSTEM:  Alert & alert; non focal; no tremors  CHEST/LUNG: Clear bilaterally  HEART: Regular rate and rhythm; No rub  ABDOMEN: Soft, Nontender, Nondistended, BS+; R ostomy      LABS:                        12.2   10.40 )-----------( 162      ( 02 Dec 2020 07:50 )             38.5     12-03    140  |  105  |  6.0<L>  ----------------------------<  204<H>  3.2<L>   |  25.0  |  0.61    Ca    9.4      03 Dec 2020 07:17  Mg     1.8     12-03          Magnesium, Serum: 1.8 mg/dL (12-03 @ 07:17)          RADIOLOGY & ADDITIONAL TESTS:

## 2020-12-03 NOTE — PROGRESS NOTE ADULT - PROBLEM SELECTOR PLAN 1
Unable to perform colonoscopy as oral preparation will not reach the colon since patient has an ileostomy, making ileostomy the exit for prep.

## 2020-12-03 NOTE — PHARMACOTHERAPY INTERVENTION NOTE - COMMENTS
Recommended to start daily dose of  KCL from tomorrow, ( K 3.2).Patient receiving total of 60 meq KCL as ivpb and 40 meq oral x 2doses today.

## 2020-12-03 NOTE — PROGRESS NOTE ADULT - SUBJECTIVE AND OBJECTIVE BOX
[place] [facility]                                                               Office: [address] [address]                                                              Telephone: [phone]. Fax:[phone]                                                                             PROGRESS NOTE  Reason for follow up: Preoperative cardiovascular clearance   Overnight: No new events.   Update:   No acute events   not on heart monitor   Subjective: "  ______________________"      	  Vitals:  T(C): 36.8 (12-03-20 @ 15:23), Max: 36.9 (12-02-20 @ 19:11)  HR: 86 (12-03-20 @ 15:23) (78 - 97)  BP: 118/83 (12-03-20 @ 15:23) (104/68 - 131/78)  RR: 20 (12-03-20 @ 15:23) (19 - 20)  SpO2: 98% (12-03-20 @ 15:23) (93% - 98%)  Wt(kg): --  I&O's Summary    Weight (kg): 74.8 (11-30 @ 11:01)      PHYSICAL EXAM:  Appearance: Comfortable. No acute distress  HEENT:  Head and neck: Atraumatic. Normocephalic.  Normal oral mucosa, PERRL, Neck is supple. No JVD, No carotid bruit.   Neurologic: A & O x 3, no focal deficits. EOMI.  Lymphatic: No cervical lymphadenopathy  Cardiovascular: Normal S1 S2, No murmur, rubs/gallops. No JVD, No edema  Respiratory: Lungs clear to auscultation  Gastrointestinal:  Soft, Non-tender, + BS  Lower Extremities: No edema  Psychiatry: Patient is calm. No agitation. Mood & affect appropriate  Skin: No rashes/ ecchymoses/cyanosis/ulcers visualized on the face, hands or feet.      CURRENT MEDICATIONS:  aMILoride 5 milliGRAM(s) Oral two times a day    erythromycin     enteric coated  neomycin  cloZAPine  atorvastatin  dextrose 40% Gel  dextrose 50% Injectable  dextrose 50% Injectable  dextrose 50% Injectable  glucagon  Injectable  insulin glargine Injectable (LANTUS)  insulin glargine Injectable (LANTUS)  insulin lispro (ADMELOG) corrective regimen sliding scale  insulin lispro Injectable (ADMELOG)  levothyroxine  dextrose 5%.  dextrose 5%.  multiple electrolytes Injection Type 1      DIAGNOSTIC TESTING:  [ x] Echocardiogram:   < from: TTE Echo Complete w/o Contrast w/ Doppler (12.03.20 @ 14:06) >    Summary:   1. Endocardial visualization was enhanced with intravenous echo contrast.   2. The left atrium is normal in size.   3. Left ventricular ejection fraction, by visual estimation, is 65 to 70%. Grade I diastolic dysfunction.   4. The right atrium is normal in size.   5. Normal right ventricular size and function.   6. Mild aortic regurgitation.   7. There is no evidence of pericardial effusion.    MD Cortez, RPVI Electronically signed on 12/3/2020 at 3:50:48 PM    < end of copied text >    [ ]  Catheterization:  [ ] Stress Test:    OTHER: 	      LABS:	 	                            12.2   10.40 )-----------( 162      ( 02 Dec 2020 07:50 )             38.5     12-03    140  |  105  |  6.0<L>  ----------------------------<  204<H>  3.2<L>   |  25.0  |  0.61    Ca    9.4      03 Dec 2020 07:17  Mg     1.8     12-03      proBNP:   Lipid Profile:   HgA1c:   TSH: Thyroid Stimulating Hormone, Serum: 19.18 uIU/mL  Thyroid Stimulating Hormone, Serum: 12.63 uIU/mL        TELEMETRY: Reviewed  NOT on telemetry monitoring   ECG:  Reviewed by me.

## 2020-12-03 NOTE — PROGRESS NOTE BEHAVIORAL HEALTH - NSBHCONSULTFOLLOWDETAILS_PSY_A_CORE FT
Left message for outpatient provider

## 2020-12-03 NOTE — PROGRESS NOTE ADULT - SUBJECTIVE AND OBJECTIVE BOX
Progress Note    S: Pt seen and examined at bedside. Pt reports no acute complaints. Tremors slowly lessening. Denies n/v/f/SOB/CP.     MEDICATIONS  (STANDING):  aMILoride 5 milliGRAM(s) Oral two times a day  atorvastatin 40 milliGRAM(s) Oral at bedtime  cloZAPine 150 milliGRAM(s) Oral at bedtime  dextrose 40% Gel 15 Gram(s) Oral once  dextrose 5%. 1000 milliLiter(s) (50 mL/Hr) IV Continuous <Continuous>  dextrose 5%. 1000 milliLiter(s) (100 mL/Hr) IV Continuous <Continuous>  dextrose 50% Injectable 25 Gram(s) IV Push once  dextrose 50% Injectable 12.5 Gram(s) IV Push once  dextrose 50% Injectable 25 Gram(s) IV Push once  enoxaparin Injectable 40 milliGRAM(s) SubCutaneous daily  glucagon  Injectable 1 milliGRAM(s) IntraMuscular once  insulin glargine Injectable (LANTUS) 10 Unit(s) SubCutaneous at bedtime  insulin glargine Injectable (LANTUS) 10 Unit(s) SubCutaneous every morning  insulin lispro (ADMELOG) corrective regimen sliding scale   SubCutaneous three times a day before meals  insulin lispro Injectable (ADMELOG) 13 Unit(s) SubCutaneous three times a day before meals  levothyroxine 50 MICROGram(s) Oral daily  sodium chloride 0.9%. 1000 milliLiter(s) (150 mL/Hr) IV Continuous <Continuous>    MEDICATIONS  (PRN):  acetaminophen   Tablet .. 650 milliGRAM(s) Oral every 6 hours PRN Mild Pain (1 - 3), Moderate Pain (4 - 6)      Vital Signs Last 24 Hrs  T(C): 36.8 (02 Dec 2020 23:34), Max: 36.9 (02 Dec 2020 11:20)  T(F): 98.3 (02 Dec 2020 23:34), Max: 98.4 (02 Dec 2020 11:20)  HR: 87 (02 Dec 2020 23:34) (73 - 97)  BP: 119/78 (02 Dec 2020 23:34) (100/60 - 131/78)  BP(mean): --  RR: 20 (02 Dec 2020 23:34) (19 - 20)  SpO2: 96% (02 Dec 2020 23:34) (93% - 100%)    Physical Exam:   GEN: NAD, sleeping  CHEST: Symmetrical chest rise, breath sounds CTAB  HEART: RRR  ABD: Soft, non-tender, non-distended. Ileostomy in the R abdomen, mucosa is pink, no bleeding, stool and air in the bag. no rebound, guarding, or rigidity.        I&O's Detail      LABS:                        12.2   10.40 )-----------( 162      ( 02 Dec 2020 07:50 )             38.5     12-02    135  |  109<H>  |  7.0<L>  ----------------------------<  230<H>  3.8   |  21.0<L>  |  0.67    Ca    8.7      02 Dec 2020 07:50  Mg     1.8     12-01            RADIOLOGY & ADDITIONAL STUDIES:

## 2020-12-03 NOTE — PROGRESS NOTE ADULT - SUBJECTIVE AND OBJECTIVE BOX
Chief Complaint: This is a 55y old man patient being seen in follow-up consultation for colonoscopy.    HPI / 24H events:  Patient seeing and evaluated at bed, reporting no complains, tolerating oral intake, ileostomy with brown liquid stool. Denies nausea, vomiting, abdominal pain, chest pain, shortness of breath, hematemesis, hematochezia, melena.    ROS: A 14-point review of systems was reviewed and was otherwise negative save what was reported in the HPI.    PAST MEDICAL/SURGICAL HISTORY:  Colon cancer    Hypothyroidism    High cholesterol    Diabetes    Osteogenesis imperfecta    Schizo-affective psychosis    Ileostomy status    Fracture of ankle    MEDICATIONS  (STANDING):  aMILoride 5 milliGRAM(s) Oral two times a day  atorvastatin 40 milliGRAM(s) Oral at bedtime  cloZAPine 150 milliGRAM(s) Oral at bedtime  dextrose 40% Gel 15 Gram(s) Oral once  dextrose 5%. 1000 milliLiter(s) (50 mL/Hr) IV Continuous <Continuous>  dextrose 5%. 1000 milliLiter(s) (100 mL/Hr) IV Continuous <Continuous>  dextrose 50% Injectable 25 Gram(s) IV Push once  dextrose 50% Injectable 12.5 Gram(s) IV Push once  dextrose 50% Injectable 25 Gram(s) IV Push once  enoxaparin Injectable 40 milliGRAM(s) SubCutaneous daily  glucagon  Injectable 1 milliGRAM(s) IntraMuscular once  insulin glargine Injectable (LANTUS) 10 Unit(s) SubCutaneous at bedtime  insulin glargine Injectable (LANTUS) 10 Unit(s) SubCutaneous every morning  insulin lispro (ADMELOG) corrective regimen sliding scale   SubCutaneous three times a day before meals  insulin lispro Injectable (ADMELOG) 13 Unit(s) SubCutaneous three times a day before meals  levothyroxine 50 MICROGram(s) Oral daily  potassium chloride    Tablet ER 40 milliEquivalent(s) Oral every 4 hours  potassium chloride  20 mEq/100 mL IVPB 20 milliEquivalent(s) IV Intermittent every 2 hours  sodium chloride 0.9%. 1000 milliLiter(s) (150 mL/Hr) IV Continuous <Continuous>    MEDICATIONS  (PRN):  acetaminophen   Tablet .. 650 milliGRAM(s) Oral every 6 hours PRN Mild Pain (1 - 3), Moderate Pain (4 - 6)    amoxicillin (Unknown)  Lamictal (Unknown)  penicillin (Unknown)  Tegretol (Unknown)  Zetia (Unknown)    T(C): 36.6 (12-03-20 @ 07:49), Max: 36.9 (12-02-20 @ 11:20)  HR: 78 (12-03-20 @ 07:49) (78 - 97)  BP: 128/86 (12-03-20 @ 07:49) (110/65 - 131/78)  RR: 20 (12-03-20 @ 07:49) (19 - 20)  SpO2: 98% (12-03-20 @ 07:49) (93% - 100%)    I&O's Summary    PHYSICAL EXAM:  Constitutional: Morbidly obese, afebrile, in no apparent distress  Eyes: Sclerae anicteric, conjunctivae normal  ENMT: Mucus membranes moist, no oropharyngeal thrush noted  Neck: No thyroid nodules appreciated, no significant cervical or supraclavicular lymphadenopathy  Respiratory: Breathing nonlabored; clear to auscultation  Cardiovascular: Regular rate and rhythm  Gastrointestinal: Soft, nontender, nondistended, normoactive bowel sounds, ileostomy with liquid brown stool.  Extremities: No clubbing, cyanosis or edema  Skin: No jaundice  Psychiatric: Affect and mood appropriate                   12.2   10.40 )-----------( 162      ( 12-02 @ 07:50 )             38.5                12.4   12.25 )-----------( 167      ( 12-01 @ 04:07 )             37.1                16.2   14.87 )-----------( 236      ( 11-30 @ 12:32 )             48.4       12-03    140  |  105  |  6.0<L>  ----------------------------<  204<H>  3.2<L>   |  25.0  |  0.61    Ca    9.4      03 Dec 2020 07:17  Mg     1.8     12-03                IMAGING: I personally reviewed the [XXXXXX], and I agree with the radiologist's interpretation as described below:

## 2020-12-04 ENCOUNTER — APPOINTMENT (OUTPATIENT)
Dept: SURGICAL ONCOLOGY | Facility: HOSPITAL | Age: 55
End: 2020-12-04

## 2020-12-04 ENCOUNTER — OUTPATIENT (OUTPATIENT)
Dept: OUTPATIENT SERVICES | Facility: HOSPITAL | Age: 55
LOS: 1 days | Discharge: HOME | End: 2020-12-04

## 2020-12-04 ENCOUNTER — RESULT REVIEW (OUTPATIENT)
Age: 55
End: 2020-12-04

## 2020-12-04 DIAGNOSIS — Z93.2 ILEOSTOMY STATUS: Chronic | ICD-10-CM

## 2020-12-04 DIAGNOSIS — C18.9 MALIGNANT NEOPLASM OF COLON, UNSPECIFIED: ICD-10-CM

## 2020-12-04 DIAGNOSIS — S82.899A OTHER FRACTURE OF UNSPECIFIED LOWER LEG, INITIAL ENCOUNTER FOR CLOSED FRACTURE: Chronic | ICD-10-CM

## 2020-12-04 LAB
ANION GAP SERPL CALC-SCNC: 14 MMOL/L — SIGNIFICANT CHANGE UP (ref 5–17)
BASOPHILS # BLD AUTO: 0.07 K/UL — SIGNIFICANT CHANGE UP (ref 0–0.2)
BASOPHILS NFR BLD AUTO: 0.6 % — SIGNIFICANT CHANGE UP (ref 0–2)
BLD GP AB SCN SERPL QL: SIGNIFICANT CHANGE UP
BUN SERPL-MCNC: 5 MG/DL — LOW (ref 8–20)
CALCIUM SERPL-MCNC: 9.6 MG/DL — SIGNIFICANT CHANGE UP (ref 8.6–10.2)
CHLORIDE SERPL-SCNC: 99 MMOL/L — SIGNIFICANT CHANGE UP (ref 98–107)
CO2 SERPL-SCNC: 22 MMOL/L — SIGNIFICANT CHANGE UP (ref 22–29)
CREAT SERPL-MCNC: 0.64 MG/DL — SIGNIFICANT CHANGE UP (ref 0.5–1.3)
EOSINOPHIL # BLD AUTO: 0.2 K/UL — SIGNIFICANT CHANGE UP (ref 0–0.5)
EOSINOPHIL NFR BLD AUTO: 1.7 % — SIGNIFICANT CHANGE UP (ref 0–6)
GLUCOSE BLDC GLUCOMTR-MCNC: 219 MG/DL — HIGH (ref 70–99)
GLUCOSE BLDC GLUCOMTR-MCNC: 230 MG/DL — HIGH (ref 70–99)
GLUCOSE BLDC GLUCOMTR-MCNC: 239 MG/DL — HIGH (ref 70–99)
GLUCOSE BLDC GLUCOMTR-MCNC: 255 MG/DL — HIGH (ref 70–99)
GLUCOSE BLDC GLUCOMTR-MCNC: 268 MG/DL — HIGH (ref 70–99)
GLUCOSE BLDC GLUCOMTR-MCNC: 284 MG/DL — HIGH (ref 70–99)
GLUCOSE BLDC GLUCOMTR-MCNC: 305 MG/DL — HIGH (ref 70–99)
GLUCOSE SERPL-MCNC: 224 MG/DL — HIGH (ref 70–99)
HCT VFR BLD CALC: 37.8 % — LOW (ref 39–50)
HCT VFR BLD CALC: 44.1 % — SIGNIFICANT CHANGE UP (ref 39–50)
HGB BLD-MCNC: 12.8 G/DL — LOW (ref 13–17)
HGB BLD-MCNC: 14.6 G/DL — SIGNIFICANT CHANGE UP (ref 13–17)
IMM GRANULOCYTES NFR BLD AUTO: 0.4 % — SIGNIFICANT CHANGE UP (ref 0–1.5)
INR BLD: 1.07 RATIO — SIGNIFICANT CHANGE UP (ref 0.88–1.16)
LYMPHOCYTES # BLD AUTO: 18.7 % — SIGNIFICANT CHANGE UP (ref 13–44)
LYMPHOCYTES # BLD AUTO: 2.15 K/UL — SIGNIFICANT CHANGE UP (ref 1–3.3)
MAGNESIUM SERPL-MCNC: 1.7 MG/DL — SIGNIFICANT CHANGE UP (ref 1.6–2.6)
MCHC RBC-ENTMCNC: 29.6 PG — SIGNIFICANT CHANGE UP (ref 27–34)
MCHC RBC-ENTMCNC: 30 PG — SIGNIFICANT CHANGE UP (ref 27–34)
MCHC RBC-ENTMCNC: 33.1 GM/DL — SIGNIFICANT CHANGE UP (ref 32–36)
MCHC RBC-ENTMCNC: 33.9 GM/DL — SIGNIFICANT CHANGE UP (ref 32–36)
MCV RBC AUTO: 88.7 FL — SIGNIFICANT CHANGE UP (ref 80–100)
MCV RBC AUTO: 89.3 FL — SIGNIFICANT CHANGE UP (ref 80–100)
MONOCYTES # BLD AUTO: 0.73 K/UL — SIGNIFICANT CHANGE UP (ref 0–0.9)
MONOCYTES NFR BLD AUTO: 6.4 % — SIGNIFICANT CHANGE UP (ref 2–14)
NEUTROPHILS # BLD AUTO: 8.29 K/UL — HIGH (ref 1.8–7.4)
NEUTROPHILS NFR BLD AUTO: 72.2 % — SIGNIFICANT CHANGE UP (ref 43–77)
PHOSPHATE SERPL-MCNC: 3.3 MG/DL — SIGNIFICANT CHANGE UP (ref 2.4–4.7)
PLATELET # BLD AUTO: 188 K/UL — SIGNIFICANT CHANGE UP (ref 150–400)
PLATELET # BLD AUTO: 193 K/UL — SIGNIFICANT CHANGE UP (ref 150–400)
POTASSIUM SERPL-MCNC: 3.8 MMOL/L — SIGNIFICANT CHANGE UP (ref 3.5–5.3)
POTASSIUM SERPL-SCNC: 3.8 MMOL/L — SIGNIFICANT CHANGE UP (ref 3.5–5.3)
PROTHROM AB SERPL-ACNC: 12.4 SEC — SIGNIFICANT CHANGE UP (ref 10.6–13.6)
RBC # BLD: 4.26 M/UL — SIGNIFICANT CHANGE UP (ref 4.2–5.8)
RBC # BLD: 4.94 M/UL — SIGNIFICANT CHANGE UP (ref 4.2–5.8)
RBC # FLD: 13 % — SIGNIFICANT CHANGE UP (ref 10.3–14.5)
RBC # FLD: 13 % — SIGNIFICANT CHANGE UP (ref 10.3–14.5)
SODIUM SERPL-SCNC: 134 MMOL/L — LOW (ref 135–145)
WBC # BLD: 11.49 K/UL — HIGH (ref 3.8–10.5)
WBC # BLD: 20.81 K/UL — HIGH (ref 3.8–10.5)
WBC # FLD AUTO: 11.49 K/UL — HIGH (ref 3.8–10.5)
WBC # FLD AUTO: 20.81 K/UL — HIGH (ref 3.8–10.5)

## 2020-12-04 PROCEDURE — 99232 SBSQ HOSP IP/OBS MODERATE 35: CPT

## 2020-12-04 PROCEDURE — 88304 TISSUE EXAM BY PATHOLOGIST: CPT | Mod: 26

## 2020-12-04 PROCEDURE — 44125 REMOVAL OF SMALL INTESTINE: CPT

## 2020-12-04 PROCEDURE — 99233 SBSQ HOSP IP/OBS HIGH 50: CPT

## 2020-12-04 PROCEDURE — 88311 DECALCIFY TISSUE: CPT | Mod: 26

## 2020-12-04 PROCEDURE — 44140 PARTIAL REMOVAL OF COLON: CPT

## 2020-12-04 PROCEDURE — 88341 IMHCHEM/IMCYTCHM EA ADD ANTB: CPT | Mod: 26

## 2020-12-04 PROCEDURE — 88309 TISSUE EXAM BY PATHOLOGIST: CPT | Mod: 26

## 2020-12-04 PROCEDURE — 88342 IMHCHEM/IMCYTCHM 1ST ANTB: CPT | Mod: 26

## 2020-12-04 RX ORDER — SODIUM CHLORIDE 9 MG/ML
1000 INJECTION, SOLUTION INTRAVENOUS
Refills: 0 | Status: DISCONTINUED | OUTPATIENT
Start: 2020-12-04 | End: 2020-12-05

## 2020-12-04 RX ORDER — DEXAMETHASONE 0.5 MG/5ML
8 ELIXIR ORAL ONCE
Refills: 0 | Status: COMPLETED | OUTPATIENT
Start: 2020-12-04 | End: 2020-12-06

## 2020-12-04 RX ORDER — DEXTROSE 50 % IN WATER 50 %
25 SYRINGE (ML) INTRAVENOUS ONCE
Refills: 0 | Status: DISCONTINUED | OUTPATIENT
Start: 2020-12-04 | End: 2020-12-21

## 2020-12-04 RX ORDER — INSULIN GLARGINE 100 [IU]/ML
10 INJECTION, SOLUTION SUBCUTANEOUS EVERY MORNING
Refills: 0 | Status: DISCONTINUED | OUTPATIENT
Start: 2020-12-04 | End: 2020-12-05

## 2020-12-04 RX ORDER — CEFOTETAN DISODIUM 1 G
2 VIAL (EA) INJECTION EVERY 12 HOURS
Refills: 0 | Status: COMPLETED | OUTPATIENT
Start: 2020-12-04 | End: 2020-12-09

## 2020-12-04 RX ORDER — INSULIN LISPRO 100/ML
4 VIAL (ML) SUBCUTANEOUS ONCE
Refills: 0 | Status: COMPLETED | OUTPATIENT
Start: 2020-12-04 | End: 2020-12-04

## 2020-12-04 RX ORDER — CLOZAPINE 150 MG/1
200 TABLET, ORALLY DISINTEGRATING ORAL AT BEDTIME
Refills: 0 | Status: DISCONTINUED | OUTPATIENT
Start: 2020-12-04 | End: 2020-12-21

## 2020-12-04 RX ORDER — FENTANYL CITRATE 50 UG/ML
25 INJECTION INTRAVENOUS
Refills: 0 | Status: DISCONTINUED | OUTPATIENT
Start: 2020-12-04 | End: 2020-12-05

## 2020-12-04 RX ORDER — CHLORHEXIDINE GLUCONATE 213 G/1000ML
1 SOLUTION TOPICAL DAILY
Refills: 0 | Status: DISCONTINUED | OUTPATIENT
Start: 2020-12-04 | End: 2020-12-06

## 2020-12-04 RX ORDER — LEVOTHYROXINE SODIUM 125 MCG
50 TABLET ORAL DAILY
Refills: 0 | Status: DISCONTINUED | OUTPATIENT
Start: 2020-12-04 | End: 2020-12-06

## 2020-12-04 RX ORDER — RISPERIDONE 4 MG/1
1 TABLET ORAL AT BEDTIME
Refills: 0 | Status: DISCONTINUED | OUTPATIENT
Start: 2020-12-04 | End: 2020-12-21

## 2020-12-04 RX ORDER — INSULIN LISPRO 100/ML
VIAL (ML) SUBCUTANEOUS
Refills: 0 | Status: DISCONTINUED | OUTPATIENT
Start: 2020-12-04 | End: 2020-12-05

## 2020-12-04 RX ORDER — SODIUM CHLORIDE 9 MG/ML
1000 INJECTION, SOLUTION INTRAVENOUS
Refills: 0 | Status: DISCONTINUED | OUTPATIENT
Start: 2020-12-04 | End: 2020-12-21

## 2020-12-04 RX ORDER — INSULIN GLARGINE 100 [IU]/ML
15 INJECTION, SOLUTION SUBCUTANEOUS AT BEDTIME
Refills: 0 | Status: DISCONTINUED | OUTPATIENT
Start: 2020-12-04 | End: 2020-12-05

## 2020-12-04 RX ORDER — GLUCAGON INJECTION, SOLUTION 0.5 MG/.1ML
1 INJECTION, SOLUTION SUBCUTANEOUS ONCE
Refills: 0 | Status: DISCONTINUED | OUTPATIENT
Start: 2020-12-04 | End: 2020-12-21

## 2020-12-04 RX ORDER — ACETAMINOPHEN 500 MG
650 TABLET ORAL EVERY 6 HOURS
Refills: 0 | Status: DISCONTINUED | OUTPATIENT
Start: 2020-12-04 | End: 2020-12-05

## 2020-12-04 RX ORDER — CHLORHEXIDINE GLUCONATE 213 G/1000ML
1 SOLUTION TOPICAL DAILY
Refills: 0 | Status: DISCONTINUED | OUTPATIENT
Start: 2020-12-04 | End: 2020-12-04

## 2020-12-04 RX ORDER — ONDANSETRON 8 MG/1
4 TABLET, FILM COATED ORAL ONCE
Refills: 0 | Status: DISCONTINUED | OUTPATIENT
Start: 2020-12-04 | End: 2020-12-21

## 2020-12-04 RX ORDER — SODIUM CHLORIDE 9 MG/ML
1000 INJECTION INTRAMUSCULAR; INTRAVENOUS; SUBCUTANEOUS
Refills: 0 | Status: DISCONTINUED | OUTPATIENT
Start: 2020-12-04 | End: 2020-12-04

## 2020-12-04 RX ORDER — HYDROMORPHONE HYDROCHLORIDE 2 MG/ML
1 INJECTION INTRAMUSCULAR; INTRAVENOUS; SUBCUTANEOUS
Refills: 0 | Status: DISCONTINUED | OUTPATIENT
Start: 2020-12-04 | End: 2020-12-05

## 2020-12-04 RX ORDER — DEXTROSE 50 % IN WATER 50 %
15 SYRINGE (ML) INTRAVENOUS ONCE
Refills: 0 | Status: DISCONTINUED | OUTPATIENT
Start: 2020-12-04 | End: 2020-12-21

## 2020-12-04 RX ORDER — DEXTROSE 50 % IN WATER 50 %
12.5 SYRINGE (ML) INTRAVENOUS ONCE
Refills: 0 | Status: DISCONTINUED | OUTPATIENT
Start: 2020-12-04 | End: 2020-12-21

## 2020-12-04 RX ORDER — INSULIN LISPRO 100/ML
18 VIAL (ML) SUBCUTANEOUS
Refills: 0 | Status: DISCONTINUED | OUTPATIENT
Start: 2020-12-04 | End: 2020-12-14

## 2020-12-04 RX ADMIN — INSULIN GLARGINE 15 UNIT(S): 100 INJECTION, SOLUTION SUBCUTANEOUS at 22:42

## 2020-12-04 RX ADMIN — Medication 50 MICROGRAM(S): at 06:51

## 2020-12-04 RX ADMIN — RISPERIDONE 1 MILLIGRAM(S): 4 TABLET ORAL at 22:39

## 2020-12-04 RX ADMIN — SODIUM CHLORIDE 125 MILLILITER(S): 9 INJECTION INTRAMUSCULAR; INTRAVENOUS; SUBCUTANEOUS at 22:48

## 2020-12-04 RX ADMIN — CLOZAPINE 200 MILLIGRAM(S): 150 TABLET, ORALLY DISINTEGRATING ORAL at 22:39

## 2020-12-04 RX ADMIN — Medication 4 UNIT(S): at 12:44

## 2020-12-04 RX ADMIN — SODIUM CHLORIDE 125 MILLILITER(S): 9 INJECTION, SOLUTION INTRAVENOUS at 00:13

## 2020-12-04 RX ADMIN — Medication 3: at 18:02

## 2020-12-04 RX ADMIN — CHLORHEXIDINE GLUCONATE 1 APPLICATION(S): 213 SOLUTION TOPICAL at 11:40

## 2020-12-04 NOTE — PROGRESS NOTE ADULT - ASSESSMENT
# Lithium toxicity / tremors / weakness   level normalized   nephrology following   EKG repeated still shows prolonged QTC   will monitor for DI , hypothyroidism   amiloride has been stopped     # schizoaffective disorder   psych on board   clozapine at home dose , Risperdal added     # hypothyroidism   lithium induced   as per medications obtained from group home , not on maintenance meds   seen by endocrine started on levothyroxine     # DM   uncontrolled   patient has not been on medications   adjust insulin for hyperglycemia   will hold off bedtime lantus as patient remains at risk of hypoglycemia if NPO for procedure     # prolonged QTC   related to lithium toxicity   magnesium supplemented   monitor EKG periodically     # Hypokalemia   oral supplementation   amiloride stopped         # DLP  on statin     #hx of colon cancer   s/p ileostomy   stage 4   supposed to follow with Dr. Diaz ,   patient to follow outpatient   ileostomy to be reversed during this hospital stay   surgery planned for tomorrow , cleared by cardio   surgery team to take over as primary tomorrow     # DVT prophylaxis  lovenox      patient care will be assumed by surgery team , discussed with resident . medicine to sign off

## 2020-12-04 NOTE — PROGRESS NOTE ADULT - ASSESSMENT
55 F admitted to medicine for Lithium toxicity, improving. Also with Stage IV colon cancer. Medicine primary team    History of Stage 4 Colon Cancer s/p loop ileostomy  -Prepped for Ileostomy reversal and right hemicolectomy: NPO and Normosol @ 125  -psych - cleared for procedure, medications changed  - cards - RCRI of 2, elevated risk for cardiac event with intermediate to high risk procedure    Hypothyroidism  -lithium induced. Endocrine following  - Synthroid 50mcg daily    DM  - on AM and PM lantus, as well as 18U premeal and sliding scale  - holding AM dose of lantus and premeal insulin prior to OR    HCL  -on statin.     Lithium toxicity / tremors / weakness   - Lithium level 3.28 on admission, most recently 0.47 yesterday am  - lithium held     Schizoaffective disorder    - Psych on board. with changes to medications  - Clozapine 200, lithium changed to risperdal

## 2020-12-04 NOTE — PROGRESS NOTE ADULT - ATTENDING COMMENTS
Patient seen and examined. I had a lengthy discussion with both he and his mother regarding the plan for today. On-table proctosigmoidoscopy to r/o distal obstruction, followed by laparoscopic-assisted (possible open) partial colectomy and reversal of ileostomy. Multiple attendings including myself and Nick's mother are in agreement that he has capacity for medical decision-making at this time.     Risks explained in detail, including but not exclusive of bleeding, infection, anastomotic dehiscence, cardio-pulmonary complications.

## 2020-12-04 NOTE — BRIEF OPERATIVE NOTE - OPERATION/FINDINGS
laparoscopic attempted converted to open partial colectomy. extremely friable tissue. abdomen washed out with 10L of saline. laparoscopic attempted converted to open partial colectomy with primary stapled ileocolic anastomosis, extremely friable tissue. abdomen washed out with 10L of saline. laparoscopic converted to open cecectomy with primary stapled ileocolic anastomosis, extremely friable tissue. abdomen washed out with 10L of saline.

## 2020-12-04 NOTE — PROGRESS NOTE ADULT - SUBJECTIVE AND OBJECTIVE BOX
Post Operative Check    Patient is post op from a lap converted to open reversal of ileostomy and partial colectomy, along with a peristomal hernia repair. Patient is recovering well and reports mild pain that is well controlled. Patient denies any n/v/f/c. currently NPO with sips. Patient reports that he's SOB, denies CP and is currently on 2L NC. Patient has a edwards in place.      Vitals    T(C): 36.3 (12-04-20 @ 22:16), Max: 36.8 (12-04-20 @ 17:18)  HR: 86 (12-04-20 @ 22:16) (54 - 99)  BP: 100/64 (12-04-20 @ 22:16) (86/55 - 124/81)  RR: 18 (12-04-20 @ 22:16) (16 - 25)  SpO2: 98% (12-04-20 @ 22:16) (96% - 100%)  Wt(kg): --      12-04 @ 07:01  -  12-04 @ 23:18  --------------------------------------------------------  IN:  Total IN: 0 mL    OUT:    Indwelling Catheter - Urethral (mL): 450 mL  Total OUT: 450 mL    Total NET: -450 mL      Labs                        12.8   20.81 )-----------( 193      ( 04 Dec 2020 19:53 )             37.8       CBC Full  -  ( 04 Dec 2020 19:53 )  WBC Count : 20.81 K/uL  Hemoglobin : 12.8 g/dL  Hematocrit : 37.8 %  Platelet Count - Automated : 193 K/uL  Mean Cell Volume : 88.7 fl  Mean Cell Hemoglobin : 30.0 pg  Mean Cell Hemoglobin Concentration : 33.9 gm/dL    Physical Exam  General: NAD AAOx3   Cards: RRR S1S2  Resp: CTAB, on 2L NC  Abdomen: soft, mildly tender, mildly distended; ISMA in place, ileostomy site dressing moderately saturated with serous fluid  : edwards in place  Ext: NTBL    Patient is a 55y old Male s/p reversal of loop ileostomy and partial colectomy who is recovering well, with mild pain and complaining of some shortness of breath.    #stage IV colon Ca w/peritoneal carcinomatosis  - WILY  - ISMA in place  - daily dressing changes to ileostomy site    #shortness of breath  - 2L NC    Hypothyroidism  -lithium induced. Endocrine following  - Synthroid 50mcg daily    DM  - restarted AM and PM lantus, as well as 18U premeal and sliding scale    #HCL  -on statin.     #lithium toxicity  - resolved, patient not on medication anymore    Schizoaffective disorder    - Psych on board. with changes to medications  - Clozapine 200  - risperdal 1mg daily

## 2020-12-04 NOTE — PROGRESS NOTE ADULT - SUBJECTIVE AND OBJECTIVE BOX
CC: lithium toxicity     INTERVAL HPI/OVERNIGHT EVENTS: seen and examined , feels well, anxious about getting surgery done   denies SOB , no chest pain   his care will be transferred to surgery team           Vital Signs Last 24 Hrs  T(C): 36.7 (04 Dec 2020 07:55), Max: 36.8 (03 Dec 2020 15:23)  T(F): 98 (04 Dec 2020 07:55), Max: 98.3 (03 Dec 2020 15:23)  HR: 99 (04 Dec 2020 07:55) (82 - 99)  BP: 121/85 (04 Dec 2020 07:55) (108/73 - 124/83)  BP(mean): --  RR: 19 (04 Dec 2020 07:55) (17 - 20)  SpO2: 96% (04 Dec 2020 07:55) (96% - 98%)    PHYSICAL EXAM:    GENERAL: NAD, resting comfortable in bed   CHEST/LUNG: CTAB , no wheezing , rales, rhonchi heard   HEART: S1S2+, Regular rate and rhythm; No murmurs, rubs, or gallops  ABDOMEN: Soft, Nontender, ileostomy in place   EXTREMITIES:  no pitting edema , pulses palpated BL.        LABS:                        14.6   11.49 )-----------( 188      ( 04 Dec 2020 08:04 )             44.1     12-04    134<L>  |  99  |  5.0<L>  ----------------------------<  224<H>  3.8   |  22.0  |  0.64    Ca    9.6      04 Dec 2020 08:04  Phos  3.3     12-04  Mg     1.7     12-04      PT/INR - ( 04 Dec 2020 08:04 )   PT: 12.4 sec;   INR: 1.07 ratio                 MEDICATIONS  (STANDING):  atorvastatin 40 milliGRAM(s) Oral at bedtime  chlorhexidine 2% Cloths 1 Application(s) Topical daily  cloZAPine 200 milliGRAM(s) Oral at bedtime  dextrose 40% Gel 15 Gram(s) Oral once  dextrose 5%. 1000 milliLiter(s) (50 mL/Hr) IV Continuous <Continuous>  dextrose 5%. 1000 milliLiter(s) (100 mL/Hr) IV Continuous <Continuous>  dextrose 50% Injectable 25 Gram(s) IV Push once  dextrose 50% Injectable 12.5 Gram(s) IV Push once  dextrose 50% Injectable 25 Gram(s) IV Push once  glucagon  Injectable 1 milliGRAM(s) IntraMuscular once  insulin glargine Injectable (LANTUS) 15 Unit(s) SubCutaneous at bedtime  insulin lispro (ADMELOG) corrective regimen sliding scale   SubCutaneous three times a day before meals  levothyroxine 50 MICROGram(s) Oral daily  multiple electrolytes Injection Type 1 1000 milliLiter(s) (125 mL/Hr) IV Continuous <Continuous>  potassium chloride   Powder 40 milliEquivalent(s) Oral daily  risperiDONE   Tablet 1 milliGRAM(s) Oral at bedtime    MEDICATIONS  (PRN):  acetaminophen   Tablet .. 650 milliGRAM(s) Oral every 6 hours PRN Mild Pain (1 - 3), Moderate Pain (4 - 6)  LORazepam     Tablet 1 milliGRAM(s) Oral four times a day PRN Anxiety      RADIOLOGY & ADDITIONAL TESTS:

## 2020-12-04 NOTE — BRIEF OPERATIVE NOTE - ASSISTANT(S)
Xochilt Escobar (PGY 5), Alex Rodriguez (PGY 1) Dr. Tej Stacy, Xochilt Escobar (PGY 5), Alex Rodriguez (PGY 1)

## 2020-12-04 NOTE — PROGRESS NOTE ADULT - SUBJECTIVE AND OBJECTIVE BOX
Pt seen and examined. For ileostomy reversal later today. No GI complaints presently.    REVIEW OF SYSTEMS:  Constitutional: No fever, weight loss or fatigue  Cardiovascular: No chest pain, palpitations, dizziness or leg swelling  Gastrointestinal: As noted above. No abdominal or epigastric pain. No nausea, vomiting or hematemesis; No diarrhea or constipation. No melena or hematochezia.  Skin: No itching, burning, rashes or lesions       MEDICATIONS:  MEDICATIONS  (STANDING):  atorvastatin 40 milliGRAM(s) Oral at bedtime  cloZAPine 200 milliGRAM(s) Oral at bedtime  dextrose 40% Gel 15 Gram(s) Oral once  dextrose 5%. 1000 milliLiter(s) (50 mL/Hr) IV Continuous <Continuous>  dextrose 5%. 1000 milliLiter(s) (100 mL/Hr) IV Continuous <Continuous>  dextrose 50% Injectable 25 Gram(s) IV Push once  dextrose 50% Injectable 12.5 Gram(s) IV Push once  dextrose 50% Injectable 25 Gram(s) IV Push once  glucagon  Injectable 1 milliGRAM(s) IntraMuscular once  insulin glargine Injectable (LANTUS) 15 Unit(s) SubCutaneous at bedtime  insulin lispro (ADMELOG) corrective regimen sliding scale   SubCutaneous three times a day before meals  levothyroxine 50 MICROGram(s) Oral daily  multiple electrolytes Injection Type 1 1000 milliLiter(s) (125 mL/Hr) IV Continuous <Continuous>  potassium chloride   Powder 40 milliEquivalent(s) Oral daily  risperiDONE   Tablet 1 milliGRAM(s) Oral at bedtime    MEDICATIONS  (PRN):  acetaminophen   Tablet .. 650 milliGRAM(s) Oral every 6 hours PRN Mild Pain (1 - 3), Moderate Pain (4 - 6)  LORazepam     Tablet 1 milliGRAM(s) Oral four times a day PRN Anxiety      Allergies    amoxicillin (Unknown)  Lamictal (Unknown)  penicillin (Unknown)  Tegretol (Unknown)  Zetia (Unknown)    Intolerances        Vital Signs Last 24 Hrs  T(C): 36.7 (04 Dec 2020 07:55), Max: 36.9 (03 Dec 2020 11:16)  T(F): 98 (04 Dec 2020 07:55), Max: 98.4 (03 Dec 2020 11:16)  HR: 99 (04 Dec 2020 07:55) (82 - 99)  BP: 121/85 (04 Dec 2020 07:55) (104/68 - 124/83)  BP(mean): --  RR: 19 (04 Dec 2020 07:55) (17 - 20)  SpO2: 96% (04 Dec 2020 07:55) (96% - 98%)      PHYSICAL EXAM:    General: Well developed; well nourished; in no acute distress  HEENT: MMM, conjunctiva pink and sclera anicteric.  Lungs: clear to auscultation bilaterally  Cor: RRR S1, S2 only.  Gastrointestinal: Abdomen: Soft non-tender non-distended; + ileostomy Normal bowel sounds; No hepatosplenomegaly  Extremities: no cyanosis, clubbing or edema.  Skin: Warm and dry. No obvious rash  Neuro: Pt. is alert.    LABS:        12-03    140  |  105  |  6.0<L>  ----------------------------<  204<H>  3.2<L>   |  25.0  |  0.61    Ca    9.4      03 Dec 2020 07:17  Mg     1.8     12-03                        RADIOLOGY & ADDITIONAL STUDIES (The following images were personally reviewed):

## 2020-12-04 NOTE — PROGRESS NOTE ADULT - PROBLEM SELECTOR PLAN 1
For reversal today. Continued post-op management as per Surgery. Signing off. Reconsult as needed. Thank you.

## 2020-12-04 NOTE — PROGRESS NOTE ADULT - SUBJECTIVE AND OBJECTIVE BOX
NEPHROLOGY INTERVAL HPI/OVERNIGHT EVENTS:    No new events.    MEDICATIONS  (STANDING):  aMILoride 5 milliGRAM(s) Oral two times a day  atorvastatin 40 milliGRAM(s) Oral at bedtime  cloZAPine 150 milliGRAM(s) Oral at bedtime  dextrose 40% Gel 15 Gram(s) Oral once  dextrose 5%. 1000 milliLiter(s) (50 mL/Hr) IV Continuous <Continuous>  dextrose 5%. 1000 milliLiter(s) (100 mL/Hr) IV Continuous <Continuous>  dextrose 50% Injectable 25 Gram(s) IV Push once  dextrose 50% Injectable 12.5 Gram(s) IV Push once  dextrose 50% Injectable 25 Gram(s) IV Push once  erythromycin     enteric coated 1000 milliGRAM(s) Oral <User Schedule>  glucagon  Injectable 1 milliGRAM(s) IntraMuscular once  insulin glargine Injectable (LANTUS) 10 Unit(s) SubCutaneous at bedtime  insulin glargine Injectable (LANTUS) 10 Unit(s) SubCutaneous every morning  insulin lispro (ADMELOG) corrective regimen sliding scale   SubCutaneous three times a day before meals  insulin lispro Injectable (ADMELOG) 13 Unit(s) SubCutaneous three times a day before meals  levothyroxine 50 MICROGram(s) Oral daily  multiple electrolytes Injection Type 1 1000 milliLiter(s) (125 mL/Hr) IV Continuous <Continuous>  neomycin 1000 milliGRAM(s) Oral <User Schedule>  potassium chloride    Tablet ER 40 milliEquivalent(s) Oral every 4 hours  potassium chloride  20 mEq/100 mL IVPB 20 milliEquivalent(s) IV Intermittent every 2 hours    MEDICATIONS  (PRN):  acetaminophen   Tablet .. 650 milliGRAM(s) Oral every 6 hours PRN Mild Pain (1 - 3), Moderate Pain (4 - 6)      Allergies    amoxicillin (Unknown)  Lamictal (Unknown)  penicillin (Unknown)  Tegretol (Unknown)  Zetia (Unknown)        Vital Signs Last 24 Hrs  T(C): 36.7 (04 Dec 2020 07:55), Max: 36.8 (03 Dec 2020 15:23)  T(F): 98 (04 Dec 2020 07:55), Max: 98.3 (03 Dec 2020 15:23)  HR: 99 (04 Dec 2020 07:55) (82 - 99)  BP: 121/85 (04 Dec 2020 07:55) (108/73 - 124/83)  BP(mean): --  RR: 19 (04 Dec 2020 07:55) (17 - 20)  SpO2: 96% (04 Dec 2020 07:55) (96% - 98%)  T(C): 36.9 (03 Dec 2020 11:16), Max: 36.9 (02 Dec 2020 15:14)  T(F): 98.4 (03 Dec 2020 11:16), Max: 98.4 (02 Dec 2020 15:14)  HR: 88 (03 Dec 2020 11:16) (78 - 97)  BP: 104/68 (03 Dec 2020 11:16) (104/68 - 131/78)  BP(mean): --  RR: 20 (03 Dec 2020 11:16) (19 - 20)  SpO2: 96% (03 Dec 2020 11:16) (93% - 100%)          GENERAL: Comfortable  ENMT: Moist mucous membranes  NECK: Supple, neck  veins wnl  NERVOUS SYSTEM:  Alert & alert; non focal; no tremors  CHEST/LUNG: Clear bilaterally  HEART: Regular rate and rhythm; No rub  ABDOMEN: Soft, Nontender, Nondistended, BS+; R ostomy      LABS:     12-04    134<L>  |  99  |  5.0<L>  ----------------------------<  224<H>  3.8   |  22.0  |  0.64    Ca    9.6      04 Dec 2020 08:04  Phos  3.3     12-04  Mg     1.7     12-04                            12.2   10.40 )-----------( 162      ( 02 Dec 2020 07:50 )             38.5     12-03    140  |  105  |  6.0<L>  ----------------------------<  204<H>  3.2<L>   |  25.0  |  0.61    Ca    9.4      03 Dec 2020 07:17  Mg     1.8     12-03          Magnesium, Serum: 1.8 mg/dL (12-03 @ 07:17)          RADIOLOGY & ADDITIONAL TESTS:

## 2020-12-04 NOTE — BRIEF OPERATIVE NOTE - NSICDXBRIEFPROCEDURE_GEN_ALL_CORE_FT
PROCEDURES:  Peristomal hernia repair 04-Dec-2020 18:57:05  Alex Rodriguez  Rigid proctosigmoidoscpy 04-Dec-2020 18:56:09  Alex Rodriguez  Partial colectomy by abdominal approach 04-Dec-2020 18:55:58  Alex Rodriguez  Reversal of colostomy or ileostomy 04-Dec-2020 18:55:22  Alex Rodriguez

## 2020-12-04 NOTE — PROGRESS NOTE ADULT - SUBJECTIVE AND OBJECTIVE BOX
INTERVAL HPI/OVERNIGHT EVENTS:  Pt completed Bunn prep: 3 doses Erythromycin and 3 doses Neomycin in prep for OR today for ileostomy reversal and right hemicolectomy. NAOE, no complaints.    MEDICATIONS  (STANDING):  atorvastatin 40 milliGRAM(s) Oral at bedtime  cloZAPine 200 milliGRAM(s) Oral at bedtime  dextrose 40% Gel 15 Gram(s) Oral once  dextrose 5%. 1000 milliLiter(s) (50 mL/Hr) IV Continuous <Continuous>  dextrose 5%. 1000 milliLiter(s) (100 mL/Hr) IV Continuous <Continuous>  dextrose 50% Injectable 25 Gram(s) IV Push once  dextrose 50% Injectable 12.5 Gram(s) IV Push once  dextrose 50% Injectable 25 Gram(s) IV Push once  glucagon  Injectable 1 milliGRAM(s) IntraMuscular once  insulin glargine Injectable (LANTUS) 15 Unit(s) SubCutaneous at bedtime  insulin lispro (ADMELOG) corrective regimen sliding scale   SubCutaneous three times a day before meals  levothyroxine 50 MICROGram(s) Oral daily  multiple electrolytes Injection Type 1 1000 milliLiter(s) (125 mL/Hr) IV Continuous <Continuous>  potassium chloride   Powder 40 milliEquivalent(s) Oral daily  risperiDONE   Tablet 1 milliGRAM(s) Oral at bedtime    MEDICATIONS  (PRN):  acetaminophen   Tablet .. 650 milliGRAM(s) Oral every 6 hours PRN Mild Pain (1 - 3), Moderate Pain (4 - 6)  LORazepam     Tablet 1 milliGRAM(s) Oral four times a day PRN Anxiety      Vital Signs Last 24 Hrs  T(C): 36.8 (03 Dec 2020 22:19), Max: 36.9 (03 Dec 2020 11:16)  T(F): 98.2 (03 Dec 2020 22:19), Max: 98.4 (03 Dec 2020 11:16)  HR: 90 (03 Dec 2020 22:19) (78 - 90)  BP: 108/73 (03 Dec 2020 22:19) (104/68 - 128/86)  BP(mean): --  RR: 18 (03 Dec 2020 22:19) (17 - 20)  SpO2: 96% (03 Dec 2020 22:19) (96% - 98%)  PE:   GEN: NAD  CHEST: non-labored breathing  HEART: RRR  ABD: Soft, non-tender, non-distended. Ileostomy in the R abdomen, mucosa is pink, no bleeding, stool and air in the bag  I&O's Detail      LABS:                        12.2   10.40 )-----------( 162      ( 02 Dec 2020 07:50 )             38.5     12-03    140  |  105  |  6.0<L>  ----------------------------<  204<H>  3.2<L>   |  25.0  |  0.61    Ca    9.4      03 Dec 2020 07:17  Mg     1.8     12-03

## 2020-12-05 LAB
ANION GAP SERPL CALC-SCNC: 13 MMOL/L — SIGNIFICANT CHANGE UP (ref 5–17)
BASE EXCESS BLDA CALC-SCNC: 4.5 MMOL/L — HIGH (ref -2–2)
BLOOD GAS COMMENTS ARTERIAL: SIGNIFICANT CHANGE UP
BUN SERPL-MCNC: 5 MG/DL — LOW (ref 8–20)
CALCIUM SERPL-MCNC: 8.3 MG/DL — LOW (ref 8.6–10.2)
CHLORIDE SERPL-SCNC: 96 MMOL/L — LOW (ref 98–107)
CO2 SERPL-SCNC: 23 MMOL/L — SIGNIFICANT CHANGE UP (ref 22–29)
CREAT SERPL-MCNC: 0.87 MG/DL — SIGNIFICANT CHANGE UP (ref 0.5–1.3)
GAS PNL BLDA: SIGNIFICANT CHANGE UP
GLUCOSE BLDC GLUCOMTR-MCNC: 119 MG/DL — HIGH (ref 70–99)
GLUCOSE BLDC GLUCOMTR-MCNC: 263 MG/DL — HIGH (ref 70–99)
GLUCOSE BLDC GLUCOMTR-MCNC: 273 MG/DL — HIGH (ref 70–99)
GLUCOSE BLDC GLUCOMTR-MCNC: 346 MG/DL — HIGH (ref 70–99)
GLUCOSE BLDC GLUCOMTR-MCNC: 419 MG/DL — HIGH (ref 70–99)
GLUCOSE SERPL-MCNC: 400 MG/DL — HIGH (ref 70–99)
HCO3 BLDA-SCNC: 28 MMOL/L — HIGH (ref 20–26)
HCT VFR BLD CALC: 31.2 % — LOW (ref 39–50)
HGB BLD-MCNC: 10.4 G/DL — LOW (ref 13–17)
HOROWITZ INDEX BLDA+IHG-RTO: SIGNIFICANT CHANGE UP
MCHC RBC-ENTMCNC: 29.7 PG — SIGNIFICANT CHANGE UP (ref 27–34)
MCHC RBC-ENTMCNC: 33.3 GM/DL — SIGNIFICANT CHANGE UP (ref 32–36)
MCV RBC AUTO: 89.1 FL — SIGNIFICANT CHANGE UP (ref 80–100)
PCO2 BLDA: 35 MMHG — SIGNIFICANT CHANGE UP (ref 35–45)
PH BLDA: 7.5 — HIGH (ref 7.35–7.45)
PHOSPHATE SERPL-MCNC: 2.5 MG/DL — SIGNIFICANT CHANGE UP (ref 2.4–4.7)
PLATELET # BLD AUTO: 145 K/UL — LOW (ref 150–400)
PO2 BLDA: 73 MMHG — LOW (ref 83–108)
POTASSIUM SERPL-MCNC: 3.3 MMOL/L — LOW (ref 3.5–5.3)
POTASSIUM SERPL-SCNC: 3.3 MMOL/L — LOW (ref 3.5–5.3)
RBC # BLD: 3.5 M/UL — LOW (ref 4.2–5.8)
RBC # FLD: 13.3 % — SIGNIFICANT CHANGE UP (ref 10.3–14.5)
SAO2 % BLDA: 97 % — SIGNIFICANT CHANGE UP (ref 95–99)
SODIUM SERPL-SCNC: 132 MMOL/L — LOW (ref 135–145)
WBC # BLD: 15.6 K/UL — HIGH (ref 3.8–10.5)
WBC # FLD AUTO: 15.6 K/UL — HIGH (ref 3.8–10.5)

## 2020-12-05 PROCEDURE — 99232 SBSQ HOSP IP/OBS MODERATE 35: CPT

## 2020-12-05 RX ORDER — MAGNESIUM SULFATE 500 MG/ML
2 VIAL (ML) INJECTION ONCE
Refills: 0 | Status: COMPLETED | OUTPATIENT
Start: 2020-12-05 | End: 2020-12-05

## 2020-12-05 RX ORDER — INSULIN GLARGINE 100 [IU]/ML
15 INJECTION, SOLUTION SUBCUTANEOUS AT BEDTIME
Refills: 0 | Status: DISCONTINUED | OUTPATIENT
Start: 2020-12-05 | End: 2020-12-07

## 2020-12-05 RX ORDER — OXYCODONE HYDROCHLORIDE 5 MG/1
5 TABLET ORAL EVERY 6 HOURS
Refills: 0 | Status: DISCONTINUED | OUTPATIENT
Start: 2020-12-05 | End: 2020-12-06

## 2020-12-05 RX ORDER — ACETAMINOPHEN 500 MG
975 TABLET ORAL EVERY 8 HOURS
Refills: 0 | Status: DISCONTINUED | OUTPATIENT
Start: 2020-12-05 | End: 2020-12-06

## 2020-12-05 RX ORDER — IBUPROFEN 200 MG
400 TABLET ORAL EVERY 8 HOURS
Refills: 0 | Status: DISCONTINUED | OUTPATIENT
Start: 2020-12-05 | End: 2020-12-06

## 2020-12-05 RX ORDER — INSULIN LISPRO 100/ML
VIAL (ML) SUBCUTANEOUS
Refills: 0 | Status: DISCONTINUED | OUTPATIENT
Start: 2020-12-05 | End: 2020-12-21

## 2020-12-05 RX ORDER — INSULIN LISPRO 100/ML
VIAL (ML) SUBCUTANEOUS AT BEDTIME
Refills: 0 | Status: DISCONTINUED | OUTPATIENT
Start: 2020-12-05 | End: 2020-12-05

## 2020-12-05 RX ORDER — POTASSIUM CHLORIDE 20 MEQ
30 PACKET (EA) ORAL
Refills: 0 | Status: DISCONTINUED | OUTPATIENT
Start: 2020-12-05 | End: 2020-12-06

## 2020-12-05 RX ORDER — POTASSIUM CHLORIDE 20 MEQ
20 PACKET (EA) ORAL ONCE
Refills: 0 | Status: COMPLETED | OUTPATIENT
Start: 2020-12-05 | End: 2020-12-05

## 2020-12-05 RX ORDER — INSULIN LISPRO 100/ML
VIAL (ML) SUBCUTANEOUS
Refills: 0 | Status: DISCONTINUED | OUTPATIENT
Start: 2020-12-05 | End: 2020-12-05

## 2020-12-05 RX ORDER — POTASSIUM CHLORIDE 20 MEQ
10 PACKET (EA) ORAL ONCE
Refills: 0 | Status: COMPLETED | OUTPATIENT
Start: 2020-12-05 | End: 2020-12-05

## 2020-12-05 RX ORDER — HYDROMORPHONE HYDROCHLORIDE 2 MG/ML
0.5 INJECTION INTRAMUSCULAR; INTRAVENOUS; SUBCUTANEOUS EVERY 6 HOURS
Refills: 0 | Status: DISCONTINUED | OUTPATIENT
Start: 2020-12-05 | End: 2020-12-05

## 2020-12-05 RX ORDER — OXYCODONE HYDROCHLORIDE 5 MG/1
10 TABLET ORAL EVERY 6 HOURS
Refills: 0 | Status: DISCONTINUED | OUTPATIENT
Start: 2020-12-05 | End: 2020-12-06

## 2020-12-05 RX ORDER — ENOXAPARIN SODIUM 100 MG/ML
40 INJECTION SUBCUTANEOUS DAILY
Refills: 0 | Status: DISCONTINUED | OUTPATIENT
Start: 2020-12-05 | End: 2020-12-21

## 2020-12-05 RX ORDER — SODIUM CHLORIDE 9 MG/ML
1000 INJECTION INTRAMUSCULAR; INTRAVENOUS; SUBCUTANEOUS
Refills: 0 | Status: DISCONTINUED | OUTPATIENT
Start: 2020-12-05 | End: 2020-12-07

## 2020-12-05 RX ADMIN — RISPERIDONE 1 MILLIGRAM(S): 4 TABLET ORAL at 22:56

## 2020-12-05 RX ADMIN — Medication 400 MILLIGRAM(S): at 14:52

## 2020-12-05 RX ADMIN — Medication 400 MILLIGRAM(S): at 14:25

## 2020-12-05 RX ADMIN — INSULIN GLARGINE 10 UNIT(S): 100 INJECTION, SOLUTION SUBCUTANEOUS at 08:31

## 2020-12-05 RX ADMIN — Medication 975 MILLIGRAM(S): at 06:07

## 2020-12-05 RX ADMIN — Medication 7: at 17:19

## 2020-12-05 RX ADMIN — CLOZAPINE 200 MILLIGRAM(S): 150 TABLET, ORALLY DISINTEGRATING ORAL at 22:55

## 2020-12-05 RX ADMIN — Medication 100 MILLIEQUIVALENT(S): at 15:30

## 2020-12-05 RX ADMIN — Medication 100 GRAM(S): at 02:27

## 2020-12-05 RX ADMIN — Medication 3: at 08:31

## 2020-12-05 RX ADMIN — Medication 50 MICROGRAM(S): at 05:37

## 2020-12-05 RX ADMIN — Medication 400 MILLIGRAM(S): at 06:07

## 2020-12-05 RX ADMIN — Medication 100 GRAM(S): at 17:18

## 2020-12-05 RX ADMIN — Medication 975 MILLIGRAM(S): at 23:55

## 2020-12-05 RX ADMIN — Medication 975 MILLIGRAM(S): at 05:37

## 2020-12-05 RX ADMIN — Medication 8: at 12:35

## 2020-12-05 RX ADMIN — Medication 400 MILLIGRAM(S): at 05:37

## 2020-12-05 RX ADMIN — Medication 975 MILLIGRAM(S): at 14:18

## 2020-12-05 RX ADMIN — SODIUM CHLORIDE 125 MILLILITER(S): 9 INJECTION, SOLUTION INTRAVENOUS at 02:40

## 2020-12-05 RX ADMIN — Medication 400 MILLIGRAM(S): at 22:55

## 2020-12-05 RX ADMIN — Medication 20 MILLIEQUIVALENT(S): at 15:30

## 2020-12-05 RX ADMIN — Medication 975 MILLIGRAM(S): at 22:55

## 2020-12-05 RX ADMIN — SODIUM CHLORIDE 125 MILLILITER(S): 9 INJECTION INTRAMUSCULAR; INTRAVENOUS; SUBCUTANEOUS at 23:03

## 2020-12-05 RX ADMIN — Medication 18 UNIT(S): at 12:35

## 2020-12-05 RX ADMIN — SODIUM CHLORIDE 125 MILLILITER(S): 9 INJECTION INTRAMUSCULAR; INTRAVENOUS; SUBCUTANEOUS at 15:30

## 2020-12-05 RX ADMIN — Medication 30 MILLIEQUIVALENT(S): at 22:55

## 2020-12-05 RX ADMIN — Medication 400 MILLIGRAM(S): at 23:55

## 2020-12-05 RX ADMIN — Medication 975 MILLIGRAM(S): at 14:52

## 2020-12-05 RX ADMIN — INSULIN GLARGINE 15 UNIT(S): 100 INJECTION, SOLUTION SUBCUTANEOUS at 23:01

## 2020-12-05 RX ADMIN — Medication 18 UNIT(S): at 17:18

## 2020-12-05 RX ADMIN — Medication 18 UNIT(S): at 08:34

## 2020-12-05 RX ADMIN — ENOXAPARIN SODIUM 40 MILLIGRAM(S): 100 INJECTION SUBCUTANEOUS at 11:21

## 2020-12-05 RX ADMIN — Medication 50 GRAM(S): at 18:07

## 2020-12-05 NOTE — PROGRESS NOTE ADULT - ASSESSMENT
56yo male with hx of Stage IV Colon Cancer (Cecal Primary) diagnosed 2019 secondary to malignant SBO treated with diverting Ileostomy.  Formal resection and reversal delayed secondary to unstable Schizophrenia.  Admitted to medicine with Lithium Toxicity (resolved prior to surgery) and patient deemed stable to consent for self.    Taken for Rigid proctosigmoidoscopy, Reversal diverting loop ileostomy with cecectomy and primary side to side functional end to end antiperistaltic stapled anastomosis (12/4/2020).      Doing well postoperatively.  Adequate UOP.      Sips of clear liquids today.  Restart 10U Lantus qAM, continue 15U Lantus qPM, restart Premeal Short acting insulin with clear liquid tray, ISS for fingerstick coverage  Monitor for hyperglycemia    Continue ISMA dressing  Prior stoma site dressing tomorrow    Continue Lucio catheter, dc at midnight if continued stability/adequate UOP/OOB    OOB to chair TID, Ambulate TID   IS  Lovenox PPx    Continue home Schizophrenia regimen, monitor lithium levels qmonday

## 2020-12-05 NOTE — PROGRESS NOTE ADULT - SUBJECTIVE AND OBJECTIVE BOX
Surg Onc    No c/o  Briseida clears - no flatus  AVSS  Na 132, K 3.3, Glucose 400  No CBC    Exam    abd soft, distended, non tender, Dressing dry    A/P    POD 1  Stable  Increase ISS, stop Lantus until on reg diet - f/u medicine, endocrinology  Replete K and Change IVF NS + 20 K 125cc/h, no dextrose  Check CBC - WBC 20 post op  OOB/IS  PT  DVT prophylaxis    Seen and examined w housestaff

## 2020-12-05 NOTE — PROGRESS NOTE ADULT - SUBJECTIVE AND OBJECTIVE BOX
NEPHROLOGY INTERVAL HPI/OVERNIGHT EVENTS:    No new events.    MEDICATIONS  (STANDING):  aMILoride 5 milliGRAM(s) Oral two times a day  atorvastatin 40 milliGRAM(s) Oral at bedtime  cloZAPine 150 milliGRAM(s) Oral at bedtime  dextrose 40% Gel 15 Gram(s) Oral once  dextrose 5%. 1000 milliLiter(s) (50 mL/Hr) IV Continuous <Continuous>  dextrose 5%. 1000 milliLiter(s) (100 mL/Hr) IV Continuous <Continuous>  dextrose 50% Injectable 25 Gram(s) IV Push once  dextrose 50% Injectable 12.5 Gram(s) IV Push once  dextrose 50% Injectable 25 Gram(s) IV Push once  erythromycin     enteric coated 1000 milliGRAM(s) Oral <User Schedule>  glucagon  Injectable 1 milliGRAM(s) IntraMuscular once  insulin glargine Injectable (LANTUS) 10 Unit(s) SubCutaneous at bedtime  insulin glargine Injectable (LANTUS) 10 Unit(s) SubCutaneous every morning  insulin lispro (ADMELOG) corrective regimen sliding scale   SubCutaneous three times a day before meals  insulin lispro Injectable (ADMELOG) 13 Unit(s) SubCutaneous three times a day before meals  levothyroxine 50 MICROGram(s) Oral daily  multiple electrolytes Injection Type 1 1000 milliLiter(s) (125 mL/Hr) IV Continuous <Continuous>  neomycin 1000 milliGRAM(s) Oral <User Schedule>  potassium chloride    Tablet ER 40 milliEquivalent(s) Oral every 4 hours  potassium chloride  20 mEq/100 mL IVPB 20 milliEquivalent(s) IV Intermittent every 2 hours    MEDICATIONS  (PRN):  acetaminophen   Tablet .. 650 milliGRAM(s) Oral every 6 hours PRN Mild Pain (1 - 3), Moderate Pain (4 - 6)      Allergies    amoxicillin (Unknown)  Lamictal (Unknown)  penicillin (Unknown)  Tegretol (Unknown)  Zetia (Unknown)        Vital Signs Last 24 Hrs  T(C): 36.8 (05 Dec 2020 08:06), Max: 37 (05 Dec 2020 05:44)  T(F): 98.2 (05 Dec 2020 08:06), Max: 98.6 (05 Dec 2020 05:44)  HR: 102 (05 Dec 2020 08:06) (54 - 102)  BP: 105/69 (05 Dec 2020 08:06) (86/55 - 120/64)  BP(mean): --  RR: 18 (05 Dec 2020 08:06) (18 - 25)  SpO2: 91% (05 Dec 2020 08:06) (91% - 100%)  T(C): 36.7 (04 Dec 2020 07:55), Max: 36.8 (03 Dec 2020 15:23)  T(F): 98 (04 Dec 2020 07:55), Max: 98.3 (03 Dec 2020 15:23)  HR: 99 (04 Dec 2020 07:55) (82 - 99)  BP: 121/85 (04 Dec 2020 07:55) (108/73 - 124/83)  BP(mean): --  RR: 19 (04 Dec 2020 07:55) (17 - 20)  SpO2: 96% (04 Dec 2020 07:55) (96% - 98%)  T(C): 36.9 (03 Dec 2020 11:16), Max: 36.9 (02 Dec 2020 15:14)  T(F): 98.4 (03 Dec 2020 11:16), Max: 98.4 (02 Dec 2020 15:14)  HR: 88 (03 Dec 2020 11:16) (78 - 97)  BP: 104/68 (03 Dec 2020 11:16) (104/68 - 131/78)  BP(mean): --  RR: 20 (03 Dec 2020 11:16) (19 - 20)  SpO2: 96% (03 Dec 2020 11:16) (93% - 100%)          GENERAL: Comfortable  ENMT: wnl  NECK: Supple, neck  veins wnl  NERVOUS SYSTEM:  Alert & alert; non focal; no tremors  CHEST/LUNG: Clear bilaterally  HEART: Regular rate and rhythm; No rub  ABDOMEN: Soft, Nontender, Nondistended, BS+; R ostomy      LABS:     12-05    132<L>  |  96<L>  |  5.0<L>  ----------------------------<  400<H>  3.3<L>   |  23.0  |  0.87    Ca    8.3<L>      05 Dec 2020 09:57  Phos  2.5     12-05  Mg     1.7     12-04 12-04    134<L>  |  99  |  5.0<L>  ----------------------------<  224<H>  3.8   |  22.0  |  0.64    Ca    9.6      04 Dec 2020 08:04  Phos  3.3     12-04  Mg     1.7     12-04                            12.2   10.40 )-----------( 162      ( 02 Dec 2020 07:50 )             38.5     12-03    140  |  105  |  6.0<L>  ----------------------------<  204<H>  3.2<L>   |  25.0  |  0.61    Ca    9.4      03 Dec 2020 07:17  Mg     1.8     12-03          Magnesium, Serum: 1.8 mg/dL (12-03 @ 07:17)          RADIOLOGY & ADDITIONAL TESTS:

## 2020-12-05 NOTE — PROGRESS NOTE ADULT - SUBJECTIVE AND OBJECTIVE BOX
INTERVAL HPI/OVERNIGHT EVENTS: No acute events overnight.  Uneventful postoperative check.  NPO overnight with sips for comfort.  Urinating via edwards catheter.  Denies fevers, chills, nausea, emesis.  Denies chest pain, dyspnea.  Denies constipation, diarrhea. Denies headaches, dizziness, changes in vision.       MEDICATIONS  (STANDING):  acetaminophen   Tablet .. 975 milliGRAM(s) Oral every 8 hours  cefoTEtan  IVPB 2 Gram(s) IV Intermittent every 12 hours  chlorhexidine 2% Cloths 1 Application(s) Topical daily  cloZAPine 200 milliGRAM(s) Oral at bedtime  dextrose 40% Gel 15 Gram(s) Oral once  dextrose 5%. 1000 milliLiter(s) (50 mL/Hr) IV Continuous <Continuous>  dextrose 5%. 1000 milliLiter(s) (100 mL/Hr) IV Continuous <Continuous>  dextrose 50% Injectable 25 Gram(s) IV Push once  dextrose 50% Injectable 12.5 Gram(s) IV Push once  dextrose 50% Injectable 25 Gram(s) IV Push once  enoxaparin Injectable 40 milliGRAM(s) SubCutaneous daily  glucagon  Injectable 1 milliGRAM(s) IntraMuscular once  ibuprofen  Tablet. 400 milliGRAM(s) Oral every 8 hours  insulin glargine Injectable (LANTUS) 10 Unit(s) SubCutaneous every morning  insulin glargine Injectable (LANTUS) 15 Unit(s) SubCutaneous at bedtime  insulin lispro (ADMELOG) corrective regimen sliding scale   SubCutaneous three times a day before meals  insulin lispro Injectable (ADMELOG) 18 Unit(s) SubCutaneous three times a day before meals  levothyroxine 50 MICROGram(s) Oral daily  multiple electrolytes Injection Type 1 1000 milliLiter(s) (125 mL/Hr) IV Continuous <Continuous>  risperiDONE   Tablet 1 milliGRAM(s) Oral at bedtime    MEDICATIONS  (PRN):  dexAMETHasone  IVPB 8 milliGRAM(s) IV Intermittent once PRN Nausea and/or Vomiting  HYDROmorphone  Injectable 0.5 milliGRAM(s) IV Push every 6 hours PRN breakthrough  LORazepam     Tablet 1 milliGRAM(s) Oral four times a day PRN Anxiety  ondansetron Injectable 4 milliGRAM(s) IV Push once PRN Nausea and/or Vomiting  oxyCODONE    IR 5 milliGRAM(s) Oral every 6 hours PRN Moderate Pain (4 - 6)  oxyCODONE    IR 10 milliGRAM(s) Oral every 6 hours PRN Severe Pain (7 - 10)      Vital Signs Last 24 Hrs  T(C): 36.8 (05 Dec 2020 08:06), Max: 37 (05 Dec 2020 05:44)  T(F): 98.2 (05 Dec 2020 08:06), Max: 98.6 (05 Dec 2020 05:44)  HR: 102 (05 Dec 2020 08:06) (54 - 102)  BP: 105/69 (05 Dec 2020 08:06) (86/55 - 124/81)  BP(mean): --  RR: 18 (05 Dec 2020 08:06) (16 - 25)  SpO2: 91% (05 Dec 2020 08:06) (91% - 100%)    GEN: NAD, laying in bed, appears stated age  HEENT: NCAT, clear oral mucosa, normal conjunctiva  Chest: non-tender  CV:  non-tachycardic, 2+ radial pulse  Pulm: no increased work of breathing, no conversational dyspnea  GI: soft, mild diffuse tenderness, dressing in place c/d/i, non-distended  MSK: moving all extremities     I&O's Detail    04 Dec 2020 07:01  -  05 Dec 2020 07:00  --------------------------------------------------------  IN:    multiple electrolytes Injection Type 1.: 1000 mL    sodium chloride 0.9%: 250 mL  Total IN: 1250 mL    OUT:    Indwelling Catheter - Urethral (mL): 450 mL    Voided (mL): 2000 mL  Total OUT: 2450 mL    Total NET: -1200 mL          LABS:                        12.8   20.81 )-----------( 193      ( 04 Dec 2020 19:53 )             37.8     12-04    134<L>  |  99  |  5.0<L>  ----------------------------<  224<H>  3.8   |  22.0  |  0.64    Ca    9.6      04 Dec 2020 08:04  Phos  3.3     12-04  Mg     1.7     12-04      PT/INR - ( 04 Dec 2020 08:04 )   PT: 12.4 sec;   INR: 1.07 ratio

## 2020-12-06 LAB
ALBUMIN SERPL ELPH-MCNC: 2.7 G/DL — LOW (ref 3.3–5.2)
ALP SERPL-CCNC: 70 U/L — SIGNIFICANT CHANGE UP (ref 40–120)
ALT FLD-CCNC: 29 U/L — SIGNIFICANT CHANGE UP
ANION GAP SERPL CALC-SCNC: 10 MMOL/L — SIGNIFICANT CHANGE UP (ref 5–17)
ANION GAP SERPL CALC-SCNC: 10 MMOL/L — SIGNIFICANT CHANGE UP (ref 5–17)
ANION GAP SERPL CALC-SCNC: 9 MMOL/L — SIGNIFICANT CHANGE UP (ref 5–17)
AST SERPL-CCNC: 19 U/L — SIGNIFICANT CHANGE UP
BASOPHILS # BLD AUTO: 0.03 K/UL — SIGNIFICANT CHANGE UP (ref 0–0.2)
BASOPHILS NFR BLD AUTO: 0.2 % — SIGNIFICANT CHANGE UP (ref 0–2)
BILIRUB SERPL-MCNC: 0.3 MG/DL — LOW (ref 0.4–2)
BUN SERPL-MCNC: 7 MG/DL — LOW (ref 8–20)
BUN SERPL-MCNC: 8 MG/DL — SIGNIFICANT CHANGE UP (ref 8–20)
BUN SERPL-MCNC: 8 MG/DL — SIGNIFICANT CHANGE UP (ref 8–20)
CALCIUM SERPL-MCNC: 7.6 MG/DL — LOW (ref 8.6–10.2)
CALCIUM SERPL-MCNC: 8 MG/DL — LOW (ref 8.6–10.2)
CALCIUM SERPL-MCNC: 8 MG/DL — LOW (ref 8.6–10.2)
CHLORIDE SERPL-SCNC: 101 MMOL/L — SIGNIFICANT CHANGE UP (ref 98–107)
CHLORIDE SERPL-SCNC: 104 MMOL/L — SIGNIFICANT CHANGE UP (ref 98–107)
CHLORIDE SERPL-SCNC: 99 MMOL/L — SIGNIFICANT CHANGE UP (ref 98–107)
CO2 SERPL-SCNC: 23 MMOL/L — SIGNIFICANT CHANGE UP (ref 22–29)
CO2 SERPL-SCNC: 27 MMOL/L — SIGNIFICANT CHANGE UP (ref 22–29)
CO2 SERPL-SCNC: 30 MMOL/L — HIGH (ref 22–29)
CREAT SERPL-MCNC: 0.53 MG/DL — SIGNIFICANT CHANGE UP (ref 0.5–1.3)
CREAT SERPL-MCNC: 0.55 MG/DL — SIGNIFICANT CHANGE UP (ref 0.5–1.3)
CREAT SERPL-MCNC: 0.6 MG/DL — SIGNIFICANT CHANGE UP (ref 0.5–1.3)
EOSINOPHIL # BLD AUTO: 0.09 K/UL — SIGNIFICANT CHANGE UP (ref 0–0.5)
EOSINOPHIL NFR BLD AUTO: 0.7 % — SIGNIFICANT CHANGE UP (ref 0–6)
GAS PNL BLDA: SIGNIFICANT CHANGE UP
GLUCOSE BLDC GLUCOMTR-MCNC: 133 MG/DL — HIGH (ref 70–99)
GLUCOSE BLDC GLUCOMTR-MCNC: 153 MG/DL — HIGH (ref 70–99)
GLUCOSE BLDC GLUCOMTR-MCNC: 196 MG/DL — HIGH (ref 70–99)
GLUCOSE BLDC GLUCOMTR-MCNC: 205 MG/DL — HIGH (ref 70–99)
GLUCOSE SERPL-MCNC: 153 MG/DL — HIGH (ref 70–99)
GLUCOSE SERPL-MCNC: 181 MG/DL — HIGH (ref 70–99)
GLUCOSE SERPL-MCNC: 214 MG/DL — HIGH (ref 70–99)
HCT VFR BLD CALC: 30.4 % — LOW (ref 39–50)
HCT VFR BLD CALC: 30.4 % — LOW (ref 39–50)
HGB BLD-MCNC: 10 G/DL — LOW (ref 13–17)
HGB BLD-MCNC: 9.9 G/DL — LOW (ref 13–17)
IMM GRANULOCYTES NFR BLD AUTO: 0.9 % — SIGNIFICANT CHANGE UP (ref 0–1.5)
LYMPHOCYTES # BLD AUTO: 1.14 K/UL — SIGNIFICANT CHANGE UP (ref 1–3.3)
LYMPHOCYTES # BLD AUTO: 8.3 % — LOW (ref 13–44)
MAGNESIUM SERPL-MCNC: 1.7 MG/DL — SIGNIFICANT CHANGE UP (ref 1.6–2.6)
MAGNESIUM SERPL-MCNC: 1.8 MG/DL — SIGNIFICANT CHANGE UP (ref 1.6–2.6)
MCHC RBC-ENTMCNC: 29.5 PG — SIGNIFICANT CHANGE UP (ref 27–34)
MCHC RBC-ENTMCNC: 29.8 PG — SIGNIFICANT CHANGE UP (ref 27–34)
MCHC RBC-ENTMCNC: 32.6 GM/DL — SIGNIFICANT CHANGE UP (ref 32–36)
MCHC RBC-ENTMCNC: 32.9 GM/DL — SIGNIFICANT CHANGE UP (ref 32–36)
MCV RBC AUTO: 90.5 FL — SIGNIFICANT CHANGE UP (ref 80–100)
MCV RBC AUTO: 90.5 FL — SIGNIFICANT CHANGE UP (ref 80–100)
MONOCYTES # BLD AUTO: 0.65 K/UL — SIGNIFICANT CHANGE UP (ref 0–0.9)
MONOCYTES NFR BLD AUTO: 4.8 % — SIGNIFICANT CHANGE UP (ref 2–14)
NEUTROPHILS # BLD AUTO: 11.64 K/UL — HIGH (ref 1.8–7.4)
NEUTROPHILS NFR BLD AUTO: 85.1 % — HIGH (ref 43–77)
PHOSPHATE SERPL-MCNC: 1.5 MG/DL — LOW (ref 2.4–4.7)
PHOSPHATE SERPL-MCNC: 2 MG/DL — LOW (ref 2.4–4.7)
PLATELET # BLD AUTO: 150 K/UL — SIGNIFICANT CHANGE UP (ref 150–400)
PLATELET # BLD AUTO: 155 K/UL — SIGNIFICANT CHANGE UP (ref 150–400)
POTASSIUM SERPL-MCNC: 2.9 MMOL/L — CRITICAL LOW (ref 3.5–5.3)
POTASSIUM SERPL-MCNC: 3.5 MMOL/L — SIGNIFICANT CHANGE UP (ref 3.5–5.3)
POTASSIUM SERPL-MCNC: 3.5 MMOL/L — SIGNIFICANT CHANGE UP (ref 3.5–5.3)
POTASSIUM SERPL-SCNC: 2.9 MMOL/L — CRITICAL LOW (ref 3.5–5.3)
POTASSIUM SERPL-SCNC: 3.5 MMOL/L — SIGNIFICANT CHANGE UP (ref 3.5–5.3)
POTASSIUM SERPL-SCNC: 3.5 MMOL/L — SIGNIFICANT CHANGE UP (ref 3.5–5.3)
PROT SERPL-MCNC: 5.2 G/DL — LOW (ref 6.6–8.7)
RBC # BLD: 3.36 M/UL — LOW (ref 4.2–5.8)
RBC # BLD: 3.36 M/UL — LOW (ref 4.2–5.8)
RBC # FLD: 13.6 % — SIGNIFICANT CHANGE UP (ref 10.3–14.5)
RBC # FLD: 13.7 % — SIGNIFICANT CHANGE UP (ref 10.3–14.5)
SODIUM SERPL-SCNC: 136 MMOL/L — SIGNIFICANT CHANGE UP (ref 135–145)
SODIUM SERPL-SCNC: 136 MMOL/L — SIGNIFICANT CHANGE UP (ref 135–145)
SODIUM SERPL-SCNC: 140 MMOL/L — SIGNIFICANT CHANGE UP (ref 135–145)
WBC # BLD: 13.67 K/UL — HIGH (ref 3.8–10.5)
WBC # BLD: 14.03 K/UL — HIGH (ref 3.8–10.5)
WBC # FLD AUTO: 13.67 K/UL — HIGH (ref 3.8–10.5)
WBC # FLD AUTO: 14.03 K/UL — HIGH (ref 3.8–10.5)

## 2020-12-06 PROCEDURE — 74019 RADEX ABDOMEN 2 VIEWS: CPT | Mod: 26

## 2020-12-06 PROCEDURE — 71045 X-RAY EXAM CHEST 1 VIEW: CPT | Mod: 26

## 2020-12-06 RX ORDER — SODIUM,POTASSIUM PHOSPHATES 278-250MG
1 POWDER IN PACKET (EA) ORAL THREE TIMES A DAY
Refills: 0 | Status: COMPLETED | OUTPATIENT
Start: 2020-12-06 | End: 2020-12-09

## 2020-12-06 RX ORDER — POTASSIUM CHLORIDE 20 MEQ
10 PACKET (EA) ORAL
Refills: 0 | Status: DISCONTINUED | OUTPATIENT
Start: 2020-12-06 | End: 2020-12-06

## 2020-12-06 RX ORDER — TETRACAINE/BENZOCAINE/BUTAMBEN 2%-14%-2%
1 OINTMENT (GRAM) TOPICAL ONCE
Refills: 0 | Status: COMPLETED | OUTPATIENT
Start: 2020-12-06 | End: 2020-12-06

## 2020-12-06 RX ORDER — MORPHINE SULFATE 50 MG/1
2 CAPSULE, EXTENDED RELEASE ORAL EVERY 4 HOURS
Refills: 0 | Status: DISCONTINUED | OUTPATIENT
Start: 2020-12-06 | End: 2020-12-13

## 2020-12-06 RX ORDER — LEVOTHYROXINE SODIUM 125 MCG
25 TABLET ORAL
Refills: 0 | Status: DISCONTINUED | OUTPATIENT
Start: 2020-12-06 | End: 2020-12-14

## 2020-12-06 RX ORDER — MORPHINE SULFATE 50 MG/1
4 CAPSULE, EXTENDED RELEASE ORAL EVERY 4 HOURS
Refills: 0 | Status: DISCONTINUED | OUTPATIENT
Start: 2020-12-06 | End: 2020-12-06

## 2020-12-06 RX ORDER — KETOROLAC TROMETHAMINE 30 MG/ML
15 SYRINGE (ML) INJECTION EVERY 6 HOURS
Refills: 0 | Status: DISCONTINUED | OUTPATIENT
Start: 2020-12-06 | End: 2020-12-10

## 2020-12-06 RX ORDER — POTASSIUM CHLORIDE 20 MEQ
40 PACKET (EA) ORAL
Refills: 0 | Status: DISCONTINUED | OUTPATIENT
Start: 2020-12-06 | End: 2020-12-06

## 2020-12-06 RX ORDER — FAMOTIDINE 10 MG/ML
20 INJECTION INTRAVENOUS DAILY
Refills: 0 | Status: DISCONTINUED | OUTPATIENT
Start: 2020-12-06 | End: 2020-12-15

## 2020-12-06 RX ORDER — FAMOTIDINE 10 MG/ML
40 INJECTION INTRAVENOUS DAILY
Refills: 0 | Status: DISCONTINUED | OUTPATIENT
Start: 2020-12-06 | End: 2020-12-06

## 2020-12-06 RX ORDER — ACETAMINOPHEN 500 MG
1000 TABLET ORAL ONCE
Refills: 0 | Status: COMPLETED | OUTPATIENT
Start: 2020-12-06 | End: 2020-12-06

## 2020-12-06 RX ORDER — POTASSIUM PHOSPHATE, MONOBASIC POTASSIUM PHOSPHATE, DIBASIC 236; 224 MG/ML; MG/ML
30 INJECTION, SOLUTION INTRAVENOUS ONCE
Refills: 0 | Status: COMPLETED | OUTPATIENT
Start: 2020-12-06 | End: 2020-12-06

## 2020-12-06 RX ORDER — POTASSIUM CHLORIDE 20 MEQ
10 PACKET (EA) ORAL
Refills: 0 | Status: COMPLETED | OUTPATIENT
Start: 2020-12-06 | End: 2020-12-06

## 2020-12-06 RX ADMIN — Medication 100 MILLIEQUIVALENT(S): at 12:00

## 2020-12-06 RX ADMIN — CLOZAPINE 200 MILLIGRAM(S): 150 TABLET, ORALLY DISINTEGRATING ORAL at 20:56

## 2020-12-06 RX ADMIN — Medication 1 PACKET(S): at 20:56

## 2020-12-06 RX ADMIN — ENOXAPARIN SODIUM 40 MILLIGRAM(S): 100 INJECTION SUBCUTANEOUS at 12:54

## 2020-12-06 RX ADMIN — Medication 100 MILLIEQUIVALENT(S): at 09:36

## 2020-12-06 RX ADMIN — Medication 3: at 07:01

## 2020-12-06 RX ADMIN — POTASSIUM PHOSPHATE, MONOBASIC POTASSIUM PHOSPHATE, DIBASIC 83.33 MILLIMOLE(S): 236; 224 INJECTION, SOLUTION INTRAVENOUS at 20:56

## 2020-12-06 RX ADMIN — Medication 1 SPRAY(S): at 14:21

## 2020-12-06 RX ADMIN — INSULIN GLARGINE 15 UNIT(S): 100 INJECTION, SOLUTION SUBCUTANEOUS at 20:56

## 2020-12-06 RX ADMIN — SODIUM CHLORIDE 125 MILLILITER(S): 9 INJECTION INTRAMUSCULAR; INTRAVENOUS; SUBCUTANEOUS at 06:56

## 2020-12-06 RX ADMIN — Medication 100 GRAM(S): at 17:41

## 2020-12-06 RX ADMIN — Medication 3: at 17:40

## 2020-12-06 RX ADMIN — RISPERIDONE 1 MILLIGRAM(S): 4 TABLET ORAL at 20:56

## 2020-12-06 RX ADMIN — Medication 5: at 12:54

## 2020-12-06 RX ADMIN — Medication 100 MILLIEQUIVALENT(S): at 09:14

## 2020-12-06 RX ADMIN — FAMOTIDINE 100 MILLIGRAM(S): 10 INJECTION INTRAVENOUS at 14:46

## 2020-12-06 RX ADMIN — Medication 100 GRAM(S): at 06:56

## 2020-12-06 RX ADMIN — CHLORHEXIDINE GLUCONATE 1 APPLICATION(S): 213 SOLUTION TOPICAL at 12:12

## 2020-12-06 RX ADMIN — Medication 1000 MILLIGRAM(S): at 14:36

## 2020-12-06 RX ADMIN — Medication 400 MILLIGRAM(S): at 14:21

## 2020-12-06 RX ADMIN — Medication 101.6 MILLIGRAM(S): at 20:56

## 2020-12-06 RX ADMIN — POTASSIUM PHOSPHATE, MONOBASIC POTASSIUM PHOSPHATE, DIBASIC 83.33 MILLIMOLE(S): 236; 224 INJECTION, SOLUTION INTRAVENOUS at 09:14

## 2020-12-06 NOTE — PROGRESS NOTE ADULT - SUBJECTIVE AND OBJECTIVE BOX
INTERVAL HPI/OVERNIGHT EVENTS:    MEDICATIONS  (STANDING):  acetaminophen   Tablet .. 975 milliGRAM(s) Oral every 8 hours  cefoTEtan  IVPB 2 Gram(s) IV Intermittent every 12 hours  chlorhexidine 2% Cloths 1 Application(s) Topical daily  cloZAPine 200 milliGRAM(s) Oral at bedtime  dextrose 40% Gel 15 Gram(s) Oral once  dextrose 5%. 1000 milliLiter(s) (50 mL/Hr) IV Continuous <Continuous>  dextrose 5%. 1000 milliLiter(s) (100 mL/Hr) IV Continuous <Continuous>  dextrose 50% Injectable 25 Gram(s) IV Push once  dextrose 50% Injectable 12.5 Gram(s) IV Push once  dextrose 50% Injectable 25 Gram(s) IV Push once  enoxaparin Injectable 40 milliGRAM(s) SubCutaneous daily  glucagon  Injectable 1 milliGRAM(s) IntraMuscular once  ibuprofen  Tablet. 400 milliGRAM(s) Oral every 8 hours  insulin glargine Injectable (LANTUS) 15 Unit(s) SubCutaneous at bedtime  insulin lispro (ADMELOG) corrective regimen sliding scale   SubCutaneous three times a day before meals  insulin lispro Injectable (ADMELOG) 18 Unit(s) SubCutaneous three times a day before meals  levothyroxine 50 MICROGram(s) Oral daily  potassium chloride    Tablet ER 30 milliEquivalent(s) Oral two times a day  risperiDONE   Tablet 1 milliGRAM(s) Oral at bedtime  sodium chloride 0.9%. 1000 milliLiter(s) (125 mL/Hr) IV Continuous <Continuous>    MEDICATIONS  (PRN):  dexAMETHasone  IVPB 8 milliGRAM(s) IV Intermittent once PRN Nausea and/or Vomiting  HYDROmorphone  Injectable 0.5 milliGRAM(s) IV Push every 6 hours PRN breakthrough  LORazepam     Tablet 1 milliGRAM(s) Oral four times a day PRN Anxiety  ondansetron Injectable 4 milliGRAM(s) IV Push once PRN Nausea and/or Vomiting  oxyCODONE    IR 5 milliGRAM(s) Oral every 6 hours PRN Moderate Pain (4 - 6)  oxyCODONE    IR 10 milliGRAM(s) Oral every 6 hours PRN Severe Pain (7 - 10)      Allergies    amoxicillin (Unknown)  Lamictal (Unknown)  penicillin (Unknown)  Tegretol (Unknown)  Zetia (Unknown)    Intolerances        Review of systems:    Vital Signs Last 24 Hrs  T(C): 36.8 (05 Dec 2020 23:26), Max: 37 (05 Dec 2020 05:44)  T(F): 98.2 (05 Dec 2020 23:26), Max: 98.6 (05 Dec 2020 05:44)  HR: 96 (05 Dec 2020 23:26) (96 - 102)  BP: 109/70 (05 Dec 2020 23:26) (105/69 - 118/58)  BP(mean): --  RR: 18 (05 Dec 2020 23:26) (18 - 19)  SpO2: 93% (05 Dec 2020 23:26) (91% - 96%)    PHYSICAL EXAM:      Constitutional: NAD, well-groomed, well-developed  HEENT: PERRLA, EOMI, no exophalmos  Neck: No LAD, No JVD, trachea midline, no thyroid enlargement  Back: Normal spine flexure, No CVA tenderness  Respiratory: CTAB  Cardiovascular: S1 and S2, RRR, no M/G/R  Gastrointestinal: BS+, soft, no organomeglag or mass  Extremities: No peripheral edema, no pedal lesions  Vascular: 2+ peripheral pulses  Neurological: A/O x 3, no focal deficits  Psychiatric: Normal mood, normal affect  Musculoskeletal: 5/5 strength b/l upper and lower extremities  Skin: No rashes, no acanthosis        LABS:                        10.4   15.60 )-----------( 145      ( 05 Dec 2020 17:12 )             31.2     12-05    132<L>  |  96<L>  |  5.0<L>  ----------------------------<  400<H>  3.3<L>   |  23.0  |  0.87    Ca    8.3<L>      05 Dec 2020 09:57  Phos  2.5     12-05  Mg     1.7     12-04            CAPILLARY BLOOD GLUCOSE      RADIOLOGY & ADDITIONAL TESTS:

## 2020-12-06 NOTE — PROGRESS NOTE ADULT - SUBJECTIVE AND OBJECTIVE BOX
Surg Onc    Pt. distended and vomiting  Attempted NGT placement - pt. refusing after 3 attempts  AVSS  Lytes improved  WBC 13  AXR - large gastric bubble    Exam    abd distended, soft, non tender    A/P    POD2  Post op ileus  Will need another attempt at NGT w cetacaine spray if he continues to vomit  NPO  OOB/IS

## 2020-12-06 NOTE — CHART NOTE - NSCHARTNOTEFT_GEN_A_CORE
Attempted to place NGT in patient due to continued distension.   Patient denies nausea at this time. States only episode of emesis occurred this am. Failed NGT placement after emesis. Has progressively felt improving throughout the day.   Explained risks and benefits of NGT including aspiration risk.   Patient voiced understanding. Refused NGT placement.

## 2020-12-06 NOTE — PROGRESS NOTE ADULT - SUBJECTIVE AND OBJECTIVE BOX
NEPHROLOGY INTERVAL HPI/OVERNIGHT EVENTS:    No new events.    MEDICATIONS  (STANDING):  aMILoride 5 milliGRAM(s) Oral two times a day  atorvastatin 40 milliGRAM(s) Oral at bedtime  cloZAPine 150 milliGRAM(s) Oral at bedtime  dextrose 40% Gel 15 Gram(s) Oral once  dextrose 5%. 1000 milliLiter(s) (50 mL/Hr) IV Continuous <Continuous>  dextrose 5%. 1000 milliLiter(s) (100 mL/Hr) IV Continuous <Continuous>  dextrose 50% Injectable 25 Gram(s) IV Push once  dextrose 50% Injectable 12.5 Gram(s) IV Push once  dextrose 50% Injectable 25 Gram(s) IV Push once  erythromycin     enteric coated 1000 milliGRAM(s) Oral <User Schedule>  glucagon  Injectable 1 milliGRAM(s) IntraMuscular once  insulin glargine Injectable (LANTUS) 10 Unit(s) SubCutaneous at bedtime  insulin glargine Injectable (LANTUS) 10 Unit(s) SubCutaneous every morning  insulin lispro (ADMELOG) corrective regimen sliding scale   SubCutaneous three times a day before meals  insulin lispro Injectable (ADMELOG) 13 Unit(s) SubCutaneous three times a day before meals  levothyroxine 50 MICROGram(s) Oral daily  multiple electrolytes Injection Type 1 1000 milliLiter(s) (125 mL/Hr) IV Continuous <Continuous>  neomycin 1000 milliGRAM(s) Oral <User Schedule>  potassium chloride    Tablet ER 40 milliEquivalent(s) Oral every 4 hours  potassium chloride  20 mEq/100 mL IVPB 20 milliEquivalent(s) IV Intermittent every 2 hours    MEDICATIONS  (PRN):  acetaminophen   Tablet .. 650 milliGRAM(s) Oral every 6 hours PRN Mild Pain (1 - 3), Moderate Pain (4 - 6)      Allergies    amoxicillin (Unknown)  Lamictal (Unknown)  penicillin (Unknown)  Tegretol (Unknown)  Zetia (Unknown)        Vital Signs Last 24 Hrs  T(C): 37.5 (06 Dec 2020 07:29), Max: 37.5 (06 Dec 2020 07:29)  T(F): 99.5 (06 Dec 2020 07:29), Max: 99.5 (06 Dec 2020 07:29)  HR: 100 (06 Dec 2020 07:29) (96 - 102)  BP: 106/72 (06 Dec 2020 07:29) (106/72 - 118/58)  BP(mean): --  RR: 19 (06 Dec 2020 07:29) (18 - 19)  SpO2: 89% (06 Dec 2020 07:29) (89% - 95%)  T(C): 36.8 (05 Dec 2020 08:06), Max: 37 (05 Dec 2020 05:44)  T(F): 98.2 (05 Dec 2020 08:06), Max: 98.6 (05 Dec 2020 05:44)  HR: 102 (05 Dec 2020 08:06) (54 - 102)  BP: 105/69 (05 Dec 2020 08:06) (86/55 - 120/64)  BP(mean): --  RR: 18 (05 Dec 2020 08:06) (18 - 25)  SpO2: 91% (05 Dec 2020 08:06) (91% - 100%)  T(C): 36.7 (04 Dec 2020 07:55), Max: 36.8 (03 Dec 2020 15:23)  T(F): 98 (04 Dec 2020 07:55), Max: 98.3 (03 Dec 2020 15:23)  HR: 99 (04 Dec 2020 07:55) (82 - 99)  BP: 121/85 (04 Dec 2020 07:55) (108/73 - 124/83)        GENERAL: Comfortable in bed  ENMT: wnl  NECK: Supple, neck  veins wnl  NERVOUS SYSTEM:  Alert & verbal  CHEST/LUNG: Clear bilaterally  HEART: Regular rate and rhythm; No rub  ABDOMEN: Soft, Nontender, Nondistended, BS+; R ostomy      LABS:     12-06    140  |  101  |  8.0  ----------------------------<  181<H>  2.9<LL>   |  30.0<H>  |  0.60    Ca    8.0<L>      06 Dec 2020 05:53  Phos  1.5     12-06  Mg     1.8     12-06    TPro  5.2<L>  /  Alb  2.7<L>  /  TBili  0.3<L>  /  DBili  x   /  AST  19  /  ALT  29  /  AlkPhos  70  12-06      12-05    132<L>  |  96<L>  |  5.0<L>  ----------------------------<  400<H>  3.3<L>   |  23.0  |  0.87    Ca    8.3<L>      05 Dec 2020 09:57  Phos  2.5     12-05  Mg     1.7     12-04        12-04    134<L>  |  99  |  5.0<L>  ----------------------------<  224<H>  3.8   |  22.0  |  0.64    Ca    9.6      04 Dec 2020 08:04  Phos  3.3     12-04  Mg     1.7     12-04                            12.2   10.40 )-----------( 162      ( 02 Dec 2020 07:50 )             38.5     12-03    140  |  105  |  6.0<L>  ----------------------------<  204<H>  3.2<L>   |  25.0  |  0.61    Ca    9.4      03 Dec 2020 07:17  Mg     1.8     12-03          Magnesium, Serum: 1.8 mg/dL (12-03 @ 07:17)          RADIOLOGY & ADDITIONAL TESTS:

## 2020-12-06 NOTE — PROGRESS NOTE ADULT - ASSESSMENT
DmT2 - uncontrolled   will reinstate Lnatus 15 unti qhs  check 3 AM BS     Hypothyrodi Cont 0.05 mg synthroid

## 2020-12-07 ENCOUNTER — APPOINTMENT (OUTPATIENT)
Dept: HEMATOLOGY ONCOLOGY | Facility: CLINIC | Age: 55
End: 2020-12-07

## 2020-12-07 LAB
ANION GAP SERPL CALC-SCNC: 10 MMOL/L — SIGNIFICANT CHANGE UP (ref 5–17)
BUN SERPL-MCNC: 6 MG/DL — LOW (ref 8–20)
CALCIUM SERPL-MCNC: 7.8 MG/DL — LOW (ref 8.6–10.2)
CHLORIDE SERPL-SCNC: 104 MMOL/L — SIGNIFICANT CHANGE UP (ref 98–107)
CLOZAPINE SERPL-MCNC: 295 NG/ML — LOW (ref 350–600)
CLOZAPINE SPEC-SCNC: 426 NG/ML — LOW
CO2 SERPL-SCNC: 26 MMOL/L — SIGNIFICANT CHANGE UP (ref 22–29)
CREAT SERPL-MCNC: 0.6 MG/DL — SIGNIFICANT CHANGE UP (ref 0.5–1.3)
GLUCOSE BLDC GLUCOMTR-MCNC: 153 MG/DL — HIGH (ref 70–99)
GLUCOSE BLDC GLUCOMTR-MCNC: 169 MG/DL — HIGH (ref 70–99)
GLUCOSE BLDC GLUCOMTR-MCNC: 175 MG/DL — HIGH (ref 70–99)
GLUCOSE BLDC GLUCOMTR-MCNC: 223 MG/DL — HIGH (ref 70–99)
GLUCOSE BLDC GLUCOMTR-MCNC: 246 MG/DL — HIGH (ref 70–99)
GLUCOSE SERPL-MCNC: 237 MG/DL — HIGH (ref 70–99)
HCT VFR BLD CALC: 30.9 % — LOW (ref 39–50)
HGB BLD-MCNC: 9.9 G/DL — LOW (ref 13–17)
MAGNESIUM SERPL-MCNC: 1.8 MG/DL — SIGNIFICANT CHANGE UP (ref 1.6–2.6)
MCHC RBC-ENTMCNC: 29.2 PG — SIGNIFICANT CHANGE UP (ref 27–34)
MCHC RBC-ENTMCNC: 32 GM/DL — SIGNIFICANT CHANGE UP (ref 32–36)
MCV RBC AUTO: 91.2 FL — SIGNIFICANT CHANGE UP (ref 80–100)
NORCLOZAPINE SERPL-MCNC: 131 NG/ML — SIGNIFICANT CHANGE UP
PHOSPHATE SERPL-MCNC: 3.2 MG/DL — SIGNIFICANT CHANGE UP (ref 2.4–4.7)
PLATELET # BLD AUTO: 184 K/UL — SIGNIFICANT CHANGE UP (ref 150–400)
POTASSIUM SERPL-MCNC: 3.7 MMOL/L — SIGNIFICANT CHANGE UP (ref 3.5–5.3)
POTASSIUM SERPL-SCNC: 3.7 MMOL/L — SIGNIFICANT CHANGE UP (ref 3.5–5.3)
RBC # BLD: 3.39 M/UL — LOW (ref 4.2–5.8)
RBC # FLD: 13.5 % — SIGNIFICANT CHANGE UP (ref 10.3–14.5)
SODIUM SERPL-SCNC: 140 MMOL/L — SIGNIFICANT CHANGE UP (ref 135–145)
WBC # BLD: 13.41 K/UL — HIGH (ref 3.8–10.5)
WBC # FLD AUTO: 13.41 K/UL — HIGH (ref 3.8–10.5)

## 2020-12-07 PROCEDURE — 99233 SBSQ HOSP IP/OBS HIGH 50: CPT

## 2020-12-07 PROCEDURE — 99232 SBSQ HOSP IP/OBS MODERATE 35: CPT

## 2020-12-07 RX ORDER — SODIUM CHLORIDE 9 MG/ML
1000 INJECTION, SOLUTION INTRAVENOUS
Refills: 0 | Status: DISCONTINUED | OUTPATIENT
Start: 2020-12-07 | End: 2020-12-09

## 2020-12-07 RX ORDER — INSULIN GLARGINE 100 [IU]/ML
20 INJECTION, SOLUTION SUBCUTANEOUS AT BEDTIME
Refills: 0 | Status: DISCONTINUED | OUTPATIENT
Start: 2020-12-07 | End: 2020-12-21

## 2020-12-07 RX ORDER — MAGNESIUM SULFATE 500 MG/ML
2 VIAL (ML) INJECTION ONCE
Refills: 0 | Status: COMPLETED | OUTPATIENT
Start: 2020-12-07 | End: 2020-12-07

## 2020-12-07 RX ORDER — POTASSIUM CHLORIDE 20 MEQ
10 PACKET (EA) ORAL
Refills: 0 | Status: DISCONTINUED | OUTPATIENT
Start: 2020-12-07 | End: 2020-12-07

## 2020-12-07 RX ADMIN — RISPERIDONE 1 MILLIGRAM(S): 4 TABLET ORAL at 21:50

## 2020-12-07 RX ADMIN — Medication 18 UNIT(S): at 11:21

## 2020-12-07 RX ADMIN — CLOZAPINE 200 MILLIGRAM(S): 150 TABLET, ORALLY DISINTEGRATING ORAL at 21:50

## 2020-12-07 RX ADMIN — SODIUM CHLORIDE 100 MILLILITER(S): 9 INJECTION, SOLUTION INTRAVENOUS at 13:40

## 2020-12-07 RX ADMIN — Medication 25 MICROGRAM(S): at 05:25

## 2020-12-07 RX ADMIN — ENOXAPARIN SODIUM 40 MILLIGRAM(S): 100 INJECTION SUBCUTANEOUS at 11:21

## 2020-12-07 RX ADMIN — SODIUM CHLORIDE 100 MILLILITER(S): 9 INJECTION, SOLUTION INTRAVENOUS at 05:14

## 2020-12-07 RX ADMIN — Medication 1 PACKET(S): at 13:40

## 2020-12-07 RX ADMIN — Medication 5: at 07:59

## 2020-12-07 RX ADMIN — Medication 1 PACKET(S): at 21:50

## 2020-12-07 RX ADMIN — Medication 100 GRAM(S): at 05:15

## 2020-12-07 RX ADMIN — FAMOTIDINE 100 MILLIGRAM(S): 10 INJECTION INTRAVENOUS at 11:21

## 2020-12-07 RX ADMIN — Medication 18 UNIT(S): at 17:15

## 2020-12-07 RX ADMIN — Medication 50 GRAM(S): at 13:40

## 2020-12-07 RX ADMIN — Medication 100 GRAM(S): at 17:15

## 2020-12-07 RX ADMIN — Medication 1 MILLIGRAM(S): at 03:15

## 2020-12-07 RX ADMIN — INSULIN GLARGINE 20 UNIT(S): 100 INJECTION, SOLUTION SUBCUTANEOUS at 21:51

## 2020-12-07 RX ADMIN — Medication 5: at 11:21

## 2020-12-07 RX ADMIN — Medication 18 UNIT(S): at 07:59

## 2020-12-07 RX ADMIN — Medication 3: at 17:15

## 2020-12-07 NOTE — PROGRESS NOTE ADULT - SUBJECTIVE AND OBJECTIVE BOX
SURGERY DAILY PROGRESS NOTE:    Interval:   Episode of emesis yesterday am  KUB w/ evidence of ileus  Patient refused NGT throughout the day.  Tachycardiac to low 100s throughout the day, improves when OOB or with pain medication    S:   Patient seen and examined. No acute events overnight. Pain is well controlled. GI fxn remains. Denies nausea, no emesis overnight. OOB w/ supervision.      O:   Exam:  Gen: NAD. A&Ox3.  Well developed, alert and cooperative.   Resp: No additional work of breathing.   Card: RR. No peripheral edema or pallor.   Abd: Softly distended. Non tender. Ileostomy takedown site w/ dressing in place, c/d/i w/o surrounding erythema. Midline dressing inplace, c/d/i.   Ext: WWP. Able to move all extremities equally.    Vital Signs Last 24 Hrs  T(C): 36.8 (07 Dec 2020 00:10), Max: 37.5 (06 Dec 2020 07:29)  T(F): 98.3 (07 Dec 2020 00:10), Max: 99.5 (06 Dec 2020 07:29)  HR: 99 (07 Dec 2020 00:10) (98 - 102)  BP: 129/74 (07 Dec 2020 00:10) (102/65 - 129/74)  BP(mean): --  RR: 19 (07 Dec 2020 00:10) (18 - 19)  SpO2: 90% (07 Dec 2020 00:10) (89% - 98%)      12-05-20 @ 07:01  -  12-06-20 @ 07:00  --------------------------------------------------------  IN: 1800 mL / OUT: 1750 mL / NET: 50 mL    12-06-20 @ 07:01  -  12-07-20 @ 03:46  --------------------------------------------------------  IN: 0 mL / OUT: 1550 mL / NET: -1550 mL        LABS:                        10.0   13.67 )-----------( 155      ( 06 Dec 2020 05:53 )             30.4     12-06    136  |  104  |  7.0<L>  ----------------------------<  153<H>  3.5   |  23.0  |  0.53    Ca    7.6<L>      06 Dec 2020 17:17  Phos  2.0     12-06  Mg     1.7     12-06    TPro  5.2<L>  /  Alb  2.7<L>  /  TBili  0.3<L>  /  DBili  x   /  AST  19  /  ALT  29  /  AlkPhos  70  12-06

## 2020-12-07 NOTE — PHYSICAL THERAPY INITIAL EVALUATION ADULT - DID THE PATIENT HAVE SURGERY?
laparoscopic attempted converted to open partial colectomy with primary stapled ileocolic anastomosis, extremely friable tissue. abdomen washed out with 10L of saline/yes

## 2020-12-07 NOTE — PHYSICAL THERAPY INITIAL EVALUATION ADULT - ADDITIONAL COMMENTS
Pt is a poor historian, lethargic, not easily aroused. Unable to get a meaningful history from patient at this time.

## 2020-12-07 NOTE — PHYSICAL THERAPY INITIAL EVALUATION ADULT - FOLLOWS COMMANDS/ANSWERS QUESTIONS, REHAB EVAL
25% of the time/unable to follow multi-step instructions/unable to answer questions/speech unintelligible

## 2020-12-07 NOTE — DIETITIAN INITIAL EVALUATION ADULT. - OTHER INFO
55M w/ h/o stage IV colon cancer, schizophrenia, HLD, admitted to medicine for Lithium toxicity, now resolved. S/p open ileostomy reversal  on 12/4. Pt not answering appropriately during assessment, limited information obtained. Pt remains NPO, with ileus. A1c 10.1 noted. Last documented BM 12/2. RD to follow up.

## 2020-12-07 NOTE — PROGRESS NOTE ADULT - SUBJECTIVE AND OBJECTIVE BOX
NEPHROLOGY INTERVAL HPI/OVERNIGHT EVENTS:    No new events.    MEDICATIONS  (STANDING):  aMILoride 5 milliGRAM(s) Oral two times a day  atorvastatin 40 milliGRAM(s) Oral at bedtime  cloZAPine 150 milliGRAM(s) Oral at bedtime  dextrose 40% Gel 15 Gram(s) Oral once  dextrose 5%. 1000 milliLiter(s) (50 mL/Hr) IV Continuous <Continuous>  dextrose 5%. 1000 milliLiter(s) (100 mL/Hr) IV Continuous <Continuous>  dextrose 50% Injectable 25 Gram(s) IV Push once  dextrose 50% Injectable 12.5 Gram(s) IV Push once  dextrose 50% Injectable 25 Gram(s) IV Push once  erythromycin     enteric coated 1000 milliGRAM(s) Oral <User Schedule>  glucagon  Injectable 1 milliGRAM(s) IntraMuscular once  insulin glargine Injectable (LANTUS) 10 Unit(s) SubCutaneous at bedtime  insulin glargine Injectable (LANTUS) 10 Unit(s) SubCutaneous every morning  insulin lispro (ADMELOG) corrective regimen sliding scale   SubCutaneous three times a day before meals  insulin lispro Injectable (ADMELOG) 13 Unit(s) SubCutaneous three times a day before meals  levothyroxine 50 MICROGram(s) Oral daily  multiple electrolytes Injection Type 1 1000 milliLiter(s) (125 mL/Hr) IV Continuous <Continuous>  neomycin 1000 milliGRAM(s) Oral <User Schedule>  potassium chloride    Tablet ER 40 milliEquivalent(s) Oral every 4 hours  potassium chloride  20 mEq/100 mL IVPB 20 milliEquivalent(s) IV Intermittent every 2 hours    MEDICATIONS  (PRN):  acetaminophen   Tablet .. 650 milliGRAM(s) Oral every 6 hours PRN Mild Pain (1 - 3), Moderate Pain (4 - 6)      Allergies    amoxicillin (Unknown)  Lamictal (Unknown)  penicillin (Unknown)  Tegretol (Unknown)  Zetia (Unknown)        Vital Signs Last 24 Hrs  T(C): 36.8 (07 Dec 2020 05:01), Max: 37.1 (06 Dec 2020 15:47)  T(F): 98.2 (07 Dec 2020 05:01), Max: 98.8 (06 Dec 2020 15:47)  HR: 95 (07 Dec 2020 07:15) (95 - 102)  BP: 103/60 (07 Dec 2020 07:15) (102/65 - 129/74)  BP(mean): --  RR: 17 (07 Dec 2020 07:15) (17 - 19)  SpO2: 93% (07 Dec 2020 07:15) (90% - 98%)  T(C): 37.5 (06 Dec 2020 07:29), Max: 37.5 (06 Dec 2020 07:29)  T(F): 99.5 (06 Dec 2020 07:29), Max: 99.5 (06 Dec 2020 07:29)  HR: 100 (06 Dec 2020 07:29) (96 - 102)  BP: 106/72 (06 Dec 2020 07:29) (106/72 - 118/58)          GENERAL: Comfortable in bed, obese  ENMT: wnl  NECK: Supple, neck  veins wnl  NERVOUS SYSTEM:  Alert & verbal  CHEST/LUNG: Clear bilaterally with nasal 02  HEART: Regular rate and rhythm; No rub  ABDOMEN: Soft, Nontender, Nondistended, BS+; R ostomy  : Lucio with clear urine      LABS:     12-07    140  |  104  |  6.0<L>  ----------------------------<  237<H>  3.7   |  26.0  |  0.60    Ca    7.8<L>      07 Dec 2020 06:26  Phos  3.2     12-07  Mg     1.8     12-07    TPro  5.2<L>  /  Alb  2.7<L>  /  TBili  0.3<L>  /  DBili  x   /  AST  19  /  ALT  29  /  AlkPhos  70  12-06 12-06    140  |  101  |  8.0  ----------------------------<  181<H>  2.9<LL>   |  30.0<H>  |  0.60    Ca    8.0<L>      06 Dec 2020 05:53  Phos  1.5     12-06  Mg     1.8     12-06    TPro  5.2<L>  /  Alb  2.7<L>  /  TBili  0.3<L>  /  DBili  x   /  AST  19  /  ALT  29  /  AlkPhos  70  12-06      12-05    132<L>  |  96<L>  |  5.0<L>  ----------------------------<  400<H>  3.3<L>   |  23.0  |  0.87    Ca    8.3<L>      05 Dec 2020 09:57  Phos  2.5     12-05  Mg     1.7     12-04                              RADIOLOGY & ADDITIONAL TESTS:

## 2020-12-07 NOTE — PROGRESS NOTE BEHAVIORAL HEALTH - SUMMARY
Patient is a 55  year old, male; [facility]) ; ;  noncaregiver; past psychiatric history of schizoaffective disorder ; multiple prior psychiatric hospitalizations ([facility] on 4/30/20- 11/2/20 ); ; no known suicide attempts; no known history of violence or arrests; no active substance abuse or known history of complicated withdrawal; PMH of osteogenesis imperfecta, HTN, hypothyroidism, DM type 2 recent dx and bowel resection with ileostomy, with Stage IV colorectal cancer presented to ER from residence secondary to tremors.  Patient presented with approximately 10 day history of intermittent worsening gross tremor.  He was  found to have elevated lithium level. Patient presented with Lithium level 3.28 decreased to 2.37.  Patient was found to have some mild disorganization, w/some irritability and grandiosity which may be baseline.  Pt denies any S/H I/I/P and has reportedly been compliant with tx and medication. He has poor insight into psychiatric illness and reportedly has been taking poor care of his ADLs.  Collateral was obtained from home.    Pt's clozaril was increased back to 200 mg at night and Risperdal was added to regimen.  Lithium was discontinued.  Pt is currently at baseline.  May consider increasing Risperdal to 2 mg at night.

## 2020-12-07 NOTE — PROGRESS NOTE ADULT - SUBJECTIVE AND OBJECTIVE BOX
Interval Events:  no overnight events  follow up on diabetes, thyroid    patient seen and examined at bedside.  FS reviewed, on high doses of admelog due to being on dextrose fluids  FS still mildly hyperglycemic  still npo      REVIEW OF SYSTEMS:    CONSTITUTIONAL: No fever, weight loss, or fatigue  EYES: No eye pain, visual disturbances, or discharge  ENMT:  No difficulty hearing, tinnitus, vertigo; No sinus or throat pain  NECK: No pain or stiffness  RESPIRATORY: No cough, wheezing, chills or hemoptysis; No shortness of breath  CARDIOVASCULAR: No chest pain, palpitations, dizziness, or leg swelling  GASTROINTESTINAL: No abdominal or epigastric pain. No nausea, vomiting, or hematemesis; No diarrhea or constipation. No melena or hematochezia.  NEUROLOGICAL: No headaches, memory loss, loss of strength, numbness, or tremors  SKIN: No itching, burning, rashes, or lesions   MUSCULOSKELETAL: No joint pain or swelling; No muscle, back, or extremity pain  PSYCHIATRIC: No depression, anxiety, mood swings, or difficulty sleeping        amoxicillin (Unknown)  Lamictal (Unknown)  penicillin (Unknown)  Tegretol (Unknown)  Zetia (Unknown)      MEDICATIONS  (STANDING):  cefoTEtan  IVPB 2 Gram(s) IV Intermittent every 12 hours  cloZAPine 200 milliGRAM(s) Oral at bedtime  dextrose 40% Gel 15 Gram(s) Oral once  dextrose 5% + sodium chloride 0.45% 1000 milliLiter(s) (100 mL/Hr) IV Continuous <Continuous>  dextrose 5%. 1000 milliLiter(s) (50 mL/Hr) IV Continuous <Continuous>  dextrose 5%. 1000 milliLiter(s) (100 mL/Hr) IV Continuous <Continuous>  dextrose 50% Injectable 25 Gram(s) IV Push once  dextrose 50% Injectable 12.5 Gram(s) IV Push once  dextrose 50% Injectable 25 Gram(s) IV Push once  enoxaparin Injectable 40 milliGRAM(s) SubCutaneous daily  famotidine  IVPB 20 milliGRAM(s) IV Intermittent daily  glucagon  Injectable 1 milliGRAM(s) IntraMuscular once  insulin glargine Injectable (LANTUS) 20 Unit(s) SubCutaneous at bedtime  insulin lispro (ADMELOG) corrective regimen sliding scale   SubCutaneous three times a day before meals  insulin lispro Injectable (ADMELOG) 18 Unit(s) SubCutaneous three times a day before meals  levothyroxine Injectable 25 MICROGram(s) IV Push <User Schedule>  magnesium sulfate  IVPB 2 Gram(s) IV Intermittent once  potassium phosphate / sodium phosphate Powder (PHOS-NaK) 1 Packet(s) Oral three times a day  risperiDONE   Tablet 1 milliGRAM(s) Oral at bedtime    MEDICATIONS  (PRN):  HYDROmorphone  Injectable 0.5 milliGRAM(s) IV Push every 6 hours PRN breakthrough  ketorolac   Injectable 15 milliGRAM(s) IV Push every 6 hours PRN Moderate Pain (4 - 6)  LORazepam   Injectable 1 milliGRAM(s) IV Push every 6 hours PRN Anxiety  morphine  - Injectable 2 milliGRAM(s) IV Push every 4 hours PRN Moderate Pain (4 - 6)  morphine  - Injectable 4 milliGRAM(s) IV Push every 4 hours PRN Severe Pain (7 - 10)  ondansetron Injectable 4 milliGRAM(s) IV Push once PRN Nausea and/or Vomiting      Vital Signs Last 24 Hrs  T(C): 36.8 (07 Dec 2020 05:01), Max: 37.1 (06 Dec 2020 15:47)  T(F): 98.2 (07 Dec 2020 05:01), Max: 98.8 (06 Dec 2020 15:47)  HR: 95 (07 Dec 2020 07:15) (95 - 102)  BP: 103/60 (07 Dec 2020 07:15) (102/65 - 129/74)  BP(mean): --  RR: 17 (07 Dec 2020 07:15) (17 - 19)  SpO2: 93% (07 Dec 2020 07:15) (90% - 98%)    Physical Exam:    Constitutional: NAD, well-developed  HEENT: EOMI, no exophalmos  Neck: trachea midline, no thyroid enlargement  Respiratory: CTAB, normal respirations  Cardiovascular: S1 and S2, RRR  Gastrointestinal: BS+, soft, ntnd  Extremities: No peripheral edema  Neurological: AOx3, no focal deficits  Psychiatric: Normal mood and normal affect  Skin: no rashes, no acanthosis    LABS  12-07    140  |  104  |  6.0<L>  ----------------------------<  237<H>  3.7   |  26.0  |  0.60    Ca    7.8<L>      07 Dec 2020 06:26  Phos  3.2     12-07  Mg     1.8     12-07    TPro  5.2<L>  /  Alb  2.7<L>  /  TBili  0.3<L>  /  DBili  x   /  AST  19  /  ALT  29  /  AlkPhos  70  12-06                          9.9    13.41 )-----------( 184      ( 07 Dec 2020 06:26 )             30.9       T4, Serum: 4.9 ug/dL (12-01-20 @ 04:07)  Thyroid Stimulating Hormone, Serum: 19.18 uIU/mL (12-01-20 @ 04:07)  Thyroid Stimulating Hormone, Serum: 12.63 uIU/mL (11-30-20 @ 16:39)    Alkaline Phosphatase, Serum: 70 U/L (12-06-20 @ 05:53)  Alanine Aminotransferase (ALT/SGPT): 29 U/L (12-06-20 @ 05:53)  Albumin, Serum: 2.7 g/dL (12-06-20 @ 05:53)  Aspartate Aminotransferase (AST/SGOT): 19 U/L (12-06-20 @ 05:53)          CAPILLARY BLOOD GLUCOSE      POCT Blood Glucose.: 223 mg/dL (07 Dec 2020 10:52)  POCT Blood Glucose.: 246 mg/dL (07 Dec 2020 07:57)  POCT Blood Glucose.: 133 mg/dL (06 Dec 2020 20:54)  POCT Blood Glucose.: 153 mg/dL (06 Dec 2020 17:27)  POCT Blood Glucose.: 205 mg/dL (06 Dec 2020 12:51)   Interval Events:  no overnight events  follow up on diabetes, thyroid    patient seen and examined at bedside.  FS reviewed, on high doses of admelog due to being on dextrose fluids  FS still mildly hyperglycemic  still npo  1:1 at bedside     REVIEW OF SYSTEMS:    CONSTITUTIONAL: No fever, weight loss, or fatigue  EYES: No eye pain, visual disturbances, or discharge  ENMT:  No difficulty hearing, tinnitus, vertigo; No sinus or throat pain  NECK: No pain or stiffness  RESPIRATORY: No cough, wheezing, chills or hemoptysis; No shortness of breath  CARDIOVASCULAR: No chest pain, palpitations, dizziness, or leg swelling  GASTROINTESTINAL: No abdominal or epigastric pain. No nausea, vomiting, or hematemesis; No diarrhea or constipation. No melena or hematochezia.  NEUROLOGICAL: No headaches, memory loss, loss of strength, numbness, or tremors  SKIN: No itching, burning, rashes, or lesions   MUSCULOSKELETAL: No joint pain or swelling; No muscle, back, or extremity pain  PSYCHIATRIC: No depression, anxiety, mood swings, or difficulty sleeping        amoxicillin (Unknown)  Lamictal (Unknown)  penicillin (Unknown)  Tegretol (Unknown)  Zetia (Unknown)      MEDICATIONS  (STANDING):  cefoTEtan  IVPB 2 Gram(s) IV Intermittent every 12 hours  cloZAPine 200 milliGRAM(s) Oral at bedtime  dextrose 40% Gel 15 Gram(s) Oral once  dextrose 5% + sodium chloride 0.45% 1000 milliLiter(s) (100 mL/Hr) IV Continuous <Continuous>  dextrose 5%. 1000 milliLiter(s) (50 mL/Hr) IV Continuous <Continuous>  dextrose 5%. 1000 milliLiter(s) (100 mL/Hr) IV Continuous <Continuous>  dextrose 50% Injectable 25 Gram(s) IV Push once  dextrose 50% Injectable 12.5 Gram(s) IV Push once  dextrose 50% Injectable 25 Gram(s) IV Push once  enoxaparin Injectable 40 milliGRAM(s) SubCutaneous daily  famotidine  IVPB 20 milliGRAM(s) IV Intermittent daily  glucagon  Injectable 1 milliGRAM(s) IntraMuscular once  insulin glargine Injectable (LANTUS) 20 Unit(s) SubCutaneous at bedtime  insulin lispro (ADMELOG) corrective regimen sliding scale   SubCutaneous three times a day before meals  insulin lispro Injectable (ADMELOG) 18 Unit(s) SubCutaneous three times a day before meals  levothyroxine Injectable 25 MICROGram(s) IV Push <User Schedule>  magnesium sulfate  IVPB 2 Gram(s) IV Intermittent once  potassium phosphate / sodium phosphate Powder (PHOS-NaK) 1 Packet(s) Oral three times a day  risperiDONE   Tablet 1 milliGRAM(s) Oral at bedtime    MEDICATIONS  (PRN):  HYDROmorphone  Injectable 0.5 milliGRAM(s) IV Push every 6 hours PRN breakthrough  ketorolac   Injectable 15 milliGRAM(s) IV Push every 6 hours PRN Moderate Pain (4 - 6)  LORazepam   Injectable 1 milliGRAM(s) IV Push every 6 hours PRN Anxiety  morphine  - Injectable 2 milliGRAM(s) IV Push every 4 hours PRN Moderate Pain (4 - 6)  morphine  - Injectable 4 milliGRAM(s) IV Push every 4 hours PRN Severe Pain (7 - 10)  ondansetron Injectable 4 milliGRAM(s) IV Push once PRN Nausea and/or Vomiting      Vital Signs Last 24 Hrs  T(C): 36.8 (07 Dec 2020 05:01), Max: 37.1 (06 Dec 2020 15:47)  T(F): 98.2 (07 Dec 2020 05:01), Max: 98.8 (06 Dec 2020 15:47)  HR: 95 (07 Dec 2020 07:15) (95 - 102)  BP: 103/60 (07 Dec 2020 07:15) (102/65 - 129/74)  BP(mean): --  RR: 17 (07 Dec 2020 07:15) (17 - 19)  SpO2: 93% (07 Dec 2020 07:15) (90% - 98%)    Physical Exam:    Constitutional: NAD, obese, older than stated age  HEENT: EOMI, no exophalmos  Neck: trachea midline, no thyroid enlargement  Respiratory: CTAB, normal respirations  Cardiovascular: S1 and S2, RRR  Gastrointestinal: BS+, soft, ntnd  Extremities: No peripheral edema  Neurological: Alert, awake, no focal deficits  Psychiatric: Normal mood and normal affect  Skin: no rashes, no acanthosis    LABS  12-07    140  |  104  |  6.0<L>  ----------------------------<  237<H>  3.7   |  26.0  |  0.60    Ca    7.8<L>      07 Dec 2020 06:26  Phos  3.2     12-07  Mg     1.8     12-07    TPro  5.2<L>  /  Alb  2.7<L>  /  TBili  0.3<L>  /  DBili  x   /  AST  19  /  ALT  29  /  AlkPhos  70  12-06                          9.9    13.41 )-----------( 184      ( 07 Dec 2020 06:26 )             30.9       T4, Serum: 4.9 ug/dL (12-01-20 @ 04:07)  Thyroid Stimulating Hormone, Serum: 19.18 uIU/mL (12-01-20 @ 04:07)  Thyroid Stimulating Hormone, Serum: 12.63 uIU/mL (11-30-20 @ 16:39)    Alkaline Phosphatase, Serum: 70 U/L (12-06-20 @ 05:53)  Alanine Aminotransferase (ALT/SGPT): 29 U/L (12-06-20 @ 05:53)  Albumin, Serum: 2.7 g/dL (12-06-20 @ 05:53)  Aspartate Aminotransferase (AST/SGOT): 19 U/L (12-06-20 @ 05:53)          CAPILLARY BLOOD GLUCOSE      POCT Blood Glucose.: 223 mg/dL (07 Dec 2020 10:52)  POCT Blood Glucose.: 246 mg/dL (07 Dec 2020 07:57)  POCT Blood Glucose.: 133 mg/dL (06 Dec 2020 20:54)  POCT Blood Glucose.: 153 mg/dL (06 Dec 2020 17:27)  POCT Blood Glucose.: 205 mg/dL (06 Dec 2020 12:51)

## 2020-12-07 NOTE — DIETITIAN INITIAL EVALUATION ADULT. - PERTINENT LABORATORY DATA
12-07 Na140 mmol/L Glu 237 mg/dL<H> K+ 3.7 mmol/L Cr  0.60 mg/dL BUN 6.0 mg/dL<L> Phos 3.2 mg/dL Alb n/a   PAB n/a

## 2020-12-07 NOTE — PROGRESS NOTE ADULT - ASSESSMENT
55M w/ h/o stage IV colon cancer, schizophrenia, HLD, admitted to medicine for Lithium toxicity, now resolved. S/p open ileostomy reversal  on 12/4.       History of Stage 4 Colon Cancer w/ h/o loop ileostomy now s/p reversal  - qdaily ileostomy site dressing chances w/ wet to dry    Ileus  - all meds via ID  - NGT ready at bedside should patient have further episode of emesis  - NPO   - D5 1/2 NS @100cc/hr    Hypothyroidism  -lithium induced. Endocrine following  - Synthroid 50mcg daily    DM  - on AM and PM lantus, as well as 18U premeal and sliding scale    HCL  -on statin.     Lithium toxicity / tremors / weakness --> resolved  - Lithium level 3.28 on admission, most recently 0.47 yesterday am  - lithium held     Schizoaffective disorder    - Psych on board. with changes to medications  - Clozapine 200, lithium changed to risperdal  - continue medication IV

## 2020-12-07 NOTE — PROGRESS NOTE ADULT - ASSESSMENT
55M w/ stage IV colon cancer, schizophrenia, T2DM, HLD admitted to medicine for lithium toxicity. s/p open iliostomy reversal on 12/4. consult for lithium toxicity    [id number] hyperglycemic due to being on dextrose fluids  -A1c 10.1  -on dextrose fluids bc npo  -increase lantus to 20 units QHS  -keep admelog 18 units plus high dose sliding scale BUT will need to decrease the dose or dc if off dextrose fluids otherwise hypoglycemia will result    Hypothyroidism  -lithium toxicity resolved  -continue LT4 25mcg IV (if okay for orals, convert to LT4 50mcg daily)    Hx of colon cancer- care as per primary team, s/p iliostomy reversal, npo

## 2020-12-08 ENCOUNTER — APPOINTMENT (OUTPATIENT)
Dept: SURGICAL ONCOLOGY | Facility: HOSPITAL | Age: 55
End: 2020-12-08

## 2020-12-08 LAB
ANION GAP SERPL CALC-SCNC: 11 MMOL/L — SIGNIFICANT CHANGE UP (ref 5–17)
ANION GAP SERPL CALC-SCNC: 9 MMOL/L — SIGNIFICANT CHANGE UP (ref 5–17)
ANION GAP SERPL CALC-SCNC: 9 MMOL/L — SIGNIFICANT CHANGE UP (ref 5–17)
BASOPHILS # BLD AUTO: 0.02 K/UL — SIGNIFICANT CHANGE UP (ref 0–0.2)
BASOPHILS NFR BLD AUTO: 0.2 % — SIGNIFICANT CHANGE UP (ref 0–2)
BUN SERPL-MCNC: 4 MG/DL — LOW (ref 8–20)
BUN SERPL-MCNC: 6 MG/DL — LOW (ref 8–20)
BUN SERPL-MCNC: 6 MG/DL — LOW (ref 8–20)
CALCIUM SERPL-MCNC: 8.3 MG/DL — LOW (ref 8.6–10.2)
CALCIUM SERPL-MCNC: 8.4 MG/DL — LOW (ref 8.6–10.2)
CALCIUM SERPL-MCNC: 8.6 MG/DL — SIGNIFICANT CHANGE UP (ref 8.6–10.2)
CHLORIDE SERPL-SCNC: 101 MMOL/L — SIGNIFICANT CHANGE UP (ref 98–107)
CHLORIDE SERPL-SCNC: 104 MMOL/L — SIGNIFICANT CHANGE UP (ref 98–107)
CHLORIDE SERPL-SCNC: 98 MMOL/L — SIGNIFICANT CHANGE UP (ref 98–107)
CO2 SERPL-SCNC: 27 MMOL/L — SIGNIFICANT CHANGE UP (ref 22–29)
CO2 SERPL-SCNC: 28 MMOL/L — SIGNIFICANT CHANGE UP (ref 22–29)
CO2 SERPL-SCNC: 29 MMOL/L — SIGNIFICANT CHANGE UP (ref 22–29)
CREAT SERPL-MCNC: 0.62 MG/DL — SIGNIFICANT CHANGE UP (ref 0.5–1.3)
CREAT SERPL-MCNC: 0.63 MG/DL — SIGNIFICANT CHANGE UP (ref 0.5–1.3)
CREAT SERPL-MCNC: 0.74 MG/DL — SIGNIFICANT CHANGE UP (ref 0.5–1.3)
EOSINOPHIL # BLD AUTO: 0.12 K/UL — SIGNIFICANT CHANGE UP (ref 0–0.5)
EOSINOPHIL NFR BLD AUTO: 1 % — SIGNIFICANT CHANGE UP (ref 0–6)
GLUCOSE BLDC GLUCOMTR-MCNC: 102 MG/DL — HIGH (ref 70–99)
GLUCOSE BLDC GLUCOMTR-MCNC: 124 MG/DL — HIGH (ref 70–99)
GLUCOSE BLDC GLUCOMTR-MCNC: 146 MG/DL — HIGH (ref 70–99)
GLUCOSE BLDC GLUCOMTR-MCNC: 179 MG/DL — HIGH (ref 70–99)
GLUCOSE SERPL-MCNC: 138 MG/DL — HIGH (ref 70–99)
GLUCOSE SERPL-MCNC: 184 MG/DL — HIGH (ref 70–99)
GLUCOSE SERPL-MCNC: 90 MG/DL — SIGNIFICANT CHANGE UP (ref 70–99)
HCT VFR BLD CALC: 30.4 % — LOW (ref 39–50)
HGB BLD-MCNC: 9.8 G/DL — LOW (ref 13–17)
IMM GRANULOCYTES NFR BLD AUTO: 1.1 % — SIGNIFICANT CHANGE UP (ref 0–1.5)
LYMPHOCYTES # BLD AUTO: 1.24 K/UL — SIGNIFICANT CHANGE UP (ref 1–3.3)
LYMPHOCYTES # BLD AUTO: 10.3 % — LOW (ref 13–44)
MAGNESIUM SERPL-MCNC: 1.7 MG/DL — SIGNIFICANT CHANGE UP (ref 1.6–2.6)
MAGNESIUM SERPL-MCNC: 1.9 MG/DL — SIGNIFICANT CHANGE UP (ref 1.6–2.6)
MAGNESIUM SERPL-MCNC: 2 MG/DL — SIGNIFICANT CHANGE UP (ref 1.8–2.6)
MCHC RBC-ENTMCNC: 29.2 PG — SIGNIFICANT CHANGE UP (ref 27–34)
MCHC RBC-ENTMCNC: 32.2 GM/DL — SIGNIFICANT CHANGE UP (ref 32–36)
MCV RBC AUTO: 90.5 FL — SIGNIFICANT CHANGE UP (ref 80–100)
MONOCYTES # BLD AUTO: 0.55 K/UL — SIGNIFICANT CHANGE UP (ref 0–0.9)
MONOCYTES NFR BLD AUTO: 4.6 % — SIGNIFICANT CHANGE UP (ref 2–14)
NEUTROPHILS # BLD AUTO: 9.95 K/UL — HIGH (ref 1.8–7.4)
NEUTROPHILS NFR BLD AUTO: 82.8 % — HIGH (ref 43–77)
PHOSPHATE SERPL-MCNC: 1.8 MG/DL — LOW (ref 2.4–4.7)
PHOSPHATE SERPL-MCNC: 2.7 MG/DL — SIGNIFICANT CHANGE UP (ref 2.4–4.7)
PHOSPHATE SERPL-MCNC: 3 MG/DL — SIGNIFICANT CHANGE UP (ref 2.4–4.7)
PLATELET # BLD AUTO: 218 K/UL — SIGNIFICANT CHANGE UP (ref 150–400)
POTASSIUM SERPL-MCNC: 2.8 MMOL/L — CRITICAL LOW (ref 3.5–5.3)
POTASSIUM SERPL-MCNC: 3.1 MMOL/L — LOW (ref 3.5–5.3)
POTASSIUM SERPL-MCNC: 3.1 MMOL/L — LOW (ref 3.5–5.3)
POTASSIUM SERPL-SCNC: 2.8 MMOL/L — CRITICAL LOW (ref 3.5–5.3)
POTASSIUM SERPL-SCNC: 3.1 MMOL/L — LOW (ref 3.5–5.3)
POTASSIUM SERPL-SCNC: 3.1 MMOL/L — LOW (ref 3.5–5.3)
RBC # BLD: 3.36 M/UL — LOW (ref 4.2–5.8)
RBC # FLD: 13.3 % — SIGNIFICANT CHANGE UP (ref 10.3–14.5)
SODIUM SERPL-SCNC: 137 MMOL/L — SIGNIFICANT CHANGE UP (ref 135–145)
SODIUM SERPL-SCNC: 139 MMOL/L — SIGNIFICANT CHANGE UP (ref 135–145)
SODIUM SERPL-SCNC: 140 MMOL/L — SIGNIFICANT CHANGE UP (ref 135–145)
WBC # BLD: 12.01 K/UL — HIGH (ref 3.8–10.5)
WBC # FLD AUTO: 12.01 K/UL — HIGH (ref 3.8–10.5)

## 2020-12-08 RX ORDER — POTASSIUM PHOSPHATE, MONOBASIC POTASSIUM PHOSPHATE, DIBASIC 236; 224 MG/ML; MG/ML
30 INJECTION, SOLUTION INTRAVENOUS ONCE
Refills: 0 | Status: DISCONTINUED | OUTPATIENT
Start: 2020-12-08 | End: 2020-12-08

## 2020-12-08 RX ORDER — TAMSULOSIN HYDROCHLORIDE 0.4 MG/1
0.4 CAPSULE ORAL AT BEDTIME
Refills: 0 | Status: DISCONTINUED | OUTPATIENT
Start: 2020-12-08 | End: 2020-12-21

## 2020-12-08 RX ORDER — ATORVASTATIN CALCIUM 80 MG/1
40 TABLET, FILM COATED ORAL AT BEDTIME
Refills: 0 | Status: DISCONTINUED | OUTPATIENT
Start: 2020-12-08 | End: 2020-12-21

## 2020-12-08 RX ORDER — POTASSIUM CHLORIDE 20 MEQ
20 PACKET (EA) ORAL DAILY
Refills: 0 | Status: DISCONTINUED | OUTPATIENT
Start: 2020-12-08 | End: 2020-12-09

## 2020-12-08 RX ORDER — MAGNESIUM OXIDE 400 MG ORAL TABLET 241.3 MG
400 TABLET ORAL ONCE
Refills: 0 | Status: COMPLETED | OUTPATIENT
Start: 2020-12-08 | End: 2020-12-09

## 2020-12-08 RX ORDER — POTASSIUM CHLORIDE 20 MEQ
20 PACKET (EA) ORAL THREE TIMES A DAY
Refills: 0 | Status: DISCONTINUED | OUTPATIENT
Start: 2020-12-08 | End: 2020-12-09

## 2020-12-08 RX ORDER — TAMSULOSIN HYDROCHLORIDE 0.4 MG/1
0.4 CAPSULE ORAL ONCE
Refills: 0 | Status: COMPLETED | OUTPATIENT
Start: 2020-12-08 | End: 2020-12-08

## 2020-12-08 RX ORDER — POTASSIUM CHLORIDE 20 MEQ
10 PACKET (EA) ORAL
Refills: 0 | Status: COMPLETED | OUTPATIENT
Start: 2020-12-08 | End: 2020-12-08

## 2020-12-08 RX ORDER — POTASSIUM CHLORIDE 20 MEQ
20 PACKET (EA) ORAL DAILY
Refills: 0 | Status: DISCONTINUED | OUTPATIENT
Start: 2020-12-08 | End: 2020-12-08

## 2020-12-08 RX ADMIN — TAMSULOSIN HYDROCHLORIDE 0.4 MILLIGRAM(S): 0.4 CAPSULE ORAL at 07:19

## 2020-12-08 RX ADMIN — SODIUM CHLORIDE 100 MILLILITER(S): 9 INJECTION, SOLUTION INTRAVENOUS at 01:55

## 2020-12-08 RX ADMIN — Medication 20 MILLIEQUIVALENT(S): at 23:51

## 2020-12-08 RX ADMIN — Medication 100 GRAM(S): at 17:07

## 2020-12-08 RX ADMIN — Medication 100 GRAM(S): at 05:27

## 2020-12-08 RX ADMIN — Medication 25 MICROGRAM(S): at 05:27

## 2020-12-08 RX ADMIN — CLOZAPINE 200 MILLIGRAM(S): 150 TABLET, ORALLY DISINTEGRATING ORAL at 22:05

## 2020-12-08 RX ADMIN — Medication 1 PACKET(S): at 22:05

## 2020-12-08 RX ADMIN — Medication 18 UNIT(S): at 11:04

## 2020-12-08 RX ADMIN — Medication 100 MILLIEQUIVALENT(S): at 14:20

## 2020-12-08 RX ADMIN — RISPERIDONE 1 MILLIGRAM(S): 4 TABLET ORAL at 22:05

## 2020-12-08 RX ADMIN — Medication 1 PACKET(S): at 13:11

## 2020-12-08 RX ADMIN — INSULIN GLARGINE 20 UNIT(S): 100 INJECTION, SOLUTION SUBCUTANEOUS at 22:05

## 2020-12-08 RX ADMIN — Medication 100 MILLIEQUIVALENT(S): at 16:43

## 2020-12-08 RX ADMIN — FAMOTIDINE 100 MILLIGRAM(S): 10 INJECTION INTRAVENOUS at 11:03

## 2020-12-08 RX ADMIN — Medication 100 MILLIEQUIVALENT(S): at 14:59

## 2020-12-08 RX ADMIN — SODIUM CHLORIDE 100 MILLILITER(S): 9 INJECTION, SOLUTION INTRAVENOUS at 22:06

## 2020-12-08 RX ADMIN — Medication 1 PACKET(S): at 05:27

## 2020-12-08 RX ADMIN — ENOXAPARIN SODIUM 40 MILLIGRAM(S): 100 INJECTION SUBCUTANEOUS at 11:03

## 2020-12-08 RX ADMIN — Medication 20 MILLIEQUIVALENT(S): at 11:15

## 2020-12-08 RX ADMIN — Medication 18 UNIT(S): at 17:07

## 2020-12-08 RX ADMIN — Medication 3: at 05:35

## 2020-12-08 RX ADMIN — Medication 18 UNIT(S): at 06:02

## 2020-12-08 NOTE — PROGRESS NOTE ADULT - ASSESSMENT
55M w/ h/o stage IV colon cancer, schizophrenia, HLD, admitted to medicine for Lithium toxicity, now resolved. S/p open ileostomy reversal on 12/4.     History of Stage 4 Colon Cancer w/ h/o loop ileostomy now s/p reversal  - daily ileostomy site dressing chances w/ wet to dry    Ileus  - NPO   - D5 1/2 NS @100cc/hr  - CT A/P if pt has emesis     Hypothyroidism  -lithium induced. Endocrine following  - Synthroid 25mcg IV    DM  - last 3 POCT in mid 100's  - PM Lantus 20 U  - Premeal 18 U  - ISS    HCL  -Atorvastatin restarted    Lithium toxicity / tremors / weakness --> resolved  - Lithium level 3.28 on admission, most recently 0.47 yesterday am  - lithium held     Schizoaffective disorder    - Psych following  - Clozapine 200, lithium changed to risperdal  - constant obs  - continue medication IV    DVT ppx: Lovenox 40

## 2020-12-08 NOTE — PROGRESS NOTE ADULT - SUBJECTIVE AND OBJECTIVE BOX
INTERVAL HPI/OVERNIGHT EVENTS:  Pt seen bedside, did well overnight, no nausea, vomiting. States he feels the urge to have a BM.     MEDICATIONS  (STANDING):  cefoTEtan  IVPB 2 Gram(s) IV Intermittent every 12 hours  cloZAPine 200 milliGRAM(s) Oral at bedtime  dextrose 40% Gel 15 Gram(s) Oral once  dextrose 5% + sodium chloride 0.45% 1000 milliLiter(s) (100 mL/Hr) IV Continuous <Continuous>  dextrose 5%. 1000 milliLiter(s) (50 mL/Hr) IV Continuous <Continuous>  dextrose 5%. 1000 milliLiter(s) (100 mL/Hr) IV Continuous <Continuous>  dextrose 50% Injectable 25 Gram(s) IV Push once  dextrose 50% Injectable 12.5 Gram(s) IV Push once  dextrose 50% Injectable 25 Gram(s) IV Push once  enoxaparin Injectable 40 milliGRAM(s) SubCutaneous daily  famotidine  IVPB 20 milliGRAM(s) IV Intermittent daily  glucagon  Injectable 1 milliGRAM(s) IntraMuscular once  insulin glargine Injectable (LANTUS) 20 Unit(s) SubCutaneous at bedtime  insulin lispro (ADMELOG) corrective regimen sliding scale   SubCutaneous three times a day before meals  insulin lispro Injectable (ADMELOG) 18 Unit(s) SubCutaneous three times a day before meals  levothyroxine Injectable 25 MICROGram(s) IV Push <User Schedule>  potassium phosphate / sodium phosphate Powder (PHOS-NaK) 1 Packet(s) Oral three times a day  risperiDONE   Tablet 1 milliGRAM(s) Oral at bedtime    MEDICATIONS  (PRN):  HYDROmorphone  Injectable 0.5 milliGRAM(s) IV Push every 6 hours PRN breakthrough  ketorolac   Injectable 15 milliGRAM(s) IV Push every 6 hours PRN Moderate Pain (4 - 6)  LORazepam   Injectable 1 milliGRAM(s) IV Push every 6 hours PRN Anxiety  morphine  - Injectable 2 milliGRAM(s) IV Push every 4 hours PRN Moderate Pain (4 - 6)  morphine  - Injectable 4 milliGRAM(s) IV Push every 4 hours PRN Severe Pain (7 - 10)  ondansetron Injectable 4 milliGRAM(s) IV Push once PRN Nausea and/or Vomiting      Vital Signs Last 24 Hrs  T(C): 36.7 (08 Dec 2020 04:35), Max: 36.9 (07 Dec 2020 19:13)  T(F): 98.1 (08 Dec 2020 04:35), Max: 98.5 (07 Dec 2020 19:13)  HR: 75 (08 Dec 2020 04:35) (62 - 95)  BP: 108/71 (08 Dec 2020 04:35) (103/60 - 131/75)  BP(mean): --  RR: 18 (08 Dec 2020 04:35) (17 - 18)  SpO2: 94% (08 Dec 2020 04:35) (91% - 94%)    Constitutional: NAD  Respiratory: no increased WOB, speaking full sentences  Cardiovascular: RRR  Gastrointestinal: Soft, non-distended, non-tender, dressing c/d/i no strikethrough  Musculoskeletal: No joint pain, swelling or deformity; no limitation of movement    I&O's Detail    06 Dec 2020 07:01  -  07 Dec 2020 07:00  --------------------------------------------------------  IN:    dextrose 5% + sodium chloride 0.45% w/ Additives: 500 mL    sodium chloride 0.9%: 225 mL  Total IN: 725 mL    OUT:    Indwelling Catheter - Urethral (mL): 4500 mL  Total OUT: 4500 mL    Total NET: -3775 mL      07 Dec 2020 07:01  -  08 Dec 2020 05:20  --------------------------------------------------------  IN:    dextrose 5% + sodium chloride 0.45% w/ Additives: 2100 mL  Total IN: 2100 mL    OUT:    Indwelling Catheter - Urethral (mL): 925 mL  Total OUT: 925 mL    Total NET: 1175 mL          LABS:                        9.9    13.41 )-----------( 184      ( 07 Dec 2020 06:26 )             30.9     12-07    140  |  104  |  6.0<L>  ----------------------------<  237<H>  3.7   |  26.0  |  0.60    Ca    7.8<L>      07 Dec 2020 06:26  Phos  3.2     12-07  Mg     1.8     12-07    TPro  5.2<L>  /  Alb  2.7<L>  /  TBili  0.3<L>  /  DBili  x   /  AST  19  /  ALT  29  /  AlkPhos  70  12-06

## 2020-12-08 NOTE — PROGRESS NOTE ADULT - SUBJECTIVE AND OBJECTIVE BOX
NEPHROLOGY INTERVAL HPI/OVERNIGHT EVENTS:    No new events.    MEDICATIONS  (STANDING):  aMILoride 5 milliGRAM(s) Oral two times a day  atorvastatin 40 milliGRAM(s) Oral at bedtime  cloZAPine 150 milliGRAM(s) Oral at bedtime  dextrose 40% Gel 15 Gram(s) Oral once  dextrose 5%. 1000 milliLiter(s) (50 mL/Hr) IV Continuous <Continuous>  dextrose 5%. 1000 milliLiter(s) (100 mL/Hr) IV Continuous <Continuous>  dextrose 50% Injectable 25 Gram(s) IV Push once  dextrose 50% Injectable 12.5 Gram(s) IV Push once  dextrose 50% Injectable 25 Gram(s) IV Push once  erythromycin     enteric coated 1000 milliGRAM(s) Oral <User Schedule>  glucagon  Injectable 1 milliGRAM(s) IntraMuscular once  insulin glargine Injectable (LANTUS) 10 Unit(s) SubCutaneous at bedtime  insulin glargine Injectable (LANTUS) 10 Unit(s) SubCutaneous every morning  insulin lispro (ADMELOG) corrective regimen sliding scale   SubCutaneous three times a day before meals  insulin lispro Injectable (ADMELOG) 13 Unit(s) SubCutaneous three times a day before meals  levothyroxine 50 MICROGram(s) Oral daily  multiple electrolytes Injection Type 1 1000 milliLiter(s) (125 mL/Hr) IV Continuous <Continuous>  neomycin 1000 milliGRAM(s) Oral <User Schedule>  potassium chloride    Tablet ER 40 milliEquivalent(s) Oral every 4 hours  potassium chloride  20 mEq/100 mL IVPB 20 milliEquivalent(s) IV Intermittent every 2 hours    MEDICATIONS  (PRN):  acetaminophen   Tablet .. 650 milliGRAM(s) Oral every 6 hours PRN Mild Pain (1 - 3), Moderate Pain (4 - 6)      Allergies    amoxicillin (Unknown)  Lamictal (Unknown)  penicillin (Unknown)  Tegretol (Unknown)  Zetia (Unknown)        Vital Signs Last 24 Hr  T(C): 36.6 (08 Dec 2020 08:05), Max: 36.9 (07 Dec 2020 19:13)  T(F): 97.9 (08 Dec 2020 08:05), Max: 98.5 (07 Dec 2020 19:13)  HR: 76 (08 Dec 2020 08:05) (62 - 77)  BP: 114/71 (08 Dec 2020 08:05) (105/63 - 131/75)  BP(mean): --  RR: 19 (08 Dec 2020 08:05) (18 - 19)  SpO2: 93% (08 Dec 2020 08:05) (91% - 94%)  T(C): 36.8 (07 Dec 2020 05:01), Max: 37.1 (06 Dec 2020 15:47)  T(F): 98.2 (07 Dec 2020 05:01), Max: 98.8 (06 Dec 2020 15:47)  HR: 95 (07 Dec 2020 07:15) (95 - 102)  BP: 103/60 (07 Dec 2020 07:15) (102/65 - 129/74)            GENERAL: Comfortable in bed, obese  ENMT: wnl  NECK: Supple, neck  veins wnl  NERVOUS SYSTEM:  Alert & verbal  CHEST/LUNG: Clear bilaterally with nasal 02  HEART: Regular rate and rhythm; No rub  ABDOMEN: Soft, Nontender, Nondistended, BS+; R ostomy  : Lucio with clear urine      LABS:     12-08    139  |  101  |  6.0<L>  ----------------------------<  184<H>  3.1<L>   |  29.0  |  0.63    Ca    8.4<L>      08 Dec 2020 06:49  Phos  1.8     12-08  Mg     2.0     12-08 12-07    140  |  104  |  6.0<L>  ----------------------------<  237<H>  3.7   |  26.0  |  0.60    Ca    7.8<L>      07 Dec 2020 06:26  Phos  3.2     12-07  Mg     1.8     12-07    TPro  5.2<L>  /  Alb  2.7<L>  /  TBili  0.3<L>  /  DBili  x   /  AST  19  /  ALT  29  /  AlkPhos  70  12-06 12-06    140  |  101  |  8.0  ----------------------------<  181<H>  2.9<LL>   |  30.0<H>  |  0.60    Ca    8.0<L>      06 Dec 2020 05:53  Phos  1.5     12-06  Mg     1.8     12-06    TPro  5.2<L>  /  Alb  2.7<L>  /  TBili  0.3<L>  /  DBili  x   /  AST  19  /  ALT  29  /  AlkPhos  70  12-06 12-05    132<L>  |  96<L>  |  5.0<L>  ----------------------------<  400<H>  3.3<L>   |  23.0  |  0.87    Ca    8.3<L>      05 Dec 2020 09:57  Phos  2.5     12-05  Mg     1.7     12-04                              RADIOLOGY & ADDITIONAL TESTS:

## 2020-12-09 LAB
ANION GAP SERPL CALC-SCNC: 9 MMOL/L — SIGNIFICANT CHANGE UP (ref 5–17)
BUN SERPL-MCNC: 3 MG/DL — LOW (ref 8–20)
CALCIUM SERPL-MCNC: 8.8 MG/DL — SIGNIFICANT CHANGE UP (ref 8.6–10.2)
CHLORIDE SERPL-SCNC: 99 MMOL/L — SIGNIFICANT CHANGE UP (ref 98–107)
CO2 SERPL-SCNC: 26 MMOL/L — SIGNIFICANT CHANGE UP (ref 22–29)
CREAT SERPL-MCNC: 0.51 MG/DL — SIGNIFICANT CHANGE UP (ref 0.5–1.3)
GLUCOSE BLDC GLUCOMTR-MCNC: 168 MG/DL — HIGH (ref 70–99)
GLUCOSE BLDC GLUCOMTR-MCNC: 170 MG/DL — HIGH (ref 70–99)
GLUCOSE BLDC GLUCOMTR-MCNC: 191 MG/DL — HIGH (ref 70–99)
GLUCOSE SERPL-MCNC: 191 MG/DL — HIGH (ref 70–99)
HCT VFR BLD CALC: 33.9 % — LOW (ref 39–50)
HGB BLD-MCNC: 11.2 G/DL — LOW (ref 13–17)
MAGNESIUM SERPL-MCNC: 1.6 MG/DL — SIGNIFICANT CHANGE UP (ref 1.6–2.6)
MCHC RBC-ENTMCNC: 29.7 PG — SIGNIFICANT CHANGE UP (ref 27–34)
MCHC RBC-ENTMCNC: 33 GM/DL — SIGNIFICANT CHANGE UP (ref 32–36)
MCV RBC AUTO: 89.9 FL — SIGNIFICANT CHANGE UP (ref 80–100)
PHOSPHATE SERPL-MCNC: 3.1 MG/DL — SIGNIFICANT CHANGE UP (ref 2.4–4.7)
PLATELET # BLD AUTO: 242 K/UL — SIGNIFICANT CHANGE UP (ref 150–400)
POTASSIUM SERPL-MCNC: 3.6 MMOL/L — SIGNIFICANT CHANGE UP (ref 3.5–5.3)
POTASSIUM SERPL-SCNC: 3.6 MMOL/L — SIGNIFICANT CHANGE UP (ref 3.5–5.3)
RBC # BLD: 3.77 M/UL — LOW (ref 4.2–5.8)
RBC # FLD: 13.7 % — SIGNIFICANT CHANGE UP (ref 10.3–14.5)
SODIUM SERPL-SCNC: 133 MMOL/L — LOW (ref 135–145)
WBC # BLD: 11.44 K/UL — HIGH (ref 3.8–10.5)
WBC # FLD AUTO: 11.44 K/UL — HIGH (ref 3.8–10.5)

## 2020-12-09 PROCEDURE — 93970 EXTREMITY STUDY: CPT | Mod: 26

## 2020-12-09 PROCEDURE — 93010 ELECTROCARDIOGRAM REPORT: CPT

## 2020-12-09 RX ORDER — ACETYLCYSTEINE 200 MG/ML
4 VIAL (ML) MISCELLANEOUS THREE TIMES A DAY
Refills: 0 | Status: COMPLETED | OUTPATIENT
Start: 2020-12-09 | End: 2020-12-10

## 2020-12-09 RX ORDER — POTASSIUM CHLORIDE 20 MEQ
20 PACKET (EA) ORAL ONCE
Refills: 0 | Status: DISCONTINUED | OUTPATIENT
Start: 2020-12-09 | End: 2020-12-09

## 2020-12-09 RX ORDER — SODIUM CHLORIDE 9 MG/ML
1000 INJECTION INTRAMUSCULAR; INTRAVENOUS; SUBCUTANEOUS ONCE
Refills: 0 | Status: COMPLETED | OUTPATIENT
Start: 2020-12-09 | End: 2020-12-09

## 2020-12-09 RX ORDER — ALBUTEROL 90 UG/1
1.25 AEROSOL, METERED ORAL EVERY 6 HOURS
Refills: 0 | Status: DISCONTINUED | OUTPATIENT
Start: 2020-12-09 | End: 2020-12-21

## 2020-12-09 RX ORDER — MAGNESIUM SULFATE 500 MG/ML
2 VIAL (ML) INJECTION ONCE
Refills: 0 | Status: COMPLETED | OUTPATIENT
Start: 2020-12-09 | End: 2020-12-09

## 2020-12-09 RX ORDER — POTASSIUM CHLORIDE 20 MEQ
10 PACKET (EA) ORAL
Refills: 0 | Status: COMPLETED | OUTPATIENT
Start: 2020-12-09 | End: 2020-12-09

## 2020-12-09 RX ORDER — SODIUM CHLORIDE 9 MG/ML
1000 INJECTION, SOLUTION INTRAVENOUS
Refills: 0 | Status: DISCONTINUED | OUTPATIENT
Start: 2020-12-09 | End: 2020-12-11

## 2020-12-09 RX ADMIN — SODIUM CHLORIDE 1000 MILLILITER(S): 9 INJECTION INTRAMUSCULAR; INTRAVENOUS; SUBCUTANEOUS at 21:44

## 2020-12-09 RX ADMIN — MORPHINE SULFATE 2 MILLIGRAM(S): 50 CAPSULE, EXTENDED RELEASE ORAL at 21:58

## 2020-12-09 RX ADMIN — MORPHINE SULFATE 2 MILLIGRAM(S): 50 CAPSULE, EXTENDED RELEASE ORAL at 17:40

## 2020-12-09 RX ADMIN — Medication 100 GRAM(S): at 06:28

## 2020-12-09 RX ADMIN — Medication 25 MICROGRAM(S): at 06:28

## 2020-12-09 RX ADMIN — Medication 100 GRAM(S): at 16:42

## 2020-12-09 RX ADMIN — Medication 50 GRAM(S): at 12:12

## 2020-12-09 RX ADMIN — CLOZAPINE 200 MILLIGRAM(S): 150 TABLET, ORALLY DISINTEGRATING ORAL at 21:46

## 2020-12-09 RX ADMIN — Medication 3: at 08:34

## 2020-12-09 RX ADMIN — Medication 20 MILLIEQUIVALENT(S): at 06:28

## 2020-12-09 RX ADMIN — FAMOTIDINE 100 MILLIGRAM(S): 10 INJECTION INTRAVENOUS at 16:27

## 2020-12-09 RX ADMIN — Medication 3: at 12:12

## 2020-12-09 RX ADMIN — Medication 100 MILLIEQUIVALENT(S): at 16:27

## 2020-12-09 RX ADMIN — ATORVASTATIN CALCIUM 40 MILLIGRAM(S): 80 TABLET, FILM COATED ORAL at 21:46

## 2020-12-09 RX ADMIN — MAGNESIUM OXIDE 400 MG ORAL TABLET 400 MILLIGRAM(S): 241.3 TABLET ORAL at 06:28

## 2020-12-09 RX ADMIN — Medication 100 MILLIEQUIVALENT(S): at 17:30

## 2020-12-09 RX ADMIN — MORPHINE SULFATE 2 MILLIGRAM(S): 50 CAPSULE, EXTENDED RELEASE ORAL at 16:40

## 2020-12-09 RX ADMIN — RISPERIDONE 1 MILLIGRAM(S): 4 TABLET ORAL at 21:46

## 2020-12-09 RX ADMIN — ENOXAPARIN SODIUM 40 MILLIGRAM(S): 100 INJECTION SUBCUTANEOUS at 12:13

## 2020-12-09 RX ADMIN — SODIUM CHLORIDE 100 MILLILITER(S): 9 INJECTION, SOLUTION INTRAVENOUS at 06:29

## 2020-12-09 RX ADMIN — Medication 100 MILLIEQUIVALENT(S): at 14:15

## 2020-12-09 RX ADMIN — TAMSULOSIN HYDROCHLORIDE 0.4 MILLIGRAM(S): 0.4 CAPSULE ORAL at 21:45

## 2020-12-09 RX ADMIN — MORPHINE SULFATE 2 MILLIGRAM(S): 50 CAPSULE, EXTENDED RELEASE ORAL at 21:43

## 2020-12-09 RX ADMIN — INSULIN GLARGINE 20 UNIT(S): 100 INJECTION, SOLUTION SUBCUTANEOUS at 21:45

## 2020-12-09 RX ADMIN — SODIUM CHLORIDE 60 MILLILITER(S): 9 INJECTION, SOLUTION INTRAVENOUS at 21:49

## 2020-12-09 RX ADMIN — Medication 1 PACKET(S): at 06:29

## 2020-12-09 NOTE — PROGRESS NOTE ADULT - ASSESSMENT
55M w/ h/o stage IV colon cancer, schizophrenia, HLD, admitted to medicine for Lithium toxicity, now resolved. S/p open ileostomy reversal on 12/4, persistent ileus, despite nurse report of flatus, as the patient continues distended.     History of Stage 4 Colon Cancer w/ h/o loop ileostomy now s/p reversal  - daily ileostomy site dressing chances w/ wet to dry    Ileus  - NPO   - D5 1/2 NS @100cc/hr  - CT A/P if pt has emesis and NGT    Hypothyroidism  -lithium induced. Endocrine following -> TSH wnl  - Synthroid 25mcg IV    DM  - last 3 POCT in mid 100's  - PM Lantus 20 U  - Premeal 18 U  - ISS    HCL  -Atorvastatin restarted    Lithium toxicity / tremors / weakness --> resolved  - Lithium level 3.28 on admission, most recently 0.47 yesterday am  - lithium held     Schizoaffective disorder    - Psych following  - Clozapine 200, lithium changed to risperdal  - constant obs  - continue medication IV    DVT ppx: Lovenox 40     55M w/ h/o stage IV colon cancer, schizophrenia, HLD, admitted to medicine for Lithium toxicity, now resolved. S/p open ileostomy reversal on 12/4, persistent ileus, despite nurse report of flatus, as the patient continues distended.     History of Stage 4 Colon Cancer w/ h/o loop ileostomy now s/p reversal  - daily ileostomy site dressing chances w/ wet to dry    Hypokalemia  -Continue repletion over the night, will recheck BMP am    Ileus  - NPO   - D5 1/2 NS @100cc/hr  - CT A/P if pt has emesis and NGT    Hypothyroidism  -lithium induced. Endocrine following -> TSH wnl  - Synthroid 25mcg IV    DM  - last 3 POCT in mid 100's  - PM Lantus 20 U  - Premeal 18 U  - ISS    HCL  -Atorvastatin restarted    Lithium toxicity / tremors / weakness --> resolved  - Lithium level 3.28 on admission, most recently 0.47 yesterday am  - lithium held     Schizoaffective disorder    - Psych following  - Clozapine 200, lithium changed to risperdal  - constant obs  - continue medication IV    DVT ppx: Lovenox 40

## 2020-12-09 NOTE — PROGRESS NOTE ADULT - SUBJECTIVE AND OBJECTIVE BOX
NEPHROLOGY INTERVAL HPI/OVERNIGHT EVENTS:    Examined  Events noted    MEDICATIONS  (STANDING):  acetylcysteine 20%  Inhalation 4 milliLiter(s) Inhalation three times a day  atorvastatin 40 milliGRAM(s) Oral at bedtime  cloZAPine 200 milliGRAM(s) Oral at bedtime  dextrose 40% Gel 15 Gram(s) Oral once  dextrose 5% + sodium chloride 0.45%. 1000 milliLiter(s) (60 mL/Hr) IV Continuous <Continuous>  dextrose 5%. 1000 milliLiter(s) (50 mL/Hr) IV Continuous <Continuous>  dextrose 5%. 1000 milliLiter(s) (100 mL/Hr) IV Continuous <Continuous>  dextrose 50% Injectable 25 Gram(s) IV Push once  dextrose 50% Injectable 12.5 Gram(s) IV Push once  dextrose 50% Injectable 25 Gram(s) IV Push once  enoxaparin Injectable 40 milliGRAM(s) SubCutaneous daily  famotidine  IVPB 20 milliGRAM(s) IV Intermittent daily  glucagon  Injectable 1 milliGRAM(s) IntraMuscular once  insulin glargine Injectable (LANTUS) 20 Unit(s) SubCutaneous at bedtime  insulin lispro (ADMELOG) corrective regimen sliding scale   SubCutaneous three times a day before meals  insulin lispro Injectable (ADMELOG) 18 Unit(s) SubCutaneous three times a day before meals  levothyroxine Injectable 25 MICROGram(s) IV Push <User Schedule>  potassium chloride  10 mEq/100 mL IVPB 10 milliEquivalent(s) IV Intermittent every 1 hour  risperiDONE   Tablet 1 milliGRAM(s) Oral at bedtime  tamsulosin 0.4 milliGRAM(s) Oral at bedtime    MEDICATIONS  (PRN):  ALBUTerol   0.042% 1.25 milliGRAM(s) Nebulizer every 6 hours PRN Shortness of Breath and/or Wheezing  HYDROmorphone  Injectable 0.5 milliGRAM(s) IV Push every 6 hours PRN breakthrough  ketorolac   Injectable 15 milliGRAM(s) IV Push every 6 hours PRN Moderate Pain (4 - 6)  LORazepam   Injectable 1 milliGRAM(s) IV Push every 6 hours PRN Anxiety  morphine  - Injectable 2 milliGRAM(s) IV Push every 4 hours PRN Moderate Pain (4 - 6)  morphine  - Injectable 4 milliGRAM(s) IV Push every 4 hours PRN Severe Pain (7 - 10)  ondansetron Injectable 4 milliGRAM(s) IV Push once PRN Nausea and/or Vomiting      Allergies    amoxicillin (Unknown)  Lamictal (Unknown)  penicillin (Unknown)  Tegretol (Unknown)  Zetia (Unknown)    Intolerances        Vital Signs Last 24 Hrs  T(C): 36.7 (09 Dec 2020 15:54), Max: 36.7 (08 Dec 2020 19:53)  T(F): 98.1 (09 Dec 2020 15:54), Max: 98.1 (08 Dec 2020 19:53)  HR: 108 (09 Dec 2020 15:54) (82 - 108)  BP: 113/76 (09 Dec 2020 15:54) (113/76 - 123/81)  BP(mean): 91 (09 Dec 2020 07:15) (18 - 91)  RR: 18 (09 Dec 2020 15:54) (17 - 18)  SpO2: 92% (09 Dec 2020 15:54) (92% - 98%)  Daily     Daily Weight in k.8 (08 Dec 2020 23:29)    PHYSICAL EXAM:  GENERAL: NAD, obese  NECK: Supple  NERVOUS SYSTEM:  Alert & verbal  CHEST/LUNG: Clear bilaterally   HEART: Regular rate and rhythm  ABDOMEN: Soft, Nontender, Nondistended, BS+; R ostomy    LABS:                        11.2   11.44 )-----------( 242      ( 09 Dec 2020 06:58 )             33.9         133<L>  |  99  |  3.0<L>  ----------------------------<  191<H>  3.6   |  26.0  |  0.51    Ca    8.8      09 Dec 2020 06:58  Phos  3.1     -  Mg     1.6               Magnesium, Serum: 1.6 mg/dL ( @ 06:58)  Phosphorus Level, Serum: 3.1 mg/dL ( @ 06:58)  Magnesium, Serum: 1.7 mg/dL ( @ 21:15)  Phosphorus Level, Serum: 3.0 mg/dL ( @ 21:15)          RADIOLOGY & ADDITIONAL TESTS:

## 2020-12-09 NOTE — PROGRESS NOTE ADULT - ASSESSMENT
HypoKalemia being supplemented  Mild hypoNatremia  Ostomy output noted  repeat labs in AM    Will follow

## 2020-12-09 NOTE — PROGRESS NOTE ADULT - SUBJECTIVE AND OBJECTIVE BOX
INTERVAL HPI/OVERNIGHT EVENTS:  Patient evaluated at bedside. No acute distress. No acute events overnight. Patient offer no complaints. PAtient continues without bowel movements, however the patient is passing flatus, as per nurse report. Patient is voiding spontaneously, has no nausea or vomiting.  PM BMP persists with hypokalemia of 3.1, and the patient will be replated with a new check in the morning.    MEDICATIONS  (STANDING):  atorvastatin 40 milliGRAM(s) Oral at bedtime  cefoTEtan  IVPB 2 Gram(s) IV Intermittent every 12 hours  cloZAPine 200 milliGRAM(s) Oral at bedtime  dextrose 40% Gel 15 Gram(s) Oral once  dextrose 5% + sodium chloride 0.45% 1000 milliLiter(s) (100 mL/Hr) IV Continuous <Continuous>  dextrose 5%. 1000 milliLiter(s) (50 mL/Hr) IV Continuous <Continuous>  dextrose 5%. 1000 milliLiter(s) (100 mL/Hr) IV Continuous <Continuous>  dextrose 50% Injectable 25 Gram(s) IV Push once  dextrose 50% Injectable 12.5 Gram(s) IV Push once  dextrose 50% Injectable 25 Gram(s) IV Push once  enoxaparin Injectable 40 milliGRAM(s) SubCutaneous daily  famotidine  IVPB 20 milliGRAM(s) IV Intermittent daily  glucagon  Injectable 1 milliGRAM(s) IntraMuscular once  insulin glargine Injectable (LANTUS) 20 Unit(s) SubCutaneous at bedtime  insulin lispro (ADMELOG) corrective regimen sliding scale   SubCutaneous three times a day before meals  insulin lispro Injectable (ADMELOG) 18 Unit(s) SubCutaneous three times a day before meals  levothyroxine Injectable 25 MICROGram(s) IV Push <User Schedule>  magnesium oxide 400 milliGRAM(s) Oral once  potassium chloride    Tablet ER 20 milliEquivalent(s) Oral three times a day  potassium chloride   Powder 20 milliEquivalent(s) Oral daily  potassium phosphate / sodium phosphate Powder (PHOS-NaK) 1 Packet(s) Oral three times a day  risperiDONE   Tablet 1 milliGRAM(s) Oral at bedtime  tamsulosin 0.4 milliGRAM(s) Oral at bedtime    MEDICATIONS  (PRN):  HYDROmorphone  Injectable 0.5 milliGRAM(s) IV Push every 6 hours PRN breakthrough  ketorolac   Injectable 15 milliGRAM(s) IV Push every 6 hours PRN Moderate Pain (4 - 6)  LORazepam   Injectable 1 milliGRAM(s) IV Push every 6 hours PRN Anxiety  morphine  - Injectable 2 milliGRAM(s) IV Push every 4 hours PRN Moderate Pain (4 - 6)  morphine  - Injectable 4 milliGRAM(s) IV Push every 4 hours PRN Severe Pain (7 - 10)  ondansetron Injectable 4 milliGRAM(s) IV Push once PRN Nausea and/or Vomiting      Vital Signs Last 24 Hrs  T(C): 36.7 (08 Dec 2020 23:29), Max: 36.8 (08 Dec 2020 15:47)  T(F): 98 (08 Dec 2020 23:29), Max: 98.3 (08 Dec 2020 15:47)  HR: 101 (08 Dec 2020 23:29) (75 - 101)  BP: 120/79 (08 Dec 2020 23:29) (108/71 - 128/94)  BP(mean): 18 (08 Dec 2020 23:29) (18 - 18)  RR: 18 (08 Dec 2020 23:29) (17 - 19)  SpO2: 98% (08 Dec 2020 23:29) (93% - 98%)    Constitutional: NAD  Respiratory: no increased WOB, speaking full sentences  Cardiovascular: RRR  Gastrointestinal: Soft, Mildly distended, non-tender, dressing c/d/i no strikethrough  Musculoskeletal: No joint pain, swelling or deformity; no limitation of movement        I&O's Detail    07 Dec 2020 07:01  -  08 Dec 2020 07:00  --------------------------------------------------------  IN:    dextrose 5% + sodium chloride 0.45% w/ Additives: 2400 mL  Total IN: 2400 mL    OUT:    Indwelling Catheter - Urethral (mL): 925 mL  Total OUT: 925 mL    Total NET: 1475 mL      08 Dec 2020 07:01  -  09 Dec 2020 01:16  --------------------------------------------------------  IN:    dextrose 5% + sodium chloride 0.45% w/ Additives: 700 mL  Total IN: 700 mL    OUT:    Voided (mL): 1000 mL  Total OUT: 1000 mL    Total NET: -300 mL          LABS:                        9.8    12.01 )-----------( 218      ( 08 Dec 2020 06:49 )             30.4     12-08    140  |  104  |  4.0<L>  ----------------------------<  90  3.1<L>   |  27.0  |  0.74    Ca    8.3<L>      08 Dec 2020 21:15  Phos  3.0     12-08  Mg     1.7     12-08            RADIOLOGY & ADDITIONAL STUDIES:

## 2020-12-10 LAB
ANION GAP SERPL CALC-SCNC: 8 MMOL/L — SIGNIFICANT CHANGE UP (ref 5–17)
BASOPHILS # BLD AUTO: 0.04 K/UL — SIGNIFICANT CHANGE UP (ref 0–0.2)
BASOPHILS NFR BLD AUTO: 0.3 % — SIGNIFICANT CHANGE UP (ref 0–2)
BUN SERPL-MCNC: 4 MG/DL — LOW (ref 8–20)
CALCIUM SERPL-MCNC: 8.2 MG/DL — LOW (ref 8.6–10.2)
CHLORIDE SERPL-SCNC: 100 MMOL/L — SIGNIFICANT CHANGE UP (ref 98–107)
CO2 SERPL-SCNC: 26 MMOL/L — SIGNIFICANT CHANGE UP (ref 22–29)
CREAT SERPL-MCNC: 0.54 MG/DL — SIGNIFICANT CHANGE UP (ref 0.5–1.3)
EOSINOPHIL # BLD AUTO: 0.15 K/UL — SIGNIFICANT CHANGE UP (ref 0–0.5)
EOSINOPHIL NFR BLD AUTO: 1.1 % — SIGNIFICANT CHANGE UP (ref 0–6)
GLUCOSE BLDC GLUCOMTR-MCNC: 160 MG/DL — HIGH (ref 70–99)
GLUCOSE BLDC GLUCOMTR-MCNC: 161 MG/DL — HIGH (ref 70–99)
GLUCOSE BLDC GLUCOMTR-MCNC: 171 MG/DL — HIGH (ref 70–99)
GLUCOSE BLDC GLUCOMTR-MCNC: 184 MG/DL — HIGH (ref 70–99)
GLUCOSE SERPL-MCNC: 178 MG/DL — HIGH (ref 70–99)
HCT VFR BLD CALC: 32.4 % — LOW (ref 39–50)
HGB BLD-MCNC: 10.5 G/DL — LOW (ref 13–17)
IMM GRANULOCYTES NFR BLD AUTO: 1 % — SIGNIFICANT CHANGE UP (ref 0–1.5)
LITHIUM SERPL-MCNC: <0.1 MMOL/L — LOW (ref 0.5–1.5)
LYMPHOCYTES # BLD AUTO: 1.29 K/UL — SIGNIFICANT CHANGE UP (ref 1–3.3)
LYMPHOCYTES # BLD AUTO: 9.8 % — LOW (ref 13–44)
MAGNESIUM SERPL-MCNC: 1.8 MG/DL — SIGNIFICANT CHANGE UP (ref 1.6–2.6)
MCHC RBC-ENTMCNC: 29.1 PG — SIGNIFICANT CHANGE UP (ref 27–34)
MCHC RBC-ENTMCNC: 32.4 GM/DL — SIGNIFICANT CHANGE UP (ref 32–36)
MCV RBC AUTO: 89.8 FL — SIGNIFICANT CHANGE UP (ref 80–100)
MONOCYTES # BLD AUTO: 0.88 K/UL — SIGNIFICANT CHANGE UP (ref 0–0.9)
MONOCYTES NFR BLD AUTO: 6.7 % — SIGNIFICANT CHANGE UP (ref 2–14)
NEUTROPHILS # BLD AUTO: 10.66 K/UL — HIGH (ref 1.8–7.4)
NEUTROPHILS NFR BLD AUTO: 81.1 % — HIGH (ref 43–77)
PHOSPHATE SERPL-MCNC: 2.9 MG/DL — SIGNIFICANT CHANGE UP (ref 2.4–4.7)
PLATELET # BLD AUTO: 233 K/UL — SIGNIFICANT CHANGE UP (ref 150–400)
POTASSIUM SERPL-MCNC: 3.3 MMOL/L — LOW (ref 3.5–5.3)
POTASSIUM SERPL-SCNC: 3.3 MMOL/L — LOW (ref 3.5–5.3)
RBC # BLD: 3.61 M/UL — LOW (ref 4.2–5.8)
RBC # FLD: 13.9 % — SIGNIFICANT CHANGE UP (ref 10.3–14.5)
SODIUM SERPL-SCNC: 134 MMOL/L — LOW (ref 135–145)
WBC # BLD: 13.15 K/UL — HIGH (ref 3.8–10.5)
WBC # FLD AUTO: 13.15 K/UL — HIGH (ref 3.8–10.5)

## 2020-12-10 PROCEDURE — 71045 X-RAY EXAM CHEST 1 VIEW: CPT | Mod: 26

## 2020-12-10 RX ORDER — MINERAL OIL
133 OIL (ML) MISCELLANEOUS ONCE
Refills: 0 | Status: COMPLETED | OUTPATIENT
Start: 2020-12-10 | End: 2020-12-10

## 2020-12-10 RX ORDER — MAGNESIUM SULFATE 500 MG/ML
1 VIAL (ML) INJECTION ONCE
Refills: 0 | Status: COMPLETED | OUTPATIENT
Start: 2020-12-10 | End: 2020-12-10

## 2020-12-10 RX ORDER — POTASSIUM CHLORIDE 20 MEQ
10 PACKET (EA) ORAL
Refills: 0 | Status: COMPLETED | OUTPATIENT
Start: 2020-12-10 | End: 2020-12-10

## 2020-12-10 RX ORDER — POTASSIUM CHLORIDE 20 MEQ
10 PACKET (EA) ORAL ONCE
Refills: 0 | Status: DISCONTINUED | OUTPATIENT
Start: 2020-12-10 | End: 2020-12-10

## 2020-12-10 RX ORDER — POTASSIUM CHLORIDE 20 MEQ
20 PACKET (EA) ORAL ONCE
Refills: 0 | Status: DISCONTINUED | OUTPATIENT
Start: 2020-12-10 | End: 2020-12-10

## 2020-12-10 RX ADMIN — MORPHINE SULFATE 2 MILLIGRAM(S): 50 CAPSULE, EXTENDED RELEASE ORAL at 06:30

## 2020-12-10 RX ADMIN — Medication 100 MILLIEQUIVALENT(S): at 11:06

## 2020-12-10 RX ADMIN — ATORVASTATIN CALCIUM 40 MILLIGRAM(S): 80 TABLET, FILM COATED ORAL at 21:53

## 2020-12-10 RX ADMIN — MORPHINE SULFATE 2 MILLIGRAM(S): 50 CAPSULE, EXTENDED RELEASE ORAL at 06:15

## 2020-12-10 RX ADMIN — Medication 25 MICROGRAM(S): at 06:15

## 2020-12-10 RX ADMIN — CLOZAPINE 200 MILLIGRAM(S): 150 TABLET, ORALLY DISINTEGRATING ORAL at 21:53

## 2020-12-10 RX ADMIN — RISPERIDONE 1 MILLIGRAM(S): 4 TABLET ORAL at 21:54

## 2020-12-10 RX ADMIN — MORPHINE SULFATE 2 MILLIGRAM(S): 50 CAPSULE, EXTENDED RELEASE ORAL at 13:26

## 2020-12-10 RX ADMIN — ALBUTEROL 1.25 MILLIGRAM(S): 90 AEROSOL, METERED ORAL at 08:31

## 2020-12-10 RX ADMIN — Medication 100 MILLIEQUIVALENT(S): at 13:05

## 2020-12-10 RX ADMIN — Medication 3: at 06:36

## 2020-12-10 RX ADMIN — Medication 15 MILLIGRAM(S): at 13:26

## 2020-12-10 RX ADMIN — Medication 3: at 17:32

## 2020-12-10 RX ADMIN — SODIUM CHLORIDE 60 MILLILITER(S): 9 INJECTION, SOLUTION INTRAVENOUS at 21:55

## 2020-12-10 RX ADMIN — Medication 4 MILLILITER(S): at 08:31

## 2020-12-10 RX ADMIN — Medication 4 MILLILITER(S): at 14:58

## 2020-12-10 RX ADMIN — ENOXAPARIN SODIUM 40 MILLIGRAM(S): 100 INJECTION SUBCUTANEOUS at 13:16

## 2020-12-10 RX ADMIN — FAMOTIDINE 100 MILLIGRAM(S): 10 INJECTION INTRAVENOUS at 13:14

## 2020-12-10 RX ADMIN — ALBUTEROL 1.25 MILLIGRAM(S): 90 AEROSOL, METERED ORAL at 14:59

## 2020-12-10 RX ADMIN — Medication 100 GRAM(S): at 10:59

## 2020-12-10 RX ADMIN — INSULIN GLARGINE 20 UNIT(S): 100 INJECTION, SOLUTION SUBCUTANEOUS at 21:53

## 2020-12-10 RX ADMIN — Medication 1 ENEMA: at 18:44

## 2020-12-10 RX ADMIN — MORPHINE SULFATE 2 MILLIGRAM(S): 50 CAPSULE, EXTENDED RELEASE ORAL at 13:12

## 2020-12-10 RX ADMIN — TAMSULOSIN HYDROCHLORIDE 0.4 MILLIGRAM(S): 0.4 CAPSULE ORAL at 21:54

## 2020-12-10 RX ADMIN — Medication 100 MILLIEQUIVALENT(S): at 12:27

## 2020-12-10 RX ADMIN — Medication 133 MILLILITER(S): at 10:46

## 2020-12-10 RX ADMIN — Medication 3: at 13:15

## 2020-12-10 RX ADMIN — Medication 15 MILLIGRAM(S): at 13:11

## 2020-12-10 NOTE — PROGRESS NOTE ADULT - ASSESSMENT
HypoKalemia being supplemented  Mild hypoNatremia  Rest labs acceptable  repeat labs    Will follow   HypoKalemia being supplemented  Loop ostomy reversed  Mild hypoNatremia  Rest labs acceptable  repeat labs    Will follow

## 2020-12-10 NOTE — PROGRESS NOTE ADULT - SUBJECTIVE AND OBJECTIVE BOX
Progress Note    S: Pt seen and examined at bedside, pt reports NAEO. Reports mild abdominal pain that is improving. Passing gas, -bm. Pt sitting in the chair most of the day, working with PT and progressing. Pt carlos sips and chips, reporting he would like to have food. Pt denies n/v/f/SOB/CP.    Tachycardia noted overnight without response to bolus. EKG and LE duplex performed to r/o cardiac disease vs. PE. EKG with sinus tachy, duplex: neg    MEDICATIONS  (STANDING):  acetylcysteine 20%  Inhalation 4 milliLiter(s) Inhalation three times a day  atorvastatin 40 milliGRAM(s) Oral at bedtime  cloZAPine 200 milliGRAM(s) Oral at bedtime  dextrose 40% Gel 15 Gram(s) Oral once  dextrose 5% + sodium chloride 0.45%. 1000 milliLiter(s) (60 mL/Hr) IV Continuous <Continuous>  dextrose 5%. 1000 milliLiter(s) (50 mL/Hr) IV Continuous <Continuous>  dextrose 5%. 1000 milliLiter(s) (100 mL/Hr) IV Continuous <Continuous>  dextrose 50% Injectable 25 Gram(s) IV Push once  dextrose 50% Injectable 12.5 Gram(s) IV Push once  dextrose 50% Injectable 25 Gram(s) IV Push once  enoxaparin Injectable 40 milliGRAM(s) SubCutaneous daily  famotidine  IVPB 20 milliGRAM(s) IV Intermittent daily  glucagon  Injectable 1 milliGRAM(s) IntraMuscular once  insulin glargine Injectable (LANTUS) 20 Unit(s) SubCutaneous at bedtime  insulin lispro (ADMELOG) corrective regimen sliding scale   SubCutaneous three times a day before meals  insulin lispro Injectable (ADMELOG) 18 Unit(s) SubCutaneous three times a day before meals  levothyroxine Injectable 25 MICROGram(s) IV Push <User Schedule>  risperiDONE   Tablet 1 milliGRAM(s) Oral at bedtime  tamsulosin 0.4 milliGRAM(s) Oral at bedtime    MEDICATIONS  (PRN):  ALBUTerol   0.042% 1.25 milliGRAM(s) Nebulizer every 6 hours PRN Shortness of Breath and/or Wheezing  HYDROmorphone  Injectable 0.5 milliGRAM(s) IV Push every 6 hours PRN breakthrough  ketorolac   Injectable 15 milliGRAM(s) IV Push every 6 hours PRN Moderate Pain (4 - 6)  LORazepam   Injectable 1 milliGRAM(s) IV Push every 6 hours PRN Anxiety  morphine  - Injectable 2 milliGRAM(s) IV Push every 4 hours PRN Moderate Pain (4 - 6)  morphine  - Injectable 4 milliGRAM(s) IV Push every 4 hours PRN Severe Pain (7 - 10)  ondansetron Injectable 4 milliGRAM(s) IV Push once PRN Nausea and/or Vomiting      Vital Signs Last 24 Hrs  T(C): 37.5 (10 Dec 2020 04:43), Max: 37.5 (10 Dec 2020 04:43)  T(F): 99.5 (10 Dec 2020 04:43), Max: 99.5 (10 Dec 2020 04:43)  HR: 105 (10 Dec 2020 04:43) (83 - 108)  BP: 112/75 (10 Dec 2020 04:43) (112/75 - 129/87)  BP(mean): 91 (09 Dec 2020 07:15) (91 - 91)  RR: 20 (10 Dec 2020 04:43) (18 - 20)  SpO2: 95% (10 Dec 2020 04:43) (92% - 100%)    Physical Exam:   General: NAD, lying in bed comfortably  CHEST/LUNG: normal resp effort  HEART: RRR, S1 S2  Abdomen: soft, nt, distended, no rebound/guarding, midline wound with strikethrough,   EXT: 2+ peripheral pulses, brisk cap refill  MSK: FROM all 4 ext, full and equal strength      I&O's Detail    08 Dec 2020 07:01  -  09 Dec 2020 07:00  --------------------------------------------------------  IN:    dextrose 5% + sodium chloride 0.45% w/ Additives: 700 mL  Total IN: 700 mL    OUT:    Voided (mL): 1350 mL  Total OUT: 1350 mL    Total NET: -650 mL      09 Dec 2020 07:01  -  10 Dec 2020 06:21  --------------------------------------------------------  IN:  Total IN: 0 mL    OUT:    Voided (mL): 750 mL  Total OUT: 750 mL    Total NET: -750 mL          LABS:                        11.2   11.44 )-----------( 242      ( 09 Dec 2020 06:58 )             33.9     12-09    133<L>  |  99  |  3.0<L>  ----------------------------<  191<H>  3.6   |  26.0  |  0.51    Ca    8.8      09 Dec 2020 06:58  Phos  3.1     12-09  Mg     1.6     12-09

## 2020-12-10 NOTE — CHART NOTE - NSCHARTNOTEFT_GEN_A_CORE
EVENT NOTE:    Patient did not have a bowel movement since his operation on 12/4 so we explained to the patient that he needed a TOBY. Patient understood the procedure with the TOBY indicating good rectal tone with no stool in the rectal vault appreciated. Patient was also given a mineral oil enema to help induce bowel movements.

## 2020-12-10 NOTE — PROGRESS NOTE ADULT - ASSESSMENT
55M w/ h/o stage IV colon cancer, schizophrenia, HLD, admitted to medicine for Lithium toxicity, now resolved. S/p open ileostomy reversal POD 6 with continued ileus. Pt improving with activity goals head of bed elevated 30-45 degrees, sitting in chair yesterday, walking with PT with rolling walker    History of Stage 4 Colon Cancer s/p reversal loop ileostomy, now with post-op ileus  - ileostomy site dressing changed w/ wet to dry  - due to persistent ileus, will begin rectal enemas and TOBY 12/10  - NPO   - D5 1/2 NS @100cc/hr  - CT A/P if pt has emesis and NGT    Hyponatremia  - s/p boluses x2 over 24 hours  - f/u AM labs  - obtain urine lytes if Na persistently low - obtain medicine consult for management    Hypothyroidism  - lithium induced. Endocrine following -> TSH wnl  - Synthroid 25mcg IV    DM  - last 3 POCT in mid 100's  - PM Lantus 20 U  - Premeal 18 U  - ISS    HCL  -Atorvastatin restarted    Lithium toxicity / tremors / weakness --> resolved + WNL  - lithium held     Schizoaffective disorder    - Psych following  - Clozapine 200, lithium changed to risperdal  - constant obs  - continue medication IV    DVT ppx: Lovenox 40

## 2020-12-10 NOTE — PROGRESS NOTE ADULT - SUBJECTIVE AND OBJECTIVE BOX
NEPHROLOGY INTERVAL HPI/OVERNIGHT EVENTS:    Examined  Debilitated    MEDICATIONS  (STANDING):  atorvastatin 40 milliGRAM(s) Oral at bedtime  cloZAPine 200 milliGRAM(s) Oral at bedtime  dextrose 40% Gel 15 Gram(s) Oral once  dextrose 5% + sodium chloride 0.45%. 1000 milliLiter(s) (60 mL/Hr) IV Continuous <Continuous>  dextrose 5%. 1000 milliLiter(s) (50 mL/Hr) IV Continuous <Continuous>  dextrose 5%. 1000 milliLiter(s) (100 mL/Hr) IV Continuous <Continuous>  dextrose 50% Injectable 25 Gram(s) IV Push once  dextrose 50% Injectable 12.5 Gram(s) IV Push once  dextrose 50% Injectable 25 Gram(s) IV Push once  enoxaparin Injectable 40 milliGRAM(s) SubCutaneous daily  famotidine  IVPB 20 milliGRAM(s) IV Intermittent daily  glucagon  Injectable 1 milliGRAM(s) IntraMuscular once  insulin glargine Injectable (LANTUS) 20 Unit(s) SubCutaneous at bedtime  insulin lispro (ADMELOG) corrective regimen sliding scale   SubCutaneous three times a day before meals  insulin lispro Injectable (ADMELOG) 18 Unit(s) SubCutaneous three times a day before meals  levothyroxine Injectable 25 MICROGram(s) IV Push <User Schedule>  risperiDONE   Tablet 1 milliGRAM(s) Oral at bedtime  tamsulosin 0.4 milliGRAM(s) Oral at bedtime    MEDICATIONS  (PRN):  ALBUTerol   0.042% 1.25 milliGRAM(s) Nebulizer every 6 hours PRN Shortness of Breath and/or Wheezing  HYDROmorphone  Injectable 0.5 milliGRAM(s) IV Push every 6 hours PRN breakthrough  ketorolac   Injectable 15 milliGRAM(s) IV Push every 6 hours PRN Moderate Pain (4 - 6)  LORazepam   Injectable 1 milliGRAM(s) IV Push every 6 hours PRN Anxiety  morphine  - Injectable 2 milliGRAM(s) IV Push every 4 hours PRN Moderate Pain (4 - 6)  morphine  - Injectable 4 milliGRAM(s) IV Push every 4 hours PRN Severe Pain (7 - 10)  ondansetron Injectable 4 milliGRAM(s) IV Push once PRN Nausea and/or Vomiting      Allergies    amoxicillin (Unknown)  Lamictal (Unknown)  penicillin (Unknown)  Tegretol (Unknown)  Zetia (Unknown)    Intolerances        Vital Signs Last 24 Hrs  T(C): 36.8 (10 Dec 2020 15:24), Max: 37.5 (10 Dec 2020 04:43)  T(F): 98.2 (10 Dec 2020 15:24), Max: 99.5 (10 Dec 2020 04:43)  HR: 105 (10 Dec 2020 15:24) (76 - 105)  BP: 136/78 (10 Dec 2020 15:24) (112/72 - 136/78)  BP(mean): --  RR: 19 (10 Dec 2020 15:24) (19 - 20)  SpO2: 94% (10 Dec 2020 15:24) (91% - 100%)  Daily     Daily     PHYSICAL EXAM:  GENERAL: NAD, obese  NECK: Supple  NERVOUS SYSTEM:  Alert & verbal  CHEST/LUNG: Clear bilaterally   HEART: Regular rate and rhythm  ABDOMEN: Soft, Nontender, Nondistended, BS+  LABS:                        10.5   13.15 )-----------( 233      ( 10 Dec 2020 07:16 )             32.4     12-10    134<L>  |  100  |  4.0<L>  ----------------------------<  178<H>  3.3<L>   |  26.0  |  0.54    Ca    8.2<L>      10 Dec 2020 07:16  Phos  2.9     12-10  Mg     1.8     12-10          Magnesium, Serum: 1.8 mg/dL (12-10 @ 07:16)  Phosphorus Level, Serum: 2.9 mg/dL (12-10 @ 07:16)          RADIOLOGY & ADDITIONAL TESTS:

## 2020-12-11 LAB
ANION GAP SERPL CALC-SCNC: 13 MMOL/L — SIGNIFICANT CHANGE UP (ref 5–17)
ANION GAP SERPL CALC-SCNC: 13 MMOL/L — SIGNIFICANT CHANGE UP (ref 5–17)
BASOPHILS # BLD AUTO: 0.04 K/UL — SIGNIFICANT CHANGE UP (ref 0–0.2)
BASOPHILS NFR BLD AUTO: 0.3 % — SIGNIFICANT CHANGE UP (ref 0–2)
BUN SERPL-MCNC: 6 MG/DL — LOW (ref 8–20)
BUN SERPL-MCNC: 7 MG/DL — LOW (ref 8–20)
CALCIUM SERPL-MCNC: 8.8 MG/DL — SIGNIFICANT CHANGE UP (ref 8.6–10.2)
CALCIUM SERPL-MCNC: 9 MG/DL — SIGNIFICANT CHANGE UP (ref 8.6–10.2)
CHLORIDE SERPL-SCNC: 96 MMOL/L — LOW (ref 98–107)
CHLORIDE SERPL-SCNC: 99 MMOL/L — SIGNIFICANT CHANGE UP (ref 98–107)
CO2 SERPL-SCNC: 24 MMOL/L — SIGNIFICANT CHANGE UP (ref 22–29)
CO2 SERPL-SCNC: 27 MMOL/L — SIGNIFICANT CHANGE UP (ref 22–29)
CREAT ?TM UR-MCNC: 46 MG/DL — SIGNIFICANT CHANGE UP
CREAT SERPL-MCNC: 0.71 MG/DL — SIGNIFICANT CHANGE UP (ref 0.5–1.3)
CREAT SERPL-MCNC: 0.74 MG/DL — SIGNIFICANT CHANGE UP (ref 0.5–1.3)
EOSINOPHIL # BLD AUTO: 0.05 K/UL — SIGNIFICANT CHANGE UP (ref 0–0.5)
EOSINOPHIL NFR BLD AUTO: 0.4 % — SIGNIFICANT CHANGE UP (ref 0–6)
GLUCOSE BLDC GLUCOMTR-MCNC: 144 MG/DL — HIGH (ref 70–99)
GLUCOSE BLDC GLUCOMTR-MCNC: 168 MG/DL — HIGH (ref 70–99)
GLUCOSE BLDC GLUCOMTR-MCNC: 172 MG/DL — HIGH (ref 70–99)
GLUCOSE BLDC GLUCOMTR-MCNC: 191 MG/DL — HIGH (ref 70–99)
GLUCOSE BLDC GLUCOMTR-MCNC: 202 MG/DL — HIGH (ref 70–99)
GLUCOSE SERPL-MCNC: 172 MG/DL — HIGH (ref 70–99)
GLUCOSE SERPL-MCNC: 189 MG/DL — HIGH (ref 70–99)
HCT VFR BLD CALC: 36.4 % — LOW (ref 39–50)
HGB BLD-MCNC: 11.8 G/DL — LOW (ref 13–17)
IMM GRANULOCYTES NFR BLD AUTO: 0.8 % — SIGNIFICANT CHANGE UP (ref 0–1.5)
LYMPHOCYTES # BLD AUTO: 0.9 K/UL — LOW (ref 1–3.3)
LYMPHOCYTES # BLD AUTO: 6.8 % — LOW (ref 13–44)
MAGNESIUM SERPL-MCNC: 1.9 MG/DL — SIGNIFICANT CHANGE UP (ref 1.6–2.6)
MCHC RBC-ENTMCNC: 29.3 PG — SIGNIFICANT CHANGE UP (ref 27–34)
MCHC RBC-ENTMCNC: 32.4 GM/DL — SIGNIFICANT CHANGE UP (ref 32–36)
MCV RBC AUTO: 90.3 FL — SIGNIFICANT CHANGE UP (ref 80–100)
MONOCYTES # BLD AUTO: 0.54 K/UL — SIGNIFICANT CHANGE UP (ref 0–0.9)
MONOCYTES NFR BLD AUTO: 4.1 % — SIGNIFICANT CHANGE UP (ref 2–14)
NEUTROPHILS # BLD AUTO: 11.5 K/UL — HIGH (ref 1.8–7.4)
NEUTROPHILS NFR BLD AUTO: 87.6 % — HIGH (ref 43–77)
OSMOLALITY UR: 236 MOSM/KG — LOW (ref 300–1000)
PHOSPHATE SERPL-MCNC: 3.1 MG/DL — SIGNIFICANT CHANGE UP (ref 2.4–4.7)
PLATELET # BLD AUTO: 262 K/UL — SIGNIFICANT CHANGE UP (ref 150–400)
POTASSIUM SERPL-MCNC: 3.1 MMOL/L — LOW (ref 3.5–5.3)
POTASSIUM SERPL-MCNC: 3.3 MMOL/L — LOW (ref 3.5–5.3)
POTASSIUM SERPL-SCNC: 3.1 MMOL/L — LOW (ref 3.5–5.3)
POTASSIUM SERPL-SCNC: 3.3 MMOL/L — LOW (ref 3.5–5.3)
RBC # BLD: 4.03 M/UL — LOW (ref 4.2–5.8)
RBC # FLD: 13.7 % — SIGNIFICANT CHANGE UP (ref 10.3–14.5)
SODIUM SERPL-SCNC: 133 MMOL/L — LOW (ref 135–145)
SODIUM SERPL-SCNC: 138 MMOL/L — SIGNIFICANT CHANGE UP (ref 135–145)
SODIUM UR-SCNC: 63 MMOL/L — SIGNIFICANT CHANGE UP
WBC # BLD: 13.14 K/UL — HIGH (ref 3.8–10.5)
WBC # FLD AUTO: 13.14 K/UL — HIGH (ref 3.8–10.5)

## 2020-12-11 PROCEDURE — 93308 TTE F-UP OR LMTD: CPT | Mod: 26

## 2020-12-11 PROCEDURE — 99233 SBSQ HOSP IP/OBS HIGH 50: CPT

## 2020-12-11 PROCEDURE — 99232 SBSQ HOSP IP/OBS MODERATE 35: CPT

## 2020-12-11 PROCEDURE — 93010 ELECTROCARDIOGRAM REPORT: CPT

## 2020-12-11 PROCEDURE — 71275 CT ANGIOGRAPHY CHEST: CPT | Mod: 26

## 2020-12-11 PROCEDURE — 74177 CT ABD & PELVIS W/CONTRAST: CPT | Mod: 26

## 2020-12-11 RX ORDER — POTASSIUM CHLORIDE 20 MEQ
10 PACKET (EA) ORAL
Refills: 0 | Status: COMPLETED | OUTPATIENT
Start: 2020-12-11 | End: 2020-12-11

## 2020-12-11 RX ORDER — DEXTROSE MONOHYDRATE, SODIUM CHLORIDE, AND POTASSIUM CHLORIDE 50; .745; 4.5 G/1000ML; G/1000ML; G/1000ML
1000 INJECTION, SOLUTION INTRAVENOUS
Refills: 0 | Status: DISCONTINUED | OUTPATIENT
Start: 2020-12-11 | End: 2020-12-12

## 2020-12-11 RX ORDER — MINERAL OIL
133 OIL (ML) MISCELLANEOUS ONCE
Refills: 0 | Status: COMPLETED | OUTPATIENT
Start: 2020-12-11 | End: 2020-12-11

## 2020-12-11 RX ADMIN — INSULIN GLARGINE 20 UNIT(S): 100 INJECTION, SOLUTION SUBCUTANEOUS at 21:10

## 2020-12-11 RX ADMIN — Medication 100 MILLIEQUIVALENT(S): at 22:09

## 2020-12-11 RX ADMIN — Medication 25 MICROGRAM(S): at 06:03

## 2020-12-11 RX ADMIN — Medication 5: at 10:54

## 2020-12-11 RX ADMIN — Medication 100 MILLIEQUIVALENT(S): at 23:06

## 2020-12-11 RX ADMIN — DEXTROSE MONOHYDRATE, SODIUM CHLORIDE, AND POTASSIUM CHLORIDE 60 MILLILITER(S): 50; .745; 4.5 INJECTION, SOLUTION INTRAVENOUS at 18:07

## 2020-12-11 RX ADMIN — Medication 100 MILLIEQUIVALENT(S): at 10:38

## 2020-12-11 RX ADMIN — ENOXAPARIN SODIUM 40 MILLIGRAM(S): 100 INJECTION SUBCUTANEOUS at 12:55

## 2020-12-11 RX ADMIN — RISPERIDONE 1 MILLIGRAM(S): 4 TABLET ORAL at 21:05

## 2020-12-11 RX ADMIN — ATORVASTATIN CALCIUM 40 MILLIGRAM(S): 80 TABLET, FILM COATED ORAL at 21:05

## 2020-12-11 RX ADMIN — Medication 100 MILLIEQUIVALENT(S): at 09:51

## 2020-12-11 RX ADMIN — Medication 100 MILLIEQUIVALENT(S): at 08:31

## 2020-12-11 RX ADMIN — CLOZAPINE 200 MILLIGRAM(S): 150 TABLET, ORALLY DISINTEGRATING ORAL at 21:06

## 2020-12-11 RX ADMIN — FAMOTIDINE 100 MILLIGRAM(S): 10 INJECTION INTRAVENOUS at 12:52

## 2020-12-11 RX ADMIN — TAMSULOSIN HYDROCHLORIDE 0.4 MILLIGRAM(S): 0.4 CAPSULE ORAL at 21:06

## 2020-12-11 RX ADMIN — Medication 133 MILLILITER(S): at 18:06

## 2020-12-11 RX ADMIN — Medication 3: at 06:02

## 2020-12-11 RX ADMIN — Medication 100 MILLIEQUIVALENT(S): at 21:06

## 2020-12-11 NOTE — PROGRESS NOTE ADULT - SUBJECTIVE AND OBJECTIVE BOX
INTERVAL HPI/OVERNIGHT EVENTS:  Patient was seen and examined at at bedside and was found to be in no acute distress. Patient had a semi-loose BM yesterday evening, a few hours after the mineral oil enema was given. Patient reports that he is also passing flatus and pain is well controlled. He was a little concerned about getting out of bed and mobilizing with a walker but we assured him that he would continue to improve and become stronger with time. Patient denies any n/v/f/c, cp/sob.  Denies any n/v/f/c.    STATUS POST: lap converted to open ileostomy reversal and partial colectomy    POST OPERATIVE DAY #: 7      MEDICATIONS  (STANDING):  atorvastatin 40 milliGRAM(s) Oral at bedtime  cloZAPine 200 milliGRAM(s) Oral at bedtime  dextrose 40% Gel 15 Gram(s) Oral once  dextrose 5% + sodium chloride 0.45%. 1000 milliLiter(s) (60 mL/Hr) IV Continuous <Continuous>  dextrose 5%. 1000 milliLiter(s) (50 mL/Hr) IV Continuous <Continuous>  dextrose 5%. 1000 milliLiter(s) (100 mL/Hr) IV Continuous <Continuous>  dextrose 50% Injectable 25 Gram(s) IV Push once  dextrose 50% Injectable 12.5 Gram(s) IV Push once  dextrose 50% Injectable 25 Gram(s) IV Push once  enoxaparin Injectable 40 milliGRAM(s) SubCutaneous daily  famotidine  IVPB 20 milliGRAM(s) IV Intermittent daily  glucagon  Injectable 1 milliGRAM(s) IntraMuscular once  insulin glargine Injectable (LANTUS) 20 Unit(s) SubCutaneous at bedtime  insulin lispro (ADMELOG) corrective regimen sliding scale   SubCutaneous three times a day before meals  insulin lispro Injectable (ADMELOG) 18 Unit(s) SubCutaneous three times a day before meals  levothyroxine Injectable 25 MICROGram(s) IV Push <User Schedule>  mineral oil enema 133 milliLiter(s) Rectal once  risperiDONE   Tablet 1 milliGRAM(s) Oral at bedtime  tamsulosin 0.4 milliGRAM(s) Oral at bedtime    MEDICATIONS  (PRN):  ALBUTerol   0.042% 1.25 milliGRAM(s) Nebulizer every 6 hours PRN Shortness of Breath and/or Wheezing  HYDROmorphone  Injectable 0.5 milliGRAM(s) IV Push every 6 hours PRN breakthrough  ketorolac   Injectable 15 milliGRAM(s) IV Push every 6 hours PRN Moderate Pain (4 - 6)  LORazepam   Injectable 1 milliGRAM(s) IV Push every 6 hours PRN Anxiety  morphine  - Injectable 2 milliGRAM(s) IV Push every 4 hours PRN Moderate Pain (4 - 6)  morphine  - Injectable 4 milliGRAM(s) IV Push every 4 hours PRN Severe Pain (7 - 10)  ondansetron Injectable 4 milliGRAM(s) IV Push once PRN Nausea and/or Vomiting      Vital Signs Last 24 Hrs  T(C): 36.8 (11 Dec 2020 00:00), Max: 37.5 (10 Dec 2020 04:43)  T(F): 98.3 (11 Dec 2020 00:00), Max: 99.5 (10 Dec 2020 04:43)  HR: 106 (11 Dec 2020 00:00) (76 - 106)  BP: 111/71 (11 Dec 2020 00:00) (111/71 - 136/78)  BP(mean): --  RR: 18 (11 Dec 2020 00:00) (18 - 20)  SpO2: 95% (11 Dec 2020 00:00) (91% - 95%)    Physical Exam:  Gen: NAD  Respiratory: no increased work of breathing  Cardiovascular: RRR  Gastrointestinal: soft, mildly tender, nondistended, dressing in place, c/d/i      I&O's Detail    09 Dec 2020 07:01  -  10 Dec 2020 07:00  --------------------------------------------------------  IN:  Total IN: 0 mL    OUT:    Voided (mL): 750 mL  Total OUT: 750 mL    Total NET: -750 mL      10 Dec 2020 07:01  -  11 Dec 2020 00:08  --------------------------------------------------------  IN:    dextrose 5% + sodium chloride 0.45%: 600 mL  Total IN: 600 mL    OUT:    Voided (mL): 450 mL  Total OUT: 450 mL    Total NET: 150 mL          LABS:                        10.5   13.15 )-----------( 233      ( 10 Dec 2020 07:16 )             32.4     12-10    134<L>  |  100  |  4.0<L>  ----------------------------<  178<H>  3.3<L>   |  26.0  |  0.54    Ca    8.2<L>      10 Dec 2020 07:16  Phos  2.9     12-10  Mg     1.8     12-10      ASSESSMENT:  Patient is a 54 yo M POD 7 s/p lap converted to open ileostomy and partial colectomy who is recovering well had a bowel movement yesterday evening.    # stage IV colon cancer, s/p ileostomy reversal and partial colectomy, now with post-op ileus  - continue to monitor bowel function  - enemas as needed until return of regular bowel function  - continue with D5 1/2NS @100, along with NPO w/sips and chips  - if emesis, must obtain CT scan and place NG tube  - encourage OOB, IS  - continue with daily PT    Hyponatremia  - f/u AM labs  - f/u urine lytes if Na persistently low - obtain medicine consult for management    Hypothyroidism  - lithium induced. Endocrine following -> TSH wnl  - Synthroid 25mcg IV  - lithium held due to lithium toxicity    DM  - last 3 POCT in mid 100's  - PM Lantus 20 U  - Premeal 18 U  - ISS    HCL  -Atorvastatin restarted    Schizoaffective disorder    - Psych following  - Clozapine 200, lithium changed to risperdal  - constant obs  - continue medication IV    DVT ppx: Lovenox 40 INTERVAL HPI/OVERNIGHT EVENTS:  Patient was seen and examined at at bedside and was found to be in no acute distress. Patient had a semi-loose BM yesterday evening, a few hours after the mineral oil enema was given. Patient reports that he is also passing flatus and pain is well controlled. He was a little concerned about getting out of bed and mobilizing with a walker but we assured him that he would continue to improve and become stronger with time. Patient also reported that the television was talking to him and sending him messages. Patient denies any n/v/f/c, cp/sob.    STATUS POST: lap converted to open ileostomy reversal and partial colectomy    POST OPERATIVE DAY #: 7      MEDICATIONS  (STANDING):  atorvastatin 40 milliGRAM(s) Oral at bedtime  cloZAPine 200 milliGRAM(s) Oral at bedtime  dextrose 40% Gel 15 Gram(s) Oral once  dextrose 5% + sodium chloride 0.45%. 1000 milliLiter(s) (60 mL/Hr) IV Continuous <Continuous>  dextrose 5%. 1000 milliLiter(s) (50 mL/Hr) IV Continuous <Continuous>  dextrose 5%. 1000 milliLiter(s) (100 mL/Hr) IV Continuous <Continuous>  dextrose 50% Injectable 25 Gram(s) IV Push once  dextrose 50% Injectable 12.5 Gram(s) IV Push once  dextrose 50% Injectable 25 Gram(s) IV Push once  enoxaparin Injectable 40 milliGRAM(s) SubCutaneous daily  famotidine  IVPB 20 milliGRAM(s) IV Intermittent daily  glucagon  Injectable 1 milliGRAM(s) IntraMuscular once  insulin glargine Injectable (LANTUS) 20 Unit(s) SubCutaneous at bedtime  insulin lispro (ADMELOG) corrective regimen sliding scale   SubCutaneous three times a day before meals  insulin lispro Injectable (ADMELOG) 18 Unit(s) SubCutaneous three times a day before meals  levothyroxine Injectable 25 MICROGram(s) IV Push <User Schedule>  mineral oil enema 133 milliLiter(s) Rectal once  risperiDONE   Tablet 1 milliGRAM(s) Oral at bedtime  tamsulosin 0.4 milliGRAM(s) Oral at bedtime    MEDICATIONS  (PRN):  ALBUTerol   0.042% 1.25 milliGRAM(s) Nebulizer every 6 hours PRN Shortness of Breath and/or Wheezing  HYDROmorphone  Injectable 0.5 milliGRAM(s) IV Push every 6 hours PRN breakthrough  ketorolac   Injectable 15 milliGRAM(s) IV Push every 6 hours PRN Moderate Pain (4 - 6)  LORazepam   Injectable 1 milliGRAM(s) IV Push every 6 hours PRN Anxiety  morphine  - Injectable 2 milliGRAM(s) IV Push every 4 hours PRN Moderate Pain (4 - 6)  morphine  - Injectable 4 milliGRAM(s) IV Push every 4 hours PRN Severe Pain (7 - 10)  ondansetron Injectable 4 milliGRAM(s) IV Push once PRN Nausea and/or Vomiting      Vital Signs Last 24 Hrs  T(C): 36.8 (11 Dec 2020 00:00), Max: 37.5 (10 Dec 2020 04:43)  T(F): 98.3 (11 Dec 2020 00:00), Max: 99.5 (10 Dec 2020 04:43)  HR: 106 (11 Dec 2020 00:00) (76 - 106)  BP: 111/71 (11 Dec 2020 00:00) (111/71 - 136/78)  BP(mean): --  RR: 18 (11 Dec 2020 00:00) (18 - 20)  SpO2: 95% (11 Dec 2020 00:00) (91% - 95%)    Physical Exam:  Gen: NAD  Respiratory: no increased work of breathing  Cardiovascular: RRR  Gastrointestinal: soft, mildly tender, nondistended, dressing in place, c/d/i      I&O's Detail    09 Dec 2020 07:01  -  10 Dec 2020 07:00  --------------------------------------------------------  IN:  Total IN: 0 mL    OUT:    Voided (mL): 750 mL  Total OUT: 750 mL    Total NET: -750 mL      10 Dec 2020 07:01  -  11 Dec 2020 00:08  --------------------------------------------------------  IN:    dextrose 5% + sodium chloride 0.45%: 600 mL  Total IN: 600 mL    OUT:    Voided (mL): 450 mL  Total OUT: 450 mL    Total NET: 150 mL          LABS:                        10.5   13.15 )-----------( 233      ( 10 Dec 2020 07:16 )             32.4     12-10    134<L>  |  100  |  4.0<L>  ----------------------------<  178<H>  3.3<L>   |  26.0  |  0.54    Ca    8.2<L>      10 Dec 2020 07:16  Phos  2.9     12-10  Mg     1.8     12-10      ASSESSMENT:  Patient is a 56 yo M POD 7 s/p lap converted to open ileostomy and partial colectomy who is recovering well had a bowel movement yesterday evening.    # stage IV colon cancer, s/p ileostomy reversal and partial colectomy, now with post-op ileus  - continue to monitor bowel function  - enemas as needed until return of regular bowel function  - continue with D5 1/2NS @100, along with NPO w/sips and chips  - if emesis, must obtain CT scan and place NG tube  - encourage OOB, IS  - continue with daily PT    Hyponatremia  - f/u AM labs  - f/u urine lytes if Na persistently low - obtain medicine consult for management    Hypothyroidism  - lithium induced. Endocrine following -> TSH wnl  - Synthroid 25mcg IV  - lithium held due to lithium toxicity    DM  - last 3 POCT in mid 100's  - PM Lantus 20 U  - Premeal 18 U  - ISS    HCL  -Atorvastatin restarted    Schizoaffective disorder    - Psych following  - Clozapine 200, lithium changed to risperdal  - constant obs  - continue medication IV  - obtain psych consult    DVT ppx: Lovenox 40

## 2020-12-11 NOTE — CHART NOTE - TREATMENT: THE FOLLOWING DIET HAS BEEN RECOMMENDED
Advance diet as medically feasible/tolerable to clears --> fulls --> CCHO, low fiber + Glucerna TID to optimize po intake and provide an additional 220 kcal, 10g protein per serving.  If pt anticipated to remain NPO, consider alternative means of nutrition/hydration.  Obtain daily weights to monitor trends.

## 2020-12-11 NOTE — PROGRESS NOTE BEHAVIORAL HEALTH - NSBHCHARTREVIEWVS_PSY_A_CORE FT
Vital Signs Last 24 Hrs  T(C): 36.7 (11 Dec 2020 07:49), Max: 37 (10 Dec 2020 20:05)  T(F): 98 (11 Dec 2020 07:49), Max: 98.6 (10 Dec 2020 20:05)  HR: 101 (11 Dec 2020 07:49) (76 - 106)  BP: 109/69 (11 Dec 2020 07:49) (109/69 - 136/78)  BP(mean): --  RR: 18 (11 Dec 2020 07:49) (18 - 19)  SpO2: 93% (11 Dec 2020 07:49) (93% - 95%)    12-11    138  |  99  |  6.0<L>  ----------------------------<  189<H>  3.1<L>   |  27.0  |  0.74    Ca    8.8      11 Dec 2020 07:05  Phos  3.1     12-11  Mg     1.9     12-11                          11.8   13.14 )-----------( 262      ( 11 Dec 2020 07:05 )             36.4       COVID-19 PCR: NotDetec (30 Nov 2020 12:26)  COVID-19 PCR: NotDetec (23 Jul 2020 12:15)

## 2020-12-11 NOTE — PROGRESS NOTE ADULT - SUBJECTIVE AND OBJECTIVE BOX
NEPHROLOGY INTERVAL HPI/OVERNIGHT EVENTS:    No new events.    MEDICATIONS  (STANDING):  atorvastatin 40 milliGRAM(s) Oral at bedtime  cloZAPine 200 milliGRAM(s) Oral at bedtime  dextrose 40% Gel 15 Gram(s) Oral once  dextrose 5% + sodium chloride 0.45%. 1000 milliLiter(s) (60 mL/Hr) IV Continuous <Continuous>  dextrose 5%. 1000 milliLiter(s) (50 mL/Hr) IV Continuous <Continuous>  dextrose 5%. 1000 milliLiter(s) (100 mL/Hr) IV Continuous <Continuous>  dextrose 50% Injectable 25 Gram(s) IV Push once  dextrose 50% Injectable 12.5 Gram(s) IV Push once  dextrose 50% Injectable 25 Gram(s) IV Push once  enoxaparin Injectable 40 milliGRAM(s) SubCutaneous daily  famotidine  IVPB 20 milliGRAM(s) IV Intermittent daily  glucagon  Injectable 1 milliGRAM(s) IntraMuscular once  insulin glargine Injectable (LANTUS) 20 Unit(s) SubCutaneous at bedtime  insulin lispro (ADMELOG) corrective regimen sliding scale   SubCutaneous three times a day before meals  insulin lispro Injectable (ADMELOG) 18 Unit(s) SubCutaneous three times a day before meals  levothyroxine Injectable 25 MICROGram(s) IV Push <User Schedule>  risperiDONE   Tablet 1 milliGRAM(s) Oral at bedtime  tamsulosin 0.4 milliGRAM(s) Oral at bedtime    MEDICATIONS  (PRN):  ALBUTerol   0.042% 1.25 milliGRAM(s) Nebulizer every 6 hours PRN Shortness of Breath and/or Wheezing  HYDROmorphone  Injectable 0.5 milliGRAM(s) IV Push every 6 hours PRN breakthrough  ketorolac   Injectable 15 milliGRAM(s) IV Push every 6 hours PRN Moderate Pain (4 - 6)  LORazepam   Injectable 1 milliGRAM(s) IV Push every 6 hours PRN Anxiety  morphine  - Injectable 2 milliGRAM(s) IV Push every 4 hours PRN Moderate Pain (4 - 6)  morphine  - Injectable 4 milliGRAM(s) IV Push every 4 hours PRN Severe Pain (7 - 10)  ondansetron Injectable 4 milliGRAM(s) IV Push once PRN Nausea and/or Vomiting      Allergies    amoxicillin (Unknown)  Lamictal (Unknown)  penicillin (Unknown)  Tegretol (Unknown)  Zetia (Unknown)            Vital Signs Last 24 Hrs  T(C): 36.7 (11 Dec 2020 07:49), Max: 37 (10 Dec 2020 20:05)  T(F): 98 (11 Dec 2020 07:49), Max: 98.6 (10 Dec 2020 20:05)  HR: 101 (11 Dec 2020 07:49) (76 - 106)  BP: 109/69 (11 Dec 2020 07:49) (109/69 - 136/78)  BP(mean): --  RR: 18 (11 Dec 2020 07:49) (18 - 19)  SpO2: 93% (11 Dec 2020 07:49) (93% - 95%)  T(C): 36.8 (10 Dec 2020 15:24), Max: 37.5 (10 Dec 2020 04:43)  T(F): 98.2 (10 Dec 2020 15:24), Max: 99.5 (10 Dec 2020 04:43)  HR: 105 (10 Dec 2020 15:24) (76 - 105)  BP: 136/78 (10 Dec 2020 15:24) (112/72 - 136/78)  BP(mean): --  RR: 19 (10 Dec 2020 15:24) (19 - 20)  SpO2: 94% (10 Dec 2020 15:24) (91% - 100%)       PHYSICAL EXAM:    GENERAL: NAD, obese  NECK: Supple  NERVOUS SYSTEM:  Alert & verbal  CHEST/LUNG: Clear bilaterally   HEART: Regular rate and rhythm  ABDOMEN: Soft, Nontender, Nondistended, BS+      LABS:    12-11    138  |  99  |  6.0<L>  ----------------------------<  189<H>  3.1<L>   |  27.0  |  0.74    Ca    8.8      11 Dec 2020 07:05  Phos  3.1     12-11  Mg     1.9     12-11                            10.5   13.15 )-----------( 233      ( 10 Dec 2020 07:16 )             32.4     12-10    134<L>  |  100  |  4.0<L>  ----------------------------<  178<H>  3.3<L>   |  26.0  |  0.54    Ca    8.2<L>      10 Dec 2020 07:16  Phos  2.9     12-10  Mg     1.8     12-10          Magnesium, Serum: 1.8 mg/dL (12-10 @ 07:16)  Phosphorus Level, Serum: 2.9 mg/dL (12-10 @ 07:16)          RADIOLOGY & ADDITIONAL TESTS:

## 2020-12-11 NOTE — PROGRESS NOTE ADULT - SUBJECTIVE AND OBJECTIVE BOX
[place] [facility]                                                               Office: [address] [address]                                                              Telephone: [phone]. Fax:[phone]                                                                             PROGRESS NOTE  Reason for follow up: Tachycardia   Overnight: No new events.   Update:     Subjective: "  ______________________"      	  Vitals:  T(C): 36.7 (12-11-20 @ 07:49), Max: 37 (12-10-20 @ 20:05)  HR: 101 (12-11-20 @ 07:49) (76 - 106)  BP: 109/69 (12-11-20 @ 07:49) (109/69 - 136/78)  RR: 18 (12-11-20 @ 07:49) (18 - 19)  SpO2: 93% (12-11-20 @ 07:49) (93% - 95%)  Wt(kg): --  I&O's Summary    10 Dec 2020 07:01  -  11 Dec 2020 07:00  --------------------------------------------------------  IN: 600 mL / OUT: 1450 mL / NET: -850 mL            PHYSICAL EXAM:  Appearance: Comfortable. No acute distress  HEENT:  Head and neck: Atraumatic. Normocephalic.  Normal oral mucosa, PERRL, Neck is supple. No JVD, No carotid bruit.   Neurologic: A & O x 3, no focal deficits. EOMI.  Lymphatic: No cervical lymphadenopathy  Cardiovascular: Normal S1 S2, No murmur, rubs/gallops. No JVD, No edema  Respiratory: Lungs clear to auscultation  Gastrointestinal:  Soft, Non-tender, + BS  Lower Extremities: No edema  Psychiatry: Patient is calm. No agitation. Mood & affect appropriate  Skin: No rashes/ ecchymoses/cyanosis/ulcers visualized on the face, hands or feet.      CURRENT MEDICATIONS:  tamsulosin 0.4 milliGRAM(s) Oral at bedtime    cloZAPine  risperiDONE   Tablet  famotidine  IVPB  atorvastatin  dextrose 40% Gel  dextrose 50% Injectable  dextrose 50% Injectable  dextrose 50% Injectable  glucagon  Injectable  insulin glargine Injectable (LANTUS)  insulin lispro (ADMELOG) corrective regimen sliding scale  insulin lispro Injectable (ADMELOG)  levothyroxine Injectable  dextrose 5% + sodium chloride 0.45%.  dextrose 5%.  dextrose 5%.  enoxaparin Injectable  potassium chloride  10 mEq/100 mL IVPB      DIAGNOSTIC TESTING:  [ ] Echocardiogram:   < from: TTE Echo Complete w/o Contrast w/ Doppler (12.03.20 @ 14:06) >    Summary:   1. Endocardial visualization was enhanced with intravenous echo contrast.   2. The left atrium is normal in size.   3. Left ventricular ejection fraction, by visual estimation, is 65 to 70%. Grade I diastolic dysfunction.   4. The right atrium is normal in size.   5. Normal right ventricular size and function.   6. Mild aortic regurgitation.   7. There is no evidence of pericardial effusion.    MD Cortez, RPVI Electronically signed on 12/3/2020 at 3:50:48 PM    < end of copied text >    [ ]  Catheterization:  [ ] Stress Test:    OTHER: 	      LABS:	 	                            11.8   13.14 )-----------( 262      ( 11 Dec 2020 07:05 )             36.4     12-11    138  |  99  |  6.0<L>  ----------------------------<  189<H>  3.1<L>   |  27.0  |  0.74    Ca    8.8      11 Dec 2020 07:05  Phos  3.1     12-11  Mg     1.9     12-11      proBNP:   Lipid Profile:   HgA1c:   TSH:       TELEMETRY: Reviewed    ECG:  Reviewed by me. 	       [place] [facility]                                                               Office: [address] [address]                                                              Telephone: [phone]. Fax:[phone]                                                                             PROGRESS NOTE  Reason for follow up: Tachycardia   Overnight: No new events.   Update: Patient is s/p lap converted to open ileostomy reversal and partial colectomy    Subjective: "  ______________________"      	  Vitals:  T(C): 36.7 (12-11-20 @ 07:49), Max: 37 (12-10-20 @ 20:05)  HR: 101 (12-11-20 @ 07:49) (76 - 106)  BP: 109/69 (12-11-20 @ 07:49) (109/69 - 136/78)  RR: 18 (12-11-20 @ 07:49) (18 - 19)  SpO2: 93% (12-11-20 @ 07:49) (93% - 95%)  Wt(kg): --  I&O's Summary    10 Dec 2020 07:01  -  11 Dec 2020 07:00  --------------------------------------------------------  IN: 600 mL / OUT: 1450 mL / NET: -850 mL            PHYSICAL EXAM:  Appearance: Comfortable. No acute distress  HEENT:  Head and neck: Atraumatic. Normocephalic.  Normal oral mucosa, PERRL, Neck is supple. No JVD, No carotid bruit.   Neurologic: A & O x 3, no focal deficits. EOMI.  Lymphatic: No cervical lymphadenopathy  Cardiovascular: Normal S1 S2, sinus tachycardia No murmur, rubs/gallops. No JVD, No edema  Respiratory: Lungs clear to auscultation  Gastrointestinal:  Soft, Non-tender, + BS + bandage of the abdomen   Lower Extremities: No edema  Psychiatry: Patient is calm. No agitation. Mood & affect appropriate  Skin: No rashes/ ecchymoses/cyanosis/ulcers visualized on the face, hands or feet.      CURRENT MEDICATIONS:  tamsulosin 0.4 milliGRAM(s) Oral at bedtime    cloZAPine  risperiDONE   Tablet  famotidine  IVPB  atorvastatin  dextrose 40% Gel  dextrose 50% Injectable  dextrose 50% Injectable  dextrose 50% Injectable  glucagon  Injectable  insulin glargine Injectable (LANTUS)  insulin lispro (ADMELOG) corrective regimen sliding scale  insulin lispro Injectable (ADMELOG)  levothyroxine Injectable  dextrose 5% + sodium chloride 0.45%.  dextrose 5%.  dextrose 5%.  enoxaparin Injectable  potassium chloride  10 mEq/100 mL IVPB      DIAGNOSTIC TESTING:  [ ] Echocardiogram:   < from: TTE Echo Complete w/o Contrast w/ Doppler (12.03.20 @ 14:06) >    Summary:   1. Endocardial visualization was enhanced with intravenous echo contrast.   2. The left atrium is normal in size.   3. Left ventricular ejection fraction, by visual estimation, is 65 to 70%. Grade I diastolic dysfunction.   4. The right atrium is normal in size.   5. Normal right ventricular size and function.   6. Mild aortic regurgitation.   7. There is no evidence of pericardial effusion.    MD Cortez, RPVI Electronically signed on 12/3/2020 at 3:50:48 PM    < end of copied text >    [ ]  Catheterization:  [ ] Stress Test:    OTHER: 	      LABS:	 	                            11.8   13.14 )-----------( 262      ( 11 Dec 2020 07:05 )             36.4     12-11    138  |  99  |  6.0<L>  ----------------------------<  189<H>  3.1<L>   |  27.0  |  0.74    Ca    8.8      11 Dec 2020 07:05  Phos  3.1     12-11  Mg     1.9     12-11      proBNP:   Lipid Profile:   HgA1c:   TSH:       TELEMETRY: Reviewed  Sinus tachycardia   ECG:  Reviewed by me. 	  Sinus tachycardia @      [place] [facility]                                                               Office: [address] [address]                                                              Telephone: [phone]. Fax:[phone]                                                                             PROGRESS NOTE  Reason for follow up: Tachycardia   Overnight: No new events.   Update: Patient is s/p lap converted to open ileostomy reversal and partial colectomy    Subjective: "  ___no complaints but patient is sleepy on physical examination ___________________"      	  Vitals:  T(C): 36.7 (12-11-20 @ 07:49), Max: 37 (12-10-20 @ 20:05)  HR: 101 (12-11-20 @ 07:49) (76 - 106)  BP: 109/69 (12-11-20 @ 07:49) (109/69 - 136/78)  RR: 18 (12-11-20 @ 07:49) (18 - 19)  SpO2: 93% (12-11-20 @ 07:49) (93% - 95%)  Wt(kg): --  I&O's Summary    10 Dec 2020 07:01  -  11 Dec 2020 07:00  --------------------------------------------------------  IN: 600 mL / OUT: 1450 mL / NET: -850 mL            PHYSICAL EXAM:  Appearance: Comfortable. No acute distress  HEENT:  Head and neck: Atraumatic. Normocephalic.  Normal oral mucosa, PERRL, Neck is supple. No JVD, No carotid bruit.   Neurologic: A & O x 3, no focal deficits. EOMI.  Lymphatic: No cervical lymphadenopathy  Cardiovascular: Normal S1 S2, sinus tachycardia No murmur, rubs/gallops. No JVD, No edema  Respiratory: Lungs clear to auscultation  Gastrointestinal:  Soft, Non-tender, + BS + bandage of the abdomen   Lower Extremities: No edema  Psychiatry: Patient is calm. No agitation. Mood & affect appropriate  Skin: No rashes/ ecchymoses/cyanosis/ulcers visualized on the face, hands or feet.      CURRENT MEDICATIONS:  tamsulosin 0.4 milliGRAM(s) Oral at bedtime    cloZAPine  risperiDONE   Tablet  famotidine  IVPB  atorvastatin  dextrose 40% Gel  dextrose 50% Injectable  dextrose 50% Injectable  dextrose 50% Injectable  glucagon  Injectable  insulin glargine Injectable (LANTUS)  insulin lispro (ADMELOG) corrective regimen sliding scale  insulin lispro Injectable (ADMELOG)  levothyroxine Injectable  dextrose 5% + sodium chloride 0.45%.  dextrose 5%.  dextrose 5%.  enoxaparin Injectable  potassium chloride  10 mEq/100 mL IVPB      DIAGNOSTIC TESTING:  [ ] Echocardiogram:   < from: TTE Echo Complete w/o Contrast w/ Doppler (12.03.20 @ 14:06) >    Summary:   1. Endocardial visualization was enhanced with intravenous echo contrast.   2. The left atrium is normal in size.   3. Left ventricular ejection fraction, by visual estimation, is 65 to 70%. Grade I diastolic dysfunction.   4. The right atrium is normal in size.   5. Normal right ventricular size and function.   6. Mild aortic regurgitation.   7. There is no evidence of pericardial effusion.    MD Cortez, RPVI Electronically signed on 12/3/2020 at 3:50:48 PM    < end of copied text >    [ ]  Catheterization:  [ ] Stress Test:    OTHER: 	      LABS:	 	                            11.8   13.14 )-----------( 262      ( 11 Dec 2020 07:05 )             36.4     12-11    138  |  99  |  6.0<L>  ----------------------------<  189<H>  3.1<L>   |  27.0  |  0.74    Ca    8.8      11 Dec 2020 07:05  Phos  3.1     12-11  Mg     1.9     12-11      proBNP:   Lipid Profile:   HgA1c:   TSH:       TELEMETRY: Reviewed  Sinus tachycardia   ECG:  Reviewed by me. 	  Sinus tachycardia @

## 2020-12-11 NOTE — PROGRESS NOTE BEHAVIORAL HEALTH - NSBHCONSULTFOLLOWAFTERCARE_PSY_A_CORE FT
Follow up with outpatient psychiatrist when stable for discharge Follow up with outpatient psychiatrist when medically  stable for discharge

## 2020-12-11 NOTE — PROGRESS NOTE BEHAVIORAL HEALTH - SUMMARY
Patient is a 55  year old, male; domiciled in [facility] (Concern for independent living) ; ;  noncaregiver; past psychiatric history of schizoaffective disorder ; multiple prior psychiatric hospitalizations ([facility] on 4/30/20- 11/2/20 ); ; no known suicide attempts; no known history of violence or arrests; no active substance abuse or known history of complicated withdrawal; PMH of osteogenesis imperfecta, HTN, hypothyroidism, DM type 2 recent dx and bowel resection with ileostomy, with Stage IV colorectal cancer presented to ER from residence secondary to tremors.  Patient presented with approximately 10 day history of intermittent worsening gross tremor.  He was  found to have elevated lithium level. Patient presented with Lithium level 3.28 decreased to 2.37.  Pt currently at baseline, compliant with medications, denies any S/H I/I/P. Patient is a 55  year old, male; domiciled in [facility] (Concern for independent living) ; ;  non caregiver; past psychiatric history of schizoaffective disorder ; multiple prior psychiatric hospitalizations (Last [facility] on 4/30/20- 11/2/20 ); ; no known suicide attempts; no known history of violence or arrests; no active substance abuse or known history of complicated withdrawal; PMH of osteogenesis imperfecta, HTN, hypothyroidism, DM type 2 recent dx and bowel resection with ileostomy, with Stage IV colorectal cancer presented to ER from residence secondary to tremors.  Patient presented with approximately 10 day history of intermittent worsening gross tremor.  He was  found to have elevated lithium level. Patient presented with Lithium level 3.28 decreased to 2.37.  Pt currently at baseline, compliant with medications, denies any S/H I/I/P. Tachycardia most likely attributable to clozapine as no other infectious or metabolic explanation.

## 2020-12-11 NOTE — PROGRESS NOTE BEHAVIORAL HEALTH - NSBHCONSULTRECOMMENDOTHER_PSY_A_CORE FT
add low dose beta blocker i.e. metoprolol 12.5 - 25 mg daily for clozapine induced sinus tachycardia Would not advise dose reduction in clozaril as he is chronically delusional cardiology suggestions appreciated Should there be no other explanation i.e. PE , infection or metabolic derangement  Consider add low dose beta blocker i.e. metoprolol 12.5 - 25 mg daily for clozapine induced sinus tachycardia Would not advise dose reduction in clozapine  as he is chronically delusional

## 2020-12-11 NOTE — PROGRESS NOTE BEHAVIORAL HEALTH - NSBHFUPINTERVALHXFT_PSY_A_CORE
Patient seen at bedside. No o/n events, patient resting comfortably. No complaints at this time. Pt states his pain has improved and he has been having bowel movements. Denies current suicidal ideation, homicidal ideation or self-harm thoughts.  Denies any psychotic sx's. Patient seen at bedside.  Asked to evaluate recurrent tachycardia  patient resting comfortably. No complaints at this time. Pt states his pain has improved and he has been having bowel movements. Denies current suicidal ideation, homicidal ideation or self-harm thoughts.  Denies any psychotic sx's. Resident reports ideas of reference from TV and delusional statements. discussed his psychotropic medications

## 2020-12-11 NOTE — CHART NOTE - NSCHARTNOTEFT_GEN_A_CORE
Source: Patient [ ]  Family [ ]   other [ x]    Current Diet: Diet, NPO:   Except Medications  With Ice Chips/Sips of Water (12-05-20 @ 13:16)    Current Weight:   (12/8) 186.9 lbs  (12/4) 175 lbs  ? accuracy of weights, noted with 2+ b/l leg edema, continue to trend     Pertinent Medications: MEDICATIONS  (STANDING):  atorvastatin 40 milliGRAM(s) Oral at bedtime  cloZAPine 200 milliGRAM(s) Oral at bedtime  dextrose 40% Gel 15 Gram(s) Oral once  dextrose 5% + sodium chloride 0.9% with potassium chloride 40 mEq/L 1000 milliLiter(s) (60 mL/Hr) IV Continuous <Continuous>  dextrose 5%. 1000 milliLiter(s) (50 mL/Hr) IV Continuous <Continuous>  dextrose 5%. 1000 milliLiter(s) (100 mL/Hr) IV Continuous <Continuous>  dextrose 50% Injectable 25 Gram(s) IV Push once  dextrose 50% Injectable 12.5 Gram(s) IV Push once  dextrose 50% Injectable 25 Gram(s) IV Push once  enoxaparin Injectable 40 milliGRAM(s) SubCutaneous daily  famotidine  IVPB 20 milliGRAM(s) IV Intermittent daily  glucagon  Injectable 1 milliGRAM(s) IntraMuscular once  insulin glargine Injectable (LANTUS) 20 Unit(s) SubCutaneous at bedtime  insulin lispro (ADMELOG) corrective regimen sliding scale   SubCutaneous three times a day before meals  insulin lispro Injectable (ADMELOG) 18 Unit(s) SubCutaneous three times a day before meals  levothyroxine Injectable 25 MICROGram(s) IV Push <User Schedule>  risperiDONE   Tablet 1 milliGRAM(s) Oral at bedtime  tamsulosin 0.4 milliGRAM(s) Oral at bedtime    MEDICATIONS  (PRN):  ALBUTerol   0.042% 1.25 milliGRAM(s) Nebulizer every 6 hours PRN Shortness of Breath and/or Wheezing  HYDROmorphone  Injectable 0.5 milliGRAM(s) IV Push every 6 hours PRN breakthrough  ketorolac   Injectable 15 milliGRAM(s) IV Push every 6 hours PRN Moderate Pain (4 - 6)  LORazepam   Injectable 1 milliGRAM(s) IV Push every 6 hours PRN Anxiety  morphine  - Injectable 2 milliGRAM(s) IV Push every 4 hours PRN Moderate Pain (4 - 6)  morphine  - Injectable 4 milliGRAM(s) IV Push every 4 hours PRN Severe Pain (7 - 10)  ondansetron Injectable 4 milliGRAM(s) IV Push once PRN Nausea and/or Vomiting    Pertinent Labs: CBC Full  -  ( 11 Dec 2020 07:05 )  WBC Count : 13.14 K/uL  RBC Count : 4.03 M/uL  Hemoglobin : 11.8 g/dL  Hematocrit : 36.4 %  Platelet Count - Automated : 262 K/uL  Mean Cell Volume : 90.3 fl  Mean Cell Hemoglobin : 29.3 pg  Mean Cell Hemoglobin Concentration : 32.4 gm/dL  Auto Neutrophil # : 11.50 K/uL  Auto Lymphocyte # : 0.90 K/uL  Auto Monocyte # : 0.54 K/uL  Auto Eosinophil # : 0.05 K/uL  Auto Basophil # : 0.04 K/uL  Auto Neutrophil % : 87.6 %  Auto Lymphocyte % : 6.8 %  Auto Monocyte % : 4.1 %  Auto Eosinophil % : 0.4 %  Auto Basophil % : 0.3 %    12-11 Na138 mmol/L Glu 189 mg/dL<H> K+ 3.1 mmol/L<L> Cr  0.74 mg/dL BUN 6.0 mg/dL<L> Phos 3.1 mg/dL Alb n/a   PAB n/a       Skin: Surgical incision to midline abdomen and Right lower abdomen per documentation    Limited nutrition focused physical exam conducted - found signs of malnutrition [ ]absent [ x]present    Subcutaneous fat loss: [ ] Orbital fat pads region, [ ]Buccal fat region, [ ]Triceps region,  [ ]Ribs region    Muscle wasting: MILD [x ]Temples region, [x ]Clavicle region, [ ]Shoulder region, [ ]Scapula region, [ ]Interosseous region,  [ ]thigh region, [ ]Calf region    Estimated Needs:   [ x] no change since previous assessment  [ ] recalculated:     Current Nutrition Diagnosis: Pt remains at high nutrition risk secondary to malnutrition (moderate, acute) related to inability to meet sufficient protein-energy in setting of stage IV colon cancer s/p lap converted to open ileostomy and partial colectomy, now with post op ileus as evidenced by meeting <50% nutrient needs >5 days, mild muscle loss of temples and clavicles, +edema. Pt lethargic at time of interview. Has been NPO x ~7 days. Pt had no BM since surgery 12/4, given mineral oil enema and had BM yesterday 12/10. RD to follow up.    Recommendations:   1) Advance diet as medically feasible/tolerable to clears --> fulls --> CCHO, low fiber + Glucerna TID to optimize po intake and provide an additional 220 kcal, 10g protein per serving.  2) If pt anticipated to remain NPO, consider alternative means of nutrition/hydration.  3) Obtain daily weights to monitor trends.     Monitoring and Evaluation:   [ ] PO intake [ ] Tolerance to diet prescription [X] Weights  [X] Follow up per protocol [X] Labs

## 2020-12-11 NOTE — PROGRESS NOTE ADULT - ASSESSMENT
55 year old male with PMH of colon cancer stage 4 status post ileostomy 6 months ago , schizoaffective disorder (on lithium) , prolonged stay at [facility] recently , DLP was sent from [facility] with c/o weakness. Pre-operative optimization for ileostomy reversal.  EKG reviewed: NSR @ 79 bpm with non specific ST-T segment changes   TTE: 12/3/2020: 1. Endocardial visualization was enhanced with intravenous echo contrast. The left atrium is normal in size. Left ventricular ejection fraction, by visual estimation, is 65 to 70%. Grade I diastolic dysfunction. The right atrium is normal in size. Normal right ventricular size and function. Mild aortic regurgitation. There is no evidence of pericardial effusion.    A/P   1. Tachycardia       2. QTc prolongation, likely secondary to antipsychotics   - EKG shows QTC of 528 ms with no increase in QTC on serial EKG   - no prior episodes of syncope or dizziness   - Anesthetic drugs cause QTc prolongation via cardiac ion channel block or sympathetic stimulation, this is seen w/ desflurane or sevoflurane and during induction   - close intraprocedural monitoring  - monitor electrolytes keep K~ 4 and check Mag and keep ~2     will sign off and reconsult PRN   Thank You   Shalonda Moy D.O.  Cardiology      55 year old male with PMH of colon cancer stage 4 status post ileostomy 6 months ago , schizoaffective disorder (on lithium) , prolonged stay at [facility] recently , DLP was sent from [facility] with c/o weakness. Pre-operative optimization for ileostomy reversal.  EKG reviewed: NSR @ 79 bpm with non specific ST-T segment changes   TTE: 12/3/2020: 1. Endocardial visualization was enhanced with intravenous echo contrast. The left atrium is normal in size. Left ventricular ejection fraction, by visual estimation, is 65 to 70%. Grade I diastolic dysfunction. The right atrium is normal in size. Normal right ventricular size and function. Mild aortic regurgitation. There is no evidence of pericardial effusion.    A/P   1. Tachycardia likely secondary to PE vs dehydration vs infection   - would consider PE in the differential with hx of stage IV colon cancer and s/p ostomy reversal   - noted to be in sinus tachycardia with no evidence of atrial arrythmia   - continue with fluids  - no evidence of acute pain;   - US duplex of the lower extremities negative   - recommend to obtain CTA of the pulmonary arteries to rule out PE   - recommend limited TTE to assess LV and RV function and size     2. Stage IV Colon cancer s/p ostomy reversal   - patient is stable from that perspective   - continue management as per surgery     3. Hypokalemia   - K~3.1  - recommend to replete patient to K ~4       Thank You   Shalonda Moy D.O.  Cardiology

## 2020-12-12 LAB
ANION GAP SERPL CALC-SCNC: 6 MMOL/L — SIGNIFICANT CHANGE UP (ref 5–17)
BUN SERPL-MCNC: 7 MG/DL — LOW (ref 8–20)
CALCIUM SERPL-MCNC: 8.4 MG/DL — LOW (ref 8.6–10.2)
CHLORIDE SERPL-SCNC: 103 MMOL/L — SIGNIFICANT CHANGE UP (ref 98–107)
CO2 SERPL-SCNC: 27 MMOL/L — SIGNIFICANT CHANGE UP (ref 22–29)
CREAT SERPL-MCNC: 0.7 MG/DL — SIGNIFICANT CHANGE UP (ref 0.5–1.3)
GLUCOSE BLDC GLUCOMTR-MCNC: 155 MG/DL — HIGH (ref 70–99)
GLUCOSE BLDC GLUCOMTR-MCNC: 171 MG/DL — HIGH (ref 70–99)
GLUCOSE BLDC GLUCOMTR-MCNC: 193 MG/DL — HIGH (ref 70–99)
GLUCOSE BLDC GLUCOMTR-MCNC: 203 MG/DL — HIGH (ref 70–99)
GLUCOSE BLDC GLUCOMTR-MCNC: 276 MG/DL — HIGH (ref 70–99)
GLUCOSE SERPL-MCNC: 150 MG/DL — HIGH (ref 70–99)
HCT VFR BLD CALC: 30.6 % — LOW (ref 39–50)
HGB BLD-MCNC: 10 G/DL — LOW (ref 13–17)
MAGNESIUM SERPL-MCNC: 2 MG/DL — SIGNIFICANT CHANGE UP (ref 1.6–2.6)
MCHC RBC-ENTMCNC: 29.3 PG — SIGNIFICANT CHANGE UP (ref 27–34)
MCHC RBC-ENTMCNC: 32.7 GM/DL — SIGNIFICANT CHANGE UP (ref 32–36)
MCV RBC AUTO: 89.7 FL — SIGNIFICANT CHANGE UP (ref 80–100)
PHOSPHATE SERPL-MCNC: 3.3 MG/DL — SIGNIFICANT CHANGE UP (ref 2.4–4.7)
PLATELET # BLD AUTO: 277 K/UL — SIGNIFICANT CHANGE UP (ref 150–400)
POTASSIUM SERPL-MCNC: 3.6 MMOL/L — SIGNIFICANT CHANGE UP (ref 3.5–5.3)
POTASSIUM SERPL-SCNC: 3.6 MMOL/L — SIGNIFICANT CHANGE UP (ref 3.5–5.3)
RBC # BLD: 3.41 M/UL — LOW (ref 4.2–5.8)
RBC # FLD: 13.5 % — SIGNIFICANT CHANGE UP (ref 10.3–14.5)
SODIUM SERPL-SCNC: 136 MMOL/L — SIGNIFICANT CHANGE UP (ref 135–145)
WBC # BLD: 10.43 K/UL — SIGNIFICANT CHANGE UP (ref 3.8–10.5)
WBC # FLD AUTO: 10.43 K/UL — SIGNIFICANT CHANGE UP (ref 3.8–10.5)

## 2020-12-12 PROCEDURE — 99232 SBSQ HOSP IP/OBS MODERATE 35: CPT

## 2020-12-12 RX ORDER — DEXTROSE MONOHYDRATE, SODIUM CHLORIDE, AND POTASSIUM CHLORIDE 50; .745; 4.5 G/1000ML; G/1000ML; G/1000ML
1000 INJECTION, SOLUTION INTRAVENOUS
Refills: 0 | Status: DISCONTINUED | OUTPATIENT
Start: 2020-12-12 | End: 2020-12-13

## 2020-12-12 RX ORDER — METOPROLOL TARTRATE 50 MG
5 TABLET ORAL EVERY 6 HOURS
Refills: 0 | Status: DISCONTINUED | OUTPATIENT
Start: 2020-12-12 | End: 2020-12-15

## 2020-12-12 RX ADMIN — Medication 18 UNIT(S): at 08:56

## 2020-12-12 RX ADMIN — Medication 25 MICROGRAM(S): at 05:01

## 2020-12-12 RX ADMIN — Medication 7: at 12:09

## 2020-12-12 RX ADMIN — Medication 5 MILLIGRAM(S): at 11:21

## 2020-12-12 RX ADMIN — Medication 5 MILLIGRAM(S): at 23:24

## 2020-12-12 RX ADMIN — FAMOTIDINE 100 MILLIGRAM(S): 10 INJECTION INTRAVENOUS at 11:27

## 2020-12-12 RX ADMIN — CLOZAPINE 200 MILLIGRAM(S): 150 TABLET, ORALLY DISINTEGRATING ORAL at 23:23

## 2020-12-12 RX ADMIN — INSULIN GLARGINE 20 UNIT(S): 100 INJECTION, SOLUTION SUBCUTANEOUS at 23:22

## 2020-12-12 RX ADMIN — Medication 18 UNIT(S): at 16:57

## 2020-12-12 RX ADMIN — ENOXAPARIN SODIUM 40 MILLIGRAM(S): 100 INJECTION SUBCUTANEOUS at 11:20

## 2020-12-12 RX ADMIN — Medication 18 UNIT(S): at 12:10

## 2020-12-12 RX ADMIN — Medication 5 MILLIGRAM(S): at 05:01

## 2020-12-12 RX ADMIN — TAMSULOSIN HYDROCHLORIDE 0.4 MILLIGRAM(S): 0.4 CAPSULE ORAL at 23:24

## 2020-12-12 RX ADMIN — Medication 3: at 08:57

## 2020-12-12 RX ADMIN — Medication 5 MILLIGRAM(S): at 17:20

## 2020-12-12 RX ADMIN — Medication 3: at 16:57

## 2020-12-12 RX ADMIN — ATORVASTATIN CALCIUM 40 MILLIGRAM(S): 80 TABLET, FILM COATED ORAL at 23:23

## 2020-12-12 NOTE — PROGRESS NOTE ADULT - SUBJECTIVE AND OBJECTIVE BOX
[place] [facility]  [address], [place] [address]    REASON FOR VISIT:  Follow up for Sinus Tach  UPDATE:  Hr is better. States he has pain that comes and goes  TELEMETRY MONITORING:  sinus to sinus tach    12-12    136  |  103  |  7.0<L>  ----------------------------<  150<H>  3.6   |  27.0  |  0.70    Ca    8.4<L>      12 Dec 2020 06:17  Phos  3.3     12-12  Mg     2.0     12-12    MEDICATIONS  (STANDING):  atorvastatin 40 milliGRAM(s) Oral at bedtime  cloZAPine 200 milliGRAM(s) Oral at bedtime  enoxaparin Injectable 40 milliGRAM(s) SubCutaneous daily  famotidine  IVPB 20 milliGRAM(s) IV Intermittent daily  glucagon  Injectable 1 milliGRAM(s) IntraMuscular once  insulin glargine Injectable (LANTUS) 20 Unit(s) SubCutaneous at bedtime  insulin lispro (ADMELOG) corrective regimen sliding scale   SubCutaneous three times a day before meals  insulin lispro Injectable (ADMELOG) 18 Unit(s) SubCutaneous three times a day before meals  levothyroxine Injectable 25 MICROGram(s) IV Push <User Schedule>  metoprolol tartrate Injectable 5 milliGRAM(s) IV Push every 6 hours  risperiDONE   Tablet 1 milliGRAM(s) Oral at bedtime  tamsulosin 0.4 milliGRAM(s) Oral at bedtime      Vital Signs Last 24 Hrs  T(C): 36.8 (12 Dec 2020 04:57), Max: 37.3 (11 Dec 2020 19:28)  T(F): 98.2 (12 Dec 2020 04:57), Max: 99.2 (11 Dec 2020 19:28)  HR: 87 (12 Dec 2020 08:03) (87 - 105)  BP: 109/72 (12 Dec 2020 08:03) (108/71 - 122/76)  BP(mean): --  RR: 19 (12 Dec 2020 08:03) (18 - 20)  SpO2: 94% (12 Dec 2020 08:03) (92% - 98%)  T(C): 36.8 (12-12-20 @ 04:57), Max: 37.3 (12-11-20 @ 19:28)  HR: 87 (12-12-20 @ 08:03) (87 - 105)  BP: 109/72 (12-12-20 @ 08:03) (108/71 - 122/76)  RR: 19 (12-12-20 @ 08:03) (18 - 20)  SpO2: 94% (12-12-20 @ 08:03) (92% - 98%)    HEENT Head atraumatic eomi, oral mucosa moist  CV S1&S2   Regular   RESP  decreased breath sounds  GI  Soft active bowel sounds non tender + dressing dry and intact  EXT  No clubbing/Cyanosis /Edema  No calf tenderness  NEURO  Alert oriented  No gross motor or sensory deficits     1. Endocardial visualization was enhanced with intravenous echo contrast.   2. The left atrium is normal in size.   3. Left ventricular ejection fraction, by visual estimation, is 65 to 70%. Grade I diastolic dysfunction.   4. The right atrium is normal in size.   5. Normal right ventricular size and function.   6. Mild aortic regurgitation.   7. There is no evidence of pericardial effusion.TTE: 12/3/2020: 1. Endocardial visualization was enhanced with intravenous echo contrast. The left atrium is normal in size. Left ventricular ejection fraction, by visual estimation, is 65 to 70%. Grade I diastolic dysfunction. The right atrium is normal in size. Normal right ventricular size and function. Mild aortic regurgitation. There is no evidence of pericardial effusion.

## 2020-12-12 NOTE — PROGRESS NOTE ADULT - ASSESSMENT
55 year old male with PMH of colon cancer stage 4 status post ileostomy 6 months ago , schizoaffective disorder (on lithium) , prolonged stay at [facility] recently , DLP was sent from [facility] with c/o weakness. Pre-operative optimization for ileostomy reversal.  We are seeing patient for sinus tachycaria.  CT Angio of chest negative for a pe    SINUS TACHYCARDIA  multifactorial -> echo normal  treat pain aggressively  OOB  to chair  encourage incentive spirometer  Keep k >4 and mg >2    STAGE 4 COLON CANCER  s/p ileostomy reversal    SCHIZOAFFECTIVE Disorder  c.w current psych medications  Lithium is on hold    POST OP Illeus  OOB to chair

## 2020-12-12 NOTE — PROGRESS NOTE ADULT - SUBJECTIVE AND OBJECTIVE BOX
SURGERY DAILY PROGRESS NOTE:    Interval:   BM s/p enema  CTA negative for PE  CT abdomen w/ small bowel distention suggestive of post-operative ileus  Per psych recommendation `no psychiatric contraindications to d/c  Persistent tachycardia, likely induced by clozaril    S:   Patient seen and examined. No acute events overnight. Pain is well controlled. GI fxn +/+ (yesterday). Remains on sips and chips      O:   Exam:  Gen: NAD. A&Ox3.  Well developed, alert and cooperative.   Resp: No additional work of breathing.   Card: RR. No peripheral edema or pallor.   Abd: Softly distended, NT.  Prior ileostomy site w/ wet-dry packing in place. c/d/i. No surrounding erythema. Midline w/ staples and island dressing.   Ext: WWP. Able to move all extremities equally.    Vital Signs Last 24 Hrs  T(C): 36.5 (11 Dec 2020 23:32), Max: 37.3 (11 Dec 2020 19:28)  T(F): 97.7 (11 Dec 2020 23:32), Max: 99.2 (11 Dec 2020 19:28)  HR: 102 (11 Dec 2020 23:32) (101 - 105)  BP: 108/71 (11 Dec 2020 23:32) (108/71 - 113/78)  BP(mean): --  RR: 18 (11 Dec 2020 23:32) (18 - 20)  SpO2: 92% (11 Dec 2020 23:32) (92% - 98%)      12-10-20 @ 07:01  -  12-11-20 @ 07:00  --------------------------------------------------------  IN: 600 mL / OUT: 1450 mL / NET: -850 mL    12-11-20 @ 07:01  -  12-12-20 @ 01:04  --------------------------------------------------------  IN: 1560 mL / OUT: 100 mL / NET: 1460 mL        LABS:                        11.8   13.14 )-----------( 262      ( 11 Dec 2020 07:05 )             36.4     12-11    133<L>  |  96<L>  |  7.0<L>  ----------------------------<  172<H>  3.3<L>   |  24.0  |  0.71    Ca    9.0      11 Dec 2020 20:08  Phos  3.1     12-11  Mg     1.9     12-11

## 2020-12-12 NOTE — PROGRESS NOTE ADULT - ASSESSMENT
55M w/ h/o stage IV colon cancer, schizophrenia, HLD, admitted to medicine for Lithium toxicity, now resolved. S/p open ileostomy reversal POD 6 with continued ileus. Pt improving with activity goals head of bed elevated 30-45 degrees, sitting in chair yesterday, walking with PT with rolling walker    History of Stage 4 Colon Cancer s/p reversal loop ileostomy, now with post-op ileus  - CT 12/11 w/ evidence of long standing ileus   - ileostomy site dressing changed w/ wet to dry daily   - c/w rectal enemas and TOBY for persistent ileus   - NPO w/ sips and chips  - D5 1/2 NS @100cc/hr    Tachycardia  - likely SE of clozaril  - other causes, including PE have been r/o   - c/w Clozaril  - add lopressor for HR control    Hyponatremia  - resolved, Na wnl this am    Hypothyroidism  - lithium induced. Endocrine following -> TSH wnl  - Synthroid 25mcg IV    DM  - last 3 POCT in mid 100's  - PM Lantus 20 U  - Premeal 18 U  - ISS    HCL  -c/w Atorvastatin    Lithium toxicity / tremors / weakness --> resolved + WNL  - lithium held     Schizoaffective disorder    - Psych following  - Clozapine 200, lithium changed to risperdal  - constant obs  - continue medication IV    DVT ppx: Lovenox 40

## 2020-12-12 NOTE — PROGRESS NOTE ADULT - ATTENDING COMMENTS
Above noted and DW NP.  CT negative for PE and Hr is controlled  No need for further workup at this time  Please call if needed.   Thank you.

## 2020-12-13 LAB
GLUCOSE BLDC GLUCOMTR-MCNC: 108 MG/DL — HIGH (ref 70–99)
GLUCOSE BLDC GLUCOMTR-MCNC: 113 MG/DL — HIGH (ref 70–99)
GLUCOSE BLDC GLUCOMTR-MCNC: 167 MG/DL — HIGH (ref 70–99)
GLUCOSE BLDC GLUCOMTR-MCNC: 175 MG/DL — HIGH (ref 70–99)

## 2020-12-13 PROCEDURE — 93010 ELECTROCARDIOGRAM REPORT: CPT

## 2020-12-13 RX ORDER — DEXTROSE MONOHYDRATE, SODIUM CHLORIDE, AND POTASSIUM CHLORIDE 50; .745; 4.5 G/1000ML; G/1000ML; G/1000ML
1000 INJECTION, SOLUTION INTRAVENOUS
Refills: 0 | Status: DISCONTINUED | OUTPATIENT
Start: 2020-12-13 | End: 2020-12-14

## 2020-12-13 RX ADMIN — FAMOTIDINE 100 MILLIGRAM(S): 10 INJECTION INTRAVENOUS at 14:03

## 2020-12-13 RX ADMIN — CLOZAPINE 200 MILLIGRAM(S): 150 TABLET, ORALLY DISINTEGRATING ORAL at 21:46

## 2020-12-13 RX ADMIN — ENOXAPARIN SODIUM 40 MILLIGRAM(S): 100 INJECTION SUBCUTANEOUS at 14:03

## 2020-12-13 RX ADMIN — ATORVASTATIN CALCIUM 40 MILLIGRAM(S): 80 TABLET, FILM COATED ORAL at 21:46

## 2020-12-13 RX ADMIN — Medication 18 UNIT(S): at 17:53

## 2020-12-13 RX ADMIN — Medication 5 MILLIGRAM(S): at 17:54

## 2020-12-13 RX ADMIN — Medication 18 UNIT(S): at 08:56

## 2020-12-13 RX ADMIN — INSULIN GLARGINE 20 UNIT(S): 100 INJECTION, SOLUTION SUBCUTANEOUS at 21:46

## 2020-12-13 RX ADMIN — Medication 5 MILLIGRAM(S): at 05:50

## 2020-12-13 RX ADMIN — TAMSULOSIN HYDROCHLORIDE 0.4 MILLIGRAM(S): 0.4 CAPSULE ORAL at 21:46

## 2020-12-13 RX ADMIN — Medication 25 MICROGRAM(S): at 05:50

## 2020-12-13 RX ADMIN — Medication 3: at 08:56

## 2020-12-13 RX ADMIN — Medication 5 MILLIGRAM(S): at 14:03

## 2020-12-13 RX ADMIN — MORPHINE SULFATE 2 MILLIGRAM(S): 50 CAPSULE, EXTENDED RELEASE ORAL at 14:30

## 2020-12-13 RX ADMIN — Medication 3: at 17:54

## 2020-12-13 RX ADMIN — MORPHINE SULFATE 2 MILLIGRAM(S): 50 CAPSULE, EXTENDED RELEASE ORAL at 14:12

## 2020-12-13 NOTE — PROGRESS NOTE ADULT - SUBJECTIVE AND OBJECTIVE BOX
INTERVAL HPI/OVERNIGHT EVENTS: no new complaints, tolerating clears, 4-5 BM's per pt., no overnight events    STATUS POST:  lap converted to open ileostomy reversal and partial colectomy 12/4      MEDICATIONS  (STANDING):  atorvastatin 40 milliGRAM(s) Oral at bedtime  cloZAPine 200 milliGRAM(s) Oral at bedtime  dextrose 40% Gel 15 Gram(s) Oral once  dextrose 5% + sodium chloride 0.9% with potassium chloride 40 mEq/L 1000 milliLiter(s) (30 mL/Hr) IV Continuous <Continuous>  dextrose 5%. 1000 milliLiter(s) (50 mL/Hr) IV Continuous <Continuous>  dextrose 5%. 1000 milliLiter(s) (100 mL/Hr) IV Continuous <Continuous>  dextrose 50% Injectable 12.5 Gram(s) IV Push once  dextrose 50% Injectable 25 Gram(s) IV Push once  dextrose 50% Injectable 25 Gram(s) IV Push once  enoxaparin Injectable 40 milliGRAM(s) SubCutaneous daily  famotidine  IVPB 20 milliGRAM(s) IV Intermittent daily  glucagon  Injectable 1 milliGRAM(s) IntraMuscular once  insulin glargine Injectable (LANTUS) 20 Unit(s) SubCutaneous at bedtime  insulin lispro (ADMELOG) corrective regimen sliding scale   SubCutaneous three times a day before meals  insulin lispro Injectable (ADMELOG) 18 Unit(s) SubCutaneous three times a day before meals  levothyroxine Injectable 25 MICROGram(s) IV Push <User Schedule>  metoprolol tartrate Injectable 5 milliGRAM(s) IV Push every 6 hours  risperiDONE   Tablet 1 milliGRAM(s) Oral at bedtime  tamsulosin 0.4 milliGRAM(s) Oral at bedtime    MEDICATIONS  (PRN):  ALBUTerol   0.042% 1.25 milliGRAM(s) Nebulizer every 6 hours PRN Shortness of Breath and/or Wheezing  LORazepam   Injectable 1 milliGRAM(s) IV Push every 6 hours PRN Anxiety  morphine  - Injectable 2 milliGRAM(s) IV Push every 4 hours PRN Moderate Pain (4 - 6)  morphine  - Injectable 4 milliGRAM(s) IV Push every 4 hours PRN Severe Pain (7 - 10)  ondansetron Injectable 4 milliGRAM(s) IV Push once PRN Nausea and/or Vomiting      Vital Signs Last 24 Hrs  T(C): 37 (13 Dec 2020 05:29), Max: 37 (13 Dec 2020 05:29)  T(F): 98.6 (13 Dec 2020 05:29), Max: 98.6 (13 Dec 2020 05:29)  HR: 92 (13 Dec 2020 05:29) (87 - 94)  BP: 114/80 (13 Dec 2020 05:29) (102/68 - 119/80)  BP(mean): --  RR: 19 (13 Dec 2020 05:29) (18 - 19)  SpO2: 96% (13 Dec 2020 05:29) (93% - 96%)    PHYSICAL EXAM:      Constitutional: NAD    Respiratory: no accessory muscle use or conversational dyspnea    Cardiovascular: S1S2    Gastrointestinal: soft, NT/ND          I&O's Detail    12 Dec 2020 07:01  -  13 Dec 2020 07:00  --------------------------------------------------------  IN:    dextrose 5% + sodium chloride 0.9% w/ Additives: 300 mL    Oral Fluid: 500 mL  Total IN: 800 mL    OUT:  Total OUT: 0 mL    Total NET: 800 mL          LABS:                        10.0   10.43 )-----------( 277      ( 12 Dec 2020 06:18 )             30.6     12-12    136  |  103  |  7.0<L>  ----------------------------<  150<H>  3.6   |  27.0  |  0.70    Ca    8.4<L>      12 Dec 2020 06:17  Phos  3.3     12-12  Mg     2.0     12-12            RADIOLOGY & ADDITIONAL STUDIES:

## 2020-12-13 NOTE — PROGRESS NOTE ADULT - ASSESSMENT
55M PMHx stage IV colon cancer, schizophrenia, HLD, originally admitted to medicine for Lithium toxicity, now resolved. S/p open ileostomy reversal POD 9     - ileus seems to have resolved  - advance diet to regular  - pt.'s home worker brought in from home, encourage ambulation  - possible d/c home in am if tolerating regular diet

## 2020-12-13 NOTE — CHART NOTE - NSCHARTNOTEFT_GEN_A_CORE
RN called that pt with 9 seconds of PAT w/ HR up to 160's on monitor. Pt evaluated bedside, resting comfortably with no complains, denies chest pain, arm numbness. States he has history of intermittent tachycardia; pt was evaluated by cardiology yesterday who recommended no further workup after a normal Echo.   EKG ordered, continue with Lopressor q6hrs for HR control

## 2020-12-13 NOTE — PROGRESS NOTE ADULT - ASSESSMENT
HypoKalemia will supplement via gentle IVF  has h/o HF pEF hyperdynamic  sinus tachy cardia will give 500cc bolus over 2 hrs  s/p--> Loop ostomy reversed  Mild HypoNatremia  AM labs    Will follow

## 2020-12-13 NOTE — PROGRESS NOTE ADULT - SUBJECTIVE AND OBJECTIVE BOX
NEPHROLOGY INTERVAL HPI/OVERNIGHT EVENTS:    Examined  Sinus tachycardia this AM    MEDICATIONS  (STANDING):  atorvastatin 40 milliGRAM(s) Oral at bedtime  cloZAPine 200 milliGRAM(s) Oral at bedtime  dextrose 40% Gel 15 Gram(s) Oral once  dextrose 5% + sodium chloride 0.9% with potassium chloride 40 mEq/L 1000 milliLiter(s) (30 mL/Hr) IV Continuous <Continuous>  dextrose 5%. 1000 milliLiter(s) (50 mL/Hr) IV Continuous <Continuous>  dextrose 5%. 1000 milliLiter(s) (100 mL/Hr) IV Continuous <Continuous>  dextrose 50% Injectable 25 Gram(s) IV Push once  dextrose 50% Injectable 12.5 Gram(s) IV Push once  dextrose 50% Injectable 25 Gram(s) IV Push once  enoxaparin Injectable 40 milliGRAM(s) SubCutaneous daily  famotidine  IVPB 20 milliGRAM(s) IV Intermittent daily  glucagon  Injectable 1 milliGRAM(s) IntraMuscular once  insulin glargine Injectable (LANTUS) 20 Unit(s) SubCutaneous at bedtime  insulin lispro (ADMELOG) corrective regimen sliding scale   SubCutaneous three times a day before meals  insulin lispro Injectable (ADMELOG) 18 Unit(s) SubCutaneous three times a day before meals  levothyroxine Injectable 25 MICROGram(s) IV Push <User Schedule>  metoprolol tartrate Injectable 5 milliGRAM(s) IV Push every 6 hours  risperiDONE   Tablet 1 milliGRAM(s) Oral at bedtime  tamsulosin 0.4 milliGRAM(s) Oral at bedtime    MEDICATIONS  (PRN):  ALBUTerol   0.042% 1.25 milliGRAM(s) Nebulizer every 6 hours PRN Shortness of Breath and/or Wheezing  LORazepam   Injectable 1 milliGRAM(s) IV Push every 6 hours PRN Anxiety  morphine  - Injectable 2 milliGRAM(s) IV Push every 4 hours PRN Moderate Pain (4 - 6)  morphine  - Injectable 4 milliGRAM(s) IV Push every 4 hours PRN Severe Pain (7 - 10)  ondansetron Injectable 4 milliGRAM(s) IV Push once PRN Nausea and/or Vomiting      Allergies    amoxicillin (Unknown)  Lamictal (Unknown)  penicillin (Unknown)  Tegretol (Unknown)  Zetia (Unknown)    Intolerances        Vital Signs Last 24 Hrs  T(C): 36.9 (13 Dec 2020 07:53), Max: 37 (13 Dec 2020 05:29)  T(F): 98.5 (13 Dec 2020 07:53), Max: 98.6 (13 Dec 2020 05:29)  HR: 87 (13 Dec 2020 07:53) (87 - 94)  BP: 117/78 (13 Dec 2020 07:53) (102/68 - 119/80)  BP(mean): --  RR: 19 (13 Dec 2020 07:53) (18 - 19)  SpO2: 92% (13 Dec 2020 07:53) (92% - 96%)  Daily     Daily     PHYSICAL EXAM:  GENERAL: NAD, obese  NECK: Supple  NERVOUS SYSTEM:  Alert & verbal  CHEST/LUNG: Clear bilaterally   HEART: Regular rate and rhythm  ABDOMEN: Soft, surgical site dressed, BS+    LABS:                        10.0   10.43 )-----------( 277      ( 12 Dec 2020 06:18 )             30.6     12-12    136  |  103  |  7.0<L>  ----------------------------<  150<H>  3.6   |  27.0  |  0.70    Ca    8.4<L>      12 Dec 2020 06:17  Phos  3.3     12-12  Mg     2.0     12-12                  RADIOLOGY & ADDITIONAL TESTS:

## 2020-12-14 LAB
ANION GAP SERPL CALC-SCNC: 8 MMOL/L — SIGNIFICANT CHANGE UP (ref 5–17)
ANION GAP SERPL CALC-SCNC: 9 MMOL/L — SIGNIFICANT CHANGE UP (ref 5–17)
BUN SERPL-MCNC: 3 MG/DL — LOW (ref 8–20)
BUN SERPL-MCNC: <3 MG/DL — LOW (ref 8–20)
CALCIUM SERPL-MCNC: 8 MG/DL — LOW (ref 8.6–10.2)
CALCIUM SERPL-MCNC: 8.7 MG/DL — SIGNIFICANT CHANGE UP (ref 8.6–10.2)
CHLORIDE SERPL-SCNC: 102 MMOL/L — SIGNIFICANT CHANGE UP (ref 98–107)
CHLORIDE SERPL-SCNC: 106 MMOL/L — SIGNIFICANT CHANGE UP (ref 98–107)
CO2 SERPL-SCNC: 22 MMOL/L — SIGNIFICANT CHANGE UP (ref 22–29)
CO2 SERPL-SCNC: 26 MMOL/L — SIGNIFICANT CHANGE UP (ref 22–29)
CREAT SERPL-MCNC: 0.59 MG/DL — SIGNIFICANT CHANGE UP (ref 0.5–1.3)
CREAT SERPL-MCNC: 0.64 MG/DL — SIGNIFICANT CHANGE UP (ref 0.5–1.3)
GLUCOSE BLDC GLUCOMTR-MCNC: 121 MG/DL — HIGH (ref 70–99)
GLUCOSE BLDC GLUCOMTR-MCNC: 126 MG/DL — HIGH (ref 70–99)
GLUCOSE BLDC GLUCOMTR-MCNC: 139 MG/DL — HIGH (ref 70–99)
GLUCOSE BLDC GLUCOMTR-MCNC: 162 MG/DL — HIGH (ref 70–99)
GLUCOSE SERPL-MCNC: 106 MG/DL — HIGH (ref 70–99)
GLUCOSE SERPL-MCNC: 159 MG/DL — HIGH (ref 70–99)
MAGNESIUM SERPL-MCNC: 1.7 MG/DL — SIGNIFICANT CHANGE UP (ref 1.6–2.6)
POTASSIUM SERPL-MCNC: 2.8 MMOL/L — CRITICAL LOW (ref 3.5–5.3)
POTASSIUM SERPL-MCNC: 3.4 MMOL/L — LOW (ref 3.5–5.3)
POTASSIUM SERPL-SCNC: 2.8 MMOL/L — CRITICAL LOW (ref 3.5–5.3)
POTASSIUM SERPL-SCNC: 3.4 MMOL/L — LOW (ref 3.5–5.3)
SODIUM SERPL-SCNC: 134 MMOL/L — LOW (ref 135–145)
SODIUM SERPL-SCNC: 136 MMOL/L — SIGNIFICANT CHANGE UP (ref 135–145)

## 2020-12-14 PROCEDURE — 99233 SBSQ HOSP IP/OBS HIGH 50: CPT

## 2020-12-14 PROCEDURE — 99232 SBSQ HOSP IP/OBS MODERATE 35: CPT

## 2020-12-14 RX ORDER — INSULIN LISPRO 100/ML
15 VIAL (ML) SUBCUTANEOUS
Refills: 0 | Status: DISCONTINUED | OUTPATIENT
Start: 2020-12-14 | End: 2020-12-14

## 2020-12-14 RX ORDER — MAGNESIUM SULFATE 500 MG/ML
2 VIAL (ML) INJECTION ONCE
Refills: 0 | Status: COMPLETED | OUTPATIENT
Start: 2020-12-14 | End: 2020-12-14

## 2020-12-14 RX ORDER — POTASSIUM CHLORIDE 20 MEQ
40 PACKET (EA) ORAL EVERY 4 HOURS
Refills: 0 | Status: COMPLETED | OUTPATIENT
Start: 2020-12-14 | End: 2020-12-14

## 2020-12-14 RX ORDER — INSULIN LISPRO 100/ML
10 VIAL (ML) SUBCUTANEOUS
Refills: 0 | Status: DISCONTINUED | OUTPATIENT
Start: 2020-12-14 | End: 2020-12-16

## 2020-12-14 RX ORDER — METFORMIN HYDROCHLORIDE 850 MG/1
500 TABLET ORAL
Refills: 0 | Status: DISCONTINUED | OUTPATIENT
Start: 2020-12-14 | End: 2020-12-21

## 2020-12-14 RX ORDER — LEVOTHYROXINE SODIUM 125 MCG
100 TABLET ORAL DAILY
Refills: 0 | Status: DISCONTINUED | OUTPATIENT
Start: 2020-12-14 | End: 2020-12-21

## 2020-12-14 RX ADMIN — Medication 3: at 07:38

## 2020-12-14 RX ADMIN — Medication 10 UNIT(S): at 17:25

## 2020-12-14 RX ADMIN — FAMOTIDINE 100 MILLIGRAM(S): 10 INJECTION INTRAVENOUS at 11:04

## 2020-12-14 RX ADMIN — Medication 25 MICROGRAM(S): at 05:35

## 2020-12-14 RX ADMIN — ENOXAPARIN SODIUM 40 MILLIGRAM(S): 100 INJECTION SUBCUTANEOUS at 11:05

## 2020-12-14 RX ADMIN — INSULIN GLARGINE 20 UNIT(S): 100 INJECTION, SOLUTION SUBCUTANEOUS at 22:18

## 2020-12-14 RX ADMIN — METFORMIN HYDROCHLORIDE 500 MILLIGRAM(S): 850 TABLET ORAL at 17:26

## 2020-12-14 RX ADMIN — Medication 40 MILLIEQUIVALENT(S): at 17:26

## 2020-12-14 RX ADMIN — Medication 5 MILLIGRAM(S): at 17:25

## 2020-12-14 RX ADMIN — Medication 15 UNIT(S): at 11:04

## 2020-12-14 RX ADMIN — TAMSULOSIN HYDROCHLORIDE 0.4 MILLIGRAM(S): 0.4 CAPSULE ORAL at 22:18

## 2020-12-14 RX ADMIN — Medication 40 MILLIEQUIVALENT(S): at 13:04

## 2020-12-14 RX ADMIN — Medication 50 GRAM(S): at 13:04

## 2020-12-14 RX ADMIN — CLOZAPINE 200 MILLIGRAM(S): 150 TABLET, ORALLY DISINTEGRATING ORAL at 22:18

## 2020-12-14 RX ADMIN — Medication 40 MILLIEQUIVALENT(S): at 10:10

## 2020-12-14 RX ADMIN — ATORVASTATIN CALCIUM 40 MILLIGRAM(S): 80 TABLET, FILM COATED ORAL at 22:18

## 2020-12-14 RX ADMIN — Medication 5 MILLIGRAM(S): at 11:05

## 2020-12-14 RX ADMIN — Medication 15 UNIT(S): at 07:38

## 2020-12-14 NOTE — PROGRESS NOTE ADULT - SUBJECTIVE AND OBJECTIVE BOX
[id number]    55y      Male    INTERVAL HPI/OVERNIGHT EVENTS: patient being transferred back to medicine s/p reversal of ileostomy on 12/4.     REVIEW OF SYSTEMS:    CONSTITUTIONAL: No fever, weight loss, or fatigue  RESPIRATORY: No cough, wheezing, hemoptysis; No shortness of breath  CARDIOVASCULAR: No chest pain, palpitations  GASTROINTESTINAL: No abdominal or epigastric pain. No nausea, vomiting  NEUROLOGICAL: No headaches, memory loss, loss of strength.  MISCELLANEOUS:      Vital Signs Last 24 Hrs  T(C): 36.7 (14 Dec 2020 15:59), Max: 36.8 (13 Dec 2020 20:01)  T(F): 98.1 (14 Dec 2020 15:59), Max: 98.2 (13 Dec 2020 20:01)  HR: 88 (14 Dec 2020 15:59) (79 - 88)  BP: 103/70 (14 Dec 2020 15:59) (103/66 - 115/81)  BP(mean): --  RR: 18 (14 Dec 2020 15:59) (18 - 18)  SpO2: 92% (14 Dec 2020 15:59) (92% - 95%)    PHYSICAL EXAM:    GENERAL: NAD, resting comfortable in bed   CHEST/LUNG: CTAB , no wheezing , rales, rhonchi heard   HEART: S1S2+, Regular rate and rhythm; No murmurs, rubs, or gallops  ABDOMEN: Soft, scar  EXTREMITIES:  no pitting edema , pulses palpated BL.      LABS:    12-14    134<L>  |  106  |  3.0<L>  ----------------------------<  159<H>  2.8<LL>   |  22.0  |  0.59    Ca    8.0<L>      14 Dec 2020 07:33  Mg     1.7     12-14              MEDICATIONS  (STANDING):  atorvastatin 40 milliGRAM(s) Oral at bedtime  cloZAPine 200 milliGRAM(s) Oral at bedtime  dextrose 40% Gel 15 Gram(s) Oral once  dextrose 5%. 1000 milliLiter(s) (50 mL/Hr) IV Continuous <Continuous>  dextrose 5%. 1000 milliLiter(s) (100 mL/Hr) IV Continuous <Continuous>  dextrose 50% Injectable 25 Gram(s) IV Push once  dextrose 50% Injectable 12.5 Gram(s) IV Push once  dextrose 50% Injectable 25 Gram(s) IV Push once  enoxaparin Injectable 40 milliGRAM(s) SubCutaneous daily  famotidine  IVPB 20 milliGRAM(s) IV Intermittent daily  glucagon  Injectable 1 milliGRAM(s) IntraMuscular once  insulin glargine Injectable (LANTUS) 20 Unit(s) SubCutaneous at bedtime  insulin lispro (ADMELOG) corrective regimen sliding scale   SubCutaneous three times a day before meals  insulin lispro Injectable (ADMELOG) 10 Unit(s) SubCutaneous three times a day before meals  levothyroxine 100 MICROGram(s) Oral daily  metFORMIN 500 milliGRAM(s) Oral two times a day  metoprolol tartrate Injectable 5 milliGRAM(s) IV Push every 6 hours  potassium chloride    Tablet ER 40 milliEquivalent(s) Oral every 4 hours  risperiDONE   Tablet 1 milliGRAM(s) Oral at bedtime  tamsulosin 0.4 milliGRAM(s) Oral at bedtime    MEDICATIONS  (PRN):  ALBUTerol   0.042% 1.25 milliGRAM(s) Nebulizer every 6 hours PRN Shortness of Breath and/or Wheezing  ondansetron Injectable 4 milliGRAM(s) IV Push once PRN Nausea and/or Vomiting      RADIOLOGY & ADDITIONAL TESTS:

## 2020-12-14 NOTE — PROGRESS NOTE ADULT - ASSESSMENT
HypoKalemia being supplement   has h/o HF pEF hyperdynamic  s/p--> Loop ostomy reversed  Mild HypoNatremia  will repeat BMP this evening  AM labs    Will follow

## 2020-12-14 NOTE — PROGRESS NOTE BEHAVIORAL HEALTH - NSBHFUPINTERVALHXFT_PSY_A_CORE
Patient seen sitting up in the chair, patient resting comfortably. No complaints at this time. Reports that his overall mood is good and that he feels he is on the path to recovery. Pt reports his tremors have significantly improved since he stopped the lithium. Denies current suicidal ideation, homicidal ideation or self-harm thoughts.  Denies any psychotic sx's.

## 2020-12-14 NOTE — PROGRESS NOTE BEHAVIORAL HEALTH - SUMMARY
Patient is a 55  year old, male; [facility]iciled in [facility] for independent [facility]) ; ;  non caregiver; past psychiatric history of schizoaffective disorder ; multiple prior psychiatric hospitalizations (Last [facility] on 4/30/20- 11/2/20 ); ; no known suicide attempts; no known history of violence or arrests; no active substance abuse or known history of complicated withdrawal; PMH of osteogenesis imperfecta, HTN, hypothyroidism, DM type 2 recent dx and bowel resection with ileostomy, with Stage IV colorectal cancer presented to ER from residence secondary to tremors.  Patient presented with approximately 10 day history of intermittent worsening gross tremor.  He was  found to have elevated lithium level. Patient presented with Lithium level 3.28 decreased to 2.37.  Pt currently at baseline, compliant with medications, denies any S/H I/I/P.

## 2020-12-14 NOTE — PROGRESS NOTE ADULT - ASSESSMENT
55M w/ h/o stage IV colon cancer, schizophrenia, HLD, admitted to medicine for Lithium toxicity, now resolved. S/p open ileostomy reversal on 12/4. being transferred back to [facility].     History of Stage 4 Colon Cancer s/p reversal loop ileostomy post op day #10  -surgery signed off  - started on a diet    Tachycardia   - lopressor for HR control  - cards cleared patient     Hypothyroidism  - lithium induced. Endocrine following -> TSH 19  - change to po synthroid.     DM  - endo following  --. c.w current metformin and lantus     HLD  -c/w Atorvastatin    Lithium toxicity / tremors / weakness --> resolved + WNL  - lithium held     Schizoaffective disorder    - Psych following  - Clozapine and risperidone    hypokalemia -   renal following  --> check level in am     DVT ppx: Lovenox 40

## 2020-12-14 NOTE — PROGRESS NOTE ADULT - ASSESSMENT
Glycemic control is good, but would benefit pt. to simplify his regimen by reducing or eliminating prandial insulin. This would be safer and also improve adherence. Will lower mealtime humalog and add back his outpatient januvia and metformin, which may be combined with lantus.

## 2020-12-14 NOTE — PROGRESS NOTE ADULT - SUBJECTIVE AND OBJECTIVE BOX
INTERVAL HPI/OVERNIGHT EVENTS:  NAOE, pt advanced to regular diet yesterday, tolerating well with no nausea, vomiting. Pain well controlled. Having liquid BM's.    MEDICATIONS  (STANDING):  atorvastatin 40 milliGRAM(s) Oral at bedtime  cloZAPine 200 milliGRAM(s) Oral at bedtime  dextrose 40% Gel 15 Gram(s) Oral once  dextrose 5% + sodium chloride 0.9% with potassium chloride 40 mEq/L 1000 milliLiter(s) (40 mL/Hr) IV Continuous <Continuous>  dextrose 5%. 1000 milliLiter(s) (50 mL/Hr) IV Continuous <Continuous>  dextrose 5%. 1000 milliLiter(s) (100 mL/Hr) IV Continuous <Continuous>  dextrose 50% Injectable 25 Gram(s) IV Push once  dextrose 50% Injectable 12.5 Gram(s) IV Push once  dextrose 50% Injectable 25 Gram(s) IV Push once  enoxaparin Injectable 40 milliGRAM(s) SubCutaneous daily  famotidine  IVPB 20 milliGRAM(s) IV Intermittent daily  glucagon  Injectable 1 milliGRAM(s) IntraMuscular once  insulin glargine Injectable (LANTUS) 20 Unit(s) SubCutaneous at bedtime  insulin lispro (ADMELOG) corrective regimen sliding scale   SubCutaneous three times a day before meals  insulin lispro Injectable (ADMELOG) 18 Unit(s) SubCutaneous three times a day before meals  levothyroxine Injectable 25 MICROGram(s) IV Push <User Schedule>  metoprolol tartrate Injectable 5 milliGRAM(s) IV Push every 6 hours  risperiDONE   Tablet 1 milliGRAM(s) Oral at bedtime  tamsulosin 0.4 milliGRAM(s) Oral at bedtime    MEDICATIONS  (PRN):  ALBUTerol   0.042% 1.25 milliGRAM(s) Nebulizer every 6 hours PRN Shortness of Breath and/or Wheezing  LORazepam   Injectable 1 milliGRAM(s) IV Push every 6 hours PRN Anxiety  morphine  - Injectable 2 milliGRAM(s) IV Push every 4 hours PRN Moderate Pain (4 - 6)  morphine  - Injectable 4 milliGRAM(s) IV Push every 4 hours PRN Severe Pain (7 - 10)  ondansetron Injectable 4 milliGRAM(s) IV Push once PRN Nausea and/or Vomiting      Vital Signs Last 24 Hrs  T(C): 36.8 (13 Dec 2020 20:01), Max: 37 (13 Dec 2020 05:29)  T(F): 98.2 (13 Dec 2020 20:01), Max: 98.6 (13 Dec 2020 05:29)  HR: 79 (13 Dec 2020 20:01) (79 - 92)  BP: 103/66 (13 Dec 2020 20:01) (103/66 - 117/78)  BP(mean): --  RR: 18 (13 Dec 2020 20:01) (18 - 19)  SpO2: 95% (13 Dec 2020 20:01) (92% - 96%)    Constitutional: NAD  Respiratory: no increased WOB, speaking full sentences  Cardiovascular: RRR  Gastrointestinal: Soft, non-distended, non-tender, midline incision with staples dressing c/d/i no strikethrough; old ostomy site to right of midline open with WTD packing, good granulation tissue, slight erythema around wound edges    I&O's Detail    12 Dec 2020 07:01  -  13 Dec 2020 07:00  --------------------------------------------------------  IN:    dextrose 5% + sodium chloride 0.9% w/ Additives: 300 mL    Oral Fluid: 500 mL  Total IN: 800 mL    OUT:  Total OUT: 0 mL    Total NET: 800 mL          LABS:                        10.0   10.43 )-----------( 277      ( 12 Dec 2020 06:18 )             30.6     12-12    136  |  103  |  7.0<L>  ----------------------------<  150<H>  3.6   |  27.0  |  0.70    Ca    8.4<L>      12 Dec 2020 06:17  Phos  3.3     12-12  Mg     2.0     12-12

## 2020-12-14 NOTE — PROGRESS NOTE ADULT - SUBJECTIVE AND OBJECTIVE BOX
INTERVAL HPI/OVERNIGHT EVENTS: follow up of diabetes    MEDICATIONS  (STANDING):  atorvastatin 40 milliGRAM(s) Oral at bedtime  cloZAPine 200 milliGRAM(s) Oral at bedtime  dextrose 40% Gel 15 Gram(s) Oral once  dextrose 5%. 1000 milliLiter(s) (50 mL/Hr) IV Continuous <Continuous>  dextrose 5%. 1000 milliLiter(s) (100 mL/Hr) IV Continuous <Continuous>  dextrose 50% Injectable 25 Gram(s) IV Push once  dextrose 50% Injectable 12.5 Gram(s) IV Push once  dextrose 50% Injectable 25 Gram(s) IV Push once  enoxaparin Injectable 40 milliGRAM(s) SubCutaneous daily  famotidine  IVPB 20 milliGRAM(s) IV Intermittent daily  glucagon  Injectable 1 milliGRAM(s) IntraMuscular once  insulin glargine Injectable (LANTUS) 20 Unit(s) SubCutaneous at bedtime  insulin lispro (ADMELOG) corrective regimen sliding scale   SubCutaneous three times a day before meals  insulin lispro Injectable (ADMELOG) 15 Unit(s) SubCutaneous three times a day before meals  levothyroxine Injectable 25 MICROGram(s) IV Push <User Schedule>  metoprolol tartrate Injectable 5 milliGRAM(s) IV Push every 6 hours  potassium chloride    Tablet ER 40 milliEquivalent(s) Oral every 4 hours  risperiDONE   Tablet 1 milliGRAM(s) Oral at bedtime  tamsulosin 0.4 milliGRAM(s) Oral at bedtime    MEDICATIONS  (PRN):  ALBUTerol   0.042% 1.25 milliGRAM(s) Nebulizer every 6 hours PRN Shortness of Breath and/or Wheezing  ondansetron Injectable 4 milliGRAM(s) IV Push once PRN Nausea and/or Vomiting      Allergies    amoxicillin (Unknown)  Lamictal (Unknown)  penicillin (Unknown)  Tegretol (Unknown)  Zetia (Unknown)    Intolerances        Review of systems:    Vital Signs Last 24 Hrs  T(C): 36.6 (14 Dec 2020 07:30), Max: 36.8 (13 Dec 2020 20:01)  T(F): 97.8 (14 Dec 2020 07:30), Max: 98.2 (13 Dec 2020 20:01)  HR: 88 (14 Dec 2020 07:30) (79 - 88)  BP: 115/81 (14 Dec 2020 07:30) (103/66 - 115/81)  BP(mean): --  RR: 18 (14 Dec 2020 07:30) (18 - 18)  SpO2: 93% (14 Dec 2020 07:30) (93% - 95%)          LABS:    12-14    134<L>  |  106  |  3.0<L>  ----------------------------<  159<H>  2.8<LL>   |  22.0  |  0.59    Ca    8.0<L>      14 Dec 2020 07:33  Mg     1.7     12-14            POCT Blood Glucose.: 126 mg/dL (12-14-20 @ 11:02)  POCT Blood Glucose.: 162 mg/dL (12-14-20 @ 07:37)  POCT Blood Glucose.: 108 mg/dL (12-13-20 @ 21:45)  POCT Blood Glucose.: 175 mg/dL (12-13-20 @ 17:29)  POCT Blood Glucose.: 113 mg/dL (12-13-20 @ 12:14)  POCT Blood Glucose.: 167 mg/dL (12-13-20 @ 08:37)  POCT Blood Glucose.: 203 mg/dL (12-12-20 @ 23:21)  POCT Blood Glucose.: 193 mg/dL (12-12-20 @ 16:24)  POCT Blood Glucose.: 276 mg/dL (12-12-20 @ 12:05)  POCT Blood Glucose.: 171 mg/dL (12-12-20 @ 08:49)  POCT Blood Glucose.: 155 mg/dL (12-12-20 @ 03:05)  POCT Blood Glucose.: 168 mg/dL (12-11-20 @ 21:09)  POCT Blood Glucose.: 144 mg/dL (12-11-20 @ 18:03)

## 2020-12-14 NOTE — PROGRESS NOTE BEHAVIORAL HEALTH - NSBHCHARTREVIEWVS_PSY_A_CORE FT
Vital Signs Last 24 Hrs  T(C): 36.6 (14 Dec 2020 07:30), Max: 36.8 (13 Dec 2020 20:01)  T(F): 97.8 (14 Dec 2020 07:30), Max: 98.2 (13 Dec 2020 20:01)  HR: 88 (14 Dec 2020 07:30) (79 - 88)  BP: 115/81 (14 Dec 2020 07:30) (103/66 - 115/81)  BP(mean): --  RR: 18 (14 Dec 2020 07:30) (18 - 18)  SpO2: 93% (14 Dec 2020 07:30) (93% - 95%) Vital Signs Last 24 Hrs  T(C): 36.6 (14 Dec 2020 07:30), Max: 36.8 (13 Dec 2020 20:01)  T(F): 97.8 (14 Dec 2020 07:30), Max: 98.2 (13 Dec 2020 20:01)  HR: 88 (14 Dec 2020 07:30) (79 - 88)  BP: 115/81 (14 Dec 2020 07:30) (103/66 - 115/81)  RR: 18 (14 Dec 2020 07:30) (18 - 18)  SpO2: 93% (14 Dec 2020 07:30) (93% - 95%)

## 2020-12-14 NOTE — PROGRESS NOTE ADULT - SUBJECTIVE AND OBJECTIVE BOX
NEPHROLOGY INTERVAL HPI/OVERNIGHT EVENTS:    Examined   No distress  Lethargic    MEDICATIONS  (STANDING):  atorvastatin 40 milliGRAM(s) Oral at bedtime  cloZAPine 200 milliGRAM(s) Oral at bedtime  dextrose 40% Gel 15 Gram(s) Oral once  dextrose 5%. 1000 milliLiter(s) (50 mL/Hr) IV Continuous <Continuous>  dextrose 5%. 1000 milliLiter(s) (100 mL/Hr) IV Continuous <Continuous>  dextrose 50% Injectable 25 Gram(s) IV Push once  dextrose 50% Injectable 12.5 Gram(s) IV Push once  dextrose 50% Injectable 25 Gram(s) IV Push once  enoxaparin Injectable 40 milliGRAM(s) SubCutaneous daily  famotidine  IVPB 20 milliGRAM(s) IV Intermittent daily  glucagon  Injectable 1 milliGRAM(s) IntraMuscular once  insulin glargine Injectable (LANTUS) 20 Unit(s) SubCutaneous at bedtime  insulin lispro (ADMELOG) corrective regimen sliding scale   SubCutaneous three times a day before meals  insulin lispro Injectable (ADMELOG) 10 Unit(s) SubCutaneous three times a day before meals  levothyroxine Injectable 25 MICROGram(s) IV Push <User Schedule>  metFORMIN 500 milliGRAM(s) Oral two times a day  metoprolol tartrate Injectable 5 milliGRAM(s) IV Push every 6 hours  potassium chloride    Tablet ER 40 milliEquivalent(s) Oral every 4 hours  risperiDONE   Tablet 1 milliGRAM(s) Oral at bedtime  tamsulosin 0.4 milliGRAM(s) Oral at bedtime    MEDICATIONS  (PRN):  ALBUTerol   0.042% 1.25 milliGRAM(s) Nebulizer every 6 hours PRN Shortness of Breath and/or Wheezing  ondansetron Injectable 4 milliGRAM(s) IV Push once PRN Nausea and/or Vomiting      Allergies    amoxicillin (Unknown)  Lamictal (Unknown)  penicillin (Unknown)  Tegretol (Unknown)  Zetia (Unknown)    Intolerances        Vital Signs Last 24 Hrs  T(C): 36.6 (14 Dec 2020 07:30), Max: 36.8 (13 Dec 2020 20:01)  T(F): 97.8 (14 Dec 2020 07:30), Max: 98.2 (13 Dec 2020 20:01)  HR: 88 (14 Dec 2020 07:30) (79 - 88)  BP: 115/81 (14 Dec 2020 07:30) (103/66 - 115/81)  BP(mean): --  RR: 18 (14 Dec 2020 07:30) (18 - 18)  SpO2: 93% (14 Dec 2020 07:30) (93% - 95%)  Daily     Daily     PHYSICAL EXAM:  GENERAL: NAD, obese  NECK: Supple  NERVOUS SYSTEM:  Alert & verbal  CHEST/LUNG: Clear bilaterally   HEART: Regular rate and rhythm  ABDOMEN: Soft, surgical site dressed, BS+      LABS:    12-14    134<L>  |  106  |  3.0<L>  ----------------------------<  159<H>  2.8<LL>   |  22.0  |  0.59    Ca    8.0<L>      14 Dec 2020 07:33  Mg     1.7     12-14          Magnesium, Serum: 1.7 mg/dL (12-14 @ 07:33)          RADIOLOGY & ADDITIONAL TESTS:

## 2020-12-15 LAB
ANION GAP SERPL CALC-SCNC: 7 MMOL/L — SIGNIFICANT CHANGE UP (ref 5–17)
BUN SERPL-MCNC: <3 MG/DL — LOW (ref 8–20)
CALCIUM SERPL-MCNC: 9 MG/DL — SIGNIFICANT CHANGE UP (ref 8.6–10.2)
CHLORIDE SERPL-SCNC: 104 MMOL/L — SIGNIFICANT CHANGE UP (ref 98–107)
CO2 SERPL-SCNC: 27 MMOL/L — SIGNIFICANT CHANGE UP (ref 22–29)
CREAT SERPL-MCNC: 0.73 MG/DL — SIGNIFICANT CHANGE UP (ref 0.5–1.3)
GLUCOSE BLDC GLUCOMTR-MCNC: 117 MG/DL — HIGH (ref 70–99)
GLUCOSE BLDC GLUCOMTR-MCNC: 134 MG/DL — HIGH (ref 70–99)
GLUCOSE BLDC GLUCOMTR-MCNC: 152 MG/DL — HIGH (ref 70–99)
GLUCOSE BLDC GLUCOMTR-MCNC: 97 MG/DL — SIGNIFICANT CHANGE UP (ref 70–99)
GLUCOSE SERPL-MCNC: 119 MG/DL — HIGH (ref 70–99)
MAGNESIUM SERPL-MCNC: 1.9 MG/DL — SIGNIFICANT CHANGE UP (ref 1.6–2.6)
PHOSPHATE SERPL-MCNC: 3.2 MG/DL — SIGNIFICANT CHANGE UP (ref 2.4–4.7)
POTASSIUM SERPL-MCNC: 3.8 MMOL/L — SIGNIFICANT CHANGE UP (ref 3.5–5.3)
POTASSIUM SERPL-SCNC: 3.8 MMOL/L — SIGNIFICANT CHANGE UP (ref 3.5–5.3)
SODIUM SERPL-SCNC: 138 MMOL/L — SIGNIFICANT CHANGE UP (ref 135–145)
SURGICAL PATHOLOGY STUDY: SIGNIFICANT CHANGE UP

## 2020-12-15 PROCEDURE — 99232 SBSQ HOSP IP/OBS MODERATE 35: CPT

## 2020-12-15 RX ORDER — MAGNESIUM SULFATE 500 MG/ML
2 VIAL (ML) INJECTION ONCE
Refills: 0 | Status: COMPLETED | OUTPATIENT
Start: 2020-12-15 | End: 2020-12-15

## 2020-12-15 RX ORDER — FAMOTIDINE 10 MG/ML
20 INJECTION INTRAVENOUS DAILY
Refills: 0 | Status: DISCONTINUED | OUTPATIENT
Start: 2020-12-15 | End: 2020-12-21

## 2020-12-15 RX ORDER — POTASSIUM CHLORIDE 20 MEQ
40 PACKET (EA) ORAL ONCE
Refills: 0 | Status: COMPLETED | OUTPATIENT
Start: 2020-12-15 | End: 2020-12-15

## 2020-12-15 RX ADMIN — Medication 100 MICROGRAM(S): at 05:33

## 2020-12-15 RX ADMIN — ATORVASTATIN CALCIUM 40 MILLIGRAM(S): 80 TABLET, FILM COATED ORAL at 21:12

## 2020-12-15 RX ADMIN — Medication 0: at 17:28

## 2020-12-15 RX ADMIN — Medication 10 UNIT(S): at 08:24

## 2020-12-15 RX ADMIN — ENOXAPARIN SODIUM 40 MILLIGRAM(S): 100 INJECTION SUBCUTANEOUS at 13:02

## 2020-12-15 RX ADMIN — Medication 3: at 08:24

## 2020-12-15 RX ADMIN — TAMSULOSIN HYDROCHLORIDE 0.4 MILLIGRAM(S): 0.4 CAPSULE ORAL at 21:12

## 2020-12-15 RX ADMIN — METFORMIN HYDROCHLORIDE 500 MILLIGRAM(S): 850 TABLET ORAL at 17:32

## 2020-12-15 RX ADMIN — Medication 0: at 12:07

## 2020-12-15 RX ADMIN — CLOZAPINE 200 MILLIGRAM(S): 150 TABLET, ORALLY DISINTEGRATING ORAL at 21:12

## 2020-12-15 RX ADMIN — METFORMIN HYDROCHLORIDE 500 MILLIGRAM(S): 850 TABLET ORAL at 05:33

## 2020-12-15 RX ADMIN — Medication 5 MILLIGRAM(S): at 05:33

## 2020-12-15 RX ADMIN — Medication 50 GRAM(S): at 17:32

## 2020-12-15 RX ADMIN — Medication 10 UNIT(S): at 12:07

## 2020-12-15 RX ADMIN — INSULIN GLARGINE 20 UNIT(S): 100 INJECTION, SOLUTION SUBCUTANEOUS at 21:12

## 2020-12-15 RX ADMIN — Medication 40 MILLIEQUIVALENT(S): at 17:32

## 2020-12-15 RX ADMIN — FAMOTIDINE 20 MILLIGRAM(S): 10 INJECTION INTRAVENOUS at 17:33

## 2020-12-15 RX ADMIN — Medication 10 UNIT(S): at 17:28

## 2020-12-15 NOTE — CHART NOTE - NSCHARTNOTESELECT_GEN_ALL_CORE
Malnutrition Notification
Event Note
Event Note/Pre Op
NGT placement
Nutrition Services
Nutrition Services

## 2020-12-15 NOTE — PROGRESS NOTE ADULT - SUBJECTIVE AND OBJECTIVE BOX
INTERVAL HPI/OVERNIGHT EVENTS:    Patient seen and evaluated at bedside and found hemodynamically stable and in no acute distress, continues to have delusions. No acute events overnight. Pain is well controlled. Tolerating diabetic diet, without nausea or emesis, passing gas and having bowel movements. Denies fevers, chills, chest pain, SOB, coughing, dizziness, n/v/d, or generalized malaise.       SUBJECTIVE:      MEDICATIONS  (STANDING):  atorvastatin 40 milliGRAM(s) Oral at bedtime  cloZAPine 200 milliGRAM(s) Oral at bedtime  dextrose 40% Gel 15 Gram(s) Oral once  dextrose 5%. 1000 milliLiter(s) (50 mL/Hr) IV Continuous <Continuous>  dextrose 5%. 1000 milliLiter(s) (100 mL/Hr) IV Continuous <Continuous>  dextrose 50% Injectable 25 Gram(s) IV Push once  dextrose 50% Injectable 12.5 Gram(s) IV Push once  dextrose 50% Injectable 25 Gram(s) IV Push once  enoxaparin Injectable 40 milliGRAM(s) SubCutaneous daily  famotidine  IVPB 20 milliGRAM(s) IV Intermittent daily  glucagon  Injectable 1 milliGRAM(s) IntraMuscular once  insulin glargine Injectable (LANTUS) 20 Unit(s) SubCutaneous at bedtime  insulin lispro (ADMELOG) corrective regimen sliding scale   SubCutaneous three times a day before meals  insulin lispro Injectable (ADMELOG) 10 Unit(s) SubCutaneous three times a day before meals  levothyroxine 100 MICROGram(s) Oral daily  metFORMIN 500 milliGRAM(s) Oral two times a day  metoprolol tartrate Injectable 5 milliGRAM(s) IV Push every 6 hours  risperiDONE   Tablet 1 milliGRAM(s) Oral at bedtime  tamsulosin 0.4 milliGRAM(s) Oral at bedtime    MEDICATIONS  (PRN):  ALBUTerol   0.042% 1.25 milliGRAM(s) Nebulizer every 6 hours PRN Shortness of Breath and/or Wheezing  ondansetron Injectable 4 milliGRAM(s) IV Push once PRN Nausea and/or Vomiting      Vital Signs Last 24 Hrs  T(C): 36.8 (14 Dec 2020 23:46), Max: 36.8 (14 Dec 2020 19:24)  T(F): 98.2 (14 Dec 2020 23:46), Max: 98.3 (14 Dec 2020 19:24)  HR: 80 (14 Dec 2020 23:46) (78 - 88)  BP: 108/76 (14 Dec 2020 23:46) (103/70 - 117/74)  BP(mean): --  RR: 18 (14 Dec 2020 23:46) (17 - 18)  SpO2: 95% (14 Dec 2020 23:46) (92% - 95%)    PHYSICAL EXAM:    General: lying in bed in no acute distress, chronic delusions  HEENT: Neck supple  Chest: non-labored breathing or conversational dyspnea   Abdomen: non-distended, soft and depressible, non-tender. No guarding or rebound, non-peritonitic  Ext: no edema or cyanosis    I&O's Detail      LABS:    12-14    136  |  102  |  <3.0<L>  ----------------------------<  106<H>  3.4<L>   |  26.0  |  0.64    Ca    8.7      14 Dec 2020 22:28  Mg     1.7     12-14

## 2020-12-15 NOTE — PROGRESS NOTE ADULT - SUBJECTIVE AND OBJECTIVE BOX
Chief complaint: Lithium toxicity    Patient seen and examined at bedside. No acute overnight events reported. The patient states he is doing well and has no complaints. Tolerating diet well. Denies headaches, fever, chills, cough, chest pain, shortness of breath, nausea, vomiting, abdominal pain, diarrhea or constipation.     Vital Signs Last 24 Hrs  T(F): 98.1 (15 Dec 2020 07:54), Max: 98.3 (14 Dec 2020 19:24)  HR: 82 (15 Dec 2020 07:54) (78 - 88)  BP: 129/79 (15 Dec 2020 07:54) (103/70 - 129/79)  RR: 18 (15 Dec 2020 07:54) (17 - 18)  SpO2: 93% (15 Dec 2020 07:54) (92% - 95%)    Physical Exam:  Constitutional: alert and oriented, in no acute distress   Neck: Soft and supple, No LAD, No JVD  Respiratory: Clear to auscultation bilaterally, no wheezes or crackles  Cardiovascular: Regular rate and rhythm no murmurs, gallops, rubs  Gastrointestinal: Soft, non-tender to palpation, +bs  Vascular: 2+ peripheral pulses  Neurological: A/O x 3, no focal neurological deficits  Musculoskeletal: No lower extremity edema bilaterally    Labs:  12-15    138  |  104  |  <3.0<L>  ----------------------------<  119<H>  3.8   |  27.0  |  0.73    Ca    9.0      15 Dec 2020 05:41  Phos  3.2     12-15  Mg     1.9     12-15

## 2020-12-15 NOTE — PROGRESS NOTE ADULT - ASSESSMENT
55M w/ h/o stage IV colon cancer, schizophrenia, HLD, admitted to medicine for Lithium toxicity, now resolved. S/p open ileostomy reversal POD 10, now with bowel function and tolerating a full diet.     History of Stage 4 Colon Cancer s/p reversal loop ileostomy  - ileostomy site dressing changed w/ wet to dry daily   - carb consistent diet  - cleared from surgical stand point     Tachycardia  - lopressor for HR control  - cards eval'd, no further intervention   - transferred to medicine service for further management    Hypothyroidism  - lithium induced. Endocrine following -> TSH wnl  - Synthroid 25mcg IV - transition to oral for discharge    DM  - PM Lantus 20 U  - Premeal 18 U -- will decrease to 15 U since D5 IVF d/c'd  - ISS    HLD  -c/w Atorvastatin    Lithium toxicity / tremors / weakness --> resolved + WNL  - lithium held     Schizoaffective disorder    - Psych following  - Clozapine 200, lithium changed to risperdal  - constant obs  - continue medication IV    DVT ppx: Lovenox 40

## 2020-12-15 NOTE — PROGRESS NOTE ADULT - SUBJECTIVE AND OBJECTIVE BOX
NEPHROLOGY INTERVAL HPI/OVERNIGHT EVENTS:    Examined   No distress  Lethargic    MEDICATIONS  (STANDING):  atorvastatin 40 milliGRAM(s) Oral at bedtime  cloZAPine 200 milliGRAM(s) Oral at bedtime  dextrose 40% Gel 15 Gram(s) Oral once  dextrose 5%. 1000 milliLiter(s) (50 mL/Hr) IV Continuous <Continuous>  dextrose 5%. 1000 milliLiter(s) (100 mL/Hr) IV Continuous <Continuous>  dextrose 50% Injectable 25 Gram(s) IV Push once  dextrose 50% Injectable 12.5 Gram(s) IV Push once  dextrose 50% Injectable 25 Gram(s) IV Push once  enoxaparin Injectable 40 milliGRAM(s) SubCutaneous daily  famotidine    Tablet 20 milliGRAM(s) Oral daily  glucagon  Injectable 1 milliGRAM(s) IntraMuscular once  insulin glargine Injectable (LANTUS) 20 Unit(s) SubCutaneous at bedtime  insulin lispro (ADMELOG) corrective regimen sliding scale   SubCutaneous three times a day before meals  insulin lispro Injectable (ADMELOG) 10 Unit(s) SubCutaneous three times a day before meals  levothyroxine 100 MICROGram(s) Oral daily  magnesium sulfate  IVPB 2 Gram(s) IV Intermittent once  metFORMIN 500 milliGRAM(s) Oral two times a day  potassium chloride   Powder 40 milliEquivalent(s) Oral once  risperiDONE   Tablet 1 milliGRAM(s) Oral at bedtime  tamsulosin 0.4 milliGRAM(s) Oral at bedtime    MEDICATIONS  (PRN):  ALBUTerol   0.042% 1.25 milliGRAM(s) Nebulizer every 6 hours PRN Shortness of Breath and/or Wheezing  ondansetron Injectable 4 milliGRAM(s) IV Push once PRN Nausea and/or Vomiting      Allergies    amoxicillin (Unknown)  Lamictal (Unknown)  penicillin (Unknown)  Tegretol (Unknown)  Zetia (Unknown)    Intolerances        Vital Signs Last 24 Hrs  T(C): 36.7 (15 Dec 2020 07:54), Max: 36.8 (14 Dec 2020 19:24)  T(F): 98.1 (15 Dec 2020 07:54), Max: 98.3 (14 Dec 2020 19:24)  HR: 82 (15 Dec 2020 07:54) (78 - 88)  BP: 129/79 (15 Dec 2020 07:54) (103/70 - 129/79)  BP(mean): --  RR: 18 (15 Dec 2020 07:54) (17 - 18)  SpO2: 93% (15 Dec 2020 07:54) (92% - 95%)  Daily     Daily     PHYSICAL EXAM:  GENERAL: NAD, obese  NECK: Supple  NERVOUS SYSTEM:  Alert & verbal  CHEST/LUNG: Clear bilaterally   HEART: Regular rate and rhythm  ABDOMEN: Soft, surgical site dressed, BS+      LABS:    12-15    138  |  104  |  <3.0<L>  ----------------------------<  119<H>  3.8   |  27.0  |  0.73    Ca    9.0      15 Dec 2020 05:41  Phos  3.2     12-15  Mg     1.9     12-15          Magnesium, Serum: 1.9 mg/dL (12-15 @ 05:41)  Phosphorus Level, Serum: 3.2 mg/dL (12-15 @ 05:41)          RADIOLOGY & ADDITIONAL TESTS:

## 2020-12-15 NOTE — PROGRESS NOTE ADULT - ASSESSMENT
HypoKalemia being supplement   today improved  Suspect GI losses  has h/o HF pEF hyperdynamic  s/p--> Loop ostomy reversed  Mild HypoNatremia  AM labs    Will follow

## 2020-12-15 NOTE — CHART NOTE - NSCHARTNOTEFT_GEN_A_CORE
Source: Patient [ ]  Family [ ]   other [ x]    Current Diet: Diet, Consistent Carbohydrate w/Evening Snack:   Supplement Feeding Modality:  Oral  Glucerna Shake Cans or Servings Per Day:  3       Frequency:  Three Times a day (12-14-20 @ 01:23)      Patient reports [ ] nausea  [ ] vomiting [ ] diarrhea [ ] constipation  [ ]chewing problems [ ] swallowing issues  [ ] other:     PO intake:  < 50% [ ]   50-75%  [ ]   %  [ ]  other :pt tolerating diet well    Source for PO intake [ ] Patient [ ] family [ x] chart [ ] staff [ ] other    Current Weight:   (12/8) 186.9 lbs  (12/4) 175 lbs  ? accuracy of weights, noted with 2+ b/l leg edema, continue to trend     Pertinent Medications: MEDICATIONS  (STANDING):  atorvastatin 40 milliGRAM(s) Oral at bedtime  cloZAPine 200 milliGRAM(s) Oral at bedtime  dextrose 40% Gel 15 Gram(s) Oral once  dextrose 5%. 1000 milliLiter(s) (50 mL/Hr) IV Continuous <Continuous>  dextrose 5%. 1000 milliLiter(s) (100 mL/Hr) IV Continuous <Continuous>  dextrose 50% Injectable 25 Gram(s) IV Push once  dextrose 50% Injectable 12.5 Gram(s) IV Push once  dextrose 50% Injectable 25 Gram(s) IV Push once  enoxaparin Injectable 40 milliGRAM(s) SubCutaneous daily  famotidine    Tablet 20 milliGRAM(s) Oral daily  glucagon  Injectable 1 milliGRAM(s) IntraMuscular once  insulin glargine Injectable (LANTUS) 20 Unit(s) SubCutaneous at bedtime  insulin lispro (ADMELOG) corrective regimen sliding scale   SubCutaneous three times a day before meals  insulin lispro Injectable (ADMELOG) 10 Unit(s) SubCutaneous three times a day before meals  levothyroxine 100 MICROGram(s) Oral daily  magnesium sulfate  IVPB 2 Gram(s) IV Intermittent once  metFORMIN 500 milliGRAM(s) Oral two times a day  potassium chloride   Powder 40 milliEquivalent(s) Oral once  risperiDONE   Tablet 1 milliGRAM(s) Oral at bedtime  tamsulosin 0.4 milliGRAM(s) Oral at bedtime    MEDICATIONS  (PRN):  ALBUTerol   0.042% 1.25 milliGRAM(s) Nebulizer every 6 hours PRN Shortness of Breath and/or Wheezing  ondansetron Injectable 4 milliGRAM(s) IV Push once PRN Nausea and/or Vomiting    Pertinent Labs: 12-15 Na138 mmol/L Glu 119 mg/dL<H> K+ 3.8 mmol/L Cr  0.73 mg/dL BUN <3.0 mg/dL<L> Phos 3.2 mg/dL Alb n/a   PAB n/a       Skin: Surgical incision to abdomen per documentation    Limited nutrition focused physical exam previously conducted - found signs of malnutrition [ ]absent [ x]present    Subcutaneous fat loss: [ ] Orbital fat pads region, [ ]Buccal fat region, [ ]Triceps region,  [ ]Ribs region    Muscle wasting: MILD [x ]Temples region, [x ]Clavicle region, [ ]Shoulder region, [ ]Scapula region, [ ]Interosseous region,  [ ]thigh region, [ ]Calf region    Estimated Needs:   [ x] no change since previous assessment  [ ] recalculated:     Current Nutrition Diagnosis: Pt remains at high nutrition risk secondary to malnutrition (moderate, acute) related to inability to meet sufficient protein-energy in setting of stage IV colon cancer s/p lap converted to open ileostomy and partial colectomy, now with post op ileus as evidenced by meeting <50% nutrient needs >5 days, mild muscle loss of temples and clavicles, +edema. Attempted to interview, pt sleeping soundly. Diet advanced to solids, tolerating well per documentation. Pt receiving Glucerna with meals. Last BM 12/13.    Recommendations:   1) Consider change diet to low fiber.  2) Continue Glucerna TID to optimize po intake and provide an additional 220 kcal, 10g protein per serving.  3) Encourage po intake, monitor diet tolerance, and provide assistance at meals as needed.   4) Obtain daily weights to monitor trends.     Monitoring and Evaluation:   [x ] PO intake [x ] Tolerance to diet prescription [X] Weights  [X] Follow up per protocol [X] Labs.

## 2020-12-15 NOTE — PROGRESS NOTE ADULT - ASSESSMENT
55M w/ h/o stage IV colon cancer, schizophrenia, HLD, admitted to medicine for Lithium toxicity, now resolved. S/p open ileostomy reversal on 12/4. being transferred back to medicine.     History of Stage 4 Colon Cancer s/p reversal loop ileostomy post op day #11  - cleared from surgical stand point  - tolerating diet     Tachycardia   - IV Lopressor 5mg q6h PRN   - Cardiology cleared    Hypothyroidism  - lithium induced. Endocrine following -> TSH 19  - c/w Synthroid 100mcg daily    DM  - Endo recommendations appreciated  - c/w Metformin 500mg BID  - FS with KELLY  - c/w Lantus 20 units nightly  - c/w Admelog 10 units TID before meals    HLD  - c/w Lipitor 40mg nightly    Lithium toxicity / tremors / weakness --> resolved + WNL  - Lithium held    Schizoaffective disorder    - Psych following  - Clozapine and risperidone    Hypokalemia  - resolved  - K today 3.8    DVT ppx  - Lovenox SQ    Dispo: 2-3 days

## 2020-12-15 NOTE — PROGRESS NOTE ADULT - NUTRITIONAL ASSESSMENT
This patient has been assessed with a concern for Malnutrition and has been determined to have a diagnosis/diagnoses of Moderate protein-calorie malnutrition.    This patient is being managed with:   Diet Clear Liquid-  Consistent Carbohydrate {No Snacks} (CSTCHO)  Supplement Feeding Modality:  Oral  Ensure Clear Cans or Servings Per Day:  3       Frequency:  Three Times a day  Entered: Dec 12 2020  1:23PM    
This patient has been assessed with a concern for Malnutrition and has been determined to have a diagnosis/diagnoses of Moderate protein-calorie malnutrition.    This patient is being managed with:   Diet Clear Liquid-  Consistent Carbohydrate {No Snacks} (CSTCHO)  Supplement Feeding Modality:  Oral  Ensure Clear Cans or Servings Per Day:  3       Frequency:  Three Times a day  Entered: Dec 12 2020  1:23PM    
This patient has been assessed with a concern for Malnutrition and has been determined to have a diagnosis/diagnoses of Moderate protein-calorie malnutrition.    This patient is being managed with:   Diet Consistent Carbohydrate w/Evening Snack-  Entered: Dec 13 2020  9:08AM    
This patient has been assessed with a concern for Malnutrition and has been determined to have a diagnosis/diagnoses of Moderate protein-calorie malnutrition.    This patient is being managed with:   Diet Consistent Carbohydrate w/Evening Snack-  Supplement Feeding Modality:  Oral  Glucerna Shake Cans or Servings Per Day:  3       Frequency:  Three Times a day  Entered: Dec 14 2020  1:23AM    
This patient has been assessed with a concern for Malnutrition and has been determined to have a diagnosis/diagnoses of Moderate protein-calorie malnutrition.    This patient is being managed with:   Diet Consistent Carbohydrate w/Evening Snack-  Entered: Dec 13 2020  9:08AM    
This patient has been assessed with a concern for Malnutrition and has been determined to have a diagnosis/diagnoses of Moderate protein-calorie malnutrition.    This patient is being managed with:   Diet Consistent Carbohydrate w/Evening Snack-  Supplement Feeding Modality:  Oral  Glucerna Shake Cans or Servings Per Day:  3       Frequency:  Three Times a day  Entered: Dec 14 2020  1:23AM

## 2020-12-16 LAB
ANION GAP SERPL CALC-SCNC: 9 MMOL/L — SIGNIFICANT CHANGE UP (ref 5–17)
BUN SERPL-MCNC: 7 MG/DL — LOW (ref 8–20)
CALCIUM SERPL-MCNC: 9.4 MG/DL — SIGNIFICANT CHANGE UP (ref 8.6–10.2)
CHLORIDE SERPL-SCNC: 100 MMOL/L — SIGNIFICANT CHANGE UP (ref 98–107)
CO2 SERPL-SCNC: 29 MMOL/L — SIGNIFICANT CHANGE UP (ref 22–29)
CREAT SERPL-MCNC: 0.72 MG/DL — SIGNIFICANT CHANGE UP (ref 0.5–1.3)
GLUCOSE BLDC GLUCOMTR-MCNC: 148 MG/DL — HIGH (ref 70–99)
GLUCOSE BLDC GLUCOMTR-MCNC: 149 MG/DL — HIGH (ref 70–99)
GLUCOSE BLDC GLUCOMTR-MCNC: 165 MG/DL — HIGH (ref 70–99)
GLUCOSE BLDC GLUCOMTR-MCNC: 174 MG/DL — HIGH (ref 70–99)
GLUCOSE SERPL-MCNC: 150 MG/DL — HIGH (ref 70–99)
POTASSIUM SERPL-MCNC: 3.5 MMOL/L — SIGNIFICANT CHANGE UP (ref 3.5–5.3)
POTASSIUM SERPL-SCNC: 3.5 MMOL/L — SIGNIFICANT CHANGE UP (ref 3.5–5.3)
SODIUM SERPL-SCNC: 138 MMOL/L — SIGNIFICANT CHANGE UP (ref 135–145)

## 2020-12-16 PROCEDURE — 99232 SBSQ HOSP IP/OBS MODERATE 35: CPT

## 2020-12-16 PROCEDURE — 99233 SBSQ HOSP IP/OBS HIGH 50: CPT

## 2020-12-16 RX ORDER — INSULIN LISPRO 100/ML
8 VIAL (ML) SUBCUTANEOUS
Refills: 0 | Status: DISCONTINUED | OUTPATIENT
Start: 2020-12-16 | End: 2020-12-21

## 2020-12-16 RX ADMIN — Medication 100 MICROGRAM(S): at 08:38

## 2020-12-16 RX ADMIN — INSULIN GLARGINE 20 UNIT(S): 100 INJECTION, SOLUTION SUBCUTANEOUS at 21:26

## 2020-12-16 RX ADMIN — Medication 8 UNIT(S): at 15:58

## 2020-12-16 RX ADMIN — ATORVASTATIN CALCIUM 40 MILLIGRAM(S): 80 TABLET, FILM COATED ORAL at 21:26

## 2020-12-16 RX ADMIN — CLOZAPINE 200 MILLIGRAM(S): 150 TABLET, ORALLY DISINTEGRATING ORAL at 21:26

## 2020-12-16 RX ADMIN — Medication 10 UNIT(S): at 11:28

## 2020-12-16 RX ADMIN — METFORMIN HYDROCHLORIDE 500 MILLIGRAM(S): 850 TABLET ORAL at 21:26

## 2020-12-16 RX ADMIN — Medication 3: at 08:36

## 2020-12-16 RX ADMIN — FAMOTIDINE 20 MILLIGRAM(S): 10 INJECTION INTRAVENOUS at 11:29

## 2020-12-16 RX ADMIN — Medication 10 UNIT(S): at 08:37

## 2020-12-16 RX ADMIN — METFORMIN HYDROCHLORIDE 500 MILLIGRAM(S): 850 TABLET ORAL at 12:22

## 2020-12-16 RX ADMIN — TAMSULOSIN HYDROCHLORIDE 0.4 MILLIGRAM(S): 0.4 CAPSULE ORAL at 21:26

## 2020-12-16 RX ADMIN — ENOXAPARIN SODIUM 40 MILLIGRAM(S): 100 INJECTION SUBCUTANEOUS at 11:29

## 2020-12-16 NOTE — PROGRESS NOTE BEHAVIORAL HEALTH - RISK ASSESSMENT
Low: Currently at baseline, compliant with medications, denies any S/H I/I/P.
Low: Currently at baseline, compliant with medications, denies any S/H I/I/P.
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
Low: Currently at baseline, compliant with medications, denies any S/H I/I/P.
Low: Currently at baseline, compliant with medications, denies any S/H I/I/P.
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

## 2020-12-16 NOTE — PROGRESS NOTE BEHAVIORAL HEALTH - NSBHCONSULTMEDS_PSY_A_CORE FT
Continue Clozaril 200 mg at night and Risperdal 1 mg at night. May consider increasing Risperdal
Continue Clozaril 200 mg at night and Risperdal 1 mg at night.

## 2020-12-16 NOTE — PROGRESS NOTE BEHAVIORAL HEALTH - THOUGHT PROCESS
Linear/Impaired reasoning/Illogical
Illogical/Impaired reasoning/Linear
Linear/Illogical/Impaired reasoning
Illogical/Impaired reasoning/Linear
Linear/Impaired reasoning/Illogical

## 2020-12-16 NOTE — PROGRESS NOTE ADULT - SUBJECTIVE AND OBJECTIVE BOX
Chief complaint: Weakness    Patient seen and examined at bedside. No acute overnight events reported. The patient states he is doing well and has no complaints. The patient denies headache, fever, chills, cough, chest pain, shortness of breath, nausea, vomiting, abdominal pain, diarrhea or constipation.     Vital Signs Last 24 Hrs  T(F): 97.9 (16 Dec 2020 08:20), Max: 98.3 (15 Dec 2020 23:31)  HR: 116 (16 Dec 2020 08:20) (89 - 116)  BP: 93/55 (16 Dec 2020 08:20) (93/55 - 128/72)  RR: 18 (16 Dec 2020 08:20) (18 - 18)  SpO2: 97% (16 Dec 2020 08:20) (95% - 97%)    Physical Exam:  Constitutional: alert and oriented, in no acute distress   Neck: Soft and supple  Respiratory: Clear to auscultation bilaterally, no wheezes or crackles  Cardiovascular: Regular rate and rhyhtm, no murmurs, gallops, rubs  Gastrointestinal: Soft, non-tender to palpation, +bs  Vascular: 2+ peripheral pulses  Neurological: A/O x 3, no focal neurological deficits  Musculoskeletal: No lower extremity edema bilaterally

## 2020-12-16 NOTE — PROGRESS NOTE BEHAVIORAL HEALTH - NSBHCHARTREVIEWVS_PSY_A_CORE FT
Vital Signs Last 24 Hrs  T(C): 36.6 (16 Dec 2020 08:20), Max: 36.8 (15 Dec 2020 15:53)  T(F): 97.9 (16 Dec 2020 08:20), Max: 98.3 (15 Dec 2020 23:31)  HR: 116 (16 Dec 2020 08:20) (89 - 116)  BP: 93/55 (16 Dec 2020 08:20) (93/55 - 128/72)  BP(mean): --  RR: 18 (16 Dec 2020 08:20) (18 - 18)  SpO2: 97% (16 Dec 2020 08:20) (95% - 97%)

## 2020-12-16 NOTE — PROGRESS NOTE BEHAVIORAL HEALTH - NSBHCHARTREVIEWLAB_PSY_A_CORE FT
12-14    134<L>  |  106  |  3.0<L>  ----------------------------<  159<H>  2.8<LL>   |  22.0  |  0.59    Ca    8.0<L>      14 Dec 2020 07:33  Mg     1.7     12-14    COVID-19 PCR: Randolphteedison (30 Nov 2020 12:26)  COVID-19 PCR: Rakeshc (23 Jul 2020 12:15)
12-16    138  |  100  |  7.0<L>  ----------------------------<  150<H>  3.5   |  29.0  |  0.72    Ca    9.4      16 Dec 2020 06:11  Phos  3.2     12-15  Mg     1.9     12-15        COVID-19 PCR: Melecio (30 Nov 2020 12:26)  COVID-19 PCR: Melecio (23 Jul 2020 12:15)
12.2   10.40 )-----------( 162      ( 02 Dec 2020 07:50 )             38.5     12-    135  |  109<H>  |  7.0<L>  ----------------------------<  230<H>  3.8   |  21.0<L>  |  0.67    Ca    8.7      02 Dec 2020 07:50  Mg     1.8     12-          Urinalysis Basic - ( 2020 16:32 )    Color: Yellow / Appearance: Clear / S.005 / pH: x  Gluc: x / Ketone: Negative  / Bili: Negative / Urobili: Negative mg/dL   Blood: x / Protein: Negative mg/dL / Nitrite: Negative   Leuk Esterase: Negative / RBC: x / WBC x   Sq Epi: x / Non Sq Epi: x / Bacteria: x
12.2   10.40 )-----------( 162      ( 02 Dec 2020 07:50 )             38.5     12-    135  |  109<H>  |  7.0<L>  ----------------------------<  230<H>  3.8   |  21.0<L>  |  0.67    Ca    8.7      02 Dec 2020 07:50  Mg     1.8     12-          Urinalysis Basic - ( 2020 16:32 )    Color: Yellow / Appearance: Clear / S.005 / pH: x  Gluc: x / Ketone: Negative  / Bili: Negative / Urobili: Negative mg/dL   Blood: x / Protein: Negative mg/dL / Nitrite: Negative   Leuk Esterase: Negative / RBC: x / WBC x   Sq Epi: x / Non Sq Epi: x / Bacteria: x

## 2020-12-16 NOTE — PROGRESS NOTE BEHAVIORAL HEALTH - CASE SUMMARY
chronic treatment refractory psychiatric disorder

## 2020-12-16 NOTE — PROGRESS NOTE ADULT - SUBJECTIVE AND OBJECTIVE BOX
INTERVAL HPI/OVERNIGHT EVENTS: follow up of diabetes    MEDICATIONS  (STANDING):  atorvastatin 40 milliGRAM(s) Oral at bedtime  cloZAPine 200 milliGRAM(s) Oral at bedtime  dextrose 40% Gel 15 Gram(s) Oral once  dextrose 5%. 1000 milliLiter(s) (50 mL/Hr) IV Continuous <Continuous>  dextrose 5%. 1000 milliLiter(s) (100 mL/Hr) IV Continuous <Continuous>  dextrose 50% Injectable 25 Gram(s) IV Push once  dextrose 50% Injectable 12.5 Gram(s) IV Push once  dextrose 50% Injectable 25 Gram(s) IV Push once  enoxaparin Injectable 40 milliGRAM(s) SubCutaneous daily  famotidine    Tablet 20 milliGRAM(s) Oral daily  glucagon  Injectable 1 milliGRAM(s) IntraMuscular once  insulin glargine Injectable (LANTUS) 20 Unit(s) SubCutaneous at bedtime  insulin lispro (ADMELOG) corrective regimen sliding scale   SubCutaneous three times a day before meals  insulin lispro Injectable (ADMELOG) 10 Unit(s) SubCutaneous three times a day before meals  levothyroxine 100 MICROGram(s) Oral daily  metFORMIN 500 milliGRAM(s) Oral two times a day  risperiDONE   Tablet 1 milliGRAM(s) Oral at bedtime  tamsulosin 0.4 milliGRAM(s) Oral at bedtime    MEDICATIONS  (PRN):  ALBUTerol   0.042% 1.25 milliGRAM(s) Nebulizer every 6 hours PRN Shortness of Breath and/or Wheezing  ondansetron Injectable 4 milliGRAM(s) IV Push once PRN Nausea and/or Vomiting      Allergies    amoxicillin (Unknown)  Lamictal (Unknown)  penicillin (Unknown)  Tegretol (Unknown)  Zetia (Unknown)    Intolerances            Vital Signs Last 24 Hrs  T(C): 36.6 (16 Dec 2020 08:20), Max: 36.8 (15 Dec 2020 15:53)  T(F): 97.9 (16 Dec 2020 08:20), Max: 98.3 (15 Dec 2020 23:31)  HR: 116 (16 Dec 2020 08:20) (89 - 116)  BP: 93/55 (16 Dec 2020 08:20) (93/55 - 128/72)  BP(mean): --  RR: 18 (16 Dec 2020 08:20) (18 - 18)  SpO2: 97% (16 Dec 2020 08:20) (95% - 97%)      LABS:    12-16    138  |  100  |  7.0<L>  ----------------------------<  150<H>  3.5   |  29.0  |  0.72    Ca    9.4      16 Dec 2020 06:11  Phos  3.2     12-15  Mg     1.9     12-15            POCT Blood Glucose.: 149 mg/dL (12-16-20 @ 11:27)  POCT Blood Glucose.: 174 mg/dL (12-16-20 @ 08:35)  POCT Blood Glucose.: 97 mg/dL (12-15-20 @ 21:11)  POCT Blood Glucose.: 117 mg/dL (12-15-20 @ 17:26)  POCT Blood Glucose.: 134 mg/dL (12-15-20 @ 12:02)  POCT Blood Glucose.: 152 mg/dL (12-15-20 @ 08:23)  POCT Blood Glucose.: 121 mg/dL (12-14-20 @ 22:15)  POCT Blood Glucose.: 139 mg/dL (12-14-20 @ 17:20)  POCT Blood Glucose.: 126 mg/dL (12-14-20 @ 11:02)  POCT Blood Glucose.: 162 mg/dL (12-14-20 @ 07:37)  POCT Blood Glucose.: 108 mg/dL (12-13-20 @ 21:45)  POCT Blood Glucose.: 175 mg/dL (12-13-20 @ 17:29)

## 2020-12-16 NOTE — PROGRESS NOTE BEHAVIORAL HEALTH - SUMMARY
Patient is a 55  year old, male; [facility] in [facility]) ; ;  non caregiver; past psychiatric history of schizoaffective disorder ; multiple prior psychiatric hospitalizations (Last [facility] on 4/30/20- 11/2/20 ); ; no known suicide attempts; no known history of violence or arrests; no active substance abuse or known history of complicated withdrawal; PMH of osteogenesis imperfecta, HTN, hypothyroidism, DM type 2 recent dx and bowel resection with ileostomy, with Stage IV colorectal cancer presented to ER from residence secondary to tremors.  Patient presented with approximately 10 day history of intermittent worsening gross tremor.  He was  found to have elevated lithium level. Patient presented with Lithium level 3.28 decreased to 2.37.  Pt currently at baseline, compliant with medications, denies any S/H I/I/P.

## 2020-12-16 NOTE — PROGRESS NOTE ADULT - ASSESSMENT
55M w/ h/o stage IV colon cancer, schizophrenia, HLD, admitted to medicine for Lithium toxicity, now resolved. S/p open ileostomy reversal on 12/4. being transferred back to medicine.     History of Stage 4 Colon Cancer s/p reversal loop ileostomy post op day #12  - cleared from surgical stand point  - tolerating diet well    Tachycardia   - IV Lopressor 5mg q6h PRN   - Cardiology cleared    Hypothyroidism  - lithium induced. Endocrine following -> TSH 19  - c/w Synthroid 100mcg daily    DM  - Endo recommendations appreciated  - c/w Metformin 500mg BID  - FS with KELLY  - c/w Lantus 20 units nightly  - c/w Admelog 10 units TID before meals    HLD  - c/w Lipitor 40mg nightly    Lithium toxicity / tremors / weakness --> resolved + WNL  - Lithium held    Schizoaffective disorder    - Psych following  - Clozapine and risperidone    DVT ppx  - Lovenox SQ    Dispo: 1-2 days

## 2020-12-16 NOTE — PROGRESS NOTE ADULT - ASSESSMENT
good control of diabetes with mild postprandial hypoglycemic trend. Will lower prandial insulin slightly.

## 2020-12-16 NOTE — PROGRESS NOTE ADULT - SUBJECTIVE AND OBJECTIVE BOX
NEPHROLOGY INTERVAL HPI/OVERNIGHT EVENTS:    Interim noted   No new events   meds noted     MEDICATIONS  (STANDING):  atorvastatin 40 milliGRAM(s) Oral at bedtime  cloZAPine 200 milliGRAM(s) Oral at bedtime  dextrose 40% Gel 15 Gram(s) Oral once  dextrose 5%. 1000 milliLiter(s) (50 mL/Hr) IV Continuous <Continuous>  dextrose 5%. 1000 milliLiter(s) (100 mL/Hr) IV Continuous <Continuous>  dextrose 50% Injectable 25 Gram(s) IV Push once  dextrose 50% Injectable 12.5 Gram(s) IV Push once  dextrose 50% Injectable 25 Gram(s) IV Push once  enoxaparin Injectable 40 milliGRAM(s) SubCutaneous daily  famotidine    Tablet 20 milliGRAM(s) Oral daily  glucagon  Injectable 1 milliGRAM(s) IntraMuscular once  insulin glargine Injectable (LANTUS) 20 Unit(s) SubCutaneous at bedtime  insulin lispro (ADMELOG) corrective regimen sliding scale   SubCutaneous three times a day before meals  insulin lispro Injectable (ADMELOG) 8 Unit(s) SubCutaneous three times a day before meals  levothyroxine 100 MICROGram(s) Oral daily  metFORMIN 500 milliGRAM(s) Oral two times a day  risperiDONE   Tablet 1 milliGRAM(s) Oral at bedtime  tamsulosin 0.4 milliGRAM(s) Oral at bedtime    MEDICATIONS  (PRN):  ALBUTerol   0.042% 1.25 milliGRAM(s) Nebulizer every 6 hours PRN Shortness of Breath and/or Wheezing  ondansetron Injectable 4 milliGRAM(s) IV Push once PRN Nausea and/or Vomiting      Allergies    amoxicillin (Unknown)  Lamictal (Unknown)  penicillin (Unknown)  Tegretol (Unknown)  Zetia (Unknown)    Intolerances          Vital Signs Last 24 Hrs  T(C): 36.6 (16 Dec 2020 08:20), Max: 36.8 (15 Dec 2020 15:53)  T(F): 97.9 (16 Dec 2020 08:20), Max: 98.3 (15 Dec 2020 23:31)  HR: 116 (16 Dec 2020 08:20) (89 - 116)  BP: 93/55 (16 Dec 2020 08:20) (93/55 - 128/72)  BP(mean): --  RR: 18 (16 Dec 2020 08:20) (18 - 18)  SpO2: 97% (16 Dec 2020 08:20) (95% - 97%)  Daily     Daily Weight in k (16 Dec 2020 04:19)  I&O's Detail    15 Dec 2020 07:  -  16 Dec 2020 07:00  --------------------------------------------------------  IN:    Oral Fluid: 240 mL  Total IN: 240 mL    OUT:  Total OUT: 0 mL    Total NET: 240 mL        I&O's Summary    15 Dec 2020 07:  -  16 Dec 2020 07:00  --------------------------------------------------------  IN: 240 mL / OUT: 0 mL / NET: 240 mL      GENERAL: NAD, obese  NECK: Supple  CHEST/LUNG: Clear bilaterally   HEART: Regular rate and rhythm  ABDOMEN: Soft, surgical site dressed, BS+      LABS:        138  |  100  |  7.0<L>  ----------------------------<  150<H>  3.5   |  29.0  |  0.72    Ca    9.4      16 Dec 2020 06:11  Phos  3.2     12-15  Mg     1.9     12-15                  RADIOLOGY & ADDITIONAL TESTS:

## 2020-12-16 NOTE — PROGRESS NOTE BEHAVIORAL HEALTH - NSBHFUPINTERVALHXFT_PSY_A_CORE
Patient seen sitting up in the chair, patient resting comfortably. No complaints at this time. Reports that his overall mood is excellent. Discussed with patient about his current psych med regimen .Denies current suicidal ideation, homicidal ideation or self-harm thoughts.  Denies any psychotic sx's.

## 2020-12-16 NOTE — PROGRESS NOTE BEHAVIORAL HEALTH - AFFECT QUALITY
Euthymic/Irritable
Euthymic
Euthymic/Irritable
Euthymic
Euthymic
Euthymic/Irritable
Irritable/Euthymic

## 2020-12-17 LAB
ANION GAP SERPL CALC-SCNC: 12 MMOL/L — SIGNIFICANT CHANGE UP (ref 5–17)
BUN SERPL-MCNC: 12 MG/DL — SIGNIFICANT CHANGE UP (ref 8–20)
CALCIUM SERPL-MCNC: 9.2 MG/DL — SIGNIFICANT CHANGE UP (ref 8.6–10.2)
CHLORIDE SERPL-SCNC: 101 MMOL/L — SIGNIFICANT CHANGE UP (ref 98–107)
CO2 SERPL-SCNC: 29 MMOL/L — SIGNIFICANT CHANGE UP (ref 22–29)
CREAT SERPL-MCNC: 0.7 MG/DL — SIGNIFICANT CHANGE UP (ref 0.5–1.3)
GLUCOSE BLDC GLUCOMTR-MCNC: 122 MG/DL — HIGH (ref 70–99)
GLUCOSE BLDC GLUCOMTR-MCNC: 133 MG/DL — HIGH (ref 70–99)
GLUCOSE BLDC GLUCOMTR-MCNC: 147 MG/DL — HIGH (ref 70–99)
GLUCOSE BLDC GLUCOMTR-MCNC: 153 MG/DL — HIGH (ref 70–99)
GLUCOSE SERPL-MCNC: 131 MG/DL — HIGH (ref 70–99)
POTASSIUM SERPL-MCNC: 3.6 MMOL/L — SIGNIFICANT CHANGE UP (ref 3.5–5.3)
POTASSIUM SERPL-SCNC: 3.6 MMOL/L — SIGNIFICANT CHANGE UP (ref 3.5–5.3)
SODIUM SERPL-SCNC: 142 MMOL/L — SIGNIFICANT CHANGE UP (ref 135–145)

## 2020-12-17 PROCEDURE — 99232 SBSQ HOSP IP/OBS MODERATE 35: CPT

## 2020-12-17 RX ORDER — ACETAMINOPHEN 500 MG
975 TABLET ORAL ONCE
Refills: 0 | Status: COMPLETED | OUTPATIENT
Start: 2020-12-17 | End: 2020-12-17

## 2020-12-17 RX ORDER — POTASSIUM CHLORIDE 20 MEQ
40 PACKET (EA) ORAL ONCE
Refills: 0 | Status: COMPLETED | OUTPATIENT
Start: 2020-12-17 | End: 2020-12-17

## 2020-12-17 RX ADMIN — CLOZAPINE 200 MILLIGRAM(S): 150 TABLET, ORALLY DISINTEGRATING ORAL at 21:15

## 2020-12-17 RX ADMIN — TAMSULOSIN HYDROCHLORIDE 0.4 MILLIGRAM(S): 0.4 CAPSULE ORAL at 21:15

## 2020-12-17 RX ADMIN — Medication 100 MICROGRAM(S): at 08:39

## 2020-12-17 RX ADMIN — Medication 8 UNIT(S): at 17:55

## 2020-12-17 RX ADMIN — Medication 975 MILLIGRAM(S): at 22:25

## 2020-12-17 RX ADMIN — ATORVASTATIN CALCIUM 40 MILLIGRAM(S): 80 TABLET, FILM COATED ORAL at 21:14

## 2020-12-17 RX ADMIN — Medication 975 MILLIGRAM(S): at 21:27

## 2020-12-17 RX ADMIN — METFORMIN HYDROCHLORIDE 500 MILLIGRAM(S): 850 TABLET ORAL at 17:55

## 2020-12-17 RX ADMIN — Medication 40 MILLIEQUIVALENT(S): at 12:27

## 2020-12-17 RX ADMIN — Medication 3: at 08:40

## 2020-12-17 RX ADMIN — ENOXAPARIN SODIUM 40 MILLIGRAM(S): 100 INJECTION SUBCUTANEOUS at 12:27

## 2020-12-17 RX ADMIN — FAMOTIDINE 20 MILLIGRAM(S): 10 INJECTION INTRAVENOUS at 12:29

## 2020-12-17 RX ADMIN — METFORMIN HYDROCHLORIDE 500 MILLIGRAM(S): 850 TABLET ORAL at 08:39

## 2020-12-17 RX ADMIN — Medication 8 UNIT(S): at 12:27

## 2020-12-17 RX ADMIN — INSULIN GLARGINE 20 UNIT(S): 100 INJECTION, SOLUTION SUBCUTANEOUS at 21:14

## 2020-12-17 RX ADMIN — Medication 8 UNIT(S): at 08:39

## 2020-12-17 NOTE — PROGRESS NOTE ADULT - ASSESSMENT
55M w/ h/o stage IV colon cancer, schizophrenia, HLD, admitted to medicine for Lithium toxicity, now resolved. S/p open ileostomy reversal on 12/4. being transferred back to medicine.     History of Stage 4 Colon Cancer s/p reversal loop ileostomy post op day #13  - cleared from surgical stand point  - tolerating diet well    Tachycardia   - IV Lopressor 5mg q6h PRN   - Cardiology cleared    Hypothyroidism  - lithium induced. Endocrine following -> TSH 19  - c/w Synthroid 100mcg daily    DM  - Endo recommendations appreciated  - c/w Metformin 500mg BID  - FS with KELLY  - c/w Lantus 20 units nightly  - c/w Admelog 10 units TID before meals    HLD  - c/w Lipitor 40mg nightly    Lithium toxicity / tremors / weakness --> resolved + WNL  - Lithium held    Schizoaffective disorder    - Psych following  - Clozapine and risperidone    DVT ppx  - Lovenox SQ    Dispo: 1-2 days

## 2020-12-17 NOTE — PROGRESS NOTE ADULT - SUBJECTIVE AND OBJECTIVE BOX
Chief complaint: Weakness    Patient seen and examined at bedside. No acute overnight events reported. Patient states he is doing well and has no complaints. Tolerating diet well. Denies fever, chills, cough, chest pain, shortness of breath, nausea, vomiting, abdominal pain, diarrhea or constipation.     Vital Signs Last 24 Hrs  T(F): 97.6 (17 Dec 2020 08:05), Max: 98.4 (17 Dec 2020 00:27)  HR: 89 (17 Dec 2020 08:05) (86 - 99)  BP: 111/72 (17 Dec 2020 08:05) (97/64 - 111/72)  RR: 19 (17 Dec 2020 08:05) (18 - 19)  SpO2: 95% (17 Dec 2020 08:05) (91% - 96%)    Physical Exam:  Constitutional: alert and oriented, in no acute distress   Neck: Soft and supple  Respiratory: Clear to auscultation bilaterally, no wheezes or crackles  Cardiovascular: Regular rate and rhythm no murmurs, gallops, rubs  Gastrointestinal: Soft, non-tender to palpation, +bs  Vascular: 2+ peripheral pulses  Neurological: A/O x 3, no focal neurological deficits  Musculoskeletal: no lower extremity edema bilaterally    Labs:  12-17    142  |  101  |  12.0  ----------------------------<  131<H>  3.6   |  29.0  |  0.70    Ca    9.2      17 Dec 2020 06:24

## 2020-12-17 NOTE — PROGRESS NOTE ADULT - SUBJECTIVE AND OBJECTIVE BOX
NEPHROLOGY INTERVAL HPI/OVERNIGHT EVENTS:    Interim noted   No new events   meds noted   Off Lithium     MEDICATIONS  (STANDING):  atorvastatin 40 milliGRAM(s) Oral at bedtime  cloZAPine 200 milliGRAM(s) Oral at bedtime  dextrose 40% Gel 15 Gram(s) Oral once  dextrose 5%. 1000 milliLiter(s) (50 mL/Hr) IV Continuous <Continuous>  dextrose 5%. 1000 milliLiter(s) (100 mL/Hr) IV Continuous <Continuous>  dextrose 50% Injectable 25 Gram(s) IV Push once  dextrose 50% Injectable 12.5 Gram(s) IV Push once  dextrose 50% Injectable 25 Gram(s) IV Push once  enoxaparin Injectable 40 milliGRAM(s) SubCutaneous daily  famotidine    Tablet 20 milliGRAM(s) Oral daily  glucagon  Injectable 1 milliGRAM(s) IntraMuscular once  insulin glargine Injectable (LANTUS) 20 Unit(s) SubCutaneous at bedtime  insulin lispro (ADMELOG) corrective regimen sliding scale   SubCutaneous three times a day before meals  insulin lispro Injectable (ADMELOG) 8 Unit(s) SubCutaneous three times a day before meals  levothyroxine 100 MICROGram(s) Oral daily  metFORMIN 500 milliGRAM(s) Oral two times a day  risperiDONE   Tablet 1 milliGRAM(s) Oral at bedtime  tamsulosin 0.4 milliGRAM(s) Oral at bedtime    MEDICATIONS  (PRN):  ALBUTerol   0.042% 1.25 milliGRAM(s) Nebulizer every 6 hours PRN Shortness of Breath and/or Wheezing  ondansetron Injectable 4 milliGRAM(s) IV Push once PRN Nausea and/or Vomiting      Allergies    amoxicillin (Unknown)  Lamictal (Unknown)  penicillin (Unknown)  Tegretol (Unknown)  Zetia (Unknown)    Intolerances        ICU Vital Signs Last 24 Hrs  T(C): 36.4 (17 Dec 2020 08:05), Max: 36.9 (17 Dec 2020 00:27)  T(F): 97.6 (17 Dec 2020 08:05), Max: 98.4 (17 Dec 2020 00:27)  HR: 89 (17 Dec 2020 08:05) (86 - 99)  BP: 111/72 (17 Dec 2020 08:05) (97/64 - 111/72)  BP(mean): 78 (17 Dec 2020 04:57) (78 - 85)  ABP: --  ABP(mean): --  RR: 19 (17 Dec 2020 08:05) (18 - 19)  SpO2: 95% (17 Dec 2020 08:05) (91% - 96%)    `  IN:    Oral Fluid: 240 mL  Total IN: 240 mL    OUT:  Total OUT: 0 mL    Total NET: 240 mL        I&O's Summary    15 Dec 2020 07:01  -  16 Dec 2020 07:00  --------------------------------------------------------  IN: 240 mL / OUT: 0 mL / NET: 240 mL      GENERAL: NAD, obese  NECK: Supple  CHEST/LUNG: Clear bilaterally   HEART: Regular rate and rhythm  ABDOMEN: Soft, surgical site dressed, BS+      LABS:    12-16    138  |  100  |  7.0<L>  ----------------------------<  150<H>  3.5   |  29.0  |  0.72    Ca    9.4      16 Dec 2020 06:11  Phos  3.2     12-15  Mg     1.9     12-15      Metabolic Panel in AM (12.17.20 @ 06:24)   Sodium, Serum: 142 mmol/L   Potassium, Serum: 3.6 mmol/L   Chloride, Serum: 101 mmol/L   Carbon Dioxide, Serum: 29.0 mmol/L   Anion Gap, Serum: 12 mmol/L   Blood Urea Nitrogen, Serum: 12.0 mg/dL   Creatinine, Serum: 0.70 mg/dL   Glucose, Serum: 131 mg/dL   Calcium, Total Serum: 9.2 mg/dL             RADIOLOGY & ADDITIONAL TESTS:

## 2020-12-18 ENCOUNTER — TRANSCRIPTION ENCOUNTER (OUTPATIENT)
Age: 55
End: 2020-12-18

## 2020-12-18 LAB
ALBUMIN SERPL ELPH-MCNC: 3.3 G/DL — SIGNIFICANT CHANGE UP (ref 3.3–5.2)
ALP SERPL-CCNC: 79 U/L — SIGNIFICANT CHANGE UP (ref 40–120)
ALT FLD-CCNC: 15 U/L — SIGNIFICANT CHANGE UP
ANION GAP SERPL CALC-SCNC: 11 MMOL/L — SIGNIFICANT CHANGE UP (ref 5–17)
AST SERPL-CCNC: 15 U/L — SIGNIFICANT CHANGE UP
BILIRUB SERPL-MCNC: <0.2 MG/DL — LOW (ref 0.4–2)
BUN SERPL-MCNC: 15 MG/DL — SIGNIFICANT CHANGE UP (ref 8–20)
CALCIUM SERPL-MCNC: 9.4 MG/DL — SIGNIFICANT CHANGE UP (ref 8.6–10.2)
CHLORIDE SERPL-SCNC: 101 MMOL/L — SIGNIFICANT CHANGE UP (ref 98–107)
CO2 SERPL-SCNC: 25 MMOL/L — SIGNIFICANT CHANGE UP (ref 22–29)
CREAT SERPL-MCNC: 0.54 MG/DL — SIGNIFICANT CHANGE UP (ref 0.5–1.3)
GLUCOSE BLDC GLUCOMTR-MCNC: 120 MG/DL — HIGH (ref 70–99)
GLUCOSE BLDC GLUCOMTR-MCNC: 150 MG/DL — HIGH (ref 70–99)
GLUCOSE BLDC GLUCOMTR-MCNC: 154 MG/DL — HIGH (ref 70–99)
GLUCOSE BLDC GLUCOMTR-MCNC: 167 MG/DL — HIGH (ref 70–99)
GLUCOSE SERPL-MCNC: 133 MG/DL — HIGH (ref 70–99)
HCT VFR BLD CALC: 33.8 % — LOW (ref 39–50)
HGB BLD-MCNC: 10.7 G/DL — LOW (ref 13–17)
MAGNESIUM SERPL-MCNC: 1.8 MG/DL — SIGNIFICANT CHANGE UP (ref 1.6–2.6)
MCHC RBC-ENTMCNC: 28.4 PG — SIGNIFICANT CHANGE UP (ref 27–34)
MCHC RBC-ENTMCNC: 31.7 GM/DL — LOW (ref 32–36)
MCV RBC AUTO: 89.7 FL — SIGNIFICANT CHANGE UP (ref 80–100)
PHOSPHATE SERPL-MCNC: 3.8 MG/DL — SIGNIFICANT CHANGE UP (ref 2.4–4.7)
PLATELET # BLD AUTO: 387 K/UL — SIGNIFICANT CHANGE UP (ref 150–400)
POTASSIUM SERPL-MCNC: 3.6 MMOL/L — SIGNIFICANT CHANGE UP (ref 3.5–5.3)
POTASSIUM SERPL-SCNC: 3.6 MMOL/L — SIGNIFICANT CHANGE UP (ref 3.5–5.3)
PROT SERPL-MCNC: 6.9 G/DL — SIGNIFICANT CHANGE UP (ref 6.6–8.7)
RBC # BLD: 3.77 M/UL — LOW (ref 4.2–5.8)
RBC # FLD: 14 % — SIGNIFICANT CHANGE UP (ref 10.3–14.5)
SODIUM SERPL-SCNC: 137 MMOL/L — SIGNIFICANT CHANGE UP (ref 135–145)
WBC # BLD: 10.19 K/UL — SIGNIFICANT CHANGE UP (ref 3.8–10.5)
WBC # FLD AUTO: 10.19 K/UL — SIGNIFICANT CHANGE UP (ref 3.8–10.5)

## 2020-12-18 PROCEDURE — 99232 SBSQ HOSP IP/OBS MODERATE 35: CPT

## 2020-12-18 RX ADMIN — ATORVASTATIN CALCIUM 40 MILLIGRAM(S): 80 TABLET, FILM COATED ORAL at 21:46

## 2020-12-18 RX ADMIN — Medication 3: at 08:40

## 2020-12-18 RX ADMIN — Medication 8 UNIT(S): at 17:30

## 2020-12-18 RX ADMIN — TAMSULOSIN HYDROCHLORIDE 0.4 MILLIGRAM(S): 0.4 CAPSULE ORAL at 21:46

## 2020-12-18 RX ADMIN — Medication 8 UNIT(S): at 12:41

## 2020-12-18 RX ADMIN — Medication 8 UNIT(S): at 08:41

## 2020-12-18 RX ADMIN — INSULIN GLARGINE 20 UNIT(S): 100 INJECTION, SOLUTION SUBCUTANEOUS at 21:48

## 2020-12-18 RX ADMIN — ENOXAPARIN SODIUM 40 MILLIGRAM(S): 100 INJECTION SUBCUTANEOUS at 14:29

## 2020-12-18 RX ADMIN — FAMOTIDINE 20 MILLIGRAM(S): 10 INJECTION INTRAVENOUS at 12:41

## 2020-12-18 RX ADMIN — CLOZAPINE 200 MILLIGRAM(S): 150 TABLET, ORALLY DISINTEGRATING ORAL at 21:50

## 2020-12-18 RX ADMIN — METFORMIN HYDROCHLORIDE 500 MILLIGRAM(S): 850 TABLET ORAL at 12:41

## 2020-12-18 NOTE — DISCHARGE NOTE PROVIDER - NSDCCPCAREPLAN_GEN_ALL_CORE_FT
PRINCIPAL DISCHARGE DIAGNOSIS  Diagnosis: Lithium toxicity  Assessment and Plan of Treatment:

## 2020-12-18 NOTE — DISCHARGE NOTE PROVIDER - NSDCMRMEDTOKEN_GEN_ALL_CORE_FT
acetaminophen 325 mg oral tablet: 2 tab(s) orally every 6 hours, As needed, Mild Pain (1 - 3)  Ativan 1 mg oral tablet: orally prn  atorvastatin 40 mg oral tablet: 1 tab(s) orally once a day  Benadryl 50 mg oral capsule: 1 cap(s) orally once, As Needed  cholecalciferol 1000 intl units oral capsule: 1 cap(s) orally 3 times a day  cloZAPine 200 mg oral tablet: 1 tab(s) orally once a day (at bedtime)  Depakote  mg oral tablet, extended release: 1 tab(s) orally once a day at 2pm  Depakote  mg oral tablet, extended release: 1 tab(s) orally once a day (at bedtime)  Glucophage 500 mg oral tablet: 1 tab(s) orally 2 times a day  Janumet 50 mg-500 mg oral tablet: 1 tab(s) orally 2 times a day with meals  levothyroxine 88 mcg (0.088 mg) oral tablet: 1 tab(s) orally once a day  lithium carbonate: 300 milligram(s) orally 2 times a day  loperamide 2 mg oral capsule: 2 cap(s) orally every 6 hours, As Needed  loxapine 10 mg oral capsule: orally once a day (at bedtime)  Milk of Magnesia:   Mylanta oral suspension:   pindolol 5 mg oral tablet: 1 tab(s) orally 2 times a day  Sodium Chloride 1 g oral tablet:   Symmetrel 100 mg oral tablet: orally once a day (at bedtime)  Tums 500 mg oral tablet, chewable: 2 tab(s) orally once a day  Visken 5 mg oral tablet: 0.5  orally once a day  ZyPREXA 20 mg oral tablet: 1 tab(s) orally once a day (at bedtime)  ZyPREXA 5 mg oral tablet: 1 tab(s) orally every 12 hours, As Needed   atorvastatin 40 mg oral tablet: 1 tab(s) orally once a day  cloZAPine 200 mg oral tablet: 1 tab(s) orally once a day (at bedtime)  famotidine 20 mg oral tablet: 1 tab(s) orally once a day  Glucophage 500 mg oral tablet: 1 tab(s) orally 2 times a day  insulin glargine: 20 unit(s) subcutaneous once a day (at bedtime)  insulin lispro 100 units/mL injectable solution: 8 unit(s) injectable 3 times a day  Janumet 50 mg-500 mg oral tablet: 1 tab(s) orally 2 times a day with meals  levothyroxine 88 mcg (0.088 mg) oral tablet: 1 tab(s) orally once a day  risperiDONE 1 mg oral tablet: 1 tab(s) orally once a day (at bedtime)  tamsulosin 0.4 mg oral capsule: 1 cap(s) orally once a day (at bedtime)

## 2020-12-18 NOTE — PROGRESS NOTE ADULT - SUBJECTIVE AND OBJECTIVE BOX
Chief complaint: Weakness    Patient seen and examined at bedside. No acute overnight events reported. Patient states  he is doing well, denies any current complaints. Denies fever, chills, cough, chest pain, shortness of breath, nausea, vomiting, abdominal pain, diarrhea or constipation.     Vital Signs Last 24 Hrs  T(F): 97.8 (18 Dec 2020 08:15), Max: 98.1 (17 Dec 2020 23:21)  HR: 86 (18 Dec 2020 08:15) (83 - 92)  BP: 107/68 (18 Dec 2020 08:15) (97/59 - 117/73)  RR: 18 (18 Dec 2020 08:15) (17 - 20)  SpO2: 93% (18 Dec 2020 08:15) (93% - 96%)    Physical Exam:  Constitutional: alert and oriented, in no acute distress   Neck: Soft and supple, No LAD, No JVD  Respiratory: Clear to auscultation bilaterally, no wheezes or crackles  Cardiovascular: Regular rate and rhythm no murmurs, gallops, rubs  Gastrointestinal: Soft, non-tender to palpation, +bs  Vascular: 2+ peripheral pulses  Neurological: A/O x 3, no focal neurological deficits  Musculoskeletal: 5/5 strength b/l upper and lower extremities, no lower extremity edema bilaterally    Labs:                        10.7   10.19 )-----------( 387      ( 18 Dec 2020 07:05 )             33.8   12-18    137  |  101  |  15.0  ----------------------------<  133<H>  3.6   |  25.0  |  0.54    Ca    9.4      18 Dec 2020 07:05  Phos  3.8     12-18  Mg     1.8     12-18    TPro  6.9  /  Alb  3.3  /  TBili  <0.2<L>  /  DBili  x   /  AST  15  /  ALT  15  /  AlkPhos  79  12-18

## 2020-12-18 NOTE — DISCHARGE NOTE PROVIDER - HOSPITAL COURSE
56 y/o M with PMH of stage IV colon cancer, schizophrenia, HLD was admitted to medicine for lithium toxicity. The patient was transferred to surgery for reversal of open ileostomy on 12/4 and subsequently downgraded to medicine for further management. The patient was cleared by surgery and managed with daily wound dressing. Lithium was held for toxicity, Psychiatry consulted and patient started on Risperidone. Endocrine followed for management of diabetes and lithium induced hypothyroidism. PT evaluated the patient and recommended [facility] vs. home. The patient is hemodynamically stable and medically optimized for discharge with appropriate follow up. 56 y/o M with PMH of stage IV colon cancer, schizophrenia, HLD was admitted to medicine for lithium toxicity. The patient was transferred to surgery for reversal of open ileostomy on  and subsequently downgraded to medicine for further management. The patient was cleared by surgery and managed with daily wound dressing. Lithium was held for toxicity, Psychiatry consulted and patient started on Risperidone, Clozaril. Endocrine followed for management of diabetes and lithium induced hypothyroidism. PT evaluated the patient and recommended LIBRADO. The patient is hemodynamically stable and medically optimized for discharge with appropriate follow up.     D/c time spent coordinatin mins    Vital Signs Last 24 Hrs  T(F): 98 (21 Dec 2020 07:49), Max: 98.4 (20 Dec 2020 15:23)  HR: 93 (21 Dec 2020 07:49) (84 - 97)  BP: 110/70 (21 Dec 2020 07:49) (109/71 - 122/76)  RR: 18 (21 Dec 2020 07:49) (18 - 18)  SpO2: 92% (21 Dec 2020 07:49) (92% - 96%)    Physical Exam:  Constitutional: Appears stated aged, not- chronically ill-appearing, laying comfortably in bed in NAD  HEENT: NC/AT, PERRL, EOMI, trachea midline, no JVD  Respiratory: CTA bilaterally, symmetrical chest rise  Cardiovascular: RRR, no m/g/r  Gastrointestinal: Soft, NT/ND, BS+  Vascular: 2+ peripheral pulses  Neurological: A/O x 3, no focal neurological deficits  Psych: Fair mood/affect  Musculoskeletal: No edema, cyanosis, deformities. ROM normal  Skin: No obvious rash, lesions. No jaundice.     56 y/o M with PMH of stage IV colon cancer, schizophrenia, HLD was admitted to medicine for lithium toxicity. The patient was transferred to surgery for reversal of open ileostomy on  and subsequently downgraded to medicine for further management. The patient was cleared by surgery and managed with daily wound dressing. Lithium was held for toxicity, Psychiatry consulted and patient started on Risperidone, Clozaril. Endocrine followed for management of diabetes and lithium induced hypothyroidism. Nephro was consulted for CARMEN. PT evaluated the patient and recommended LIBRADO. The patient is hemodynamically stable and medically optimized for discharge with appropriate follow up.     D/c time spent coordinatin mins    Vital Signs Last 24 Hrs  T(F): 98 (21 Dec 2020 07:49), Max: 98.4 (20 Dec 2020 15:23)  HR: 93 (21 Dec 2020 07:49) (84 - 97)  BP: 110/70 (21 Dec 2020 07:49) (109/71 - 122/76)  RR: 18 (21 Dec 2020 07:49) (18 - 18)  SpO2: 92% (21 Dec 2020 07:49) (92% - 96%)    Physical Exam:  Constitutional: Appears stated aged, not- chronically ill-appearing, laying comfortably in bed in NAD  HEENT: NC/AT, PERRL, EOMI, trachea midline, no JVD  Respiratory: CTA bilaterally, symmetrical chest rise  Cardiovascular: RRR, no m/g/r  Gastrointestinal: Soft, NT/ND, BS+  Vascular: 2+ peripheral pulses  Neurological: A/O x 3, no focal neurological deficits  Psych: Fair mood/affect  Musculoskeletal: No edema, cyanosis, deformities. ROM normal  Skin: No obvious rash, lesions. No jaundice.

## 2020-12-18 NOTE — PROGRESS NOTE ADULT - ASSESSMENT
55M w/ h/o stage IV colon cancer, schizophrenia, HLD, admitted to medicine for Lithium toxicity, now resolved. S/p open ileostomy reversal on 12/4. being transferred back to medicine.     History of Stage 4 Colon Cancer s/p reversal loop ileostomy post op day #14  - cleared from surgical stand point  - tolerating diet well  - daily wound dressing    Tachycardia   - IV Lopressor 5mg q6h PRN   - Cardiology cleared    Hypothyroidism  - lithium induced. Endocrine following -> TSH 19  - c/w Synthroid 100mcg daily    DM  - Endo recommendations appreciated  - c/w Metformin 500mg BID  - FS with KELLY  - c/w Lantus 20 units nightly  - c/w Admelog 10 units TID before meals    HLD  - c/w Lipitor 40mg nightly    Lithium toxicity / tremors / weakness --> resolved + WNL  - Lithium held    Schizoaffective disorder    - Psych following  - Clozapine and risperidone    DVT ppx  - Lovenox SQ    Dispo: medically cleared, waiting for services to be in place. Likely Monday

## 2020-12-18 NOTE — PROGRESS NOTE ADULT - ASSESSMENT
55M w/ h/o stage IV colon cancer, schizophrenia, HLD, admitted to medicine for Lithium toxicity, now resolved. S/p open ileostomy reversal POD 10, now with bowel function and tolerating a full diet.     History of Stage 4 Colon Cancer s/p reversal loop ileostomy    #Wound care:    - ileostomy site dressing changes w/ wet to dry daily   - midline wound staples removed, small opening, steri strips placed. Can have small guaze place in opening.   - Recommend bowel regimen senna/miralax

## 2020-12-18 NOTE — DISCHARGE NOTE PROVIDER - NSDCFUADDINST_GEN_ALL_CORE_FT
- Take medications as reconciled  - Follow up with your PMD within 2 weeks  - Follow up with your Psychiatrist

## 2020-12-18 NOTE — PROGRESS NOTE ADULT - SUBJECTIVE AND OBJECTIVE BOX
INTERVAL HPI/OVERNIGHT EVENTS:    Patient seen and evaluated at bedside and found hemodynamically stable and in no acute distress. No acute events overnight. Pain is well controlled, worse when coughing. Tolerating regular diet, without nausea or emesis, endorse not being able to have a bowel movement in the last 2 days. Denies fevers, chills, chest pain, SOB, coughing, dizziness, n/v/d, or generalized malaise.       SUBJECTIVE:      MEDICATIONS  (STANDING):  atorvastatin 40 milliGRAM(s) Oral at bedtime  cloZAPine 200 milliGRAM(s) Oral at bedtime  dextrose 40% Gel 15 Gram(s) Oral once  dextrose 5%. 1000 milliLiter(s) (50 mL/Hr) IV Continuous <Continuous>  dextrose 5%. 1000 milliLiter(s) (100 mL/Hr) IV Continuous <Continuous>  dextrose 50% Injectable 25 Gram(s) IV Push once  dextrose 50% Injectable 12.5 Gram(s) IV Push once  dextrose 50% Injectable 25 Gram(s) IV Push once  enoxaparin Injectable 40 milliGRAM(s) SubCutaneous daily  famotidine    Tablet 20 milliGRAM(s) Oral daily  glucagon  Injectable 1 milliGRAM(s) IntraMuscular once  insulin glargine Injectable (LANTUS) 20 Unit(s) SubCutaneous at bedtime  insulin lispro (ADMELOG) corrective regimen sliding scale   SubCutaneous three times a day before meals  insulin lispro Injectable (ADMELOG) 8 Unit(s) SubCutaneous three times a day before meals  levothyroxine 100 MICROGram(s) Oral daily  metFORMIN 500 milliGRAM(s) Oral two times a day  risperiDONE   Tablet 1 milliGRAM(s) Oral at bedtime  tamsulosin 0.4 milliGRAM(s) Oral at bedtime    MEDICATIONS  (PRN):  ALBUTerol   0.042% 1.25 milliGRAM(s) Nebulizer every 6 hours PRN Shortness of Breath and/or Wheezing  ondansetron Injectable 4 milliGRAM(s) IV Push once PRN Nausea and/or Vomiting      Vital Signs Last 24 Hrs  T(C): 36.6 (18 Dec 2020 08:15), Max: 36.7 (17 Dec 2020 19:54)  T(F): 97.8 (18 Dec 2020 08:15), Max: 98.1 (17 Dec 2020 23:21)  HR: 86 (18 Dec 2020 08:15) (83 - 92)  BP: 107/68 (18 Dec 2020 08:15) (97/59 - 117/73)  BP(mean): --  RR: 18 (18 Dec 2020 08:15) (17 - 20)  SpO2: 93% (18 Dec 2020 08:15) (93% - 96%)    PHYSICAL EXAM:      General: lying in bed in no acute distress  HEENT: Neck supple  Chest: non-labored breathing or conversational dyspnea   Abdomen: non-distended, soft and depressible, non-tender. No guarding or rebound, non-peritonitic  Ext: no edema or cyanosis    I&O's Detail    17 Dec 2020 07:01  -  18 Dec 2020 07:00  --------------------------------------------------------  IN:    Oral Fluid: 240 mL  Total IN: 240 mL    OUT:  Total OUT: 0 mL    Total NET: 240 mL          LABS:                        10.7   10.19 )-----------( 387      ( 18 Dec 2020 07:05 )             33.8     12-18    137  |  101  |  15.0  ----------------------------<  133<H>  3.6   |  25.0  |  0.54    Ca    9.4      18 Dec 2020 07:05  Phos  3.8     12-18  Mg     1.8     12-18    TPro  6.9  /  Alb  3.3  /  TBili  <0.2<L>  /  DBili  x   /  AST  15  /  ALT  15  /  AlkPhos  79  12-18          RADIOLOGY & ADDITIONAL STUDIES:    ASSESSMENT AND PLAN: INTERVAL HPI/OVERNIGHT EVENTS:    Patient seen and evaluated at bedside and found hemodynamically stable and in no acute distress. No acute events overnight. Pain is well controlled, worse when coughing. Tolerating regular diet, without nausea or emesis, endorse not being able to have a bowel movement in the last 2 days. Denies fevers, chills, chest pain, SOB, coughing, dizziness, n/v/d, or generalized malaise.       SUBJECTIVE:      MEDICATIONS  (STANDING):  atorvastatin 40 milliGRAM(s) Oral at bedtime  cloZAPine 200 milliGRAM(s) Oral at bedtime  dextrose 40% Gel 15 Gram(s) Oral once  dextrose 5%. 1000 milliLiter(s) (50 mL/Hr) IV Continuous <Continuous>  dextrose 5%. 1000 milliLiter(s) (100 mL/Hr) IV Continuous <Continuous>  dextrose 50% Injectable 25 Gram(s) IV Push once  dextrose 50% Injectable 12.5 Gram(s) IV Push once  dextrose 50% Injectable 25 Gram(s) IV Push once  enoxaparin Injectable 40 milliGRAM(s) SubCutaneous daily  famotidine    Tablet 20 milliGRAM(s) Oral daily  glucagon  Injectable 1 milliGRAM(s) IntraMuscular once  insulin glargine Injectable (LANTUS) 20 Unit(s) SubCutaneous at bedtime  insulin lispro (ADMELOG) corrective regimen sliding scale   SubCutaneous three times a day before meals  insulin lispro Injectable (ADMELOG) 8 Unit(s) SubCutaneous three times a day before meals  levothyroxine 100 MICROGram(s) Oral daily  metFORMIN 500 milliGRAM(s) Oral two times a day  risperiDONE   Tablet 1 milliGRAM(s) Oral at bedtime  tamsulosin 0.4 milliGRAM(s) Oral at bedtime    MEDICATIONS  (PRN):  ALBUTerol   0.042% 1.25 milliGRAM(s) Nebulizer every 6 hours PRN Shortness of Breath and/or Wheezing  ondansetron Injectable 4 milliGRAM(s) IV Push once PRN Nausea and/or Vomiting      Vital Signs Last 24 Hrs  T(C): 36.6 (18 Dec 2020 08:15), Max: 36.7 (17 Dec 2020 19:54)  T(F): 97.8 (18 Dec 2020 08:15), Max: 98.1 (17 Dec 2020 23:21)  HR: 86 (18 Dec 2020 08:15) (83 - 92)  BP: 107/68 (18 Dec 2020 08:15) (97/59 - 117/73)  BP(mean): --  RR: 18 (18 Dec 2020 08:15) (17 - 20)  SpO2: 93% (18 Dec 2020 08:15) (93% - 96%)    PHYSICAL EXAM:      General: lying in bed in no acute distress  HEENT: Neck supple  Chest: non-labored breathing or conversational dyspnea   Abdomen: minimally distended, soft and depressible, non-tender. midline staples removed, small opening mid incision, small guaze placed. ileostomy site with good granulation tissue, WTD dressing placed. No guarding or rebound, non-peritonitic.  Ext: no edema or cyanosis    I&O's Detail    17 Dec 2020 07:01  -  18 Dec 2020 07:00  --------------------------------------------------------  IN:    Oral Fluid: 240 mL  Total IN: 240 mL    OUT:  Total OUT: 0 mL    Total NET: 240 mL          LABS:                        10.7   10.19 )-----------( 387      ( 18 Dec 2020 07:05 )             33.8     12-18    137  |  101  |  15.0  ----------------------------<  133<H>  3.6   |  25.0  |  0.54    Ca    9.4      18 Dec 2020 07:05  Phos  3.8     12-18  Mg     1.8     12-18    TPro  6.9  /  Alb  3.3  /  TBili  <0.2<L>  /  DBili  x   /  AST  15  /  ALT  15  /  AlkPhos  79  12-18

## 2020-12-19 LAB
ANION GAP SERPL CALC-SCNC: 10 MMOL/L — SIGNIFICANT CHANGE UP (ref 5–17)
BUN SERPL-MCNC: 15 MG/DL — SIGNIFICANT CHANGE UP (ref 8–20)
CALCIUM SERPL-MCNC: 9.1 MG/DL — SIGNIFICANT CHANGE UP (ref 8.6–10.2)
CHLORIDE SERPL-SCNC: 104 MMOL/L — SIGNIFICANT CHANGE UP (ref 98–107)
CO2 SERPL-SCNC: 25 MMOL/L — SIGNIFICANT CHANGE UP (ref 22–29)
CREAT SERPL-MCNC: 0.57 MG/DL — SIGNIFICANT CHANGE UP (ref 0.5–1.3)
GLUCOSE BLDC GLUCOMTR-MCNC: 123 MG/DL — HIGH (ref 70–99)
GLUCOSE BLDC GLUCOMTR-MCNC: 138 MG/DL — HIGH (ref 70–99)
GLUCOSE BLDC GLUCOMTR-MCNC: 151 MG/DL — HIGH (ref 70–99)
GLUCOSE BLDC GLUCOMTR-MCNC: 177 MG/DL — HIGH (ref 70–99)
GLUCOSE SERPL-MCNC: 167 MG/DL — HIGH (ref 70–99)
HCT VFR BLD CALC: 32.7 % — LOW (ref 39–50)
HGB BLD-MCNC: 10.4 G/DL — LOW (ref 13–17)
MCHC RBC-ENTMCNC: 28.1 PG — SIGNIFICANT CHANGE UP (ref 27–34)
MCHC RBC-ENTMCNC: 31.8 GM/DL — LOW (ref 32–36)
MCV RBC AUTO: 88.4 FL — SIGNIFICANT CHANGE UP (ref 80–100)
PLATELET # BLD AUTO: 363 K/UL — SIGNIFICANT CHANGE UP (ref 150–400)
POTASSIUM SERPL-MCNC: 3.6 MMOL/L — SIGNIFICANT CHANGE UP (ref 3.5–5.3)
POTASSIUM SERPL-SCNC: 3.6 MMOL/L — SIGNIFICANT CHANGE UP (ref 3.5–5.3)
RBC # BLD: 3.7 M/UL — LOW (ref 4.2–5.8)
RBC # FLD: 14.2 % — SIGNIFICANT CHANGE UP (ref 10.3–14.5)
SARS-COV-2 RNA SPEC QL NAA+PROBE: SIGNIFICANT CHANGE UP
SODIUM SERPL-SCNC: 139 MMOL/L — SIGNIFICANT CHANGE UP (ref 135–145)
WBC # BLD: 8.4 K/UL — SIGNIFICANT CHANGE UP (ref 3.8–10.5)
WBC # FLD AUTO: 8.4 K/UL — SIGNIFICANT CHANGE UP (ref 3.8–10.5)

## 2020-12-19 PROCEDURE — 99232 SBSQ HOSP IP/OBS MODERATE 35: CPT

## 2020-12-19 RX ADMIN — METFORMIN HYDROCHLORIDE 500 MILLIGRAM(S): 850 TABLET ORAL at 08:51

## 2020-12-19 RX ADMIN — ENOXAPARIN SODIUM 40 MILLIGRAM(S): 100 INJECTION SUBCUTANEOUS at 11:19

## 2020-12-19 RX ADMIN — METFORMIN HYDROCHLORIDE 500 MILLIGRAM(S): 850 TABLET ORAL at 17:30

## 2020-12-19 RX ADMIN — ATORVASTATIN CALCIUM 40 MILLIGRAM(S): 80 TABLET, FILM COATED ORAL at 22:03

## 2020-12-19 RX ADMIN — Medication 100 MICROGRAM(S): at 08:51

## 2020-12-19 RX ADMIN — TAMSULOSIN HYDROCHLORIDE 0.4 MILLIGRAM(S): 0.4 CAPSULE ORAL at 22:03

## 2020-12-19 RX ADMIN — FAMOTIDINE 20 MILLIGRAM(S): 10 INJECTION INTRAVENOUS at 11:19

## 2020-12-19 RX ADMIN — Medication 8 UNIT(S): at 08:36

## 2020-12-19 RX ADMIN — Medication 3: at 08:37

## 2020-12-19 RX ADMIN — INSULIN GLARGINE 20 UNIT(S): 100 INJECTION, SOLUTION SUBCUTANEOUS at 22:04

## 2020-12-19 RX ADMIN — CLOZAPINE 200 MILLIGRAM(S): 150 TABLET, ORALLY DISINTEGRATING ORAL at 22:06

## 2020-12-19 NOTE — PROGRESS NOTE ADULT - ASSESSMENT
55M w/ h/o stage IV colon cancer, schizophrenia, HLD, admitted to medicine for Lithium toxicity, now resolved. S/p open ileostomy reversal on 12/4. being transferred back to medicine.     History of Stage 4 Colon Cancer s/p reversal loop ileostomy post op day #15  - cleared from surgical stand point  - tolerating diet well  - daily wound dressing    Tachycardia   - IV Lopressor 5mg q6h PRN   - Cardiology cleared    Hypothyroidism  - lithium induced. Endocrine following -> TSH 19  - c/w Synthroid 100mcg daily    DM  - Endo recommendations appreciated  - c/w Metformin 500mg BID  - FS with KELLY  - c/w Lantus 20 units nightly  - c/w Admelog 10 units TID before meals    HLD  - c/w Lipitor 40mg nightly    Lithium toxicity / tremors / weakness --> resolved + WNL  - Lithium held    Schizoaffective disorder    - Psych following  - Clozapine and risperidone    DVT ppx  - Lovenox SQ    Dispo: medically cleared, discharge to [facility] on Monday

## 2020-12-19 NOTE — PROGRESS NOTE ADULT - SUBJECTIVE AND OBJECTIVE BOX
chief complaint: Weakness    Patient seen and examined at bedside. No acute overnight events reported. Denies fever, chills, cough, chest pain, shortness of breath, nausea, vomiting, abdominal pain, diarrhea or constipation.     Vital Signs Last 24 Hrs  T(F): 97.8 (19 Dec 2020 07:30), Max: 98.5 (18 Dec 2020 19:26)  HR: 83 (19 Dec 2020 07:30) (83 - 100)  BP: 100/66 (19 Dec 2020 07:30) (100/66 - 128/76)  RR: 18 (19 Dec 2020 07:30) (18 - 20)  SpO2: 96% (19 Dec 2020 07:30) (95% - 98%)    Physical Exam:  Constitutional: alert and oriented, in no acute distress   Neck: Soft and supple, No LAD, No JVD  Respiratory: Clear to auscultation bilaterally, no wheezes or crackles  Cardiovascular: Regular rate and rhythm no murmurs, gallops, rubs  Gastrointestinal: Soft, non-tender to palpation, +bs  Vascular: 2+ peripheral pulses  Neurological: A/O x 3, no focal neurological deficits  Musculoskeletal: 5/5 strength b/l upper and lower extremities, no lower extremity edema bilaterally    Labs:                        10.4   8.40  )-----------( 363      ( 19 Dec 2020 06:18 )             32.7   12-19    139  |  104  |  15.0  ----------------------------<  167<H>  3.6   |  25.0  |  0.57    Ca    9.1      19 Dec 2020 06:18  Phos  3.8     12-18  Mg     1.8     12-18    TPro  6.9  /  Alb  3.3  /  TBili  <0.2<L>  /  DBili  x   /  AST  15  /  ALT  15  /  AlkPhos  79  12-18

## 2020-12-20 LAB
ANION GAP SERPL CALC-SCNC: 10 MMOL/L — SIGNIFICANT CHANGE UP (ref 5–17)
BUN SERPL-MCNC: 16 MG/DL — SIGNIFICANT CHANGE UP (ref 8–20)
CALCIUM SERPL-MCNC: 9.4 MG/DL — SIGNIFICANT CHANGE UP (ref 8.6–10.2)
CHLORIDE SERPL-SCNC: 103 MMOL/L — SIGNIFICANT CHANGE UP (ref 98–107)
CO2 SERPL-SCNC: 28 MMOL/L — SIGNIFICANT CHANGE UP (ref 22–29)
CREAT SERPL-MCNC: 0.75 MG/DL — SIGNIFICANT CHANGE UP (ref 0.5–1.3)
GLUCOSE BLDC GLUCOMTR-MCNC: 123 MG/DL — HIGH (ref 70–99)
GLUCOSE BLDC GLUCOMTR-MCNC: 141 MG/DL — HIGH (ref 70–99)
GLUCOSE BLDC GLUCOMTR-MCNC: 155 MG/DL — HIGH (ref 70–99)
GLUCOSE BLDC GLUCOMTR-MCNC: 166 MG/DL — HIGH (ref 70–99)
GLUCOSE SERPL-MCNC: 151 MG/DL — HIGH (ref 70–99)
HCT VFR BLD CALC: 33.3 % — LOW (ref 39–50)
HGB BLD-MCNC: 10.3 G/DL — LOW (ref 13–17)
MCHC RBC-ENTMCNC: 28.1 PG — SIGNIFICANT CHANGE UP (ref 27–34)
MCHC RBC-ENTMCNC: 30.9 GM/DL — LOW (ref 32–36)
MCV RBC AUTO: 91 FL — SIGNIFICANT CHANGE UP (ref 80–100)
PLATELET # BLD AUTO: 368 K/UL — SIGNIFICANT CHANGE UP (ref 150–400)
POTASSIUM SERPL-MCNC: 3.8 MMOL/L — SIGNIFICANT CHANGE UP (ref 3.5–5.3)
POTASSIUM SERPL-SCNC: 3.8 MMOL/L — SIGNIFICANT CHANGE UP (ref 3.5–5.3)
RBC # BLD: 3.66 M/UL — LOW (ref 4.2–5.8)
RBC # FLD: 14.3 % — SIGNIFICANT CHANGE UP (ref 10.3–14.5)
SODIUM SERPL-SCNC: 141 MMOL/L — SIGNIFICANT CHANGE UP (ref 135–145)
WBC # BLD: 7.6 K/UL — SIGNIFICANT CHANGE UP (ref 3.8–10.5)
WBC # FLD AUTO: 7.6 K/UL — SIGNIFICANT CHANGE UP (ref 3.8–10.5)

## 2020-12-20 PROCEDURE — 99232 SBSQ HOSP IP/OBS MODERATE 35: CPT

## 2020-12-20 RX ADMIN — Medication 3: at 08:09

## 2020-12-20 RX ADMIN — Medication 8 UNIT(S): at 12:40

## 2020-12-20 RX ADMIN — METFORMIN HYDROCHLORIDE 500 MILLIGRAM(S): 850 TABLET ORAL at 17:13

## 2020-12-20 RX ADMIN — FAMOTIDINE 20 MILLIGRAM(S): 10 INJECTION INTRAVENOUS at 12:40

## 2020-12-20 RX ADMIN — CLOZAPINE 200 MILLIGRAM(S): 150 TABLET, ORALLY DISINTEGRATING ORAL at 21:56

## 2020-12-20 RX ADMIN — Medication 8 UNIT(S): at 17:13

## 2020-12-20 RX ADMIN — TAMSULOSIN HYDROCHLORIDE 0.4 MILLIGRAM(S): 0.4 CAPSULE ORAL at 21:52

## 2020-12-20 RX ADMIN — Medication 8 UNIT(S): at 08:09

## 2020-12-20 RX ADMIN — ATORVASTATIN CALCIUM 40 MILLIGRAM(S): 80 TABLET, FILM COATED ORAL at 21:52

## 2020-12-20 RX ADMIN — ENOXAPARIN SODIUM 40 MILLIGRAM(S): 100 INJECTION SUBCUTANEOUS at 12:40

## 2020-12-20 RX ADMIN — METFORMIN HYDROCHLORIDE 500 MILLIGRAM(S): 850 TABLET ORAL at 08:07

## 2020-12-20 RX ADMIN — INSULIN GLARGINE 20 UNIT(S): 100 INJECTION, SOLUTION SUBCUTANEOUS at 21:52

## 2020-12-20 NOTE — PROGRESS NOTE ADULT - REASON FOR ADMISSION
weakness , shakes , tremor
Stage IV Colon Cancer
weakness , shakes , tremor

## 2020-12-20 NOTE — PROGRESS NOTE ADULT - SUBJECTIVE AND OBJECTIVE BOX
chief complaint: Weakness    Patient seen and examined at bedside. No acute overnight events reported overnight. States he feels fine, denies any complaints. Denies fever, chills, cough, chest pain, shortness of breath, nausea, vomiting, abdominal pain, diarrhea or constipation.     Vital Signs Last 24 Hrs  T(F): 97.8 (19 Dec 2020 07:30), Max: 98.5 (18 Dec 2020 19:26)  HR: 83 (19 Dec 2020 07:30) (83 - 100)  BP: 100/66 (19 Dec 2020 07:30) (100/66 - 128/76)  RR: 18 (19 Dec 2020 07:30) (18 - 20)  SpO2: 96% (19 Dec 2020 07:30) (95% - 98%)    Physical Exam:  Constitutional: alert and oriented, in no acute distress   Neck: Soft and supple, No LAD, No JVD  Respiratory: Clear to auscultation bilaterally, no wheezes or crackles  Cardiovascular: Regular rate and rhythm no murmurs, gallops, rubs  Gastrointestinal: Soft, non-tender to palpation, +bs  Vascular: 2+ peripheral pulses  Neurological: A/O x 3, no focal neurological deficits  Musculoskeletal: 5/5 strength b/l upper and lower extremities, no lower extremity edema bilaterally    Labs:                        10.4   8.40  )-----------( 363      ( 19 Dec 2020 06:18 )             32.7   12-19    139  |  104  |  15.0  ----------------------------<  167<H>  3.6   |  25.0  |  0.57    Ca    9.1      19 Dec 2020 06:18  Phos  3.8     12-18  Mg     1.8     12-18    TPro  6.9  /  Alb  3.3  /  TBili  <0.2<L>  /  DBili  x   /  AST  15  /  ALT  15  /  AlkPhos  79  12-18

## 2020-12-20 NOTE — PROGRESS NOTE ADULT - ASSESSMENT
55M w/ h/o stage IV colon cancer, schizophrenia, HLD, admitted to medicine for Lithium toxicity, now resolved. S/p open ileostomy reversal on 12/4. being transferred back to medicine.     History of Stage 4 Colon Cancer s/p reversal loop ileostomy post op day #15  - cleared from surgical stand point  - tolerating diet well  - daily wound dressing    Tachycardia   - IV Lopressor 5mg q6h PRN   - Cardiology cleared    Hypothyroidism  - lithium induced. Endocrine following -> TSH 19  - c/w Synthroid 100mcg daily    DM  - Endo recommendations appreciated  - c/w Metformin 500mg BID  - FS with KELLY  - c/w Lantus 20 units nightly  - c/w Admelog 10 units TID before meals    HLD  - c/w Lipitor 40mg nightly    Lithium toxicity / tremors / weakness --> resolved + WNL  - Lithium held    Schizoaffective disorder    - Psych following  - Clozapine and risperidone    DVT ppx  - Lovenox SQ    Dispo: medically cleared, discharge to [facility] on Monday

## 2020-12-20 NOTE — PROGRESS NOTE ADULT - PROVIDER SPECIALTY LIST ADULT
Anesthesia
Cardiology
Colorectal Surgery
Endocrinology
Gastroenterology
Gastroenterology
Hospitalist
Internal Medicine
Nephrology
Surgery
Hospitalist
Nephrology
Hospitalist
Hospitalist

## 2020-12-21 ENCOUNTER — TRANSCRIPTION ENCOUNTER (OUTPATIENT)
Age: 55
End: 2020-12-21

## 2020-12-21 VITALS
RESPIRATION RATE: 18 BRPM | HEART RATE: 93 BPM | TEMPERATURE: 98 F | SYSTOLIC BLOOD PRESSURE: 110 MMHG | DIASTOLIC BLOOD PRESSURE: 70 MMHG | OXYGEN SATURATION: 92 %

## 2020-12-21 LAB — GLUCOSE BLDC GLUCOMTR-MCNC: 170 MG/DL — HIGH (ref 70–99)

## 2020-12-21 PROCEDURE — 86901 BLOOD TYPING SEROLOGIC RH(D): CPT

## 2020-12-21 PROCEDURE — 71045 X-RAY EXAM CHEST 1 VIEW: CPT

## 2020-12-21 PROCEDURE — 97116 GAIT TRAINING THERAPY: CPT

## 2020-12-21 PROCEDURE — U0003: CPT

## 2020-12-21 PROCEDURE — 84436 ASSAY OF TOTAL THYROXINE: CPT

## 2020-12-21 PROCEDURE — 99239 HOSP IP/OBS DSCHRG MGMT >30: CPT

## 2020-12-21 PROCEDURE — 88309 TISSUE EXAM BY PATHOLOGIST: CPT

## 2020-12-21 PROCEDURE — 86850 RBC ANTIBODY SCREEN: CPT

## 2020-12-21 PROCEDURE — 74177 CT ABD & PELVIS W/CONTRAST: CPT

## 2020-12-21 PROCEDURE — 88342 IMHCHEM/IMCYTCHM 1ST ANTB: CPT

## 2020-12-21 PROCEDURE — 86803 HEPATITIS C AB TEST: CPT

## 2020-12-21 PROCEDURE — 80159 DRUG ASSAY CLOZAPINE: CPT

## 2020-12-21 PROCEDURE — 83735 ASSAY OF MAGNESIUM: CPT

## 2020-12-21 PROCEDURE — 80053 COMPREHEN METABOLIC PANEL: CPT

## 2020-12-21 PROCEDURE — 88311 DECALCIFY TISSUE: CPT

## 2020-12-21 PROCEDURE — 36415 COLL VENOUS BLD VENIPUNCTURE: CPT

## 2020-12-21 PROCEDURE — 83970 ASSAY OF PARATHORMONE: CPT

## 2020-12-21 PROCEDURE — 71275 CT ANGIOGRAPHY CHEST: CPT

## 2020-12-21 PROCEDURE — 96374 THER/PROPH/DIAG INJ IV PUSH: CPT

## 2020-12-21 PROCEDURE — 80307 DRUG TEST PRSMV CHEM ANLYZR: CPT

## 2020-12-21 PROCEDURE — 83935 ASSAY OF URINE OSMOLALITY: CPT

## 2020-12-21 PROCEDURE — 83605 ASSAY OF LACTIC ACID: CPT

## 2020-12-21 PROCEDURE — 81003 URINALYSIS AUTO W/O SCOPE: CPT

## 2020-12-21 PROCEDURE — 85610 PROTHROMBIN TIME: CPT

## 2020-12-21 PROCEDURE — 84480 ASSAY TRIIODOTHYRONINE (T3): CPT

## 2020-12-21 PROCEDURE — 82310 ASSAY OF CALCIUM: CPT

## 2020-12-21 PROCEDURE — 82330 ASSAY OF CALCIUM: CPT

## 2020-12-21 PROCEDURE — 80048 BASIC METABOLIC PNL TOTAL CA: CPT

## 2020-12-21 PROCEDURE — 97530 THERAPEUTIC ACTIVITIES: CPT

## 2020-12-21 PROCEDURE — 93308 TTE F-UP OR LMTD: CPT

## 2020-12-21 PROCEDURE — 84300 ASSAY OF URINE SODIUM: CPT

## 2020-12-21 PROCEDURE — C1889: CPT

## 2020-12-21 PROCEDURE — 82435 ASSAY OF BLOOD CHLORIDE: CPT

## 2020-12-21 PROCEDURE — 84132 ASSAY OF SERUM POTASSIUM: CPT

## 2020-12-21 PROCEDURE — 85027 COMPLETE CBC AUTOMATED: CPT

## 2020-12-21 PROCEDURE — 97110 THERAPEUTIC EXERCISES: CPT

## 2020-12-21 PROCEDURE — 93306 TTE W/DOPPLER COMPLETE: CPT

## 2020-12-21 PROCEDURE — 85018 HEMOGLOBIN: CPT

## 2020-12-21 PROCEDURE — 70450 CT HEAD/BRAIN W/O DYE: CPT

## 2020-12-21 PROCEDURE — 94640 AIRWAY INHALATION TREATMENT: CPT

## 2020-12-21 PROCEDURE — 86900 BLOOD TYPING SEROLOGIC ABO: CPT

## 2020-12-21 PROCEDURE — 99285 EMERGENCY DEPT VISIT HI MDM: CPT | Mod: 25

## 2020-12-21 PROCEDURE — 83036 HEMOGLOBIN GLYCOSYLATED A1C: CPT

## 2020-12-21 PROCEDURE — 82570 ASSAY OF URINE CREATININE: CPT

## 2020-12-21 PROCEDURE — 96361 HYDRATE IV INFUSION ADD-ON: CPT

## 2020-12-21 PROCEDURE — 82962 GLUCOSE BLOOD TEST: CPT

## 2020-12-21 PROCEDURE — 97163 PT EVAL HIGH COMPLEX 45 MIN: CPT

## 2020-12-21 PROCEDURE — 93005 ELECTROCARDIOGRAM TRACING: CPT

## 2020-12-21 PROCEDURE — 85025 COMPLETE CBC W/AUTO DIFF WBC: CPT

## 2020-12-21 PROCEDURE — 74019 RADEX ABDOMEN 2 VIEWS: CPT

## 2020-12-21 PROCEDURE — 85014 HEMATOCRIT: CPT

## 2020-12-21 PROCEDURE — 82803 BLOOD GASES ANY COMBINATION: CPT

## 2020-12-21 PROCEDURE — 84100 ASSAY OF PHOSPHORUS: CPT

## 2020-12-21 PROCEDURE — 88341 IMHCHEM/IMCYTCHM EA ADD ANTB: CPT

## 2020-12-21 PROCEDURE — 84443 ASSAY THYROID STIM HORMONE: CPT

## 2020-12-21 PROCEDURE — 93970 EXTREMITY STUDY: CPT

## 2020-12-21 PROCEDURE — 82947 ASSAY GLUCOSE BLOOD QUANT: CPT

## 2020-12-21 PROCEDURE — 88304 TISSUE EXAM BY PATHOLOGIST: CPT

## 2020-12-21 PROCEDURE — 84295 ASSAY OF SERUM SODIUM: CPT

## 2020-12-21 PROCEDURE — 80178 ASSAY OF LITHIUM: CPT

## 2020-12-21 PROCEDURE — 86769 SARS-COV-2 COVID-19 ANTIBODY: CPT

## 2020-12-21 RX ORDER — PINDOLOL 10 MG
0.5 TABLET ORAL
Qty: 0 | Refills: 0 | DISCHARGE

## 2020-12-21 RX ORDER — AMANTADINE HCL 100 MG
0 CAPSULE ORAL
Qty: 0 | Refills: 0 | DISCHARGE

## 2020-12-21 RX ORDER — LITHIUM CARBONATE 300 MG/1
300 TABLET, EXTENDED RELEASE ORAL
Qty: 0 | Refills: 0 | DISCHARGE

## 2020-12-21 RX ORDER — TAMSULOSIN HYDROCHLORIDE 0.4 MG/1
1 CAPSULE ORAL
Qty: 0 | Refills: 0 | DISCHARGE
Start: 2020-12-21

## 2020-12-21 RX ORDER — PINDOLOL 10 MG
1 TABLET ORAL
Qty: 0 | Refills: 0 | DISCHARGE

## 2020-12-21 RX ORDER — INSULIN LISPRO 100/ML
8 VIAL (ML) SUBCUTANEOUS
Qty: 0 | Refills: 0 | DISCHARGE
Start: 2020-12-21

## 2020-12-21 RX ORDER — FAMOTIDINE 10 MG/ML
1 INJECTION INTRAVENOUS
Qty: 0 | Refills: 0 | DISCHARGE
Start: 2020-12-21

## 2020-12-21 RX ORDER — OLANZAPINE 15 MG/1
1 TABLET, FILM COATED ORAL
Qty: 0 | Refills: 0 | DISCHARGE

## 2020-12-21 RX ORDER — MAGNESIUM HYDROXIDE 400 MG/1
0 TABLET, CHEWABLE ORAL
Qty: 0 | Refills: 0 | DISCHARGE

## 2020-12-21 RX ORDER — RISPERIDONE 4 MG/1
1 TABLET ORAL
Qty: 0 | Refills: 0 | DISCHARGE
Start: 2020-12-21

## 2020-12-21 RX ORDER — DIPHENHYDRAMINE HCL 50 MG
1 CAPSULE ORAL
Qty: 0 | Refills: 0 | DISCHARGE

## 2020-12-21 RX ORDER — CHOLECALCIFEROL (VITAMIN D3) 125 MCG
1 CAPSULE ORAL
Qty: 0 | Refills: 0 | DISCHARGE

## 2020-12-21 RX ORDER — INSULIN GLARGINE 100 [IU]/ML
20 INJECTION, SOLUTION SUBCUTANEOUS
Qty: 0 | Refills: 0 | DISCHARGE
Start: 2020-12-21

## 2020-12-21 RX ORDER — SODIUM CHLORIDE 9 MG/ML
0 INJECTION INTRAMUSCULAR; INTRAVENOUS; SUBCUTANEOUS
Qty: 0 | Refills: 0 | DISCHARGE

## 2020-12-21 RX ORDER — LOXAPINE SUCCINATE 25 MG
0 CAPSULE ORAL
Qty: 0 | Refills: 0 | DISCHARGE

## 2020-12-21 RX ORDER — DIVALPROEX SODIUM 500 MG/1
1 TABLET, DELAYED RELEASE ORAL
Qty: 0 | Refills: 0 | DISCHARGE

## 2020-12-21 RX ADMIN — Medication 3: at 08:37

## 2020-12-21 RX ADMIN — METFORMIN HYDROCHLORIDE 500 MILLIGRAM(S): 850 TABLET ORAL at 06:17

## 2020-12-21 RX ADMIN — Medication 8 UNIT(S): at 08:37

## 2020-12-21 NOTE — DISCHARGE NOTE NURSING/CASE MANAGEMENT/SOCIAL WORK - PATIENT PORTAL LINK FT
You can access the FollowMyHealth Patient Portal offered by [facility] by registering at the following website: [email or web address]. By joining [employer]’s FollowMyHealth portal, you will also be able to view your health information using other applications (apps) compatible with our system.

## 2020-12-30 ENCOUNTER — APPOINTMENT (OUTPATIENT)
Dept: HEMATOLOGY ONCOLOGY | Facility: CLINIC | Age: 55
End: 2020-12-30

## 2021-02-19 ENCOUNTER — APPOINTMENT (OUTPATIENT)
Dept: ENDOCRINOLOGY | Facility: CLINIC | Age: 56
End: 2021-02-19
Payer: MEDICARE

## 2021-02-19 PROCEDURE — 99443: CPT | Mod: 95

## 2021-02-19 RX ORDER — VITAMIN E MIXED 1000 UNIT
500-200 CAPSULE ORAL
Qty: 180 | Refills: 1 | Status: DISCONTINUED | COMMUNITY
End: 2021-02-19

## 2021-02-19 RX ORDER — PINDOLOL 5 MG
5 TABLET ORAL
Refills: 0 | Status: DISCONTINUED | COMMUNITY
End: 2021-02-19

## 2021-02-19 RX ORDER — LITHIUM CARBONATE 300 MG/1
300 CAPSULE ORAL TWICE DAILY
Refills: 0 | Status: DISCONTINUED | COMMUNITY
End: 2021-02-19

## 2021-02-19 RX ORDER — DIVALPROEX SODIUM 250 MG/1
250 TABLET, EXTENDED RELEASE ORAL DAILY
Refills: 0 | Status: DISCONTINUED | COMMUNITY
End: 2021-02-19

## 2021-02-19 RX ORDER — SITAGLIPTIN AND METFORMIN HYDROCHLORIDE 50; 500 MG/1; MG/1
50-500 TABLET, FILM COATED ORAL TWICE DAILY
Refills: 0 | Status: DISCONTINUED | COMMUNITY
End: 2021-02-19

## 2021-02-19 RX ORDER — CALCIUM CARBONATE 500(1250)
TABLET,CHEWABLE ORAL DAILY
Refills: 0 | Status: DISCONTINUED | COMMUNITY
End: 2021-02-19

## 2021-02-19 RX ORDER — METFORMIN HYDROCHLORIDE 500 MG/1
500 TABLET, FILM COATED ORAL
Refills: 0 | Status: DISCONTINUED | COMMUNITY
End: 2021-02-19

## 2021-02-19 RX ORDER — OLANZAPINE 5 MG/1
5 TABLET ORAL TWICE DAILY
Refills: 0 | Status: DISCONTINUED | COMMUNITY
End: 2021-02-19

## 2021-02-19 RX ORDER — LORAZEPAM 1 MG/1
1 TABLET ORAL 3 TIMES DAILY
Refills: 0 | Status: DISCONTINUED | COMMUNITY
End: 2021-02-19

## 2021-02-19 RX ORDER — SITAGLIPTIN 50 MG/1
50 TABLET, FILM COATED ORAL DAILY
Refills: 0 | Status: DISCONTINUED | COMMUNITY
End: 2021-02-19

## 2021-02-19 RX ORDER — ALENDRONATE SODIUM 70 MG/1
70 TABLET ORAL
Refills: 0 | Status: DISCONTINUED | COMMUNITY
End: 2021-02-19

## 2021-02-19 RX ORDER — CLOZAPINE 25 MG/1
25 TABLET ORAL
Refills: 0 | Status: DISCONTINUED | COMMUNITY
End: 2021-02-19

## 2021-02-19 RX ORDER — LOXAPINE 10 MG/1
10 CAPSULE ORAL AT BEDTIME
Refills: 0 | Status: DISCONTINUED | COMMUNITY
End: 2021-02-19

## 2021-02-19 RX ORDER — PNV NO.95/FERROUS FUM/FOLIC AC 28MG-0.8MG
500-125 TABLET ORAL DAILY
Refills: 0 | Status: DISCONTINUED | COMMUNITY
End: 2021-02-19

## 2021-02-19 RX ORDER — OLANZAPINE 20 MG/1
20 TABLET ORAL
Refills: 0 | Status: DISCONTINUED | COMMUNITY
End: 2021-02-19

## 2021-02-19 RX ORDER — LEVOTHYROXINE SODIUM 0.09 MG/1
88 TABLET ORAL DAILY
Refills: 0 | Status: DISCONTINUED | COMMUNITY
End: 2021-02-19

## 2021-02-19 RX ORDER — CALCIUM CARBONATE/VITAMIN D3 500MG-5MCG
500-200 TABLET ORAL
Refills: 0 | Status: DISCONTINUED | COMMUNITY
End: 2021-02-19

## 2021-02-19 NOTE — HISTORY OF PRESENT ILLNESS
[Home] : at home, [unfilled] , at the time of the visit. [Medical Office: ([facility])___] : at the medical office located in  [Verbal consent obtained from patient] : the patient, [unfilled] [FreeTextEntry1] : Telephonic appt conducted due to COVID-19 Pandemic.\par Time started: 11:19 AM\par Time Ended:  11:43 AM\par \par Follow up of DM, hypothyroidism and osteogenesis imperfecta.   Last seen at the office 2 years ago.  \par Seeing Rheumatology for OI (Dr. Underwood).  Now back on ALendronate and may be switching to IV formulation.  \par Also hx of hyperprolactinemia from psych meds.  Still following with Heme/Onc for adenocarcinoma of the colon.  Recently had reversal of ileostomy.  \par Currently residing at rehab facility after his recent surgery.  ([facility] in [place]). \par Taken off [place] after toxicity.  \par Patient was previously on LT4 100 mcg, but stopped taking this about 6 months ago.  \par \par Quality:  type 2  DM\par Severity:  moderate\par Duration of diabetes:  since 2007\par Associated Complications/ Symptoms:  no known microvascular complications\par Modifying Factors:  Better with medication\par \par Patient tests blood glucose 1 times per day.  Reports that most BG in the 120- 160 mg/dl range.  \par \par Current Diabetic Medication Regimen:\par Metformin BID-  unsure of dose, maybe 500 mg. \par (came off Janumet)  \par Will get sliding scale insulin prn.\par \par  \par

## 2021-02-19 NOTE — ASSESSMENT
[FreeTextEntry1] : 55 year old male with type 2 DM, hypothyroidism, hyperlipidemia and Osteogenesis imperfecta.   \par \par 1.  Type 2 DM-   continue metformin.  Check A1c and urine microalbumin now.  \par 2.  Hypothyroidism-   Could have been secondary to Lithium use versus underlying Hashimoto's.  Will repeat TFTs and thyroid antibodies now and if TSH is elevated, will resume LT4.  \par 3.  Hyperlipidemia-   Continue Atorvastatin,  check lipids now.  \par 4.  OI-   no longer on Alendronate.  Will need follow up with rheumatology.

## 2021-03-07 ENCOUNTER — OUTPATIENT (OUTPATIENT)
Dept: OUTPATIENT SERVICES | Facility: HOSPITAL | Age: 56
LOS: 1 days | Discharge: HOME | End: 2021-03-07

## 2021-03-07 DIAGNOSIS — Z93.2 ILEOSTOMY STATUS: Chronic | ICD-10-CM

## 2021-03-07 DIAGNOSIS — S82.899A OTHER FRACTURE OF UNSPECIFIED LOWER LEG, INITIAL ENCOUNTER FOR CLOSED FRACTURE: Chronic | ICD-10-CM

## 2021-03-07 DIAGNOSIS — C18.9 MALIGNANT NEOPLASM OF COLON, UNSPECIFIED: ICD-10-CM

## 2021-03-08 ENCOUNTER — APPOINTMENT (OUTPATIENT)
Dept: HEMATOLOGY ONCOLOGY | Facility: CLINIC | Age: 56
End: 2021-03-08

## 2021-03-17 ENCOUNTER — APPOINTMENT (OUTPATIENT)
Dept: HEMATOLOGY ONCOLOGY | Facility: CLINIC | Age: 56
End: 2021-03-17
Payer: MEDICARE

## 2021-03-17 DIAGNOSIS — C18.2 MALIGNANT NEOPLASM OF ASCENDING COLON: ICD-10-CM

## 2021-03-17 PROCEDURE — 99215 OFFICE O/P EST HI 40 MIN: CPT | Mod: 95

## 2021-03-17 NOTE — RESULTS/DATA
[FreeTextEntry1] : 12/15/20 path:  Final Diagnosis\par 1. Ileum at Ileostomy, resection:\par -Ileostomy with erosion and inflamed polypoid granulation tissue\par -Margins of resection are viable\par \par 2. Omentum, omentectomy:\par -One (1) lymph node and adipose tissue, no tumor identified\par \par 3. Terminal ileum, appendix and right colon, right hemicolectomy:\par -Poorly differentiated adenocarcinoma, ileocecal valve\par -Tumor size 3.5 cm\par -Tumor infiltrates the muscularis propria\par -Tumor is present in lymphovascular spaces\par -One of  seventeen (1/17)  lymph nodes with metastatic carcinoma\par -Tumor and lymph nodes with dystrophic calcification\par -Appendix with fibrous obliteration of tip\par -Margins of resection, no tumor identified\par \par Procedure: Right hemicolectomy\par Tumor Site:  Ileocecal valve /Cecum\par Tumor Size:  3.5 cm\par Macroscopic Tumor Perforation:  Not identified\par Histologic Type: Adenocarcinoma\par Histologic Grade:  Poorly differentiated, G3\par Microscopic Tumor Extension:\par Tumor invades muscularis propria\par Tumor invades through the muscularis propria into the subserosal\par adipose tissue or the\par Margins:  Distance of invasive carcinoma from closest margin:\par 0.7 cm (deep (fat) margin\par Treatment Effect:  No prior treatment\par Lymph-Vascular Invasion:  Present - Small vessel lymph-vascular\par invasion\par Perineural Invasion:  Not identified\par Tumor Budding:  Intermediate score (5-9)\par Tumor Deposits:  Not identified\par Lymph Node Examination:\par Number of Lymph Nodes Examined:  17\par Number of Lymph Nodes Involved:  \par Primary Tumor (pT):\par pT2:      Tumor invades muscularis propria\par Regional Lymph Nodes (pN):\par pN1a:     Metastasis in 1 regional lymph node\par \par Ancillary Studies:  Immunostains performed on block 3G by [employer]\par and interpreted at [facility] support the\par above diagnosis.\par CK20, CDX-2, MOC31 : Positive\par CK7 : Negative\par MMR by IHC : Intact (See [id number])\par \par 5/11/20 CT:  Stable right lung nodule.  Decrease in size of the portacaval lymph node.  Stable splenic lesions.  Stable peritoneal nodules.  Previously identified small bowel mesenteric lymph node has resolved.\par \par 1/7/20 CT CAP:  Previously identified inflammatory opacity in the right upper lobe has resolved. Stable bilateral lung nodules. Stable portacaval adenopathy. \par Decrease in size of the 2 splenic lesions which are favored to be capsular in location. Stable peritoneal nodules and mesenteric lymph nodes.\par \par 10/3/19 CT C/A/P:  New small opacities right upper lobe favored to be infectious or inflammatory in etiology. Decrease in size of a nonenlarged cardiophrenic lymph node. \par Decrease in size of the splenic lesions. Decrease in size of the ileocolic lymph nodes. Increase in size of a small bowel mesenteric lymph node in the mid abdomen. \par Stable peritoneal nodules in the right upper quadrant. Stable portacaval adenopathy.\par \par 7/1/19 CT C/A/P: Two new right lung nodules. Previously identified lung nodules stable. Increase in size of 1.0 cm lymph node in the cardiophrenic region. The known ileocecal mass is not well visualized on this examination. Decrease in size of peritoneal nodules. Stable splenic lesions. Decrease in size of ileocolic lymph nodes. \par \par 4/30/19 CT CAP:\par Interval loop ileostomy.  The known ileocecal mass is not well visualized on this examination.  Decrease in size of previously identified peritoneal nodules. However, \par there are a few newly noted peritoneal nodules.  Stable splenic lesions.  Stable small bilateral lung nodules.\par \par 1/19/19 Pathology:\par 1. Cecal mass, biopsy ([id number]):\par - At least intramucosal adenocarcinoma in background of high-grade dysplasia, see note.\par -Note: Dr. Goodwin concur(s) with diagnosis. The findings may not represent the entire mass lesion.\par \par 1/9/19 CT Chest:  No lung mass or consolidation. No suspicious pulmonary nodule. 3 mm  triangular density within the minor fissure most  likely benign fissural lymph node.\par \par 1/9/19 U/S Abdomen:  Mild fatty infiltration of the liver.    1.7 cm right lobe liver lesion is indeterminate but for which metastatic  disease is in the differential diagnosis.    Two solid splenic lesions measuring 3.1 cm and 1.8 cm.  The liver lesion and splenic lesions may be further evaluated with PET/CT  scan to evaluate for metastatic disease.  1.2 x 0.4 cm gallbladder polyp.\par \par 12/13/18 CT A/P:  There is a soft tissue mass in the region of the ileocecal  junction measuring 3.7 x 3.5 cm. There is no bowel obstruction. There are  multiple peritoneal nodules in the abdomen and pelvis. For example, a  nodule in the pelvis measures 1.4 cm. A nodule in the left lower quadrant  measures 1.3 cm. There are omental nodules measuring up to 3.2 x 2.9 cm.  There are ileocolic lymph nodes measuring up to 1.3 cm.  1.1 cm lesion in the right lobe of the liver is suspicious for metastatic  disease.    Two splenic lesions are suspicious for metastatic disease.\par \par 12/11/18 Pathology:  Cecal mass, biopsy:   Adenocarcinoma, at least intramucosal, superficial mucosal fragments.\par \par 12/11/18 Colonoscopy:  Cecal mass.  Hemorrhoids

## 2021-03-17 NOTE — PHYSICAL EXAM
[Restricted in physically strenuous activity but ambulatory and able to carry out work of a light or sedentary nature] : Status 1- Restricted in physically strenuous activity but ambulatory and able to carry out work of a light or sedentary nature, e.g., light house work, office work [Normal] : affect appropriate [de-identified] : prolapsed ileostomy

## 2021-03-17 NOTE — HISTORY OF PRESENT ILLNESS
[Home] : at home, [unfilled] , at the time of the visit. [Medical Office: ([facility])___] : at the medical office located in  [Spouse] : spouse [de-identified] : The patient was diagnosed with adenocarcinoma of the cecum in December 2018 at the age of 53.  He has a history of irregular bowel movements with alternating diarrhea and constipation and recently saw blood on the toilet paper.  He saw GI, Dr. Thuan Cassidy, who performed a colonoscopy on 12/11/18.  Pathology c/w adenocarcinoma.  On 12/13/18 a CT A/P showed a soft tissue mass in the region of the ileocecal  junction measuring 3.7 x 3.5 cm.  There are  multiple peritoneal nodules in the abdomen and pelvis. For example, a  nodule in the pelvis measures 1.4 cm. A nodule in the left lower quadrant  measures 1.3 cm. There are omental nodules measuring up to 3.2 x 2.9 cm.  There are ileocolic lymph nodes measuring up to 1.3 cm.  1.1 cm lesion in the right lobe of the liver is suspicious for metastatic  disease.    Two splenic lesions are suspicious for metastatic disease.  He was referred to Colorectal surgery, Dr. Livia Talavera, who first ordered a CT Chest to complete staging.  It showed no suspicious pulmonary nodules.  Dr. Talavera recommended systemic chemotherapy given extent of disease.   [de-identified] : PMH of HTN, DM, HPL, Osteogenesis imperfecta, h/o Disruptive behavior, schizoaffective disorder [de-identified] : Patient presents for follow up on Xeloda alone for adenocarcinoma of the cecum.  He is s/p C10 XELOX (C10 was on 10/7/19).  He is s/p emergent diverting loop ileostomy on 2/8/2019 with Dr Giang.  Patient recently had a psychotic break and is now in-patient at [facility].  + Pruritus around stoma.  + Fatigue improved.  + Neuropathy improved. + Sleep disturbances, worse recently.  + Grade 1 H/F - xeroderma. + Xerostomia. No fevers. No headaches. No abdominal pain. No N/V. No mucositis.

## 2021-04-13 NOTE — ED ADULT NURSE NOTE - DOES PATIENT HAVE ADVANCE DIRECTIVE
Patient called and need it sent to [facility] in [place] today.  Message confirmed.    Sending to Dr. Pinto's pool.         No

## 2021-05-06 NOTE — PROGRESS NOTE BEHAVIORAL HEALTH - AXIS III
osteogenesis imperfecta, HTN, hypothyroidism, DM type 2, ileostomy s/p colectomy for stage IV colorectal cancer

## 2021-05-12 ENCOUNTER — OUTPATIENT (OUTPATIENT)
Dept: OUTPATIENT SERVICES | Facility: HOSPITAL | Age: 56
LOS: 1 days | Discharge: HOME | End: 2021-05-12

## 2021-05-12 DIAGNOSIS — S82.899A OTHER FRACTURE OF UNSPECIFIED LOWER LEG, INITIAL ENCOUNTER FOR CLOSED FRACTURE: Chronic | ICD-10-CM

## 2021-05-12 DIAGNOSIS — C18.9 MALIGNANT NEOPLASM OF COLON, UNSPECIFIED: ICD-10-CM

## 2021-05-12 DIAGNOSIS — Z93.2 ILEOSTOMY STATUS: Chronic | ICD-10-CM

## 2021-05-13 ENCOUNTER — APPOINTMENT (OUTPATIENT)
Dept: HEMATOLOGY ONCOLOGY | Facility: CLINIC | Age: 56
End: 2021-05-13

## 2021-05-17 NOTE — ED ADULT NURSE NOTE - NS ED NURSE DC INFO COMPLEXITY
[address] Urology A department of [place] [address]  Phone: [phone]  Fax: [phone]    Nesha Benitez MD        May 17, 2021     Patient: Nick Sanford   YOB: 1985   Date of Visit: 5/17/2021       To Whom it May Concern: Nick Sanford was seen in my clinic on 5/17/2021. He may return to work on 5/17/21. If you have any questions or concerns, please don't hesitate to call.     Sincerely,         Nesha Benitez MD
Simple: Patient demonstrates quick and easy understanding

## 2021-06-02 ENCOUNTER — APPOINTMENT (OUTPATIENT)
Dept: SURGICAL ONCOLOGY | Facility: CLINIC | Age: 56
End: 2021-06-02
Payer: MEDICARE

## 2021-06-02 VITALS
DIASTOLIC BLOOD PRESSURE: 79 MMHG | HEART RATE: 102 BPM | TEMPERATURE: 96.5 F | SYSTOLIC BLOOD PRESSURE: 115 MMHG | BODY MASS INDEX: 32.39 KG/M2 | OXYGEN SATURATION: 95 % | HEIGHT: 60 IN | WEIGHT: 165 LBS

## 2021-06-02 PROCEDURE — 99215 OFFICE O/P EST HI 40 MIN: CPT

## 2021-06-14 NOTE — HISTORY OF PRESENT ILLNESS
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 He was also known at that time to have disease c/w peritoneal carcinomatosis. Since then he has been under the care of Dr. Sarina Diaz who has been administering XELOX with good patient tolerance and good results on his CT from 4/2019 demonstrating tumor abatement. He has learned to manage his ileostomy well and has become proficient in ostomy care and diet management.\par \par 10/29/19: Mr. Edwards returns with his mother to discuss his ostomy status and possible surgical management of his disease. He continues to receive systemic chemotherapy under the care of Dr. Galarza (XELOX) with his recent imaging demonstrating good tumor control. He experiences intermittent, mild, painless prolapse of his ostomy, which he easily reduces with a change in position. He is uncertain at this point if he would like to consider right colectomy with ostomy reversal, or simply continue with systemic treatment.\par \par INTERVAL 3/3/20: Mr. Edwards returns with his mother. Unfortunately he has had an exacerbation of his psychiatric disorders and has required involuntary admission at a psychiatric center. He and his mother present for me to evaluate his ileostomy and to discuss surgical options moving forward. \par \par INTERIM 6/2/21: Mr. Edwards returns with his mother. He is now 6 months s/p right hemicolectomy at the time of ileostomy reversal. He denies any pain, wound-healing issues, fevers, chills, lethargy, or difficulty with bowel movements. Final pathology yielded a 3.5cm poorly differentiated adenocarcinoma with 1/18 LN's positive for metastatic disease ([id number]). He does report a large bulge in his abdominal midline in the area of the surgical laparotomy incision.

## 2021-06-14 NOTE — ASSESSMENT
[FreeTextEntry1] : 55 year old man with history of carcinomatosis for cecal adenocarcinoma. Now s/p right colectomy with 1/17 LNs containing metastatic disease. No evidence of carcinomatosis at laparotomy. He has recovered well now 6 months post-op but he has developed an sizeable reducible incisional hernia. He denies any pain or obstructive symptoms related to the hernia and denies any negative impact on his QOL. He uses an abdominal binder during his waking hours. We discussed the complexity of a large complex incisional hernia repair for cases like his, including an extended hospital stay, need for possible component separation closure, and surgical drains. We discussed that given Nick's history and psychiatric challenges, this surgery should only be taken if the hernia is symptomatic or negatively impacts his QOL. At this time he denies either an his mother agrees. I told him it can be performed in the future if he so wishes. In the meantime I encouraged continued use of the abdominal binder. He will continue to follow with medical oncology for his cancer surveillance.

## 2021-06-14 NOTE — PHYSICAL EXAM
[Normal] : supple, no neck mass and thyroid not enlarged [Normal Neck Lymph Nodes] : normal neck lymph nodes  [Normal Supraclavicular Lymph Nodes] : normal supraclavicular lymph nodes [Normal Groin Lymph Nodes] : normal groin lymph nodes [Normal Axillary Lymph Nodes] : normal axillary lymph nodes [Normal] : oriented to person, place and time, with appropriate affect [de-identified] : ileostomy incision completely healed, mildly hypertrophic. Midline laparotomy incision fully healed. In the midline there is a large, reducible incisional hernia with the borders of the fascia being splayed apart by approximately 5cm x 7cm.

## 2021-06-24 NOTE — PROGRESS NOTE ADULT - SUBJECTIVE AND OBJECTIVE BOX
Scheduled procedure with Patient at      Telephone Information:   Mobile [phone]      Scheduled Via: Case Request Order for  [facility]  Patient prefers n/a facility rather than the doctor's preferred facility  Procedure date: 7.28.2021  Procedure time: 12:30  Mi explained: Yes  Rep Contacted?: n/a  Entered into [facility] [place]? Yes  Insurance confirmed as [employer], will be the same at time of procedure?: Yes  Insurance Accepted at Facility? Yes    The following have been confirmed:  Latex Allergy No  Diabetic No  Sleep Apnea No  Diuretic/Water pill No  Defibrillator/Pacemaker No  MRSA hx No  Blood thinners: Coumadin (Warfarin) or Plavix No      Aspirin No      Phentermine (diet pill) No  Pre-Op testing required YES, BUT  H&P:  NOT Needed per Dr. Quinteros , Patient informed Yes  Prep required? ; Briefly reviewed? ; Prep cost range discussed?   If procedure is scheduled 7 days or less, patient was told to  prep letter?:        HPI/OVERNIGHT EVENTS: Patient seen and examined at bedside this AM. No acute events overnight per nursing reports. Pain well controlled  tolerating diet, ostomy functioning well, but still edematous. Denies fever, chills, nausea, vomitting, chest pain, SOB, dizziness, abd pain or any other concerning symptoms    Vital Signs Last 24 Hrs  T(C): 36.8 (10 Feb 2019 15:22), Max: 37.1 (10 Feb 2019 08:47)  T(F): 98.3 (10 Feb 2019 15:22), Max: 98.7 (10 Feb 2019 08:47)  HR: 81 (10 Feb 2019 15:22) (76 - 115)  BP: 120/71 (10 Feb 2019 15:22) (86/48 - 131/76)  BP(mean): --  RR: 18 (10 Feb 2019 15:22) (17 - 18)  SpO2: 98% (10 Feb 2019 15:22) (96% - 100%)    I&O's Detail    09 Feb 2019 07:01  -  10 Feb 2019 07:00  --------------------------------------------------------  IN:    lactated ringers.: 400 mL    Oral Fluid: 480 mL    sodium chloride 0.9%: 2100 mL  Total IN: 2980 mL    OUT:    Ileostomy: 220 mL    Voided: 2850 mL  Total OUT: 3070 mL    Total NET: -90 mL      10 Feb 2019 07:01  -  10 Feb 2019 17:15  --------------------------------------------------------  IN:    sodium chloride 0.9%: 900 mL  Total IN: 900 mL    OUT:    Ileostomy: 450 mL  Total OUT: 450 mL    Total NET: 450 mL            Constitutional: patient resting comfortably in bed, in no acute distress  HEENT: EOMI / PERRL b/l  Neck: No JVD, full ROM without pain  Respiratory: CTAB, respirations are unlabored, no accessory muscle use, no conversational dyspnea  Cardiovascular: regular rate & rhythm  Gastrointestinal: Abdomen soft, non-tender, moderately-distended, no rebound tenderness / guarding, ostomy edematous  Incision/Wound: midline incision clean, dry and intact  Musculoskeletal: No joint pain, swelling or deformity; no limitation of movement    LABS:                        8.4    12.1  )-----------( 320      ( 10 Feb 2019 06:42 )             27.5     02-10    143  |  107  |  7.0<L>  ----------------------------<  150<H>  3.0<L>   |  24.0  |  0.53    Ca    7.4<L>      10 Feb 2019 06:42  Phos  1.5     02-10  Mg     2.1     02-10            MEDICATIONS  (STANDING):  cloZAPine 200 milliGRAM(s) Oral at bedtime  dextrose 5%. 1000 milliLiter(s) (50 mL/Hr) IV Continuous <Continuous>  dextrose 50% Injectable 12.5 Gram(s) IV Push once  dextrose 50% Injectable 25 Gram(s) IV Push once  dextrose 50% Injectable 25 Gram(s) IV Push once  enoxaparin Injectable 40 milliGRAM(s) SubCutaneous every 24 hours  insulin lispro (HumaLOG) corrective regimen sliding scale   SubCutaneous every 6 hours  levothyroxine Injectable 50 MICROGram(s) IV Push <User Schedule>  metoprolol tartrate Injectable 5 milliGRAM(s) IV Push every 6 hours  potassium chloride    Tablet ER 20 milliEquivalent(s) Oral once  risperiDONE   Tablet 3 milliGRAM(s) Oral at bedtime    MEDICATIONS  (PRN):  dextrose 40% Gel 15 Gram(s) Oral once PRN Blood Glucose LESS THAN 70 milliGRAM(s)/deciliter  fentaNYL    Injectable 50 MICROGram(s) IV Push every 10 minutes PRN Moderate Pain (4 - 6)  glucagon  Injectable 1 milliGRAM(s) IntraMuscular once PRN Glucose LESS THAN 70 milligrams/deciliter  HYDROmorphone  Injectable 0.5 milliGRAM(s) IV Push every 4 hours PRN Severe Pain (7 - 10)  ketorolac   Injectable 15 milliGRAM(s) IV Push every 6 hours PRN Moderate Pain (4 - 6)  LORazepam   Injectable 0.25 milliGRAM(s) IV Push every 12 hours PRN Agitation  ondansetron Injectable 4 milliGRAM(s) IV Push every 6 hours PRN Nausea      MICRO:   Cultures     STUDIES:   EKG, CXR, U/S, CT, MRI

## 2021-07-29 ENCOUNTER — EMERGENCY (EMERGENCY)
Facility: HOSPITAL | Age: 56
LOS: 1 days | Discharge: OTHER INSTITUTION | End: 2021-07-29
Attending: EMERGENCY MEDICINE
Payer: MEDICARE

## 2021-07-29 VITALS
OXYGEN SATURATION: 99 % | SYSTOLIC BLOOD PRESSURE: 135 MMHG | WEIGHT: 149.91 LBS | HEIGHT: 60 IN | HEART RATE: 108 BPM | TEMPERATURE: 99 F | DIASTOLIC BLOOD PRESSURE: 85 MMHG | RESPIRATION RATE: 18 BRPM

## 2021-07-29 DIAGNOSIS — Z93.2 ILEOSTOMY STATUS: Chronic | ICD-10-CM

## 2021-07-29 DIAGNOSIS — S82.899A OTHER FRACTURE OF UNSPECIFIED LOWER LEG, INITIAL ENCOUNTER FOR CLOSED FRACTURE: Chronic | ICD-10-CM

## 2021-07-29 DIAGNOSIS — F25.0 SCHIZOAFFECTIVE DISORDER, BIPOLAR TYPE: ICD-10-CM

## 2021-07-29 LAB
ALBUMIN SERPL ELPH-MCNC: 4.3 G/DL — SIGNIFICANT CHANGE UP (ref 3.3–5.2)
ALP SERPL-CCNC: 82 U/L — SIGNIFICANT CHANGE UP (ref 40–120)
ALT FLD-CCNC: 36 U/L — SIGNIFICANT CHANGE UP
AMPHET UR-MCNC: NEGATIVE — SIGNIFICANT CHANGE UP
ANION GAP SERPL CALC-SCNC: 15 MMOL/L — SIGNIFICANT CHANGE UP (ref 5–17)
APPEARANCE UR: CLEAR — SIGNIFICANT CHANGE UP
AST SERPL-CCNC: 22 U/L — SIGNIFICANT CHANGE UP
BACTERIA # UR AUTO: ABNORMAL
BARBITURATES UR SCN-MCNC: NEGATIVE — SIGNIFICANT CHANGE UP
BASOPHILS # BLD AUTO: 0.05 K/UL — SIGNIFICANT CHANGE UP (ref 0–0.2)
BASOPHILS NFR BLD AUTO: 0.5 % — SIGNIFICANT CHANGE UP (ref 0–2)
BENZODIAZ UR-MCNC: NEGATIVE — SIGNIFICANT CHANGE UP
BILIRUB SERPL-MCNC: 0.2 MG/DL — LOW (ref 0.4–2)
BILIRUB UR-MCNC: NEGATIVE — SIGNIFICANT CHANGE UP
BUN SERPL-MCNC: 17.6 MG/DL — SIGNIFICANT CHANGE UP (ref 8–20)
CALCIUM SERPL-MCNC: 9.7 MG/DL — SIGNIFICANT CHANGE UP (ref 8.6–10.2)
CHLORIDE SERPL-SCNC: 100 MMOL/L — SIGNIFICANT CHANGE UP (ref 98–107)
CO2 SERPL-SCNC: 23 MMOL/L — SIGNIFICANT CHANGE UP (ref 22–29)
COCAINE METAB.OTHER UR-MCNC: NEGATIVE — SIGNIFICANT CHANGE UP
COLOR SPEC: YELLOW — SIGNIFICANT CHANGE UP
CREAT SERPL-MCNC: 0.7 MG/DL — SIGNIFICANT CHANGE UP (ref 0.5–1.3)
DIFF PNL FLD: NEGATIVE — SIGNIFICANT CHANGE UP
EOSINOPHIL # BLD AUTO: 0.09 K/UL — SIGNIFICANT CHANGE UP (ref 0–0.5)
EOSINOPHIL NFR BLD AUTO: 0.8 % — SIGNIFICANT CHANGE UP (ref 0–6)
EPI CELLS # UR: SIGNIFICANT CHANGE UP
GLUCOSE SERPL-MCNC: 132 MG/DL — HIGH (ref 70–99)
GLUCOSE UR QL: 1000 MG/DL
HCT VFR BLD CALC: 45.4 % — SIGNIFICANT CHANGE UP (ref 39–50)
HGB BLD-MCNC: 14.7 G/DL — SIGNIFICANT CHANGE UP (ref 13–17)
IMM GRANULOCYTES NFR BLD AUTO: 0.7 % — SIGNIFICANT CHANGE UP (ref 0–1.5)
KETONES UR-MCNC: NEGATIVE — SIGNIFICANT CHANGE UP
LEUKOCYTE ESTERASE UR-ACNC: NEGATIVE — SIGNIFICANT CHANGE UP
LYMPHOCYTES # BLD AUTO: 1.97 K/UL — SIGNIFICANT CHANGE UP (ref 1–3.3)
LYMPHOCYTES # BLD AUTO: 18.5 % — SIGNIFICANT CHANGE UP (ref 13–44)
MCHC RBC-ENTMCNC: 26.7 PG — LOW (ref 27–34)
MCHC RBC-ENTMCNC: 32.4 GM/DL — SIGNIFICANT CHANGE UP (ref 32–36)
MCV RBC AUTO: 82.5 FL — SIGNIFICANT CHANGE UP (ref 80–100)
METHADONE UR-MCNC: NEGATIVE — SIGNIFICANT CHANGE UP
MONOCYTES # BLD AUTO: 0.83 K/UL — SIGNIFICANT CHANGE UP (ref 0–0.9)
MONOCYTES NFR BLD AUTO: 7.8 % — SIGNIFICANT CHANGE UP (ref 2–14)
NEUTROPHILS # BLD AUTO: 7.64 K/UL — HIGH (ref 1.8–7.4)
NEUTROPHILS NFR BLD AUTO: 71.7 % — SIGNIFICANT CHANGE UP (ref 43–77)
NITRITE UR-MCNC: NEGATIVE — SIGNIFICANT CHANGE UP
OPIATES UR-MCNC: NEGATIVE — SIGNIFICANT CHANGE UP
PCP SPEC-MCNC: SIGNIFICANT CHANGE UP
PCP UR-MCNC: NEGATIVE — SIGNIFICANT CHANGE UP
PH UR: 6 — SIGNIFICANT CHANGE UP (ref 5–8)
PLATELET # BLD AUTO: 204 K/UL — SIGNIFICANT CHANGE UP (ref 150–400)
POTASSIUM SERPL-MCNC: 3.7 MMOL/L — SIGNIFICANT CHANGE UP (ref 3.5–5.3)
POTASSIUM SERPL-SCNC: 3.7 MMOL/L — SIGNIFICANT CHANGE UP (ref 3.5–5.3)
PROT SERPL-MCNC: 7.6 G/DL — SIGNIFICANT CHANGE UP (ref 6.6–8.7)
PROT UR-MCNC: 15 MG/DL
RBC # BLD: 5.5 M/UL — SIGNIFICANT CHANGE UP (ref 4.2–5.8)
RBC # FLD: 14.3 % — SIGNIFICANT CHANGE UP (ref 10.3–14.5)
RBC CASTS # UR COMP ASSIST: SIGNIFICANT CHANGE UP /HPF (ref 0–4)
SODIUM SERPL-SCNC: 138 MMOL/L — SIGNIFICANT CHANGE UP (ref 135–145)
SP GR SPEC: 1.01 — SIGNIFICANT CHANGE UP (ref 1.01–1.02)
THC UR QL: NEGATIVE — SIGNIFICANT CHANGE UP
UROBILINOGEN FLD QL: NEGATIVE MG/DL — SIGNIFICANT CHANGE UP
WBC # BLD: 10.65 K/UL — HIGH (ref 3.8–10.5)
WBC # FLD AUTO: 10.65 K/UL — HIGH (ref 3.8–10.5)
WBC UR QL: SIGNIFICANT CHANGE UP

## 2021-07-29 PROCEDURE — 99218: CPT

## 2021-07-29 PROCEDURE — 93010 ELECTROCARDIOGRAM REPORT: CPT

## 2021-07-29 RX ORDER — MIDAZOLAM HYDROCHLORIDE 1 MG/ML
10 INJECTION, SOLUTION INTRAMUSCULAR; INTRAVENOUS ONCE
Refills: 0 | Status: DISCONTINUED | OUTPATIENT
Start: 2021-07-29 | End: 2021-07-29

## 2021-07-29 RX ORDER — CLOZAPINE 150 MG/1
200 TABLET, ORALLY DISINTEGRATING ORAL AT BEDTIME
Refills: 0 | Status: DISCONTINUED | OUTPATIENT
Start: 2021-07-29 | End: 2021-08-03

## 2021-07-29 RX ORDER — ATORVASTATIN CALCIUM 80 MG/1
40 TABLET, FILM COATED ORAL AT BEDTIME
Refills: 0 | Status: DISCONTINUED | OUTPATIENT
Start: 2021-07-29 | End: 2021-08-03

## 2021-07-29 RX ORDER — RISPERIDONE 4 MG/1
0.5 TABLET ORAL EVERY 12 HOURS
Refills: 0 | Status: DISCONTINUED | OUTPATIENT
Start: 2021-07-29 | End: 2021-08-03

## 2021-07-29 RX ORDER — METFORMIN HYDROCHLORIDE 850 MG/1
1000 TABLET ORAL EVERY 12 HOURS
Refills: 0 | Status: DISCONTINUED | OUTPATIENT
Start: 2021-07-29 | End: 2021-08-03

## 2021-07-29 RX ADMIN — ATORVASTATIN CALCIUM 40 MILLIGRAM(S): 80 TABLET, FILM COATED ORAL at 22:10

## 2021-07-29 RX ADMIN — MIDAZOLAM HYDROCHLORIDE 10 MILLIGRAM(S): 1 INJECTION, SOLUTION INTRAMUSCULAR; INTRAVENOUS at 15:27

## 2021-07-29 RX ADMIN — CLOZAPINE 200 MILLIGRAM(S): 150 TABLET, ORALLY DISINTEGRATING ORAL at 22:10

## 2021-07-29 RX ADMIN — METFORMIN HYDROCHLORIDE 1000 MILLIGRAM(S): 850 TABLET ORAL at 22:10

## 2021-07-29 RX ADMIN — RISPERIDONE 0.5 MILLIGRAM(S): 4 TABLET ORAL at 22:10

## 2021-07-29 NOTE — ED ADULT TRIAGE NOTE - CHIEF COMPLAINT QUOTE
from group home, staff called for pt to have psych eval, erratic behavior, aggressive at times. pt uncooperative, states he wants to leave, denies drugs alcohol. denies si/hi at this time. from group home, staff called for pt to have psych eval, erratic behavior, aggressive at times. pt uncooperative, states he wants to leave, denies drugs alcohol. denies si/hi at this time. as per EMS staff not allowing him to return

## 2021-07-29 NOTE — ED BEHAVIORAL HEALTH ASSESSMENT NOTE - DESCRIPTION
no acute agitation or aggression in control and is cooperative. osteogenesis imperfecta, HTN, hypothyroidism, DM type 2, HLD Pt has been residing at [facility] since discharge from [facility]

## 2021-07-29 NOTE — ED CDU PROVIDER INITIAL DAY NOTE - PHYSICAL EXAMINATION
· CONSTITUTIONAL: Awake, alert, oriented to person, place, time/situation, acutely agitated, tachycardic  · ENMT: Airway patent, Nasal mucosa clear. Mouth with normal mucosa. Throat has no vesicles, no oropharyngeal exudates and uvula is midline.  · EYES: Clear bilaterally, pupils equal, round and reactive to light.  · CARDIAC: Tachycardic rate, regular rhythm.  Heart sounds S1, S2.    · RESPIRATORY: Breath sounds clear and equal bilaterally.  · GASTROINTESTINAL: Abdomen soft, NT, + large m/l incisional hernia  · MUSCULOSKELETAL: Spine appears normal, range of motion is not limited  · NEUROLOGICAL: No gross focal deficits  · SKIN: Skin normal color for race, warm, dry and intact.  · PSYCHIATRIC: Awake and alert, paranoid  · HEME LYMPH: No adenopathy

## 2021-07-29 NOTE — ED ADULT NURSE NOTE - PATIENT'S CHIEF COMPLAINT
"I am fine, they say I'm not taking my meds, and I am. I have the right to refuse a pill from time to time.

## 2021-07-29 NOTE — ED BEHAVIORAL HEALTH ASSESSMENT NOTE - HPI (INCLUDE ILLNESS QUALITY, SEVERITY, DURATION, TIMING, CONTEXT, MODIFYING FACTORS, ASSOCIATED SIGNS AND SYMPTOMS)
Patient is a 55  year old, male; domiciled in [facility] ; ;  noncaregiver; past psychiatric history of schizoaffective disorder ; multiple prior psychiatric hospitalizations ([facility] on 4/30/20- 11/2/20 ); ; no known suicide attempts; no known history of violence or arrests; no active substance abuse or known history of complicated withdrawal; PMH of osteogenesis imperfecta, HTN, hypothyroidism, DM type 2 recent dx and bowel resection with ileostomy, with Stage IV colorectal cancer s/p reversal and admission to rehab at [facility] Jan to April 2021,  presented to ER from residence due to reports of agitation and aggression.  Pt interviewed in private area with RN present.  Pt speaks in circular, circumstantial manner as the events leading up to admission and events unrelated in overinclusive, detailed manner, unable to be redirected to pertinent  questions.   Pt evasive to reasons why he was brought to ED by police.  Pt claims he was talking 1/2 his Clozapine at scheduled time, then took the remainder 1 hr later, c/o staff accusing him on "not taking meds".  Pt rambling at times, difficulty getting to point.  Denies depressed mood, SI and denies h/o SIB.  He endorses feeling anxious and worried about his car and the papers he needs to fill out for insurance.  Pt denies HIIP and denies h/o violence.   Pt endorsing AH he claims is normal.  He would not give specific content of AH, claiming is his thoughts, then reported is how he communicated with "girlfriend" Sharri, "telepathically".  Pt reporting s/s delusions, reporting his NP is seeking for more than just a professional relationship with his and claims she wants more.    Pt was irritable and shouting in ED prior to  and received Versed 10 mg at 1530.      Per record Pt has h/o treatment resistant psychosis with a protracted admission at [facility] from4/2020 11/2/20.:  (During last hospitalization in 2020 pt was admitted because of suicidal ideation , command AH telling him to  kill himself., and delusional thoughts that god was telling him to do things, paranoid and had reported that he had created a system to electrocute himself.      He was described as anxious, thought blocked, disorganized and internally preoccupied.  He had a protracted hospitalization with multiple trials of medications. Pt was taken for medications over objections and was eventually stabilized on Clozapine 200 mg at night and Lithium 600 mg in am and 900 mg at night.)    Per collateral from Charline Vallejo, director of residence Pt was agitated and verbally threatening to staff and claims it was ACT team NP who contacted EMS for ED psych eval.  Charline reports past inappropriate behaviors of exposing himself, last times 3 mo ago and claims they will not take him back without a psych admission do to poor compliance and their fear of safety of others.  Charline denies Pt was physically aggressive but has h/o using cane to threaten people and of med noncompliance.  RN on call from ACT team on call did not know details by Bev, the ACT  NP who was present  at time of incident who reportedly activated 911.  She did indicate paranoid ideation recent past, claiming his mother was placing hallucinogens in his bagels last week and claiming his father is a "Nazi sympathizer".    Mother called claiming the staff at residence in particular Charline Vallejo has a "different attitude" towards Pt and that she has a "vendetta" towards him.  Mother insists Pt is "Not a danger to self or others ", and she does not feel he requires psych admission.  Pt was offered voluntary psych admission but declined need and claims he wants to get his meds titrated or changed on out pt basis.  Pt to be held overnight for reeval secondary to getting collateral from JAZMÍN Pablo from ACT team who activated 911, to monitor behavior overnight since Pt rct sedation for agitation,  and to assess if Pt meets criteria for 2PC. Patient is a 55  year old, male; domiciled in [facility]) ; ;  noncaregiver; past psychiatric history of schizoaffective disorder ; multiple prior psychiatric hospitalizations ([facility] on 4/30/20- 11/2/20 ); ; no known suicide attempts; no known history of violence or arrests; no active substance abuse or known history of complicated withdrawal; PMH of osteogenesis imperfecta, HTN, hypothyroidism, DM type 2 recent dx and bowel resection with ileostomy, with Stage IV colorectal cancer s/p reversal and admission to rehab at [facility] Jan to April 2021,  presented to ER from residence due to reports of agitation and aggression.  Pt interviewed in private area with RN present.  Pt speaks in circular, circumstantial manner as  to the events leading up to admission and events unrelated,  in overinclusive, detailed manner, unable to be redirected to pertinent  questions.   Pt evasive to reasons why he was brought to ED by police.  Pt claims he was talking 1/2 his Clozapine at scheduled time, then took the remainder 1 hr later, c/o staff accusing him on "not taking meds".  Pt rambling at times, difficulty getting to point or staying on topic.  Denies depressed mood, SI and denies h/o SIB.  He endorses feeling anxious and worried about his car and the papers he needs to fill out for insurance.  Pt denies HIIP and denies h/o violence.   Pt endorsing AH he claims is "normal thoughts".  He would not give specific content of AH, then reported AH is  how he communicated with "girlfriend" Sharri, "telepathically".  Pt reporting s/s delusions, reporting his NP is seeking for more than just a professional relationship with his and claims she wants more.    Pt was irritable and shouting in ED prior to  and received Versed 10 mg at 1530.      Per record Pt has h/o treatment resistant psychosis with a protracted admission at [facility] from4/2020 11/2/20.:  (During last hospitalization in 2020 pt was admitted because of suicidal ideation , command AH telling him to  kill himself., and delusional thoughts that god was telling him to do things, paranoid and had reported that he had created a system to electrocute himself.      He was described as anxious, thought blocked, disorganized and internally preoccupied.  He had a protracted hospitalization with multiple trials of medications. Pt was taken for medications over objections and was eventually stabilized on Clozapine 200 mg at night and Lithium 600 mg in am and 900 mg at night.)    Per collateral from Charline Vallejo, director of residence Pt was agitated and verbally threatening to staff and claims it was ACT team NP who contacted EMS for ED psych eval.  Charline reports past inappropriate behaviors of exposing himself, last time 3 mo ago and claims they will not take him back without a psych admission do to poor compliance and their fear of safety of others.  Charline denies Pt was physically aggressive but has h/o using cane to threaten people and of med noncompliance.  RN on call from ACT team on call did not know details by Bev, the ACT  NP who was present  at time of incident who reportedly activated 911.  She did indicate paranoid ideation recent past, claiming his mother was placing hallucinogens in his bagels last week and claiming his father is a "Nazi sympathizer".    Mother called claiming the staff at residence in particular Charline Vallejo has a "different attitude" towards Pt and that she has a "vendetta" towards him.  Mother insists Pt is "Not a danger to self or others ", and she does not feel he requires psych admission.  Pt was offered voluntary psych admission but declined need and claims he wants to get his meds titrated or changed on out pt basis.  Pt to be held overnight for reeval secondary to getting collateral from JAZMÍN Pablo from ACT team who activated 911, to monitor behavior overnight since Pt rct sedation for agitation,  and to assess if Pt meets criteria for 2PC.

## 2021-07-29 NOTE — ED BEHAVIORAL HEALTH ASSESSMENT NOTE - SUMMARY
Patient is a 55  year old, male; domiciled in [facility] ; ;  noncaregiver; past psychiatric history of schizoaffective disorder ; multiple prior psychiatric hospitalizations ([facility] on 4/30/20- 11/2/20 ); ; no known suicide attempts; no known history of violence or arrests; no active substance abuse or known history of complicated withdrawal; PMH of osteogenesis imperfecta, HTN, hypothyroidism, DM type 2 recent dx and bowel resection with ileostomy, with Stage IV colorectal cancer s/p reversal and admission to rehab at [facility] Jan to April 2021,  presented to ER from residence due to reports of agitation and aggression.  Pt interviewed in private area with RN present.  Pt speaks in circular, circumstantial manner as the events leading up to admission and events unrelated in overinclusive, detailed manner, unable to be redirected to pertinent  questions.   Pt evasive to reasons why he was brought to ED by police.  Pt claims he was talking 1/2 his Clozapine at scheduled time, then took the remainder 1 hr later, c/o staff accusing him on "not taking meds".  Pt rambling at times, difficulty getting to point.  Denies depressed mood, SI and denies h/o SIB.  He endorses feeling anxious and worried about his car and the papers he needs to fill out for insurance.  Pt denies HIIP and denies h/o violence.   Pt endorsing AH he claims is normal.  He would not give specific content of AH, claiming is his thoughts, then reported is how he communicated with "girlfriend" Sharri, "telepathically".  Pt reporting s/s delusions, reporting his NP is seeking for more than just a professional relationship with his and claims she wants more.    Pt was irritable and shouting in ED prior to  and received Versed 10 mg at 1530.      Per record Pt has h/o treatment resistant psychosis with a protracted admission at [facility] from4/2020 11/2/20.:  (During last hospitalization in 2020 pt was admitted because of suicidal ideation , command AH telling him to  kill himself., and delusional thoughts that god was telling him to do things, paranoid and had reported that he had created a system to electrocute himself.      He was described as anxious, thought blocked, disorganized and internally preoccupied.  He had a protracted hospitalization with multiple trials of medications. Pt was taken for medications over objections and was eventually stabilized on Clozapine 200 mg at night and Lithium 600 mg in am and 900 mg at night.)    Per collateral from Charline Vallejo, director of residence Pt was agitated and verbally threatening to staff and claims it was ACT team NP who contacted EMS for ED psych eval.  Charline reports past inappropriate behaviors of exposing himself, last times 3 mo ago and claims they will not take him back without a psych admission do to poor compliance and their fear of safety of others.  Charline denies Pt was physically aggressive but has h/o using cane to threaten people and of med noncompliance.  RN on call from ACT team on call did not know details by Bev, the ACT  NP who was present  at time of incident who reportedly activated 911.  She did indicate paranoid ideation recent past, claiming his mother was placing hallucinogens in his bagels last week and claiming his father is a "Nazi sympathizer".    Mother called claiming the staff at residence in particular Charline Vallejo has a "different attitude" towards Pt and that she has a "vendetta" towards him.  Mother insists Pt is "Not a danger to self or others ", and she does not feel he requires psych admission.  Pt was offered voluntary psych admission but declined need and claims he wants to get his meds titrated or changed on out pt basis.  Pt to be held overnight for reeval secondary to getting collateral from JAZMÍN Pablo from ACT team who activated 911, to monitor behavior overnight since Pt rct sedation for agitation,  and to assess if Pt meets criteria for 2PC.

## 2021-07-29 NOTE — ED ADULT NURSE NOTE - HPI (INCLUDE ILLNESS QUALITY, SEVERITY, DURATION, TIMING, CONTEXT, MODIFYING FACTORS, ASSOCIATED SIGNS AND SYMPTOMS)
08-Jan-2018 11:56
Patient comes to  ED after being medically cleared in main ED. He presents with rambling, tangential speech. Needs frequent prompts to stay on topic. Pt reports he has been having disagreements with the psychiatric NP whom follows his case. States he sometimes breaks his pills in half, and takes one half later. He reports this makes him less tired and he is able to "function better". Staff at residence state patient has been increasingly agitated and aggressive. However, this was in context of previous incidents, and pt has not been physically aggressive for a period of time. Pt generally calm and cooperative with intake interview. Aware NP is planning to observe overnight before determining appropriate disposition. Pt has had multiple psychiatric inpatient admissions, but reports he has been doing well with meds and counseling. Able to contract for safety, denied any suicidal/homicidal ideation.

## 2021-07-29 NOTE — ED BEHAVIORAL HEALTH ASSESSMENT NOTE - RISK ASSESSMENT
RF med noncompliance, limited insight and judgement  PF no prior suicide attempt, supportive family Low Acute Suicide Risk

## 2021-07-29 NOTE — ED BEHAVIORAL HEALTH ASSESSMENT NOTE - OTHER
possible housing issues regarding return to residence Charline Vallejo director of residence as above

## 2021-07-29 NOTE — ED ADULT NURSE NOTE - NSIMPLEMENTINTERV_GEN_ALL_ED
Implemented All Fall Risk Interventions:  [place] to call system. Call bell, personal items and telephone within reach. Instruct patient to call for assistance. Room bathroom lighting operational. Non-slip footwear when patient is off stretcher. Physically safe environment: no spills, clutter or unnecessary equipment. Stretcher in lowest position, wheels locked, appropriate side rails in place. Provide visual cue, wrist band, yellow gown, etc. Monitor gait and stability. Monitor for mental status changes and reorient to person, place, and time. Review medications for side effects contributing to fall risk. Reinforce activity limits and safety measures with patient and family.

## 2021-07-29 NOTE — ED PROVIDER NOTE - CLINICAL SUMMARY MEDICAL DECISION MAKING FREE TEXT BOX
Pt requiting medication for acute agitation.  Labs, EKG ordered for BH clearance.  Will continue to observe and monitor

## 2021-07-29 NOTE — ED ADULT NURSE NOTE - CHIEF COMPLAINT QUOTE
from group home, staff called for pt to have psych eval, erratic behavior, aggressive at times. pt uncooperative, states he wants to leave, denies drugs alcohol. denies si/hi at this time. as per EMS staff not allowing him to return

## 2021-07-29 NOTE — ED PROVIDER NOTE - OBJECTIVE STATEMENT
56 yo M presents to ED via EMS for further evaluation of increased agitation, paranoia and being manic.  On arrival pt noted to be tremulous with hurried, rapid speech calling the police and attempting to climb out of the stretcher

## 2021-07-29 NOTE — ED BEHAVIORAL HEALTH ASSESSMENT NOTE - DETAILS
Dr Dolan spoke with staff from [facility] reports of verbal aggression, no known physical aggression noted. Denies current suicidal ideations

## 2021-07-29 NOTE — ED CDU PROVIDER INITIAL DAY NOTE - OBJECTIVE STATEMENT
54 yo M presents to ED via EMS for further evaluation of increased agitation, paranoia and being manic.  On arrival pt noted to be tremulous with hurried, rapid speech calling the police and attempting to climb out of the stretcher

## 2021-07-29 NOTE — ED BEHAVIORAL HEALTH ASSESSMENT NOTE - OTHER PAST PSYCHIATRIC HISTORY (INCLUDE DETAILS REGARDING ONSET, COURSE OF ILLNESS, INPATIENT/OUTPATIENT TREATMENT)
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  Last hospitalized at [facility] from 4/30-11/2 of this year. No known suicide attempts.  Currently with ACT team

## 2021-07-29 NOTE — ED BEHAVIORAL HEALTH ASSESSMENT NOTE - PATIENT'S CHIEF COMPLAINT
"I was upset because I was brought here against my will...I was hollering so they gave me medicine."

## 2021-07-30 ENCOUNTER — INPATIENT (INPATIENT)
Facility: HOSPITAL | Age: 56
LOS: 122 days | Discharge: HOME | End: 2021-11-30
Attending: PSYCHIATRY & NEUROLOGY | Admitting: PSYCHIATRY & NEUROLOGY
Payer: MEDICARE

## 2021-07-30 VITALS
RESPIRATION RATE: 20 BRPM | HEART RATE: 99 BPM | OXYGEN SATURATION: 96 % | DIASTOLIC BLOOD PRESSURE: 90 MMHG | TEMPERATURE: 98 F | SYSTOLIC BLOOD PRESSURE: 127 MMHG

## 2021-07-30 VITALS — OXYGEN SATURATION: 99 % | RESPIRATION RATE: 17 BRPM | TEMPERATURE: 98 F

## 2021-07-30 DIAGNOSIS — Z93.2 ILEOSTOMY STATUS: Chronic | ICD-10-CM

## 2021-07-30 DIAGNOSIS — F20.5 RESIDUAL SCHIZOPHRENIA: ICD-10-CM

## 2021-07-30 DIAGNOSIS — S82.899A OTHER FRACTURE OF UNSPECIFIED LOWER LEG, INITIAL ENCOUNTER FOR CLOSED FRACTURE: Chronic | ICD-10-CM

## 2021-07-30 LAB — SARS-COV-2 RNA SPEC QL NAA+PROBE: SIGNIFICANT CHANGE UP

## 2021-07-30 PROCEDURE — 80307 DRUG TEST PRSMV CHEM ANLYZR: CPT

## 2021-07-30 PROCEDURE — 99213 OFFICE O/P EST LOW 20 MIN: CPT

## 2021-07-30 PROCEDURE — 93005 ELECTROCARDIOGRAM TRACING: CPT

## 2021-07-30 PROCEDURE — 99285 EMERGENCY DEPT VISIT HI MDM: CPT | Mod: 25

## 2021-07-30 PROCEDURE — 36415 COLL VENOUS BLD VENIPUNCTURE: CPT

## 2021-07-30 PROCEDURE — 96372 THER/PROPH/DIAG INJ SC/IM: CPT

## 2021-07-30 PROCEDURE — 80053 COMPREHEN METABOLIC PANEL: CPT

## 2021-07-30 PROCEDURE — 99222 1ST HOSP IP/OBS MODERATE 55: CPT

## 2021-07-30 PROCEDURE — 85025 COMPLETE CBC W/AUTO DIFF WBC: CPT

## 2021-07-30 PROCEDURE — 81001 URINALYSIS AUTO W/SCOPE: CPT

## 2021-07-30 PROCEDURE — U0003: CPT

## 2021-07-30 PROCEDURE — U0005: CPT

## 2021-07-30 PROCEDURE — G0378: CPT

## 2021-07-30 PROCEDURE — 99217: CPT

## 2021-07-30 RX ORDER — ATORVASTATIN CALCIUM 80 MG/1
40 TABLET, FILM COATED ORAL AT BEDTIME
Refills: 0 | Status: DISCONTINUED | OUTPATIENT
Start: 2021-07-30 | End: 2021-11-30

## 2021-07-30 RX ORDER — DEXTROSE 50 % IN WATER 50 %
25 SYRINGE (ML) INTRAVENOUS ONCE
Refills: 0 | Status: DISCONTINUED | OUTPATIENT
Start: 2021-07-30 | End: 2021-07-30

## 2021-07-30 RX ORDER — DEXTROSE 50 % IN WATER 50 %
15 SYRINGE (ML) INTRAVENOUS ONCE
Refills: 0 | Status: DISCONTINUED | OUTPATIENT
Start: 2021-07-30 | End: 2021-11-30

## 2021-07-30 RX ORDER — LANOLIN ALCOHOL/MO/W.PET/CERES
5 CREAM (GRAM) TOPICAL AT BEDTIME
Refills: 0 | Status: DISCONTINUED | OUTPATIENT
Start: 2021-07-30 | End: 2021-10-29

## 2021-07-30 RX ORDER — SODIUM CHLORIDE 9 MG/ML
1000 INJECTION, SOLUTION INTRAVENOUS
Refills: 0 | Status: DISCONTINUED | OUTPATIENT
Start: 2021-07-30 | End: 2021-07-30

## 2021-07-30 RX ORDER — INSULIN LISPRO 100/ML
VIAL (ML) SUBCUTANEOUS
Refills: 0 | Status: DISCONTINUED | OUTPATIENT
Start: 2021-07-30 | End: 2021-11-30

## 2021-07-30 RX ORDER — HALOPERIDOL DECANOATE 100 MG/ML
5 INJECTION INTRAMUSCULAR EVERY 6 HOURS
Refills: 0 | Status: DISCONTINUED | OUTPATIENT
Start: 2021-07-30 | End: 2021-10-05

## 2021-07-30 RX ORDER — CLOZAPINE 150 MG/1
200 TABLET, ORALLY DISINTEGRATING ORAL AT BEDTIME
Refills: 0 | Status: DISCONTINUED | OUTPATIENT
Start: 2021-07-30 | End: 2021-08-04

## 2021-07-30 RX ORDER — HALOPERIDOL DECANOATE 100 MG/ML
5 INJECTION INTRAMUSCULAR ONCE
Refills: 0 | Status: DISCONTINUED | OUTPATIENT
Start: 2021-07-30 | End: 2021-10-04

## 2021-07-30 RX ORDER — GLUCAGON INJECTION, SOLUTION 0.5 MG/.1ML
1 INJECTION, SOLUTION SUBCUTANEOUS ONCE
Refills: 0 | Status: DISCONTINUED | OUTPATIENT
Start: 2021-07-30 | End: 2021-11-30

## 2021-07-30 RX ORDER — INSULIN LISPRO 100/ML
VIAL (ML) SUBCUTANEOUS AT BEDTIME
Refills: 0 | Status: DISCONTINUED | OUTPATIENT
Start: 2021-07-30 | End: 2021-11-30

## 2021-07-30 RX ORDER — DEXTROSE 50 % IN WATER 50 %
12.5 SYRINGE (ML) INTRAVENOUS ONCE
Refills: 0 | Status: DISCONTINUED | OUTPATIENT
Start: 2021-07-30 | End: 2021-07-30

## 2021-07-30 RX ORDER — RISPERIDONE 4 MG/1
0.5 TABLET ORAL EVERY 12 HOURS
Refills: 0 | Status: DISCONTINUED | OUTPATIENT
Start: 2021-07-30 | End: 2021-08-05

## 2021-07-30 RX ADMIN — CLOZAPINE 200 MILLIGRAM(S): 150 TABLET, ORALLY DISINTEGRATING ORAL at 21:43

## 2021-07-30 RX ADMIN — ATORVASTATIN CALCIUM 40 MILLIGRAM(S): 80 TABLET, FILM COATED ORAL at 21:44

## 2021-07-30 RX ADMIN — METFORMIN HYDROCHLORIDE 1000 MILLIGRAM(S): 850 TABLET ORAL at 06:50

## 2021-07-30 RX ADMIN — RISPERIDONE 0.5 MILLIGRAM(S): 4 TABLET ORAL at 21:42

## 2021-07-30 RX ADMIN — RISPERIDONE 0.5 MILLIGRAM(S): 4 TABLET ORAL at 06:50

## 2021-07-30 NOTE — BH INPATIENT PSYCHIATRY ASSESSMENT NOTE - NSBHMETABOLIC_PSY_ALL_CORE_FT
BMI: BMI (kg/m2): 30.3 (07-29-21 @ 14:53)  HbA1c: A1C with Estimated Average Glucose Result: 10.1 % (12-02-20 @ 07:50)    Glucose: POCT Blood Glucose.: 170 mg/dL (12-21-20 @ 08:27)    BP: --  Lipid Panel:

## 2021-07-30 NOTE — BH PATIENT PROFILE - FUNCTIONAL SCREEN CURRENT LEVEL: EATING, MLM
HISTORY OF PRESENT ILLNESS  Fredy Stallings is a 39 y.o. female. HPI  Pt is here to establish care.     BP is 133/78  Pt is on lisinopril 5mg  Discussed risks if she were to become pregnant  Pt states her HR has been high at home, in the 110s  HR is 86 today  Discussed this may be from her current vomiting sx  Pt saw Dr Geovanni Campbell in 2012 for some CP and discomfort  Reviewed ECHO and stress test 2012: nl  Will get EKG today -- LAE  Ordered ECHO    BS is 129-179 throughout the day, 129 usually in the AM  She is on ozempic 0.25mg (will be increased), synjardy  BID    Pt follows with Dr Farzad Cornejo (endo) for diabetes  Last visit was 6 weeks ago  Next visit scheduled for 10/18    Wt is 317 lbs today  Pt states she has lost 30 lbs in the last year  Discussed continued diet and w/l    Reviewed labs 10/16/18 per Dr Farzad Cornejo: LDL 70, cr nl, liver tests nl, a1c 7.5, TSH 3.6, B12 335, vit D nl  Ordered labs    Pt was previously following with a psych  She has appointment with Dr Lucy Vogel (psych)  Pt is on paxil 40mg for anxiety, and has klonopin to use prn (infrequently)    Advised pt to get an annual skin check  Provided info for Dr Macrina Melgar (derm)    Pt is on lipitor for cholesterol    Pt was told to take nexium to prevent heartburn and nausea because of her diabetes  Discussed that she only needs to take this prn if she is having sx  Pt has been having nausea over the past few days, dicussed that this is a SE of ozempic    Takes daily vit D 2000U    Pt c/o HAs  Pt is on topamax 50mg    Pt c/o 2 episodes of spinning and some dizziness    Pt c/o more frequent yeast infections and irritation from synjardy  Discussed talking to Dr Farzad Cornejo if sx progress    Pt c/o nausea and vomiting over the last few years  She has also had some diarrhea with this  She threw up this AM as well  Discussed this is likely gastroenteritis    Pt c/o choking on mucous at night, which wakes her up  She snores at night  Reports being checked for sleep apnea in the past, states she did not sleep enough to get results  Discussed that mucous could be from PND  Advised her to take zyrtec and flonase  Advised testing for sleep apnea again    PMHx: Anxiety  Asthma  Bipolar  Depression  Diabetes  Endometriosis  Ganglion cyst  Headache  Hypertension  Osteopenia  PTSD    FMHx: Father - diabetes, HTN, heart disease, kidney disease, melanoma, stroke  Mother - diabetes, HTN, osteoporosis, thyroid disease  Brother - asthna, HTN    PSHx: Appendectomy  Orthopedic - BL knee surgeries, L shoulder, ganglion cist removal  Tympanostomy  Tonsil and adenoidectomy    SHx: ,  Has girlfriend, no children  Works for [employer] of [employer]  No smoking  Rare alcohol    PREVENTIVE:  Colonoscopy, mammo, DEXA, pneumovax, shingrix: not yet needed  Pap: 7/18 with NP Silas Spatz at [facility]  Tdap: 10/17/18   Flu shot: 10/17/18   Foot exam: 10/17/18   A1C: 10/18 7.5  Eye exam: Dr Mary Chang, 6/18 or 7/18, per pt no diabetic retinopathy  Lipids: 10/18 70  EKG: 10/17/18, LAE    There are no active problems to display for this patient. Current Outpatient Medications   Medication Sig Dispense Refill    SULFAMETHOXAZOLE/TRIMETHOPRIM (BACTRIM PO) Take  by mouth.  amoxicillin (AMOXIL) 875 mg tablet Take 875 mg by mouth two (2) times a day.          Past Surgical History:   Procedure Laterality Date    HX APPENDECTOMY      HX ORTHOPAEDIC      lft knee surgery x3    HX ORTHOPAEDIC      right knee surgery x1    HX ORTHOPAEDIC      ganglion cyst removal    HX ORTHOPAEDIC      left shoulder surgery    HX TONSIL AND ADENOIDECTOMY      HX TYMPANOSTOMY        Lab Results   Component Value Date/Time    WBC 8.8 03/25/2012 09:50 PM    HGB 13.7 03/25/2012 09:50 PM    HCT 38.7 03/25/2012 09:50 PM    PLATELET 408 19/15/3785 09:50 PM    MCV 84.1 03/25/2012 09:50 PM     No results found for: CHOL, CHOLPOCT, HDL, LDL, LDLC, LDLCPOC, LDLCEXT, TRIGL, TGLPOCT, HD, [address] Results   Component Value Date/Time    GFR est non-AA 56 (L) 03/25/2012 09:50 PM    GFR est AA >60 03/25/2012 09:50 PM    Creatinine 1.2 03/25/2012 09:50 PM    BUN 11 03/25/2012 09:50 PM    Sodium 138 03/25/2012 09:50 PM    Potassium 3.7 03/25/2012 09:50 PM    Chloride 101 03/25/2012 09:50 PM    CO2 30 03/25/2012 09:50 PM    Magnesium 1.9 03/25/2012 09:50 PM        Review of Systems   Constitutional: Negative for chills and fever. HENT: Negative for hearing loss and tinnitus. Eyes: Negative for blurred vision and double vision. Respiratory: Negative for shortness of breath and wheezing. Cardiovascular: Negative for chest pain and palpitations. Gastrointestinal: Negative for nausea and vomiting. Genitourinary: Negative for dysuria and frequency. Musculoskeletal: Negative for back pain and falls. Skin: Negative for itching and rash. Neurological: Positive for dizziness and headaches. Negative for loss of consciousness. Psychiatric/Behavioral: Negative for depression. The patient is not nervous/anxious. Physical Exam   Constitutional: She is oriented to person, place, and time. She appears well-developed and well-nourished. No distress. HENT:   Head: Normocephalic and atraumatic. Right Ear: External ear normal.   Left Ear: External ear normal.   Mouth/Throat: Oropharynx is clear and moist. No oropharyngeal exudate. Eyes: Conjunctivae and EOM are normal. Pupils are equal, round, and reactive to light. Right eye exhibits no discharge. Left eye exhibits no discharge. No scleral icterus. Neck: Normal range of motion. Neck supple. No carotid bruits    Cardiovascular: Normal rate, regular rhythm, normal heart sounds and intact distal pulses. Exam reveals no gallop and no friction rub. No murmur heard. Pulmonary/Chest: Effort normal and breath sounds normal. No respiratory distress. She has no wheezes. She has no rales. She exhibits no tenderness. Abdominal: Soft. She exhibits no distension and no mass. There is no tenderness. There is no rebound and no guarding. Musculoskeletal: Normal range of motion. She exhibits no edema, tenderness or deformity. Lymphadenopathy:     She has no cervical adenopathy. Neurological: She is alert and oriented to person, place, and time. Coordination normal.   Monofilament nl BLE, good peripheral pulses, no ulcers    Skin: Skin is warm and dry. No rash noted. She is not diaphoretic. No erythema. No pallor. Psychiatric: She has a normal mood and affect. Her behavior is normal.       ASSESSMENT and PLAN    ICD-10-CM ICD-9-CM    1. Essential hypertension    Controlled on lisinopril, discussed teratogenic effects   I10 401.9    2. Mixed hyperlipidemia    Controlled on lipitor   E78.2 272.2    3. [facility]    Improving control with a1c of 7.5, continues on synjardy, ozempic will need to be increased and has appointment this week with endo to further increase med, continue with w/l, per her report she has already lost over 30 lbs, doing a great job, continue, eye exam UTD, will get notes   E11.9 250.00 MICROALBUMIN, UR, RAND W/ MICROALB/CREAT RATIO   4. Bipolar 1 disorder ([facility]    Follows with psych, will give paxil to cover til her next appointment as she is changing providers, rarely uses klonopin   F31.9 296.7    5. Obesity, morbid ([facility]    Counseled on diet and w/l, synjardy and ozempic to assist with w/l, working on dietary changes as well   E66.01 278.01    6. Physical exam, annual    Eye exam UTD, pelvic UTD, does not need mammo or COLO yet, tdap and flu shot today, labs UTD and reviewed, EKG showed LAE, will order ECHO   Z00.00 V70.0 REFERRAL TO DERMATOLOGY      EKG, 12 LEAD, INITIAL      REFERRAL TO SLEEP STUDIES      CBC W/O DIFF      AMB POC EKG ROUTINE W/ 12 LEADS, INTER & REP   7. LAE (left atrial enlargement)    ECHO   I51.7 429.3 ECHO COMPLETE STUDY   8.  Encounter for immunization Z23 V03.89 INFLUENZA VIRUS VAC QUAD,SPLIT,PRESV FREE SYRINGE IM      TETANUS, DIPHTHERIA TOXOIDS AND ACELLULAR PERTUSSIS VACCINE (TDAP), IN INDIVIDS. >=7, IM      Depression screen reviewed and negative. Scribed by Keena Khoury of [facility], as dictated by Dr. Jackson Young. Current diagnosis and concerns discussed with pt at length. Pt understands risks and benefits or current treatment plan and medications, and accepts the treatment and medication with any possible risks. Pt asks appropriate questions, which were answered. Pt was instructed to call with any concerns or problems. I have reviewed the note documented by the scribe. The services provided are my own. The documentation is accurateThis note will not be viewable in [address] 0 = independent

## 2021-07-30 NOTE — BH INPATIENT PSYCHIATRY ASSESSMENT NOTE - NSBHASSESSSUMMFT_PSY_ALL_CORE
Patient is a 55  year old, male; domiciled in [facility] (Concern for independent living) ; ;  noncaregiver; past psychiatric history of schizoaffective disorder ; multiple prior psychiatric hospitalizations ([facility] on 4/30/20- 11/2/20 ); ; no known suicide attempts; no known history of violence or arrests; no active substance abuse or known history of complicated withdrawal; PMH of osteogenesis imperfecta, HTN, hypothyroidism, DM type 2 recent dx and bowel resection with ileostomy, with Stage IV colorectal cancer s/p reversal and admission to rehab at [facility] Jan to April 2021,  presented to ER from residence due to reports of agitation and aggression.    Patient denies SI, HI and feels safe in unit. Patient requires inpatient admission for agitation management. Admitted involuntary.     Plan:   -Legal: 9.37  -Safety: Routine Observation   -Psychiatry: Group, milieu, individual therapy as inappropriate   -c/w home medication: Clozapine 200 mg at night,  Risperdal 0.5 bid, Atorvastatin 40 hs  -Holding metformin and start on insulin sliding scale   -PRNs: Haldol 5 mg oral/IM, Ativan 2 mg oral/IM, Benadryl 50 mg oral/IM, melatonin 5 mg   -Dispo: pending clinical improvement, Patient continues to require inpatient hospitalization for stabilization and safety    Patient is a 55  year old, male; domiciled in [facility] (Concern for independent living) ; ;  noncaregiver; past psychiatric history of schizoaffective disorder ; multiple prior psychiatric hospitalizations ([facility] on 4/30/20- 11/2/20 ); ; no known suicide attempts; no known history of violence or arrests; no active substance abuse or known history of complicated withdrawal; PMH of osteogenesis imperfecta, HTN, hypothyroidism, DM type 2 recent dx and bowel resection with ileostomy, with Stage IV colorectal cancer s/p reversal and admission to rehab at [facility] Jan to April 2021,  presented to ER from residence due to reports of agitation and aggression.  On initial assessment pt is extremely circumstantial, resists redirection, endorsing paranoid thoughts towards staff from his residence (believes they placed cameras on the speakers to watch him) and likely AH. Patient denies SI, HI and feels safe in unit. Patient requires inpatient admission for agitation management. Admitted involuntary. Currently in need of inpatient hospitalization for stabilization of active symptoms.     Plan:   -Legal: 9.37  -Safety: Routine Observation   -Psychiatry: Group, milieu, individual therapy as inappropriate   -c/w home medication: Clozapine 200 mg at night,  Risperdal 0.5 bid, Atorvastatin 40 hs  -Holding metformin and start on insulin sliding scale   -PRNs: Haldol 5 mg oral/IM, Ativan 2 mg oral/IM, Benadryl 50 mg oral/IM, melatonin 5 mg   -Dispo: pending clinical improvement, Patient continues to require inpatient hospitalization for stabilization and safety

## 2021-07-30 NOTE — ED ADULT NURSE REASSESSMENT NOTE - NS ED NURSE REASSESS COMMENT FT1
Nurse to nurse report given to Ce JIN on Low 6 at [facility].  Patient concerned his belongings were given to another patient that was discharged but was reassured of his belongings being secured.  No attempts to harm self or others.  Safety of patient maintained.
Assumed care of pt @1930. pt reports he has been trying to contact his psych NP with the ACT team for weeks and today she stopped at his house. pt denies being aggressive. states his medications make him drowsy so he takes half doses. Pt reports he is functional and wants to be discharge. POC explained to pt. pt is to be held over night and reassessed in the am. Will monitor the pt for safety.
Assumed care of patient at 0720.  Patient awake out of his room, pleasant on interaction.  Patient reports he has been having trouble at his residence with staff.  Patient denies suicidal or homicidal ideations.  Patient oriented to staff and educated about plan of care.  Safety of patient maintained.
Patient ate 100% of his breakfast.  Patient denies need for inpatient treatment.  No attempts to harm self or others.  Safety of patient maintained.

## 2021-07-30 NOTE — BH PATIENT PROFILE - HOME MEDICATIONS
famotidine 20 mg oral tablet , 1 tab(s) orally once a day  risperiDONE 1 mg oral tablet , 1 tab(s) orally once a day (at bedtime)  insulin lispro 100 units/mL injectable solution , 8 unit(s) injectable 3 times a day  insulin glargine , 20 unit(s) subcutaneous once a day (at bedtime)  tamsulosin 0.4 mg oral capsule , 1 cap(s) orally once a day (at bedtime)  cloZAPine 200 mg oral tablet , 1 tab(s) orally once a day (at bedtime)  Glucophage 500 mg oral tablet , 1 tab(s) orally 2 times a day  levothyroxine 88 mcg (0.088 mg) oral tablet , 1 tab(s) orally once a day  atorvastatin 40 mg oral tablet , 1 tab(s) orally once a day  Janumet 50 mg-500 mg oral tablet , 1 tab(s) orally 2 times a day with meals

## 2021-07-30 NOTE — BH INPATIENT PSYCHIATRY ASSESSMENT NOTE - OTHER PAST PSYCHIATRIC HISTORY (INCLUDE DETAILS REGARDING ONSET, COURSE OF ILLNESS, INPATIENT/OUTPATIENT TREATMENT)
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  Last hospitalized at [facility] from 4/30-11/2 of this year. No known suicide attempts.  Currently with ACT team

## 2021-07-30 NOTE — BH INPATIENT PSYCHIATRY ASSESSMENT NOTE - MODIFICATIONS
Patient interviewed and evaluated with resident present to follow up on psychotic symptoms and the case has been reviewed with the treatment team this morning.   I was physically present during the service to the patient and personally examined the patient and was directly involved in the management and recommendations of the care provided to the patient.  I have reviewed the above note and the mental status examination and I agree with its contents and made modifications as indicated

## 2021-07-30 NOTE — ED BEHAVIORAL HEALTH NOTE - BEHAVIORAL HEALTH NOTE
PROGRESS NOTE: 21 @ 09:23  	  • Reason for Ongoing Consultation: 	agitation, medication management    ID: 54 y/o Male with HEALTH ISSUES - PROBLEM Dx:  Schizoaffective disorder, mixed type  Osteogenesis imperfecta  HTN   Hypothyroidism   HLD  Type II DM       INTERVAL DATA:   • Interval Chief Complaint: "There are so many details, I don't know where to begin"  • Interval History: Patient states he was brought here against his will yesterday after the director of his group home accused him with being noncompliant with his medications. States he likes to split his Clozapine pill in half and take one half at the scheduled time and the other half 2-3 hours later. States if he takes the full dose he gets palpitations and has difficulty sleeping and he finds splitting it works better for him. States the staff at the home, especially the director Charline, are against him and are exaggerating everything. States they often change the doses of his medications and the timing, messing with him and making him seem non-complaint. Reports they continue to threaten him with hospitalization if he continues to split his Clozapine dose. Reports a NP, Bev, from [facility] comes to work with him, however states he believes she is working with Charline and refuses to listen to his suggestions about his medication. States he has been compliant with his medications and takes them everyday, just not the way the staff wants him to. Reports he believes the staff might  be anti-semetic and jealous of him because he is educated, unlike other residents who "aren't functional or are criminals". States each room in the home has speakers for overhead announcements, believes they stuck a camera in the speaker hole and are watching him. Denies SI/HI. Denies aggressive behavior, state he cannot be physically aggressive because he has a bone condition and he does not want to hurt himself. Denies VH but reports AH. States he met a woman when he was in [facility] and he is able to communicate with her telepathically. States he hears her voice in his Left ear telling him "don't worry, we will meet again" and reports she tells him to "take your pill". States he does not think he needs to be put in the hospital but does not wish to return to the group home because everyone is out to get him. Patient states he brought his christiano pack and two cellphones with him to the ED and would like a direct flight out of here to [facility].     REVIEW OF SYSTEMS:   • Constitutional Symptoms	No complaints  • Eyes	No complaints  • Ears / Nose / Throat / Mouth	No complaints  • Cardiovascular	No complaints  • Respiratory	No complaints  • Gastrointestinal	No complaints  • Genitourinary	No complaints  • Musculoskeletal	No complaints  • Skin	No complaints  • Neurological	No complaints  • Psychiatric (see HPI)	See HPI  • Endocrine	No complaints  • Hematologic / Lymphatic	No complaints  • Allergic / Immunologic	No complaints    REVIEW OF VITALS/LABS/IMAGING/INVESTIGATIONS:   • Vital signs reviewed: Yes  • Vital Signs:	    T(C): 36.4 (21 @ 06:34), Max: 37.2 (21 @ 14:53)  HR: 90 (21 @ 06:34) (83 - 108)  BP: 129/90 (21 @ 06:34) (121/78 - 135/85)  RR: 17 (21 @ 06:34) (17 - 19)  SpO2: 96% (21 @ 06:34) (96% - 99%)    • Available labs reviewed: Yes  • Available Lab Results:                           14.7   10.65 )-----------( 204      ( 2021 16:05 )             45.4     -    138  |  100  |  17.6  ----------------------------<  132<H>  3.7   |  23.0  |  0.70    Ca    9.7      2021 16:05    TPro  7.6  /  Alb  4.3  /  TBili  0.2<L>  /  DBili  x   /  AST  22  /  ALT  36  /  AlkPhos  82  07-    LIVER FUNCTIONS - ( 2021 16:05 )  Alb: 4.3 g/dL / Pro: 7.6 g/dL / ALK PHOS: 82 U/L / ALT: 36 U/L / AST: 22 U/L / GGT: x             Urinalysis Basic - ( 2021 16:03 )    Color: Yellow / Appearance: Clear / S.015 / pH: x  Gluc: x / Ketone: Negative  / Bili: Negative / Urobili: Negative mg/dL   Blood: x / Protein: 15 mg/dL / Nitrite: Negative   Leuk Esterase: Negative / RBC: 0-2 /HPF / WBC 0-2   Sq Epi: x / Non Sq Epi: Occasional / Bacteria: Occasional    MEDICATIONS:      PRN Medications: none  • PRN Medications since last evaluation	none  	    Current Medications:   atorvastatin 40 milliGRAM(s) Oral at bedtime  cloZAPine 200 milliGRAM(s) Oral at bedtime  metFORMIN 1000 milliGRAM(s) Oral every 12 hours  risperiDONE   Tablet 0.5 milliGRAM(s) Oral every 12 hours     Medication Side Effects:  • Medication Side Effects or Adverse Reactions (new or ongoing)	None known    MENTAL STATUS EXAM:   • Level of Consciousness	Alert  • General Appearance	Well developed  • Body Habitus	Overweight  • Hygiene	Fair  • Grooming	Fair  • Behavior	Cooperative  • Eye Contact	Good  • Relatedness	Fair  • Impulse Control	Normal  • Muscle Tone / Strength	Normal muscle tone/strength  • Abnormal Movements	No abnormal movements  • Gait / Station	Normal gait / station  • Speech Volume	Normal   • Speech Rate	Normal, increased  • Speech Spontaneity	Normal  • Speech Articulation	Normal  • Mood	Anxious   • Affect Quality	Anxious  • Affect Range	Full  • Affect Congruence	Congruent  • Thought Process	Circumferential, Impaired reasoning   • Thought Associations	Normal  • Thought Content	Delusions, paranoia   • Perceptions	Auditory hallucinations   • Oriented to Time	Yes  • Oriented to Place	Yes  • Oriented to Situation	Yes  • Oriented to Person	Yes  • Attention / Concentration	Impaired, skips between topics   • Estimated Intelligence	Average  • Recent Memory	Normal  • Remote Memory	Normal  • Fund of Knowledge	Normal  • Language	No abnormalities noted  • Judgment (regarding everyday events)	Poor  • Insight (regarding psychiatric illness)	Fair    SUICIDALITY:   • Suicidality (Interval)	none known    HOMICIDALITY/AGGRESSION:   • Homicidality/Aggression	none known    DIAGNOSIS DSM-V:    Psychiatric Diagnosis (Corresponds to DSM-IV Axis I, II):   HEALTH ISSUES - PROBLEM Dx:  Schizoaffective disorder, mixed type  Osteogenesis imperfecta  HTN   Hypothyroidism   HLD  Type II DM       Medical Diagnosis (Corresponds to DSM-IV Axis III):  • Axis III	see above      ASSESSMENT OF CURRENT CONDITION:   Summary (include case differential, formulation and patient response to therapy): 54 y/o M with history of chronic schizoaffective disorder with treatment refractory psychosis who presents after episode of agitation at group home. Reports he was yelling because he is being accused by the director of medication non-compliance. Patient believes every one at the home is out to get him and does not wish to return there. Per previous  note, home director Charline Johnsonhop does not want the patient to return without having an inpatient admission due to concerns of safety and medication non-compliance.     Risk Assessment (consider static vs modifiable risk factors and protective factors; comment on level of risk for dangerous behavior): moderate risk, risk factors include medication non-compliance, limited insight and judgement, chronic psychiatric diagnosis     PLAN  Chronically ill patient with acute decompensation, recommending admission for behavior responding to delusions. PROGRESS NOTE: 21 @ 09:23  	  • Reason for Ongoing Consultation: 	agitation, medication management    ID: 54 y/o Male with HEALTH ISSUES - PROBLEM Dx:  Schizoaffective disorder, mixed type  Osteogenesis imperfecta  HTN   Hypothyroidism   HLD  Type II DM       INTERVAL DATA:   • Interval Chief Complaint: "There are so many details, I don't know where to begin"  • Interval History: Patient states he was brought here against his will yesterday after the director of his group home accused him with being noncompliant with his medications. States he likes to split his Clozapine pill in half and take one half at the scheduled time and the other half 2-3 hours later. States if he takes the full dose he gets palpitations and has difficulty sleeping and he finds splitting it works better for him. States the staff at the home, especially the director Charline, are against him and are exaggerating everything. States they often change the doses of his medications and the timing, messing with him and making him seem non-complaint. Reports they continue to threaten him with hospitalization if he continues to split his Clozapine dose. Reports a NP, Bev, from [facility] comes to work with him, however states he believes she is working with Charline and refuses to listen to his suggestions about his medication. States he has been compliant with his medications and takes them everyday, just not the way the staff wants him to. Reports he believes the staff might  be anti-Semitic and jealous of him because he is educated, unlike other residents who "aren't functional or are criminals". States each room in the home has speakers for overhead announcements, believes they stuck a camera in the speaker hole and are watching him. Denies SI/HI. Denies aggressive behavior, state he cannot be physically aggressive because he has a bone condition and he does not want to hurt himself. Denies VH but reports AH. States he met a woman when he was in [facility] and he is able to communicate with her telepathically. States he hears her voice in his Left ear telling him "don't worry, we will meet again" and reports she tells him to "take your pill". States he does not think he needs to be put in the hospital but does not wish to return to the group home because everyone is out to get him. Patient states he brought his christiano pack and two cellphones with him to the ED and would like a direct flight out of here to [place].   Additionally Nick reports calling the police multiple times recently due to his alleged mistreatment and inability to keep his medications in his room.  REVIEW OF SYSTEMS:   • Constitutional Symptoms	No complaints  • Eyes	No complaints  • Ears / Nose / Throat / Mouth	No complaints  • Cardiovascular	No complaints  • Respiratory	No complaints  • Gastrointestinal	No complaints  • Genitourinary	No complaints  • Musculoskeletal	No complaints  • Skin	No complaints  • Neurological	No complaints  • Psychiatric (see HPI)	See HPI  • Endocrine	No complaints  • Hematologic / Lymphatic	No complaints  • Allergic / Immunologic	No complaints    REVIEW OF VITALS/LABS/IMAGING/INVESTIGATIONS:   • Vital signs reviewed: Yes  • Vital Signs:	    T(C): 36.4 (21 @ 06:34), Max: 37.2 (21 @ 14:53)  HR: 90 (21 @ 06:34) (83 - 108)  BP: 129/90 (21 @ 06:34) (121/78 - 135/85)  RR: 17 (21 @ 06:34) (17 - 19)  SpO2: 96% (21 @ 06:34) (96% - 99%)    • Available labs reviewed: Yes  • Available Lab Results:                           14.7   10.65 )-----------( 204      ( 2021 16:05 )             45.4         138  |  100  |  17.6  ----------------------------<  132<H>  3.7   |  23.0  |  0.70    Ca    9.7      2021 16:05    TPro  7.6  /  Alb  4.3  /  TBili  0.2<L>  /  DBili  x   /  AST  22  /  ALT  36  /  AlkPhos  82      LIVER FUNCTIONS - ( 2021 16:05 )  Alb: 4.3 g/dL / Pro: 7.6 g/dL / ALK PHOS: 82 U/L / ALT: 36 U/L / AST: 22 U/L / GGT: x             Urinalysis Basic - ( 2021 16:03 )    Color: Yellow / Appearance: Clear / S.015 / pH: x  Gluc: x / Ketone: Negative  / Bili: Negative / Urobili: Negative mg/dL   Blood: x / Protein: 15 mg/dL / Nitrite: Negative   Leuk Esterase: Negative / RBC: 0-2 /HPF / WBC 0-2   Sq Epi: x / Non Sq Epi: Occasional / Bacteria: Occasional    MEDICATIONS:      PRN Medications: none  • PRN Medications since last evaluation	none  	    Current Medications:   atorvastatin 40 milliGRAM(s) Oral at bedtime  cloZAPine 200 milliGRAM(s) Oral at bedtime  metFORMIN 1000 milliGRAM(s) Oral every 12 hours  risperiDONE   Tablet 0.5 milliGRAM(s) Oral every 12 hours     Medication Side Effects:  • Medication Side Effects or Adverse Reactions (new or ongoing)	None known    MENTAL STATUS EXAM:   • Level of Consciousness	Alert  • General Appearance	Well developed  • Body Habitus	Overweight  • Hygiene	Fair  • Grooming	Fair  • Behavior	Cooperative  • Eye Contact	Good  • Relatedness	Fair  • Impulse Control	Normal  • Muscle Tone / Strength	Normal muscle tone/strength  • Abnormal Movements	No abnormal movements  • Gait / Station	Normal gait / station  • Speech Volume	Normal   • Speech Rate	Normal, increased  • Speech Spontaneity	Normal  • Speech Articulation	Normal  • Mood	Anxious   • Affect Quality	Anxious  • Affect Range	Full  • Affect Congruence	Congruent  • Thought Process	Circumferential, Impaired reasoning   • Thought Associations	Normal  • Thought Content	Delusions, paranoia   • Perceptions	Auditory hallucinations   • Oriented to Time	Yes  • Oriented to Place	Yes  • Oriented to Situation	Yes  • Oriented to Person	Yes  • Attention / Concentration	Impaired, skips between topics   • Estimated Intelligence	Average  • Recent Memory	Normal  • Remote Memory	Normal  • Fund of Knowledge	Normal  • Language	No abnormalities noted  • Judgment (regarding everyday events)	Poor  • Insight (regarding psychiatric illness)	Fair    SUICIDALITY:   • Suicidality (Interval)	none known    HOMICIDALITY/AGGRESSION:   • Homicidality/Aggression	none known    DIAGNOSIS DSM-V:    Psychiatric Diagnosis (Corresponds to DSM-IV Axis I, II):   HEALTH ISSUES - PROBLEM Dx:  Schizoaffective disorder, mixed type  Osteogenesis imperfecta  HTN   Hypothyroidism   HLD  Type II DM       Medical Diagnosis (Corresponds to DSM-IV Axis III):  • Axis III	see above      ASSESSMENT OF CURRENT CONDITION:   Summary (include case differential, formulation and patient response to therapy): 54 y/o M with history of chronic schizoaffective disorder with treatment refractory psychosis who presents after episode of agitation at group home. Reports he was yelling because he is being accused by the director of medication non-compliance. Patient believes every one at the home is out to get him and does not wish to return there. Per previous  note, home director Charline Vallejo does not want the patient to return without having an inpatient admission due to concerns of safety and medication non-compliance.     Risk Assessment (consider static vs modifiable risk factors and protective factors; comment on level of risk for dangerous behavior): moderate risk, risk factors include medication non-compliance, limited insight and judgement, chronic psychiatric diagnosis     PLAN  Chronically ill patient with acute decompensation, recommending admission for behavior responding to delusions.  will complete 9.37 application. PROGRESS NOTE: 21 @ 09:23  	  • Reason for Ongoing Consultation: 	agitation, medication management    ID: 56 y/o Male with HEALTH ISSUES - PROBLEM Dx:  Schizoaffective disorder, mixed type  Osteogenesis imperfecta  HTN   Hypothyroidism   HLD  Type II DM       INTERVAL DATA:   • Interval Chief Complaint: "There are so many details, I don't know where to begin"  • Interval History: Patient states he was brought here against his will yesterday after the director of his group home accused him with being noncompliant with his medications. States he likes to split his Clozapine pill in half and take one half at the scheduled time and the other half 2-3 hours later. States if he takes the full dose he gets palpitations and has difficulty sleeping and he finds splitting it works better for him. States the staff at the home, especially the director Charline, are against him and are exaggerating everything. States they often change the doses of his medications and the timing, messing with him and making him seem non-complaint. Reports they continue to threaten him with hospitalization if he continues to split his Clozapine dose. Reports a NP, Bev, from [facility] comes to work with him, however states he believes she is working with Charline and refuses to listen to his suggestions about his medication. States he has been compliant with his medications and takes them everyday, just not the way the staff wants him to. Reports he believes the staff might  be anti-Semitic and jealous of him because he is educated, unlike other residents who "aren't functional or are criminals". States each room in the home has speakers for overhead announcements, believes they stuck a camera in the speaker hole and are watching him. Denies SI/HI. Denies aggressive behavior, state he cannot be physically aggressive because he has a bone condition and he does not want to hurt himself. Denies VH but reports AH. States he met a woman when he was in [facility] and he is able to communicate with her telepathically. States he hears her voice in his Left ear telling him "don't worry, we will meet again" and reports she tells him to "take your pill". States he does not think he needs to be put in the hospital but does not wish to return to the group home because everyone is out to get him. Patient states he brought his christiano pack and two cellphones with him to the ED and would like a direct flight out of here to [place].   Additionally Nick reports calling the police multiple times recently due to his alleged mistreatment and inability to keep his medications in his room.    **ADDENDUM**   - Dr. Zhong spoke with Nick's parents and informed them of situation, addressed questions.   - Collateral Information obtained from Charline Vallejo at  (work cell: [phone]), who is Director of [facility]. She states for the past month Nick has been becoming increasingly paranoid and delusional. States he believes the medications are poisoning him and he has been missing doses and changing his regimen, despite medical advice. Reports he is becoming increasingly verbally abusive to staff. States yesterday the [facility] team came with her to speak with him regarding his medications. Reports the [facility] NP Bev witnessed Nick being verbally abusive at that time. States Nick started to go after Bev but stopped himself. She reports Nick is a pathological liar and is very smart and knows how to hold it together or manipulate words to get what he wants. States Nick will always place blame on others and won't own up to his own behaviors. Reports they have found him in his room creating devices which he says will set fire to the residence. She report Nick is unable to care for himself, he does not bathe or shower and his room is very messy. States she has to send staff there to clean his room and make sure he is okay. She states she frequently speaks with Nick's parents, states they enable Nick's behavior at the group home and believe he isn't being taken care of but refuse to take him back home to them.  States she does not want Nick returning to the home at this point and is concerned for everyone's safety there.     REVIEW OF SYSTEMS:   • Constitutional Symptoms	No complaints  • Eyes	No complaints  • Ears / Nose / Throat / Mouth	No complaints  • Cardiovascular	No complaints  • Respiratory	No complaints  • Gastrointestinal	No complaints  • Genitourinary	No complaints  • Musculoskeletal	No complaints  • Skin	No complaints  • Neurological	No complaints  • Psychiatric (see HPI)	See HPI  • Endocrine	No complaints  • Hematologic / Lymphatic	No complaints  • Allergic / Immunologic	No complaints    REVIEW OF VITALS/LABS/IMAGING/INVESTIGATIONS:   • Vital signs reviewed: Yes  • Vital Signs:	    T(C): 36.4 (21 @ 06:34), Max: 37.2 (21 @ 14:53)  HR: 90 (21 @ 06:34) (83 - 108)  BP: 129/90 (21 @ 06:34) (121/78 - 135/85)  RR: 17 (21 @ 06:34) (17 - 19)  SpO2: 96% (21 @ 06:34) (96% - 99%)    • Available labs reviewed: Yes  • Available Lab Results:                           14.7   10.65 )-----------( 204      ( 2021 16:05 )             45.4         138  |  100  |  17.6  ----------------------------<  132<H>  3.7   |  23.0  |  0.70    Ca    9.7      2021 16:05    TPro  7.6  /  Alb  4.3  /  TBili  0.2<L>  /  DBili  x   /  AST  22  /  ALT  36  /  AlkPhos  82      LIVER FUNCTIONS - ( 2021 16:05 )  Alb: 4.3 g/dL / Pro: 7.6 g/dL / ALK PHOS: 82 U/L / ALT: 36 U/L / AST: 22 U/L / GGT: x             Urinalysis Basic - ( 2021 16:03 )    Color: Yellow / Appearance: Clear / S.015 / pH: x  Gluc: x / Ketone: Negative  / Bili: Negative / Urobili: Negative mg/dL   Blood: x / Protein: 15 mg/dL / Nitrite: Negative   Leuk Esterase: Negative / RBC: 0-2 /HPF / WBC 0-2   Sq Epi: x / Non Sq Epi: Occasional / Bacteria: Occasional    MEDICATIONS:      PRN Medications: none  • PRN Medications since last evaluation	none  	    Current Medications:   atorvastatin 40 milliGRAM(s) Oral at bedtime  cloZAPine 200 milliGRAM(s) Oral at bedtime  metFORMIN 1000 milliGRAM(s) Oral every 12 hours  risperiDONE   Tablet 0.5 milliGRAM(s) Oral every 12 hours     Medication Side Effects:  • Medication Side Effects or Adverse Reactions (new or ongoing)	None known    MENTAL STATUS EXAM:   • Level of Consciousness	Alert  • General Appearance	Well developed  • Body Habitus	Overweight  • Hygiene	Fair  • Grooming	Fair  • Behavior	Cooperative  • Eye Contact	Good  • Relatedness	Fair  • Impulse Control	Normal  • Muscle Tone / Strength	Normal muscle tone/strength  • Abnormal Movements	No abnormal movements  • Gait / Station	Normal gait / station  • Speech Volume	Normal   • Speech Rate	Normal, increased  • Speech Spontaneity	Normal  • Speech Articulation	Normal  • Mood	Anxious   • Affect Quality	Anxious  • Affect Range	Full  • Affect Congruence	Congruent  • Thought Process	Circumferential, Impaired reasoning   • Thought Associations	Normal  • Thought Content	Delusions, paranoia   • Perceptions	Auditory hallucinations   • Oriented to Time	Yes  • Oriented to Place	Yes  • Oriented to Situation	Yes  • Oriented to Person	Yes  • Attention / Concentration	Impaired, skips between topics   • Estimated Intelligence	Average  • Recent Memory	Normal  • Remote Memory	Normal  • Fund of Knowledge	Normal  • Language	No abnormalities noted  • Judgment (regarding everyday events)	Poor  • Insight (regarding psychiatric illness)	Fair    SUICIDALITY:   • Suicidality (Interval)	none known    HOMICIDALITY/AGGRESSION:   • Homicidality/Aggression	none known    DIAGNOSIS DSM-V:    Psychiatric Diagnosis (Corresponds to DSM-IV Axis I, II):   HEALTH ISSUES - PROBLEM Dx:  Schizoaffective disorder, mixed type  Osteogenesis imperfecta  HTN   Hypothyroidism   HLD  Type II DM       Medical Diagnosis (Corresponds to DSM-IV Axis III):  • Axis III	see above      ASSESSMENT OF CURRENT CONDITION:   Summary (include case differential, formulation and patient response to therapy): 56 y/o M with history of chronic schizoaffective disorder with treatment refractory psychosis who presents after episode of agitation at group home. Reports he was yelling because he is being accused by the director of medication non-compliance. Patient believes every one at the home is out to get him and does not wish to return there. Per previous  note, home director Charlinekorina Vallejo does not want the patient to return without having an inpatient admission due to concerns of safety and medication non-compliance.     Risk Assessment (consider static vs modifiable risk factors and protective factors; comment on level of risk for dangerous behavior): moderate risk, risk factors include medication non-compliance, limited insight and judgement, chronic psychiatric diagnosis     PLAN  Chronically ill patient with acute decompensation, recommending admission for behavior responding to delusions.  will complete 9.37 application.

## 2021-07-30 NOTE — BH INPATIENT PSYCHIATRY ASSESSMENT NOTE - CASE SUMMARY
Patient is a 55  year old, male; domiciled in [facility]) ; ;  noncaregiver; past psychiatric history of schizoaffective disorder ; multiple prior psychiatric hospitalizations ([facility] on 4/30/20- 11/2/20 ); ; no known suicide attempts; no known history of violence or arrests; no active substance abuse or known history of complicated withdrawal; PMH of osteogenesis imperfecta, HTN, hypothyroidism, DM type 2 recent dx and bowel resection with ileostomy, with Stage IV colorectal cancer s/p reversal and admission to rehab at [facility] Jan to April 2021,  presented to ER from residence due to reports of agitation and aggression.  On initial assessment pt is extremely circumstantial, resists redirection, endorsing paranoid thoughts towards staff from his residence (believes they placed cameras on the speakers to watch him), towards his ACT team and likely AH. Patient denies SI, HI and feels safe in unit. Patient requires inpatient admission for agitation management. Admitted involuntary. Currently in need of inpatient hospitalization for stabilization of active symptoms.

## 2021-07-30 NOTE — BH INPATIENT PSYCHIATRY ASSESSMENT NOTE - HPI (INCLUDE ILLNESS QUALITY, SEVERITY, DURATION, TIMING, CONTEXT, MODIFYING FACTORS, ASSOCIATED SIGNS AND SYMPTOMS)
Patient is a 55  year old, male; domiciled in [facility]) ; ;  noncaregiver; past psychiatric history of schizoaffective disorder ; multiple prior psychiatric hospitalizations ([facility] on 4/30/20- 11/2/20 ); ; no known suicide attempts; no known history of violence or arrests; no active substance abuse or known history of complicated withdrawal; PMH of osteogenesis imperfecta, HTN, hypothyroidism, DM type 2 recent dx and bowel resection with ileostomy, with Stage IV colorectal cancer s/p reversal and admission to rehab at [facility] Jan to April 2021,  presented to ER from residence due to reports of agitation and aggression.    On assessment, patient was difficult to redirect, circumstantial and tangential. Patient reports he came due to staff saying he was medication nonadherent at [facility], but says this in not true.  Patient says originally he was taking Clozapine 100 mg AM and PM but was switched to taking 200 mg PM. He reports that with new change he was experiencing "SOB, palpitation and passing out." In order to avoid side effects, patient has been taking the 200 mg pills and splitting it in half, taking the second half of the medication after 2 hours. He mentioned reaching out to NP for 6 weeks trying to get a hold of her to mention side effects from Clozapine. Patient denies going a day without taking the medication. Patient says this is his 20th admission because "people always misunderstand me." Patient mentions he has auditory hallucination which are his "inner thoughts," but denies having command voices. He denies visual hallucinations. Patient denies SI and HI. Patient has paranoid delusional thought of residential home - having cameras in the speaker box, does not trust the staff, and grandiose thoughts of being more intelligent than everyone with multiple degrees. Patient often saying phrase es like "I think I've said too much."    Per ED note:  Pt interviewed in private area with RN present.  Pt speaks in circular, circumstantial manner as  to the events leading up to admission and events unrelated,  in overinclusive, detailed manner, unable to be redirected to pertinent  questions.   Pt evasive to reasons why he was brought to ED by police.  Pt claims he was talking 1/2 his Clozapine at scheduled time, then took the remainder 1 hr later, c/o staff accusing him on "not taking meds".  Pt rambling at times, difficulty getting to point or staying on topic.  Denies depressed mood, SI and denies h/o SIB.  He endorses feeling anxious and worried about his car and the papers he needs to fill out for insurance.  Pt denies HIIP and denies h/o violence.   Pt endorsing AH he claims is "normal thoughts".  He would not give specific content of AH, then reported AH is  how he communicated with "girlfriend" Sharri, "telepathically".  Pt reporting s/s delusions, reporting his NP is seeking for more than just a professional relationship with his and claims she wants more.    Pt was irritable and shouting in ED prior to  and received Versed 10 mg at 1530.      Per record Pt has h/o treatment resistant psychosis with a protracted admission at [facility] from4/2020 11/2/20.:  (During last hospitalization in 2020 pt was admitted because of suicidal ideation , command AH telling him to  kill himself., and delusional thoughts that god was telling him to do things, paranoid and had reported that he had created a system to electrocute himself.      He was described as anxious, thought blocked, disorganized and internally preoccupied.  He had a protracted hospitalization with multiple trials of medications. Pt was taken for medications over objections and was eventually stabilized on Clozapine 200 mg at night and Lithium 600 mg in am and 900 mg at night.)    Per collateral from Charline Vallejo, director of residence Pt was agitated and verbally threatening to staff and claims it was ACT team NP who contacted EMS for ED psych eval.  Charline reports past inappropriate behaviors of exposing himself, last time 3 mo ago and claims they will not take him back without a psych admission do to poor compliance and their fear of safety of others.  Charline denies Pt was physically aggressive but has h/o using cane to threaten people and of med noncompliance.  RN on call from ACT team on call did not know details by Bev, the ACT  NP who was present  at time of incident who reportedly activated 911.  She did indicate paranoid ideation recent past, claiming his mother was placing hallucinogens in his bagels last week and claiming his father is a "Nazi sympathizer".    Mother called claiming the staff at residence in particular Charline Vallejo has a "different attitude" towards Pt and that she has a "vendetta" towards him.  Mother insists Pt is "Not a danger to self or others ", and she does not feel he requires psych admission.  Pt was offered voluntary psych admission but declined need and claims he wants to get his meds titrated or changed on out pt basis.  Pt to be held overnight for reeval secondary to getting collateral from JAZMÍN Pablo from ACT team who activated 911, to monitor behavior overnight since Pt rct sedation for agitation,  and to assess if Pt meets criteria for 2PC. Patient is a 55  year old, male; domiciled in [facility]) ; ;  noncaregiver; past psychiatric history of schizoaffective disorder ; multiple prior psychiatric hospitalizations ([facility] on 4/30/20- 11/2/20 ); ; no known suicide attempts; no known history of violence or arrests; no active substance abuse or known history of complicated withdrawal; PMH of osteogenesis imperfecta, HTN, hypothyroidism, DM type 2 recent dx and bowel resection with ileostomy, with Stage IV colorectal cancer s/p reversal and admission to rehab at [facility] Jan to April 2021,  presented to ER from residence due to reports of agitation and aggression.    On assessment, patient was difficult to redirect, circumstantial and tangential. Patient reports he came due to staff saying he was medication nonadherent at [facility], but says this in not true.  Patient says originally he was taking Clozapine 100 mg AM and PM but was switched to taking 200 mg PM. He reports that with new change he was experiencing "SOB, palpitation and passing out." In order to avoid side effects, patient has been taking the 200 mg pills and splitting it in half, taking the second half of the medication after 2 hours. He mentioned reaching out to NP for 6 weeks trying to get a hold of her to mention side effects from Clozapine. Patient denies going a day without taking the medication. Patient says this is his 20th admission because "people always misunderstand me." Patient mentions he has auditory hallucination which are his "inner thoughts,"  and per ED telepathically communicates with GF but denies having command voices. He denies visual hallucinations. Patient denies SI and HI. Patient has paranoid delusional thought of residential home - having cameras in the speaker box, does not trust the staff, NP or ACT team, and grandiose thoughts of being more intelligent than everyone with multiple degrees. Patient often saying phrase es like "I think I've said too much."    Per ED note:  Pt interviewed in private area with RN present.  Pt speaks in circular, circumstantial manner as  to the events leading up to admission and events unrelated,  in overinclusive, detailed manner, unable to be redirected to pertinent  questions.   Pt evasive to reasons why he was brought to ED by police.  Pt claims he was talking 1/2 his Clozapine at scheduled time, then took the remainder 1 hr later, c/o staff accusing him on "not taking meds".  Pt rambling at times, difficulty getting to point or staying on topic.  Denies depressed mood, SI and denies h/o SIB.  He endorses feeling anxious and worried about his car and the papers he needs to fill out for insurance.  Pt denies HIIP and denies h/o violence.   Pt endorsing AH he claims is "normal thoughts".  He would not give specific content of AH, then reported AH is  how he communicated with "girlfriend" Sharri, "telepathically".  Pt reporting s/s delusions, reporting his NP is seeking for more than just a professional relationship with his and claims she wants more.    Pt was irritable and shouting in ED prior to  and received Versed 10 mg at 1530.      Per record Pt has h/o treatment resistant psychosis with a protracted admission at [facility] from4/2020 11/2/20.:  (During last hospitalization in 2020 pt was admitted because of suicidal ideation , command AH telling him to  kill himself., and delusional thoughts that god was telling him to do things, paranoid and had reported that he had created a system to electrocute himself.      He was described as anxious, thought blocked, disorganized and internally preoccupied.  He had a protracted hospitalization with multiple trials of medications. Pt was taken for medications over objections and was eventually stabilized on Clozapine 200 mg at night and Lithium 600 mg in am and 900 mg at night.)    Per collateral from Charline Vallejo, director of residence Pt was agitated and verbally threatening to staff and claims it was ACT team NP who contacted EMS for ED psych eval.  Charline reports past inappropriate behaviors of exposing himself, last time 3 mo ago and claims they will not take him back without a psych admission do to poor compliance and their fear of safety of others.  Charline denies Pt was physically aggressive but has h/o using cane to threaten people and of med noncompliance.  RN on call from ACT team on call did not know details by Bev, the ACT  NP who was present  at time of incident who reportedly activated 911.  She did indicate paranoid ideation recent past, claiming his mother was placing hallucinogens in his bagels last week and claiming his father is a "Nazi sympathizer".    Mother called claiming the staff at residence in particular Charline Vallejo has a "different attitude" towards Pt and that she has a "vendetta" towards him.  Mother insists Pt is "Not a danger to self or others ", and she does not feel he requires psych admission.  Pt was offered voluntary psych admission but declined need and claims he wants to get his meds titrated or changed on out pt basis.  Pt to be held overnight for reeval secondary to getting collateral from JAZMÍN Pablo from ACT team who activated 911, to monitor behavior overnight since Pt rct sedation for agitation,  and to assess if Pt meets criteria for 2PC.

## 2021-07-30 NOTE — BH INPATIENT PSYCHIATRY ASSESSMENT NOTE - DESCRIPTION
Pt has been residing at [facility] since discharge from [facility]

## 2021-07-30 NOTE — BH INPATIENT PSYCHIATRY ASSESSMENT NOTE - DETAILS
reports of verbal aggression, no known physical aggression noted. Denies current suicidal ideations Abdomen has enlarged umbilical hernia

## 2021-07-30 NOTE — BH INPATIENT PSYCHIATRY ASSESSMENT NOTE - RISK ASSESSMENT
Static Risk: PPH of Schizoaffective disorder, multiple hospitalization,  male   Modifiable Risk: Medication, poor medication adherence   Protective factor: No SI or HI, No previous suicide attempt

## 2021-07-30 NOTE — CHART NOTE - NSCHARTNOTEFT_GEN_A_CORE
ALINA Note: Plan is to transfer pt for inpt psychiatric care. Called [facility] to make a referral but the pt has exhausted his medicare benefits and secondary is straight medicaid. Referral made to [facility]. Spoke with [facility] [phone], bed available and chart being reviewed. faxed EKG and legals. Will follow
SW Note: SW made aware by  provider pt is to be held overnight and reassess in AM, SW following

## 2021-07-30 NOTE — ED ADULT NURSE REASSESSMENT NOTE - COMFORT CARE
darkened lights
plan of care explained/po fluids offered
plan of care explained/wait time explained
meal provided/plan of care explained
meal provided/plan of care explained

## 2021-07-30 NOTE — BH INPATIENT PSYCHIATRY ASSESSMENT NOTE - NSBHCHARTREVIEWVS_PSY_A_CORE FT
Vital Signs Last 24 Hrs  T(C): 36.8 (30 Jul 2021 15:19), Max: 36.9 (29 Jul 2021 20:00)  T(F): 98.2 (30 Jul 2021 15:19), Max: 98.4 (29 Jul 2021 20:00)  HR: 99 (30 Jul 2021 15:19) (83 - 103)  BP: 127/90 (30 Jul 2021 15:19) (120/82 - 129/90)  BP(mean): --  RR: 20 (30 Jul 2021 15:19) (17 - 20)  SpO2: 96% (30 Jul 2021 15:19) (96% - 99%)

## 2021-07-31 PROCEDURE — 99232 SBSQ HOSP IP/OBS MODERATE 35: CPT

## 2021-07-31 RX ORDER — METFORMIN HYDROCHLORIDE 850 MG/1
1000 TABLET ORAL DAILY
Refills: 0 | Status: DISCONTINUED | OUTPATIENT
Start: 2021-07-31 | End: 2021-09-01

## 2021-07-31 RX ADMIN — ATORVASTATIN CALCIUM 40 MILLIGRAM(S): 80 TABLET, FILM COATED ORAL at 20:54

## 2021-07-31 RX ADMIN — METFORMIN HYDROCHLORIDE 1000 MILLIGRAM(S): 850 TABLET ORAL at 09:00

## 2021-07-31 RX ADMIN — RISPERIDONE 0.5 MILLIGRAM(S): 4 TABLET ORAL at 09:00

## 2021-07-31 RX ADMIN — CLOZAPINE 200 MILLIGRAM(S): 150 TABLET, ORALLY DISINTEGRATING ORAL at 20:54

## 2021-07-31 RX ADMIN — RISPERIDONE 0.5 MILLIGRAM(S): 4 TABLET ORAL at 20:54

## 2021-07-31 NOTE — PHYSICAL THERAPY INITIAL EVALUATION ADULT - PERTINENT HX OF CURRENT PROBLEM, REHAB EVAL
Patient is a 55  year old, male; domiciled in [facility] ; ;  noncaregiver; past psychiatric history of schizoaffective disorder ; multiple prior psychiatric hospitalizations. Patient presented to ER secondary to aggression and agitation. Patient referred to PT for evaluation.

## 2021-07-31 NOTE — PHYSICAL THERAPY INITIAL EVALUATION ADULT - DISCHARGE DISPOSITION, PT EVAL
Recommend patient ambulate on unit using RW with distant supervision./extended care facility/rehabilitation facility

## 2021-07-31 NOTE — BH INPATIENT PSYCHIATRY PROGRESS NOTE - CURRENT MEDICATION
MEDICATIONS  (STANDING):  atorvastatin 40 milliGRAM(s) Oral at bedtime  cloZAPine 200 milliGRAM(s) Oral at bedtime  dextrose 40% Gel 15 Gram(s) Oral once  glucagon  Injectable 1 milliGRAM(s) IntraMuscular once  insulin lispro (ADMELOG) corrective regimen sliding scale   SubCutaneous three times a day before meals  insulin lispro (ADMELOG) corrective regimen sliding scale   SubCutaneous at bedtime  metFORMIN 1000 milliGRAM(s) Oral daily  risperiDONE   Tablet 0.5 milliGRAM(s) Oral every 12 hours    MEDICATIONS  (PRN):  haloperidol     Tablet 5 milliGRAM(s) Oral every 6 hours PRN agitation  haloperidol    Injectable 5 milliGRAM(s) IntraMuscular once PRN agitation  LORazepam     Tablet 2 milliGRAM(s) Oral every 6 hours PRN agitation  LORazepam   Injectable 2 milliGRAM(s) IntraMuscular once PRN agitation  melatonin. 5 milliGRAM(s) Oral at bedtime PRN Insomnia

## 2021-07-31 NOTE — BH INPATIENT PSYCHIATRY PROGRESS NOTE - OTHER
AMINAH Ellison Adam Denis, PA Ann Smerling DAVIDA Mesa Rachele BURR Rosalba Cobian NP Rosalba, NP Umbilical hernia visible over clothing.  Loretta NP DAVIDA Mesa Poor gait

## 2021-07-31 NOTE — BH INPATIENT PSYCHIATRY PROGRESS NOTE - PRN MEDS
MEDICATIONS  (PRN):  haloperidol     Tablet 5 milliGRAM(s) Oral every 6 hours PRN agitation  haloperidol    Injectable 5 milliGRAM(s) IntraMuscular once PRN agitation  LORazepam     Tablet 2 milliGRAM(s) Oral every 6 hours PRN agitation  LORazepam   Injectable 2 milliGRAM(s) IntraMuscular once PRN agitation  melatonin. 5 milliGRAM(s) Oral at bedtime PRN Insomnia

## 2021-07-31 NOTE — BH INPATIENT PSYCHIATRY PROGRESS NOTE - NSBHASSESSSUMMFT_PSY_ALL_CORE
Patient is a 55  year old, male; domiciled in [facility]) ; ;  noncaregiver; past psychiatric history of schizoaffective disorder ; multiple prior psychiatric hospitalizations ([facility] on 4/30/20- 11/2/20 ); ; no known suicide attempts; no known history of violence or arrests; no active substance abuse or known history of complicated withdrawal; PMH of osteogenesis imperfecta, HTN, hypothyroidism, DM type 2 recent dx and bowel resection with ileostomy, with Stage IV colorectal cancer s/p reversal and admission to rehab at [facility] Jan to April 2021,  presented to ER from residence due to reports of agitation and aggression.  On initial assessment pt is extremely circumstantial, resists redirection, endorsing paranoid thoughts towards staff from his residence (believes they placed cameras on the speakers to watch him) and likely AH. Patient denies SI, HI and feels safe in unit. Patient requires inpatient admission for agitation management. Admitted involuntary. Currently in need of inpatient hospitalization for stabilization of active symptoms.     Plan:   -Legal: 9.37  -Safety: Routine Observation   -Psychiatry: Group, milieu, individual therapy as inappropriate   -c/w home medication: Clozapine 200 mg at night,  Risperdal 0.5 bid, Atorvastatin 40 hs  -Holding metformin and start on insulin sliding scale   -PRNs: Haldol 5 mg oral/IM, Ativan 2 mg oral/IM, Benadryl 50 mg oral/IM, melatonin 5 mg   -Dispo: pending clinical improvement, Patient continues to require inpatient hospitalization for stabilization and safety

## 2021-07-31 NOTE — BH INPATIENT PSYCHIATRY PROGRESS NOTE - NSBHMETABOLIC_PSY_ALL_CORE_FT
BMI: BMI (kg/m2): 30.3 (07-29-21 @ 14:53)  HbA1c: A1C with Estimated Average Glucose Result: 10.1 % (12-02-20 @ 07:50)    Glucose: POCT Blood Glucose.: 170 mg/dL (12-21-20 @ 08:27)    BP: 107/79 (07-31-21 @ 08:07) (107/79 - 107/79)  Lipid Panel:

## 2021-07-31 NOTE — BH INPATIENT PSYCHIATRY PROGRESS NOTE - NSBHCHARTREVIEWVS_PSY_A_CORE FT
Vital Signs Last 24 Hrs  T(C): 36.5 (31 Jul 2021 08:07), Max: 36.9 (30 Jul 2021 18:22)  T(F): 97.7 (31 Jul 2021 08:07), Max: 98.4 (30 Jul 2021 18:22)  HR: 85 (31 Jul 2021 08:07) (85 - 99)  BP: 107/79 (31 Jul 2021 08:07) (107/79 - 127/90)  BP(mean): --  RR: 17 (30 Jul 2021 18:22) (17 - 20)  SpO2: 99% (30 Jul 2021 18:22) (96% - 99%)

## 2021-07-31 NOTE — CHART NOTE - NSCHARTNOTEFT_GEN_A_CORE
Screening Medical Evaluation  Patient Admitted from: [facility] ED    ZH admitting diagnosis: Residual schizophrenia    PAST MEDICAL & SURGICAL HISTORY:  Schizo-affective psychosis    Osteogenesis imperfecta    Diabetes    High cholesterol    Hypothyroidism    Colon cancer    Fracture of ankle    Ileostomy status          Allergies    amoxicillin (Unknown)  Lamictal (Unknown)  penicillin (Unknown)  Tegretol (Unknown)  Zetia (Unknown)    Intolerances        Social History:     FAMILY HISTORY:  FH: prostate cancer (Father)    FHx: hypertension (Mother)        MEDICATIONS  (STANDING):  atorvastatin 40 milliGRAM(s) Oral at bedtime  cloZAPine 200 milliGRAM(s) Oral at bedtime  dextrose 40% Gel 15 Gram(s) Oral once  glucagon  Injectable 1 milliGRAM(s) IntraMuscular once  insulin lispro (ADMELOG) corrective regimen sliding scale   SubCutaneous three times a day before meals  insulin lispro (ADMELOG) corrective regimen sliding scale   SubCutaneous at bedtime  metFORMIN 1000 milliGRAM(s) Oral daily  risperiDONE   Tablet 0.5 milliGRAM(s) Oral every 12 hours    MEDICATIONS  (PRN):  haloperidol     Tablet 5 milliGRAM(s) Oral every 6 hours PRN agitation  haloperidol    Injectable 5 milliGRAM(s) IntraMuscular once PRN agitation  LORazepam     Tablet 2 milliGRAM(s) Oral every 6 hours PRN agitation  LORazepam   Injectable 2 milliGRAM(s) IntraMuscular once PRN agitation  melatonin. 5 milliGRAM(s) Oral at bedtime PRN Insomnia      Vital Signs Last 24 Hrs  T(C): 36.6 (2021 20:39), Max: 36.9 (2021 18:22)  T(F): 97.9 (2021 20:39), Max: 98.4 (2021 18:22)  HR: 99 (2021 15:19) (90 - 103)  BP: 127/90 (2021 15:19) (120/82 - 129/90)  BP(mean): --  RR: 17 (2021 18:22) (17 - 20)  SpO2: 99% (2021 18:22) (96% - 99%)  CAPILLARY BLOOD GLUCOSE            PHYSICAL EXAM:  GENERAL: NAD, well-developed  HEAD:  Atraumatic, Normocephalic  EYES: EOMI, PERRLA, conjunctiva and sclera clear  NECK: Supple, No JVD  CHEST/LUNG: Clear to auscultation bilaterally; No wheeze  HEART: Regular rate and rhythm; No murmurs, rubs, or gallops  ABDOMEN: Soft, Nontender, Nondistended; Bowel sounds present  EXTREMITIES:  2+ Peripheral Pulses, No cyanosis, or edema  PSYCH: AAOx3  NEUROLOGY: non-focal  SKIN: No rashes or lesions    LABS:                        14.7   10.65 )-----------( 204      ( 2021 16:05 )             45.4         138  |  100  |  17.6  ----------------------------<  132<H>  3.7   |  23.0  |  0.70    Ca    9.7      2021 16:05    TPro  7.6  /  Alb  4.3  /  TBili  0.2<L>  /  DBili  x   /  AST  22  /  ALT  36  /  AlkPhos  82            Urinalysis Basic - ( 2021 16:03 )    Color: Yellow / Appearance: Clear / S.015 / pH: x  Gluc: x / Ketone: Negative  / Bili: Negative / Urobili: Negative mg/dL   Blood: x / Protein: 15 mg/dL / Nitrite: Negative   Leuk Esterase: Negative / RBC: 0-2 /HPF / WBC 0-2   Sq Epi: x / Non Sq Epi: Occasional / Bacteria: Occasional        RADIOLOGY & ADDITIONAL TESTS:    Assessment and Plan:  55 year old male presenting today from [facility] ED to [facility] with admitting diagnosis of Residual schizophrenia  with pertinent PMH of DM, HLD. Denies any medical concerns. Denies any fever, chills, headache, chest pain, SOB,abdominal pain, N//D/C, dysuria. Physical exam unremarkable.  1) Residual schizophrenia: Follow care plan as per primary team.  2) DM: Continue insulin sliding scale and metformin 1000mg daily.  3) HLD: Continue Lipitor 40mg oral at bedtime. Screening Medical Evaluation  Patient Admitted from: [facility] ED    [facility] admitting diagnosis: Residual schizophrenia    PAST MEDICAL & SURGICAL HISTORY:  Schizo-affective psychosis    Osteogenesis imperfecta    Diabetes    High cholesterol    Hypothyroidism    Colon cancer    Fracture of ankle    Ileostomy status          Allergies    amoxicillin (Unknown)  Lamictal (Unknown)  penicillin (Unknown)  Tegretol (Unknown)  Zetia (Unknown)    Intolerances        Social History:     FAMILY HISTORY:  FH: prostate cancer (Father)    FHx: hypertension (Mother)        MEDICATIONS  (STANDING):  atorvastatin 40 milliGRAM(s) Oral at bedtime  cloZAPine 200 milliGRAM(s) Oral at bedtime  dextrose 40% Gel 15 Gram(s) Oral once  glucagon  Injectable 1 milliGRAM(s) IntraMuscular once  insulin lispro (ADMELOG) corrective regimen sliding scale   SubCutaneous three times a day before meals  insulin lispro (ADMELOG) corrective regimen sliding scale   SubCutaneous at bedtime  metFORMIN 1000 milliGRAM(s) Oral daily  risperiDONE   Tablet 0.5 milliGRAM(s) Oral every 12 hours    MEDICATIONS  (PRN):  haloperidol     Tablet 5 milliGRAM(s) Oral every 6 hours PRN agitation  haloperidol    Injectable 5 milliGRAM(s) IntraMuscular once PRN agitation  LORazepam     Tablet 2 milliGRAM(s) Oral every 6 hours PRN agitation  LORazepam   Injectable 2 milliGRAM(s) IntraMuscular once PRN agitation  melatonin. 5 milliGRAM(s) Oral at bedtime PRN Insomnia      Vital Signs Last 24 Hrs  T(C): 36.6 (2021 20:39), Max: 36.9 (2021 18:22)  T(F): 97.9 (2021 20:39), Max: 98.4 (2021 18:22)  HR: 99 (2021 15:19) (90 - 103)  BP: 127/90 (2021 15:19) (120/82 - 129/90)  BP(mean): --  RR: 17 (2021 18:22) (17 - 20)  SpO2: 99% (2021 18:22) (96% - 99%)  CAPILLARY BLOOD GLUCOSE            PHYSICAL EXAM:  GENERAL: NAD, well-developed  HEAD:  Atraumatic, Normocephalic  EYES: EOMI, PERRLA, conjunctiva and sclera clear  NECK: Supple, No JVD  CHEST/LUNG: Clear to auscultation bilaterally; No wheeze  HEART: Regular rate and rhythm; No murmurs, rubs, or gallops  ABDOMEN: Soft, Nontender, Nondistended; Bowel sounds present  EXTREMITIES:  2+ Peripheral Pulses, No cyanosis, or edema  PSYCH: AAOx3  NEUROLOGY: non-focal  SKIN: No rashes or lesions    LABS:                        14.7   10.65 )-----------( 204      ( 2021 16:05 )             45.4         138  |  100  |  17.6  ----------------------------<  132<H>  3.7   |  23.0  |  0.70    Ca    9.7      2021 16:05    TPro  7.6  /  Alb  4.3  /  TBili  0.2<L>  /  DBili  x   /  AST  22  /  ALT  36  /  AlkPhos  82            Urinalysis Basic - ( 2021 16:03 )    Color: Yellow / Appearance: Clear / S.015 / pH: x  Gluc: x / Ketone: Negative  / Bili: Negative / Urobili: Negative mg/dL   Blood: x / Protein: 15 mg/dL / Nitrite: Negative   Leuk Esterase: Negative / RBC: 0-2 /HPF / WBC 0-2   Sq Epi: x / Non Sq Epi: Occasional / Bacteria: Occasional        RADIOLOGY & ADDITIONAL TESTS:    Assessment and Plan:  55 year old male presenting today from [facility] ED to [facility] with admitting diagnosis of Residual schizophrenia  with pertinent PMH of DM, HLD. Denies any medical concerns. Denies any fever, chills, headache, chest pain, SOB,abdominal pain, N//D/C, dysuria. Physical exam unremarkable.  1) Residual schizophrenia: Follow care plan as per primary team.  2) DM: Continue insulin sliding scale and metformin 1000mg daily.  3) HLD: Continue Lipitor 40mg oral at bedtime.

## 2021-07-31 NOTE — PHYSICAL THERAPY INITIAL EVALUATION ADULT - CRITERIA FOR SKILLED THERAPEUTIC INTERVENTIONS
impairments found/functional limitations in following categories/risk reduction/prevention/therapy frequency/anticipated equipment needs at discharge/anticipated discharge recommendation

## 2021-07-31 NOTE — PSYCHIATRIC REHAB INITIAL EVALUATION - NSBHPRRECOMMEND_PSY_ALL_CORE
Writer met with patient to introduce patient to psych rehab staff and services.  Patient was admitted to [place] [id number] on 07/30/2021 from [facility] with an admitting diagnosis of Schizoaffective Disorder, mixed type.  Patient was brought in by police from [facility]  due to agitation and aggression.  During initial psychiatric rehab interview, patient presented with a depressed mood and constricted affect. Patient reported he was brought in due to "verbal abuse" however did not elaborate to writer.  Patient has previous history of inpatient psychiatric admissions, last at [facility] in April 2020.  Patient has no known history of suicide attempts, no known history of violence or arrests and no active substance use.  Patient reported he is compliant with medication and treatment.  As per patient's ED chart, patient has past behavioral of exposing himself , last time 3 months ago and claims they will not take him back without a psych eval.  Patient reportedly has an ACT team.  Patient denied SI/HI as well as AH/VH to writer.  As per patient's chart, patient endorsed auditory hallucinations.     Psych rehab staff recommends patient maintain behavioral control as well as attend daily psych rehab groups for improved symptom management within seven days.

## 2021-07-31 NOTE — BH INPATIENT PSYCHIATRY PROGRESS NOTE - NSBHFUPINTERVALHXFT_PSY_A_CORE
Patient is followed up for Schizoaffective disorder, admitted for escalating aggression and psychosis in context of medication nonadherence. Chart, medications and labs reviewed.  Patient is discussed with nursing staff. No significant overnight issues.  Per nursing in fair behavioral control last night, no prns for aggression. Reports fair sleep and appetite. Has been compliant with standing medications, no SE noted or reported. POCT—pt refused glucose monitoring.  Patient is observed in dayroom, utilizes a walker.  No appreciated change in status today. Patient remains disorganized, with ongoing perceptual disturbances. Psychotic symptoms successfully elicited, appears internally stimulated, irritable and disorganized. Patient endorses he has auditory hallucination which are his "inner thoughts." Denies CAH/VH, cristy, delusions. Denies SI/HI, no plan or intent, engages in safety planning.   Endorses paranoid delusional thought of staff at his residence, believes staff is watching him with cameras.  Remains compliant with medications, no reported or observed adverse effects.  No akithisia, EPS, or tremors. On Clozaril reports +BM today. Encouraged patient to continue medication regimen. PMH of osteogenesis imperfecta, HTN, hypothyroidism, DM type 2, and bowel resection with ileostomy, with Stage IV colorectal cancer s/p reversal.  No acute medical concerns, VSS. Continue to monitor and provide therapeutic support.

## 2021-08-01 LAB — GLUCOSE BLDC GLUCOMTR-MCNC: 193 MG/DL — HIGH (ref 70–99)

## 2021-08-01 PROCEDURE — 99232 SBSQ HOSP IP/OBS MODERATE 35: CPT

## 2021-08-01 RX ADMIN — ATORVASTATIN CALCIUM 40 MILLIGRAM(S): 80 TABLET, FILM COATED ORAL at 20:36

## 2021-08-01 RX ADMIN — CLOZAPINE 200 MILLIGRAM(S): 150 TABLET, ORALLY DISINTEGRATING ORAL at 20:36

## 2021-08-01 RX ADMIN — METFORMIN HYDROCHLORIDE 1000 MILLIGRAM(S): 850 TABLET ORAL at 09:17

## 2021-08-01 RX ADMIN — RISPERIDONE 0.5 MILLIGRAM(S): 4 TABLET ORAL at 20:37

## 2021-08-01 RX ADMIN — RISPERIDONE 0.5 MILLIGRAM(S): 4 TABLET ORAL at 09:17

## 2021-08-01 RX ADMIN — Medication 2: at 12:27

## 2021-08-01 RX ADMIN — Medication 1: at 09:16

## 2021-08-01 NOTE — BH INPATIENT PSYCHIATRY PROGRESS NOTE - NSBHFUPINTERVALHXFT_PSY_A_CORE
Patient is followed up for Schizoaffective disorder, admitted for escalating aggression and psychosis in context of medication nonadherence. Chart, medications and labs reviewed.  Patient is discussed with nursing staff. No significant overnight issues.  Per nursing in fair behavioral control last night, no prns for aggression. Reports fair sleep and appetite. Has been compliant with standing medications, no SE noted or reported. Patient allowed glucose monitoring today POCT—193.  Patient is observed in dayroom, utilizes a walker.  No appreciated change in status today. Patient remains disorganized, with ongoing perceptual disturbances. When asked about his mood pt replies “I don’t like ambulance rides.” Denies CAH/VH, cristy, delusions. Denies SI/HI, no plan or intent, engages in safety planning.   Endorses paranoid delusional thought of staff at his residence, believes staff is watching him with cameras.  Speech is very low almost inaudible.  Remains compliant with medications, no reported or observed adverse effects.  No akithisia, EPS, or tremors. On Clozaril reports +BM today. Encouraged patient to continue medication regimen. PMH of osteogenesis imperfecta, HTN, hypothyroidism, DM type 2, and bowel resection with ileostomy, with Stage IV colorectal cancer s/p reversal, utilizes walker.  VSS. Continue to monitor and provide therapeutic support.

## 2021-08-01 NOTE — BH INPATIENT PSYCHIATRY PROGRESS NOTE - NSBHCHARTREVIEWVS_PSY_A_CORE FT
Vital Signs Last 24 Hrs  T(C): 36.8 (31 Jul 2021 20:14), Max: 36.8 (31 Jul 2021 20:14)  T(F): 98.2 (31 Jul 2021 20:14), Max: 98.2 (31 Jul 2021 20:14)  HR: 85 (31 Jul 2021 08:07) (85 - 85)  BP: 107/79 (31 Jul 2021 08:07) (107/79 - 107/79)  BP(mean): --  RR: --  SpO2: --

## 2021-08-01 NOTE — BH INPATIENT PSYCHIATRY PROGRESS NOTE - NSBHASSESSSUMMFT_PSY_ALL_CORE
Patient is a 55  year old, male; domic[facility] for [facility]) ; ;  noncaregiver; past psychiatric history of schizoaffective disorder ; multiple prior psychiatric hospitalizations ([facility] on 4/30/20- 11/2/20 ); ; no known suicide attempts; no known history of violence or arrests; no active substance abuse or known history of complicated withdrawal; PMH of osteogenesis imperfecta, HTN, hypothyroidism, DM type 2 recent dx and bowel resection with ileostomy, with Stage IV colorectal cancer s/p reversal and admission to rehab at [facility] Jan to April 2021,  presented to ER from residence due to reports of agitation and aggression.  On initial assessment pt is extremely circumstantial, resists redirection, endorsing paranoid thoughts towards staff from his residence (believes they placed cameras on the speakers to watch him) and likely AH. Patient denies SI, HI and feels safe in unit. Patient requires inpatient admission for agitation management. Admitted involuntary. Currently in need of inpatient hospitalization for stabilization of active symptoms.     Plan:   -Legal: 9.37  -Safety: Routine Observation   -Psychiatry: Group, milieu, individual therapy as inappropriate   -c/w home medication: Clozapine 200 mg at night,  Risperdal 0.5 bid, Atorvastatin 40 hs  -Holding metformin and start on insulin sliding scale   -PRNs: Haldol 5 mg oral/IM, Ativan 2 mg oral/IM, Benadryl 50 mg oral/IM, melatonin 5 mg   -Dispo: pending clinical improvement, Patient continues to require inpatient hospitalization for stabilization and safety

## 2021-08-02 LAB
GLUCOSE BLDC GLUCOMTR-MCNC: 167 MG/DL — HIGH (ref 70–99)
GLUCOSE BLDC GLUCOMTR-MCNC: 172 MG/DL — HIGH (ref 70–99)

## 2021-08-02 PROCEDURE — 99232 SBSQ HOSP IP/OBS MODERATE 35: CPT

## 2021-08-02 RX ORDER — GABAPENTIN 400 MG/1
300 CAPSULE ORAL
Refills: 0 | Status: DISCONTINUED | OUTPATIENT
Start: 2021-08-02 | End: 2021-10-08

## 2021-08-02 RX ADMIN — RISPERIDONE 0.5 MILLIGRAM(S): 4 TABLET ORAL at 08:37

## 2021-08-02 RX ADMIN — METFORMIN HYDROCHLORIDE 1000 MILLIGRAM(S): 850 TABLET ORAL at 08:37

## 2021-08-02 RX ADMIN — CLOZAPINE 200 MILLIGRAM(S): 150 TABLET, ORALLY DISINTEGRATING ORAL at 20:50

## 2021-08-02 RX ADMIN — RISPERIDONE 0.5 MILLIGRAM(S): 4 TABLET ORAL at 20:50

## 2021-08-02 RX ADMIN — ATORVASTATIN CALCIUM 40 MILLIGRAM(S): 80 TABLET, FILM COATED ORAL at 20:50

## 2021-08-02 NOTE — BH TREATMENT PLAN - NSTXIMPULSINTERPR_PSY_ALL_CORE
Psych rehab staff recommends patient maintain behavioral control as well as attend daily psych rehab groups for improved symptom management within seven days.

## 2021-08-02 NOTE — BH INPATIENT PSYCHIATRY PROGRESS NOTE - PRN MEDS
MEDICATIONS  (PRN):  gabapentin 300 milliGRAM(s) Oral four times a day PRN anxiety  haloperidol     Tablet 5 milliGRAM(s) Oral every 6 hours PRN agitation  haloperidol    Injectable 5 milliGRAM(s) IntraMuscular once PRN agitation  LORazepam     Tablet 2 milliGRAM(s) Oral every 6 hours PRN agitation  LORazepam   Injectable 2 milliGRAM(s) IntraMuscular once PRN agitation  melatonin. 5 milliGRAM(s) Oral at bedtime PRN Insomnia

## 2021-08-02 NOTE — BH INPATIENT PSYCHIATRY PROGRESS NOTE - NSBHCHARTREVIEWVS_PSY_A_CORE FT
Vital Signs Last 24 Hrs  T(C): 36.2 (03 Aug 2021 07:42), Max: 36.6 (02 Aug 2021 20:56)  T(F): 97.1 (03 Aug 2021 07:42), Max: 97.9 (02 Aug 2021 20:56)  HR: --  BP: --  BP(mean): --  RR: --  SpO2: --

## 2021-08-02 NOTE — BH INPATIENT PSYCHIATRY PROGRESS NOTE - NSBHFUPINTERVALHXFT_PSY_A_CORE
Patient is followed up for Schizoaffective disorder, admitted for escalating aggression and psychosis in context of medication nonadherence. Chart, medications and labs reviewed.  Patient is discussed with nursing staff. No significant overnight issues.  Per nursing in fair behavioral control last night, no prns for aggression. Reports fair sleep and appetite. Has been compliant with standing medications, no SE noted or reported. Patient allowed glucose monitoring today POCT—193.  Patient is observed in dayroom, utilizes a walker.  No appreciated change in status today. Patient remains disorganized, with ongoing perceptual disturbances. When asked about his mood pt replies “I don’t like ambulance rides.” Denies CAH/VH, cristy, delusions. Denies SI/HI, no plan or intent, engages in safety planning.   Endorses paranoid delusional thought of staff at his residence, believes staff is watching him with cameras.  Speech is very low almost inaudible.  Remains compliant with medications, no reported or observed adverse effects.  No akithisia, EPS, or tremors. On Clozaril reports +BM today. Encouraged patient to continue medication regimen. PMH of osteogenesis imperfecta, HTN, hypothyroidism, DM type 2, and bowel resection with ileostomy, with Stage IV colorectal cancer s/p reversal, utilizes walker.  VSS. Continue to monitor and provide therapeutic support.    MON 8/2 Portions of ED not corroborated, but pt argumentative, irritable, and rather concrete and rigid. Unable to answer questions about medication, rationales fo them, but says he has taken clozapine for 19 years. Denies recent aggression. Very paranoid, also obsessional traits. Says he is unsure about taking all his risperdal at bedtime, will consider, gestures with hands and appears very rigid, does not want to change meds. Does not mention he has an ACT team. Does not mention arguments at residence. Agrees to speak with MD further in AM, as he appears distressed, does not want to speak further. Later in PM, approaches MD and says he wants his legal status changed. Appears paranoid, tells MD he can do this, points to regulations on the wall, and says "you do not have to do what the doctor in the emergency room says." Offered to speak further about legal status in AM. Pt will likely remain INVOL, but will discuss with tx team.  Using walker d/t OI. Says "I have a mutation" but wants writer to put "osteoporosis" in the chart, not OI.  No new SEs reported or observed.

## 2021-08-02 NOTE — BH INPATIENT PSYCHIATRY PROGRESS NOTE - NSBHASSESSSUMMFT_PSY_ALL_CORE
Patient is a 55  year old, male; domiciled in [facility] ([facility] for [facility] living) ; ;  noncaregiver; past psychiatric history of schizoaffective disorder ; multiple prior psychiatric hospitalizations ([facility] on 4/30/20- 11/2/20 ); ; no known suicide attempts; no known history of violence or arrests; no active substance abuse or known history of complicated withdrawal; PMH of osteogenesis imperfecta, HTN, hypothyroidism, DM type 2 recent dx and bowel resection with ileostomy, with Stage IV colorectal cancer s/p reversal and admission to rehab at [facility] Jan to April 2021,  presented to ER from residence due to reports of agitation and aggression.  On initial assessment pt is extremely circumstantial, resists redirection, endorsing paranoid thoughts towards staff from his residence (believes they placed cameras on the speakers to watch him) and likely AH. Patient denies SI, HI and feels safe in unit. Patient requires inpatient admission for agitation management. Admitted involuntary. Currently in need of inpatient hospitalization for stabilization of active symptoms.     Plan:   -Legal: 9.37  -Safety: Routine Observation   -Psychiatry: Group, milieu, individual therapy as inappropriate   -c/w home medication: Clozapine 200 mg at night,  Risperdal 0.5 bid, Atorvastatin 40 hs  -Holding metformin and start on insulin sliding scale   -PRNs: Haldol 5 mg oral/IM, Ativan 2 mg oral/IM, Benadryl 50 mg oral/IM, melatonin 5 mg   -Dispo: pending clinical improvement, Patient continues to require inpatient hospitalization for stabilization and safety

## 2021-08-02 NOTE — BH INPATIENT PSYCHIATRY PROGRESS NOTE - NSBHMETABOLIC_PSY_ALL_CORE_FT
BMI: BMI (kg/m2): 30.3 (07-29-21 @ 14:53)  HbA1c: A1C with Estimated Average Glucose Result: 10.1 % (12-02-20 @ 07:50)    Glucose: POCT Blood Glucose.: 211 mg/dL (08-03-21 @ 16:46)    BP: 110/81 (08-02-21 @ 07:52) (110/81 - 110/81)  Lipid Panel:

## 2021-08-02 NOTE — BH SOCIAL WORK INITIAL PSYCHOSOCIAL EVALUATION - OTHER PAST PSYCHIATRIC HISTORY (INCLUDE DETAILS REGARDING ONSET, COURSE OF ILLNESS, INPATIENT/OUTPATIENT TREATMENT)
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  Last hospitalized at [facility] from 4/30-11/2 of this year. No known suicide attempts.  Currently with ACT team

## 2021-08-02 NOTE — BH INPATIENT PSYCHIATRY PROGRESS NOTE - CURRENT MEDICATION
MEDICATIONS  (STANDING):  atorvastatin 40 milliGRAM(s) Oral at bedtime  cloZAPine 200 milliGRAM(s) Oral at bedtime  dextrose 40% Gel 15 Gram(s) Oral once  glucagon  Injectable 1 milliGRAM(s) IntraMuscular once  insulin lispro (ADMELOG) corrective regimen sliding scale   SubCutaneous three times a day before meals  insulin lispro (ADMELOG) corrective regimen sliding scale   SubCutaneous at bedtime  metFORMIN 1000 milliGRAM(s) Oral daily  risperiDONE   Tablet 0.5 milliGRAM(s) Oral every 12 hours    MEDICATIONS  (PRN):  gabapentin 300 milliGRAM(s) Oral four times a day PRN anxiety  haloperidol     Tablet 5 milliGRAM(s) Oral every 6 hours PRN agitation  haloperidol    Injectable 5 milliGRAM(s) IntraMuscular once PRN agitation  LORazepam     Tablet 2 milliGRAM(s) Oral every 6 hours PRN agitation  LORazepam   Injectable 2 milliGRAM(s) IntraMuscular once PRN agitation  melatonin. 5 milliGRAM(s) Oral at bedtime PRN Insomnia

## 2021-08-03 LAB
GLUCOSE BLDC GLUCOMTR-MCNC: 149 MG/DL — HIGH (ref 70–99)
GLUCOSE BLDC GLUCOMTR-MCNC: 187 MG/DL — HIGH (ref 70–99)
GLUCOSE BLDC GLUCOMTR-MCNC: 205 MG/DL — HIGH (ref 70–99)
GLUCOSE BLDC GLUCOMTR-MCNC: 205 MG/DL — HIGH (ref 70–99)
GLUCOSE BLDC GLUCOMTR-MCNC: 211 MG/DL — HIGH (ref 70–99)

## 2021-08-03 PROCEDURE — 99223 1ST HOSP IP/OBS HIGH 75: CPT

## 2021-08-03 PROCEDURE — 99232 SBSQ HOSP IP/OBS MODERATE 35: CPT

## 2021-08-03 RX ADMIN — CLOZAPINE 200 MILLIGRAM(S): 150 TABLET, ORALLY DISINTEGRATING ORAL at 20:57

## 2021-08-03 RX ADMIN — METFORMIN HYDROCHLORIDE 1000 MILLIGRAM(S): 850 TABLET ORAL at 08:14

## 2021-08-03 RX ADMIN — ATORVASTATIN CALCIUM 40 MILLIGRAM(S): 80 TABLET, FILM COATED ORAL at 20:57

## 2021-08-03 RX ADMIN — RISPERIDONE 0.5 MILLIGRAM(S): 4 TABLET ORAL at 08:48

## 2021-08-03 RX ADMIN — RISPERIDONE 0.5 MILLIGRAM(S): 4 TABLET ORAL at 20:57

## 2021-08-03 NOTE — CONSULT NOTE ADULT - ASSESSMENT
55 year old male presenting from [facility] ED to [facility] with admitting diagnosis of Residual schizophrenia with pertinent PMH of DM2, HLD, colon ca s/p resection, Li induced hypothyroidism being seen for DM2 management    1- DM2 - old records reviewed. on prior hospitalization in 2020, pt had required basal+bolus insulin and on discharge pre-meal was tapered and was sent home on Lantus with combination of home Janumet. prior A1C in 6 range now at [facility], FS are controlled just on Metformin daily. per staff pt with psychosis and overall limited po - for now no indication to escalate regimen, cw metformin and NISS and will monitor fs and adjust regimen as needed    2- Li induced hypothyroidism - noted this hx on prior records in 2020 and was on synthroid 100mcg daily - currently not on Li and not on synthroid - check TFTs    3- HLD - cw statin - check lipid profile    4- colon CA- per prior documention s.p resection and reversal of ostomy - unclear if hx of chemo ro RT - needs out-pt f/u     5- schizo- per primary team, monitor QTC on psychotropics

## 2021-08-03 NOTE — CONSULT NOTE ADULT - SUBJECTIVE AND OBJECTIVE BOX
55 year old male presenting from [facility] ED to [facility] with admitting diagnosis of Residual schizophrenia with pertinent PMH of DM2, HLD, colon ca s/p resection, Li induced hypothyroidism being seen for DM2 management - pt states that at some point he was briefly on insulin but otherwise for a while has been taking Janumet with good control. denies hx of hypoglycemia, DKA, HHS. at [facility] reported wo n/v/d but was only started on metformin 1000mg po daily.     Allergies: PCN, Zetia, Lamictal, Tegretol     PMHX: DM2, HLD  Social: Denies  FHX: NC      ROS:  No HA/DZ  No Vision changes   No CP, SOB  No N/V/D  No Edema  No Rash  NO weakness, numbness    MEDICATIONS  (STANDING):  atorvastatin 40 milliGRAM(s) Oral at bedtime  cloZAPine 200 milliGRAM(s) Oral at bedtime  dextrose 40% Gel 15 Gram(s) Oral once  glucagon  Injectable 1 milliGRAM(s) IntraMuscular once  insulin lispro (ADMELOG) corrective regimen sliding scale   SubCutaneous three times a day before meals  insulin lispro (ADMELOG) corrective regimen sliding scale   SubCutaneous at bedtime  metFORMIN 1000 milliGRAM(s) Oral daily  risperiDONE   Tablet 0.5 milliGRAM(s) Oral every 12 hours    MEDICATIONS  (PRN):  gabapentin 300 milliGRAM(s) Oral four times a day PRN anxiety  haloperidol     Tablet 5 milliGRAM(s) Oral every 6 hours PRN agitation  haloperidol    Injectable 5 milliGRAM(s) IntraMuscular once PRN agitation  LORazepam     Tablet 2 milliGRAM(s) Oral every 6 hours PRN agitation  LORazepam   Injectable 2 milliGRAM(s) IntraMuscular once PRN agitation  melatonin. 5 milliGRAM(s) Oral at bedtime PRN Insomnia      T(C): 36.2 (08-03-21 @ 07:42)  HR: --69  BP: --112/68  RR: --12  SpO2: --  CAPILLARY BLOOD GLUCOSE      POCT Blood Glucose.: 187 mg/dL (03 Aug 2021 08:09)  POCT Blood Glucose.: 172 mg/dL (02 Aug 2021 20:21)  POCT Blood Glucose.: 167 mg/dL (02 Aug 2021 16:59)    I&O's Summary      PHYSICAL EXAM:  GENERAL: NAD, well-developed  HEAD:  Atraumatic, Normocephalic  EYES: EOMI, PERRL, conjunctiva and sclera clear  NECK: Supple, No JVD  CHEST/LUNG: Clear to auscultation bilaterally  HEART: Regular rate and rhythm; No murmurs, rubs, or gallops, No Edema  ABDOMEN: Soft, Nontender, Nondistended; Bowel sounds present  EXTREMITIES:  2+ Peripheral Pulses, No clubbing, cyanosis  PSYCH: No SI/HI  NEUROLOGY: non-focal  SKIN: No rashes or lesions    LABS:        cr 0.7                RADIOLOGY & ADDITIONAL TESTS:    Imaging Personally Reviewed: EKG personally reviewed sinus tachy     Consultant(s) Notes Reviewed:      Care Discussed with Consultants/Other Providers:

## 2021-08-03 NOTE — BH INPATIENT PSYCHIATRY PROGRESS NOTE - NSBHMETABOLIC_PSY_ALL_CORE_FT
BMI: BMI (kg/m2): 30.3 (07-29-21 @ 14:53)  HbA1c: A1C with Estimated Average Glucose Result: 10.1 % (12-02-20 @ 07:50)    Glucose: POCT Blood Glucose.: 205 mg/dL (08-03-21 @ 20:29)    BP: 110/81 (08-02-21 @ 07:52) (110/81 - 110/81)  Lipid Panel:

## 2021-08-03 NOTE — BH INPATIENT PSYCHIATRY PROGRESS NOTE - NSBHCHARTREVIEWVS_PSY_A_CORE FT
Vital Signs Last 24 Hrs  T(C): 36.6 (03 Aug 2021 20:17), Max: 36.6 (03 Aug 2021 20:17)  T(F): 97.9 (03 Aug 2021 20:17), Max: 97.9 (03 Aug 2021 20:17)  HR: --  BP: --  BP(mean): --  RR: --  SpO2: --

## 2021-08-03 NOTE — BH INPATIENT PSYCHIATRY PROGRESS NOTE - NSBHASSESSSUMMFT_PSY_ALL_CORE
Patient is a 55  year old, male; [facility]iciled [facility] for independent living) ; ;  noncaregiver; past psychiatric history of schizoaffective disorder ; multiple prior psychiatric hospitalizations ([facility] on 4/30/20- 11/2/20 ); ; no known suicide attempts; no known history of violence or arrests; no active substance abuse or known history of complicated withdrawal; PMH of osteogenesis imperfecta, HTN, hypothyroidism, DM type 2 recent dx and bowel resection with ileostomy, with Stage IV colorectal cancer s/p reversal and admission to rehab at [facility] Jan to April 2021,  presented to ER from residence due to reports of agitation and aggression.  On initial assessment pt is extremely circumstantial, resists redirection, endorsing paranoid thoughts towards staff from his residence (believes they placed cameras on the speakers to watch him) and likely AH. Patient denies SI, HI and feels safe in unit. Patient requires inpatient admission for agitation management. Admitted involuntary. Currently in need of inpatient hospitalization for stabilization of active symptoms.     Plan:   -Legal: 9.37  -Safety: Routine Observation   -Psychiatry: Group, milieu, individual therapy as inappropriate   -c/w home medication: Clozapine 200 mg at night,  Risperdal 0.5 bid, Atorvastatin 40 hs  -Holding metformin and start on insulin sliding scale   -PRNs: Haldol 5 mg oral/IM, Ativan 2 mg oral/IM, Benadryl 50 mg oral/IM, melatonin 5 mg   -Dispo: pending clinical improvement, Patient continues to require inpatient hospitalization for stabilization and safety

## 2021-08-03 NOTE — BH INPATIENT PSYCHIATRY PROGRESS NOTE - NSBHFUPINTERVALHXFT_PSY_A_CORE
Patient is followed up for Schizoaffective disorder, admitted for escalating aggression and psychosis in context of medication nonadherence. Chart, medications and labs reviewed.  Patient is discussed with nursing staff. No significant overnight issues.  Per nursing in [facility] last night, no prns for aggression. Reports fair sleep and appetite. Has been compliant with standing medications, no SE noted or reported. Patient allowed glucose monitoring today POCT—193.  Patient is observed in dayroom, utilizes a walker.  No appreciated change in status today. Patient remains disorganized, with ongoing perceptual disturbances. When asked about his mood pt replies “I don’t like ambulance rides.” Denies CAH/VH, critsy, delusions. Denies SI/HI, no plan or intent, engages in safety planning.   Endorses paranoid delusional thought of staff at his residence, believes staff is watching him with cameras.  Speech is very low almost inaudible.  Remains compliant with medications, no reported or observed adverse effects.  No akithisia, EPS, or tremors. On Clozaril reports +BM today. Encouraged patient to continue medication regimen. PMH of osteogenesis imperfecta, HTN, hypothyroidism, DM type 2, and bowel resection with ileostomy, with Stage IV colorectal cancer s/p reversal, utilizes walker.  VSS. Continue to monitor and provide therapeutic support.    MON 8/2 Portions of ED not corroborated, but pt argumentative, irritable, and rather concrete and rigid. Unable to answer questions about medication, rationales fo them, but says he has taken clozapine for 19 years. Denies recent aggression. Very paranoid, also obsessional traits. Says he is unsure about taking all his risperdal at bedtime, will consider, gestures with hands and appears very rigid, does not want to change meds. Does not mention he has an ACT team. Does not mention arguments at residence. Agrees to speak with MD further in AM, as he appears distressed, does not want to speak further. Later in PM, approaches MD and says he wants his legal status changed. Appears paranoid, tells MD he can do this, points to regulations on the wall, and says "you do not have to do what the doctor in the emergency room says." Offered to speak further about legal status in AM. Pt will likely remain INVOL, but will discuss with tx team.  Using walker d/t OI. Says "I have a mutation" but wants writer to put "osteoporosis" in the chart, not OI.  No new SEs reported or observed.    TUES 8/3 Less argumentative today, slightly less paranoid. Very concrete, but insistent that  BID dosing of clozapine resolved his headache. Overinvested in meaningless and confusing and overvalued ideas with FTD all consistent with SCZ. Denies aggression despite contents of ED note. Unable to explain why he has an ACT team- says "they want to  get rid  of me at the residence."  Irritable and argumentative with MD when he interrupts with questions on occasion.  Compliant with medication. Isolative on unit, not overly engaged in tx. Says  the head  of his residence "wants to have sex with me."  No new SEs reported or observed.

## 2021-08-04 PROCEDURE — 99232 SBSQ HOSP IP/OBS MODERATE 35: CPT

## 2021-08-04 RX ORDER — CLOZAPINE 150 MG/1
250 TABLET, ORALLY DISINTEGRATING ORAL AT BEDTIME
Refills: 0 | Status: DISCONTINUED | OUTPATIENT
Start: 2021-08-04 | End: 2021-08-05

## 2021-08-04 RX ADMIN — CLOZAPINE 250 MILLIGRAM(S): 150 TABLET, ORALLY DISINTEGRATING ORAL at 21:05

## 2021-08-04 RX ADMIN — RISPERIDONE 0.5 MILLIGRAM(S): 4 TABLET ORAL at 10:00

## 2021-08-04 RX ADMIN — RISPERIDONE 0.5 MILLIGRAM(S): 4 TABLET ORAL at 21:05

## 2021-08-04 RX ADMIN — ATORVASTATIN CALCIUM 40 MILLIGRAM(S): 80 TABLET, FILM COATED ORAL at 21:05

## 2021-08-04 RX ADMIN — METFORMIN HYDROCHLORIDE 1000 MILLIGRAM(S): 850 TABLET ORAL at 10:00

## 2021-08-04 NOTE — BH INPATIENT PSYCHIATRY PROGRESS NOTE - CURRENT MEDICATION
MEDICATIONS  (STANDING):  atorvastatin 40 milliGRAM(s) Oral at bedtime  cloZAPine 200 milliGRAM(s) Oral at bedtime  dextrose 40% Gel 15 Gram(s) Oral once  glucagon  Injectable 1 milliGRAM(s) IntraMuscular once  insulin lispro (ADMELOG) corrective regimen sliding scale   SubCutaneous three times a day before meals  insulin lispro (ADMELOG) corrective regimen sliding scale   SubCutaneous at bedtime  metFORMIN 1000 milliGRAM(s) Oral daily  risperiDONE   Tablet 0.5 milliGRAM(s) Oral every 12 hours  sitaGLIPtin 100 milliGRAM(s) Oral daily    MEDICATIONS  (PRN):  gabapentin 300 milliGRAM(s) Oral four times a day PRN anxiety  haloperidol     Tablet 5 milliGRAM(s) Oral every 6 hours PRN agitation  haloperidol    Injectable 5 milliGRAM(s) IntraMuscular once PRN agitation  LORazepam     Tablet 2 milliGRAM(s) Oral every 6 hours PRN agitation  LORazepam   Injectable 2 milliGRAM(s) IntraMuscular once PRN agitation  melatonin. 5 milliGRAM(s) Oral at bedtime PRN Insomnia

## 2021-08-04 NOTE — BH INPATIENT PSYCHIATRY PROGRESS NOTE - NSBHASSESSSUMMFT_PSY_ALL_CORE
Patient remains labile and grossly disorganized with +delusions.    c/w current medications and titrate Clozapine

## 2021-08-04 NOTE — PROGRESS NOTE ADULT - ASSESSMENT
55 year old male presenting from [facility] ED to [facility] with admitting diagnosis of Residual schizophrenia with pertinent PMH of DM2, HLD, colon ca s/p resection, Li induced hypothyroidism being seen for DM2 management    1- DM2 - old records reviewed. on prior hospitalization in 2020, pt had required basal+bolus insulin and on discharge pre-meal was tapered and was sent home on Lantus with combination of home Janumet 50/500mg. prior A1C in 6 range now at [facility], FS are now creeping up to 200s - just on Metformin daily. per staff pt with psychosis and overall limited po - for now will add Januvia 50mg po daily and cw metformin and NISS and will monitor fs and adjust regimen as needed     2- Li induced hypothyroidism - noted this hx on prior records in 2020 and was on synthroid 100mcg daily - currently not on Li and not on synthroid - check TFTs    3- HLD - cw statin - check lipid profile    4- colon CA- per prior documentation s.p resection and reversal of ostomy - unclear if hx of chemo ro RT - needs out-pt f/u     5- schizo- per primary team, monitor QTC on psychotropics  55 year old male presenting from [facility] ED to [facility] with admitting diagnosis of Residual schizophrenia with pertinent PMH of DM2, HLD, colon ca s/p resection, Li induced hypothyroidism being seen for DM2 management    1- DM2 - old records reviewed. on prior hospitalization in 2020, pt had required basal+bolus insulin and on discharge pre-meal was tapered and was sent home on Lantus with combination of home Janumet 50/500mg BID. prior A1C in 6 range now at [facility], FS are now creeping up to 200s - just on Metformin daily. per staff pt with psychosis and overall limited po - for now will add Januvia 100mg po daily and cw metformin and NISS and will monitor fs and adjust regimen as needed     2- Li induced hypothyroidism - noted this hx on prior records in 2020 and was on synthroid 100mcg daily - currently not on Li and not on synthroid - check TFTs    3- HLD - cw statin - check lipid profile    4- colon CA- per prior documentation s.p resection and reversal of ostomy - unclear if hx of chemo ro RT - needs out-pt f/u     5- schizo- per primary team, monitor QTC on psychotropics

## 2021-08-04 NOTE — BH INPATIENT PSYCHIATRY PROGRESS NOTE - NSBHCHARTREVIEWVS_PSY_A_CORE FT
Vital Signs Last 24 Hrs  T(C): 36.2 (04 Aug 2021 08:08), Max: 36.6 (03 Aug 2021 20:17)  T(F): 97.2 (04 Aug 2021 08:08), Max: 97.9 (03 Aug 2021 20:17)  HR: --  BP: --  BP(mean): --  RR: --  SpO2: --

## 2021-08-05 LAB
A1C WITH ESTIMATED AVERAGE GLUCOSE RESULT: 7.3 % — HIGH (ref 4–5.6)
BASOPHILS # BLD AUTO: 0.08 K/UL — SIGNIFICANT CHANGE UP (ref 0–0.2)
BASOPHILS NFR BLD AUTO: 0.8 % — SIGNIFICANT CHANGE UP (ref 0–2)
CHOLEST SERPL-MCNC: 139 MG/DL — SIGNIFICANT CHANGE UP
EOSINOPHIL # BLD AUTO: 0.19 K/UL — SIGNIFICANT CHANGE UP (ref 0–0.5)
EOSINOPHIL NFR BLD AUTO: 1.8 % — SIGNIFICANT CHANGE UP (ref 0–6)
ESTIMATED AVERAGE GLUCOSE: 163 — SIGNIFICANT CHANGE UP
GLUCOSE BLDC GLUCOMTR-MCNC: 123 MG/DL — HIGH (ref 70–99)
GLUCOSE BLDC GLUCOMTR-MCNC: 185 MG/DL — HIGH (ref 70–99)
HCT VFR BLD CALC: 46 % — SIGNIFICANT CHANGE UP (ref 39–50)
HDLC SERPL-MCNC: 42 MG/DL — SIGNIFICANT CHANGE UP
HGB BLD-MCNC: 14.4 G/DL — SIGNIFICANT CHANGE UP (ref 13–17)
IANC: 6.89 K/UL — SIGNIFICANT CHANGE UP (ref 1.5–8.5)
IMM GRANULOCYTES NFR BLD AUTO: 0.3 % — SIGNIFICANT CHANGE UP (ref 0–1.5)
LIPID PNL WITH DIRECT LDL SERPL: 50 MG/DL — SIGNIFICANT CHANGE UP
LYMPHOCYTES # BLD AUTO: 2.45 K/UL — SIGNIFICANT CHANGE UP (ref 1–3.3)
LYMPHOCYTES # BLD AUTO: 23.5 % — SIGNIFICANT CHANGE UP (ref 13–44)
MCHC RBC-ENTMCNC: 26 PG — LOW (ref 27–34)
MCHC RBC-ENTMCNC: 31.3 GM/DL — LOW (ref 32–36)
MCV RBC AUTO: 83 FL — SIGNIFICANT CHANGE UP (ref 80–100)
MONOCYTES # BLD AUTO: 0.79 K/UL — SIGNIFICANT CHANGE UP (ref 0–0.9)
MONOCYTES NFR BLD AUTO: 7.6 % — SIGNIFICANT CHANGE UP (ref 2–14)
NEUTROPHILS # BLD AUTO: 6.89 K/UL — SIGNIFICANT CHANGE UP (ref 1.8–7.4)
NEUTROPHILS NFR BLD AUTO: 66 % — SIGNIFICANT CHANGE UP (ref 43–77)
NON HDL CHOLESTEROL: 97 MG/DL — SIGNIFICANT CHANGE UP
NRBC # BLD: 0 /100 WBCS — SIGNIFICANT CHANGE UP
NRBC # FLD: 0 K/UL — SIGNIFICANT CHANGE UP
PLATELET # BLD AUTO: 209 K/UL — SIGNIFICANT CHANGE UP (ref 150–400)
RBC # BLD: 5.54 M/UL — SIGNIFICANT CHANGE UP (ref 4.2–5.8)
RBC # FLD: 14.4 % — SIGNIFICANT CHANGE UP (ref 10.3–14.5)
T3 SERPL-MCNC: 123 NG/DL — SIGNIFICANT CHANGE UP (ref 80–200)
T4 AB SER-ACNC: 6.7 UG/DL — SIGNIFICANT CHANGE UP (ref 5.1–13)
TRIGL SERPL-MCNC: 234 MG/DL — HIGH
TSH SERPL-MCNC: 6.02 UIU/ML — HIGH (ref 0.27–4.2)
WBC # BLD: 10.43 K/UL — SIGNIFICANT CHANGE UP (ref 3.8–10.5)
WBC # FLD AUTO: 10.43 K/UL — SIGNIFICANT CHANGE UP (ref 3.8–10.5)

## 2021-08-05 PROCEDURE — 99232 SBSQ HOSP IP/OBS MODERATE 35: CPT

## 2021-08-05 RX ORDER — CLOZAPINE 150 MG/1
225 TABLET, ORALLY DISINTEGRATING ORAL AT BEDTIME
Refills: 0 | Status: DISCONTINUED | OUTPATIENT
Start: 2021-08-05 | End: 2021-08-10

## 2021-08-05 RX ADMIN — Medication 1: at 08:26

## 2021-08-05 RX ADMIN — ATORVASTATIN CALCIUM 40 MILLIGRAM(S): 80 TABLET, FILM COATED ORAL at 21:11

## 2021-08-05 RX ADMIN — METFORMIN HYDROCHLORIDE 1000 MILLIGRAM(S): 850 TABLET ORAL at 08:27

## 2021-08-05 RX ADMIN — CLOZAPINE 225 MILLIGRAM(S): 150 TABLET, ORALLY DISINTEGRATING ORAL at 21:12

## 2021-08-05 RX ADMIN — RISPERIDONE 0.5 MILLIGRAM(S): 4 TABLET ORAL at 08:27

## 2021-08-05 NOTE — BH INPATIENT PSYCHIATRY PROGRESS NOTE - CURRENT MEDICATION
MEDICATIONS  (STANDING):  atorvastatin 40 milliGRAM(s) Oral at bedtime  cloZAPine 225 milliGRAM(s) Oral at bedtime  dextrose 40% Gel 15 Gram(s) Oral once  glucagon  Injectable 1 milliGRAM(s) IntraMuscular once  insulin lispro (ADMELOG) corrective regimen sliding scale   SubCutaneous three times a day before meals  insulin lispro (ADMELOG) corrective regimen sliding scale   SubCutaneous at bedtime  metFORMIN 1000 milliGRAM(s) Oral daily  sitaGLIPtin 100 milliGRAM(s) Oral daily    MEDICATIONS  (PRN):  gabapentin 300 milliGRAM(s) Oral four times a day PRN anxiety  haloperidol     Tablet 5 milliGRAM(s) Oral every 6 hours PRN agitation  haloperidol    Injectable 5 milliGRAM(s) IntraMuscular once PRN agitation  LORazepam     Tablet 2 milliGRAM(s) Oral every 6 hours PRN agitation  LORazepam   Injectable 2 milliGRAM(s) IntraMuscular once PRN agitation  melatonin. 5 milliGRAM(s) Oral at bedtime PRN Insomnia

## 2021-08-05 NOTE — BH INPATIENT PSYCHIATRY PROGRESS NOTE - NSBHFUPINTERVALHXFT_PSY_A_CORE
Chart reviewed and case discussed with treatment team. No events reported overnight. Sleep and appetite is fair. Patient was cooperative with blood work this morning. He refused the increased dose of Clozapine last night to 250mg but was willing to comply with increased dose of 225mg. Patient remains delusional and endorsed +AH of the soul of the planet talking to him and explaining to him how to rebuild the ozone layer. Patient stated that the "planet fought back, it created Covid". Patient ambulating well with walker. He makes sexually inappropriate comments at times and very disorganized. Patient is compliant with medications, no adverse effects reported.

## 2021-08-05 NOTE — BH INPATIENT PSYCHIATRY PROGRESS NOTE - NSBHCHARTREVIEWVS_PSY_A_CORE FT
Vital Signs Last 24 Hrs  T(C): 36.5 (05 Aug 2021 08:03), Max: 36.5 (05 Aug 2021 08:03)  T(F): 97.7 (05 Aug 2021 08:03), Max: 97.7 (05 Aug 2021 08:03)  HR: 89 (05 Aug 2021 08:03) (89 - 89)  BP: 130/71 (05 Aug 2021 08:03) (130/71 - 130/71)  BP(mean): --  RR: --  SpO2: --

## 2021-08-05 NOTE — BH INPATIENT PSYCHIATRY PROGRESS NOTE - NSBHMETABOLIC_PSY_ALL_CORE_FT
BMI: BMI (kg/m2): 30.3 (07-29-21 @ 14:53)  HbA1c: A1C with Estimated Average Glucose Result: 7.3 % (08-05-21 @ 10:24)    Glucose: POCT Blood Glucose.: 185 mg/dL (08-05-21 @ 08:12)    BP: 130/71 (08-05-21 @ 08:03) (130/71 - 130/71)  Lipid Panel: Date/Time: 08-05-21 @ 10:24  Cholesterol, Serum: 139  Direct LDL: --  HDL Cholesterol, Serum: 42  Total Cholesterol/HDL Ration Measurement: --  Triglycerides, Serum: 234

## 2021-08-05 NOTE — ED CDU PROVIDER DISPOSITION NOTE - CLINICAL COURSE
pt with schizoaffective disorder mixed type with paranoia and aggressive behavior; pt to be admitted [facility] for further inpatient care

## 2021-08-05 NOTE — PROGRESS NOTE ADULT - ASSESSMENT
55 year old male presenting from [facility] ED to [facility] with admitting diagnosis of Residual schizophrenia with pertinent PMH of DM2, HLD, colon ca s/p resection, Li induced hypothyroidism being seen for DM2 management    1- DM2 - old records reviewed. on prior hospitalization in 2020, pt had required basal+bolus insulin and on discharge pre-meal was tapered and was sent home on Lantus with combination of home Janumet 50/500mg BID. prior A1C in 6 range now at [facility], FS are now creeping up to 200s - just on Metformin daily. per staff pt with psychosis and overall limited po - added Januvia 100mg po daily and cw metformin and NISS and will monitor fs and adjust regimen as needed - AM FS controlled, will monitor     2- Li induced hypothyroidism - noted this hx on prior records in 2020 and was on synthroid 100mcg daily - currently not on Li and not on synthroid - check TFTs - DW covering NP - reordered for today     3- HLD - cw statin - check lipid profile    4- colon CA- per prior documentation s.p resection and reversal of ostomy - unclear if hx of chemo ro RT - needs out-pt f/u     5- schizo- per primary team, monitor QTC on psychotropics

## 2021-08-06 LAB
GLUCOSE BLDC GLUCOMTR-MCNC: 129 MG/DL — HIGH (ref 70–99)
GLUCOSE BLDC GLUCOMTR-MCNC: 156 MG/DL — HIGH (ref 70–99)
GLUCOSE BLDC GLUCOMTR-MCNC: 161 MG/DL — HIGH (ref 70–99)

## 2021-08-06 PROCEDURE — 99231 SBSQ HOSP IP/OBS SF/LOW 25: CPT

## 2021-08-06 PROCEDURE — 99232 SBSQ HOSP IP/OBS MODERATE 35: CPT

## 2021-08-06 RX ORDER — POLYETHYLENE GLYCOL 3350 17 G/17G
17 POWDER, FOR SOLUTION ORAL DAILY
Refills: 0 | Status: DISCONTINUED | OUTPATIENT
Start: 2021-08-06 | End: 2021-11-30

## 2021-08-06 RX ORDER — SENNA PLUS 8.6 MG/1
2 TABLET ORAL AT BEDTIME
Refills: 0 | Status: DISCONTINUED | OUTPATIENT
Start: 2021-08-06 | End: 2021-10-08

## 2021-08-06 RX ADMIN — METFORMIN HYDROCHLORIDE 1000 MILLIGRAM(S): 850 TABLET ORAL at 08:55

## 2021-08-06 NOTE — BH INPATIENT PSYCHIATRY PROGRESS NOTE - NSBHASSESSSUMMFT_PSY_ALL_CORE
Patient remains labile and grossly disorganized with +delusions.  He was reported to be irritable and energetic previously.  Now he seems depressed/flat.  He is compliant with his clozapine 225.  He asked after miralax and senna and I am adding these.  H ei s un able to care for self.      c/w current medications and titrate Clozapine

## 2021-08-06 NOTE — BH INPATIENT PSYCHIATRY PROGRESS NOTE - NSBHCHARTREVIEWVS_PSY_A_CORE FT
Vital Signs Last 24 Hrs  T(C): 36.6 (06 Aug 2021 08:34), Max: 36.8 (05 Aug 2021 20:35)  T(F): 97.8 (06 Aug 2021 08:34), Max: 98.3 (05 Aug 2021 20:35)  HR: --  BP: 111/79 (06 Aug 2021 08:34) (111/79 - 111/79)  BP(mean): 92 (06 Aug 2021 08:34) (92 - 92)  RR: 18 (06 Aug 2021 08:34) (18 - 18)  SpO2: --

## 2021-08-06 NOTE — BH INPATIENT PSYCHIATRY PROGRESS NOTE - NSBHFUPINTERVALHXFT_PSY_A_CORE
Chart reviewed and case discussed with treatment team. No events reported overnight. Sleep and appetite is fair. He was compliant with clozapine 225 last night.  Complaint with medicaitons except for insulin.  No prns.  He complains of some constipation hard stools, asks after senna and miralax

## 2021-08-06 NOTE — BH INPATIENT PSYCHIATRY PROGRESS NOTE - OTHER
paranoid, bizarre blunted to flat Abnormal gait,  uses walker walks with walker depressed to neutral limited by disorganizaiton

## 2021-08-06 NOTE — BH INPATIENT PSYCHIATRY PROGRESS NOTE - PRN MEDS
MEDICATIONS  (PRN):  gabapentin 300 milliGRAM(s) Oral four times a day PRN anxiety  haloperidol     Tablet 5 milliGRAM(s) Oral every 6 hours PRN agitation  haloperidol    Injectable 5 milliGRAM(s) IntraMuscular once PRN agitation  LORazepam     Tablet 2 milliGRAM(s) Oral every 6 hours PRN agitation  LORazepam   Injectable 2 milliGRAM(s) IntraMuscular once PRN agitation  melatonin. 5 milliGRAM(s) Oral at bedtime PRN Insomnia  polyethylene glycol 3350 17 Gram(s) Oral daily PRN constipation

## 2021-08-06 NOTE — PROGRESS NOTE ADULT - SUBJECTIVE AND OBJECTIVE BOX
Patient is a 55y old  Male who presents with a chief complaint of DM2    SUBJECTIVE / OVERNIGHT EVENTS: no events     ROS:  No HA/DZ  No Vision changes   No CP, SOB  No N/V/D  No Edema  No Rash  NO weakness, numbness    MEDICATIONS  (STANDING):  atorvastatin 40 milliGRAM(s) Oral at bedtime  cloZAPine 200 milliGRAM(s) Oral at bedtime  dextrose 40% Gel 15 Gram(s) Oral once  glucagon  Injectable 1 milliGRAM(s) IntraMuscular once  insulin lispro (ADMELOG) corrective regimen sliding scale   SubCutaneous three times a day before meals  insulin lispro (ADMELOG) corrective regimen sliding scale   SubCutaneous at bedtime  metFORMIN 1000 milliGRAM(s) Oral daily  risperiDONE   Tablet 0.5 milliGRAM(s) Oral every 12 hours    MEDICATIONS  (PRN):  gabapentin 300 milliGRAM(s) Oral four times a day PRN anxiety  haloperidol     Tablet 5 milliGRAM(s) Oral every 6 hours PRN agitation  haloperidol    Injectable 5 milliGRAM(s) IntraMuscular once PRN agitation  LORazepam     Tablet 2 milliGRAM(s) Oral every 6 hours PRN agitation  LORazepam   Injectable 2 milliGRAM(s) IntraMuscular once PRN agitation  melatonin. 5 milliGRAM(s) Oral at bedtime PRN Insomnia      T(C): 36.2 (08-04-21 @ 08:08)  HR: --90  BP: --121/84  RR: --12  SpO2: --  CAPILLARY BLOOD GLUCOSE      POCT Blood Glucose.: 205 mg/dL (03 Aug 2021 20:29)  POCT Blood Glucose.: 211 mg/dL (03 Aug 2021 16:46)  POCT Blood Glucose.: 205 mg/dL (03 Aug 2021 16:23)  POCT Blood Glucose.: 149 mg/dL (03 Aug 2021 11:59)    I&O's Summary      PHYSICAL EXAM:  GENERAL: NAD, well-developed  HEAD:  Atraumatic, Normocephalic  EYES: EOMI, PERRL, conjunctiva and sclera clear  NECK: Supple, No JVD  CHEST/LUNG: Clear to auscultation bilaterally  HEART: Regular rate and rhythm; No murmurs, rubs, or gallops, No Edema  ABDOMEN: Soft, Nontender, Nondistended; Bowel sounds present  EXTREMITIES:  2+ Peripheral Pulses, No clubbing, cyanosis  PSYCH: No SI/HI  NEUROLOGY: non-focal  SKIN: No rashes or lesions    LABS:          fs 205              RADIOLOGY & ADDITIONAL TESTS:    Imaging Personally Reviewed:    Consultant(s) Notes Reviewed:      Care Discussed with Consultants/Other Providers:  
Patient is a 55y old  Male who presents with a chief complaint of DM2    SUBJECTIVE / OVERNIGHT EVENTS: no events     ROS:  No HA/DZ  No Vision changes   No CP, SOB  No N/V/D  No Edema  No Rash  NO weakness, numbness    MEDICATIONS  (STANDING):  atorvastatin 40 milliGRAM(s) Oral at bedtime  cloZAPine 250 milliGRAM(s) Oral at bedtime  dextrose 40% Gel 15 Gram(s) Oral once  glucagon  Injectable 1 milliGRAM(s) IntraMuscular once  insulin lispro (ADMELOG) corrective regimen sliding scale   SubCutaneous three times a day before meals  insulin lispro (ADMELOG) corrective regimen sliding scale   SubCutaneous at bedtime  metFORMIN 1000 milliGRAM(s) Oral daily  risperiDONE   Tablet 0.5 milliGRAM(s) Oral every 12 hours  sitaGLIPtin 100 milliGRAM(s) Oral daily    MEDICATIONS  (PRN):  gabapentin 300 milliGRAM(s) Oral four times a day PRN anxiety  haloperidol     Tablet 5 milliGRAM(s) Oral every 6 hours PRN agitation  haloperidol    Injectable 5 milliGRAM(s) IntraMuscular once PRN agitation  LORazepam     Tablet 2 milliGRAM(s) Oral every 6 hours PRN agitation  LORazepam   Injectable 2 milliGRAM(s) IntraMuscular once PRN agitation  melatonin. 5 milliGRAM(s) Oral at bedtime PRN Insomnia      T(C): 36.5 (08-05-21 @ 08:03)  HR: 89 (08-05-21 @ 08:03)  BP: 130/71 (08-05-21 @ 08:03)  RR: --12  SpO2: --  CAPILLARY BLOOD GLUCOSE      POCT Blood Glucose.: 185 mg/dL (05 Aug 2021 08:12)    I&O's Summary      PHYSICAL EXAM:  GENERAL: NAD, well-developed  HEAD:  Atraumatic, Normocephalic  EYES: EOMI, PERRL, conjunctiva and sclera clear  NECK: Supple, No JVD  CHEST/LUNG: Clear to auscultation bilaterally  HEART: Regular rate and rhythm; No murmurs, rubs, or gallops, No Edema  ABDOMEN: Soft, Nontender, Nondistended; Bowel sounds present  EXTREMITIES:  2+ Peripheral Pulses, No clubbing, cyanosis  PSYCH: No SI/HI  NEUROLOGY: non-focal  SKIN: No rashes or lesions    LABS:                        RADIOLOGY & ADDITIONAL TESTS:    Imaging Personally Reviewed:    Consultant(s) Notes Reviewed:      Care Discussed with Consultants/Other Providers:  
Patient is a 55y old  Male who presents with a chief complaint of dm2    SUBJECTIVE / OVERNIGHT EVENTS: NO EVENTS     ROS:  No HA/DZ  No Vision changes   No CP, SOB  No N/V/D  No Edema  No Rash  NO weakness, numbness    MEDICATIONS  (STANDING):  atorvastatin 40 milliGRAM(s) Oral at bedtime  cloZAPine 225 milliGRAM(s) Oral at bedtime  dextrose 40% Gel 15 Gram(s) Oral once  glucagon  Injectable 1 milliGRAM(s) IntraMuscular once  insulin lispro (ADMELOG) corrective regimen sliding scale   SubCutaneous three times a day before meals  insulin lispro (ADMELOG) corrective regimen sliding scale   SubCutaneous at bedtime  metFORMIN 1000 milliGRAM(s) Oral daily  sitaGLIPtin 100 milliGRAM(s) Oral daily    MEDICATIONS  (PRN):  gabapentin 300 milliGRAM(s) Oral four times a day PRN anxiety  haloperidol     Tablet 5 milliGRAM(s) Oral every 6 hours PRN agitation  haloperidol    Injectable 5 milliGRAM(s) IntraMuscular once PRN agitation  LORazepam     Tablet 2 milliGRAM(s) Oral every 6 hours PRN agitation  LORazepam   Injectable 2 milliGRAM(s) IntraMuscular once PRN agitation  melatonin. 5 milliGRAM(s) Oral at bedtime PRN Insomnia      T(C): 36.6 (08-06-21 @ 08:34)  HR: --88  BP: 111/79 (08-06-21 @ 08:34)  RR: 12 (08-06-21 @ 08:34)  SpO2: --  CAPILLARY BLOOD GLUCOSE      POCT Blood Glucose.: 161 mg/dL (06 Aug 2021 08:36)  POCT Blood Glucose.: 123 mg/dL (05 Aug 2021 16:31)    I&O's Summary      PHYSICAL EXAM:  GENERAL: NAD, well-developed  HEAD:  Atraumatic, Normocephalic  EYES: EOMI, PERRL, conjunctiva and sclera clear  NECK: Supple, No JVD  CHEST/LUNG: Clear to auscultation bilaterally  HEART: Regular rate and rhythm; No murmurs, rubs, or gallops, No Edema  ABDOMEN: Soft, Nontender, Nondistended; Bowel sounds present  EXTREMITIES:  2+ Peripheral Pulses, No clubbing, cyanosis  PSYCH: No SI/HI  NEUROLOGY: non-focal  SKIN: No rashes or lesions    LABS:                        14.4   10.43 )-----------( 209      ( 05 Aug 2021 10:24 )             46.0                         RADIOLOGY & ADDITIONAL TESTS:    Imaging Personally Reviewed:    Consultant(s) Notes Reviewed:      Care Discussed with Consultants/Other Providers:

## 2021-08-06 NOTE — PROGRESS NOTE ADULT - ASSESSMENT
55 year old male presenting from [facility] ED to [facility] with admitting diagnosis of Residual schizophrenia with pertinent PMH of DM2, HLD, colon ca s/p resection, Li induced hypothyroidism being seen for DM2 management    1- DM2 - old records reviewed. on prior hospitalization in 2020, pt had required basal+bolus insulin and on discharge pre-meal was tapered and was sent home on Lantus with combination of home Janumet 50/500mg BID. prior A1C in 6 range now at [facility], FS are now creeping up to 200s - just on Metformin daily. per staff pt with psychosis and overall limited po - added Januvia 100mg po daily and cw metformin and NISS and will monitor fs and adjust regimen as needed - AM FS controlled, will monitor - repeat A1C 7.3    2- Li induced hypothyroidism - noted this hx on prior records in 2020 and was on synthroid 100mcg daily - currently not on Li and not on synthroid - TFTs consistent with subclinical hypothyroidism - can monitor off synthroid and monitor TFTs - can repeat as out-pt in 6 weeks     3- HLD - cw statin - lipid profile noted     4- colon CA- per prior documentation s.p resection and reversal of ostomy - unclear if hx of chemo ro RT - needs out-pt f/u     5- schizo- per primary team, monitor QTC on psychotropics

## 2021-08-06 NOTE — BH INPATIENT PSYCHIATRY PROGRESS NOTE - CURRENT MEDICATION
MEDICATIONS  (STANDING):  atorvastatin 40 milliGRAM(s) Oral at bedtime  cloZAPine 225 milliGRAM(s) Oral at bedtime  dextrose 40% Gel 15 Gram(s) Oral once  glucagon  Injectable 1 milliGRAM(s) IntraMuscular once  insulin lispro (ADMELOG) corrective regimen sliding scale   SubCutaneous three times a day before meals  insulin lispro (ADMELOG) corrective regimen sliding scale   SubCutaneous at bedtime  metFORMIN 1000 milliGRAM(s) Oral daily  senna 2 Tablet(s) Oral at bedtime  sitaGLIPtin 100 milliGRAM(s) Oral daily    MEDICATIONS  (PRN):  gabapentin 300 milliGRAM(s) Oral four times a day PRN anxiety  haloperidol     Tablet 5 milliGRAM(s) Oral every 6 hours PRN agitation  haloperidol    Injectable 5 milliGRAM(s) IntraMuscular once PRN agitation  LORazepam     Tablet 2 milliGRAM(s) Oral every 6 hours PRN agitation  LORazepam   Injectable 2 milliGRAM(s) IntraMuscular once PRN agitation  melatonin. 5 milliGRAM(s) Oral at bedtime PRN Insomnia  polyethylene glycol 3350 17 Gram(s) Oral daily PRN constipation

## 2021-08-06 NOTE — BH INPATIENT PSYCHIATRY PROGRESS NOTE - NSBHMETABOLIC_PSY_ALL_CORE_FT
BMI: BMI (kg/m2): 30.3 (07-29-21 @ 14:53)  HbA1c: A1C with Estimated Average Glucose Result: 7.3 % (08-05-21 @ 10:24)    Glucose: POCT Blood Glucose.: 156 mg/dL (08-06-21 @ 16:33)    BP: 111/79 (08-06-21 @ 08:34) (111/79 - 130/71)  Lipid Panel: Date/Time: 08-05-21 @ 10:24  Cholesterol, Serum: 139  Direct LDL: --  HDL Cholesterol, Serum: 42  Total Cholesterol/HDL Ration Measurement: --  Triglycerides, Serum: 234

## 2021-08-07 LAB
GLUCOSE BLDC GLUCOMTR-MCNC: 173 MG/DL — HIGH (ref 70–99)
GLUCOSE BLDC GLUCOMTR-MCNC: 200 MG/DL — HIGH (ref 70–99)

## 2021-08-07 RX ADMIN — METFORMIN HYDROCHLORIDE 1000 MILLIGRAM(S): 850 TABLET ORAL at 08:24

## 2021-08-08 LAB
GLUCOSE BLDC GLUCOMTR-MCNC: 141 MG/DL — HIGH (ref 70–99)
GLUCOSE BLDC GLUCOMTR-MCNC: 153 MG/DL — HIGH (ref 70–99)

## 2021-08-09 LAB — GLUCOSE BLDC GLUCOMTR-MCNC: 190 MG/DL — HIGH (ref 70–99)

## 2021-08-09 PROCEDURE — 99232 SBSQ HOSP IP/OBS MODERATE 35: CPT

## 2021-08-09 RX ADMIN — METFORMIN HYDROCHLORIDE 1000 MILLIGRAM(S): 850 TABLET ORAL at 08:39

## 2021-08-09 NOTE — BH INPATIENT PSYCHIATRY PROGRESS NOTE - NSBHMETABOLIC_PSY_ALL_CORE_FT
BMI: BMI (kg/m2): 30.3 (07-29-21 @ 14:53)  HbA1c: A1C with Estimated Average Glucose Result: 7.3 % (08-05-21 @ 10:24)    Glucose: POCT Blood Glucose.: 190 mg/dL (08-09-21 @ 08:15)    BP: 139/81 (08-08-21 @ 20:08) (120/74 - 139/81)  Lipid Panel: Date/Time: 08-05-21 @ 10:24  Cholesterol, Serum: 139  Direct LDL: --  HDL Cholesterol, Serum: 42  Total Cholesterol/HDL Ration Measurement: --  Triglycerides, Serum: 234

## 2021-08-09 NOTE — BH INPATIENT PSYCHIATRY PROGRESS NOTE - NSBHCHARTREVIEWVS_PSY_A_CORE FT
Vital Signs Last 24 Hrs  T(C): 36.4 (09 Aug 2021 07:47), Max: 36.8 (08 Aug 2021 20:08)  T(F): 97.5 (09 Aug 2021 07:47), Max: 98.3 (08 Aug 2021 20:08)  HR: 77 (08 Aug 2021 20:08) (77 - 77)  BP: 139/81 (08 Aug 2021 20:08) (139/81 - 139/81)  BP(mean): --  RR: 18 (09 Aug 2021 07:47) (18 - 18)  SpO2: --

## 2021-08-09 NOTE — BH INPATIENT PSYCHIATRY PROGRESS NOTE - NSBHASSESSSUMMFT_PSY_ALL_CORE
Patient remains labile, grossly disorganized and intrusive with +delusions. He has been refusing medications.    continue to encourage medications

## 2021-08-09 NOTE — ED ADULT NURSE NOTE - DISCHARGE DATE/TIME
Patient was left a message letting him know that results are back patient is to call back and go over the results with staff.    25-Feb-2020 17:56

## 2021-08-09 NOTE — BH INPATIENT PSYCHIATRY PROGRESS NOTE - NSBHFUPINTERVALHXFT_PSY_A_CORE
Chart reviewed and case discussed with treatment team. No events reported over the weekend. Sleep is "so, so" and appetite is "ok". Patient has been refusing all medications. Patient is grossly disorganized, tangential and intrusive. He endorsed some paranoid delusions but then ended the interview with "that's all I can say". Patient is out on the unit, ambulating with walker.

## 2021-08-10 LAB
GLUCOSE BLDC GLUCOMTR-MCNC: 115 MG/DL — HIGH (ref 70–99)
GLUCOSE BLDC GLUCOMTR-MCNC: 143 MG/DL — HIGH (ref 70–99)

## 2021-08-10 PROCEDURE — 99232 SBSQ HOSP IP/OBS MODERATE 35: CPT

## 2021-08-10 RX ORDER — CLOZAPINE 150 MG/1
25 TABLET, ORALLY DISINTEGRATING ORAL AT BEDTIME
Refills: 0 | Status: DISCONTINUED | OUTPATIENT
Start: 2021-08-10 | End: 2021-08-12

## 2021-08-10 RX ADMIN — METFORMIN HYDROCHLORIDE 1000 MILLIGRAM(S): 850 TABLET ORAL at 08:33

## 2021-08-10 NOTE — BH INPATIENT PSYCHIATRY PROGRESS NOTE - NSBHASSESSSUMMFT_PSY_ALL_CORE
Patient remains labile, grossly disorganized and intrusive with +delusions. He has been selectively refusing medication.    -Restart Clozaril at 25mg PO at bedtime as patient has refused the last four nights  -CBC + diff on 8/12/21 AM

## 2021-08-10 NOTE — BH INPATIENT PSYCHIATRY PROGRESS NOTE - CURRENT MEDICATION
MEDICATIONS  (STANDING):  atorvastatin 40 milliGRAM(s) Oral at bedtime  cloZAPine 25 milliGRAM(s) Oral at bedtime  dextrose 40% Gel 15 Gram(s) Oral once  glucagon  Injectable 1 milliGRAM(s) IntraMuscular once  insulin lispro (ADMELOG) corrective regimen sliding scale   SubCutaneous three times a day before meals  insulin lispro (ADMELOG) corrective regimen sliding scale   SubCutaneous at bedtime  metFORMIN 1000 milliGRAM(s) Oral daily  senna 2 Tablet(s) Oral at bedtime  sitaGLIPtin 100 milliGRAM(s) Oral daily    MEDICATIONS  (PRN):  gabapentin 300 milliGRAM(s) Oral four times a day PRN anxiety  haloperidol     Tablet 5 milliGRAM(s) Oral every 6 hours PRN agitation  haloperidol    Injectable 5 milliGRAM(s) IntraMuscular once PRN agitation  LORazepam     Tablet 2 milliGRAM(s) Oral every 6 hours PRN agitation  LORazepam   Injectable 2 milliGRAM(s) IntraMuscular once PRN agitation  melatonin. 5 milliGRAM(s) Oral at bedtime PRN Insomnia  polyethylene glycol 3350 17 Gram(s) Oral daily PRN constipation

## 2021-08-10 NOTE — BH INPATIENT PSYCHIATRY PROGRESS NOTE - NSBHMETABOLIC_PSY_ALL_CORE_FT
BMI: BMI (kg/m2): 30.3 (07-29-21 @ 14:53)  HbA1c: A1C with Estimated Average Glucose Result: 7.3 % (08-05-21 @ 10:24)    Glucose: POCT Blood Glucose.: 143 mg/dL (08-10-21 @ 08:25)    BP: 131/83 (08-10-21 @ 08:11) (131/83 - 139/81)  Lipid Panel: Date/Time: 08-05-21 @ 10:24  Cholesterol, Serum: 139  Direct LDL: --  HDL Cholesterol, Serum: 42  Total Cholesterol/HDL Ration Measurement: --  Triglycerides, Serum: 234

## 2021-08-10 NOTE — BH INPATIENT PSYCHIATRY PROGRESS NOTE - OTHER
paranoid, bizarre limited by disorganization, partially cooperative concrete blunted to flat responding to internal stimuli and internally preoccupied

## 2021-08-10 NOTE — BH INPATIENT PSYCHIATRY PROGRESS NOTE - NSBHCHARTREVIEWVS_PSY_A_CORE FT
Vital Signs Last 24 Hrs  T(C): 36.7 (10 Aug 2021 08:11), Max: 36.8 (09 Aug 2021 19:02)  T(F): 98 (10 Aug 2021 08:11), Max: 98.2 (09 Aug 2021 19:02)  HR: 80 (10 Aug 2021 08:11) (80 - 80)  BP: 131/83 (10 Aug 2021 08:11) (131/83 - 131/83)  BP(mean): --  RR: --  SpO2: --

## 2021-08-10 NOTE — BH INPATIENT PSYCHIATRY PROGRESS NOTE - NSBHFUPINTERVALHXFT_PSY_A_CORE
Pt compliant with AM medication, but refused HS medication last night, including Clozaril.  Case discussed with treatment team.  Pt more isolative in his room today, initially says he does not want to speak, then speaks and remains tangential, disorganized, paranoid, talks about how people on the outside are after him.  Pt also actively responding to internal stimuli and appears internally preoccupied.  Discussed with patient recommendation for medication compliance with Clozaril and all medications daily and Clozaril will have to be re-initiated at 25mg PO at bedtime as patient has refused for the past four nights.  Also discussed plan for state transfer if patient's sx do not improve.  Pt understood and agreed to take Clozaril tonight.

## 2021-08-11 LAB
GLUCOSE BLDC GLUCOMTR-MCNC: 134 MG/DL — HIGH (ref 70–99)
GLUCOSE BLDC GLUCOMTR-MCNC: 155 MG/DL — HIGH (ref 70–99)

## 2021-08-11 PROCEDURE — 99232 SBSQ HOSP IP/OBS MODERATE 35: CPT

## 2021-08-11 RX ADMIN — CLOZAPINE 25 MILLIGRAM(S): 150 TABLET, ORALLY DISINTEGRATING ORAL at 21:18

## 2021-08-11 RX ADMIN — ATORVASTATIN CALCIUM 40 MILLIGRAM(S): 80 TABLET, FILM COATED ORAL at 21:18

## 2021-08-11 RX ADMIN — METFORMIN HYDROCHLORIDE 1000 MILLIGRAM(S): 850 TABLET ORAL at 08:30

## 2021-08-11 RX ADMIN — SENNA PLUS 2 TABLET(S): 8.6 TABLET ORAL at 21:18

## 2021-08-11 NOTE — BH INPATIENT PSYCHIATRY PROGRESS NOTE - NSBHCHARTREVIEWVS_PSY_A_CORE FT
Vital Signs Last 24 Hrs  T(C): 36.4 (11 Aug 2021 07:24), Max: 36.4 (11 Aug 2021 07:24)  T(F): 97.6 (11 Aug 2021 07:24), Max: 97.6 (11 Aug 2021 07:24)  HR: --  BP: --  BP(mean): --  RR: --  SpO2: --

## 2021-08-11 NOTE — BH INPATIENT PSYCHIATRY PROGRESS NOTE - NSBHASSESSSUMMFT_PSY_ALL_CORE
Patient remains labile, grossly disorganized and intrusive with +delusions. He has been selectively refusing medication.    -Restart Clozaril at 25mg PO at bedtime  -CBC + diff on 8/12/21 AM

## 2021-08-11 NOTE — BH INPATIENT PSYCHIATRY PROGRESS NOTE - NSBHFUPINTERVALHXFT_PSY_A_CORE
Pt compliant with AM medication, but refused HS medication, including Clozaril.  No other events reported overnight.  Chart reviewed and case discussed with treatment team.  Pt sexually preoccupied, observed masturbating in his room on several attempts to meet with him.  Later in the day, patient able to meet with writer, but remains disorganized and labile and appears paranoid.  Pt again says he will take his Clozaril tonight, cannot come up with a reason as to why he refused it.  Pt denies physical complaints.  Pt then starts responding to internal stimuli and unable to engage in meaningful interview.

## 2021-08-11 NOTE — BH INPATIENT PSYCHIATRY PROGRESS NOTE - OTHER
blunted to flat responding to internal stimuli and internally preoccupied limited by disorganization, partially cooperative, sexually preoccupied paranoid, bizarre concrete

## 2021-08-11 NOTE — BH INPATIENT PSYCHIATRY PROGRESS NOTE - NSBHMETABOLIC_PSY_ALL_CORE_FT
BMI: BMI (kg/m2): 30.3 (07-29-21 @ 14:53)  HbA1c: A1C with Estimated Average Glucose Result: 7.3 % (08-05-21 @ 10:24)    Glucose: POCT Blood Glucose.: 155 mg/dL (08-11-21 @ 12:21)    BP: 131/83 (08-10-21 @ 08:11) (131/83 - 139/81)  Lipid Panel: Date/Time: 08-05-21 @ 10:24  Cholesterol, Serum: 139  Direct LDL: --  HDL Cholesterol, Serum: 42  Total Cholesterol/HDL Ration Measurement: --  Triglycerides, Serum: 234

## 2021-08-12 LAB
BASOPHILS # BLD AUTO: 0.06 K/UL — SIGNIFICANT CHANGE UP (ref 0–0.2)
BASOPHILS NFR BLD AUTO: 0.5 % — SIGNIFICANT CHANGE UP (ref 0–2)
EOSINOPHIL # BLD AUTO: 0.11 K/UL — SIGNIFICANT CHANGE UP (ref 0–0.5)
EOSINOPHIL NFR BLD AUTO: 1 % — SIGNIFICANT CHANGE UP (ref 0–6)
GLUCOSE BLDC GLUCOMTR-MCNC: 126 MG/DL — HIGH (ref 70–99)
GLUCOSE BLDC GLUCOMTR-MCNC: 143 MG/DL — HIGH (ref 70–99)
HCT VFR BLD CALC: 47.9 % — SIGNIFICANT CHANGE UP (ref 39–50)
HGB BLD-MCNC: 15 G/DL — SIGNIFICANT CHANGE UP (ref 13–17)
IANC: 7.04 K/UL — SIGNIFICANT CHANGE UP (ref 1.5–8.5)
IMM GRANULOCYTES NFR BLD AUTO: 0.4 % — SIGNIFICANT CHANGE UP (ref 0–1.5)
LYMPHOCYTES # BLD AUTO: 2.94 K/UL — SIGNIFICANT CHANGE UP (ref 1–3.3)
LYMPHOCYTES # BLD AUTO: 26.6 % — SIGNIFICANT CHANGE UP (ref 13–44)
MCHC RBC-ENTMCNC: 26.4 PG — LOW (ref 27–34)
MCHC RBC-ENTMCNC: 31.3 GM/DL — LOW (ref 32–36)
MCV RBC AUTO: 84.3 FL — SIGNIFICANT CHANGE UP (ref 80–100)
MONOCYTES # BLD AUTO: 0.85 K/UL — SIGNIFICANT CHANGE UP (ref 0–0.9)
MONOCYTES NFR BLD AUTO: 7.7 % — SIGNIFICANT CHANGE UP (ref 2–14)
NEUTROPHILS # BLD AUTO: 7.04 K/UL — SIGNIFICANT CHANGE UP (ref 1.8–7.4)
NEUTROPHILS NFR BLD AUTO: 63.8 % — SIGNIFICANT CHANGE UP (ref 43–77)
NRBC # BLD: 0 /100 WBCS — SIGNIFICANT CHANGE UP
NRBC # FLD: 0 K/UL — SIGNIFICANT CHANGE UP
PLATELET # BLD AUTO: 238 K/UL — SIGNIFICANT CHANGE UP (ref 150–400)
RBC # BLD: 5.68 M/UL — SIGNIFICANT CHANGE UP (ref 4.2–5.8)
RBC # FLD: 14.8 % — HIGH (ref 10.3–14.5)
WBC # BLD: 11.04 K/UL — HIGH (ref 3.8–10.5)
WBC # FLD AUTO: 11.04 K/UL — HIGH (ref 3.8–10.5)

## 2021-08-12 PROCEDURE — 99232 SBSQ HOSP IP/OBS MODERATE 35: CPT

## 2021-08-12 RX ORDER — CLOZAPINE 150 MG/1
50 TABLET, ORALLY DISINTEGRATING ORAL AT BEDTIME
Refills: 0 | Status: DISCONTINUED | OUTPATIENT
Start: 2021-08-12 | End: 2021-08-13

## 2021-08-12 RX ADMIN — METFORMIN HYDROCHLORIDE 1000 MILLIGRAM(S): 850 TABLET ORAL at 08:55

## 2021-08-12 RX ADMIN — HALOPERIDOL DECANOATE 5 MILLIGRAM(S): 100 INJECTION INTRAMUSCULAR at 17:20

## 2021-08-12 RX ADMIN — HALOPERIDOL DECANOATE 5 MILLIGRAM(S): 100 INJECTION INTRAMUSCULAR at 09:25

## 2021-08-12 RX ADMIN — CLOZAPINE 50 MILLIGRAM(S): 150 TABLET, ORALLY DISINTEGRATING ORAL at 20:56

## 2021-08-12 RX ADMIN — SENNA PLUS 2 TABLET(S): 8.6 TABLET ORAL at 20:56

## 2021-08-12 RX ADMIN — Medication 5 MILLIGRAM(S): at 20:56

## 2021-08-12 RX ADMIN — ATORVASTATIN CALCIUM 40 MILLIGRAM(S): 80 TABLET, FILM COATED ORAL at 20:56

## 2021-08-12 RX ADMIN — Medication 2 MILLIGRAM(S): at 17:19

## 2021-08-12 NOTE — BH INPATIENT PSYCHIATRY PROGRESS NOTE - OTHER
responding to internal stimuli and internally preoccupied concrete Abnormal gait,  uses walker paranoid, bizarre limited by disorganization, minimally cooperative, sexually preoccupied

## 2021-08-12 NOTE — BH INPATIENT PSYCHIATRY PROGRESS NOTE - NSBHMETABOLIC_PSY_ALL_CORE_FT
BMI: BMI (kg/m2): 30.3 (07-29-21 @ 14:53)  HbA1c: A1C with Estimated Average Glucose Result: 7.3 % (08-05-21 @ 10:24)    Glucose: POCT Blood Glucose.: 143 mg/dL (08-12-21 @ 08:53)    BP: 131/83 (08-10-21 @ 08:11) (131/83 - 131/83)  Lipid Panel: Date/Time: 08-05-21 @ 10:24  Cholesterol, Serum: 139  Direct LDL: --  HDL Cholesterol, Serum: 42  Total Cholesterol/HDL Ration Measurement: --  Triglycerides, Serum: 234

## 2021-08-12 NOTE — BH INPATIENT PSYCHIATRY PROGRESS NOTE - CURRENT MEDICATION
MEDICATIONS  (STANDING):  atorvastatin 40 milliGRAM(s) Oral at bedtime  cloZAPine 50 milliGRAM(s) Oral at bedtime  dextrose 40% Gel 15 Gram(s) Oral once  glucagon  Injectable 1 milliGRAM(s) IntraMuscular once  insulin lispro (ADMELOG) corrective regimen sliding scale   SubCutaneous three times a day before meals  insulin lispro (ADMELOG) corrective regimen sliding scale   SubCutaneous at bedtime  metFORMIN 1000 milliGRAM(s) Oral daily  senna 2 Tablet(s) Oral at bedtime  sitaGLIPtin 100 milliGRAM(s) Oral daily    MEDICATIONS  (PRN):  gabapentin 300 milliGRAM(s) Oral four times a day PRN anxiety  haloperidol     Tablet 5 milliGRAM(s) Oral every 6 hours PRN agitation  haloperidol    Injectable 5 milliGRAM(s) IntraMuscular once PRN agitation  LORazepam     Tablet 2 milliGRAM(s) Oral every 6 hours PRN agitation  LORazepam   Injectable 2 milliGRAM(s) IntraMuscular once PRN agitation  melatonin. 5 milliGRAM(s) Oral at bedtime PRN Insomnia  polyethylene glycol 3350 17 Gram(s) Oral daily PRN constipation

## 2021-08-12 NOTE — BH INPATIENT PSYCHIATRY PROGRESS NOTE - NSBHASSESSSUMMFT_PSY_ALL_CORE
Patient remains labile, grossly disorganized and intrusive with +delusions. He has been selectively refusing medication.    -Restart Clozaril and titrate to 50mg PO at bedtime  -CBC + diff on 8/12/21 AM

## 2021-08-12 NOTE — BH INPATIENT PSYCHIATRY PROGRESS NOTE - NSBHFUPINTERVALHXFT_PSY_A_CORE
Pt compliant with medication and tolerating it well.  Pt this morning, was exposing himself to staff, running down hallway, barricaded himself in a female room several times, took off his pants and laid in a female's bed without her present, was given PO PRN medication with poor effect and unable to follow staff redirection.  Pt was placed on CO 1:1 for disorganization and sexual activity risk.  On interview, patient minimally cooperative due to disorganization.  Mumbles on about people targeting him, continues to appear internally preoccupied and is actively responding to internal stimuli.

## 2021-08-13 LAB — GLUCOSE BLDC GLUCOMTR-MCNC: 208 MG/DL — HIGH (ref 70–99)

## 2021-08-13 PROCEDURE — 99232 SBSQ HOSP IP/OBS MODERATE 35: CPT

## 2021-08-13 RX ORDER — ACETAMINOPHEN 500 MG
650 TABLET ORAL ONCE
Refills: 0 | Status: DISCONTINUED | OUTPATIENT
Start: 2021-08-13 | End: 2021-11-30

## 2021-08-13 RX ORDER — CLOZAPINE 150 MG/1
75 TABLET, ORALLY DISINTEGRATING ORAL AT BEDTIME
Refills: 0 | Status: DISCONTINUED | OUTPATIENT
Start: 2021-08-13 | End: 2021-08-15

## 2021-08-13 RX ADMIN — CLOZAPINE 75 MILLIGRAM(S): 150 TABLET, ORALLY DISINTEGRATING ORAL at 21:03

## 2021-08-13 RX ADMIN — Medication 2 MILLIGRAM(S): at 11:06

## 2021-08-13 RX ADMIN — ATORVASTATIN CALCIUM 40 MILLIGRAM(S): 80 TABLET, FILM COATED ORAL at 21:03

## 2021-08-13 RX ADMIN — SENNA PLUS 2 TABLET(S): 8.6 TABLET ORAL at 21:03

## 2021-08-13 RX ADMIN — Medication 5 MILLIGRAM(S): at 21:05

## 2021-08-13 RX ADMIN — METFORMIN HYDROCHLORIDE 1000 MILLIGRAM(S): 850 TABLET ORAL at 08:35

## 2021-08-13 RX ADMIN — HALOPERIDOL DECANOATE 5 MILLIGRAM(S): 100 INJECTION INTRAMUSCULAR at 08:35

## 2021-08-13 NOTE — BH INPATIENT PSYCHIATRY PROGRESS NOTE - NSBHMETABOLIC_PSY_ALL_CORE_FT
BMI: BMI (kg/m2): 30.3 (07-29-21 @ 14:53)  HbA1c: A1C with Estimated Average Glucose Result: 7.3 % (08-05-21 @ 10:24)    Glucose: POCT Blood Glucose.: 126 mg/dL (08-12-21 @ 16:23)    BP: 111/63 (08-13-21 @ 07:45) (111/63 - 111/63)  Lipid Panel: Date/Time: 08-05-21 @ 10:24  Cholesterol, Serum: 139  Direct LDL: --  HDL Cholesterol, Serum: 42  Total Cholesterol/HDL Ration Measurement: --  Triglycerides, Serum: 234

## 2021-08-13 NOTE — BH INPATIENT PSYCHIATRY PROGRESS NOTE - NSBHASSESSSUMMFT_PSY_ALL_CORE
Patient remains labile, grossly disorganized and intrusive with +delusions.     -Restart Clozaril and titrate to 75mg PO at bedtime  -CBC + diff on 8/19/21 AM  -CO 1:1 for disorganization and sexual activity risk

## 2021-08-13 NOTE — BH INPATIENT PSYCHIATRY PROGRESS NOTE - NSBHFUPINTERVALHXFT_PSY_A_CORE
Pt compliant with medication and tolerating it well.  Chart reviewed and case discussed with treatment team.  Pt without physical complaints.  Per nursing, patient attempted to kick staff yesterday evening.  Pt observed on the unit wandering down hallways, requiring redirection from staff, maintained on CO 1:1 for disorganization and sexual activity risk.  Pt then sleeping in the day room, refusing to go to his room, reports he wants to rest there, reports he is scared to go to his room.  Pt speaks very softly and is hard to hear, remains disorganized and tangential, jumps from topic to topic.  Pt talks about Clozaril, then asks writer to tell him his past issues, then about how others are targeting him.  Pt continues to appear internally preoccupied and is responding to internal stimuli.

## 2021-08-13 NOTE — BH INPATIENT PSYCHIATRY PROGRESS NOTE - CURRENT MEDICATION
MEDICATIONS  (STANDING):  atorvastatin 40 milliGRAM(s) Oral at bedtime  cloZAPine 75 milliGRAM(s) Oral at bedtime  dextrose 40% Gel 15 Gram(s) Oral once  glucagon  Injectable 1 milliGRAM(s) IntraMuscular once  insulin lispro (ADMELOG) corrective regimen sliding scale   SubCutaneous three times a day before meals  insulin lispro (ADMELOG) corrective regimen sliding scale   SubCutaneous at bedtime  metFORMIN 1000 milliGRAM(s) Oral daily  senna 2 Tablet(s) Oral at bedtime  sitaGLIPtin 100 milliGRAM(s) Oral daily    MEDICATIONS  (PRN):  gabapentin 300 milliGRAM(s) Oral four times a day PRN anxiety  haloperidol     Tablet 5 milliGRAM(s) Oral every 6 hours PRN agitation  haloperidol    Injectable 5 milliGRAM(s) IntraMuscular once PRN agitation  LORazepam     Tablet 2 milliGRAM(s) Oral every 6 hours PRN agitation  LORazepam   Injectable 2 milliGRAM(s) IntraMuscular once PRN agitation  melatonin. 5 milliGRAM(s) Oral at bedtime PRN Insomnia  polyethylene glycol 3350 17 Gram(s) Oral daily PRN constipation

## 2021-08-13 NOTE — BH INPATIENT PSYCHIATRY PROGRESS NOTE - OTHER
responding to internal stimuli and internally preoccupied paranoid, bizarre Abnormal gait,  uses walker limited by disorganization, minimally cooperative, sexually preoccupied concrete

## 2021-08-13 NOTE — BH INPATIENT PSYCHIATRY PROGRESS NOTE - NSBHCHARTREVIEWVS_PSY_A_CORE FT
Vital Signs Last 24 Hrs  T(C): 36.2 (13 Aug 2021 07:45), Max: 36.3 (12 Aug 2021 20:29)  T(F): 97.2 (13 Aug 2021 07:45), Max: 97.3 (12 Aug 2021 20:29)  HR: 93 (13 Aug 2021 07:45) (93 - 93)  BP: 111/63 (13 Aug 2021 07:45) (111/63 - 111/63)  BP(mean): --  RR: --  SpO2: --

## 2021-08-14 LAB — GLUCOSE BLDC GLUCOMTR-MCNC: 246 MG/DL — HIGH (ref 70–99)

## 2021-08-14 PROCEDURE — 99232 SBSQ HOSP IP/OBS MODERATE 35: CPT

## 2021-08-14 RX ADMIN — CLOZAPINE 75 MILLIGRAM(S): 150 TABLET, ORALLY DISINTEGRATING ORAL at 20:53

## 2021-08-14 NOTE — BH INPATIENT PSYCHIATRY PROGRESS NOTE - NSBHCHARTREVIEWVS_PSY_A_CORE FT
Vital Signs Last 24 Hrs  T(C): 36.3 (14 Aug 2021 07:35), Max: 36.3 (13 Aug 2021 19:37)  T(F): 97.3 (14 Aug 2021 07:35), Max: 97.4 (13 Aug 2021 19:37)  HR: --  BP: --  BP(mean): --  RR: --  SpO2: --

## 2021-08-14 NOTE — BH INPATIENT PSYCHIATRY PROGRESS NOTE - NSBHFUPINTERVALHXFT_PSY_A_CORE
Patient is followed up for psychosis. Chart, medications and labs reviewed.  Patient is discussed with nursing staff. No significant overnight issues.  Per nursing patient has been tenuous on the unit the past several days, with increasing aggression. Attempted to kick staff few days ago, at times can be oppositional.   Nursing reports patient has been taking his standing psychiatric medications, but refused all medical medications this morning.  Sleeping and eating ok.  Nursing reports no interval events.    Patient is seen in his room, interviewed at bedside  accompanied by CO 1:1.  Patient is in bed on his back, talking to himself,  Patient will remain on CO 1:1 for disorganization and sexual activity risk. Pt is fondling himself during interview, required redirection.  Interview was challenging patient is difficult to engage, he is hard to hear, remains disorganized and tangential, jumps from topic to topic.  Endorsing some paranoia stating people are  targeting him.  Pt continues to appear internally preoccupied and is responding to internal stimuli.  No acute medical concerns, VSS.

## 2021-08-14 NOTE — BH INPATIENT PSYCHIATRY PROGRESS NOTE - OTHER
concrete limited by disorganization, minimally cooperative, sexually preoccupied Abnormal gait,  uses walker responding to internal stimuli and internally preoccupied paranoid, bizarre

## 2021-08-14 NOTE — BH INPATIENT PSYCHIATRY PROGRESS NOTE - CURRENT MEDICATION
MEDICATIONS  (STANDING):  acetaminophen   Tablet .. 650 milliGRAM(s) Oral once  atorvastatin 40 milliGRAM(s) Oral at bedtime  cloZAPine 75 milliGRAM(s) Oral at bedtime  dextrose 40% Gel 15 Gram(s) Oral once  glucagon  Injectable 1 milliGRAM(s) IntraMuscular once  insulin lispro (ADMELOG) corrective regimen sliding scale   SubCutaneous three times a day before meals  insulin lispro (ADMELOG) corrective regimen sliding scale   SubCutaneous at bedtime  metFORMIN 1000 milliGRAM(s) Oral daily  senna 2 Tablet(s) Oral at bedtime  sitaGLIPtin 100 milliGRAM(s) Oral daily    MEDICATIONS  (PRN):  gabapentin 300 milliGRAM(s) Oral four times a day PRN anxiety  haloperidol     Tablet 5 milliGRAM(s) Oral every 6 hours PRN agitation  haloperidol    Injectable 5 milliGRAM(s) IntraMuscular once PRN agitation  LORazepam     Tablet 2 milliGRAM(s) Oral every 6 hours PRN agitation  LORazepam   Injectable 2 milliGRAM(s) IntraMuscular once PRN agitation  melatonin. 5 milliGRAM(s) Oral at bedtime PRN Insomnia  polyethylene glycol 3350 17 Gram(s) Oral daily PRN constipation

## 2021-08-14 NOTE — BH INPATIENT PSYCHIATRY PROGRESS NOTE - NSBHMETABOLIC_PSY_ALL_CORE_FT
BMI: BMI (kg/m2): 30.3 (07-29-21 @ 14:53)  HbA1c: A1C with Estimated Average Glucose Result: 7.3 % (08-05-21 @ 10:24)    Glucose: POCT Blood Glucose.: 208 mg/dL (08-13-21 @ 20:26)    BP: 111/63 (08-13-21 @ 07:45) (111/63 - 111/63)  Lipid Panel: Date/Time: 08-05-21 @ 10:24  Cholesterol, Serum: 139  Direct LDL: --  HDL Cholesterol, Serum: 42  Total Cholesterol/HDL Ration Measurement: --  Triglycerides, Serum: 234

## 2021-08-15 PROCEDURE — 99232 SBSQ HOSP IP/OBS MODERATE 35: CPT

## 2021-08-15 RX ORDER — CLOZAPINE 150 MG/1
100 TABLET, ORALLY DISINTEGRATING ORAL AT BEDTIME
Refills: 0 | Status: DISCONTINUED | OUTPATIENT
Start: 2021-08-15 | End: 2021-08-16

## 2021-08-15 RX ADMIN — CLOZAPINE 100 MILLIGRAM(S): 150 TABLET, ORALLY DISINTEGRATING ORAL at 20:47

## 2021-08-15 NOTE — BH INPATIENT PSYCHIATRY PROGRESS NOTE - NSBHFUPINTERVALHXFT_PSY_A_CORE
Patient is followed up for psychosis. Chart, medications and labs reviewed.  Patient is discussed with nursing staff.  Nursing reports no interval events.  Patient took po medications last night. No mention of sleep disturbances or appetite concerns. Patient is observed in his room, standing with back against the wall, arms crossed over his chest, totally nude.  Patient’s room in disarray.  Interview continues to be limited,  minimal eye contact made with writer, kept eyes  on the floor. Remains overall selectively mute.  Symptoms continues to impact pts functionality. Not compliant with MOO overall, still has on/off compliance with standing medications, so IM Olanzapine continued.  CO continued for safety given disorganization.  No acute medical concerns, VSS.  Patient is followed up for psychosis. Chart, medications and labs reviewed.  Patient is discussed with nursing staff. No significant overnight issues.  Per nursing patient has been tenuous on the unit the past several days, with increasing aggression. Attempted to kick staff few days ago, at times can be oppositional.   Nursing reports patient has been taking his standing psychiatric medications, but refused all medical medications. Pt is on Clozaril, reports +BM. Sleeping and eating ok.    Patient is seen in his room, interviewed at bedside accompanied by CO 1:1.  Patient is observed sitting on bed, talking to himselg,  interview is limited due to severity of his psychotic symptoms.   Interview was challenging patient is difficult to engage, he is hard to hear, remains tangential, appear internally preoccupied, RODRIGUEZ, jumps from topic to topic.  Endorsing some paranoia stating people are targeting him.  No acute medical concerns, VSS. Patient will remain on CO 1:1 for disorganization and sexual activity risk.  Increase Clozaril 100mg tonight.

## 2021-08-15 NOTE — BH INPATIENT PSYCHIATRY PROGRESS NOTE - NSBHCHARTREVIEWVS_PSY_A_CORE FT
Vital Signs Last 24 Hrs  T(C): 36.3 (14 Aug 2021 07:35), Max: 36.3 (14 Aug 2021 07:35)  T(F): 97.3 (14 Aug 2021 07:35), Max: 97.3 (14 Aug 2021 07:35)  HR: --  BP: --  BP(mean): --  RR: --  SpO2: --

## 2021-08-15 NOTE — BH INPATIENT PSYCHIATRY PROGRESS NOTE - OTHER
limited by disorganization, minimally cooperative, sexually preoccupied Abnormal gait,  uses walker paranoid, bizarre concrete responding to internal stimuli and internally preoccupied

## 2021-08-15 NOTE — BH INPATIENT PSYCHIATRY PROGRESS NOTE - NSBHMETABOLIC_PSY_ALL_CORE_FT
BMI: BMI (kg/m2): 30.3 (07-29-21 @ 14:53)  HbA1c: A1C with Estimated Average Glucose Result: 7.3 % (08-05-21 @ 10:24)    Glucose: POCT Blood Glucose.: 246 mg/dL (08-14-21 @ 08:00)    BP: 111/63 (08-13-21 @ 07:45) (111/63 - 111/63)  Lipid Panel: Date/Time: 08-05-21 @ 10:24  Cholesterol, Serum: 139  Direct LDL: --  HDL Cholesterol, Serum: 42  Total Cholesterol/HDL Ration Measurement: --  Triglycerides, Serum: 234

## 2021-08-16 LAB
GLUCOSE BLDC GLUCOMTR-MCNC: 138 MG/DL — HIGH (ref 70–99)
GLUCOSE BLDC GLUCOMTR-MCNC: 200 MG/DL — HIGH (ref 70–99)

## 2021-08-16 PROCEDURE — 99232 SBSQ HOSP IP/OBS MODERATE 35: CPT

## 2021-08-16 RX ORDER — CLOZAPINE 150 MG/1
125 TABLET, ORALLY DISINTEGRATING ORAL AT BEDTIME
Refills: 0 | Status: DISCONTINUED | OUTPATIENT
Start: 2021-08-16 | End: 2021-08-17

## 2021-08-16 RX ADMIN — METFORMIN HYDROCHLORIDE 1000 MILLIGRAM(S): 850 TABLET ORAL at 08:11

## 2021-08-16 RX ADMIN — SENNA PLUS 2 TABLET(S): 8.6 TABLET ORAL at 20:49

## 2021-08-16 RX ADMIN — ATORVASTATIN CALCIUM 40 MILLIGRAM(S): 80 TABLET, FILM COATED ORAL at 20:50

## 2021-08-16 RX ADMIN — CLOZAPINE 125 MILLIGRAM(S): 150 TABLET, ORALLY DISINTEGRATING ORAL at 20:50

## 2021-08-16 NOTE — BH INPATIENT PSYCHIATRY PROGRESS NOTE - OTHER
responding to internal stimuli and internally preoccupied limited by disorganization, minimally cooperative concrete paranoid, bizarre Abnormal gait,  uses walker fair with encouragement

## 2021-08-16 NOTE — BH INPATIENT PSYCHIATRY PROGRESS NOTE - NSBHMETABOLIC_PSY_ALL_CORE_FT
BMI: BMI (kg/m2): 30.3 (07-29-21 @ 14:53)  HbA1c: A1C with Estimated Average Glucose Result: 7.3 % (08-05-21 @ 10:24)    Glucose: POCT Blood Glucose.: 200 mg/dL (08-16-21 @ 08:14)    BP: --  Lipid Panel: Date/Time: 08-05-21 @ 10:24  Cholesterol, Serum: 139  Direct LDL: --  HDL Cholesterol, Serum: 42  Total Cholesterol/HDL Ration Measurement: --  Triglycerides, Serum: 234

## 2021-08-16 NOTE — BH INPATIENT PSYCHIATRY PROGRESS NOTE - NSBHCHARTREVIEWVS_PSY_A_CORE FT
Vital Signs Last 24 Hrs  T(C): 35.9 (16 Aug 2021 07:36), Max: 36.5 (15 Aug 2021 18:49)  T(F): 96.6 (16 Aug 2021 07:36), Max: 97.7 (15 Aug 2021 18:49)  HR: --  BP: --  BP(mean): --  RR: --  SpO2: --

## 2021-08-16 NOTE — BH INPATIENT PSYCHIATRY PROGRESS NOTE - NSBHFUPINTERVALHXFT_PSY_A_CORE
Pt compliant with medication and tolerating it well.  Chart reviewed, case discussed with treatment team.  Per nursing, patient remains sexually preoccupied, masturbating in his room.  Pt also continues to have disorganized and bizarre behavior, was walking around with feces in his hands.  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  Pt remains on CO 1:1 for disorganization and sexual activity risk.  Interview remains limited due to disorganization and patient speaks very softly and is hard to hear.  He continues to appear internally preoccupied and is actively responding to internal stimuli.  Pt has been showering with staff encouragement and no complaints about eating or sleeping.  Pt mostly isolating in his room today.

## 2021-08-16 NOTE — BH INPATIENT PSYCHIATRY PROGRESS NOTE - NSBHASSESSSUMMFT_PSY_ALL_CORE
Patient remains labile, grossly disorganized and intrusive with +delusions.     -Restart Clozaril and titrate to 125mg PO at bedtime  -CBC + diff on 8/19/21 AM  -CO 1:1 for disorganization and sexual activity risk

## 2021-08-16 NOTE — BH INPATIENT PSYCHIATRY PROGRESS NOTE - CURRENT MEDICATION
MEDICATIONS  (STANDING):  acetaminophen   Tablet .. 650 milliGRAM(s) Oral once  atorvastatin 40 milliGRAM(s) Oral at bedtime  cloZAPine 125 milliGRAM(s) Oral at bedtime  dextrose 40% Gel 15 Gram(s) Oral once  glucagon  Injectable 1 milliGRAM(s) IntraMuscular once  insulin lispro (ADMELOG) corrective regimen sliding scale   SubCutaneous three times a day before meals  insulin lispro (ADMELOG) corrective regimen sliding scale   SubCutaneous at bedtime  metFORMIN 1000 milliGRAM(s) Oral daily  senna 2 Tablet(s) Oral at bedtime  sitaGLIPtin 100 milliGRAM(s) Oral daily    MEDICATIONS  (PRN):  gabapentin 300 milliGRAM(s) Oral four times a day PRN anxiety  haloperidol     Tablet 5 milliGRAM(s) Oral every 6 hours PRN agitation  haloperidol    Injectable 5 milliGRAM(s) IntraMuscular once PRN agitation  LORazepam     Tablet 2 milliGRAM(s) Oral every 6 hours PRN agitation  LORazepam   Injectable 2 milliGRAM(s) IntraMuscular once PRN agitation  melatonin. 5 milliGRAM(s) Oral at bedtime PRN Insomnia  polyethylene glycol 3350 17 Gram(s) Oral daily PRN constipation

## 2021-08-17 DIAGNOSIS — E11.9 TYPE 2 DIABETES MELLITUS WITHOUT COMPLICATIONS: ICD-10-CM

## 2021-08-17 LAB
GLUCOSE BLDC GLUCOMTR-MCNC: 136 MG/DL — HIGH (ref 70–99)
GLUCOSE BLDC GLUCOMTR-MCNC: 162 MG/DL — HIGH (ref 70–99)

## 2021-08-17 PROCEDURE — 99232 SBSQ HOSP IP/OBS MODERATE 35: CPT

## 2021-08-17 RX ORDER — CLOZAPINE 150 MG/1
150 TABLET, ORALLY DISINTEGRATING ORAL AT BEDTIME
Refills: 0 | Status: DISCONTINUED | OUTPATIENT
Start: 2021-08-17 | End: 2021-08-19

## 2021-08-17 RX ADMIN — METFORMIN HYDROCHLORIDE 1000 MILLIGRAM(S): 850 TABLET ORAL at 08:38

## 2021-08-17 NOTE — BH INPATIENT PSYCHIATRY PROGRESS NOTE - NSBHASSESSSUMMFT_PSY_ALL_CORE
Patient remains grossly disorganized.     -Restart Clozaril and titrate to 15mg PO at bedtime  -CBC + diff on 8/19/21 AM  -CO 1:1 for disorganization and sexual activity risk

## 2021-08-17 NOTE — BH INPATIENT PSYCHIATRY PROGRESS NOTE - NSBHMETABOLIC_PSY_ALL_CORE_FT
BMI: BMI (kg/m2): 30.3 (07-29-21 @ 14:53)  HbA1c: A1C with Estimated Average Glucose Result: 7.3 % (08-05-21 @ 10:24)    Glucose: POCT Blood Glucose.: 136 mg/dL (08-17-21 @ 12:32)    BP: --  Lipid Panel: Date/Time: 08-05-21 @ 10:24  Cholesterol, Serum: 139  Direct LDL: --  HDL Cholesterol, Serum: 42  Total Cholesterol/HDL Ration Measurement: --  Triglycerides, Serum: 234

## 2021-08-17 NOTE — BH INPATIENT PSYCHIATRY PROGRESS NOTE - CURRENT MEDICATION
MEDICATIONS  (STANDING):  acetaminophen   Tablet .. 650 milliGRAM(s) Oral once  atorvastatin 40 milliGRAM(s) Oral at bedtime  cloZAPine 150 milliGRAM(s) Oral at bedtime  dextrose 40% Gel 15 Gram(s) Oral once  glucagon  Injectable 1 milliGRAM(s) IntraMuscular once  insulin lispro (ADMELOG) corrective regimen sliding scale   SubCutaneous three times a day before meals  insulin lispro (ADMELOG) corrective regimen sliding scale   SubCutaneous at bedtime  metFORMIN 1000 milliGRAM(s) Oral daily  senna 2 Tablet(s) Oral at bedtime  sitaGLIPtin 100 milliGRAM(s) Oral daily    MEDICATIONS  (PRN):  gabapentin 300 milliGRAM(s) Oral four times a day PRN anxiety  haloperidol     Tablet 5 milliGRAM(s) Oral every 6 hours PRN agitation  haloperidol    Injectable 5 milliGRAM(s) IntraMuscular once PRN agitation  LORazepam     Tablet 2 milliGRAM(s) Oral every 6 hours PRN agitation  LORazepam   Injectable 2 milliGRAM(s) IntraMuscular once PRN agitation  melatonin. 5 milliGRAM(s) Oral at bedtime PRN Insomnia  polyethylene glycol 3350 17 Gram(s) Oral daily PRN constipation

## 2021-08-17 NOTE — BH INPATIENT PSYCHIATRY PROGRESS NOTE - NSBHCHARTREVIEWVS_PSY_A_CORE FT
Vital Signs Last 24 Hrs  T(C): 36.1 (17 Aug 2021 07:37), Max: 36.6 (16 Aug 2021 20:40)  T(F): 97 (17 Aug 2021 07:37), Max: 97.8 (16 Aug 2021 20:40)  HR: --  BP: --  BP(mean): --  RR: --  SpO2: --

## 2021-08-17 NOTE — BH INPATIENT PSYCHIATRY PROGRESS NOTE - NSBHFUPINTERVALHXFT_PSY_A_CORE
Pt compliant with medication and tolerating it well.  No events reported overnight.  Chart reviewed and case discussed with treatment team.  Pt without physical complaints.  Pt showers with staff encouragement.  Pt has been spending most of his time in his room, out for meals only, starting to respond to staff redirection.  Pt in bed during interview, wearing hooded sweatshirt, with noel over his eyes, refuses to remove it from his eyes to make eye contact.  Pt continues to speak softly at times, hard to hear.  Pt remains disorganized, jumps from topic to topic.  Pt reports he wants to move forward in life, then talks about how "I am here, you are not, then curses, talks about saving his life, then about "[employer]."  Pt unable to answer interview questions due to disorganization.  Pt does have brief moments of logical speech and does ask today when he will go back to his residence or his mother's house.  Encouraged continued medication compliance and behavioral control.  Pt agrees then asks writer to leave as he is trying to rest.

## 2021-08-17 NOTE — BH INPATIENT PSYCHIATRY PROGRESS NOTE - OTHER
responding to internal stimuli and internally preoccupied paranoid, bizarre limited by disorganization, minimally cooperative concrete fair with encouragement

## 2021-08-18 LAB
GLUCOSE BLDC GLUCOMTR-MCNC: 129 MG/DL — HIGH (ref 70–99)
GLUCOSE BLDC GLUCOMTR-MCNC: 137 MG/DL — HIGH (ref 70–99)
GLUCOSE BLDC GLUCOMTR-MCNC: 138 MG/DL — HIGH (ref 70–99)
GLUCOSE BLDC GLUCOMTR-MCNC: 187 MG/DL — HIGH (ref 70–99)

## 2021-08-18 PROCEDURE — 99232 SBSQ HOSP IP/OBS MODERATE 35: CPT

## 2021-08-18 RX ADMIN — CLOZAPINE 150 MILLIGRAM(S): 150 TABLET, ORALLY DISINTEGRATING ORAL at 21:11

## 2021-08-18 NOTE — BH INPATIENT PSYCHIATRY PROGRESS NOTE - NSBHFUPINTERVALHXFT_PSY_A_CORE
Chart reviewed and case discussed with treatment team.  Pt refused all medication last night and this morning.  Per report, patient attempted to wander down the female hallway and required staff redirection.  Pt maintained on CO 1:1 for sexual activity risk and disorganization.  Pt remains disorganized, appears internally preoccupied and paranoid.  Pt laying in be during interview, initially refuses to speak, then starts speaking, but in a whisper tone of voice that could not be heard.  Pt then said, "Clozaril is for Jews."  Pt encouraged to be compliant with medication.

## 2021-08-18 NOTE — BH INPATIENT PSYCHIATRY PROGRESS NOTE - OTHER
responding to internal stimuli and internally preoccupied fair with encouragement limited by disorganization, minimally cooperative paranoid, bizarre

## 2021-08-18 NOTE — BH INPATIENT PSYCHIATRY PROGRESS NOTE - NSBHMETABOLIC_PSY_ALL_CORE_FT
BMI: BMI (kg/m2): 30.3 (07-29-21 @ 14:53)  HbA1c: A1C with Estimated Average Glucose Result: 7.3 % (08-05-21 @ 10:24)    Glucose: POCT Blood Glucose.: 187 mg/dL (08-18-21 @ 12:08)    BP: --  Lipid Panel: Date/Time: 08-05-21 @ 10:24  Cholesterol, Serum: 139  Direct LDL: --  HDL Cholesterol, Serum: 42  Total Cholesterol/HDL Ration Measurement: --  Triglycerides, Serum: 234

## 2021-08-18 NOTE — BH INPATIENT PSYCHIATRY PROGRESS NOTE - NSBHASSESSSUMMFT_PSY_ALL_CORE
Patient remains grossly disorganized, refused medication last night and this morning    -Restart Clozaril and titrate to 150mg PO at bedtime  -CBC + diff on 8/19/21 AM  -CO 1:1 for disorganization and sexual activity risk

## 2021-08-19 LAB
GLUCOSE BLDC GLUCOMTR-MCNC: 131 MG/DL — HIGH (ref 70–99)
GLUCOSE BLDC GLUCOMTR-MCNC: 197 MG/DL — HIGH (ref 70–99)

## 2021-08-19 PROCEDURE — 99232 SBSQ HOSP IP/OBS MODERATE 35: CPT

## 2021-08-19 RX ORDER — CLOZAPINE 150 MG/1
175 TABLET, ORALLY DISINTEGRATING ORAL AT BEDTIME
Refills: 0 | Status: DISCONTINUED | OUTPATIENT
Start: 2021-08-19 | End: 2021-08-20

## 2021-08-19 RX ORDER — ACETAMINOPHEN 500 MG
650 TABLET ORAL EVERY 6 HOURS
Refills: 0 | Status: DISCONTINUED | OUTPATIENT
Start: 2021-08-19 | End: 2021-11-30

## 2021-08-19 RX ADMIN — Medication 1: at 08:37

## 2021-08-19 RX ADMIN — POLYETHYLENE GLYCOL 3350 17 GRAM(S): 17 POWDER, FOR SOLUTION ORAL at 10:39

## 2021-08-19 RX ADMIN — CLOZAPINE 175 MILLIGRAM(S): 150 TABLET, ORALLY DISINTEGRATING ORAL at 21:15

## 2021-08-19 RX ADMIN — METFORMIN HYDROCHLORIDE 1000 MILLIGRAM(S): 850 TABLET ORAL at 08:38

## 2021-08-19 RX ADMIN — Medication 650 MILLIGRAM(S): at 10:38

## 2021-08-19 RX ADMIN — Medication 650 MILLIGRAM(S): at 11:38

## 2021-08-19 RX ADMIN — ATORVASTATIN CALCIUM 40 MILLIGRAM(S): 80 TABLET, FILM COATED ORAL at 21:21

## 2021-08-19 NOTE — BH INPATIENT PSYCHIATRY PROGRESS NOTE - PRN MEDS
MEDICATIONS  (PRN):  acetaminophen   Tablet .. 650 milliGRAM(s) Oral every 6 hours PRN Mild Pain (1 - 3), Moderate Pain (4 - 6)  gabapentin 300 milliGRAM(s) Oral four times a day PRN anxiety  haloperidol     Tablet 5 milliGRAM(s) Oral every 6 hours PRN agitation  haloperidol    Injectable 5 milliGRAM(s) IntraMuscular once PRN agitation  LORazepam     Tablet 2 milliGRAM(s) Oral every 6 hours PRN agitation  LORazepam   Injectable 2 milliGRAM(s) IntraMuscular once PRN agitation  melatonin. 5 milliGRAM(s) Oral at bedtime PRN Insomnia  polyethylene glycol 3350 17 Gram(s) Oral daily PRN constipation

## 2021-08-19 NOTE — BH INPATIENT PSYCHIATRY PROGRESS NOTE - OTHER
paranoid, bizarre limited by disorganization, partially cooperative fair with encouragement responding to internal stimuli and internally preoccupied

## 2021-08-19 NOTE — BH INPATIENT PSYCHIATRY PROGRESS NOTE - NSBHFUPINTERVALHXFT_PSY_A_CORE
Pt compliant with medication and tolerating it well.  Chart reviewed and case discussed with nursing.  Per staff, patient threw water at a [facility] this morning.  Pt maintained on CO 1:1 for disorganization and sexual activity risk.  Pt in bed most of the morning.  Pt remains disorganized.  Pt reports, "who is Nick, play along with a movie theatre, I want to change my name."  Pt continues to speak softly, hard to hear, and reports he does so because speaking loud hurts his head.  Pt c/c/o headache, agrees to take Tylenol.  Pt also reports constipation x a few days, then reports, "God fixed my anus."

## 2021-08-19 NOTE — BH INPATIENT PSYCHIATRY PROGRESS NOTE - NSBHCHARTREVIEWVS_PSY_A_CORE FT
Vital Signs Last 24 Hrs  T(C): 36.4 (19 Aug 2021 08:31), Max: 36.4 (19 Aug 2021 08:31)  T(F): 97.6 (19 Aug 2021 08:31), Max: 97.6 (19 Aug 2021 08:31)  HR: --  BP: 125/77 (19 Aug 2021 08:31) (125/77 - 125/77)  BP(mean): 86 (19 Aug 2021 08:31) (86 - 86)  RR: 16 (18 Aug 2021 20:55) (16 - 16)  SpO2: --

## 2021-08-19 NOTE — BH INPATIENT PSYCHIATRY PROGRESS NOTE - CURRENT MEDICATION
MEDICATIONS  (STANDING):  acetaminophen   Tablet .. 650 milliGRAM(s) Oral once  atorvastatin 40 milliGRAM(s) Oral at bedtime  cloZAPine 175 milliGRAM(s) Oral at bedtime  dextrose 40% Gel 15 Gram(s) Oral once  glucagon  Injectable 1 milliGRAM(s) IntraMuscular once  insulin lispro (ADMELOG) corrective regimen sliding scale   SubCutaneous three times a day before meals  insulin lispro (ADMELOG) corrective regimen sliding scale   SubCutaneous at bedtime  metFORMIN 1000 milliGRAM(s) Oral daily  senna 2 Tablet(s) Oral at bedtime  sitaGLIPtin 100 milliGRAM(s) Oral daily    MEDICATIONS  (PRN):  acetaminophen   Tablet .. 650 milliGRAM(s) Oral every 6 hours PRN Mild Pain (1 - 3), Moderate Pain (4 - 6)  gabapentin 300 milliGRAM(s) Oral four times a day PRN anxiety  haloperidol     Tablet 5 milliGRAM(s) Oral every 6 hours PRN agitation  haloperidol    Injectable 5 milliGRAM(s) IntraMuscular once PRN agitation  LORazepam     Tablet 2 milliGRAM(s) Oral every 6 hours PRN agitation  LORazepam   Injectable 2 milliGRAM(s) IntraMuscular once PRN agitation  melatonin. 5 milliGRAM(s) Oral at bedtime PRN Insomnia  polyethylene glycol 3350 17 Gram(s) Oral daily PRN constipation

## 2021-08-19 NOTE — BH INPATIENT PSYCHIATRY PROGRESS NOTE - NSBHMETABOLIC_PSY_ALL_CORE_FT
BMI: BMI (kg/m2): 30.3 (07-29-21 @ 14:53)  HbA1c: A1C with Estimated Average Glucose Result: 7.3 % (08-05-21 @ 10:24)    Glucose: POCT Blood Glucose.: 197 mg/dL (08-19-21 @ 07:47)    BP: 125/77 (08-19-21 @ 08:31) (125/77 - 125/77)  Lipid Panel: Date/Time: 08-05-21 @ 10:24  Cholesterol, Serum: 139  Direct LDL: --  HDL Cholesterol, Serum: 42  Total Cholesterol/HDL Ration Measurement: --  Triglycerides, Serum: 234

## 2021-08-19 NOTE — BH INPATIENT PSYCHIATRY PROGRESS NOTE - NSBHASSESSSUMMFT_PSY_ALL_CORE
Patient remains grossly disorganized, compliant with medication today    -Restart Clozaril and titrate to 175mg PO at bedtime  -CBC + diff on 8/19/21 AM refused, reordered for 8/20/21 AM  -CO 1:1 for disorganization and sexual activity risk

## 2021-08-20 ENCOUNTER — APPOINTMENT (OUTPATIENT)
Dept: ENDOCRINOLOGY | Facility: CLINIC | Age: 56
End: 2021-08-20

## 2021-08-20 LAB
BASOPHILS # BLD AUTO: 0.05 K/UL — SIGNIFICANT CHANGE UP (ref 0–0.2)
BASOPHILS NFR BLD AUTO: 0.6 % — SIGNIFICANT CHANGE UP (ref 0–2)
EOSINOPHIL # BLD AUTO: 0.16 K/UL — SIGNIFICANT CHANGE UP (ref 0–0.5)
EOSINOPHIL NFR BLD AUTO: 1.8 % — SIGNIFICANT CHANGE UP (ref 0–6)
GLUCOSE BLDC GLUCOMTR-MCNC: 160 MG/DL — HIGH (ref 70–99)
GLUCOSE BLDC GLUCOMTR-MCNC: 169 MG/DL — HIGH (ref 70–99)
GLUCOSE BLDC GLUCOMTR-MCNC: 181 MG/DL — HIGH (ref 70–99)
GLUCOSE BLDC GLUCOMTR-MCNC: 196 MG/DL — HIGH (ref 70–99)
HCT VFR BLD CALC: 44.6 % — SIGNIFICANT CHANGE UP (ref 39–50)
HGB BLD-MCNC: 13.8 G/DL — SIGNIFICANT CHANGE UP (ref 13–17)
IANC: 5.42 K/UL — SIGNIFICANT CHANGE UP (ref 1.5–8.5)
IMM GRANULOCYTES NFR BLD AUTO: 0.7 % — SIGNIFICANT CHANGE UP (ref 0–1.5)
LYMPHOCYTES # BLD AUTO: 2.34 K/UL — SIGNIFICANT CHANGE UP (ref 1–3.3)
LYMPHOCYTES # BLD AUTO: 26.8 % — SIGNIFICANT CHANGE UP (ref 13–44)
MCHC RBC-ENTMCNC: 26.3 PG — LOW (ref 27–34)
MCHC RBC-ENTMCNC: 30.9 GM/DL — LOW (ref 32–36)
MCV RBC AUTO: 85 FL — SIGNIFICANT CHANGE UP (ref 80–100)
MONOCYTES # BLD AUTO: 0.69 K/UL — SIGNIFICANT CHANGE UP (ref 0–0.9)
MONOCYTES NFR BLD AUTO: 7.9 % — SIGNIFICANT CHANGE UP (ref 2–14)
NEUTROPHILS # BLD AUTO: 5.42 K/UL — SIGNIFICANT CHANGE UP (ref 1.8–7.4)
NEUTROPHILS NFR BLD AUTO: 62.2 % — SIGNIFICANT CHANGE UP (ref 43–77)
NRBC # BLD: 0 /100 WBCS — SIGNIFICANT CHANGE UP
NRBC # FLD: 0 K/UL — SIGNIFICANT CHANGE UP
PLATELET # BLD AUTO: 212 K/UL — SIGNIFICANT CHANGE UP (ref 150–400)
RBC # BLD: 5.25 M/UL — SIGNIFICANT CHANGE UP (ref 4.2–5.8)
RBC # FLD: 13.9 % — SIGNIFICANT CHANGE UP (ref 10.3–14.5)
WBC # BLD: 8.72 K/UL — SIGNIFICANT CHANGE UP (ref 3.8–10.5)
WBC # FLD AUTO: 8.72 K/UL — SIGNIFICANT CHANGE UP (ref 3.8–10.5)

## 2021-08-20 PROCEDURE — 99232 SBSQ HOSP IP/OBS MODERATE 35: CPT

## 2021-08-20 RX ORDER — CLOZAPINE 150 MG/1
200 TABLET, ORALLY DISINTEGRATING ORAL AT BEDTIME
Refills: 0 | Status: DISCONTINUED | OUTPATIENT
Start: 2021-08-20 | End: 2021-08-20

## 2021-08-20 RX ORDER — CHLORPROMAZINE HCL 10 MG
100 TABLET ORAL ONCE
Refills: 0 | Status: DISCONTINUED | OUTPATIENT
Start: 2021-08-20 | End: 2021-11-30

## 2021-08-20 RX ORDER — HALOPERIDOL DECANOATE 100 MG/ML
5 INJECTION INTRAMUSCULAR ONCE
Refills: 0 | Status: COMPLETED | OUTPATIENT
Start: 2021-08-20 | End: 2021-08-20

## 2021-08-20 RX ORDER — CLOZAPINE 150 MG/1
200 TABLET, ORALLY DISINTEGRATING ORAL AT BEDTIME
Refills: 0 | Status: DISCONTINUED | OUTPATIENT
Start: 2021-08-20 | End: 2021-08-21

## 2021-08-20 RX ORDER — CHLORPROMAZINE HCL 10 MG
100 TABLET ORAL EVERY 4 HOURS
Refills: 0 | Status: DISCONTINUED | OUTPATIENT
Start: 2021-08-20 | End: 2021-11-30

## 2021-08-20 RX ADMIN — Medication 2 MILLIGRAM(S): at 12:54

## 2021-08-20 RX ADMIN — HALOPERIDOL DECANOATE 5 MILLIGRAM(S): 100 INJECTION INTRAMUSCULAR at 12:54

## 2021-08-20 RX ADMIN — ATORVASTATIN CALCIUM 40 MILLIGRAM(S): 80 TABLET, FILM COATED ORAL at 20:31

## 2021-08-20 RX ADMIN — CLOZAPINE 200 MILLIGRAM(S): 150 TABLET, ORALLY DISINTEGRATING ORAL at 20:32

## 2021-08-20 NOTE — BH INPATIENT PSYCHIATRY PROGRESS NOTE - NSBHLEGALSTATUSCHANGE_PSY_ALL_CORE
[Sclera] : the sclera and conjunctiva were normal [General Appearance - Alert] : alert [General Appearance - In No Acute Distress] : in no acute distress [PERRL With Normal Accommodation] : pupils were equal in size, round, and reactive to light [Outer Ear] : the ears and nose were normal in appearance [Neck Appearance] : the appearance of the neck was normal [Arterial Pulses Carotid] : carotid pulses were normal with no bruits [Bowel Sounds] : normal bowel sounds [Abdomen Tenderness] : non-tender [Abdomen Soft] : soft [] : no hepato-splenomegaly [No CVA Tenderness] : no ~M costovertebral angle tenderness No

## 2021-08-20 NOTE — BH INPATIENT PSYCHIATRY PROGRESS NOTE - CURRENT MEDICATION
MEDICATIONS  (STANDING):  acetaminophen   Tablet .. 650 milliGRAM(s) Oral once  atorvastatin 40 milliGRAM(s) Oral at bedtime  cloZAPine 200 milliGRAM(s) Oral at bedtime  dextrose 40% Gel 15 Gram(s) Oral once  glucagon  Injectable 1 milliGRAM(s) IntraMuscular once  insulin lispro (ADMELOG) corrective regimen sliding scale   SubCutaneous three times a day before meals  insulin lispro (ADMELOG) corrective regimen sliding scale   SubCutaneous at bedtime  metFORMIN 1000 milliGRAM(s) Oral daily  senna 2 Tablet(s) Oral at bedtime  sitaGLIPtin 100 milliGRAM(s) Oral daily    MEDICATIONS  (PRN):  acetaminophen   Tablet .. 650 milliGRAM(s) Oral every 6 hours PRN Mild Pain (1 - 3), Moderate Pain (4 - 6)  gabapentin 300 milliGRAM(s) Oral four times a day PRN anxiety  haloperidol     Tablet 5 milliGRAM(s) Oral every 6 hours PRN agitation  haloperidol    Injectable 5 milliGRAM(s) IntraMuscular once PRN agitation  LORazepam     Tablet 2 milliGRAM(s) Oral every 6 hours PRN agitation  LORazepam   Injectable 2 milliGRAM(s) IntraMuscular once PRN agitation  melatonin. 5 milliGRAM(s) Oral at bedtime PRN Insomnia  polyethylene glycol 3350 17 Gram(s) Oral daily PRN constipation

## 2021-08-20 NOTE — BH INPATIENT PSYCHIATRY PROGRESS NOTE - NSBHCHARTREVIEWVS_PSY_A_CORE FT
Vital Signs Last 24 Hrs  T(C): 36.3 (20 Aug 2021 07:57), Max: 36.4 (19 Aug 2021 20:00)  T(F): 97.4 (20 Aug 2021 07:57), Max: 97.6 (19 Aug 2021 20:00)  HR: 91 (20 Aug 2021 07:57) (91 - 91)  BP: 125/76 (20 Aug 2021 07:57) (125/76 - 125/76)  BP(mean): --  RR: --  SpO2: --

## 2021-08-20 NOTE — BH INPATIENT PSYCHIATRY PROGRESS NOTE - NSBHMETABOLIC_PSY_ALL_CORE_FT
BMI: BMI (kg/m2): 30.3 (07-29-21 @ 14:53)  HbA1c: A1C with Estimated Average Glucose Result: 7.3 % (08-05-21 @ 10:24)    Glucose: POCT Blood Glucose.: 181 mg/dL (08-20-21 @ 08:19)    BP: 125/76 (08-20-21 @ 07:57) (125/76 - 125/77)  Lipid Panel: Date/Time: 08-05-21 @ 10:24  Cholesterol, Serum: 139  Direct LDL: --  HDL Cholesterol, Serum: 42  Total Cholesterol/HDL Ration Measurement: --  Triglycerides, Serum: 234

## 2021-08-20 NOTE — BH INPATIENT PSYCHIATRY PROGRESS NOTE - NSBHASSESSSUMMFT_PSY_ALL_CORE
Patient remains grossly disorganized, refused AM medication took last night    -Restart Clozaril and titrate to 200mg PO at bedtime  -CBC + diff on 8/26/21 AM  -CO 1:1 for disorganization and sexual activity risk

## 2021-08-20 NOTE — BH INPATIENT PSYCHIATRY PROGRESS NOTE - NSBHFUPINTERVALHXFT_PSY_A_CORE
Pt compliant with medication last night, refused this morning.  Chart reviewed and case discussed with treatment team.  No other events reported overnight.  Pt maintained on CO 1:1 for disorganization and sexual activity risk.  Pt in bed during interview, but observed more visible in the mileu afterward, sitting in the day room, seemed to be appropriate with peers.  On interview, patient remains disorganized, jumps from topic to topic.  Pt reports hearing "music" in his head, continues to appear internally preoccupied an is responding to internal stimuli.  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  Pt reports he does not know if he slept.  Pt has been eating.  Pt continues to speak softly and is hard to hear.

## 2021-08-20 NOTE — BH INPATIENT PSYCHIATRY PROGRESS NOTE - OTHER
limited by disorganization, partially cooperative fair with encouragement paranoid, bizarre responding to internal stimuli and internally preoccupied

## 2021-08-21 LAB — GLUCOSE BLDC GLUCOMTR-MCNC: 129 MG/DL — HIGH (ref 70–99)

## 2021-08-21 PROCEDURE — 99232 SBSQ HOSP IP/OBS MODERATE 35: CPT

## 2021-08-21 RX ORDER — CLOZAPINE 150 MG/1
225 TABLET, ORALLY DISINTEGRATING ORAL AT BEDTIME
Refills: 0 | Status: DISCONTINUED | OUTPATIENT
Start: 2021-08-21 | End: 2021-08-22

## 2021-08-21 RX ADMIN — SENNA PLUS 2 TABLET(S): 8.6 TABLET ORAL at 21:00

## 2021-08-21 RX ADMIN — CLOZAPINE 225 MILLIGRAM(S): 150 TABLET, ORALLY DISINTEGRATING ORAL at 20:59

## 2021-08-21 RX ADMIN — ATORVASTATIN CALCIUM 40 MILLIGRAM(S): 80 TABLET, FILM COATED ORAL at 21:00

## 2021-08-21 RX ADMIN — METFORMIN HYDROCHLORIDE 1000 MILLIGRAM(S): 850 TABLET ORAL at 08:55

## 2021-08-21 NOTE — BH INPATIENT PSYCHIATRY PROGRESS NOTE - NSBHFUPINTERVALHXFT_PSY_A_CORE
Patient is followed up for psychosis. Chart, medications and labs reviewed.  Patient is discussed with nursing staff. No significant overnight issues.  Per nursing patient has been tenuous on the unit the past several days, with increasing aggression. Patient threw water at staff a few days ago.   Nursing reports patient has been on/off with taking his standing psychiatric medications. Pt is on Clozaril, reports +BM. Sleeping and eating ok.    Patient is seen in hallway, aggressively using walker, accompanied by CO 1:1.  When asked what was wrong pt engaged in psychotic ranting, difficult to understand.  Patient remains disorganized, appears internally preoccupied, bizarre, endorses +AH.  He denies SI/HI, no plan or intent..  Patient is difficult to engage, he is hard to hear, speaks very softly, he remains tangential, RODRIGUEZ, jumps from topic to topic.  No acute medical concerns, VSS. Patient will remain on CO 1:1 for disorganization and sexual activity risk.  Continue treatment for risk modification. Maintain CO.

## 2021-08-21 NOTE — BH INPATIENT PSYCHIATRY PROGRESS NOTE - NSBHMETABOLIC_PSY_ALL_CORE_FT
BMI: BMI (kg/m2): 30.3 (07-29-21 @ 14:53)  HbA1c: A1C with Estimated Average Glucose Result: 7.3 % (08-05-21 @ 10:24)    Glucose: POCT Blood Glucose.: 169 mg/dL (08-20-21 @ 20:24)    BP: 125/76 (08-20-21 @ 07:57) (125/76 - 125/77)  Lipid Panel: Date/Time: 08-05-21 @ 10:24  Cholesterol, Serum: 139  Direct LDL: --  HDL Cholesterol, Serum: 42  Total Cholesterol/HDL Ration Measurement: --  Triglycerides, Serum: 234

## 2021-08-21 NOTE — BH INPATIENT PSYCHIATRY PROGRESS NOTE - CURRENT MEDICATION
MEDICATIONS  (STANDING):  acetaminophen   Tablet .. 650 milliGRAM(s) Oral once  atorvastatin 40 milliGRAM(s) Oral at bedtime  cloZAPine Disintegrating Tablet 200 milliGRAM(s) Oral at bedtime  dextrose 40% Gel 15 Gram(s) Oral once  glucagon  Injectable 1 milliGRAM(s) IntraMuscular once  insulin lispro (ADMELOG) corrective regimen sliding scale   SubCutaneous three times a day before meals  insulin lispro (ADMELOG) corrective regimen sliding scale   SubCutaneous at bedtime  metFORMIN 1000 milliGRAM(s) Oral daily  senna 2 Tablet(s) Oral at bedtime  sitaGLIPtin 100 milliGRAM(s) Oral daily    MEDICATIONS  (PRN):  acetaminophen   Tablet .. 650 milliGRAM(s) Oral every 6 hours PRN Mild Pain (1 - 3), Moderate Pain (4 - 6)  chlorproMAZINE    Injectable 100 milliGRAM(s) IntraMuscular once PRN agitation or aggression  chlorproMAZINE    Tablet 100 milliGRAM(s) Oral every 4 hours PRN agitation or aggression  gabapentin 300 milliGRAM(s) Oral four times a day PRN anxiety  haloperidol     Tablet 5 milliGRAM(s) Oral every 6 hours PRN agitation  haloperidol    Injectable 5 milliGRAM(s) IntraMuscular once PRN agitation  LORazepam     Tablet 2 milliGRAM(s) Oral every 6 hours PRN agitation  LORazepam   Injectable 2 milliGRAM(s) IntraMuscular once PRN agitation  melatonin. 5 milliGRAM(s) Oral at bedtime PRN Insomnia  polyethylene glycol 3350 17 Gram(s) Oral daily PRN constipation

## 2021-08-21 NOTE — BH INPATIENT PSYCHIATRY PROGRESS NOTE - NSBHCHARTREVIEWVS_PSY_A_CORE FT
Vital Signs Last 24 Hrs  T(C): 37 (20 Aug 2021 20:44), Max: 37 (20 Aug 2021 20:44)  T(F): 98.6 (20 Aug 2021 20:44), Max: 98.6 (20 Aug 2021 20:44)  HR: 91 (20 Aug 2021 07:57) (91 - 91)  BP: 125/76 (20 Aug 2021 07:57) (125/76 - 125/76)  BP(mean): --  RR: 18 (20 Aug 2021 20:44) (18 - 18)  SpO2: --

## 2021-08-21 NOTE — BH INPATIENT PSYCHIATRY PROGRESS NOTE - OTHER
fair with encouragement paranoid, bizarre responding to internal stimuli and internally preoccupied limited by disorganization, partially cooperative

## 2021-08-21 NOTE — BH INPATIENT PSYCHIATRY PROGRESS NOTE - PRN MEDS
MEDICATIONS  (PRN):  acetaminophen   Tablet .. 650 milliGRAM(s) Oral every 6 hours PRN Mild Pain (1 - 3), Moderate Pain (4 - 6)  chlorproMAZINE    Injectable 100 milliGRAM(s) IntraMuscular once PRN agitation or aggression  chlorproMAZINE    Tablet 100 milliGRAM(s) Oral every 4 hours PRN agitation or aggression  gabapentin 300 milliGRAM(s) Oral four times a day PRN anxiety  haloperidol     Tablet 5 milliGRAM(s) Oral every 6 hours PRN agitation  haloperidol    Injectable 5 milliGRAM(s) IntraMuscular once PRN agitation  LORazepam     Tablet 2 milliGRAM(s) Oral every 6 hours PRN agitation  LORazepam   Injectable 2 milliGRAM(s) IntraMuscular once PRN agitation  melatonin. 5 milliGRAM(s) Oral at bedtime PRN Insomnia  polyethylene glycol 3350 17 Gram(s) Oral daily PRN constipation

## 2021-08-22 LAB
GLUCOSE BLDC GLUCOMTR-MCNC: 164 MG/DL — HIGH (ref 70–99)
GLUCOSE BLDC GLUCOMTR-MCNC: 174 MG/DL — HIGH (ref 70–99)

## 2021-08-22 PROCEDURE — 99232 SBSQ HOSP IP/OBS MODERATE 35: CPT

## 2021-08-22 RX ORDER — CLOZAPINE 150 MG/1
250 TABLET, ORALLY DISINTEGRATING ORAL AT BEDTIME
Refills: 0 | Status: DISCONTINUED | OUTPATIENT
Start: 2021-08-22 | End: 2021-08-24

## 2021-08-22 RX ADMIN — CLOZAPINE 250 MILLIGRAM(S): 150 TABLET, ORALLY DISINTEGRATING ORAL at 21:15

## 2021-08-22 RX ADMIN — SENNA PLUS 2 TABLET(S): 8.6 TABLET ORAL at 21:28

## 2021-08-22 NOTE — BH INPATIENT PSYCHIATRY PROGRESS NOTE - NSBHMETABOLIC_PSY_ALL_CORE_FT
BMI: BMI (kg/m2): 30.3 (07-29-21 @ 14:53)  HbA1c: A1C with Estimated Average Glucose Result: 7.3 % (08-05-21 @ 10:24)    Glucose: POCT Blood Glucose.: 129 mg/dL (08-21-21 @ 12:31)    BP: 125/76 (08-20-21 @ 07:57) (125/76 - 125/77)  Lipid Panel: Date/Time: 08-05-21 @ 10:24  Cholesterol, Serum: 139  Direct LDL: --  HDL Cholesterol, Serum: 42  Total Cholesterol/HDL Ration Measurement: --  Triglycerides, Serum: 234

## 2021-08-22 NOTE — BH INPATIENT PSYCHIATRY PROGRESS NOTE - NSBHCHARTREVIEWVS_PSY_A_CORE FT
Vital Signs Last 24 Hrs  T(C): 36.8 (21 Aug 2021 18:27), Max: 36.8 (21 Aug 2021 18:27)  T(F): 98.2 (21 Aug 2021 18:27), Max: 98.2 (21 Aug 2021 18:27)  HR: --  BP: --  BP(mean): --  RR: --  SpO2: --

## 2021-08-22 NOTE — BH INPATIENT PSYCHIATRY PROGRESS NOTE - OTHER
Telephone Encounter by Stephania Cunningham RN at 12/13/17 03:15 PM     Author:  Stephania Cunningham RN Service:  (none) Author Type:  Registered Nurse     Filed:  12/13/17 03:16 PM Encounter Date:  12/13/2017 Status:  Signed     :  Stephania Cunningham RN (Registered Nurse)            Routed to reception for first available with Boo[KC1.1M]      Revision History        User Key Date/Time User Provider Type Action    > KC1.1 12/13/17 03:16 PM Stephania Cunningham RN Registered Nurse Sign    M - Manual             paranoid, bizarre limited by disorganization, partially cooperative fair with encouragement responding to internal stimuli and internally preoccupied

## 2021-08-22 NOTE — BH INPATIENT PSYCHIATRY PROGRESS NOTE - NSBHFUPINTERVALHXFT_PSY_A_CORE
Patient is followed up for psychosis. Chart, medications and labs reviewed.  Patient is discussed with nursing staff. No significant overnight issues.  Per nursing patient has been tenuous on the unit the past several days, with increasing aggression. Patient threw water at staff a few days ago.   Nursing reports patient has been on/off with taking his standing psychiatric medications. Pt is on Clozaril, increase to 250mg tonight, reports +BM. Sleeping and eating ok.    Patient is seen in his room accompanied by CO 1:1.  He is observed talking to himself, remains disorganized, appears internally preoccupied, bizarre, endorses +AH, remains tangential, RODRIGUEZ, jumps from topic to topic.  .  He denies SI/HI, no plan or intent.  No acute medical concerns, VSS. Patient will remain on CO 1:1 for disorganization and sexual activity risk.  Continue treatment for risk modification. Maintain CO.

## 2021-08-22 NOTE — BH INPATIENT PSYCHIATRY PROGRESS NOTE - CURRENT MEDICATION
MEDICATIONS  (STANDING):  acetaminophen   Tablet .. 650 milliGRAM(s) Oral once  atorvastatin 40 milliGRAM(s) Oral at bedtime  cloZAPine Disintegrating Tablet 225 milliGRAM(s) Oral at bedtime  dextrose 40% Gel 15 Gram(s) Oral once  glucagon  Injectable 1 milliGRAM(s) IntraMuscular once  insulin lispro (ADMELOG) corrective regimen sliding scale   SubCutaneous three times a day before meals  insulin lispro (ADMELOG) corrective regimen sliding scale   SubCutaneous at bedtime  metFORMIN 1000 milliGRAM(s) Oral daily  senna 2 Tablet(s) Oral at bedtime  sitaGLIPtin 100 milliGRAM(s) Oral daily    MEDICATIONS  (PRN):  acetaminophen   Tablet .. 650 milliGRAM(s) Oral every 6 hours PRN Mild Pain (1 - 3), Moderate Pain (4 - 6)  chlorproMAZINE    Injectable 100 milliGRAM(s) IntraMuscular once PRN agitation or aggression  chlorproMAZINE    Tablet 100 milliGRAM(s) Oral every 4 hours PRN agitation or aggression  gabapentin 300 milliGRAM(s) Oral four times a day PRN anxiety  haloperidol     Tablet 5 milliGRAM(s) Oral every 6 hours PRN agitation  haloperidol    Injectable 5 milliGRAM(s) IntraMuscular once PRN agitation  LORazepam     Tablet 2 milliGRAM(s) Oral every 6 hours PRN agitation  LORazepam   Injectable 2 milliGRAM(s) IntraMuscular once PRN agitation  melatonin. 5 milliGRAM(s) Oral at bedtime PRN Insomnia  polyethylene glycol 3350 17 Gram(s) Oral daily PRN constipation

## 2021-08-22 NOTE — BH INPATIENT PSYCHIATRY PROGRESS NOTE - NSBHASSESSSUMMFT_PSY_ALL_CORE
Patient remains grossly disorganized, refused AM medication took last night    -Restart Clozaril and titrate to 200mg PO at bedtime  -CBC + diff on 8/26/21 AM  -CO 1:1 for disorganization and sexual activity risk Patient remains grossly disorganized, refused AM medication took last night    -Restart Clozaril and titrate to 250mg PO at bedtime  -CBC + diff on 8/26/21 AM  -CO 1:1 for disorganization and sexual activity risk

## 2021-08-23 LAB
GLUCOSE BLDC GLUCOMTR-MCNC: 141 MG/DL — HIGH (ref 70–99)
GLUCOSE BLDC GLUCOMTR-MCNC: 161 MG/DL — HIGH (ref 70–99)
GLUCOSE BLDC GLUCOMTR-MCNC: 161 MG/DL — HIGH (ref 70–99)
GLUCOSE BLDC GLUCOMTR-MCNC: 192 MG/DL — HIGH (ref 70–99)

## 2021-08-23 PROCEDURE — 99232 SBSQ HOSP IP/OBS MODERATE 35: CPT

## 2021-08-23 RX ADMIN — METFORMIN HYDROCHLORIDE 1000 MILLIGRAM(S): 850 TABLET ORAL at 09:03

## 2021-08-23 NOTE — BH INPATIENT PSYCHIATRY PROGRESS NOTE - NSBHCHARTREVIEWVS_PSY_A_CORE FT
Vital Signs Last 24 Hrs  T(C): 36.2 (23 Aug 2021 07:32), Max: 36.7 (22 Aug 2021 20:51)  T(F): 97.2 (23 Aug 2021 07:32), Max: 98 (22 Aug 2021 20:51)  HR: --  BP: --  BP(mean): --  RR: --  SpO2: --

## 2021-08-23 NOTE — BH INPATIENT PSYCHIATRY PROGRESS NOTE - NSBHFUPINTERVALHXFT_PSY_A_CORE
Chart, medications and labs reviewed.  Patient is discussed with nursing staff. No significant overnight issues.  Per nursing patient has been tenuous on the unit the past several days, with increasing aggression. Patient threw water at staff a few days ago.   Nursing reports patient has been on/off with taking his standing psychiatric medications.  Patient is seen in his room. He is observed talking to himself, remains disorganized, appears internally preoccupied, bizarre, endorses +AH, remains tangential, RODRIGUEZ, jumps from topic to topic. He denies SI/HI, no plan or intent.  No acute medical concerns, VSS. Patient will remain on CO 1:1 for disorganization and sexual activity risk.  Continue treatment for risk modification.

## 2021-08-23 NOTE — BH INPATIENT PSYCHIATRY PROGRESS NOTE - OTHER
responding to internal stimuli and internally preoccupied paranoid, bizarre fair with encouragement limited by disorganization, partially cooperative

## 2021-08-23 NOTE — BH INPATIENT PSYCHIATRY PROGRESS NOTE - NSBHMETABOLIC_PSY_ALL_CORE_FT
BMI: BMI (kg/m2): 30.3 (07-29-21 @ 14:53)  HbA1c: A1C with Estimated Average Glucose Result: 7.3 % (08-05-21 @ 10:24)    Glucose: POCT Blood Glucose.: 161 mg/dL (08-23-21 @ 12:04)    BP: --  Lipid Panel: Date/Time: 08-05-21 @ 10:24  Cholesterol, Serum: 139  Direct LDL: --  HDL Cholesterol, Serum: 42  Total Cholesterol/HDL Ration Measurement: --  Triglycerides, Serum: 234

## 2021-08-23 NOTE — BH INPATIENT PSYCHIATRY PROGRESS NOTE - NSBHASSESSSUMMFT_PSY_ALL_CORE
Patient remains grossly disorganized, refused AM medication took last night     -Restart Clozaril and titrate to 250mg PO at bedtime   -CBC + diff on 8/26/21 AM   -individual, group and milieu therapy  -discharge planning in progress

## 2021-08-23 NOTE — BH INPATIENT PSYCHIATRY PROGRESS NOTE - CURRENT MEDICATION
MEDICATIONS  (STANDING):  acetaminophen   Tablet .. 650 milliGRAM(s) Oral once  atorvastatin 40 milliGRAM(s) Oral at bedtime  cloZAPine Disintegrating Tablet 250 milliGRAM(s) Oral at bedtime  dextrose 40% Gel 15 Gram(s) Oral once  glucagon  Injectable 1 milliGRAM(s) IntraMuscular once  insulin lispro (ADMELOG) corrective regimen sliding scale   SubCutaneous three times a day before meals  insulin lispro (ADMELOG) corrective regimen sliding scale   SubCutaneous at bedtime  metFORMIN 1000 milliGRAM(s) Oral daily  senna 2 Tablet(s) Oral at bedtime  sitaGLIPtin 100 milliGRAM(s) Oral daily    MEDICATIONS  (PRN):  acetaminophen   Tablet .. 650 milliGRAM(s) Oral every 6 hours PRN Mild Pain (1 - 3), Moderate Pain (4 - 6)  chlorproMAZINE    Injectable 100 milliGRAM(s) IntraMuscular once PRN agitation or aggression  chlorproMAZINE    Tablet 100 milliGRAM(s) Oral every 4 hours PRN agitation or aggression  gabapentin 300 milliGRAM(s) Oral four times a day PRN anxiety  haloperidol     Tablet 5 milliGRAM(s) Oral every 6 hours PRN agitation  haloperidol    Injectable 5 milliGRAM(s) IntraMuscular once PRN agitation  LORazepam     Tablet 2 milliGRAM(s) Oral every 6 hours PRN agitation  LORazepam   Injectable 2 milliGRAM(s) IntraMuscular once PRN agitation  melatonin. 5 milliGRAM(s) Oral at bedtime PRN Insomnia  polyethylene glycol 3350 17 Gram(s) Oral daily PRN constipation

## 2021-08-24 LAB
GLUCOSE BLDC GLUCOMTR-MCNC: 145 MG/DL — HIGH (ref 70–99)
GLUCOSE BLDC GLUCOMTR-MCNC: 163 MG/DL — HIGH (ref 70–99)
GLUCOSE BLDC GLUCOMTR-MCNC: 165 MG/DL — HIGH (ref 70–99)

## 2021-08-24 PROCEDURE — 99232 SBSQ HOSP IP/OBS MODERATE 35: CPT

## 2021-08-24 RX ORDER — CLOZAPINE 150 MG/1
25 TABLET, ORALLY DISINTEGRATING ORAL ONCE
Refills: 0 | Status: COMPLETED | OUTPATIENT
Start: 2021-08-24 | End: 2021-08-24

## 2021-08-24 RX ORDER — CLOZAPINE 150 MG/1
100 TABLET, ORALLY DISINTEGRATING ORAL AT BEDTIME
Refills: 0 | Status: DISCONTINUED | OUTPATIENT
Start: 2021-08-24 | End: 2021-08-30

## 2021-08-24 RX ORDER — CLOZAPINE 150 MG/1
50 TABLET, ORALLY DISINTEGRATING ORAL DAILY
Refills: 0 | Status: DISCONTINUED | OUTPATIENT
Start: 2021-08-25 | End: 2021-08-30

## 2021-08-24 RX ORDER — CLOZAPINE 150 MG/1
100 TABLET, ORALLY DISINTEGRATING ORAL AT BEDTIME
Refills: 0 | Status: DISCONTINUED | OUTPATIENT
Start: 2021-08-24 | End: 2021-08-24

## 2021-08-24 RX ADMIN — CLOZAPINE 25 MILLIGRAM(S): 150 TABLET, ORALLY DISINTEGRATING ORAL at 12:09

## 2021-08-24 RX ADMIN — ATORVASTATIN CALCIUM 40 MILLIGRAM(S): 80 TABLET, FILM COATED ORAL at 21:11

## 2021-08-24 RX ADMIN — METFORMIN HYDROCHLORIDE 1000 MILLIGRAM(S): 850 TABLET ORAL at 08:44

## 2021-08-24 RX ADMIN — CLOZAPINE 100 MILLIGRAM(S): 150 TABLET, ORALLY DISINTEGRATING ORAL at 21:11

## 2021-08-24 NOTE — BH INPATIENT PSYCHIATRY PROGRESS NOTE - OTHER
Abnormal gait,  uses walker fair with encouragement partially cooperative soft then more normal volume responding to internal stimuli and internally preoccupied paranoid, bizarre

## 2021-08-24 NOTE — BH INPATIENT PSYCHIATRY PROGRESS NOTE - NSBHCHARTREVIEWVS_PSY_A_CORE FT
Vital Signs Last 24 Hrs  T(C): 36.2 (24 Aug 2021 07:48), Max: 36.3 (23 Aug 2021 18:59)  T(F): 97.2 (24 Aug 2021 07:48), Max: 97.4 (23 Aug 2021 18:59)  HR: --  BP: --  BP(mean): --  RR: 18 (23 Aug 2021 20:11) (18 - 18)  SpO2: --

## 2021-08-24 NOTE — BH INPATIENT PSYCHIATRY PROGRESS NOTE - CURRENT MEDICATION
MEDICATIONS  (STANDING):  acetaminophen   Tablet .. 650 milliGRAM(s) Oral once  atorvastatin 40 milliGRAM(s) Oral at bedtime  dextrose 40% Gel 15 Gram(s) Oral once  glucagon  Injectable 1 milliGRAM(s) IntraMuscular once  insulin lispro (ADMELOG) corrective regimen sliding scale   SubCutaneous three times a day before meals  insulin lispro (ADMELOG) corrective regimen sliding scale   SubCutaneous at bedtime  metFORMIN 1000 milliGRAM(s) Oral daily  senna 2 Tablet(s) Oral at bedtime  sitaGLIPtin 100 milliGRAM(s) Oral daily    MEDICATIONS  (PRN):  acetaminophen   Tablet .. 650 milliGRAM(s) Oral every 6 hours PRN Mild Pain (1 - 3), Moderate Pain (4 - 6)  chlorproMAZINE    Injectable 100 milliGRAM(s) IntraMuscular once PRN agitation or aggression  chlorproMAZINE    Tablet 100 milliGRAM(s) Oral every 4 hours PRN agitation or aggression  gabapentin 300 milliGRAM(s) Oral four times a day PRN anxiety  haloperidol     Tablet 5 milliGRAM(s) Oral every 6 hours PRN agitation  haloperidol    Injectable 5 milliGRAM(s) IntraMuscular once PRN agitation  LORazepam     Tablet 2 milliGRAM(s) Oral every 6 hours PRN agitation  LORazepam   Injectable 2 milliGRAM(s) IntraMuscular once PRN agitation  melatonin. 5 milliGRAM(s) Oral at bedtime PRN Insomnia  polyethylene glycol 3350 17 Gram(s) Oral daily PRN constipation

## 2021-08-24 NOTE — BH INPATIENT PSYCHIATRY PROGRESS NOTE - NSBHASSESSSUMMFT_PSY_ALL_CORE
Patient remains grossly disorganized, refused AM medication took last night     -clozapine ODT changed to 25mg PO daily and 100mg PO at bedtime   -CBC + diff on 8/26/21 AM   -individual, group and milieu therapy  -CO 1:1 for disorganization and sexual activity risk

## 2021-08-24 NOTE — BH INPATIENT PSYCHIATRY PROGRESS NOTE - NSBHMETABOLIC_PSY_ALL_CORE_FT
BMI: BMI (kg/m2): 30.3 (07-29-21 @ 14:53)  HbA1c: A1C with Estimated Average Glucose Result: 7.3 % (08-05-21 @ 10:24)    Glucose: POCT Blood Glucose.: 145 mg/dL (08-24-21 @ 08:13)    BP: --  Lipid Panel: Date/Time: 08-05-21 @ 10:24  Cholesterol, Serum: 139  Direct LDL: --  HDL Cholesterol, Serum: 42  Total Cholesterol/HDL Ration Measurement: --  Triglycerides, Serum: 234

## 2021-08-24 NOTE — BH INPATIENT PSYCHIATRY PROGRESS NOTE - NSBHFUPINTERVALHXFT_PSY_A_CORE
Pt refused HS medication including Clozaril last night, was compliant with medication this AM.  No other events reported overnight.  Pt maintained on CO 1:1 for disorganization and sexual activity risk.  Pt more irritable today, but able to hold a more organized conversation than previous days.  Pt initially with soft tone of voice then speaks more regularly.  Pt reports his Clozaril dose is too high and he cannot take a high dose tonight because his outpatient provider told him if he misses a dose he can get side effects.  Explained to him that side effects occur after missing two days of Clozaril.  Pt still not willing to take high dose, initially agrees to only take 25mg PO of Clozaril BID.  Talked to him about how that is too low of dose and if dose is lower will need to add augmenting agent such as Risperdal.  Pt keeps saying it is Monday, tried to explain it is Tuesday, reports his watch says Monday and staff offers to fix the date on his watch.  Patient then asks to draw out a daily schedule for Clozaril titration, asks for a pen, staff leaves to go get him a pencil and he starts mumbling to himself.  Pt then draws out a daily schedule, agrees to take 25mg PO daily and 100mg PO tonight, titrate to 50mg PO daily and 100mg PO tomorrow night and asks to speak with him daily to determine if more needs to be added and will consider adding Risperdal 0.5mg PO daily on Thursday.

## 2021-08-25 LAB
GLUCOSE BLDC GLUCOMTR-MCNC: 162 MG/DL — HIGH (ref 70–99)
GLUCOSE BLDC GLUCOMTR-MCNC: 197 MG/DL — HIGH (ref 70–99)

## 2021-08-25 RX ORDER — RISPERIDONE 4 MG/1
0.5 TABLET ORAL
Refills: 0 | Status: DISCONTINUED | OUTPATIENT
Start: 2021-08-26 | End: 2021-08-27

## 2021-08-25 RX ADMIN — CLOZAPINE 50 MILLIGRAM(S): 150 TABLET, ORALLY DISINTEGRATING ORAL at 09:16

## 2021-08-25 RX ADMIN — METFORMIN HYDROCHLORIDE 1000 MILLIGRAM(S): 850 TABLET ORAL at 09:17

## 2021-08-25 RX ADMIN — SENNA PLUS 2 TABLET(S): 8.6 TABLET ORAL at 21:19

## 2021-08-25 RX ADMIN — CLOZAPINE 100 MILLIGRAM(S): 150 TABLET, ORALLY DISINTEGRATING ORAL at 21:19

## 2021-08-25 RX ADMIN — ATORVASTATIN CALCIUM 40 MILLIGRAM(S): 80 TABLET, FILM COATED ORAL at 21:19

## 2021-08-25 NOTE — UM REPORT PROGRESS NOTE - NSUMRMLAST_GEN_A_CORE DT
01-Jan-1900 [JVD] : no jugular venous distention  [2+] : left 2+ [Ankle Swelling (On Exam)] : not present [Varicose Veins Of Lower Extremities] : not present [] : not present [No Rash or Lesion] : No rash or lesion [Calm] : calm [de-identified] : pleasant, here with daughter

## 2021-08-25 NOTE — BH INPATIENT PSYCHIATRY PROGRESS NOTE - NSBHLEGALSTATUSCHANGE_PSY_ALL_CORE
No Complex Repair And Skin Substitute Graft Text: The defect edges were debeveled with a #15 scalpel blade.  The primary defect was closed partially with a complex linear closure.  Given the location of the remaining defect, shape of the defect and the proximity to free margins a skin substitute graft was deemed most appropriate to repair the remaining defect.  The graft was trimmed to fit the size of the remaining defect.  The graft was then placed in the primary defect, oriented appropriately, and sutured into place.

## 2021-08-25 NOTE — BH INPATIENT PSYCHIATRY PROGRESS NOTE - CURRENT MEDICATION
MEDICATIONS  (STANDING):  acetaminophen   Tablet .. 650 milliGRAM(s) Oral once  atorvastatin 40 milliGRAM(s) Oral at bedtime  cloZAPine Disintegrating Tablet 100 milliGRAM(s) Oral at bedtime  cloZAPine Disintegrating Tablet 50 milliGRAM(s) Oral daily  dextrose 40% Gel 15 Gram(s) Oral once  glucagon  Injectable 1 milliGRAM(s) IntraMuscular once  insulin lispro (ADMELOG) corrective regimen sliding scale   SubCutaneous three times a day before meals  insulin lispro (ADMELOG) corrective regimen sliding scale   SubCutaneous at bedtime  metFORMIN 1000 milliGRAM(s) Oral daily  senna 2 Tablet(s) Oral at bedtime  sitaGLIPtin 100 milliGRAM(s) Oral daily    MEDICATIONS  (PRN):  acetaminophen   Tablet .. 650 milliGRAM(s) Oral every 6 hours PRN Mild Pain (1 - 3), Moderate Pain (4 - 6)  chlorproMAZINE    Injectable 100 milliGRAM(s) IntraMuscular once PRN agitation or aggression  chlorproMAZINE    Tablet 100 milliGRAM(s) Oral every 4 hours PRN agitation or aggression  gabapentin 300 milliGRAM(s) Oral four times a day PRN anxiety  haloperidol     Tablet 5 milliGRAM(s) Oral every 6 hours PRN agitation  haloperidol    Injectable 5 milliGRAM(s) IntraMuscular once PRN agitation  LORazepam     Tablet 2 milliGRAM(s) Oral every 6 hours PRN agitation  LORazepam   Injectable 2 milliGRAM(s) IntraMuscular once PRN agitation  melatonin. 5 milliGRAM(s) Oral at bedtime PRN Insomnia  polyethylene glycol 3350 17 Gram(s) Oral daily PRN constipation

## 2021-08-25 NOTE — BH INPATIENT PSYCHIATRY PROGRESS NOTE - OTHER
Abnormal gait,  uses walker responding to internal stimuli less and internally preoccupied paranoid, bizarre

## 2021-08-25 NOTE — BH INPATIENT PSYCHIATRY PROGRESS NOTE - NSBHFUPINTERVALHXFT_PSY_A_CORE
Pt compliant with medication and tolerating it well.  No events reported overnight.  Chart reviewed and case discussed with nursing team.  Pt has not been sexually inappropriate or wandering on the unit and CO 1:1 discontinued today.  Pt goes over the daily schedule of psychiatric medications he is willing to take and agrees to restart Risperdal 0.5mg PO tomorrow.  Pt remains less disorganized than previous days.  Pt responding to internal stimuli less, but still has AH and appears internally preoccupied.  Pt reports the AH are "childhood memories that I don't want to let go of."  Pt reports he has not showered in 5 days, reports he will do so today prior to dinner.

## 2021-08-25 NOTE — BH INPATIENT PSYCHIATRY PROGRESS NOTE - NSBHMETABOLIC_PSY_ALL_CORE_FT
BMI: BMI (kg/m2): 30.3 (07-29-21 @ 14:53)  HbA1c: A1C with Estimated Average Glucose Result: 7.3 % (08-05-21 @ 10:24)    Glucose: POCT Blood Glucose.: 162 mg/dL (08-25-21 @ 08:12)    BP: --  Lipid Panel: Date/Time: 08-05-21 @ 10:24  Cholesterol, Serum: 139  Direct LDL: --  HDL Cholesterol, Serum: 42  Total Cholesterol/HDL Ration Measurement: --  Triglycerides, Serum: 234

## 2021-08-25 NOTE — BH INPATIENT PSYCHIATRY PROGRESS NOTE - NSBHCHARTREVIEWVS_PSY_A_CORE FT
Vital Signs Last 24 Hrs  T(C): 36.8 (08-25-21 @ 07:46), Max: 36.8 (08-25-21 @ 07:46)  T(F): 98.3 (08-25-21 @ 07:46), Max: 98.3 (08-25-21 @ 07:46)  HR: --  BP: --  BP(mean): --  RR: --  SpO2: --    Orthostatic VS  08-25-21 @ 07:46  Lying BP: --/-- HR: --  Sitting BP: 110/80 HR: 72  Standing BP: 120/70 HR: 70  Site: --  Mode: --  Orthostatic VS  08-24-21 @ 11:57  Lying BP: --/-- HR: --  Sitting BP: 133/83 HR: 100  Standing BP: 134/86 HR: 102  Site: --  Mode: --  Orthostatic VS  08-24-21 @ 07:48  Lying BP: --/-- HR: --  Sitting BP: 104/66 HR: 78  Standing BP: 113/75 HR: 85  Site: --  Mode: --  Orthostatic VS  08-23-21 @ 20:11  Lying BP: --/-- HR: --  Sitting BP: 136/87 HR: 103  Standing BP: 127/79 HR: 106  Site: --  Mode: --

## 2021-08-25 NOTE — UM REPORT PROGRESS NOTE - NSUMSWNOTE_GEN_A_CORE FT
Patient is now agreeing to take his medication however remains symptomatic Patient had been taking his medication periodically but not consistently.  He was scheduled for court for Medication Over Objection yesterday, 9/28/21, but he refused to participate.  He remains confused and disorganized.  He has been aggressive, irritable, internally preoccupied, and he attempted to elope; though, this appeared to be more disorganized than determined. Patient had been taking his medication periodically but not consistently.  He was scheduled for court for Medication Over Objection on, 9/28/21, but he refused to participate.  He remains confused and disorganized.  He has been aggressive, irritable, internally preoccupied, and he attempts to elope; though, this appeared to be more disorganized than determined. Patient had been taking his medication periodically not consistently.  PT went to court for Medication Over Objection on, 9/28/21, but he refused to participate.  He remains confused and disorganized.  He has been aggressive, irritable, internally preoccupied.  Patient had been taking his medication sporadically but has been taking with moderate encouragement. He remains confused and disorganized.  He has been aggressive, irritable, internally preoccupied.  Writer is a social work float assisting on this unit for today.  As per team, patient is improving.  He is taking medication and is communicating more effectively. PT is improving.  He is taking medication and is communicating more effectively. Patient is showing improvement. Phone screen was held today with [facility]. Patient was accepted and awaiting a bed there. Plan for patient to be discharged to respite while housing arranges a higher level of care.

## 2021-08-25 NOTE — BH INPATIENT PSYCHIATRY PROGRESS NOTE - NSBHASSESSSUMMFT_PSY_ALL_CORE
Patient less disorganized, compliant with medication still with AH and delusions    -clozapine ODT changed to 25mg PO daily and 100mg PO at bedtime   -CBC + diff on 8/26/21 AM   -individual, group and milieu therapy  -CO 1:1 for disorganization and sexual activity risk discontinued

## 2021-08-25 NOTE — UM REPORT PROGRESS NOTE - NSUMSWACTION_GEN_A_CORE FT
maintains contact with act team and attempts to have patient sign consents to speak with family members  maintains contact with ACT team and attempts to have patient sign consents to speak with family members  meets with the team daily to discuss the patient's current symptoms and treatment plan and to discuss appropriate discharge plan and readiness.  Patient's ACT team has been involved and  has maintained contact with them. SW meets with team and PT daily to discuss his current symptoms and treatment plan and to discuss appropriate discharge plan and readiness.  Patient's ACT team has been involved and  has maintained contact with them. SW meets with team and PT daily to discuss his current symptoms and treatment plan and to discuss appropriate discharge plan and readiness.  Patient's ACT team has been involved and  has maintained contact with them. Pt was supposed to have court for retention today however he stated that he did not want to petition for retention and court was cancelled.  SW meets with team and PT daily to discuss his current symptoms and treatment plan and to discuss appropriate discharge plan and readiness.  Patient's ACT team has been involved and  has maintained contact with them.  SW meets with team and PT daily to discuss his current symptoms and treatment plan and to discuss appropriate discharge plan and readiness.  Patient is improving and NP and assigned unit  are trying to schedule a meeting with the patient's outpatient team for tomorrow, Thursday November 4th, 2021.  If patient remains stable his discharge will, likely, be next week. ALINA meets with team and PT daily to discuss his current symptoms and treatment plan and to discuss appropriate discharge plan and readiness. ALINA held a meeting with the patient's outpatient team. If patient remains stable his discharge will, likely, be next week. ALINA meets with team and PT daily to discuss his current symptoms and treatment plan and to discuss appropriate discharge plan and readiness. ALINA is in contact with patient's collateral supports.

## 2021-08-26 LAB
GLUCOSE BLDC GLUCOMTR-MCNC: 110 MG/DL — HIGH (ref 70–99)
GLUCOSE BLDC GLUCOMTR-MCNC: 156 MG/DL — HIGH (ref 70–99)
GLUCOSE BLDC GLUCOMTR-MCNC: 181 MG/DL — HIGH (ref 70–99)

## 2021-08-26 PROCEDURE — 90853 GROUP PSYCHOTHERAPY: CPT

## 2021-08-26 PROCEDURE — 99232 SBSQ HOSP IP/OBS MODERATE 35: CPT

## 2021-08-26 RX ADMIN — CLOZAPINE 50 MILLIGRAM(S): 150 TABLET, ORALLY DISINTEGRATING ORAL at 08:47

## 2021-08-26 RX ADMIN — METFORMIN HYDROCHLORIDE 1000 MILLIGRAM(S): 850 TABLET ORAL at 08:48

## 2021-08-26 RX ADMIN — CLOZAPINE 100 MILLIGRAM(S): 150 TABLET, ORALLY DISINTEGRATING ORAL at 20:56

## 2021-08-26 RX ADMIN — ATORVASTATIN CALCIUM 40 MILLIGRAM(S): 80 TABLET, FILM COATED ORAL at 20:56

## 2021-08-26 RX ADMIN — RISPERIDONE 0.5 MILLIGRAM(S): 4 TABLET ORAL at 14:16

## 2021-08-26 NOTE — BH INPATIENT PSYCHIATRY PROGRESS NOTE - NSBHCHARTREVIEWVS_PSY_A_CORE FT
Vital Signs Last 24 Hrs  T(C): 36.5 (08-26-21 @ 07:34), Max: 36.8 (08-25-21 @ 20:18)  T(F): 97.7 (08-26-21 @ 07:34), Max: 98.2 (08-25-21 @ 20:18)  HR: 79 (08-26-21 @ 07:34) (79 - 79)  BP: 124/- (08-26-21 @ 07:34) (124/- - 124/-)  BP(mean): --  RR: --  SpO2: --    Orthostatic VS  08-26-21 @ 08:26  Lying BP: --/-- HR: --  Sitting BP: 118/73 HR: 87  Standing BP: 117/78 HR: 86  Site: --  Mode: --  Orthostatic VS  08-26-21 @ 08:15  Lying BP: --/-- HR: --  Sitting BP: 124/74 HR: 79  Standing BP: 129/80 HR: 80  Site: --  Mode: --  Orthostatic VS  08-25-21 @ 20:18  Lying BP: --/-- HR: --  Sitting BP: 104/60 HR: 89  Standing BP: 100/62 HR: 95  Site: --  Mode: --  Orthostatic VS  08-25-21 @ 07:46  Lying BP: --/-- HR: --  Sitting BP: 110/80 HR: 72  Standing BP: 120/70 HR: 70  Site: --  Mode: --  Orthostatic VS  08-24-21 @ 11:57  Lying BP: --/-- HR: --  Sitting BP: 133/83 HR: 100  Standing BP: 134/86 HR: 102  Site: --  Mode: --

## 2021-08-26 NOTE — BH INPATIENT PSYCHIATRY PROGRESS NOTE - NSBHASSESSSUMMFT_PSY_ALL_CORE
Patient less disorganized, compliant with medication still with AH and delusions    -clozapine ODT changed to 25mg PO daily and 100mg PO at bedtime and add Risperdal M-tab 0.5mg PO at 1300  -CBC + diff on 8/26/21 AM   -individual, group and milieu therapy  -CO 1:1 for disorganization and sexual activity risk discontinued

## 2021-08-26 NOTE — BH INPATIENT PSYCHIATRY PROGRESS NOTE - NSBHMETABOLIC_PSY_ALL_CORE_FT
[Follow-Up - Clinic] : a clinic follow-up of [Cardiomyopathy] : cardiomyopathy [Coronary Artery Disease] : coronary artery disease [Hyperlipidemia] : hyperlipidemia [Medication Management] : Medication management [Prior Myocardial Infarction] : a prior myocardial infarction BMI: BMI (kg/m2): 30.3 (07-29-21 @ 14:53)  HbA1c: A1C with Estimated Average Glucose Result: 7.3 % (08-05-21 @ 10:24)    Glucose: POCT Blood Glucose.: 156 mg/dL (08-26-21 @ 08:22)    BP: 124/- (08-26-21 @ 07:34) (124/- - 124/-)  Lipid Panel: Date/Time: 08-05-21 @ 10:24  Cholesterol, Serum: 139  Direct LDL: --  HDL Cholesterol, Serum: 42  Total Cholesterol/HDL Ration Measurement: --  Triglycerides, Serum: 234

## 2021-08-26 NOTE — BH INPATIENT PSYCHIATRY PROGRESS NOTE - CURRENT MEDICATION
MEDICATIONS  (STANDING):  acetaminophen   Tablet .. 650 milliGRAM(s) Oral once  atorvastatin 40 milliGRAM(s) Oral at bedtime  cloZAPine Disintegrating Tablet 100 milliGRAM(s) Oral at bedtime  cloZAPine Disintegrating Tablet 50 milliGRAM(s) Oral daily  dextrose 40% Gel 15 Gram(s) Oral once  glucagon  Injectable 1 milliGRAM(s) IntraMuscular once  insulin lispro (ADMELOG) corrective regimen sliding scale   SubCutaneous three times a day before meals  insulin lispro (ADMELOG) corrective regimen sliding scale   SubCutaneous at bedtime  metFORMIN 1000 milliGRAM(s) Oral daily  risperiDONE  Disintegrating Tablet 0.5 milliGRAM(s) Oral <User Schedule>  senna 2 Tablet(s) Oral at bedtime  sitaGLIPtin 100 milliGRAM(s) Oral daily    MEDICATIONS  (PRN):  acetaminophen   Tablet .. 650 milliGRAM(s) Oral every 6 hours PRN Mild Pain (1 - 3), Moderate Pain (4 - 6)  chlorproMAZINE    Injectable 100 milliGRAM(s) IntraMuscular once PRN agitation or aggression  chlorproMAZINE    Tablet 100 milliGRAM(s) Oral every 4 hours PRN agitation or aggression  gabapentin 300 milliGRAM(s) Oral four times a day PRN anxiety  haloperidol     Tablet 5 milliGRAM(s) Oral every 6 hours PRN agitation  haloperidol    Injectable 5 milliGRAM(s) IntraMuscular once PRN agitation  LORazepam     Tablet 2 milliGRAM(s) Oral every 6 hours PRN agitation  LORazepam   Injectable 2 milliGRAM(s) IntraMuscular once PRN agitation  melatonin. 5 milliGRAM(s) Oral at bedtime PRN Insomnia  polyethylene glycol 3350 17 Gram(s) Oral daily PRN constipation

## 2021-08-26 NOTE — BH INPATIENT PSYCHIATRY PROGRESS NOTE - NSBHFUPINTERVALHXFT_PSY_A_CORE
Pt compliant with medication and tolerating it well.  Pt refused blood work this morning.  No other events reported overnight.  Chart reviewed and case discussed with treatment team.  Pt has been doing well off of CO 1:1, no behavioral issues.  Pt reports he washed up yesterday, but still wearing dirty clothes, reports he has clothing in the dryer and will change.  Pt more visible in the mileu today.  Pt speaking softly again today, but continues to be more organized.  He agrees to start Risperdal today at lunch time.  Pt jumps from topic to topic, talking about a band he likes and then how a boy and girl met in high school and fell in love.

## 2021-08-27 LAB
BASOPHILS # BLD AUTO: 0.05 K/UL — SIGNIFICANT CHANGE UP (ref 0–0.2)
BASOPHILS NFR BLD AUTO: 0.5 % — SIGNIFICANT CHANGE UP (ref 0–2)
EOSINOPHIL # BLD AUTO: 0.11 K/UL — SIGNIFICANT CHANGE UP (ref 0–0.5)
EOSINOPHIL NFR BLD AUTO: 1.1 % — SIGNIFICANT CHANGE UP (ref 0–6)
GLUCOSE BLDC GLUCOMTR-MCNC: 141 MG/DL — HIGH (ref 70–99)
GLUCOSE BLDC GLUCOMTR-MCNC: 152 MG/DL — HIGH (ref 70–99)
GLUCOSE BLDC GLUCOMTR-MCNC: 188 MG/DL — HIGH (ref 70–99)
HCT VFR BLD CALC: 43.8 % — SIGNIFICANT CHANGE UP (ref 39–50)
HGB BLD-MCNC: 14.1 G/DL — SIGNIFICANT CHANGE UP (ref 13–17)
IANC: 6.87 K/UL — SIGNIFICANT CHANGE UP (ref 1.5–8.5)
IMM GRANULOCYTES NFR BLD AUTO: 0.5 % — SIGNIFICANT CHANGE UP (ref 0–1.5)
LYMPHOCYTES # BLD AUTO: 2.4 K/UL — SIGNIFICANT CHANGE UP (ref 1–3.3)
LYMPHOCYTES # BLD AUTO: 23.6 % — SIGNIFICANT CHANGE UP (ref 13–44)
MCHC RBC-ENTMCNC: 26.6 PG — LOW (ref 27–34)
MCHC RBC-ENTMCNC: 32.2 GM/DL — SIGNIFICANT CHANGE UP (ref 32–36)
MCV RBC AUTO: 82.5 FL — SIGNIFICANT CHANGE UP (ref 80–100)
MONOCYTES # BLD AUTO: 0.7 K/UL — SIGNIFICANT CHANGE UP (ref 0–0.9)
MONOCYTES NFR BLD AUTO: 6.9 % — SIGNIFICANT CHANGE UP (ref 2–14)
NEUTROPHILS # BLD AUTO: 6.87 K/UL — SIGNIFICANT CHANGE UP (ref 1.8–7.4)
NEUTROPHILS NFR BLD AUTO: 67.4 % — SIGNIFICANT CHANGE UP (ref 43–77)
NRBC # BLD: 0 /100 WBCS — SIGNIFICANT CHANGE UP
NRBC # FLD: 0 K/UL — SIGNIFICANT CHANGE UP
PLATELET # BLD AUTO: 210 K/UL — SIGNIFICANT CHANGE UP (ref 150–400)
RBC # BLD: 5.31 M/UL — SIGNIFICANT CHANGE UP (ref 4.2–5.8)
RBC # FLD: 14 % — SIGNIFICANT CHANGE UP (ref 10.3–14.5)
WBC # BLD: 10.18 K/UL — SIGNIFICANT CHANGE UP (ref 3.8–10.5)
WBC # FLD AUTO: 10.18 K/UL — SIGNIFICANT CHANGE UP (ref 3.8–10.5)

## 2021-08-27 PROCEDURE — 99238 HOSP IP/OBS DSCHRG MGMT 30/<: CPT

## 2021-08-27 RX ORDER — RISPERIDONE 4 MG/1
1 TABLET ORAL AT BEDTIME
Refills: 0 | Status: DISCONTINUED | OUTPATIENT
Start: 2021-08-27 | End: 2021-09-07

## 2021-08-27 RX ADMIN — METFORMIN HYDROCHLORIDE 1000 MILLIGRAM(S): 850 TABLET ORAL at 09:05

## 2021-08-27 RX ADMIN — CLOZAPINE 100 MILLIGRAM(S): 150 TABLET, ORALLY DISINTEGRATING ORAL at 21:33

## 2021-08-27 RX ADMIN — CLOZAPINE 50 MILLIGRAM(S): 150 TABLET, ORALLY DISINTEGRATING ORAL at 09:04

## 2021-08-27 RX ADMIN — Medication 2 MILLIGRAM(S): at 15:35

## 2021-08-27 RX ADMIN — ATORVASTATIN CALCIUM 40 MILLIGRAM(S): 80 TABLET, FILM COATED ORAL at 21:34

## 2021-08-27 RX ADMIN — SENNA PLUS 2 TABLET(S): 8.6 TABLET ORAL at 21:34

## 2021-08-27 RX ADMIN — RISPERIDONE 1 MILLIGRAM(S): 4 TABLET ORAL at 21:34

## 2021-08-27 NOTE — BH INPATIENT PSYCHIATRY PROGRESS NOTE - NSBHFUPINTERVALHXFT_PSY_A_CORE
Pt compliant with medication and tolerating it well.  No events reported overnight.  Chart reviewed and case discussed with treatment team.  Pt remains in better behavioral control today.  Pt observed going down the male hallway and responds appropriately to redirection.  Pt continues to talk to himself, but less so.  Pt goes over his calendar of daily medications that he made.  Pt agrees to titrate Risperdal to 1mg PO tonight and take at bedtime.  Pt initially refused AM blood work then was compliant later on.  Pt becomes irritable when talking about going back to his residence.  Pt continues to speak softly at times and is hard to hear.  Pt washed up and changed his clothing.

## 2021-08-27 NOTE — BH INPATIENT PSYCHIATRY PROGRESS NOTE - NSBHASSESSSUMMFT_PSY_ALL_CORE
Patient less disorganized, compliant with medication still with AH and delusions    -clozapine ODT changed to 25mg PO daily and 100mg PO at bedtime and titrate Risperdal M-tab to 1mg Po at bedtime  -CBC + diff on 9/3/21 AM   -individual, group and milieu therapy  -CO 1:1 for disorganization and sexual activity risk discontinued

## 2021-08-27 NOTE — BH INPATIENT PSYCHIATRY PROGRESS NOTE - NSBHMETABOLIC_PSY_ALL_CORE_FT
BMI: BMI (kg/m2): 30.3 (07-29-21 @ 14:53)  HbA1c: A1C with Estimated Average Glucose Result: 7.3 % (08-05-21 @ 10:24)    Glucose: POCT Blood Glucose.: 141 mg/dL (08-27-21 @ 08:15)    BP: 124/- (08-26-21 @ 07:34) (124/- - 124/-)  Lipid Panel: Date/Time: 08-05-21 @ 10:24  Cholesterol, Serum: 139  Direct LDL: --  HDL Cholesterol, Serum: 42  Total Cholesterol/HDL Ration Measurement: --  Triglycerides, Serum: 234

## 2021-08-27 NOTE — BH INPATIENT PSYCHIATRY PROGRESS NOTE - CURRENT MEDICATION
MEDICATIONS  (STANDING):  acetaminophen   Tablet .. 650 milliGRAM(s) Oral once  atorvastatin 40 milliGRAM(s) Oral at bedtime  cloZAPine Disintegrating Tablet 100 milliGRAM(s) Oral at bedtime  cloZAPine Disintegrating Tablet 50 milliGRAM(s) Oral daily  dextrose 40% Gel 15 Gram(s) Oral once  glucagon  Injectable 1 milliGRAM(s) IntraMuscular once  insulin lispro (ADMELOG) corrective regimen sliding scale   SubCutaneous three times a day before meals  insulin lispro (ADMELOG) corrective regimen sliding scale   SubCutaneous at bedtime  metFORMIN 1000 milliGRAM(s) Oral daily  risperiDONE  Disintegrating Tablet 1 milliGRAM(s) Oral at bedtime  senna 2 Tablet(s) Oral at bedtime  sitaGLIPtin 100 milliGRAM(s) Oral daily    MEDICATIONS  (PRN):  acetaminophen   Tablet .. 650 milliGRAM(s) Oral every 6 hours PRN Mild Pain (1 - 3), Moderate Pain (4 - 6)  chlorproMAZINE    Injectable 100 milliGRAM(s) IntraMuscular once PRN agitation or aggression  chlorproMAZINE    Tablet 100 milliGRAM(s) Oral every 4 hours PRN agitation or aggression  gabapentin 300 milliGRAM(s) Oral four times a day PRN anxiety  haloperidol     Tablet 5 milliGRAM(s) Oral every 6 hours PRN agitation  haloperidol    Injectable 5 milliGRAM(s) IntraMuscular once PRN agitation  LORazepam     Tablet 2 milliGRAM(s) Oral every 6 hours PRN agitation  LORazepam   Injectable 2 milliGRAM(s) IntraMuscular once PRN agitation  melatonin. 5 milliGRAM(s) Oral at bedtime PRN Insomnia  polyethylene glycol 3350 17 Gram(s) Oral daily PRN constipation

## 2021-08-27 NOTE — BH INPATIENT PSYCHIATRY PROGRESS NOTE - NSBHCHARTREVIEWVS_PSY_A_CORE FT
Vital Signs Last 24 Hrs  T(C): 36.9 (08-27-21 @ 07:53), Max: 36.9 (08-27-21 @ 07:53)  T(F): 98.5 (08-27-21 @ 07:53), Max: 98.5 (08-27-21 @ 07:53)  HR: --  BP: --  BP(mean): --  RR: --  SpO2: --    Orthostatic VS  08-27-21 @ 07:53  Lying BP: --/-- HR: --  Sitting BP: 121/84 HR: 86  Standing BP: 119/75 HR: 80  Site: upper right arm  Mode: electronic  Orthostatic VS  08-26-21 @ 20:33  Lying BP: --/-- HR: --  Sitting BP: 123/89 HR: 99  Standing BP: 128/80 HR: 101  Site: --  Mode: --  Orthostatic VS  08-26-21 @ 08:26  Lying BP: --/-- HR: --  Sitting BP: 118/73 HR: 87  Standing BP: 117/78 HR: 86  Site: --  Mode: --  Orthostatic VS  08-26-21 @ 08:15  Lying BP: --/-- HR: --  Sitting BP: 124/74 HR: 79  Standing BP: 129/80 HR: 80  Site: --  Mode: --  Orthostatic VS  08-25-21 @ 20:18  Lying BP: --/-- HR: --  Sitting BP: 104/60 HR: 89  Standing BP: 100/62 HR: 95  Site: --  Mode: --

## 2021-08-28 LAB — GLUCOSE BLDC GLUCOMTR-MCNC: 152 MG/DL — HIGH (ref 70–99)

## 2021-08-28 RX ADMIN — CLOZAPINE 100 MILLIGRAM(S): 150 TABLET, ORALLY DISINTEGRATING ORAL at 20:50

## 2021-08-28 RX ADMIN — METFORMIN HYDROCHLORIDE 1000 MILLIGRAM(S): 850 TABLET ORAL at 08:23

## 2021-08-28 RX ADMIN — RISPERIDONE 1 MILLIGRAM(S): 4 TABLET ORAL at 20:50

## 2021-08-28 RX ADMIN — CLOZAPINE 50 MILLIGRAM(S): 150 TABLET, ORALLY DISINTEGRATING ORAL at 08:23

## 2021-08-28 RX ADMIN — ATORVASTATIN CALCIUM 40 MILLIGRAM(S): 80 TABLET, FILM COATED ORAL at 20:50

## 2021-08-29 LAB
GLUCOSE BLDC GLUCOMTR-MCNC: 143 MG/DL — HIGH (ref 70–99)
GLUCOSE BLDC GLUCOMTR-MCNC: 160 MG/DL — HIGH (ref 70–99)
GLUCOSE BLDC GLUCOMTR-MCNC: 169 MG/DL — HIGH (ref 70–99)

## 2021-08-29 RX ADMIN — ATORVASTATIN CALCIUM 40 MILLIGRAM(S): 80 TABLET, FILM COATED ORAL at 21:17

## 2021-08-29 RX ADMIN — CLOZAPINE 100 MILLIGRAM(S): 150 TABLET, ORALLY DISINTEGRATING ORAL at 21:17

## 2021-08-29 RX ADMIN — CLOZAPINE 50 MILLIGRAM(S): 150 TABLET, ORALLY DISINTEGRATING ORAL at 09:24

## 2021-08-29 RX ADMIN — SENNA PLUS 2 TABLET(S): 8.6 TABLET ORAL at 21:17

## 2021-08-29 RX ADMIN — RISPERIDONE 1 MILLIGRAM(S): 4 TABLET ORAL at 21:17

## 2021-08-29 RX ADMIN — METFORMIN HYDROCHLORIDE 1000 MILLIGRAM(S): 850 TABLET ORAL at 09:24

## 2021-08-30 LAB
GLUCOSE BLDC GLUCOMTR-MCNC: 162 MG/DL — HIGH (ref 70–99)
GLUCOSE BLDC GLUCOMTR-MCNC: 199 MG/DL — HIGH (ref 70–99)
GLUCOSE BLDC GLUCOMTR-MCNC: 226 MG/DL — HIGH (ref 70–99)

## 2021-08-30 PROCEDURE — 90853 GROUP PSYCHOTHERAPY: CPT

## 2021-08-30 PROCEDURE — 99232 SBSQ HOSP IP/OBS MODERATE 35: CPT | Mod: 25

## 2021-08-30 RX ORDER — RISPERIDONE 4 MG/1
0.5 TABLET ORAL DAILY
Refills: 0 | Status: DISCONTINUED | OUTPATIENT
Start: 2021-08-31 | End: 2021-09-17

## 2021-08-30 RX ORDER — CLOZAPINE 150 MG/1
100 TABLET, ORALLY DISINTEGRATING ORAL AT BEDTIME
Refills: 0 | Status: COMPLETED | OUTPATIENT
Start: 2021-08-30 | End: 2021-08-30

## 2021-08-30 RX ORDER — CLOZAPINE 150 MG/1
150 TABLET, ORALLY DISINTEGRATING ORAL AT BEDTIME
Refills: 0 | Status: DISCONTINUED | OUTPATIENT
Start: 2021-08-31 | End: 2021-08-31

## 2021-08-30 RX ADMIN — CLOZAPINE 50 MILLIGRAM(S): 150 TABLET, ORALLY DISINTEGRATING ORAL at 08:37

## 2021-08-30 RX ADMIN — CLOZAPINE 100 MILLIGRAM(S): 150 TABLET, ORALLY DISINTEGRATING ORAL at 21:39

## 2021-08-30 RX ADMIN — RISPERIDONE 1 MILLIGRAM(S): 4 TABLET ORAL at 21:39

## 2021-08-30 RX ADMIN — SENNA PLUS 2 TABLET(S): 8.6 TABLET ORAL at 21:39

## 2021-08-30 RX ADMIN — ATORVASTATIN CALCIUM 40 MILLIGRAM(S): 80 TABLET, FILM COATED ORAL at 21:39

## 2021-08-30 NOTE — BH PSYCHOLOGY - GROUP THERAPY NOTE - NSBHPSYCHOLPARTICIPCOMMENT_PSY_A_CORE FT
Pt appeared appropriately dressed but not adequately groomed. Pt was observed to be internally preoccupied for the majority of the group, displaying poor boundaries and poor impulse control. Pt was redirected multiple times. His thought process was non-linear and disorganized. His speech was soft and hard to hear or understand. Although pt was interested in group, he had a difficult time following group rules and staying on topic. Pt was unable to read from the handouts. Speech was rapid and soft. Pt was observed to be slightly disoriented X3. 
Pt appeared appropriately dressed but not adequately groomed. Pt was observed to be internally preoccupied for the majority of the group, displaying poor boundaries and poor impulse control. Pt was redirected multiple times. His thought process was non-linear and disorganized. His speech was soft and hard to hear or understand. Although pt was interested in group, he had a difficult time following group rules and staying on topic. Pt was unable to read from the handouts. Speech was rapid and soft. Pt was observed to be slightly disoriented X3. Pt was disruptive to the group and was often verbally redirected.

## 2021-08-30 NOTE — BH INPATIENT PSYCHIATRY PROGRESS NOTE - NSBHASSESSSUMMFT_PSY_ALL_CORE
Patient less disorganized, compliant with medication still with AH and delusions    -clozapine ODT changed to 50mg PO daily and 100mg PO at bedtime and titrate Risperdal M-tab to 1mg P0 at bedtime, and for tomorrow, clozapine ODT 150mg PO at bedtime and Risperdal M-tab 0.5mg PO daily and 1mg PO at bedtime  -CBC + diff on 9/3/21 AM   -individual, group and milieu therapy  -CO 1:1 for disorganization and sexual activity risk discontinued

## 2021-08-30 NOTE — BH INPATIENT PSYCHIATRY PROGRESS NOTE - OTHER
Abnormal gait,  uses walker responding to internal stimuli less and internally preoccupied partially cooperative

## 2021-08-30 NOTE — BH PSYCHOLOGY - GROUP THERAPY NOTE - NSPSYCHOLGRPCOGINT_PSY_A_CORE FT
Cognitive/behavioral therapy, Emotion regulation/coping skills taught, Psychoed  
Cognitive/behavioral therapy, Emotion regulation/coping skills taught, Psychoed

## 2021-08-30 NOTE — BH INPATIENT PSYCHIATRY PROGRESS NOTE - CURRENT MEDICATION
MEDICATIONS  (STANDING):  acetaminophen   Tablet .. 650 milliGRAM(s) Oral once  atorvastatin 40 milliGRAM(s) Oral at bedtime  cloZAPine Disintegrating Tablet 100 milliGRAM(s) Oral at bedtime  dextrose 40% Gel 15 Gram(s) Oral once  glucagon  Injectable 1 milliGRAM(s) IntraMuscular once  insulin lispro (ADMELOG) corrective regimen sliding scale   SubCutaneous three times a day before meals  insulin lispro (ADMELOG) corrective regimen sliding scale   SubCutaneous at bedtime  metFORMIN 1000 milliGRAM(s) Oral daily  risperiDONE  Disintegrating Tablet 1 milliGRAM(s) Oral at bedtime  senna 2 Tablet(s) Oral at bedtime  sitaGLIPtin 100 milliGRAM(s) Oral daily    MEDICATIONS  (PRN):  acetaminophen   Tablet .. 650 milliGRAM(s) Oral every 6 hours PRN Mild Pain (1 - 3), Moderate Pain (4 - 6)  chlorproMAZINE    Injectable 100 milliGRAM(s) IntraMuscular once PRN agitation or aggression  chlorproMAZINE    Tablet 100 milliGRAM(s) Oral every 4 hours PRN agitation or aggression  gabapentin 300 milliGRAM(s) Oral four times a day PRN anxiety  haloperidol     Tablet 5 milliGRAM(s) Oral every 6 hours PRN agitation  haloperidol    Injectable 5 milliGRAM(s) IntraMuscular once PRN agitation  LORazepam     Tablet 2 milliGRAM(s) Oral every 6 hours PRN agitation  LORazepam   Injectable 2 milliGRAM(s) IntraMuscular once PRN agitation  melatonin. 5 milliGRAM(s) Oral at bedtime PRN Insomnia  polyethylene glycol 3350 17 Gram(s) Oral daily PRN constipation

## 2021-08-30 NOTE — BH INPATIENT PSYCHIATRY PROGRESS NOTE - NSBHMETABOLIC_PSY_ALL_CORE_FT
BMI: BMI (kg/m2): 30.3 (07-29-21 @ 14:53)  HbA1c: A1C with Estimated Average Glucose Result: 7.3 % (08-05-21 @ 10:24)    Glucose: POCT Blood Glucose.: 226 mg/dL (08-30-21 @ 11:56)    BP: 119/79 (08-29-21 @ 07:59) (119/79 - 119/79)  Lipid Panel: Date/Time: 08-05-21 @ 10:24  Cholesterol, Serum: 139  Direct LDL: --  HDL Cholesterol, Serum: 42  Total Cholesterol/HDL Ration Measurement: --  Triglycerides, Serum: 234

## 2021-08-30 NOTE — BH INPATIENT PSYCHIATRY PROGRESS NOTE - NSBHCHARTREVIEWVS_PSY_A_CORE FT
Vital Signs Last 24 Hrs  T(C): 36 (08-30-21 @ 08:03), Max: 36 (08-30-21 @ 08:03)  T(F): 96.8 (08-30-21 @ 08:03), Max: 96.8 (08-30-21 @ 08:03)  HR: --  BP: --  BP(mean): --  RR: 18 (08-30-21 @ 08:03) (18 - 20)  SpO2: --    Orthostatic VS  08-30-21 @ 08:03  Lying BP: --/-- HR: --  Sitting BP: 113/79 HR: 94  Standing BP: 98/78 HR: 101  Site: --  Mode: --  Orthostatic VS  08-29-21 @ 20:18  Lying BP: --/-- HR: --  Sitting BP: 124/71 HR: 89  Standing BP: 110/80 HR: 74  Site: --  Mode: --  Orthostatic VS  08-28-21 @ 20:40  Lying BP: --/-- HR: --  Sitting BP: 101/69 HR: 98  Standing BP: --/-- HR: --  Site: --  Mode: --

## 2021-08-30 NOTE — BH PSYCHOLOGY - GROUP THERAPY NOTE - NSPSYCHOLGRPCOGGOAL_PSY_A_CORE FT
Decrease symptoms, Develop coping/emotion regulation skills, Psychoed  
Decrease symptoms, Develop coping/emotion regulation skills, Psychoed

## 2021-08-30 NOTE — BH INPATIENT PSYCHIATRY PROGRESS NOTE - NSBHFUPINTERVALHXFT_PSY_A_CORE
Pt compliant with medication and tolerating it well.  No events reported overnight.  Pt initially cooperative with interview, then more irritable when talking about medication.  Pt reports he does not need medication because he is not hearing voices, although observed talking to himself at times.  Pt reports "Clozaril is against my Sabianism, psychiatry is against my Sabianism."  Pt also talks about how his home, meaning his residence, does not exist anymore.  Pt eventually agrees to titrate Risperdal for tomorrow after risks and benefits of medication discussed.  Pt sexually preoccupied and asks if he should masturbate.  Pt then walks out of the room.  Pt observed later in group, did not appear to be disruptive.

## 2021-08-30 NOTE — BH PSYCHOLOGY - GROUP THERAPY NOTE - NSPSYCHOLGRPCOGPROB_PSY_A_CORE FT
Anxiety, Depression, Emotion dysregulation, Lack of coping skills, Psycho-social stressors, Self care, Problem solving  
Anxiety, Depression, Emotion dysregulation, Lack of coping skills, Psycho-social stressors, Self care, Problem solving

## 2021-08-30 NOTE — BH PSYCHOLOGY - GROUP THERAPY NOTE - NSPSYCHOLGRPCOGPT_PSY_A_CORE FT
Pt attended Cognitive Behavioral Therapy group. ACT skills and concepts were also utilized. The group started with a brief check in during which pts were asked to share something that they wanted to work on in their life. The group then read a paragraph that talked about mindfulness in the form of a metaphor. Pts connected with the metaphor which prompted a meaningful discussion. Pts then talked about what mindfulness meant for them and also discussed things to do during distressful times. This discussion was guided by a distress tolerance handout (ACCEPTS) that went over different ways in which one can distract themselves from distressful thoughts, emotions and events. Group leaders explained concepts, reinforced participation, and engaged patients in the discussion.
Pt attended Cognitive Behavioral Therapy group. ACT skills and concepts were also utilized. The group started with a brief check in during which pts were asked to share a quality they valued in a friend. The group then read a mindfulness passage in the form of a metaphor. Pts were able to connect with the metaphor and meaningfully participated in a group discussion. The group then went over the IMPROVE skills on the distress tolerance handout. Group leaders focused on skills that pts found helpful and accessible. Pts talked about ways they tolerate stressful situations in their lives. Group leaders explained concepts, reinforced participation, and engaged patients in the discussion.

## 2021-08-31 LAB
GLUCOSE BLDC GLUCOMTR-MCNC: 134 MG/DL — HIGH (ref 70–99)
GLUCOSE BLDC GLUCOMTR-MCNC: 174 MG/DL — HIGH (ref 70–99)
GLUCOSE BLDC GLUCOMTR-MCNC: 185 MG/DL — HIGH (ref 70–99)
GLUCOSE BLDC GLUCOMTR-MCNC: 198 MG/DL — HIGH (ref 70–99)

## 2021-08-31 PROCEDURE — 99232 SBSQ HOSP IP/OBS MODERATE 35: CPT

## 2021-08-31 RX ORDER — CLOZAPINE 150 MG/1
175 TABLET, ORALLY DISINTEGRATING ORAL AT BEDTIME
Refills: 0 | Status: DISCONTINUED | OUTPATIENT
Start: 2021-08-31 | End: 2021-09-09

## 2021-08-31 RX ADMIN — METFORMIN HYDROCHLORIDE 1000 MILLIGRAM(S): 850 TABLET ORAL at 09:20

## 2021-08-31 RX ADMIN — SENNA PLUS 2 TABLET(S): 8.6 TABLET ORAL at 21:35

## 2021-08-31 RX ADMIN — RISPERIDONE 0.5 MILLIGRAM(S): 4 TABLET ORAL at 09:20

## 2021-08-31 RX ADMIN — RISPERIDONE 1 MILLIGRAM(S): 4 TABLET ORAL at 21:35

## 2021-08-31 RX ADMIN — CLOZAPINE 175 MILLIGRAM(S): 150 TABLET, ORALLY DISINTEGRATING ORAL at 21:35

## 2021-08-31 RX ADMIN — ATORVASTATIN CALCIUM 40 MILLIGRAM(S): 80 TABLET, FILM COATED ORAL at 21:35

## 2021-08-31 NOTE — BH INPATIENT PSYCHIATRY PROGRESS NOTE - NSBHMETABOLIC_PSY_ALL_CORE_FT
BMI: BMI (kg/m2): 30.3 (07-29-21 @ 14:53)  HbA1c: A1C with Estimated Average Glucose Result: 7.3 % (08-05-21 @ 10:24)    Glucose: POCT Blood Glucose.: 185 mg/dL (08-31-21 @ 12:02)    BP: 119/79 (08-29-21 @ 07:59) (119/79 - 119/79)  Lipid Panel: Date/Time: 08-05-21 @ 10:24  Cholesterol, Serum: 139  Direct LDL: --  HDL Cholesterol, Serum: 42  Total Cholesterol/HDL Ration Measurement: --  Triglycerides, Serum: 234

## 2021-08-31 NOTE — BH INPATIENT PSYCHIATRY PROGRESS NOTE - CURRENT MEDICATION
MEDICATIONS  (STANDING):  acetaminophen   Tablet .. 650 milliGRAM(s) Oral once  atorvastatin 40 milliGRAM(s) Oral at bedtime  cloZAPine Disintegrating Tablet 175 milliGRAM(s) Oral at bedtime  dextrose 40% Gel 15 Gram(s) Oral once  glucagon  Injectable 1 milliGRAM(s) IntraMuscular once  insulin lispro (ADMELOG) corrective regimen sliding scale   SubCutaneous three times a day before meals  insulin lispro (ADMELOG) corrective regimen sliding scale   SubCutaneous at bedtime  metFORMIN 1000 milliGRAM(s) Oral daily  risperiDONE  Disintegrating Tablet 1 milliGRAM(s) Oral at bedtime  risperiDONE  Disintegrating Tablet 0.5 milliGRAM(s) Oral daily  senna 2 Tablet(s) Oral at bedtime  sitaGLIPtin 100 milliGRAM(s) Oral daily    MEDICATIONS  (PRN):  acetaminophen   Tablet .. 650 milliGRAM(s) Oral every 6 hours PRN Mild Pain (1 - 3), Moderate Pain (4 - 6)  chlorproMAZINE    Injectable 100 milliGRAM(s) IntraMuscular once PRN agitation or aggression  chlorproMAZINE    Tablet 100 milliGRAM(s) Oral every 4 hours PRN agitation or aggression  gabapentin 300 milliGRAM(s) Oral four times a day PRN anxiety  haloperidol     Tablet 5 milliGRAM(s) Oral every 6 hours PRN agitation  haloperidol    Injectable 5 milliGRAM(s) IntraMuscular once PRN agitation  LORazepam     Tablet 2 milliGRAM(s) Oral every 6 hours PRN agitation  LORazepam   Injectable 2 milliGRAM(s) IntraMuscular once PRN agitation  melatonin. 5 milliGRAM(s) Oral at bedtime PRN Insomnia  polyethylene glycol 3350 17 Gram(s) Oral daily PRN constipation

## 2021-08-31 NOTE — BH INPATIENT PSYCHIATRY PROGRESS NOTE - NSBHFUPINTERVALHXFT_PSY_A_CORE
Pt compliant with medication and tolerating it well.  No events reported overnight.  Chart reviewed and case discussed with treatment team.  Pt agrees to meet with writer and SW, is more appropriate and cooperative today and less irritable.  Pt takes out his daily schedule of agreed upon medication, agrees to titrate Clozaril to 175mg PO tonight and continue Risperdal as is.  Pt today reports he wants to increase Clozaril so that he can get back to his residence and inquires how he will be transported there.  Pt reports when Clozaril is too high "I can't think, I stare out the window."  Pt continues to be illogical and talk to himself at times, but this is improving.  Pt denies SI/HI/I/P or AH/VH, reports the only voices he hears is music.  Pt reports eating and sleeping well.  Pt shows writer a picture he corey of a space ship and then starts talking about the planet [place].

## 2021-08-31 NOTE — BH INPATIENT PSYCHIATRY PROGRESS NOTE - NSBHCHARTREVIEWVS_PSY_A_CORE FT
Vital Signs Last 24 Hrs  T(C): 35.9 (08-31-21 @ 08:03), Max: 36.1 (08-30-21 @ 21:00)  T(F): 96.7 (08-31-21 @ 08:03), Max: 97 (08-30-21 @ 21:00)  HR: --  BP: --  BP(mean): --  RR: --  SpO2: --    Orthostatic VS  08-31-21 @ 08:03  Lying BP: --/-- HR: --  Sitting BP: 121/78 HR: 91  Standing BP: --/-- HR: --  Site: --  Mode: --  Orthostatic VS  08-30-21 @ 21:00  Lying BP: --/-- HR: --  Sitting BP: 112/74 HR: 95  Standing BP: 122/84 HR: 93  Site: --  Mode: --  Orthostatic VS  08-30-21 @ 08:03  Lying BP: --/-- HR: --  Sitting BP: 113/79 HR: 94  Standing BP: 98/78 HR: 101  Site: --  Mode: --  Orthostatic VS  08-29-21 @ 20:18  Lying BP: --/-- HR: --  Sitting BP: 124/71 HR: 89  Standing BP: 110/80 HR: 74  Site: --  Mode: --

## 2021-08-31 NOTE — BH INPATIENT PSYCHIATRY PROGRESS NOTE - OTHER
responding to internal stimuli less and internally preoccupied improving Abnormal gait,  uses walker

## 2021-08-31 NOTE — BH INPATIENT PSYCHIATRY PROGRESS NOTE - NSBHASSESSSUMMFT_PSY_ALL_CORE
Patient less disorganized, compliant with medication still with AH and delusions    -clozapine ODT titrated to 175mg PO at bedtime and Risperdal M-tab 0.5mg PO daily and 1mg P0 at bedtime  -CBC + diff on 9/3/21 AM   -individual, group and milieu therapy  -CO 1:1 for disorganization and sexual activity risk discontinued

## 2021-09-01 LAB
GLUCOSE BLDC GLUCOMTR-MCNC: 155 MG/DL — HIGH (ref 70–99)
GLUCOSE BLDC GLUCOMTR-MCNC: 162 MG/DL — HIGH (ref 70–99)
GLUCOSE BLDC GLUCOMTR-MCNC: 170 MG/DL — HIGH (ref 70–99)

## 2021-09-01 PROCEDURE — 99232 SBSQ HOSP IP/OBS MODERATE 35: CPT

## 2021-09-01 RX ORDER — METFORMIN HYDROCHLORIDE 850 MG/1
1000 TABLET ORAL AT BEDTIME
Refills: 0 | Status: DISCONTINUED | OUTPATIENT
Start: 2021-09-02 | End: 2021-11-29

## 2021-09-01 RX ADMIN — RISPERIDONE 0.5 MILLIGRAM(S): 4 TABLET ORAL at 08:37

## 2021-09-01 RX ADMIN — METFORMIN HYDROCHLORIDE 1000 MILLIGRAM(S): 850 TABLET ORAL at 08:40

## 2021-09-01 RX ADMIN — Medication 650 MILLIGRAM(S): at 23:40

## 2021-09-01 RX ADMIN — CLOZAPINE 175 MILLIGRAM(S): 150 TABLET, ORALLY DISINTEGRATING ORAL at 21:28

## 2021-09-01 RX ADMIN — RISPERIDONE 1 MILLIGRAM(S): 4 TABLET ORAL at 21:27

## 2021-09-01 RX ADMIN — Medication 650 MILLIGRAM(S): at 22:40

## 2021-09-01 RX ADMIN — SENNA PLUS 2 TABLET(S): 8.6 TABLET ORAL at 21:27

## 2021-09-01 NOTE — BH INPATIENT PSYCHIATRY PROGRESS NOTE - NSBHCHARTREVIEWVS_PSY_A_CORE FT
Vital Signs Last 24 Hrs  T(C): 36.5 (09-01-21 @ 08:04), Max: 36.5 (09-01-21 @ 08:04)  T(F): 97.7 (09-01-21 @ 08:04), Max: 97.7 (09-01-21 @ 08:04)  HR: 90 (09-01-21 @ 08:04) (90 - 90)  BP: 125/82 (09-01-21 @ 08:04) (125/82 - 125/82)  BP(mean): --  RR: --  SpO2: --    Orthostatic VS  08-31-21 @ 20:53  Lying BP: --/-- HR: --  Sitting BP: 127/82 HR: 91  Standing BP: 108/80 HR: 101  Site: upper left arm  Mode: electronic  Orthostatic VS  08-31-21 @ 08:03  Lying BP: --/-- HR: --  Sitting BP: 121/78 HR: 91  Standing BP: --/-- HR: --  Site: --  Mode: --  Orthostatic VS  08-30-21 @ 21:00  Lying BP: --/-- HR: --  Sitting BP: 112/74 HR: 95  Standing BP: 122/84 HR: 93  Site: --  Mode: --

## 2021-09-01 NOTE — BH INPATIENT PSYCHIATRY PROGRESS NOTE - OTHER
improving responding to internal stimuli less and internally preoccupied Abnormal gait,  uses walker

## 2021-09-01 NOTE — BH INPATIENT PSYCHIATRY PROGRESS NOTE - CURRENT MEDICATION
MEDICATIONS  (STANDING):  acetaminophen   Tablet .. 650 milliGRAM(s) Oral once  atorvastatin 40 milliGRAM(s) Oral at bedtime  cloZAPine Disintegrating Tablet 175 milliGRAM(s) Oral at bedtime  dextrose 40% Gel 15 Gram(s) Oral once  glucagon  Injectable 1 milliGRAM(s) IntraMuscular once  insulin lispro (ADMELOG) corrective regimen sliding scale   SubCutaneous three times a day before meals  insulin lispro (ADMELOG) corrective regimen sliding scale   SubCutaneous at bedtime  risperiDONE  Disintegrating Tablet 0.5 milliGRAM(s) Oral daily  risperiDONE  Disintegrating Tablet 1 milliGRAM(s) Oral at bedtime  senna 2 Tablet(s) Oral at bedtime  sitaGLIPtin 100 milliGRAM(s) Oral daily    MEDICATIONS  (PRN):  acetaminophen   Tablet .. 650 milliGRAM(s) Oral every 6 hours PRN Mild Pain (1 - 3), Moderate Pain (4 - 6)  chlorproMAZINE    Injectable 100 milliGRAM(s) IntraMuscular once PRN agitation or aggression  chlorproMAZINE    Tablet 100 milliGRAM(s) Oral every 4 hours PRN agitation or aggression  gabapentin 300 milliGRAM(s) Oral four times a day PRN anxiety  haloperidol     Tablet 5 milliGRAM(s) Oral every 6 hours PRN agitation  haloperidol    Injectable 5 milliGRAM(s) IntraMuscular once PRN agitation  LORazepam     Tablet 2 milliGRAM(s) Oral every 6 hours PRN agitation  LORazepam   Injectable 2 milliGRAM(s) IntraMuscular once PRN agitation  melatonin. 5 milliGRAM(s) Oral at bedtime PRN Insomnia  polyethylene glycol 3350 17 Gram(s) Oral daily PRN constipation

## 2021-09-01 NOTE — BH INPATIENT PSYCHIATRY PROGRESS NOTE - NSBHMETABOLIC_PSY_ALL_CORE_FT
BMI: BMI (kg/m2): 30.3 (07-29-21 @ 14:53)  HbA1c: A1C with Estimated Average Glucose Result: 7.3 % (08-05-21 @ 10:24)    Glucose: POCT Blood Glucose.: 162 mg/dL (09-01-21 @ 08:13)    BP: 125/82 (09-01-21 @ 08:04) (125/82 - 125/82)  Lipid Panel: Date/Time: 08-05-21 @ 10:24  Cholesterol, Serum: 139  Direct LDL: --  HDL Cholesterol, Serum: 42  Total Cholesterol/HDL Ration Measurement: --  Triglycerides, Serum: 234

## 2021-09-01 NOTE — BH INPATIENT PSYCHIATRY PROGRESS NOTE - NSBHFUPINTERVALHXFT_PSY_A_CORE
Pt compliant with medication and tolerating it well.  Chart reviewed and case discussed with treatment team.  No events reported overnight.  Pt more irritable today, calls writer derogatory names.  Pt agrees to meet with writer and SW for interview.  Pt preoccupied with how he takes Metformin in the AM that it is "shutting down my pancreas," after discussion agrees he would rather take it at bedtime.  Pt also paranoid, talking about calling the  in [facility].  Pt mumbling to himself less.  Pt agrees to go over his daily medication schedule, changed Metformin to HS and agrees to continue Januvia in the AM.  Will continue Clozaril and Risperdal at the same doses as yesterday and will re-evaluate dosing tomorrow.

## 2021-09-01 NOTE — BH INPATIENT PSYCHIATRY PROGRESS NOTE - NSBHASSESSSUMMFT_PSY_ALL_CORE
Patient less disorganized, compliant with medication still with AH and delusions    -clozapine ODT titrated to 175mg PO at bedtime and Risperdal M-tab 0.5mg PO daily and 1mg P0 at bedtime  -CBC + diff on 9/3/21 AM   -individual, group and milieu therapy  -routine checks

## 2021-09-02 LAB
GLUCOSE BLDC GLUCOMTR-MCNC: 171 MG/DL — HIGH (ref 70–99)
GLUCOSE BLDC GLUCOMTR-MCNC: 184 MG/DL — HIGH (ref 70–99)
GLUCOSE BLDC GLUCOMTR-MCNC: 186 MG/DL — HIGH (ref 70–99)

## 2021-09-02 PROCEDURE — 99231 SBSQ HOSP IP/OBS SF/LOW 25: CPT

## 2021-09-02 RX ADMIN — RISPERIDONE 0.5 MILLIGRAM(S): 4 TABLET ORAL at 09:02

## 2021-09-02 NOTE — BH INPATIENT PSYCHIATRY PROGRESS NOTE - NSBHMETABOLIC_PSY_ALL_CORE_FT
BMI: BMI (kg/m2): 30.3 (07-29-21 @ 14:53)  HbA1c: A1C with Estimated Average Glucose Result: 7.3 % (08-05-21 @ 10:24)    Glucose: POCT Blood Glucose.: 155 mg/dL (09-01-21 @ 16:38)    BP: 136/92 (09-01-21 @ 20:49) (125/82 - 136/92)  Lipid Panel: Date/Time: 08-05-21 @ 10:24  Cholesterol, Serum: 139  Direct LDL: --  HDL Cholesterol, Serum: 42  Total Cholesterol/HDL Ration Measurement: --  Triglycerides, Serum: 234

## 2021-09-02 NOTE — BH INPATIENT PSYCHIATRY PROGRESS NOTE - CURRENT MEDICATION
MEDICATIONS  (STANDING):  acetaminophen   Tablet .. 650 milliGRAM(s) Oral once  atorvastatin 40 milliGRAM(s) Oral at bedtime  cloZAPine Disintegrating Tablet 175 milliGRAM(s) Oral at bedtime  dextrose 40% Gel 15 Gram(s) Oral once  glucagon  Injectable 1 milliGRAM(s) IntraMuscular once  insulin lispro (ADMELOG) corrective regimen sliding scale   SubCutaneous three times a day before meals  insulin lispro (ADMELOG) corrective regimen sliding scale   SubCutaneous at bedtime  metFORMIN 1000 milliGRAM(s) Oral at bedtime  risperiDONE  Disintegrating Tablet 0.5 milliGRAM(s) Oral daily  risperiDONE  Disintegrating Tablet 1 milliGRAM(s) Oral at bedtime  senna 2 Tablet(s) Oral at bedtime  sitaGLIPtin 100 milliGRAM(s) Oral daily    MEDICATIONS  (PRN):  acetaminophen   Tablet .. 650 milliGRAM(s) Oral every 6 hours PRN Mild Pain (1 - 3), Moderate Pain (4 - 6)  chlorproMAZINE    Injectable 100 milliGRAM(s) IntraMuscular once PRN agitation or aggression  chlorproMAZINE    Tablet 100 milliGRAM(s) Oral every 4 hours PRN agitation or aggression  gabapentin 300 milliGRAM(s) Oral four times a day PRN anxiety  haloperidol     Tablet 5 milliGRAM(s) Oral every 6 hours PRN agitation  haloperidol    Injectable 5 milliGRAM(s) IntraMuscular once PRN agitation  LORazepam     Tablet 2 milliGRAM(s) Oral every 6 hours PRN agitation  LORazepam   Injectable 2 milliGRAM(s) IntraMuscular once PRN agitation  melatonin. 5 milliGRAM(s) Oral at bedtime PRN Insomnia  polyethylene glycol 3350 17 Gram(s) Oral daily PRN constipation

## 2021-09-02 NOTE — BH INPATIENT PSYCHIATRY PROGRESS NOTE - OTHER
minimally cooperative improving Abnormal gait,  uses walker responding to internal stimuli less and internally preoccupied

## 2021-09-02 NOTE — BH INPATIENT PSYCHIATRY PROGRESS NOTE - NSBHFUPINTERVALHXFT_PSY_A_CORE
Pt selectively refusing medical medication last night and this morning and only took 150mg of Clozaril last night.  No other events reported overnight.  Chart reviewed and case discussed with treatment team.  Pt mostly isolative in room today, reports he is resting and does not really want to talk.  Pt did agree to take full dose of Clozaril tonight.  Pt denies AH today, less talking to himself.  Pt then talks about a girlfriend he has and how he misses her and they both can't stay together at his residence.  Pt then asks to rest.

## 2021-09-02 NOTE — BH INPATIENT PSYCHIATRY PROGRESS NOTE - NSBHCHARTREVIEWVS_PSY_A_CORE FT
Vital Signs Last 24 Hrs  T(C): 36.3 (09-02-21 @ 08:36), Max: 37 (09-01-21 @ 20:49)  T(F): 97.4 (09-02-21 @ 08:36), Max: 98.6 (09-01-21 @ 20:49)  HR: 98 (09-01-21 @ 20:49) (98 - 98)  BP: 136/92 (09-01-21 @ 20:49) (136/92 - 136/92)  BP(mean): --  RR: --  SpO2: --    Orthostatic VS  09-02-21 @ 08:36  Lying BP: --/-- HR: --  Sitting BP: 128/83 HR: 94  Standing BP: 141/94 HR: 105  Site: upper left arm  Mode: electronic  Orthostatic VS  08-31-21 @ 20:53  Lying BP: --/-- HR: --  Sitting BP: 127/82 HR: 91  Standing BP: 108/80 HR: 101  Site: upper left arm  Mode: electronic

## 2021-09-03 LAB
GLUCOSE BLDC GLUCOMTR-MCNC: 169 MG/DL — HIGH (ref 70–99)
GLUCOSE BLDC GLUCOMTR-MCNC: 179 MG/DL — HIGH (ref 70–99)
GLUCOSE BLDC GLUCOMTR-MCNC: 241 MG/DL — HIGH (ref 70–99)

## 2021-09-03 PROCEDURE — 99232 SBSQ HOSP IP/OBS MODERATE 35: CPT

## 2021-09-03 NOTE — BH INPATIENT PSYCHIATRY PROGRESS NOTE - NSBHFUPINTERVALHXFT_PSY_A_CORE
Pt refused all medication last night and this morning.  No other events reported overnight.  Chart reviewed and case discussed with treatment team.  Pt more agitated, labile today, disorganized, getting in writer's face, requiring redirection.  Pt reports he cannot take the medication because "it's poison" and "it affects my frontal lobe."  Pt then rambles on about "you are [place] we are back [place] and not around [place]."  Attempted to discuss risks and benefits of medication and plan for MOO if patient continues to refuse, but patient too agitated to be receptive.

## 2021-09-03 NOTE — BH INPATIENT PSYCHIATRY PROGRESS NOTE - NSBHASSESSSUMMFT_PSY_ALL_CORE
Patient less disorganized, compliant with medication still with AH and delusions    -clozapine ODT titrated to 175mg PO at bedtime and Risperdal M-tab 0.5mg PO daily and 1mg P0 at bedtime, consider MOO if patient continues to refuse  -CBC + diff on 9/3/21 AM   -individual, group and milieu therapy  -routine checks

## 2021-09-03 NOTE — BH INPATIENT PSYCHIATRY PROGRESS NOTE - NSBHMETABOLIC_PSY_ALL_CORE_FT
BMI: BMI (kg/m2): 30.3 (07-29-21 @ 14:53)  HbA1c: A1C with Estimated Average Glucose Result: 7.3 % (08-05-21 @ 10:24)    Glucose: POCT Blood Glucose.: 169 mg/dL (09-03-21 @ 08:09)    BP: 136/92 (09-01-21 @ 20:49) (125/82 - 136/92)  Lipid Panel: Date/Time: 08-05-21 @ 10:24  Cholesterol, Serum: 139  Direct LDL: --  HDL Cholesterol, Serum: 42  Total Cholesterol/HDL Ration Measurement: --  Triglycerides, Serum: 234

## 2021-09-03 NOTE — BH INPATIENT PSYCHIATRY PROGRESS NOTE - NSBHCHARTREVIEWVS_PSY_A_CORE FT
Vital Signs Last 24 Hrs  T(C): 36.4 (09-03-21 @ 07:46), Max: 36.6 (09-02-21 @ 18:48)  T(F): 97.6 (09-03-21 @ 07:46), Max: 97.9 (09-02-21 @ 18:48)  HR: --  BP: --  BP(mean): --  RR: --  SpO2: --    Orthostatic VS  09-03-21 @ 07:46  Lying BP: --/-- HR: --  Sitting BP: 119/80 HR: 86  Standing BP: --/-- HR: --  Site: --  Mode: --  Orthostatic VS  09-02-21 @ 19:49  Lying BP: --/-- HR: --  Sitting BP: 111/73 HR: 93  Standing BP: 106/74 HR: 100  Site: --  Mode: --  Orthostatic VS  09-02-21 @ 08:36  Lying BP: --/-- HR: --  Sitting BP: 128/83 HR: 94  Standing BP: 141/94 HR: 105  Site: upper left arm  Mode: electronic

## 2021-09-04 RX ORDER — CHLORPROMAZINE HCL 10 MG
100 TABLET ORAL ONCE
Refills: 0 | Status: COMPLETED | OUTPATIENT
Start: 2021-09-04 | End: 2021-09-04

## 2021-09-04 RX ADMIN — RISPERIDONE 0.5 MILLIGRAM(S): 4 TABLET ORAL at 08:17

## 2021-09-04 RX ADMIN — Medication 2 MILLIGRAM(S): at 11:52

## 2021-09-04 RX ADMIN — Medication 100 MILLIGRAM(S): at 11:52

## 2021-09-05 LAB
GLUCOSE BLDC GLUCOMTR-MCNC: 159 MG/DL — HIGH (ref 70–99)
GLUCOSE BLDC GLUCOMTR-MCNC: 184 MG/DL — HIGH (ref 70–99)
GLUCOSE BLDC GLUCOMTR-MCNC: 233 MG/DL — HIGH (ref 70–99)

## 2021-09-05 RX ADMIN — SENNA PLUS 2 TABLET(S): 8.6 TABLET ORAL at 21:12

## 2021-09-05 RX ADMIN — ATORVASTATIN CALCIUM 40 MILLIGRAM(S): 80 TABLET, FILM COATED ORAL at 21:12

## 2021-09-05 RX ADMIN — METFORMIN HYDROCHLORIDE 1000 MILLIGRAM(S): 850 TABLET ORAL at 21:12

## 2021-09-05 RX ADMIN — RISPERIDONE 1 MILLIGRAM(S): 4 TABLET ORAL at 21:12

## 2021-09-05 RX ADMIN — RISPERIDONE 0.5 MILLIGRAM(S): 4 TABLET ORAL at 08:12

## 2021-09-05 RX ADMIN — CLOZAPINE 175 MILLIGRAM(S): 150 TABLET, ORALLY DISINTEGRATING ORAL at 21:13

## 2021-09-06 LAB
GLUCOSE BLDC GLUCOMTR-MCNC: 150 MG/DL — HIGH (ref 70–99)
GLUCOSE BLDC GLUCOMTR-MCNC: 175 MG/DL — HIGH (ref 70–99)
GLUCOSE BLDC GLUCOMTR-MCNC: 191 MG/DL — HIGH (ref 70–99)

## 2021-09-06 RX ADMIN — SENNA PLUS 2 TABLET(S): 8.6 TABLET ORAL at 20:42

## 2021-09-06 RX ADMIN — METFORMIN HYDROCHLORIDE 1000 MILLIGRAM(S): 850 TABLET ORAL at 20:43

## 2021-09-06 RX ADMIN — RISPERIDONE 0.5 MILLIGRAM(S): 4 TABLET ORAL at 08:44

## 2021-09-06 RX ADMIN — RISPERIDONE 1 MILLIGRAM(S): 4 TABLET ORAL at 20:44

## 2021-09-06 RX ADMIN — Medication 1: at 08:42

## 2021-09-06 RX ADMIN — ATORVASTATIN CALCIUM 40 MILLIGRAM(S): 80 TABLET, FILM COATED ORAL at 20:42

## 2021-09-06 RX ADMIN — CLOZAPINE 175 MILLIGRAM(S): 150 TABLET, ORALLY DISINTEGRATING ORAL at 20:42

## 2021-09-07 LAB — GLUCOSE BLDC GLUCOMTR-MCNC: 229 MG/DL — HIGH (ref 70–99)

## 2021-09-07 PROCEDURE — 99232 SBSQ HOSP IP/OBS MODERATE 35: CPT

## 2021-09-07 RX ORDER — RISPERIDONE 4 MG/1
1.5 TABLET ORAL AT BEDTIME
Refills: 0 | Status: DISCONTINUED | OUTPATIENT
Start: 2021-09-07 | End: 2021-09-14

## 2021-09-07 RX ADMIN — RISPERIDONE 0.5 MILLIGRAM(S): 4 TABLET ORAL at 08:40

## 2021-09-07 NOTE — BH INPATIENT PSYCHIATRY PROGRESS NOTE - NSBHMETABOLIC_PSY_ALL_CORE_FT
BMI: BMI (kg/m2): 33.1 (09-04-21 @ 07:48)  HbA1c: A1C with Estimated Average Glucose Result: 7.3 % (08-05-21 @ 10:24)    Glucose: POCT Blood Glucose.: 229 mg/dL (09-07-21 @ 11:59)    BP: 136/77 (09-05-21 @ 21:10) (136/77 - 136/77)  Lipid Panel: Date/Time: 08-05-21 @ 10:24  Cholesterol, Serum: 139  Direct LDL: --  HDL Cholesterol, Serum: 42  Total Cholesterol/HDL Ration Measurement: --  Triglycerides, Serum: 234

## 2021-09-07 NOTE — BH INPATIENT PSYCHIATRY PROGRESS NOTE - CURRENT MEDICATION
MEDICATIONS  (STANDING):  acetaminophen   Tablet .. 650 milliGRAM(s) Oral once  atorvastatin 40 milliGRAM(s) Oral at bedtime  cloZAPine Disintegrating Tablet 175 milliGRAM(s) Oral at bedtime  dextrose 40% Gel 15 Gram(s) Oral once  glucagon  Injectable 1 milliGRAM(s) IntraMuscular once  insulin lispro (ADMELOG) corrective regimen sliding scale   SubCutaneous three times a day before meals  insulin lispro (ADMELOG) corrective regimen sliding scale   SubCutaneous at bedtime  metFORMIN 1000 milliGRAM(s) Oral at bedtime  risperiDONE  Disintegrating Tablet 1.5 milliGRAM(s) Oral at bedtime  risperiDONE  Disintegrating Tablet 0.5 milliGRAM(s) Oral daily  senna 2 Tablet(s) Oral at bedtime  sitaGLIPtin 100 milliGRAM(s) Oral daily    MEDICATIONS  (PRN):  acetaminophen   Tablet .. 650 milliGRAM(s) Oral every 6 hours PRN Mild Pain (1 - 3), Moderate Pain (4 - 6)  chlorproMAZINE    Injectable 100 milliGRAM(s) IntraMuscular once PRN agitation or aggression  chlorproMAZINE    Tablet 100 milliGRAM(s) Oral every 4 hours PRN agitation or aggression  gabapentin 300 milliGRAM(s) Oral four times a day PRN anxiety  haloperidol     Tablet 5 milliGRAM(s) Oral every 6 hours PRN agitation  haloperidol    Injectable 5 milliGRAM(s) IntraMuscular once PRN agitation  LORazepam     Tablet 2 milliGRAM(s) Oral every 6 hours PRN agitation  LORazepam   Injectable 2 milliGRAM(s) IntraMuscular once PRN agitation  melatonin. 5 milliGRAM(s) Oral at bedtime PRN Insomnia  polyethylene glycol 3350 17 Gram(s) Oral daily PRN constipation

## 2021-09-07 NOTE — BH INPATIENT PSYCHIATRY PROGRESS NOTE - NSBHASSESSSUMMFT_PSY_ALL_CORE
Patient less disorganized, compliant with medication still with AH and delusions    -clozapine ODT titrated to 175mg PO at bedtime and Risperdal M-tab 0.5mg PO daily and 1.5mg P0 at bedtime, consider MOO if patient continues to refuse  -group and milieu therapy  -CO 1:1 for disorganization

## 2021-09-07 NOTE — BH INPATIENT PSYCHIATRY PROGRESS NOTE - OTHER
minimally cooperative Abnormal gait,  uses walker responding to internal stimuli and internally preoccupied

## 2021-09-07 NOTE — BH INPATIENT PSYCHIATRY PROGRESS NOTE - NSBHCHARTREVIEWVS_PSY_A_CORE FT
Vital Signs Last 24 Hrs  T(C): 36.4 (09-07-21 @ 07:47), Max: 36.4 (09-07-21 @ 07:47)  T(F): 97.5 (09-07-21 @ 07:47), Max: 97.5 (09-07-21 @ 07:47)  HR: --  BP: --  BP(mean): --  RR: --  SpO2: --    Orthostatic VS  09-07-21 @ 07:47  Lying BP: --/-- HR: --  Sitting BP: 121/85 HR: 93  Standing BP: 121/73 HR: 97  Site: --  Mode: --  Orthostatic VS  09-06-21 @ 08:18  Lying BP: --/-- HR: --  Sitting BP: 126/76 HR: 92  Standing BP: 119/79 HR: 93  Site: --  Mode: --

## 2021-09-07 NOTE — BH INPATIENT PSYCHIATRY PROGRESS NOTE - NSBHFUPINTERVALHXFT_PSY_A_CORE
Pt compliant with medication and tolerating it well.  No events reported overnight.  Chart reviewed and case discussed with treatment team.  Pt approached for interview, is irritable, asks writer and SW to come back in one hour.  Writer and SW attempt to engage patient again and patient continues to be irritable, rambles on about song names he wrote down, how he wants to go home soon and how he does not know if he is allowed back to his residence.  Writer attempts to discuss medication several times, but patient interrupts writer each time and then eventually agrees to Risperdal titration, but only wants to take 62.5mg of Clozaril, reports that is the first dose he was on.  Writer explains that his current dose is recommended and patient stands up and gets in writers face, yelling, then sits down and asks to end the conversation.      Later, per nursing, patient attempting to put a spoon in his ear, agitated, requiring a lot of redirection, placed on CO 1:1 for disorganization.

## 2021-09-08 PROCEDURE — 99232 SBSQ HOSP IP/OBS MODERATE 35: CPT

## 2021-09-08 RX ADMIN — RISPERIDONE 0.5 MILLIGRAM(S): 4 TABLET ORAL at 09:27

## 2021-09-08 NOTE — BH INPATIENT PSYCHIATRY PROGRESS NOTE - NSBHMETABOLIC_PSY_ALL_CORE_FT
BMI: BMI (kg/m2): 33.1 (09-04-21 @ 07:48)  HbA1c: A1C with Estimated Average Glucose Result: 7.3 % (08-05-21 @ 10:24)    Glucose: POCT Blood Glucose.: 229 mg/dL (09-07-21 @ 11:59)    BP: 136/82 (09-07-21 @ 20:23) (136/77 - 136/82)  Lipid Panel: Date/Time: 08-05-21 @ 10:24  Cholesterol, Serum: 139  Direct LDL: --  HDL Cholesterol, Serum: 42  Total Cholesterol/HDL Ration Measurement: --  Triglycerides, Serum: 234

## 2021-09-08 NOTE — BH INPATIENT PSYCHIATRY PROGRESS NOTE - NSBHFUPINTERVALHXFT_PSY_A_CORE
Pt refused medication last night and was compliant this morning.  No other events reported overnight.  Chart reviewed and case discussed with nursing.  Pt continues to be observed talking to himself in his room.  On interview, patient is labile, rambling on about different topics.  Pt reports he did not take medication because "I had better things to do" and "it will melt my brain."  Pt also reported, "I feel drunk off my ass" on the medication.  Writer inquired what time of day he felt like that and offered to split Clozaril into BID dosing, but patient not receptive to talking about the topic.  Pt also says, "you are not a doctor, you don't have a prescription pad."  Writer writes down patient's list of psychotropic medication for today and patient crosses off the Clozaril, reports he will only take 62.5mg, gets angry and starts walking away.

## 2021-09-08 NOTE — BH INPATIENT PSYCHIATRY PROGRESS NOTE - NSBHCHARTREVIEWVS_PSY_A_CORE FT
Vital Signs Last 24 Hrs  T(C): 36.4 (09-08-21 @ 07:52), Max: 36.4 (09-08-21 @ 07:52)  T(F): 97.6 (09-08-21 @ 07:52), Max: 97.6 (09-08-21 @ 07:52)  HR: --  BP: 136/82 (09-07-21 @ 20:23) (136/82 - 136/82)  BP(mean): 102 (09-07-21 @ 20:23) (102 - 102)  RR: --  SpO2: --    Orthostatic VS  09-08-21 @ 07:52  Lying BP: --/-- HR: --  Sitting BP: 115/71 HR: 87  Standing BP: 102/70 HR: 89  Site: --  Mode: --  Orthostatic VS  09-07-21 @ 07:47  Lying BP: --/-- HR: --  Sitting BP: 121/85 HR: 93  Standing BP: 121/73 HR: 97  Site: --  Mode: --

## 2021-09-08 NOTE — BH INPATIENT PSYCHIATRY PROGRESS NOTE - OTHER
responding to internal stimuli and internally preoccupied intense minimally cooperative Abnormal gait,  uses walker

## 2021-09-08 NOTE — BH INPATIENT PSYCHIATRY PROGRESS NOTE - NSBHASSESSSUMMFT_PSY_ALL_CORE
Patient less disorganized, compliant with medication still with AH and delusions    -clozapine ODT titrated to 175mg PO at bedtime and Risperdal M-tab 0.5mg PO daily and 1.5mg P0 at bedtime, consider MOO if patient continues to refuse  -group and milieu therapy  -CO 1:1 for disorganization discontinued and patient placed on routine checks

## 2021-09-09 LAB — GLUCOSE BLDC GLUCOMTR-MCNC: 238 MG/DL — HIGH (ref 70–99)

## 2021-09-09 PROCEDURE — 99231 SBSQ HOSP IP/OBS SF/LOW 25: CPT

## 2021-09-09 RX ORDER — CLOZAPINE 150 MG/1
62.5 TABLET, ORALLY DISINTEGRATING ORAL ONCE
Refills: 0 | Status: COMPLETED | OUTPATIENT
Start: 2021-09-09 | End: 2021-09-09

## 2021-09-09 RX ORDER — CLOZAPINE 150 MG/1
62.5 TABLET, ORALLY DISINTEGRATING ORAL
Refills: 0 | Status: DISCONTINUED | OUTPATIENT
Start: 2021-09-09 | End: 2021-09-29

## 2021-09-09 RX ADMIN — SENNA PLUS 2 TABLET(S): 8.6 TABLET ORAL at 21:10

## 2021-09-09 RX ADMIN — RISPERIDONE 1.5 MILLIGRAM(S): 4 TABLET ORAL at 21:10

## 2021-09-09 RX ADMIN — METFORMIN HYDROCHLORIDE 1000 MILLIGRAM(S): 850 TABLET ORAL at 21:11

## 2021-09-09 RX ADMIN — RISPERIDONE 0.5 MILLIGRAM(S): 4 TABLET ORAL at 08:23

## 2021-09-09 RX ADMIN — ATORVASTATIN CALCIUM 40 MILLIGRAM(S): 80 TABLET, FILM COATED ORAL at 21:10

## 2021-09-09 RX ADMIN — CLOZAPINE 62.5 MILLIGRAM(S): 150 TABLET, ORALLY DISINTEGRATING ORAL at 10:49

## 2021-09-09 RX ADMIN — CLOZAPINE 62.5 MILLIGRAM(S): 150 TABLET, ORALLY DISINTEGRATING ORAL at 21:11

## 2021-09-09 NOTE — BH INPATIENT PSYCHIATRY PROGRESS NOTE - OTHER
minimally cooperative impoverished responding to internal stimuli and internally preoccupied Abnormal gait,  uses walker

## 2021-09-09 NOTE — BH INPATIENT PSYCHIATRY PROGRESS NOTE - NSBHFUPINTERVALHXFT_PSY_A_CORE
Pt compliant with medication this morning, but refused last night.  Pt not as talkative today, refusing to speak or speaking very softly and hard to hear.  Writer inquired as to why patient refused medication.  Pt talks again about only willing to take 62.5mg of Clozaril, agrees to take it BID and takes dose after nurse offered it.  Pt has a drawing in front of him, reports it is a drawing of his family.  Pt refused to engage further.

## 2021-09-09 NOTE — BH INPATIENT PSYCHIATRY PROGRESS NOTE - NSBHMETABOLIC_PSY_ALL_CORE_FT
BMI: BMI (kg/m2): 33.1 (09-04-21 @ 07:48)  HbA1c: A1C with Estimated Average Glucose Result: 7.3 % (08-05-21 @ 10:24)    Glucose: POCT Blood Glucose.: 229 mg/dL (09-07-21 @ 11:59)    BP: 136/82 (09-07-21 @ 20:23) (136/82 - 136/82)  Lipid Panel: Date/Time: 08-05-21 @ 10:24  Cholesterol, Serum: 139  Direct LDL: --  HDL Cholesterol, Serum: 42  Total Cholesterol/HDL Ration Measurement: --  Triglycerides, Serum: 234

## 2021-09-09 NOTE — BH INPATIENT PSYCHIATRY PROGRESS NOTE - NSBHCHARTREVIEWVS_PSY_A_CORE FT
Vital Signs Last 24 Hrs  T(C): 36.3 (09-09-21 @ 07:51), Max: 36.7 (09-08-21 @ 20:18)  T(F): 97.3 (09-09-21 @ 07:51), Max: 98 (09-08-21 @ 20:18)  HR: --  BP: --  BP(mean): --  RR: --  SpO2: --    Orthostatic VS  09-09-21 @ 07:51  Lying BP: --/-- HR: --  Sitting BP: 104/75 HR: 70  Standing BP: 107/72 HR: 83  Site: --  Mode: --  Orthostatic VS  09-08-21 @ 07:52  Lying BP: --/-- HR: --  Sitting BP: 115/71 HR: 87  Standing BP: 102/70 HR: 89  Site: --  Mode: --

## 2021-09-09 NOTE — BH INPATIENT PSYCHIATRY PROGRESS NOTE - CURRENT MEDICATION
MEDICATIONS  (STANDING):  acetaminophen   Tablet .. 650 milliGRAM(s) Oral once  atorvastatin 40 milliGRAM(s) Oral at bedtime  cloZAPine 62.5 milliGRAM(s) Oral two times a day  dextrose 40% Gel 15 Gram(s) Oral once  glucagon  Injectable 1 milliGRAM(s) IntraMuscular once  insulin lispro (ADMELOG) corrective regimen sliding scale   SubCutaneous three times a day before meals  insulin lispro (ADMELOG) corrective regimen sliding scale   SubCutaneous at bedtime  metFORMIN 1000 milliGRAM(s) Oral at bedtime  risperiDONE  Disintegrating Tablet 1.5 milliGRAM(s) Oral at bedtime  risperiDONE  Disintegrating Tablet 0.5 milliGRAM(s) Oral daily  senna 2 Tablet(s) Oral at bedtime  sitaGLIPtin 100 milliGRAM(s) Oral daily    MEDICATIONS  (PRN):  acetaminophen   Tablet .. 650 milliGRAM(s) Oral every 6 hours PRN Mild Pain (1 - 3), Moderate Pain (4 - 6)  chlorproMAZINE    Injectable 100 milliGRAM(s) IntraMuscular once PRN agitation or aggression  chlorproMAZINE    Tablet 100 milliGRAM(s) Oral every 4 hours PRN agitation or aggression  gabapentin 300 milliGRAM(s) Oral four times a day PRN anxiety  haloperidol     Tablet 5 milliGRAM(s) Oral every 6 hours PRN agitation  haloperidol    Injectable 5 milliGRAM(s) IntraMuscular once PRN agitation  LORazepam     Tablet 2 milliGRAM(s) Oral every 6 hours PRN agitation  LORazepam   Injectable 2 milliGRAM(s) IntraMuscular once PRN agitation  melatonin. 5 milliGRAM(s) Oral at bedtime PRN Insomnia  polyethylene glycol 3350 17 Gram(s) Oral daily PRN constipation

## 2021-09-09 NOTE — BH INPATIENT PSYCHIATRY PROGRESS NOTE - NSBHASSESSSUMMFT_PSY_ALL_CORE
Patient less disorganized, compliant with medication still with AH and delusions    -clozapine changed to 62.5mg PO BID and Risperdal M-tab 0.5mg PO daily and 1.5mg P0 at bedtime, consider MOO if patient continues to refuse  -group and milieu therapy  -routine checks

## 2021-09-10 LAB
BASOPHILS # BLD AUTO: 0.06 K/UL — SIGNIFICANT CHANGE UP (ref 0–0.2)
BASOPHILS NFR BLD AUTO: 0.5 % — SIGNIFICANT CHANGE UP (ref 0–2)
EOSINOPHIL # BLD AUTO: 0.14 K/UL — SIGNIFICANT CHANGE UP (ref 0–0.5)
EOSINOPHIL NFR BLD AUTO: 1.2 % — SIGNIFICANT CHANGE UP (ref 0–6)
GLUCOSE BLDC GLUCOMTR-MCNC: 145 MG/DL — HIGH (ref 70–99)
HCT VFR BLD CALC: 42.4 % — SIGNIFICANT CHANGE UP (ref 39–50)
HGB BLD-MCNC: 13.9 G/DL — SIGNIFICANT CHANGE UP (ref 13–17)
IANC: 8.63 K/UL — HIGH (ref 1.5–8.5)
IMM GRANULOCYTES NFR BLD AUTO: 0.7 % — SIGNIFICANT CHANGE UP (ref 0–1.5)
LYMPHOCYTES # BLD AUTO: 18.3 % — SIGNIFICANT CHANGE UP (ref 13–44)
LYMPHOCYTES # BLD AUTO: 2.14 K/UL — SIGNIFICANT CHANGE UP (ref 1–3.3)
MCHC RBC-ENTMCNC: 27.3 PG — SIGNIFICANT CHANGE UP (ref 27–34)
MCHC RBC-ENTMCNC: 32.8 GM/DL — SIGNIFICANT CHANGE UP (ref 32–36)
MCV RBC AUTO: 83.3 FL — SIGNIFICANT CHANGE UP (ref 80–100)
MONOCYTES # BLD AUTO: 0.67 K/UL — SIGNIFICANT CHANGE UP (ref 0–0.9)
MONOCYTES NFR BLD AUTO: 5.7 % — SIGNIFICANT CHANGE UP (ref 2–14)
NEUTROPHILS # BLD AUTO: 8.63 K/UL — HIGH (ref 1.8–7.4)
NEUTROPHILS NFR BLD AUTO: 73.6 % — SIGNIFICANT CHANGE UP (ref 43–77)
NRBC # BLD: 0 /100 WBCS — SIGNIFICANT CHANGE UP
NRBC # FLD: 0 K/UL — SIGNIFICANT CHANGE UP
PLATELET # BLD AUTO: 207 K/UL — SIGNIFICANT CHANGE UP (ref 150–400)
RBC # BLD: 5.09 M/UL — SIGNIFICANT CHANGE UP (ref 4.2–5.8)
RBC # FLD: 14 % — SIGNIFICANT CHANGE UP (ref 10.3–14.5)
WBC # BLD: 11.72 K/UL — HIGH (ref 3.8–10.5)
WBC # FLD AUTO: 11.72 K/UL — HIGH (ref 3.8–10.5)

## 2021-09-10 PROCEDURE — 99232 SBSQ HOSP IP/OBS MODERATE 35: CPT

## 2021-09-10 RX ADMIN — SENNA PLUS 2 TABLET(S): 8.6 TABLET ORAL at 21:15

## 2021-09-10 RX ADMIN — Medication 5 MILLIGRAM(S): at 21:15

## 2021-09-10 RX ADMIN — CLOZAPINE 62.5 MILLIGRAM(S): 150 TABLET, ORALLY DISINTEGRATING ORAL at 22:31

## 2021-09-10 RX ADMIN — CLOZAPINE 62.5 MILLIGRAM(S): 150 TABLET, ORALLY DISINTEGRATING ORAL at 08:55

## 2021-09-10 RX ADMIN — METFORMIN HYDROCHLORIDE 1000 MILLIGRAM(S): 850 TABLET ORAL at 21:15

## 2021-09-10 RX ADMIN — RISPERIDONE 1.5 MILLIGRAM(S): 4 TABLET ORAL at 21:15

## 2021-09-10 RX ADMIN — RISPERIDONE 0.5 MILLIGRAM(S): 4 TABLET ORAL at 08:55

## 2021-09-10 RX ADMIN — Medication 100 MILLIGRAM(S): at 21:16

## 2021-09-10 RX ADMIN — ATORVASTATIN CALCIUM 40 MILLIGRAM(S): 80 TABLET, FILM COATED ORAL at 21:15

## 2021-09-10 NOTE — BH INPATIENT PSYCHIATRY PROGRESS NOTE - NSBHCHARTREVIEWVS_PSY_A_CORE FT
Vital Signs Last 24 Hrs  T(C): 36.1 (09-10-21 @ 07:54), Max: 36.1 (09-09-21 @ 19:29)  T(F): 97 (09-10-21 @ 07:54), Max: 97 (09-09-21 @ 19:29)  HR: 105 (09-10-21 @ 07:54) (105 - 105)  BP: 125/95 (09-10-21 @ 07:54) (125/95 - 125/95)  BP(mean): --  RR: --  SpO2: --    Orthostatic VS  09-09-21 @ 19:29  Lying BP: --/-- HR: --  Sitting BP: 124/73 HR: 92  Standing BP: 117/66 HR: 97  Site: upper right arm  Mode: electronic  Orthostatic VS  09-09-21 @ 07:51  Lying BP: --/-- HR: --  Sitting BP: 104/75 HR: 70  Standing BP: 107/72 HR: 83  Site: --  Mode: --

## 2021-09-10 NOTE — BH INPATIENT PSYCHIATRY PROGRESS NOTE - NSBHMETABOLIC_PSY_ALL_CORE_FT
BMI: BMI (kg/m2): 33.1 (09-04-21 @ 07:48)  HbA1c: A1C with Estimated Average Glucose Result: 7.3 % (08-05-21 @ 10:24)    Glucose: POCT Blood Glucose.: 238 mg/dL (09-09-21 @ 20:36)    BP: 125/95 (09-10-21 @ 07:54) (125/95 - 136/82)  Lipid Panel: Date/Time: 08-05-21 @ 10:24  Cholesterol, Serum: 139  Direct LDL: --  HDL Cholesterol, Serum: 42  Total Cholesterol/HDL Ration Measurement: --  Triglycerides, Serum: 234

## 2021-09-10 NOTE — BH INPATIENT PSYCHIATRY PROGRESS NOTE - CURRENT MEDICATION
MEDICATIONS  (STANDING):  acetaminophen   Tablet .. 650 milliGRAM(s) Oral once  atorvastatin 40 milliGRAM(s) Oral at bedtime  cloZAPine 62.5 milliGRAM(s) Oral two times a day  dextrose 40% Gel 15 Gram(s) Oral once  glucagon  Injectable 1 milliGRAM(s) IntraMuscular once  insulin lispro (ADMELOG) corrective regimen sliding scale   SubCutaneous three times a day before meals  insulin lispro (ADMELOG) corrective regimen sliding scale   SubCutaneous at bedtime  metFORMIN 1000 milliGRAM(s) Oral at bedtime  risperiDONE  Disintegrating Tablet 0.5 milliGRAM(s) Oral daily  risperiDONE  Disintegrating Tablet 1.5 milliGRAM(s) Oral at bedtime  senna 2 Tablet(s) Oral at bedtime  sitaGLIPtin 100 milliGRAM(s) Oral daily    MEDICATIONS  (PRN):  acetaminophen   Tablet .. 650 milliGRAM(s) Oral every 6 hours PRN Mild Pain (1 - 3), Moderate Pain (4 - 6)  chlorproMAZINE    Injectable 100 milliGRAM(s) IntraMuscular once PRN agitation or aggression  chlorproMAZINE    Tablet 100 milliGRAM(s) Oral every 4 hours PRN agitation or aggression  gabapentin 300 milliGRAM(s) Oral four times a day PRN anxiety  haloperidol     Tablet 5 milliGRAM(s) Oral every 6 hours PRN agitation  haloperidol    Injectable 5 milliGRAM(s) IntraMuscular once PRN agitation  LORazepam     Tablet 2 milliGRAM(s) Oral every 6 hours PRN agitation  LORazepam   Injectable 2 milliGRAM(s) IntraMuscular once PRN agitation  melatonin. 5 milliGRAM(s) Oral at bedtime PRN Insomnia  polyethylene glycol 3350 17 Gram(s) Oral daily PRN constipation

## 2021-09-10 NOTE — BH INPATIENT PSYCHIATRY PROGRESS NOTE - NSBHFUPINTERVALHXFT_PSY_A_CORE
Pt compliant with medication and tolerating it well.  No events reported overnight.  Chart reviewed and case discussed with treatment team. Pt out of his room more today.  Pt initially cooperative with interview, joking with writer, then rambles on jumping from topic to topic.  Pt reports eating and sleeping well.  When writer brings up medication, patient gets more irritable, talks about his Clozaril dosage that he got 80.5mg last night.  Explained to him that Clozaril is 62.5mg PO BID, gets more irritable, reports, "you don't know how to do math," then storms away and starts talking to himself.

## 2021-09-11 LAB
GLUCOSE BLDC GLUCOMTR-MCNC: 143 MG/DL — HIGH (ref 70–99)
GLUCOSE BLDC GLUCOMTR-MCNC: 153 MG/DL — HIGH (ref 70–99)
GLUCOSE BLDC GLUCOMTR-MCNC: 194 MG/DL — HIGH (ref 70–99)

## 2021-09-11 RX ADMIN — RISPERIDONE 1.5 MILLIGRAM(S): 4 TABLET ORAL at 21:27

## 2021-09-11 RX ADMIN — CLOZAPINE 62.5 MILLIGRAM(S): 150 TABLET, ORALLY DISINTEGRATING ORAL at 08:28

## 2021-09-11 RX ADMIN — RISPERIDONE 0.5 MILLIGRAM(S): 4 TABLET ORAL at 08:28

## 2021-09-11 RX ADMIN — ATORVASTATIN CALCIUM 40 MILLIGRAM(S): 80 TABLET, FILM COATED ORAL at 21:26

## 2021-09-11 RX ADMIN — CLOZAPINE 62.5 MILLIGRAM(S): 150 TABLET, ORALLY DISINTEGRATING ORAL at 21:26

## 2021-09-12 LAB
GLUCOSE BLDC GLUCOMTR-MCNC: 152 MG/DL — HIGH (ref 70–99)
GLUCOSE BLDC GLUCOMTR-MCNC: 161 MG/DL — HIGH (ref 70–99)

## 2021-09-12 RX ORDER — CHLORPROMAZINE HCL 10 MG
100 TABLET ORAL ONCE
Refills: 0 | Status: COMPLETED | OUTPATIENT
Start: 2021-09-12 | End: 2021-09-12

## 2021-09-12 RX ADMIN — CLOZAPINE 62.5 MILLIGRAM(S): 150 TABLET, ORALLY DISINTEGRATING ORAL at 20:59

## 2021-09-12 RX ADMIN — ATORVASTATIN CALCIUM 40 MILLIGRAM(S): 80 TABLET, FILM COATED ORAL at 20:58

## 2021-09-12 RX ADMIN — RISPERIDONE 0.5 MILLIGRAM(S): 4 TABLET ORAL at 08:50

## 2021-09-12 RX ADMIN — RISPERIDONE 1.5 MILLIGRAM(S): 4 TABLET ORAL at 20:58

## 2021-09-12 RX ADMIN — Medication 100 MILLIGRAM(S): at 00:27

## 2021-09-12 RX ADMIN — CLOZAPINE 62.5 MILLIGRAM(S): 150 TABLET, ORALLY DISINTEGRATING ORAL at 08:50

## 2021-09-13 LAB — GLUCOSE BLDC GLUCOMTR-MCNC: 190 MG/DL — HIGH (ref 70–99)

## 2021-09-13 PROCEDURE — 99232 SBSQ HOSP IP/OBS MODERATE 35: CPT

## 2021-09-13 RX ADMIN — RISPERIDONE 1.5 MILLIGRAM(S): 4 TABLET ORAL at 21:20

## 2021-09-13 RX ADMIN — METFORMIN HYDROCHLORIDE 1000 MILLIGRAM(S): 850 TABLET ORAL at 21:20

## 2021-09-13 RX ADMIN — SENNA PLUS 2 TABLET(S): 8.6 TABLET ORAL at 21:20

## 2021-09-13 RX ADMIN — CLOZAPINE 62.5 MILLIGRAM(S): 150 TABLET, ORALLY DISINTEGRATING ORAL at 11:29

## 2021-09-13 RX ADMIN — RISPERIDONE 0.5 MILLIGRAM(S): 4 TABLET ORAL at 11:30

## 2021-09-13 RX ADMIN — CLOZAPINE 62.5 MILLIGRAM(S): 150 TABLET, ORALLY DISINTEGRATING ORAL at 21:20

## 2021-09-13 RX ADMIN — ATORVASTATIN CALCIUM 40 MILLIGRAM(S): 80 TABLET, FILM COATED ORAL at 21:20

## 2021-09-13 NOTE — BH INPATIENT PSYCHIATRY PROGRESS NOTE - OTHER
Abnormal gait,  uses walker responding to internal stimuli and internally preoccupied minimally cooperative

## 2021-09-13 NOTE — BH INPATIENT PSYCHIATRY PROGRESS NOTE - NSBHASSESSSUMMFT_PSY_ALL_CORE
Patient selectively refusing medication, remains disorganized, labile, responding to internal stimuli    -clozapine changed to 62.5mg PO BID and Risperdal M-tab 0.5mg PO daily and 1.5mg P0 at bedtime, consider MOO if patient continues to refuse  -group and milieu therapy  -routine checks  -Discussed results of CBC + diff on 9/10/21 as well as patient's medical hx  with hospitalist, Dr. Bhatia, who reported to repeat CBC + diff weekly and monitor patient temperature daily, no other recommendations

## 2021-09-13 NOTE — BH INPATIENT PSYCHIATRY PROGRESS NOTE - NSBHFUPINTERVALHXFT_PSY_A_CORE
Chart reviewed and case discussed with treatment team.  Pt selectively compliant with medication over the weekend, initially refused all medication this morning, then was compliant with a lot of encouragement.  Per report, patient received emergency IM medication on 9/12/21 after wandering into male rooms naked, masturbating in front of his roomate, unable to be redirected, threatening staff and throwing belongings at staff.  Pt observed talking to himself in his room, continues to ramble on about different topics, more disorganized today.  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  Pt also talks about how Clozaril with "fog my brain."  Pt unable to tolerate further interview due to disorganization and irritability.  Discussed plan for MOO and state hearing if patient continues to be selectively compliant

## 2021-09-13 NOTE — BH INPATIENT PSYCHIATRY PROGRESS NOTE - NSBHMETABOLIC_PSY_ALL_CORE_FT
BMI: BMI (kg/m2): 33.1 (09-04-21 @ 07:48)  HbA1c: A1C with Estimated Average Glucose Result: 7.3 % (08-05-21 @ 10:24)    Glucose: POCT Blood Glucose.: 190 mg/dL (09-13-21 @ 12:04)    BP: --  Lipid Panel: Date/Time: 08-05-21 @ 10:24  Cholesterol, Serum: 139  Direct LDL: --  HDL Cholesterol, Serum: 42  Total Cholesterol/HDL Ration Measurement: --  Triglycerides, Serum: 234

## 2021-09-14 LAB
GLUCOSE BLDC GLUCOMTR-MCNC: 169 MG/DL — HIGH (ref 70–99)
GLUCOSE BLDC GLUCOMTR-MCNC: 176 MG/DL — HIGH (ref 70–99)

## 2021-09-14 PROCEDURE — 99232 SBSQ HOSP IP/OBS MODERATE 35: CPT

## 2021-09-14 RX ORDER — RISPERIDONE 4 MG/1
2 TABLET ORAL AT BEDTIME
Refills: 0 | Status: DISCONTINUED | OUTPATIENT
Start: 2021-09-14 | End: 2021-09-20

## 2021-09-14 RX ADMIN — ATORVASTATIN CALCIUM 40 MILLIGRAM(S): 80 TABLET, FILM COATED ORAL at 21:16

## 2021-09-14 RX ADMIN — CLOZAPINE 62.5 MILLIGRAM(S): 150 TABLET, ORALLY DISINTEGRATING ORAL at 21:15

## 2021-09-14 RX ADMIN — SENNA PLUS 2 TABLET(S): 8.6 TABLET ORAL at 21:14

## 2021-09-14 RX ADMIN — CLOZAPINE 62.5 MILLIGRAM(S): 150 TABLET, ORALLY DISINTEGRATING ORAL at 08:54

## 2021-09-14 RX ADMIN — RISPERIDONE 0.5 MILLIGRAM(S): 4 TABLET ORAL at 08:54

## 2021-09-14 NOTE — BH INPATIENT PSYCHIATRY PROGRESS NOTE - NSBHFUPINTERVALHXFT_PSY_A_CORE
No events reported overnight.  Chart reviewed and case discussed with treatment team.  Pt compliant with medication last night and this morning with a lot of encouragement.  Pt observed visible in the mileu, talking to himself and appears internally preoccupied.  Pt irritable and suspicious upon approach, comes to sit with writer and SW for interview, remains disorganized, says things like, "give me another name, when is the last time."  Then starts talking about medication, says he will take "30.5mg of Clozaril."  Writer explained that is not his prescribed dose, attempted to discuss risks and benefits of medication as well as plan for MOO and state hearing as back up plan as patient has been noncompliant with medication during his hospitalization, patient gets angry, starts cursing at writer, saying, "you are an asshole, you don't have a prescription pad."  He then gets up, refuses to engage further and walks away.  As he walks away he continues to talk to himself.

## 2021-09-14 NOTE — BH INPATIENT PSYCHIATRY PROGRESS NOTE - CURRENT MEDICATION
MEDICATIONS  (STANDING):  acetaminophen   Tablet .. 650 milliGRAM(s) Oral once  atorvastatin 40 milliGRAM(s) Oral at bedtime  cloZAPine 62.5 milliGRAM(s) Oral two times a day  dextrose 40% Gel 15 Gram(s) Oral once  glucagon  Injectable 1 milliGRAM(s) IntraMuscular once  insulin lispro (ADMELOG) corrective regimen sliding scale   SubCutaneous three times a day before meals  insulin lispro (ADMELOG) corrective regimen sliding scale   SubCutaneous at bedtime  metFORMIN 1000 milliGRAM(s) Oral at bedtime  risperiDONE  Disintegrating Tablet 0.5 milliGRAM(s) Oral daily  risperiDONE  Disintegrating Tablet 1.5 milliGRAM(s) Oral at bedtime  senna 2 Tablet(s) Oral at bedtime  sitaGLIPtin 100 milliGRAM(s) Oral daily    MEDICATIONS  (PRN):  acetaminophen   Tablet .. 650 milliGRAM(s) Oral every 6 hours PRN Mild Pain (1 - 3), Moderate Pain (4 - 6)  chlorproMAZINE    Injectable 100 milliGRAM(s) IntraMuscular once PRN agitation or aggression  chlorproMAZINE    Tablet 100 milliGRAM(s) Oral every 4 hours PRN agitation or aggression  gabapentin 300 milliGRAM(s) Oral four times a day PRN anxiety  haloperidol     Tablet 5 milliGRAM(s) Oral every 6 hours PRN agitation  haloperidol    Injectable 5 milliGRAM(s) IntraMuscular once PRN agitation  LORazepam     Tablet 2 milliGRAM(s) Oral every 6 hours PRN agitation  LORazepam   Injectable 2 milliGRAM(s) IntraMuscular once PRN agitation  melatonin. 5 milliGRAM(s) Oral at bedtime PRN Insomnia  polyethylene glycol 3350 17 Gram(s) Oral daily PRN constipation   MEDICATIONS  (STANDING):  acetaminophen   Tablet .. 650 milliGRAM(s) Oral once  atorvastatin 40 milliGRAM(s) Oral at bedtime  cloZAPine 62.5 milliGRAM(s) Oral two times a day  dextrose 40% Gel 15 Gram(s) Oral once  glucagon  Injectable 1 milliGRAM(s) IntraMuscular once  insulin lispro (ADMELOG) corrective regimen sliding scale   SubCutaneous three times a day before meals  insulin lispro (ADMELOG) corrective regimen sliding scale   SubCutaneous at bedtime  metFORMIN 1000 milliGRAM(s) Oral at bedtime  risperiDONE  Disintegrating Tablet 2 milliGRAM(s) Oral at bedtime  risperiDONE  Disintegrating Tablet 0.5 milliGRAM(s) Oral daily  senna 2 Tablet(s) Oral at bedtime  sitaGLIPtin 100 milliGRAM(s) Oral daily    MEDICATIONS  (PRN):  acetaminophen   Tablet .. 650 milliGRAM(s) Oral every 6 hours PRN Mild Pain (1 - 3), Moderate Pain (4 - 6)  chlorproMAZINE    Injectable 100 milliGRAM(s) IntraMuscular once PRN agitation or aggression  chlorproMAZINE    Tablet 100 milliGRAM(s) Oral every 4 hours PRN agitation or aggression  gabapentin 300 milliGRAM(s) Oral four times a day PRN anxiety  haloperidol     Tablet 5 milliGRAM(s) Oral every 6 hours PRN agitation  haloperidol    Injectable 5 milliGRAM(s) IntraMuscular once PRN agitation  LORazepam     Tablet 2 milliGRAM(s) Oral every 6 hours PRN agitation  LORazepam   Injectable 2 milliGRAM(s) IntraMuscular once PRN agitation  melatonin. 5 milliGRAM(s) Oral at bedtime PRN Insomnia  polyethylene glycol 3350 17 Gram(s) Oral daily PRN constipation

## 2021-09-14 NOTE — BH INPATIENT PSYCHIATRY PROGRESS NOTE - NSBHASSESSSUMMFT_PSY_ALL_CORE
Patient selectively refusing medication, remains disorganized, labile, responding to internal stimuli    -clozapine changed to 62.5mg PO BID and Risperdal M-tab titrated to 0.5mg PO daily and 2mg PO at bedtime, MOO hearing and state hearing requested  -group and milieu therapy  -routine checks  -Discussed results of CBC + diff on 9/10/21 as well as patient's medical hx  with hospitalist, Dr. Bhatia, who reported to repeat CBC + diff weekly and monitor patient temperature daily, no other recommendations, will repeat on 9/17/21 AM   Patient selectively refusing medication, remains disorganized, labile, responding to internal stimuli    -clozapine changed to 62.5mg PO BID and Risperdal M-tab titrated to 0.5mg PO daily and 2mg PO at bedtime, MOO hearing and state hearing requested  -group and milieu therapy  -routine checks  -CBC + diff will repeat on 9/17/21 AM

## 2021-09-14 NOTE — BH INPATIENT PSYCHIATRY PROGRESS NOTE - OTHER
minimally cooperative, gets angry and walks away intense Abnormal gait,  uses walker responding to internal stimuli and internally preoccupied

## 2021-09-14 NOTE — BH INPATIENT PSYCHIATRY PROGRESS NOTE - NSBHMETABOLIC_PSY_ALL_CORE_FT
BMI: BMI (kg/m2): 33.1 (09-04-21 @ 07:48)  HbA1c: A1C with Estimated Average Glucose Result: 7.3 % (08-05-21 @ 10:24)    Glucose: POCT Blood Glucose.: 169 mg/dL (09-14-21 @ 08:07)    BP: --  Lipid Panel: Date/Time: 08-05-21 @ 10:24  Cholesterol, Serum: 139  Direct LDL: --  HDL Cholesterol, Serum: 42  Total Cholesterol/HDL Ration Measurement: --  Triglycerides, Serum: 234   BMI: BMI (kg/m2): 33.1 (09-04-21 @ 07:48)  HbA1c: A1C with Estimated Average Glucose Result: 7.3 % (08-05-21 @ 10:24)    Glucose: POCT Blood Glucose.: 176 mg/dL (09-14-21 @ 12:12)    BP: --  Lipid Panel: Date/Time: 08-05-21 @ 10:24  Cholesterol, Serum: 139  Direct LDL: --  HDL Cholesterol, Serum: 42  Total Cholesterol/HDL Ration Measurement: --  Triglycerides, Serum: 234

## 2021-09-14 NOTE — BH INPATIENT PSYCHIATRY PROGRESS NOTE - NSBHCHARTREVIEWVS_PSY_A_CORE FT
Vital Signs Last 24 Hrs  T(C): 36.8 (09-14-21 @ 07:56), Max: 36.8 (09-14-21 @ 07:56)  T(F): 98.3 (09-14-21 @ 07:56), Max: 98.3 (09-14-21 @ 07:56)  HR: --  BP: --  BP(mean): --  RR: --  SpO2: --    Orthostatic VS  09-14-21 @ 07:56  Lying BP: 110/70 HR: 68  Sitting BP: 109/78 HR: 100  Standing BP: --/-- HR: --  Site: --  Mode: --  Orthostatic VS  09-13-21 @ 21:15  Lying BP: --/-- HR: --  Sitting BP: 137/87 HR: 107  Standing BP: 123/84 HR: 107  Site: --  Mode: --  Orthostatic VS  09-13-21 @ 18:48  Lying BP: --/-- HR: --  Sitting BP: 114/82 HR: 102  Standing BP: 129/76 HR: 105  Site: upper left arm  Mode: --  Orthostatic VS  09-13-21 @ 07:45  Lying BP: --/-- HR: --  Sitting BP: 148/88 HR: 90  Standing BP: 135/86 HR: 88  Site: --  Mode: --  Orthostatic VS  09-12-21 @ 19:38  Lying BP: --/-- HR: --  Sitting BP: 110/84 HR: 111  Standing BP: 111/87 HR: 116  Site: --  Mode: --

## 2021-09-15 LAB
GLUCOSE BLDC GLUCOMTR-MCNC: 213 MG/DL — HIGH (ref 70–99)
GLUCOSE BLDC GLUCOMTR-MCNC: 246 MG/DL — HIGH (ref 70–99)

## 2021-09-15 PROCEDURE — 99232 SBSQ HOSP IP/OBS MODERATE 35: CPT

## 2021-09-15 RX ADMIN — Medication 2 MILLIGRAM(S): at 03:16

## 2021-09-15 RX ADMIN — RISPERIDONE 2 MILLIGRAM(S): 4 TABLET ORAL at 21:42

## 2021-09-15 RX ADMIN — Medication 2: at 17:00

## 2021-09-15 RX ADMIN — METFORMIN HYDROCHLORIDE 1000 MILLIGRAM(S): 850 TABLET ORAL at 21:00

## 2021-09-15 RX ADMIN — SENNA PLUS 2 TABLET(S): 8.6 TABLET ORAL at 21:42

## 2021-09-15 RX ADMIN — RISPERIDONE 0.5 MILLIGRAM(S): 4 TABLET ORAL at 09:08

## 2021-09-15 RX ADMIN — CLOZAPINE 62.5 MILLIGRAM(S): 150 TABLET, ORALLY DISINTEGRATING ORAL at 09:08

## 2021-09-15 RX ADMIN — ATORVASTATIN CALCIUM 40 MILLIGRAM(S): 80 TABLET, FILM COATED ORAL at 21:00

## 2021-09-15 RX ADMIN — Medication 100 MILLIGRAM(S): at 21:42

## 2021-09-15 RX ADMIN — CLOZAPINE 62.5 MILLIGRAM(S): 150 TABLET, ORALLY DISINTEGRATING ORAL at 21:42

## 2021-09-15 RX ADMIN — HALOPERIDOL DECANOATE 5 MILLIGRAM(S): 100 INJECTION INTRAMUSCULAR at 03:16

## 2021-09-15 NOTE — BH INPATIENT PSYCHIATRY PROGRESS NOTE - NSBHCHARTREVIEWVS_PSY_A_CORE FT
Vital Signs Last 24 Hrs  T(C): 36.6 (09-15-21 @ 09:21), Max: 36.6 (09-15-21 @ 09:21)  T(F): 97.9 (09-15-21 @ 09:21), Max: 97.9 (09-15-21 @ 09:21)  HR: --  BP: --  BP(mean): --  RR: --  SpO2: --    Orthostatic VS  09-15-21 @ 09:21  Lying BP: --/-- HR: --  Sitting BP: 127/88 HR: 92  Standing BP: 127/91 HR: 88  Site: --  Mode: --  Orthostatic VS  09-14-21 @ 07:56  Lying BP: 110/70 HR: 68  Sitting BP: 109/78 HR: 100  Standing BP: --/-- HR: --  Site: --  Mode: --  Orthostatic VS  09-13-21 @ 21:15  Lying BP: --/-- HR: --  Sitting BP: 137/87 HR: 107  Standing BP: 123/84 HR: 107  Site: --  Mode: --  Orthostatic VS  09-13-21 @ 18:48  Lying BP: --/-- HR: --  Sitting BP: 114/82 HR: 102  Standing BP: 129/76 HR: 105  Site: upper left arm  Mode: --

## 2021-09-15 NOTE — BH CHART NOTE - NSEVENTNOTEFT_PSY_ALL_CORE
Treatment over objection (male)  DIRECTOR OF INPATIENT PSYCHIATRY NOTE:  I have evaluated the patient’s need for medication over his objection in the presence of the LS  Ms. Bev Guzman, the risks and benefits of medication, and his ability to make a reasoned decision.      Briefly, the pt is a 55  year old, male; domiciled [facility] ; ;  noncaregiver; past psychiatric history of schizoaffective disorder ; multiple prior psychiatric hospitalizations ([facility] on 4/30/20- 11/2/20 ); ; no known suicide attempts; no known history of violence or arrests; no active substance abuse or known history of complicated withdrawal; PMH of osteogenesis imperfecta, HTN, hypothyroidism, DM type 2 recent dx and bowel resection with ileostomy, with Stage IV colorectal cancer s/p reversal and admission to rehab at [facility] Jan to April 2021,  presented to ER from residence due to reports of agitation and aggression.    On evaluation, the pt sates too much clozapine is not good for his body,  clozapine is a carbon monoxide and he is pregnant and does not want to harm his baby.  He has prescribed Clozapine, Risperidone and Insulin, metformin    He received PRN medication for agitation in the past few dyas.    He has not been taking medications as prescribed including insulin.  He does not understand the extent of his illness.    It is my opinion that this patient currently experiences psychotic symptoms that are severely impacting his safety and ability to care for himself, and would likely benefit from antipsychotic medications.  Furthermore, it is my opinion that he lacks capacity to refuse antipsychotic therapy.  I have informed the patient of my decision and that unless he withdraws his objection to taking medications, we will make application to court for authorization to treat him over his objection. I have notified the patient and LS of this decision by letter.   Treatment over objection (male)  DIRECTOR OF INPATIENT PSYCHIATRY NOTE:  I have evaluated the patient’s need for medication over his objection in the presence of the LS  Ms. Bev Guzman, the risks and benefits of medication, and his ability to make a reasoned decision.      Briefly, the pt is a 55  year old, male; domiciled in [facility] ; ;  noncaregiver; past psychiatric history of schizoaffective disorder ; multiple prior psychiatric hospitalizations ([facility] on 4/30/20- 11/2/20 ); ; no known suicide attempts; no known history of violence or arrests; no active substance abuse or known history of complicated withdrawal; PMH of osteogenesis imperfecta, HTN, hypothyroidism, DM type 2 recent dx and bowel resection with ileostomy, with Stage IV colorectal cancer s/p reversal and admission to rehab at [facility] Jan to April 2021,  presented to ER from residence due to reports of agitation and aggression.    On evaluation, the pt sates too much clozapine is not good for his body,  clozapine is a carbon monoxide and he is pregnant and does not want to harm his baby.  He requested discharge by the . He has prescribed Clozapine, Risperidone and Insulin, metformin    He received PRN medication for agitation in the past few dyas.    He has not been taking medications as prescribed including insulin.  He does not understand the extent of his illness.    It is my opinion that this patient currently experiences psychotic symptoms that are severely impacting his safety and ability to care for himself, and would likely benefit from antipsychotic medications.  Furthermore, it is my opinion that he lacks capacity to refuse antipsychotic therapy.  I have informed the patient of my decision and that unless he withdraws his objection to taking medications, we will make application to court for authorization to treat him over his objection. I have notified the patient and \[facility] [place] [facility]\"" of this decision by letter.

## 2021-09-15 NOTE — BH INPATIENT PSYCHIATRY PROGRESS NOTE - OTHER
intense minimally cooperative, gets angry and refuses to speak further Abnormal gait,  uses walker responding to internal stimuli and internally preoccupied

## 2021-09-15 NOTE — BH INPATIENT PSYCHIATRY PROGRESS NOTE - NSTXPSYCHOGOALOTHER_PSY_ALL_CORE
Patient will benefit from demonstrating related thought for at least 5 min by day seven.

## 2021-09-15 NOTE — BH INPATIENT PSYCHIATRY PROGRESS NOTE - NSBHASSESSSUMMFT_PSY_ALL_CORE
Patient selectively refusing medication, remains disorganized, labile, responding to internal stimuli    -clozapine changed to 62.5mg PO BID and Risperdal M-tab titrated to 0.5mg PO daily and 2mg PO at bedtime, MOO hearing completed today  -group and milieu therapy  -routine checks  -CBC + diff will repeat on 9/17/21 AM

## 2021-09-15 NOTE — BH INPATIENT PSYCHIATRY PROGRESS NOTE - NSBHFUPINTERVALHXFT_PSY_A_CORE
Pt refused Risperdal and only took 50mg of Clozaril last night, pt compliant with medication this morning.  No other events reported overnight.  Chart reviewed and case discussed with treatment team.  Pt remains irritable disorganized and tangential upon approach, continues to talk to himself.  He reports "a doctor and a  came in here claiming things that are not true."  Pt talks about how they told him he will get a lobotomy.  Pt wants to "wean off Clozaril because my head will explode from carbon monoxide."  Pt also reports he is pregnant and he did not fall asleep until 6am.  Pt reports being compliant with medication, told him he did not take Risperdal or full dose of Clozaril last night.  Explained if patient on lower dose of Clozaril then recommended then Risperdal can be titrated.  Pt then yells at writer and says he no longer wants to speak.

## 2021-09-15 NOTE — BH INPATIENT PSYCHIATRY PROGRESS NOTE - NSBHMETABOLIC_PSY_ALL_CORE_FT
BMI: BMI (kg/m2): 33.1 (09-04-21 @ 07:48)  HbA1c: A1C with Estimated Average Glucose Result: 7.3 % (08-05-21 @ 10:24)    Glucose: POCT Blood Glucose.: 176 mg/dL (09-14-21 @ 12:12)    BP: --  Lipid Panel: Date/Time: 08-05-21 @ 10:24  Cholesterol, Serum: 139  Direct LDL: --  HDL Cholesterol, Serum: 42  Total Cholesterol/HDL Ration Measurement: --  Triglycerides, Serum: 234

## 2021-09-15 NOTE — BH INPATIENT PSYCHIATRY PROGRESS NOTE - CURRENT MEDICATION
MEDICATIONS  (STANDING):  acetaminophen   Tablet .. 650 milliGRAM(s) Oral once  atorvastatin 40 milliGRAM(s) Oral at bedtime  cloZAPine 62.5 milliGRAM(s) Oral two times a day  dextrose 40% Gel 15 Gram(s) Oral once  glucagon  Injectable 1 milliGRAM(s) IntraMuscular once  insulin lispro (ADMELOG) corrective regimen sliding scale   SubCutaneous three times a day before meals  insulin lispro (ADMELOG) corrective regimen sliding scale   SubCutaneous at bedtime  metFORMIN 1000 milliGRAM(s) Oral at bedtime  risperiDONE  Disintegrating Tablet 0.5 milliGRAM(s) Oral daily  risperiDONE  Disintegrating Tablet 2 milliGRAM(s) Oral at bedtime  senna 2 Tablet(s) Oral at bedtime  sitaGLIPtin 100 milliGRAM(s) Oral daily    MEDICATIONS  (PRN):  acetaminophen   Tablet .. 650 milliGRAM(s) Oral every 6 hours PRN Mild Pain (1 - 3), Moderate Pain (4 - 6)  chlorproMAZINE    Injectable 100 milliGRAM(s) IntraMuscular once PRN agitation or aggression  chlorproMAZINE    Tablet 100 milliGRAM(s) Oral every 4 hours PRN agitation or aggression  gabapentin 300 milliGRAM(s) Oral four times a day PRN anxiety  haloperidol     Tablet 5 milliGRAM(s) Oral every 6 hours PRN agitation  haloperidol    Injectable 5 milliGRAM(s) IntraMuscular once PRN agitation  LORazepam     Tablet 2 milliGRAM(s) Oral every 6 hours PRN agitation  LORazepam   Injectable 2 milliGRAM(s) IntraMuscular once PRN agitation  melatonin. 5 milliGRAM(s) Oral at bedtime PRN Insomnia  polyethylene glycol 3350 17 Gram(s) Oral daily PRN constipation

## 2021-09-16 LAB
GLUCOSE BLDC GLUCOMTR-MCNC: 175 MG/DL — HIGH (ref 70–99)
GLUCOSE BLDC GLUCOMTR-MCNC: 175 MG/DL — HIGH (ref 70–99)

## 2021-09-16 PROCEDURE — 99232 SBSQ HOSP IP/OBS MODERATE 35: CPT

## 2021-09-16 RX ADMIN — METFORMIN HYDROCHLORIDE 1000 MILLIGRAM(S): 850 TABLET ORAL at 21:30

## 2021-09-16 RX ADMIN — CLOZAPINE 62.5 MILLIGRAM(S): 150 TABLET, ORALLY DISINTEGRATING ORAL at 08:50

## 2021-09-16 RX ADMIN — SENNA PLUS 2 TABLET(S): 8.6 TABLET ORAL at 21:30

## 2021-09-16 RX ADMIN — CLOZAPINE 62.5 MILLIGRAM(S): 150 TABLET, ORALLY DISINTEGRATING ORAL at 21:29

## 2021-09-16 RX ADMIN — RISPERIDONE 2 MILLIGRAM(S): 4 TABLET ORAL at 21:30

## 2021-09-16 RX ADMIN — RISPERIDONE 0.5 MILLIGRAM(S): 4 TABLET ORAL at 08:50

## 2021-09-16 RX ADMIN — ATORVASTATIN CALCIUM 40 MILLIGRAM(S): 80 TABLET, FILM COATED ORAL at 21:30

## 2021-09-16 NOTE — BH INPATIENT PSYCHIATRY PROGRESS NOTE - NSBHFUPINTERVALHXFT_PSY_A_CORE
Pt compliant with medication last night and this morning.  Chart reviewed and case discussed with treatment team.  No events reported overnight.  Pt out in the day room eating breakfast, more calm and cooperative initially during interview, asks writer how she is doing and reports compliance with medication.  Pt then starts mumbling to himself and then says he no longer wants to talk and walks away, refusing to engage further.  Pt then comes back and is disorganized and walks away again.

## 2021-09-16 NOTE — BH INPATIENT PSYCHIATRY PROGRESS NOTE - CURRENT MEDICATION
MEDICATIONS  (STANDING):  acetaminophen   Tablet .. 650 milliGRAM(s) Oral once  atorvastatin 40 milliGRAM(s) Oral at bedtime  cloZAPine 62.5 milliGRAM(s) Oral two times a day  dextrose 40% Gel 15 Gram(s) Oral once  glucagon  Injectable 1 milliGRAM(s) IntraMuscular once  insulin lispro (ADMELOG) corrective regimen sliding scale   SubCutaneous three times a day before meals  insulin lispro (ADMELOG) corrective regimen sliding scale   SubCutaneous at bedtime  metFORMIN 1000 milliGRAM(s) Oral at bedtime  risperiDONE  Disintegrating Tablet 0.5 milliGRAM(s) Oral daily  risperiDONE  Disintegrating Tablet 2 milliGRAM(s) Oral at bedtime  senna 2 Tablet(s) Oral at bedtime  sitaGLIPtin 100 milliGRAM(s) Oral daily    MEDICATIONS  (PRN):  acetaminophen   Tablet .. 650 milliGRAM(s) Oral every 6 hours PRN Mild Pain (1 - 3), Moderate Pain (4 - 6)  chlorproMAZINE    Injectable 100 milliGRAM(s) IntraMuscular once PRN agitation or aggression  chlorproMAZINE    Tablet 100 milliGRAM(s) Oral every 4 hours PRN agitation or aggression  gabapentin 300 milliGRAM(s) Oral four times a day PRN anxiety  haloperidol     Tablet 5 milliGRAM(s) Oral every 6 hours PRN agitation  haloperidol    Injectable 5 milliGRAM(s) IntraMuscular once PRN agitation  melatonin. 5 milliGRAM(s) Oral at bedtime PRN Insomnia  polyethylene glycol 3350 17 Gram(s) Oral daily PRN constipation

## 2021-09-16 NOTE — BH TREATMENT PLAN - NSTXPSYCHOINTERPR_PSY_ALL_CORE
Patient will benefit from demonstrating related thought for at least 5 min by day seven.
Patient will benefit from demonstrating related thought for at least 5 min by day seven.

## 2021-09-16 NOTE — BH INPATIENT PSYCHIATRY PROGRESS NOTE - NSBHASSESSSUMMFT_PSY_ALL_CORE
Patient selectively refusing medication, remains disorganized, labile, responding to internal stimuli    -clozapine changed to 62.5mg PO BID and Risperdal M-tab titrated to 0.5mg PO daily and 2mg PO at bedtime, MOO hearing completed   -group and milieu therapy  -routine checks  -CBC + diff will repeat on 9/17/21 AM

## 2021-09-16 NOTE — BH INPATIENT PSYCHIATRY PROGRESS NOTE - NSBHMETABOLIC_PSY_ALL_CORE_FT
BMI: BMI (kg/m2): 33.1 (09-04-21 @ 07:48)  HbA1c: A1C with Estimated Average Glucose Result: 7.3 % (08-05-21 @ 10:24)    Glucose: POCT Blood Glucose.: 175 mg/dL (09-16-21 @ 12:01)    BP: --  Lipid Panel: Date/Time: 08-05-21 @ 10:24  Cholesterol, Serum: 139  Direct LDL: --  HDL Cholesterol, Serum: 42  Total Cholesterol/HDL Ration Measurement: --  Triglycerides, Serum: 234

## 2021-09-16 NOTE — BH INPATIENT PSYCHIATRY PROGRESS NOTE - OTHER
minimally cooperative, ends interview early Abnormal gait,  uses walker responding to internal stimuli and internally preoccupied

## 2021-09-16 NOTE — BH INPATIENT PSYCHIATRY PROGRESS NOTE - NSBHCHARTREVIEWVS_PSY_A_CORE FT
Vital Signs Last 24 Hrs  T(C): 36.6 (09-16-21 @ 08:02), Max: 36.8 (09-15-21 @ 21:00)  T(F): 97.9 (09-16-21 @ 08:02), Max: 98.2 (09-15-21 @ 21:00)  HR: --  BP: --  BP(mean): --  RR: 18 (09-15-21 @ 21:00) (18 - 18)  SpO2: --    Orthostatic VS  09-16-21 @ 08:02  Lying BP: 108/70 HR: 89  Sitting BP: --/-- HR: --  Standing BP: --/-- HR: --  Site: --  Mode: --  Orthostatic VS  09-15-21 @ 21:00  Lying BP: --/-- HR: --  Sitting BP: 121/77 HR: 96  Standing BP: 115/63 HR: --  Site: --  Mode: --  Orthostatic VS  09-15-21 @ 09:21  Lying BP: --/-- HR: --  Sitting BP: 127/88 HR: 92  Standing BP: 127/91 HR: 88  Site: --  Mode: --

## 2021-09-17 LAB — GLUCOSE BLDC GLUCOMTR-MCNC: 187 MG/DL — HIGH (ref 70–99)

## 2021-09-17 PROCEDURE — 99232 SBSQ HOSP IP/OBS MODERATE 35: CPT

## 2021-09-17 RX ORDER — RISPERIDONE 4 MG/1
1 TABLET ORAL DAILY
Refills: 0 | Status: DISCONTINUED | OUTPATIENT
Start: 2021-09-18 | End: 2021-09-29

## 2021-09-17 RX ADMIN — Medication 650 MILLIGRAM(S): at 00:20

## 2021-09-17 RX ADMIN — CLOZAPINE 62.5 MILLIGRAM(S): 150 TABLET, ORALLY DISINTEGRATING ORAL at 09:02

## 2021-09-17 RX ADMIN — RISPERIDONE 0.5 MILLIGRAM(S): 4 TABLET ORAL at 09:02

## 2021-09-17 NOTE — BH INPATIENT PSYCHIATRY PROGRESS NOTE - NSBHFUPINTERVALHXFT_PSY_A_CORE
Pt compliant with medication and tolerating it well.  No events reported overnight.  Chart reviewed and case discussed with treatment team.  Per NP student, patient showed her an experiment he is doing with an orange peel and tomato.  Pt this morning, walked by nurse's station window and stuck his tongue out at writer.  On interview, patient in bed, mostly uncooperative today, reports he is resting, then mumbles to himself and says he does not want to talk.

## 2021-09-17 NOTE — BH INPATIENT PSYCHIATRY PROGRESS NOTE - PRN MEDS
MEDICATIONS  (PRN):  acetaminophen   Tablet .. 650 milliGRAM(s) Oral every 6 hours PRN Mild Pain (1 - 3), Moderate Pain (4 - 6)  chlorproMAZINE    Injectable 100 milliGRAM(s) IntraMuscular once PRN agitation or aggression  chlorproMAZINE    Tablet 100 milliGRAM(s) Oral every 4 hours PRN agitation or aggression  gabapentin 300 milliGRAM(s) Oral four times a day PRN anxiety  haloperidol     Tablet 5 milliGRAM(s) Oral every 6 hours PRN agitation  haloperidol    Injectable 5 milliGRAM(s) IntraMuscular once PRN agitation  melatonin. 5 milliGRAM(s) Oral at bedtime PRN Insomnia  polyethylene glycol 3350 17 Gram(s) Oral daily PRN constipation

## 2021-09-17 NOTE — BH INPATIENT PSYCHIATRY PROGRESS NOTE - CURRENT MEDICATION
MEDICATIONS  (STANDING):  acetaminophen   Tablet .. 650 milliGRAM(s) Oral once  atorvastatin 40 milliGRAM(s) Oral at bedtime  cloZAPine 62.5 milliGRAM(s) Oral two times a day  dextrose 40% Gel 15 Gram(s) Oral once  glucagon  Injectable 1 milliGRAM(s) IntraMuscular once  insulin lispro (ADMELOG) corrective regimen sliding scale   SubCutaneous three times a day before meals  insulin lispro (ADMELOG) corrective regimen sliding scale   SubCutaneous at bedtime  metFORMIN 1000 milliGRAM(s) Oral at bedtime  risperiDONE  Disintegrating Tablet 2 milliGRAM(s) Oral at bedtime  senna 2 Tablet(s) Oral at bedtime  sitaGLIPtin 100 milliGRAM(s) Oral daily    MEDICATIONS  (PRN):  acetaminophen   Tablet .. 650 milliGRAM(s) Oral every 6 hours PRN Mild Pain (1 - 3), Moderate Pain (4 - 6)  chlorproMAZINE    Injectable 100 milliGRAM(s) IntraMuscular once PRN agitation or aggression  chlorproMAZINE    Tablet 100 milliGRAM(s) Oral every 4 hours PRN agitation or aggression  gabapentin 300 milliGRAM(s) Oral four times a day PRN anxiety  haloperidol     Tablet 5 milliGRAM(s) Oral every 6 hours PRN agitation  haloperidol    Injectable 5 milliGRAM(s) IntraMuscular once PRN agitation  melatonin. 5 milliGRAM(s) Oral at bedtime PRN Insomnia  polyethylene glycol 3350 17 Gram(s) Oral daily PRN constipation

## 2021-09-17 NOTE — BH INPATIENT PSYCHIATRY PROGRESS NOTE - NSBHCHARTREVIEWVS_PSY_A_CORE FT
Vital Signs Last 24 Hrs  T(C): 36.1 (09-17-21 @ 08:01), Max: 36.4 (09-16-21 @ 21:15)  T(F): 96.9 (09-17-21 @ 08:01), Max: 97.6 (09-16-21 @ 21:15)  HR: --  BP: --  BP(mean): --  RR: --  SpO2: --    Orthostatic VS  09-17-21 @ 08:01  Lying BP: --/-- HR: --  Sitting BP: 109/69 HR: 72  Standing BP: 110/70 HR: 75  Site: --  Mode: --  Orthostatic VS  09-16-21 @ 21:15  Lying BP: 123/84 HR: 98  Sitting BP: 114/82 HR: 105  Standing BP: --/-- HR: --  Site: --  Mode: --  Orthostatic VS  09-16-21 @ 08:02  Lying BP: 108/70 HR: 89  Sitting BP: --/-- HR: --  Standing BP: --/-- HR: --  Site: --  Mode: --  Orthostatic VS  09-15-21 @ 21:00  Lying BP: --/-- HR: --  Sitting BP: 121/77 HR: 96  Standing BP: 115/63 HR: --  Site: --  Mode: --

## 2021-09-17 NOTE — BH INPATIENT PSYCHIATRY PROGRESS NOTE - NSBHASSESSSUMMFT_PSY_ALL_CORE
Patient selectively refusing medication, remains disorganized, labile, responding to internal stimuli    -clozapine changed to 62.5mg PO BID and Risperdal M-tab titrated to 1mg PO daily and 2mg PO at bedtime, MOO hearing completed   -group and milieu therapy  -routine checks  -CBC + diff will repeat on 9/17/21 AM

## 2021-09-18 LAB
GLUCOSE BLDC GLUCOMTR-MCNC: 188 MG/DL — HIGH (ref 70–99)
GLUCOSE BLDC GLUCOMTR-MCNC: 228 MG/DL — HIGH (ref 70–99)

## 2021-09-18 RX ADMIN — CLOZAPINE 62.5 MILLIGRAM(S): 150 TABLET, ORALLY DISINTEGRATING ORAL at 09:18

## 2021-09-18 RX ADMIN — ATORVASTATIN CALCIUM 40 MILLIGRAM(S): 80 TABLET, FILM COATED ORAL at 20:32

## 2021-09-18 RX ADMIN — METFORMIN HYDROCHLORIDE 1000 MILLIGRAM(S): 850 TABLET ORAL at 20:32

## 2021-09-18 RX ADMIN — RISPERIDONE 1 MILLIGRAM(S): 4 TABLET ORAL at 08:52

## 2021-09-18 RX ADMIN — CLOZAPINE 62.5 MILLIGRAM(S): 150 TABLET, ORALLY DISINTEGRATING ORAL at 20:32

## 2021-09-18 RX ADMIN — RISPERIDONE 2 MILLIGRAM(S): 4 TABLET ORAL at 20:32

## 2021-09-19 LAB — GLUCOSE BLDC GLUCOMTR-MCNC: 159 MG/DL — HIGH (ref 70–99)

## 2021-09-19 RX ADMIN — ATORVASTATIN CALCIUM 40 MILLIGRAM(S): 80 TABLET, FILM COATED ORAL at 20:52

## 2021-09-19 RX ADMIN — CLOZAPINE 62.5 MILLIGRAM(S): 150 TABLET, ORALLY DISINTEGRATING ORAL at 09:20

## 2021-09-19 RX ADMIN — METFORMIN HYDROCHLORIDE 1000 MILLIGRAM(S): 850 TABLET ORAL at 20:53

## 2021-09-19 RX ADMIN — RISPERIDONE 2 MILLIGRAM(S): 4 TABLET ORAL at 20:52

## 2021-09-19 RX ADMIN — SENNA PLUS 2 TABLET(S): 8.6 TABLET ORAL at 20:52

## 2021-09-19 RX ADMIN — RISPERIDONE 1 MILLIGRAM(S): 4 TABLET ORAL at 09:16

## 2021-09-19 RX ADMIN — CLOZAPINE 62.5 MILLIGRAM(S): 150 TABLET, ORALLY DISINTEGRATING ORAL at 20:52

## 2021-09-20 LAB
GLUCOSE BLDC GLUCOMTR-MCNC: 172 MG/DL — HIGH (ref 70–99)
GLUCOSE BLDC GLUCOMTR-MCNC: 177 MG/DL — HIGH (ref 70–99)
GLUCOSE BLDC GLUCOMTR-MCNC: 209 MG/DL — HIGH (ref 70–99)

## 2021-09-20 PROCEDURE — 99232 SBSQ HOSP IP/OBS MODERATE 35: CPT

## 2021-09-20 RX ORDER — CHLORPROMAZINE HCL 10 MG
100 TABLET ORAL ONCE
Refills: 0 | Status: COMPLETED | OUTPATIENT
Start: 2021-09-20 | End: 2021-09-20

## 2021-09-20 RX ORDER — RISPERIDONE 4 MG/1
3 TABLET ORAL AT BEDTIME
Refills: 0 | Status: DISCONTINUED | OUTPATIENT
Start: 2021-09-20 | End: 2021-09-29

## 2021-09-20 RX ADMIN — RISPERIDONE 3 MILLIGRAM(S): 4 TABLET ORAL at 21:17

## 2021-09-20 RX ADMIN — GABAPENTIN 300 MILLIGRAM(S): 400 CAPSULE ORAL at 09:05

## 2021-09-20 RX ADMIN — CLOZAPINE 62.5 MILLIGRAM(S): 150 TABLET, ORALLY DISINTEGRATING ORAL at 08:51

## 2021-09-20 RX ADMIN — RISPERIDONE 1 MILLIGRAM(S): 4 TABLET ORAL at 09:05

## 2021-09-20 RX ADMIN — SENNA PLUS 2 TABLET(S): 8.6 TABLET ORAL at 21:16

## 2021-09-20 RX ADMIN — ATORVASTATIN CALCIUM 40 MILLIGRAM(S): 80 TABLET, FILM COATED ORAL at 21:17

## 2021-09-20 RX ADMIN — Medication 100 MILLIGRAM(S): at 01:02

## 2021-09-20 RX ADMIN — CLOZAPINE 62.5 MILLIGRAM(S): 150 TABLET, ORALLY DISINTEGRATING ORAL at 21:17

## 2021-09-20 RX ADMIN — METFORMIN HYDROCHLORIDE 1000 MILLIGRAM(S): 850 TABLET ORAL at 21:16

## 2021-09-20 NOTE — BH INPATIENT PSYCHIATRY PROGRESS NOTE - NSBHCHARTREVIEWVS_PSY_A_CORE FT
Vital Signs Last 24 Hrs  T(C): 35.8 (09-20-21 @ 07:50), Max: 36.8 (09-19-21 @ 20:45)  T(F): 96.5 (09-20-21 @ 07:50), Max: 98.2 (09-19-21 @ 20:45)  HR: --  BP: --  BP(mean): --  RR: --  SpO2: --    Orthostatic VS  09-20-21 @ 07:50  Lying BP: 91/62 HR: 94  Sitting BP: --/-- HR: --  Standing BP: --/-- HR: --  Site: --  Mode: --  Orthostatic VS  09-19-21 @ 20:45  Lying BP: --/-- HR: --  Sitting BP: 118/73 HR: 92  Standing BP: 113/70 HR: 103  Site: --  Mode: --  Orthostatic VS  09-19-21 @ 09:20  Lying BP: --/-- HR: --  Sitting BP: 130/75 HR: 94  Standing BP: 129/77 HR: 95  Site: --  Mode: --  Orthostatic VS  09-18-21 @ 19:18  Lying BP: --/-- HR: --  Sitting BP: 111/71 HR: 100  Standing BP: 118/69 HR: 101  Site: --  Mode: --

## 2021-09-20 NOTE — BH INPATIENT PSYCHIATRY PROGRESS NOTE - CURRENT MEDICATION
None MEDICATIONS  (STANDING):  acetaminophen   Tablet .. 650 milliGRAM(s) Oral once  atorvastatin 40 milliGRAM(s) Oral at bedtime  cloZAPine 62.5 milliGRAM(s) Oral two times a day  dextrose 40% Gel 15 Gram(s) Oral once  glucagon  Injectable 1 milliGRAM(s) IntraMuscular once  insulin lispro (ADMELOG) corrective regimen sliding scale   SubCutaneous three times a day before meals  insulin lispro (ADMELOG) corrective regimen sliding scale   SubCutaneous at bedtime  metFORMIN 1000 milliGRAM(s) Oral at bedtime  risperiDONE  Disintegrating Tablet 1 milliGRAM(s) Oral daily  risperiDONE  Disintegrating Tablet 3 milliGRAM(s) Oral at bedtime  senna 2 Tablet(s) Oral at bedtime  sitaGLIPtin 100 milliGRAM(s) Oral daily    MEDICATIONS  (PRN):  acetaminophen   Tablet .. 650 milliGRAM(s) Oral every 6 hours PRN Mild Pain (1 - 3), Moderate Pain (4 - 6)  chlorproMAZINE    Injectable 100 milliGRAM(s) IntraMuscular once PRN agitation or aggression  chlorproMAZINE    Tablet 100 milliGRAM(s) Oral every 4 hours PRN agitation or aggression  gabapentin 300 milliGRAM(s) Oral four times a day PRN anxiety  haloperidol     Tablet 5 milliGRAM(s) Oral every 6 hours PRN agitation  haloperidol    Injectable 5 milliGRAM(s) IntraMuscular once PRN agitation  melatonin. 5 milliGRAM(s) Oral at bedtime PRN Insomnia  polyethylene glycol 3350 17 Gram(s) Oral daily PRN constipation

## 2021-09-20 NOTE — BH INPATIENT PSYCHIATRY PROGRESS NOTE - OTHER
minimally cooperative Abnormal gait,  uses walker reports, "not really," but responding to internal stimuli and internally preoccupied

## 2021-09-20 NOTE — BH INPATIENT PSYCHIATRY PROGRESS NOTE - NSBHFUPINTERVALHXFT_PSY_A_CORE
Pt compliant with medication and tolerating it well.  Per report, patient refused medication Friday night, but otherwise has been compliant and yesterday patient was agitated, unable to be redirected, refused PO PRN medication received emergency IM medication.  On interview, patient reports he was compliant with medication.  Pt speaking softly and is hard to hear, remains disorganized.  When asked about AH, he reports, "not really," and is not willing to elaborate, but is observed talking to himself on the unit.  Pt reports, "I signed myself in voluntary, there is an error in this speech," showing writer his MOO paperwork given to him by the legal department.  Explained to him, patient is 9.37 status, but patient not willing to listen, is adamant he is voluntary.  Pt then talks about how his mother is "dead or in snf," although this is not believed to be true.  Still not giving consent to speak with his parents.  When asked about why he received an IM last night, he reports, "I was doing a simulation and there was a commotion outside."

## 2021-09-20 NOTE — BH INPATIENT PSYCHIATRY PROGRESS NOTE - NSBHMETABOLIC_PSY_ALL_CORE_FT
BMI: BMI (kg/m2): 33.1 (09-04-21 @ 07:48)  HbA1c: A1C with Estimated Average Glucose Result: 7.3 % (08-05-21 @ 10:24)    Glucose: POCT Blood Glucose.: 209 mg/dL (09-20-21 @ 12:03)    BP: --  Lipid Panel: Date/Time: 08-05-21 @ 10:24  Cholesterol, Serum: 139  Direct LDL: --  HDL Cholesterol, Serum: 42  Total Cholesterol/HDL Ration Measurement: --  Triglycerides, Serum: 234

## 2021-09-20 NOTE — BH INPATIENT PSYCHIATRY PROGRESS NOTE - NSBHASSESSSUMMFT_PSY_ALL_CORE
Patient selectively refusing medication, remains disorganized, labile, responding to internal stimuli    -clozapine changed to 62.5mg PO BID and Risperdal M-tab titrated to 1mg PO daily and 3mg PO at bedtime, MOO hearing completed   -group and milieu therapy  -routine checks  -CBC + diff will repeat on 9/21/21 AM

## 2021-09-21 LAB
BASOPHILS # BLD AUTO: 0.04 K/UL — SIGNIFICANT CHANGE UP (ref 0–0.2)
BASOPHILS NFR BLD AUTO: 0.3 % — SIGNIFICANT CHANGE UP (ref 0–2)
EOSINOPHIL # BLD AUTO: 0.19 K/UL — SIGNIFICANT CHANGE UP (ref 0–0.5)
EOSINOPHIL NFR BLD AUTO: 1.5 % — SIGNIFICANT CHANGE UP (ref 0–6)
GLUCOSE BLDC GLUCOMTR-MCNC: 184 MG/DL — HIGH (ref 70–99)
GLUCOSE BLDC GLUCOMTR-MCNC: 247 MG/DL — HIGH (ref 70–99)
HCT VFR BLD CALC: 41.8 % — SIGNIFICANT CHANGE UP (ref 39–50)
HGB BLD-MCNC: 13.5 G/DL — SIGNIFICANT CHANGE UP (ref 13–17)
IANC: 9.13 K/UL — HIGH (ref 1.5–8.5)
IMM GRANULOCYTES NFR BLD AUTO: 0.5 % — SIGNIFICANT CHANGE UP (ref 0–1.5)
LYMPHOCYTES # BLD AUTO: 2.58 K/UL — SIGNIFICANT CHANGE UP (ref 1–3.3)
LYMPHOCYTES # BLD AUTO: 20.1 % — SIGNIFICANT CHANGE UP (ref 13–44)
MCHC RBC-ENTMCNC: 27.1 PG — SIGNIFICANT CHANGE UP (ref 27–34)
MCHC RBC-ENTMCNC: 32.3 GM/DL — SIGNIFICANT CHANGE UP (ref 32–36)
MCV RBC AUTO: 83.9 FL — SIGNIFICANT CHANGE UP (ref 80–100)
MONOCYTES # BLD AUTO: 0.85 K/UL — SIGNIFICANT CHANGE UP (ref 0–0.9)
MONOCYTES NFR BLD AUTO: 6.6 % — SIGNIFICANT CHANGE UP (ref 2–14)
NEUTROPHILS # BLD AUTO: 9.13 K/UL — HIGH (ref 1.8–7.4)
NEUTROPHILS NFR BLD AUTO: 71 % — SIGNIFICANT CHANGE UP (ref 43–77)
NRBC # BLD: 0 /100 WBCS — SIGNIFICANT CHANGE UP
NRBC # FLD: 0 K/UL — SIGNIFICANT CHANGE UP
PLATELET # BLD AUTO: 211 K/UL — SIGNIFICANT CHANGE UP (ref 150–400)
RBC # BLD: 4.98 M/UL — SIGNIFICANT CHANGE UP (ref 4.2–5.8)
RBC # FLD: 14.5 % — SIGNIFICANT CHANGE UP (ref 10.3–14.5)
WBC # BLD: 12.86 K/UL — HIGH (ref 3.8–10.5)
WBC # FLD AUTO: 12.86 K/UL — HIGH (ref 3.8–10.5)

## 2021-09-21 PROCEDURE — 99232 SBSQ HOSP IP/OBS MODERATE 35: CPT

## 2021-09-21 RX ADMIN — RISPERIDONE 3 MILLIGRAM(S): 4 TABLET ORAL at 21:00

## 2021-09-21 RX ADMIN — ATORVASTATIN CALCIUM 40 MILLIGRAM(S): 80 TABLET, FILM COATED ORAL at 21:00

## 2021-09-21 RX ADMIN — METFORMIN HYDROCHLORIDE 1000 MILLIGRAM(S): 850 TABLET ORAL at 21:00

## 2021-09-21 RX ADMIN — SENNA PLUS 2 TABLET(S): 8.6 TABLET ORAL at 21:00

## 2021-09-21 RX ADMIN — CLOZAPINE 62.5 MILLIGRAM(S): 150 TABLET, ORALLY DISINTEGRATING ORAL at 21:00

## 2021-09-21 RX ADMIN — RISPERIDONE 1 MILLIGRAM(S): 4 TABLET ORAL at 09:10

## 2021-09-21 RX ADMIN — CLOZAPINE 62.5 MILLIGRAM(S): 150 TABLET, ORALLY DISINTEGRATING ORAL at 09:10

## 2021-09-21 RX ADMIN — Medication 2: at 12:29

## 2021-09-21 NOTE — BH INPATIENT PSYCHIATRY PROGRESS NOTE - OTHER
Abnormal gait,  uses walker denies but responding to internal stimuli and internally preoccupied minimally cooperative

## 2021-09-21 NOTE — BH INPATIENT PSYCHIATRY PROGRESS NOTE - NSBHFUPINTERVALHXFT_PSY_A_CORE
Pt compliant with medication and tolerating it well.  No events reported overnight.  Chart reviewed and case discussed with treatment team.  Pt remains disorganized, mumbles to himself.  Pt talks about an issue of having a car delivered to the hospital and how "there must be parallel lines and it got lost on the [place]."  When asked about voices, he reports, "only outside my head," then makes writer look out the window, talks about how we are on the second story.  Pt then asks to call his residence to ensure his car is in the parking lot and he will only drive his own [place].

## 2021-09-21 NOTE — BH INPATIENT PSYCHIATRY PROGRESS NOTE - CURRENT MEDICATION
MEDICATIONS  (STANDING):  acetaminophen   Tablet .. 650 milliGRAM(s) Oral once  atorvastatin 40 milliGRAM(s) Oral at bedtime  cloZAPine 62.5 milliGRAM(s) Oral two times a day  dextrose 40% Gel 15 Gram(s) Oral once  glucagon  Injectable 1 milliGRAM(s) IntraMuscular once  insulin lispro (ADMELOG) corrective regimen sliding scale   SubCutaneous three times a day before meals  insulin lispro (ADMELOG) corrective regimen sliding scale   SubCutaneous at bedtime  metFORMIN 1000 milliGRAM(s) Oral at bedtime  risperiDONE  Disintegrating Tablet 1 milliGRAM(s) Oral daily  risperiDONE  Disintegrating Tablet 3 milliGRAM(s) Oral at bedtime  senna 2 Tablet(s) Oral at bedtime  sitaGLIPtin 100 milliGRAM(s) Oral daily    MEDICATIONS  (PRN):  acetaminophen   Tablet .. 650 milliGRAM(s) Oral every 6 hours PRN Mild Pain (1 - 3), Moderate Pain (4 - 6)  chlorproMAZINE    Injectable 100 milliGRAM(s) IntraMuscular once PRN agitation or aggression  chlorproMAZINE    Tablet 100 milliGRAM(s) Oral every 4 hours PRN agitation or aggression  gabapentin 300 milliGRAM(s) Oral four times a day PRN anxiety  haloperidol     Tablet 5 milliGRAM(s) Oral every 6 hours PRN agitation  haloperidol    Injectable 5 milliGRAM(s) IntraMuscular once PRN agitation  melatonin. 5 milliGRAM(s) Oral at bedtime PRN Insomnia  polyethylene glycol 3350 17 Gram(s) Oral daily PRN constipation

## 2021-09-21 NOTE — BH INPATIENT PSYCHIATRY PROGRESS NOTE - NSBHCHARTREVIEWVS_PSY_A_CORE FT
Vital Signs Last 24 Hrs  T(C): 35.7 (09-21-21 @ 08:07), Max: 36.1 (09-20-21 @ 19:55)  T(F): 96.3 (09-21-21 @ 08:07), Max: 97 (09-20-21 @ 19:55)  HR: --  BP: --  BP(mean): --  RR: --  SpO2: --    Orthostatic VS  09-21-21 @ 08:07  Lying BP: --/-- HR: --  Sitting BP: 118/60 HR: 101  Standing BP: --/-- HR: --  Site: --  Mode: --  Orthostatic VS  09-20-21 @ 19:55  Lying BP: --/-- HR: --  Sitting BP: 109/72 HR: 95  Standing BP: 100/70 HR: 80  Site: --  Mode: --  Orthostatic VS  09-20-21 @ 07:50  Lying BP: 91/62 HR: 94  Sitting BP: --/-- HR: --  Standing BP: --/-- HR: --  Site: --  Mode: --  Orthostatic VS  09-19-21 @ 20:45  Lying BP: --/-- HR: --  Sitting BP: 118/73 HR: 92  Standing BP: 113/70 HR: 103  Site: --  Mode: --

## 2021-09-21 NOTE — BH INPATIENT PSYCHIATRY PROGRESS NOTE - NSBHASSESSSUMMFT_PSY_ALL_CORE
Patient selectively refusing medication, remains disorganized, labile, responding to internal stimuli    -clozapine changed to 62.5mg PO BID and Risperdal M-tab titrated to 1mg PO daily and 3mg PO at bedtime, MOO hearing completed   -group and milieu therapy  -routine checks

## 2021-09-21 NOTE — BH INPATIENT PSYCHIATRY PROGRESS NOTE - NSBHMETABOLIC_PSY_ALL_CORE_FT
BMI: BMI (kg/m2): 33.1 (09-04-21 @ 07:48)  HbA1c: A1C with Estimated Average Glucose Result: 7.3 % (08-05-21 @ 10:24)    Glucose: POCT Blood Glucose.: 247 mg/dL (09-21-21 @ 12:05)    BP: --  Lipid Panel: Date/Time: 08-05-21 @ 10:24  Cholesterol, Serum: 139  Direct LDL: --  HDL Cholesterol, Serum: 42  Total Cholesterol/HDL Ration Measurement: --  Triglycerides, Serum: 234

## 2021-09-22 LAB
GLUCOSE BLDC GLUCOMTR-MCNC: 169 MG/DL — HIGH (ref 70–99)
GLUCOSE BLDC GLUCOMTR-MCNC: 226 MG/DL — HIGH (ref 70–99)

## 2021-09-22 PROCEDURE — 99232 SBSQ HOSP IP/OBS MODERATE 35: CPT

## 2021-09-22 RX ORDER — CHLORPROMAZINE HCL 10 MG
100 TABLET ORAL ONCE
Refills: 0 | Status: COMPLETED | OUTPATIENT
Start: 2021-09-22 | End: 2021-09-22

## 2021-09-22 RX ADMIN — Medication 100 MILLIGRAM(S): at 11:01

## 2021-09-22 RX ADMIN — Medication 2 MILLIGRAM(S): at 11:01

## 2021-09-22 NOTE — BH INPATIENT PSYCHIATRY PROGRESS NOTE - PRN MEDS
MEDICATIONS  (PRN):  acetaminophen   Tablet .. 650 milliGRAM(s) Oral every 6 hours PRN Mild Pain (1 - 3), Moderate Pain (4 - 6)  chlorproMAZINE    Injectable 100 milliGRAM(s) IntraMuscular once PRN agitation or aggression  chlorproMAZINE    Tablet 100 milliGRAM(s) Oral every 4 hours PRN agitation or aggression  gabapentin 300 milliGRAM(s) Oral four times a day PRN anxiety  haloperidol     Tablet 5 milliGRAM(s) Oral every 6 hours PRN agitation  haloperidol    Injectable 5 milliGRAM(s) IntraMuscular once PRN agitation  LORazepam   Injectable 2 milliGRAM(s) IntraMuscular once PRN agitation  melatonin. 5 milliGRAM(s) Oral at bedtime PRN Insomnia  polyethylene glycol 3350 17 Gram(s) Oral daily PRN constipation

## 2021-09-22 NOTE — BH INPATIENT PSYCHIATRY PROGRESS NOTE - NSBHASSESSSUMMFT_PSY_ALL_CORE
Patient selectively refusing medication, remains disorganized, labile, responding to internal stimuli    -clozapine changed to 62.5mg PO BID and Risperdal M-tab titrated to 1mg PO daily and 3mg PO at bedtime, MOO hearing completed   -group and milieu therapy  -routine checks     Patient selectively refusing medication, remains disorganized, labile, responding to internal stimuli    -clozapine changed to 62.5mg PO BID and Risperdal M-tab titrated to 1mg PO daily and 3mg PO at bedtime, MOO hearing completed   -group and milieu therapy  -routine checks  -reviewed results of CBC + diff results including elevated WBC and Auto Neutrophil # on 9/21 as well as patient's medical hx with hospitalist, Dr. Figueroa.  Pt without elevated temperature or GI complaints, he recommended to monitor CBC + diff weekly otherwise NTD.

## 2021-09-22 NOTE — BH INPATIENT PSYCHIATRY PROGRESS NOTE - NSBHCHARTREVIEWVS_PSY_A_CORE FT
Vital Signs Last 24 Hrs  T(C): 36.6 (09-22-21 @ 06:37), Max: 36.6 (09-22-21 @ 06:37)  T(F): 97.8 (09-22-21 @ 06:37), Max: 97.8 (09-22-21 @ 06:37)  HR: --  BP: --  BP(mean): --  RR: 21 (09-22-21 @ 06:37) (21 - 21)  SpO2: --    Orthostatic VS  09-22-21 @ 06:37  Lying BP: --/-- HR: --  Sitting BP: 114/69 HR: 78  Standing BP: 122/67 HR: 97  Site: upper left arm  Mode: electronic  Orthostatic VS  09-21-21 @ 20:50  Lying BP: --/-- HR: --  Sitting BP: 140/79 HR: 108  Standing BP: 135/81 HR: 109  Site: --  Mode: --  Orthostatic VS  09-21-21 @ 08:07  Lying BP: --/-- HR: --  Sitting BP: 118/60 HR: 101  Standing BP: --/-- HR: --  Site: --  Mode: --  Orthostatic VS  09-20-21 @ 19:55  Lying BP: --/-- HR: --  Sitting BP: 109/72 HR: 95  Standing BP: 100/70 HR: 80  Site: --  Mode: --

## 2021-09-22 NOTE — BH INPATIENT PSYCHIATRY PROGRESS NOTE - NSBHFUPINTERVALHXFT_PSY_A_CORE
Pt compliant with medication last night and refused this morning.  Chart reviewed and case discussed with treatment team.  Pt more irritable upon approach today.  Pt reports he no longer needs to take his medication because his court paperwork says if he takes his medication through Tuesday he no longer needs to take it.  Explained to patient that if patient continues to refuse MOO paperwork is in and there will be court for MOO.  Pt reports, "I had agranulocytosis last night, I couldn't think or sleep."  Pt then rambles on, is difficult to interrupt, says, "I don't want my brain to melt away anymore" and "there is a girl on [place] and if she doesn't come here, I will go on my own plane."  Pt then slams his bedroom door refuses to speak further.  Later on, patient reported to have thrown things at a staff member hosting group, unable to be redirected, required emergency IM medication for agitation.  Pt then later comes up to writer, reports, "why did you take my friend away."  Writer inquired if patient would take AM medication after risks and benefits discussed and patient was irritable, reported, "I can increase my serotonin with a pile of spaghetti," then walked away refusing to engage further.

## 2021-09-22 NOTE — BH INPATIENT PSYCHIATRY PROGRESS NOTE - CURRENT MEDICATION
MEDICATIONS  (STANDING):  acetaminophen   Tablet .. 650 milliGRAM(s) Oral once  atorvastatin 40 milliGRAM(s) Oral at bedtime  cloZAPine 62.5 milliGRAM(s) Oral two times a day  dextrose 40% Gel 15 Gram(s) Oral once  glucagon  Injectable 1 milliGRAM(s) IntraMuscular once  insulin lispro (ADMELOG) corrective regimen sliding scale   SubCutaneous three times a day before meals  insulin lispro (ADMELOG) corrective regimen sliding scale   SubCutaneous at bedtime  metFORMIN 1000 milliGRAM(s) Oral at bedtime  risperiDONE  Disintegrating Tablet 1 milliGRAM(s) Oral daily  risperiDONE  Disintegrating Tablet 3 milliGRAM(s) Oral at bedtime  senna 2 Tablet(s) Oral at bedtime  sitaGLIPtin 100 milliGRAM(s) Oral daily    MEDICATIONS  (PRN):  acetaminophen   Tablet .. 650 milliGRAM(s) Oral every 6 hours PRN Mild Pain (1 - 3), Moderate Pain (4 - 6)  chlorproMAZINE    Injectable 100 milliGRAM(s) IntraMuscular once PRN agitation or aggression  chlorproMAZINE    Tablet 100 milliGRAM(s) Oral every 4 hours PRN agitation or aggression  gabapentin 300 milliGRAM(s) Oral four times a day PRN anxiety  haloperidol     Tablet 5 milliGRAM(s) Oral every 6 hours PRN agitation  haloperidol    Injectable 5 milliGRAM(s) IntraMuscular once PRN agitation  LORazepam   Injectable 2 milliGRAM(s) IntraMuscular once PRN agitation  melatonin. 5 milliGRAM(s) Oral at bedtime PRN Insomnia  polyethylene glycol 3350 17 Gram(s) Oral daily PRN constipation

## 2021-09-23 LAB
GLUCOSE BLDC GLUCOMTR-MCNC: 177 MG/DL — HIGH (ref 70–99)
GLUCOSE BLDC GLUCOMTR-MCNC: 178 MG/DL — HIGH (ref 70–99)
GLUCOSE BLDC GLUCOMTR-MCNC: 254 MG/DL — HIGH (ref 70–99)

## 2021-09-23 PROCEDURE — 99232 SBSQ HOSP IP/OBS MODERATE 35: CPT

## 2021-09-23 RX ADMIN — Medication 650 MILLIGRAM(S): at 14:36

## 2021-09-23 NOTE — BH INPATIENT PSYCHIATRY PROGRESS NOTE - NSBHFUPINTERVALHXFT_PSY_A_CORE
Pt refused all medication last night and this morning.  Chart reviewed and case discussed with treatment team.  Per staff, patient has been masturbating in his room in front of his roomate.  Pt observed visible on the unit, appears disheveled.  Pt irritable upon approach and remains disorganized.  Pt reports he does not want to take medication because he has "agranulocytosis in my arm," referring to the IM he received yesterday.  Attempted to discuss results of this weeks CBC + diff, but patient not receptive, demanding for the phlebotomist to tell him about it.  Pt claims he came in voluntary and "they put and 'In' in front of Voluntary" on his MOO paperwork and the paperwork is not valid.  Explained to patient if he continues to refuse medication, then there will be court for MOO.   Pt then talks about only wanting to take 25mg PO of Clozaril, then after risks and benefits discussed, seems agreeable to take medication tonight.  Pt then starts walking away from the interview, talks about how "you should look out the telescope to see [place]," then starts talking to himself.

## 2021-09-23 NOTE — BH INPATIENT PSYCHIATRY PROGRESS NOTE - NSBHASSESSSUMMFT_PSY_ALL_CORE
Patient selectively refusing medication, remains disorganized, labile, responding to internal stimuli    -clozapine changed to 62.5mg PO BID and Risperdal M-tab titrated to 1mg PO daily and 3mg PO at bedtime, MOO hearing completed   -group and milieu therapy  -routine checks  -reviewed results of CBC + diff results including elevated WBC and Auto Neutrophil # on 9/21 as well as patient's medical hx with hospitalist, Dr. Figueroa.  Pt without elevated temperature or GI complaints, he recommended to monitor CBC + diff weekly otherwise NTD.

## 2021-09-23 NOTE — BH INPATIENT PSYCHIATRY PROGRESS NOTE - NSBHMETABOLIC_PSY_ALL_CORE_FT
BMI: BMI (kg/m2): 33.1 (09-04-21 @ 07:48)  HbA1c: A1C with Estimated Average Glucose Result: 7.3 % (08-05-21 @ 10:24)    Glucose: POCT Blood Glucose.: 177 mg/dL (09-23-21 @ 07:39)    BP: 126/75 (09-23-21 @ 06:35) (126/75 - 126/75)  Lipid Panel: Date/Time: 08-05-21 @ 10:24  Cholesterol, Serum: 139  Direct LDL: --  HDL Cholesterol, Serum: 42  Total Cholesterol/HDL Ration Measurement: --  Triglycerides, Serum: 234

## 2021-09-23 NOTE — BH INPATIENT PSYCHIATRY PROGRESS NOTE - NSBHCHARTREVIEWVS_PSY_A_CORE FT
Vital Signs Last 24 Hrs  T(C): 35.8 (09-23-21 @ 06:35), Max: 36.2 (09-22-21 @ 18:33)  T(F): 96.4 (09-23-21 @ 06:35), Max: 97.2 (09-22-21 @ 18:33)  HR: 96 (09-23-21 @ 06:35) (96 - 96)  BP: 126/75 (09-23-21 @ 06:35) (126/75 - 126/75)  BP(mean): --  RR: --  SpO2: --    Orthostatic VS  09-22-21 @ 06:37  Lying BP: --/-- HR: --  Sitting BP: 114/69 HR: 78  Standing BP: 122/67 HR: 97  Site: upper left arm  Mode: electronic  Orthostatic VS  09-21-21 @ 20:50  Lying BP: --/-- HR: --  Sitting BP: 140/79 HR: 108  Standing BP: 135/81 HR: 109  Site: --  Mode: --

## 2021-09-23 NOTE — BH INPATIENT PSYCHIATRY PROGRESS NOTE - OTHER
minimally cooperative Abnormal gait,  uses walker disheveled denies but responding to internal stimuli and internally preoccupied

## 2021-09-24 LAB
GLUCOSE BLDC GLUCOMTR-MCNC: 163 MG/DL — HIGH (ref 70–99)
GLUCOSE BLDC GLUCOMTR-MCNC: 170 MG/DL — HIGH (ref 70–99)

## 2021-09-24 PROCEDURE — 99232 SBSQ HOSP IP/OBS MODERATE 35: CPT

## 2021-09-24 RX ADMIN — CLOZAPINE 62.5 MILLIGRAM(S): 150 TABLET, ORALLY DISINTEGRATING ORAL at 09:29

## 2021-09-24 RX ADMIN — SENNA PLUS 2 TABLET(S): 8.6 TABLET ORAL at 21:17

## 2021-09-24 RX ADMIN — CLOZAPINE 62.5 MILLIGRAM(S): 150 TABLET, ORALLY DISINTEGRATING ORAL at 20:18

## 2021-09-24 RX ADMIN — METFORMIN HYDROCHLORIDE 1000 MILLIGRAM(S): 850 TABLET ORAL at 20:17

## 2021-09-24 RX ADMIN — RISPERIDONE 3 MILLIGRAM(S): 4 TABLET ORAL at 20:16

## 2021-09-24 NOTE — BH INPATIENT PSYCHIATRY PROGRESS NOTE - NSBHFUPINTERVALHXFT_PSY_A_CORE
Pt refused all medication yesterday and was only compliant with Clozaril today with a lot of staff encouragement.  Chart reviewed and case discussed with treatment team.  Per staff, patient is very intrusive with staff and peers, interrupts staff conversations, requires a lot of redirection.  Per staff, patient also masturbating excessively in front of his roomate, and a room block was requested yesterday and granted.  Pt remains disorganized, irritable, minimally cooperative with interview, continues to talk to himself.  Writer discussed risks and benefits of medication, encouraged compliance, explained again there will be court for MOO next week.  Pt reports, "I cannot take anxiety in my mouth because it will increase my blood sugar."  Pt also reports he cannot take medication because he received "medication in my arm the other day and you cannot have too much medication," referring to the IM he received for agitation this week.  Pt then refuses to engage in further interview and as writer, SW and NP student are leaving, he says, "be the ozone donut jelly inside."

## 2021-09-24 NOTE — BH INPATIENT PSYCHIATRY PROGRESS NOTE - NSBHASSESSSUMMFT_PSY_ALL_CORE
Patient selectively refusing medication, remains disorganized, labile, responding to internal stimuli, minimally cooperative with interview    -clozapine changed to 62.5mg PO BID and Risperdal M-tab titrated to 1mg PO daily and 3mg PO at bedtime, MOO hearing completed   -group and milieu therapy  -routine checks

## 2021-09-24 NOTE — BH INPATIENT PSYCHIATRY PROGRESS NOTE - NSBHCHARTREVIEWVS_PSY_A_CORE FT
Vital Signs Last 24 Hrs  T(C): 37.1 (09-24-21 @ 08:22), Max: 37.1 (09-24-21 @ 08:22)  T(F): 98.8 (09-24-21 @ 08:22), Max: 98.8 (09-24-21 @ 08:22)  HR: --  BP: --  BP(mean): --  RR: 18 (09-23-21 @ 20:16) (18 - 18)  SpO2: --    Orthostatic VS  09-24-21 @ 08:22  Lying BP: --/-- HR: --  Sitting BP: 128/78 HR: 83  Standing BP: 118/74 HR: 92  Site: upper left arm  Mode: --  Orthostatic VS  09-23-21 @ 20:16  Lying BP: --/-- HR: --  Sitting BP: 139/73 HR: 98  Standing BP: 129/85 HR: 106  Site: --  Mode: --

## 2021-09-24 NOTE — BH INPATIENT PSYCHIATRY PROGRESS NOTE - OTHER
minimally cooperative Abnormal gait,  uses walker disheveled responding to internal stimuli and internally preoccupied

## 2021-09-24 NOTE — MEDICAL LEGAL COORDINATOR PROGRESS NOTE - COMMENTS
I served patient with an "Order to Show Cause" from [facility] [address], [facility]. Whereas the hospital is requesting to medicate the patient over their objection. I also placed a copy in the patient's chart.

## 2021-09-24 NOTE — BH INPATIENT PSYCHIATRY PROGRESS NOTE - NSBHMETABOLIC_PSY_ALL_CORE_FT
BMI: BMI (kg/m2): 33.1 (09-04-21 @ 07:48)  HbA1c: A1C with Estimated Average Glucose Result: 7.3 % (08-05-21 @ 10:24)    Glucose: POCT Blood Glucose.: 170 mg/dL (09-24-21 @ 08:16)    BP: 126/75 (09-23-21 @ 06:35) (126/75 - 126/75)  Lipid Panel: Date/Time: 08-05-21 @ 10:24  Cholesterol, Serum: 139  Direct LDL: --  HDL Cholesterol, Serum: 42  Total Cholesterol/HDL Ration Measurement: --  Triglycerides, Serum: 234

## 2021-09-25 LAB
GLUCOSE BLDC GLUCOMTR-MCNC: 179 MG/DL — HIGH (ref 70–99)
GLUCOSE BLDC GLUCOMTR-MCNC: 182 MG/DL — HIGH (ref 70–99)

## 2021-09-25 RX ADMIN — ATORVASTATIN CALCIUM 40 MILLIGRAM(S): 80 TABLET, FILM COATED ORAL at 20:27

## 2021-09-25 RX ADMIN — Medication 100 MILLIGRAM(S): at 20:27

## 2021-09-25 RX ADMIN — HALOPERIDOL DECANOATE 5 MILLIGRAM(S): 100 INJECTION INTRAMUSCULAR at 06:31

## 2021-09-25 RX ADMIN — Medication 100 MILLIGRAM(S): at 06:31

## 2021-09-25 RX ADMIN — RISPERIDONE 1 MILLIGRAM(S): 4 TABLET ORAL at 09:36

## 2021-09-25 RX ADMIN — CLOZAPINE 62.5 MILLIGRAM(S): 150 TABLET, ORALLY DISINTEGRATING ORAL at 20:27

## 2021-09-25 RX ADMIN — METFORMIN HYDROCHLORIDE 1000 MILLIGRAM(S): 850 TABLET ORAL at 20:27

## 2021-09-25 RX ADMIN — RISPERIDONE 3 MILLIGRAM(S): 4 TABLET ORAL at 20:26

## 2021-09-25 RX ADMIN — CLOZAPINE 62.5 MILLIGRAM(S): 150 TABLET, ORALLY DISINTEGRATING ORAL at 09:35

## 2021-09-25 RX ADMIN — SENNA PLUS 2 TABLET(S): 8.6 TABLET ORAL at 20:26

## 2021-09-26 LAB
GLUCOSE BLDC GLUCOMTR-MCNC: 170 MG/DL — HIGH (ref 70–99)
GLUCOSE BLDC GLUCOMTR-MCNC: 177 MG/DL — HIGH (ref 70–99)
GLUCOSE BLDC GLUCOMTR-MCNC: 196 MG/DL — HIGH (ref 70–99)

## 2021-09-26 RX ADMIN — CLOZAPINE 62.5 MILLIGRAM(S): 150 TABLET, ORALLY DISINTEGRATING ORAL at 08:43

## 2021-09-26 RX ADMIN — ATORVASTATIN CALCIUM 40 MILLIGRAM(S): 80 TABLET, FILM COATED ORAL at 20:48

## 2021-09-26 RX ADMIN — RISPERIDONE 3 MILLIGRAM(S): 4 TABLET ORAL at 20:48

## 2021-09-26 RX ADMIN — METFORMIN HYDROCHLORIDE 1000 MILLIGRAM(S): 850 TABLET ORAL at 20:48

## 2021-09-26 RX ADMIN — RISPERIDONE 1 MILLIGRAM(S): 4 TABLET ORAL at 08:43

## 2021-09-27 LAB — GLUCOSE BLDC GLUCOMTR-MCNC: 148 MG/DL — HIGH (ref 70–99)

## 2021-09-27 PROCEDURE — 99232 SBSQ HOSP IP/OBS MODERATE 35: CPT

## 2021-09-27 RX ORDER — CHLORPROMAZINE HCL 10 MG
100 TABLET ORAL ONCE
Refills: 0 | Status: COMPLETED | OUTPATIENT
Start: 2021-09-27 | End: 2021-09-27

## 2021-09-27 RX ADMIN — Medication 100 MILLIGRAM(S): at 13:22

## 2021-09-27 NOTE — BH INPATIENT PSYCHIATRY PROGRESS NOTE - NSBHFUPINTERVALHXFT_PSY_A_CORE
Pt refused Clozaril last night and all medications this morning.  Chart reviewed and case discussed with treatment team.  Pt irritable upon approach today and uncooperative with interview.  Pt reports, "I don't need medication, they gave me 20mg of Risperdal yesterday."  Writer attempted to explain his current Risperdal dosage and encourage compliance with medication and patient reports, "I don't want to talk to you lady, leave, before I get anxious and violent," then refuses to speak further.

## 2021-09-27 NOTE — BH INPATIENT PSYCHIATRY PROGRESS NOTE - NSBHMETABOLIC_PSY_ALL_CORE_FT
BMI: BMI (kg/m2): 33.1 (09-04-21 @ 07:48)  HbA1c: A1C with Estimated Average Glucose Result: 7.3 % (08-05-21 @ 10:24)    Glucose: POCT Blood Glucose.: 170 mg/dL (09-26-21 @ 20:15)    BP: --  Lipid Panel: Date/Time: 08-05-21 @ 10:24  Cholesterol, Serum: 139  Direct LDL: --  HDL Cholesterol, Serum: 42  Total Cholesterol/HDL Ration Measurement: --  Triglycerides, Serum: 234

## 2021-09-27 NOTE — BH INPATIENT PSYCHIATRY PROGRESS NOTE - NSBHCHARTREVIEWVS_PSY_A_CORE FT
Vital Signs Last 24 Hrs  T(C): 35.6 (09-27-21 @ 07:49), Max: 36.6 (09-26-21 @ 19:06)  T(F): 96 (09-27-21 @ 07:49), Max: 97.8 (09-26-21 @ 19:06)  HR: --  BP: --  BP(mean): --  RR: --  SpO2: --    Orthostatic VS  09-27-21 @ 07:49  Lying BP: --/-- HR: --  Sitting BP: 125/68 HR: 94  Standing BP: --/-- HR: --  Site: --  Mode: --  Orthostatic VS  09-26-21 @ 19:06  Lying BP: --/-- HR: --  Sitting BP: 137/76 HR: 108  Standing BP: 140/80 HR: 91  Site: --  Mode: --  Orthostatic VS  09-26-21 @ 08:20  Lying BP: --/-- HR: --  Sitting BP: 96/71 HR: 97  Standing BP: 96/65 HR: 99  Site: --  Mode: --  Orthostatic VS  09-25-21 @ 19:41  Lying BP: --/-- HR: --  Sitting BP: 105/60 HR: 86  Standing BP: 111/72 HR: 89  Site: --  Mode: --

## 2021-09-28 LAB
BASOPHILS # BLD AUTO: 0.06 K/UL — SIGNIFICANT CHANGE UP (ref 0–0.2)
BASOPHILS NFR BLD AUTO: 0.6 % — SIGNIFICANT CHANGE UP (ref 0–2)
EOSINOPHIL # BLD AUTO: 0.24 K/UL — SIGNIFICANT CHANGE UP (ref 0–0.5)
EOSINOPHIL NFR BLD AUTO: 2.4 % — SIGNIFICANT CHANGE UP (ref 0–6)
GLUCOSE BLDC GLUCOMTR-MCNC: 176 MG/DL — HIGH (ref 70–99)
GLUCOSE BLDC GLUCOMTR-MCNC: 183 MG/DL — HIGH (ref 70–99)
GLUCOSE BLDC GLUCOMTR-MCNC: 184 MG/DL — HIGH (ref 70–99)
GLUCOSE BLDC GLUCOMTR-MCNC: 210 MG/DL — HIGH (ref 70–99)
HCT VFR BLD CALC: 40.3 % — SIGNIFICANT CHANGE UP (ref 39–50)
HGB BLD-MCNC: 13.2 G/DL — SIGNIFICANT CHANGE UP (ref 13–17)
IANC: 6.74 K/UL — SIGNIFICANT CHANGE UP (ref 1.5–8.5)
IMM GRANULOCYTES NFR BLD AUTO: 0.9 % — SIGNIFICANT CHANGE UP (ref 0–1.5)
LYMPHOCYTES # BLD AUTO: 2.26 K/UL — SIGNIFICANT CHANGE UP (ref 1–3.3)
LYMPHOCYTES # BLD AUTO: 22.3 % — SIGNIFICANT CHANGE UP (ref 13–44)
MCHC RBC-ENTMCNC: 27.1 PG — SIGNIFICANT CHANGE UP (ref 27–34)
MCHC RBC-ENTMCNC: 32.8 GM/DL — SIGNIFICANT CHANGE UP (ref 32–36)
MCV RBC AUTO: 82.8 FL — SIGNIFICANT CHANGE UP (ref 80–100)
MONOCYTES # BLD AUTO: 0.76 K/UL — SIGNIFICANT CHANGE UP (ref 0–0.9)
MONOCYTES NFR BLD AUTO: 7.5 % — SIGNIFICANT CHANGE UP (ref 2–14)
NEUTROPHILS # BLD AUTO: 6.74 K/UL — SIGNIFICANT CHANGE UP (ref 1.8–7.4)
NEUTROPHILS NFR BLD AUTO: 66.3 % — SIGNIFICANT CHANGE UP (ref 43–77)
NRBC # BLD: 0 /100 WBCS — SIGNIFICANT CHANGE UP
NRBC # FLD: 0 K/UL — SIGNIFICANT CHANGE UP
PLATELET # BLD AUTO: 213 K/UL — SIGNIFICANT CHANGE UP (ref 150–400)
RBC # BLD: 4.87 M/UL — SIGNIFICANT CHANGE UP (ref 4.2–5.8)
RBC # FLD: 13.9 % — SIGNIFICANT CHANGE UP (ref 10.3–14.5)
WBC # BLD: 10.15 K/UL — SIGNIFICANT CHANGE UP (ref 3.8–10.5)
WBC # FLD AUTO: 10.15 K/UL — SIGNIFICANT CHANGE UP (ref 3.8–10.5)

## 2021-09-28 PROCEDURE — 99232 SBSQ HOSP IP/OBS MODERATE 35: CPT

## 2021-09-28 RX ADMIN — Medication 1: at 08:35

## 2021-09-28 RX ADMIN — ATORVASTATIN CALCIUM 40 MILLIGRAM(S): 80 TABLET, FILM COATED ORAL at 20:35

## 2021-09-28 RX ADMIN — RISPERIDONE 1 MILLIGRAM(S): 4 TABLET ORAL at 08:29

## 2021-09-28 RX ADMIN — SENNA PLUS 2 TABLET(S): 8.6 TABLET ORAL at 20:34

## 2021-09-28 RX ADMIN — METFORMIN HYDROCHLORIDE 1000 MILLIGRAM(S): 850 TABLET ORAL at 20:35

## 2021-09-28 RX ADMIN — RISPERIDONE 3 MILLIGRAM(S): 4 TABLET ORAL at 20:34

## 2021-09-28 RX ADMIN — Medication 1: at 12:30

## 2021-09-28 RX ADMIN — CLOZAPINE 62.5 MILLIGRAM(S): 150 TABLET, ORALLY DISINTEGRATING ORAL at 20:35

## 2021-09-28 RX ADMIN — CLOZAPINE 62.5 MILLIGRAM(S): 150 TABLET, ORALLY DISINTEGRATING ORAL at 08:29

## 2021-09-28 NOTE — BH INPATIENT PSYCHIATRY PROGRESS NOTE - NSBHCHARTREVIEWVS_PSY_A_CORE FT
Vital Signs Last 24 Hrs  T(C): 36.9 (09-28-21 @ 07:46), Max: 36.9 (09-28-21 @ 07:46)  T(F): 98.4 (09-28-21 @ 07:46), Max: 98.4 (09-28-21 @ 07:46)  HR: --  BP: --  BP(mean): --  RR: --  SpO2: --    Orthostatic VS  09-28-21 @ 07:46  Lying BP: --/-- HR: --  Sitting BP: 126/78 HR: 95  Standing BP: 120/75 HR: 82  Site: --  Mode: --  Orthostatic VS  09-27-21 @ 20:41  Lying BP: --/-- HR: --  Sitting BP: 128/73 HR: 95  Standing BP: 129/77 HR: 101  Site: upper right arm  Mode: electronic  Orthostatic VS  09-27-21 @ 07:49  Lying BP: --/-- HR: --  Sitting BP: 125/68 HR: 94  Standing BP: --/-- HR: --  Site: --  Mode: --  Orthostatic VS  09-26-21 @ 19:06  Lying BP: --/-- HR: --  Sitting BP: 137/76 HR: 108  Standing BP: 140/80 HR: 91  Site: --  Mode: --

## 2021-09-28 NOTE — BH INPATIENT PSYCHIATRY PROGRESS NOTE - NSBHASSESSSUMMFT_PSY_ALL_CORE
Patient selectively refusing medication, remains disorganized, labile, responding to internal stimuli, minimally cooperative with interview    -clozapine changed to 62.5mg PO BID and Risperdal M-tab titrated to 1mg PO daily and 3mg PO at bedtime, MOO hearing completed, MOO and retention court today   -group and milieu therapy  -routine checks

## 2021-09-28 NOTE — BH INPATIENT PSYCHIATRY PROGRESS NOTE - NSBHFUPINTERVALHXFT_PSY_A_CORE
Pt compliant with medication this morning, but refused last night.  Chart reviewed and case discussed with treatment team.  Pt refusing to go to court for MOO and retention today, court still pending.  Pt observed in his room in the dark talking to himself, appears disheveled.  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

## 2021-09-28 NOTE — BH INPATIENT PSYCHIATRY PROGRESS NOTE - NSBHMETABOLIC_PSY_ALL_CORE_FT
BMI: BMI (kg/m2): 33.1 (09-04-21 @ 07:48)  HbA1c: A1C with Estimated Average Glucose Result: 7.3 % (08-05-21 @ 10:24)    Glucose: POCT Blood Glucose.: 176 mg/dL (09-28-21 @ 12:04)    BP: --  Lipid Panel: Date/Time: 08-05-21 @ 10:24  Cholesterol, Serum: 139  Direct LDL: --  HDL Cholesterol, Serum: 42  Total Cholesterol/HDL Ration Measurement: --  Triglycerides, Serum: 234

## 2021-09-29 LAB — GLUCOSE BLDC GLUCOMTR-MCNC: 206 MG/DL — HIGH (ref 70–99)

## 2021-09-29 RX ORDER — OLANZAPINE 15 MG/1
5 TABLET, FILM COATED ORAL EVERY 12 HOURS
Refills: 0 | Status: DISCONTINUED | OUTPATIENT
Start: 2021-09-29 | End: 2021-10-04

## 2021-09-29 RX ORDER — RISPERIDONE 4 MG/1
1 TABLET ORAL DAILY
Refills: 0 | Status: DISCONTINUED | OUTPATIENT
Start: 2021-09-30 | End: 2021-10-04

## 2021-09-29 RX ORDER — CLOZAPINE 150 MG/1
62.5 TABLET, ORALLY DISINTEGRATING ORAL
Refills: 0 | Status: DISCONTINUED | OUTPATIENT
Start: 2021-09-29 | End: 2021-09-30

## 2021-09-29 RX ORDER — CHLORPROMAZINE HCL 10 MG
100 TABLET ORAL ONCE
Refills: 0 | Status: DISCONTINUED | OUTPATIENT
Start: 2021-09-29 | End: 2021-09-29

## 2021-09-29 RX ORDER — RISPERIDONE 4 MG/1
3 TABLET ORAL AT BEDTIME
Refills: 0 | Status: DISCONTINUED | OUTPATIENT
Start: 2021-09-29 | End: 2021-09-30

## 2021-09-29 RX ADMIN — CLOZAPINE 62.5 MILLIGRAM(S): 150 TABLET, ORALLY DISINTEGRATING ORAL at 08:32

## 2021-09-29 RX ADMIN — SENNA PLUS 2 TABLET(S): 8.6 TABLET ORAL at 20:51

## 2021-09-29 RX ADMIN — ATORVASTATIN CALCIUM 40 MILLIGRAM(S): 80 TABLET, FILM COATED ORAL at 20:52

## 2021-09-29 RX ADMIN — CLOZAPINE 62.5 MILLIGRAM(S): 150 TABLET, ORALLY DISINTEGRATING ORAL at 20:52

## 2021-09-29 RX ADMIN — RISPERIDONE 1 MILLIGRAM(S): 4 TABLET ORAL at 08:32

## 2021-09-29 RX ADMIN — Medication 100 MILLIGRAM(S): at 22:50

## 2021-09-29 RX ADMIN — RISPERIDONE 3 MILLIGRAM(S): 4 TABLET ORAL at 20:51

## 2021-09-29 RX ADMIN — METFORMIN HYDROCHLORIDE 1000 MILLIGRAM(S): 850 TABLET ORAL at 20:52

## 2021-09-29 NOTE — BH INPATIENT PSYCHIATRY PROGRESS NOTE - NSBHMETABOLIC_PSY_ALL_CORE_FT
BMI: BMI (kg/m2): 33.1 (09-04-21 @ 07:48)  HbA1c: A1C with Estimated Average Glucose Result: 7.3 % (08-05-21 @ 10:24)    Glucose: POCT Blood Glucose.: 210 mg/dL (09-28-21 @ 20:09)    BP: --  Lipid Panel: Date/Time: 08-05-21 @ 10:24  Cholesterol, Serum: 139  Direct LDL: --  HDL Cholesterol, Serum: 42  Total Cholesterol/HDL Ration Measurement: --  Triglycerides, Serum: 234

## 2021-09-29 NOTE — BH INPATIENT PSYCHIATRY PROGRESS NOTE - NSBHFUPINTERVALHXFT_PSY_A_CORE
Pt compliant with medication last night and this morning.  Chart reviewed and case discussed with treatment team.  MOO court order obtained yesterday.  Pt advised of this, and he reports, "I took my medication."  Pt remains irritable and disorganized, responds to internal stimuli.  Pt remains uncooperative with interview, reports he needs to rest because "my vocal cords need it," refuses to engage further.

## 2021-09-29 NOTE — BH INPATIENT PSYCHIATRY PROGRESS NOTE - NSBHASSESSSUMMFT_PSY_ALL_CORE
Patient selectively refusing medication, remains disorganized, labile, responding to internal stimuli, minimally cooperative with interview    -clozapine changed to 62.5mg PO BID and Risperdal M-tab titrated to 1mg PO daily and 3mg PO at bedtime, MOO granted yesterday, will give Zyprexa 5mg IM if patient refuses Risperdal and/or Clozaril standing medication  -group and milieu therapy  -routine checks

## 2021-09-29 NOTE — BH INPATIENT PSYCHIATRY PROGRESS NOTE - PRN MEDS
MEDICATIONS  (PRN):  acetaminophen   Tablet .. 650 milliGRAM(s) Oral every 6 hours PRN Mild Pain (1 - 3), Moderate Pain (4 - 6)  chlorproMAZINE    Injectable 100 milliGRAM(s) IntraMuscular once PRN agitation or aggression  chlorproMAZINE    Tablet 100 milliGRAM(s) Oral every 4 hours PRN agitation or aggression  gabapentin 300 milliGRAM(s) Oral four times a day PRN anxiety  haloperidol     Tablet 5 milliGRAM(s) Oral every 6 hours PRN agitation  haloperidol    Injectable 5 milliGRAM(s) IntraMuscular once PRN agitation  LORazepam   Injectable 2 milliGRAM(s) IntraMuscular once PRN agitation  melatonin. 5 milliGRAM(s) Oral at bedtime PRN Insomnia  OLANZapine Injectable 5 milliGRAM(s) IntraMuscular every 12 hours PRN psychosis  OLANZapine Injectable 5 milliGRAM(s) IntraMuscular every 12 hours PRN psychosis  polyethylene glycol 3350 17 Gram(s) Oral daily PRN constipation

## 2021-09-29 NOTE — BH INPATIENT PSYCHIATRY PROGRESS NOTE - CURRENT MEDICATION
MEDICATIONS  (STANDING):  acetaminophen   Tablet .. 650 milliGRAM(s) Oral once  atorvastatin 40 milliGRAM(s) Oral at bedtime  cloZAPine 62.5 milliGRAM(s) Oral two times a day  dextrose 40% Gel 15 Gram(s) Oral once  glucagon  Injectable 1 milliGRAM(s) IntraMuscular once  insulin lispro (ADMELOG) corrective regimen sliding scale   SubCutaneous three times a day before meals  insulin lispro (ADMELOG) corrective regimen sliding scale   SubCutaneous at bedtime  metFORMIN 1000 milliGRAM(s) Oral at bedtime  risperiDONE  Disintegrating Tablet 3 milliGRAM(s) Oral at bedtime  senna 2 Tablet(s) Oral at bedtime  sitaGLIPtin 100 milliGRAM(s) Oral daily    MEDICATIONS  (PRN):  acetaminophen   Tablet .. 650 milliGRAM(s) Oral every 6 hours PRN Mild Pain (1 - 3), Moderate Pain (4 - 6)  chlorproMAZINE    Injectable 100 milliGRAM(s) IntraMuscular once PRN agitation or aggression  chlorproMAZINE    Tablet 100 milliGRAM(s) Oral every 4 hours PRN agitation or aggression  gabapentin 300 milliGRAM(s) Oral four times a day PRN anxiety  haloperidol     Tablet 5 milliGRAM(s) Oral every 6 hours PRN agitation  haloperidol    Injectable 5 milliGRAM(s) IntraMuscular once PRN agitation  LORazepam   Injectable 2 milliGRAM(s) IntraMuscular once PRN agitation  melatonin. 5 milliGRAM(s) Oral at bedtime PRN Insomnia  OLANZapine Injectable 5 milliGRAM(s) IntraMuscular every 12 hours PRN psychosis  OLANZapine Injectable 5 milliGRAM(s) IntraMuscular every 12 hours PRN psychosis  polyethylene glycol 3350 17 Gram(s) Oral daily PRN constipation

## 2021-09-29 NOTE — BH INPATIENT PSYCHIATRY PROGRESS NOTE - NSBHCHARTREVIEWVS_PSY_A_CORE FT
Vital Signs Last 24 Hrs  T(C): 36.8 (09-29-21 @ 08:00), Max: 36.8 (09-29-21 @ 08:00)  T(F): 98.2 (09-29-21 @ 08:00), Max: 98.2 (09-29-21 @ 08:00)  HR: --  BP: --  BP(mean): --  RR: --  SpO2: --    Orthostatic VS  09-29-21 @ 08:00  Lying BP: --/-- HR: --  Sitting BP: 115/73 HR: 89  Standing BP: 102/68 HR: 89  Site: --  Mode: --  Orthostatic VS  09-28-21 @ 20:21  Lying BP: --/-- HR: --  Sitting BP: 121/64 HR: 99  Standing BP: 104/50 HR: 105  Site: --  Mode: --  Orthostatic VS  09-28-21 @ 07:46  Lying BP: --/-- HR: --  Sitting BP: 126/78 HR: 95  Standing BP: 120/75 HR: 82  Site: --  Mode: --  Orthostatic VS  09-27-21 @ 20:41  Lying BP: --/-- HR: --  Sitting BP: 128/73 HR: 95  Standing BP: 129/77 HR: 101  Site: upper right arm  Mode: electronic

## 2021-09-30 LAB
GLUCOSE BLDC GLUCOMTR-MCNC: 161 MG/DL — HIGH (ref 70–99)
GLUCOSE BLDC GLUCOMTR-MCNC: 171 MG/DL — HIGH (ref 70–99)
GLUCOSE BLDC GLUCOMTR-MCNC: 241 MG/DL — HIGH (ref 70–99)
GLUCOSE BLDC GLUCOMTR-MCNC: 249 MG/DL — HIGH (ref 70–99)

## 2021-09-30 PROCEDURE — 99232 SBSQ HOSP IP/OBS MODERATE 35: CPT

## 2021-09-30 RX ORDER — CHLORPROMAZINE HCL 10 MG
100 TABLET ORAL ONCE
Refills: 0 | Status: COMPLETED | OUTPATIENT
Start: 2021-09-30 | End: 2021-10-01

## 2021-09-30 RX ORDER — CLOZAPINE 150 MG/1
62.5 TABLET, ORALLY DISINTEGRATING ORAL
Refills: 0 | Status: DISCONTINUED | OUTPATIENT
Start: 2021-09-30 | End: 2021-10-01

## 2021-09-30 RX ORDER — RISPERIDONE 4 MG/1
4 TABLET ORAL
Refills: 0 | Status: DISCONTINUED | OUTPATIENT
Start: 2021-09-30 | End: 2021-10-04

## 2021-09-30 RX ADMIN — CLOZAPINE 62.5 MILLIGRAM(S): 150 TABLET, ORALLY DISINTEGRATING ORAL at 08:39

## 2021-09-30 RX ADMIN — Medication 100 MILLIGRAM(S): at 21:00

## 2021-09-30 RX ADMIN — ATORVASTATIN CALCIUM 40 MILLIGRAM(S): 80 TABLET, FILM COATED ORAL at 21:00

## 2021-09-30 RX ADMIN — RISPERIDONE 4 MILLIGRAM(S): 4 TABLET ORAL at 21:00

## 2021-09-30 RX ADMIN — RISPERIDONE 1 MILLIGRAM(S): 4 TABLET ORAL at 08:38

## 2021-09-30 RX ADMIN — METFORMIN HYDROCHLORIDE 1000 MILLIGRAM(S): 850 TABLET ORAL at 21:01

## 2021-09-30 RX ADMIN — SENNA PLUS 2 TABLET(S): 8.6 TABLET ORAL at 20:59

## 2021-09-30 RX ADMIN — CLOZAPINE 62.5 MILLIGRAM(S): 150 TABLET, ORALLY DISINTEGRATING ORAL at 21:01

## 2021-09-30 NOTE — BH INPATIENT PSYCHIATRY PROGRESS NOTE - NSBHMETABOLIC_PSY_ALL_CORE_FT
BMI: BMI (kg/m2): 33.1 (09-04-21 @ 07:48)  HbA1c: A1C with Estimated Average Glucose Result: 7.3 % (08-05-21 @ 10:24)    Glucose: POCT Blood Glucose.: 161 mg/dL (09-30-21 @ 12:17)    BP: --  Lipid Panel: Date/Time: 08-05-21 @ 10:24  Cholesterol, Serum: 139  Direct LDL: --  HDL Cholesterol, Serum: 42  Total Cholesterol/HDL Ration Measurement: --  Triglycerides, Serum: 234

## 2021-09-30 NOTE — BH INPATIENT PSYCHIATRY PROGRESS NOTE - CURRENT MEDICATION
MEDICATIONS  (STANDING):  acetaminophen   Tablet .. 650 milliGRAM(s) Oral once  atorvastatin 40 milliGRAM(s) Oral at bedtime  cloZAPine Disintegrating Tablet 62.5 milliGRAM(s) Oral two times a day  dextrose 40% Gel 15 Gram(s) Oral once  glucagon  Injectable 1 milliGRAM(s) IntraMuscular once  insulin lispro (ADMELOG) corrective regimen sliding scale   SubCutaneous three times a day before meals  insulin lispro (ADMELOG) corrective regimen sliding scale   SubCutaneous at bedtime  metFORMIN 1000 milliGRAM(s) Oral at bedtime  risperiDONE  Disintegrating Tablet 1 milliGRAM(s) Oral daily  risperiDONE  Disintegrating Tablet 4 milliGRAM(s) Oral two times a day  senna 2 Tablet(s) Oral at bedtime  sitaGLIPtin 100 milliGRAM(s) Oral daily    MEDICATIONS  (PRN):  acetaminophen   Tablet .. 650 milliGRAM(s) Oral every 6 hours PRN Mild Pain (1 - 3), Moderate Pain (4 - 6)  chlorproMAZINE    Injectable 100 milliGRAM(s) IntraMuscular once PRN agitation or aggression  chlorproMAZINE    Tablet 100 milliGRAM(s) Oral every 4 hours PRN agitation or aggression  gabapentin 300 milliGRAM(s) Oral four times a day PRN anxiety  haloperidol     Tablet 5 milliGRAM(s) Oral every 6 hours PRN agitation  haloperidol    Injectable 5 milliGRAM(s) IntraMuscular once PRN agitation  melatonin. 5 milliGRAM(s) Oral at bedtime PRN Insomnia  OLANZapine Injectable 5 milliGRAM(s) IntraMuscular every 12 hours PRN psychosis  OLANZapine Injectable 5 milliGRAM(s) IntraMuscular every 12 hours PRN psychosis  polyethylene glycol 3350 17 Gram(s) Oral daily PRN constipation

## 2021-09-30 NOTE — BH INPATIENT PSYCHIATRY PROGRESS NOTE - OTHER
responding to internal stimuli and internally preoccupied minimally cooperative Abnormal gait,  uses walker

## 2021-09-30 NOTE — CHART NOTE - NSCHARTNOTEFT_GEN_A_CORE
Writer received voicemail from patient's mother, Tammy Edwards ([phone]), requesting call back.  Pt has not given HIPPA consent for treatment team to speak with his mother at this time.  Writer gave courtesy call back and informed her writer cannot confirm or deny whether patient is hospitalized, but she can share information  She spoke to writer about patient's hx of stage 4 colon cancer which is currently in remission, how he had an ileostomy reversal in 12/2020 and spent 4 months in [facility] and on 3/30/21 he returned to his residence.  She reports she accompanied patient to an appointment with his surgical oncologist, Dr. Arroyo ([phone]) in June 2021 and that MD recommended tests and blood work to be done and for patient to follow-up with his oncologist with results.  She reports his previous oncologist Dr. Diaz transferred his care to Dr. Reilly at the [facility] in [place] ([phone]), but patient never saw Dr. Reilly and she is concerned that patient needs the tests done that his surgical oncologist recommended.      Called surgical oncologist office and was told Dr. Arroyo no longer practices there, but they were able to access patient's records.  At patient's last appointment on 6/2/21, patient was recommended to have bloodwork done of carcinoembryonic antigen, CMP and A1C as well as CT abdomen, pelvis and chest with IV contrast and Xray Hypaque.    Spoke with hospitalist, Dr. Patel, advised him of above and he reviewed patient's current bloodwork.  He recommended order CMP and CEA with weekly clozapine CBC + diff and for patient to follow-up on an outpatient basis for the CT and Xray as it is non urgent at this time and patient will also need follow-up with oncologist on outpatient basis.

## 2021-09-30 NOTE — BH INPATIENT PSYCHIATRY PROGRESS NOTE - NSBHCHARTREVIEWVS_PSY_A_CORE FT
Vital Signs Last 24 Hrs  T(C): 36.4 (09-30-21 @ 08:06), Max: 36.9 (09-29-21 @ 18:50)  T(F): 97.5 (09-30-21 @ 08:06), Max: 98.5 (09-29-21 @ 18:50)  HR: --  BP: --  BP(mean): --  RR: --  SpO2: --    Orthostatic VS  09-30-21 @ 08:06  Lying BP: --/-- HR: --  Sitting BP: 113/68 HR: 69  Standing BP: 101/72 HR: 79  Site: --  Mode: --  Orthostatic VS  09-29-21 @ 20:45  Lying BP: --/-- HR: --  Sitting BP: 122/69 HR: 85  Standing BP: 109/70 HR: 93  Site: --  Mode: --  Orthostatic VS  09-29-21 @ 08:00  Lying BP: --/-- HR: --  Sitting BP: 115/73 HR: 89  Standing BP: 102/68 HR: 89  Site: --  Mode: --  Orthostatic VS  09-28-21 @ 20:21  Lying BP: --/-- HR: --  Sitting BP: 121/64 HR: 99  Standing BP: 104/50 HR: 105  Site: --  Mode: --

## 2021-09-30 NOTE — BH INPATIENT PSYCHIATRY PROGRESS NOTE - NSBHASSESSSUMMFT_PSY_ALL_CORE
Patient selectively refusing medication, remains disorganized, labile, responding to internal stimuli, minimally cooperative with interview    -clozapine changed clozpine ODT 62.5mg PO BID and Risperdal M-tab titrated to 1mg PO daily and 4mg PO at bedtime and ordered mouth checks, MOO granted yesterday, will give Zyprexa 5mg IM if patient refuses Risperdal and/or Clozaril standing medication  -group and milieu therapy  -routine checks  -CBC + diff on 10/4/21

## 2021-09-30 NOTE — BH INPATIENT PSYCHIATRY PROGRESS NOTE - PRN MEDS
MEDICATIONS  (PRN):  acetaminophen   Tablet .. 650 milliGRAM(s) Oral every 6 hours PRN Mild Pain (1 - 3), Moderate Pain (4 - 6)  chlorproMAZINE    Injectable 100 milliGRAM(s) IntraMuscular once PRN agitation or aggression  chlorproMAZINE    Tablet 100 milliGRAM(s) Oral every 4 hours PRN agitation or aggression  gabapentin 300 milliGRAM(s) Oral four times a day PRN anxiety  haloperidol     Tablet 5 milliGRAM(s) Oral every 6 hours PRN agitation  haloperidol    Injectable 5 milliGRAM(s) IntraMuscular once PRN agitation  melatonin. 5 milliGRAM(s) Oral at bedtime PRN Insomnia  OLANZapine Injectable 5 milliGRAM(s) IntraMuscular every 12 hours PRN psychosis  OLANZapine Injectable 5 milliGRAM(s) IntraMuscular every 12 hours PRN psychosis  polyethylene glycol 3350 17 Gram(s) Oral daily PRN constipation

## 2021-09-30 NOTE — BH INPATIENT PSYCHIATRY PROGRESS NOTE - NSBHFUPINTERVALHXFT_PSY_A_CORE
Pt compliant with medication and tolerating it well.  Chart reviewed and case discussed with treatment team.  Per report, patient received IM medication last night as he was wandering into other peers rooms, unable to be redirected.  Pt on interview, remains disorganized, less irritable than previous days.  Pt reports that he sucks on the Clozaril and it makes marbles.  Pt then takes 1.5 small tablets of medication out of his pocket and says it is marbles.  Pt empties his other pocket nothing in it and patient turns over tablets.  Pt encouraged to be compliant with medication as per MOO order and not cheek medication.  Pt then points to his walker, talks about how there is a battery pack there and mumbles on to himself.

## 2021-10-01 LAB
GLUCOSE BLDC GLUCOMTR-MCNC: 180 MG/DL — HIGH (ref 70–99)
GLUCOSE BLDC GLUCOMTR-MCNC: 261 MG/DL — HIGH (ref 70–99)

## 2021-10-01 PROCEDURE — 99232 SBSQ HOSP IP/OBS MODERATE 35: CPT

## 2021-10-01 RX ORDER — CLOZAPINE 150 MG/1
75 TABLET, ORALLY DISINTEGRATING ORAL
Refills: 0 | Status: DISCONTINUED | OUTPATIENT
Start: 2021-10-01 | End: 2021-10-04

## 2021-10-01 RX ADMIN — Medication 2 MILLIGRAM(S): at 12:36

## 2021-10-01 RX ADMIN — RISPERIDONE 4 MILLIGRAM(S): 4 TABLET ORAL at 21:14

## 2021-10-01 RX ADMIN — METFORMIN HYDROCHLORIDE 1000 MILLIGRAM(S): 850 TABLET ORAL at 21:15

## 2021-10-01 RX ADMIN — ATORVASTATIN CALCIUM 40 MILLIGRAM(S): 80 TABLET, FILM COATED ORAL at 21:15

## 2021-10-01 RX ADMIN — CLOZAPINE 75 MILLIGRAM(S): 150 TABLET, ORALLY DISINTEGRATING ORAL at 21:14

## 2021-10-01 RX ADMIN — SENNA PLUS 2 TABLET(S): 8.6 TABLET ORAL at 21:15

## 2021-10-01 RX ADMIN — RISPERIDONE 1 MILLIGRAM(S): 4 TABLET ORAL at 08:27

## 2021-10-01 RX ADMIN — CLOZAPINE 62.5 MILLIGRAM(S): 150 TABLET, ORALLY DISINTEGRATING ORAL at 08:30

## 2021-10-01 RX ADMIN — RISPERIDONE 4 MILLIGRAM(S): 4 TABLET ORAL at 08:27

## 2021-10-01 RX ADMIN — Medication 100 MILLIGRAM(S): at 12:36

## 2021-10-01 NOTE — BH INPATIENT PSYCHIATRY PROGRESS NOTE - NSBHMETABOLIC_PSY_ALL_CORE_FT
BMI: BMI (kg/m2): 33.1 (09-04-21 @ 07:48)  HbA1c: A1C with Estimated Average Glucose Result: 7.3 % (08-05-21 @ 10:24)    Glucose: POCT Blood Glucose.: 171 mg/dL (09-30-21 @ 20:58)    BP: --  Lipid Panel: Date/Time: 08-05-21 @ 10:24  Cholesterol, Serum: 139  Direct LDL: --  HDL Cholesterol, Serum: 42  Total Cholesterol/HDL Ration Measurement: --  Triglycerides, Serum: 234

## 2021-10-01 NOTE — BH INPATIENT PSYCHIATRY PROGRESS NOTE - CURRENT MEDICATION
MEDICATIONS  (STANDING):  acetaminophen   Tablet .. 650 milliGRAM(s) Oral once  atorvastatin 40 milliGRAM(s) Oral at bedtime  cloZAPine Disintegrating Tablet 75 milliGRAM(s) Oral two times a day  dextrose 40% Gel 15 Gram(s) Oral once  glucagon  Injectable 1 milliGRAM(s) IntraMuscular once  insulin lispro (ADMELOG) corrective regimen sliding scale   SubCutaneous three times a day before meals  insulin lispro (ADMELOG) corrective regimen sliding scale   SubCutaneous at bedtime  metFORMIN 1000 milliGRAM(s) Oral at bedtime  risperiDONE  Disintegrating Tablet 1 milliGRAM(s) Oral daily  risperiDONE  Disintegrating Tablet 4 milliGRAM(s) Oral two times a day  senna 2 Tablet(s) Oral at bedtime  sitaGLIPtin 100 milliGRAM(s) Oral daily    MEDICATIONS  (PRN):  acetaminophen   Tablet .. 650 milliGRAM(s) Oral every 6 hours PRN Mild Pain (1 - 3), Moderate Pain (4 - 6)  chlorproMAZINE    Injectable 100 milliGRAM(s) IntraMuscular once PRN agitation or aggression  chlorproMAZINE    Injectable 100 milliGRAM(s) IntraMuscular once PRN agitation or aggression  chlorproMAZINE    Tablet 100 milliGRAM(s) Oral every 4 hours PRN agitation or aggression  gabapentin 300 milliGRAM(s) Oral four times a day PRN anxiety  haloperidol     Tablet 5 milliGRAM(s) Oral every 6 hours PRN agitation  haloperidol    Injectable 5 milliGRAM(s) IntraMuscular once PRN agitation  LORazepam   Injectable 2 milliGRAM(s) IntraMuscular once PRN agitation  LORazepam   Injectable 2 milliGRAM(s) IntraMuscular once PRN agitation  melatonin. 5 milliGRAM(s) Oral at bedtime PRN Insomnia  OLANZapine Injectable 5 milliGRAM(s) IntraMuscular every 12 hours PRN psychosis  OLANZapine Injectable 5 milliGRAM(s) IntraMuscular every 12 hours PRN psychosis  polyethylene glycol 3350 17 Gram(s) Oral daily PRN constipation

## 2021-10-01 NOTE — BH INPATIENT PSYCHIATRY PROGRESS NOTE - NSBHFUPINTERVALHXFT_PSY_A_CORE
Pt compliant with medication and tolerating it well.  Chart reviewed and case discussed with treatment team.  Per RN, patient scratched RN while administering AM medication.  Pt more irritable upon approach today, patient reports when the RN was giving medication "she was giving too many at one time, you can't mix the medication."  Pt agrees to titrate clozapine to 75mg PO BID.  Pt then walks away, talking to himself, then comes back, reports he is being discharged today, then when told he is not, he reports, "you can discharge a bullet out of a gun."  Then walks away angrily, saying, "you are talking over me."  Pt later seen in the hallway, irritable again, saying to writer, "you don't have a heart," and calling writer names like "Dr. Sebastian."

## 2021-10-01 NOTE — BH INPATIENT PSYCHIATRY PROGRESS NOTE - NSBHASSESSSUMMFT_PSY_ALL_CORE
Patient selectively refusing medication, remains disorganized, labile, responding to internal stimuli, minimally cooperative with interview    -clozapine changed clozpine ODT and titrated to 75mg PO BID and Risperdal M-tab titrated to 1mg PO daily and 4mg PO at bedtime and ordered mouth checks, MOO granted yesterday, will give Zyprexa 5mg IM if patient refuses Risperdal and/or Clozaril standing medication  -group and milieu therapy  -routine checks  -CBC + diff on 10/4/21

## 2021-10-01 NOTE — BH INPATIENT PSYCHIATRY PROGRESS NOTE - NSBHCHARTREVIEWVS_PSY_A_CORE FT
Vital Signs Last 24 Hrs  T(C): 36.6 (10-01-21 @ 07:54), Max: 36.6 (10-01-21 @ 07:54)  T(F): 97.9 (10-01-21 @ 07:54), Max: 97.9 (10-01-21 @ 07:54)  HR: --  BP: --  BP(mean): --  RR: 16 (09-30-21 @ 20:45) (16 - 16)  SpO2: --    Orthostatic VS  10-01-21 @ 07:54  Lying BP: --/-- HR: --  Sitting BP: 114/64 HR: 96  Standing BP: 113/61 HR: 90  Site: --  Mode: --  Orthostatic VS  09-30-21 @ 20:45  Lying BP: --/-- HR: --  Sitting BP: 133/86 HR: 94  Standing BP: 128/89 HR: 103  Site: --  Mode: --  Orthostatic VS  09-30-21 @ 08:06  Lying BP: --/-- HR: --  Sitting BP: 113/68 HR: 69  Standing BP: 101/72 HR: 79  Site: --  Mode: --  Orthostatic VS  09-29-21 @ 20:45  Lying BP: --/-- HR: --  Sitting BP: 122/69 HR: 85  Standing BP: 109/70 HR: 93  Site: --  Mode: --

## 2021-10-01 NOTE — BH INPATIENT PSYCHIATRY PROGRESS NOTE - PRN MEDS
MEDICATIONS  (PRN):  acetaminophen   Tablet .. 650 milliGRAM(s) Oral every 6 hours PRN Mild Pain (1 - 3), Moderate Pain (4 - 6)  chlorproMAZINE    Injectable 100 milliGRAM(s) IntraMuscular once PRN agitation or aggression  chlorproMAZINE    Injectable 100 milliGRAM(s) IntraMuscular once PRN agitation or aggression  chlorproMAZINE    Tablet 100 milliGRAM(s) Oral every 4 hours PRN agitation or aggression  gabapentin 300 milliGRAM(s) Oral four times a day PRN anxiety  haloperidol     Tablet 5 milliGRAM(s) Oral every 6 hours PRN agitation  haloperidol    Injectable 5 milliGRAM(s) IntraMuscular once PRN agitation  LORazepam   Injectable 2 milliGRAM(s) IntraMuscular once PRN agitation  LORazepam   Injectable 2 milliGRAM(s) IntraMuscular once PRN agitation  melatonin. 5 milliGRAM(s) Oral at bedtime PRN Insomnia  OLANZapine Injectable 5 milliGRAM(s) IntraMuscular every 12 hours PRN psychosis  OLANZapine Injectable 5 milliGRAM(s) IntraMuscular every 12 hours PRN psychosis  polyethylene glycol 3350 17 Gram(s) Oral daily PRN constipation

## 2021-10-02 LAB
GLUCOSE BLDC GLUCOMTR-MCNC: 204 MG/DL — HIGH (ref 70–99)
GLUCOSE BLDC GLUCOMTR-MCNC: 229 MG/DL — HIGH (ref 70–99)
GLUCOSE BLDC GLUCOMTR-MCNC: 231 MG/DL — HIGH (ref 70–99)
GLUCOSE BLDC GLUCOMTR-MCNC: 282 MG/DL — HIGH (ref 70–99)

## 2021-10-02 RX ORDER — CHLORPROMAZINE HCL 10 MG
100 TABLET ORAL ONCE
Refills: 0 | Status: DISCONTINUED | OUTPATIENT
Start: 2021-10-02 | End: 2021-10-04

## 2021-10-02 RX ADMIN — Medication 100 MILLIGRAM(S): at 05:30

## 2021-10-02 RX ADMIN — OLANZAPINE 5 MILLIGRAM(S): 15 TABLET, FILM COATED ORAL at 10:13

## 2021-10-02 RX ADMIN — OLANZAPINE 5 MILLIGRAM(S): 15 TABLET, FILM COATED ORAL at 21:50

## 2021-10-02 RX ADMIN — HALOPERIDOL DECANOATE 5 MILLIGRAM(S): 100 INJECTION INTRAMUSCULAR at 05:30

## 2021-10-03 LAB
GLUCOSE BLDC GLUCOMTR-MCNC: 179 MG/DL — HIGH (ref 70–99)
GLUCOSE BLDC GLUCOMTR-MCNC: 203 MG/DL — HIGH (ref 70–99)
GLUCOSE BLDC GLUCOMTR-MCNC: 218 MG/DL — HIGH (ref 70–99)
GLUCOSE BLDC GLUCOMTR-MCNC: 234 MG/DL — HIGH (ref 70–99)

## 2021-10-03 RX ADMIN — RISPERIDONE 4 MILLIGRAM(S): 4 TABLET ORAL at 09:27

## 2021-10-03 RX ADMIN — Medication 650 MILLIGRAM(S): at 02:45

## 2021-10-03 RX ADMIN — OLANZAPINE 5 MILLIGRAM(S): 15 TABLET, FILM COATED ORAL at 21:50

## 2021-10-03 RX ADMIN — CLOZAPINE 75 MILLIGRAM(S): 150 TABLET, ORALLY DISINTEGRATING ORAL at 09:28

## 2021-10-03 RX ADMIN — RISPERIDONE 1 MILLIGRAM(S): 4 TABLET ORAL at 09:27

## 2021-10-04 LAB
ALBUMIN SERPL ELPH-MCNC: 4.1 G/DL — SIGNIFICANT CHANGE UP (ref 3.3–5)
ALP SERPL-CCNC: 71 U/L — SIGNIFICANT CHANGE UP (ref 40–120)
ALT FLD-CCNC: 33 U/L — SIGNIFICANT CHANGE UP (ref 4–41)
ANION GAP SERPL CALC-SCNC: 13 MMOL/L — SIGNIFICANT CHANGE UP (ref 7–14)
AST SERPL-CCNC: 30 U/L — SIGNIFICANT CHANGE UP (ref 4–40)
BASOPHILS # BLD AUTO: 0.07 K/UL — SIGNIFICANT CHANGE UP (ref 0–0.2)
BASOPHILS NFR BLD AUTO: 0.5 % — SIGNIFICANT CHANGE UP (ref 0–2)
BILIRUB SERPL-MCNC: 0.3 MG/DL — SIGNIFICANT CHANGE UP (ref 0.2–1.2)
BUN SERPL-MCNC: 20 MG/DL — SIGNIFICANT CHANGE UP (ref 7–23)
CALCIUM SERPL-MCNC: 9.3 MG/DL — SIGNIFICANT CHANGE UP (ref 8.4–10.5)
CEA SERPL-MCNC: 3.1 NG/ML — SIGNIFICANT CHANGE UP (ref 1–3.8)
CHLORIDE SERPL-SCNC: 99 MMOL/L — SIGNIFICANT CHANGE UP (ref 98–107)
CO2 SERPL-SCNC: 26 MMOL/L — SIGNIFICANT CHANGE UP (ref 22–31)
CREAT SERPL-MCNC: 0.69 MG/DL — SIGNIFICANT CHANGE UP (ref 0.5–1.3)
EOSINOPHIL # BLD AUTO: 0.24 K/UL — SIGNIFICANT CHANGE UP (ref 0–0.5)
EOSINOPHIL NFR BLD AUTO: 1.8 % — SIGNIFICANT CHANGE UP (ref 0–6)
GLUCOSE BLDC GLUCOMTR-MCNC: 159 MG/DL — HIGH (ref 70–99)
GLUCOSE BLDC GLUCOMTR-MCNC: 173 MG/DL — HIGH (ref 70–99)
GLUCOSE SERPL-MCNC: 182 MG/DL — HIGH (ref 70–99)
HCT VFR BLD CALC: 41.2 % — SIGNIFICANT CHANGE UP (ref 39–50)
HGB BLD-MCNC: 13.4 G/DL — SIGNIFICANT CHANGE UP (ref 13–17)
IANC: 9.36 K/UL — HIGH (ref 1.5–8.5)
IMM GRANULOCYTES NFR BLD AUTO: 0.7 % — SIGNIFICANT CHANGE UP (ref 0–1.5)
LYMPHOCYTES # BLD AUTO: 19.9 % — SIGNIFICANT CHANGE UP (ref 13–44)
LYMPHOCYTES # BLD AUTO: 2.69 K/UL — SIGNIFICANT CHANGE UP (ref 1–3.3)
MCHC RBC-ENTMCNC: 27.2 PG — SIGNIFICANT CHANGE UP (ref 27–34)
MCHC RBC-ENTMCNC: 32.5 GM/DL — SIGNIFICANT CHANGE UP (ref 32–36)
MCV RBC AUTO: 83.6 FL — SIGNIFICANT CHANGE UP (ref 80–100)
MONOCYTES # BLD AUTO: 1.06 K/UL — HIGH (ref 0–0.9)
MONOCYTES NFR BLD AUTO: 7.8 % — SIGNIFICANT CHANGE UP (ref 2–14)
NEUTROPHILS # BLD AUTO: 9.36 K/UL — HIGH (ref 1.8–7.4)
NEUTROPHILS NFR BLD AUTO: 69.3 % — SIGNIFICANT CHANGE UP (ref 43–77)
NRBC # BLD: 0 /100 WBCS — SIGNIFICANT CHANGE UP
NRBC # FLD: 0 K/UL — SIGNIFICANT CHANGE UP
PLATELET # BLD AUTO: 206 K/UL — SIGNIFICANT CHANGE UP (ref 150–400)
POTASSIUM SERPL-MCNC: 4.1 MMOL/L — SIGNIFICANT CHANGE UP (ref 3.5–5.3)
POTASSIUM SERPL-SCNC: 4.1 MMOL/L — SIGNIFICANT CHANGE UP (ref 3.5–5.3)
PROT SERPL-MCNC: 7.1 G/DL — SIGNIFICANT CHANGE UP (ref 6–8.3)
RBC # BLD: 4.93 M/UL — SIGNIFICANT CHANGE UP (ref 4.2–5.8)
RBC # FLD: 14.6 % — HIGH (ref 10.3–14.5)
SODIUM SERPL-SCNC: 138 MMOL/L — SIGNIFICANT CHANGE UP (ref 135–145)
WBC # BLD: 13.51 K/UL — HIGH (ref 3.8–10.5)
WBC # FLD AUTO: 13.51 K/UL — HIGH (ref 3.8–10.5)

## 2021-10-04 PROCEDURE — 99232 SBSQ HOSP IP/OBS MODERATE 35: CPT

## 2021-10-04 RX ORDER — DIPHENHYDRAMINE HCL 50 MG
25 CAPSULE ORAL EVERY 12 HOURS
Refills: 0 | Status: DISCONTINUED | OUTPATIENT
Start: 2021-10-04 | End: 2021-11-30

## 2021-10-04 RX ORDER — RISPERIDONE 4 MG/1
4 TABLET ORAL AT BEDTIME
Refills: 0 | Status: DISCONTINUED | OUTPATIENT
Start: 2021-10-05 | End: 2021-10-20

## 2021-10-04 RX ORDER — HALOPERIDOL DECANOATE 100 MG/ML
5 INJECTION INTRAMUSCULAR EVERY 12 HOURS
Refills: 0 | Status: DISCONTINUED | OUTPATIENT
Start: 2021-10-04 | End: 2021-11-30

## 2021-10-04 RX ORDER — RISPERIDONE 4 MG/1
1 TABLET ORAL DAILY
Refills: 0 | Status: DISCONTINUED | OUTPATIENT
Start: 2021-10-05 | End: 2021-10-08

## 2021-10-04 RX ORDER — CLOZAPINE 150 MG/1
75 TABLET, ORALLY DISINTEGRATING ORAL
Refills: 0 | Status: DISCONTINUED | OUTPATIENT
Start: 2021-10-04 | End: 2021-10-08

## 2021-10-04 RX ADMIN — RISPERIDONE 4 MILLIGRAM(S): 4 TABLET ORAL at 11:02

## 2021-10-04 RX ADMIN — RISPERIDONE 1 MILLIGRAM(S): 4 TABLET ORAL at 11:02

## 2021-10-04 RX ADMIN — CLOZAPINE 75 MILLIGRAM(S): 150 TABLET, ORALLY DISINTEGRATING ORAL at 11:01

## 2021-10-04 RX ADMIN — Medication 25 MILLIGRAM(S): at 22:09

## 2021-10-04 RX ADMIN — HALOPERIDOL DECANOATE 5 MILLIGRAM(S): 100 INJECTION INTRAMUSCULAR at 22:09

## 2021-10-04 NOTE — BH INPATIENT PSYCHIATRY PROGRESS NOTE - CURRENT MEDICATION
MEDICATIONS  (STANDING):  acetaminophen   Tablet .. 650 milliGRAM(s) Oral once  atorvastatin 40 milliGRAM(s) Oral at bedtime  cloZAPine Disintegrating Tablet 75 milliGRAM(s) Oral two times a day  dextrose 40% Gel 15 Gram(s) Oral once  glucagon  Injectable 1 milliGRAM(s) IntraMuscular once  insulin lispro (ADMELOG) corrective regimen sliding scale   SubCutaneous three times a day before meals  insulin lispro (ADMELOG) corrective regimen sliding scale   SubCutaneous at bedtime  metFORMIN 1000 milliGRAM(s) Oral at bedtime  risperiDONE  Disintegrating Tablet 1 milliGRAM(s) Oral daily  risperiDONE  Disintegrating Tablet 4 milliGRAM(s) Oral two times a day  senna 2 Tablet(s) Oral at bedtime  sitaGLIPtin 100 milliGRAM(s) Oral daily    MEDICATIONS  (PRN):  acetaminophen   Tablet .. 650 milliGRAM(s) Oral every 6 hours PRN Mild Pain (1 - 3), Moderate Pain (4 - 6)  chlorproMAZINE    Injectable 100 milliGRAM(s) IntraMuscular once PRN agitation or aggression  chlorproMAZINE    Injectable 100 milliGRAM(s) IntraMuscular once PRN agitation or aggression  chlorproMAZINE    Tablet 100 milliGRAM(s) Oral every 4 hours PRN agitation or aggression  gabapentin 300 milliGRAM(s) Oral four times a day PRN anxiety  haloperidol     Tablet 5 milliGRAM(s) Oral every 6 hours PRN agitation  haloperidol    Injectable 5 milliGRAM(s) IntraMuscular once PRN agitation  LORazepam   Injectable 2 milliGRAM(s) IntraMuscular once PRN agitation  LORazepam   Injectable 2 milliGRAM(s) IntraMuscular once PRN agitation  melatonin. 5 milliGRAM(s) Oral at bedtime PRN Insomnia  OLANZapine Injectable 5 milliGRAM(s) IntraMuscular every 12 hours PRN psychosis  OLANZapine Injectable 5 milliGRAM(s) IntraMuscular every 12 hours PRN psychosis  polyethylene glycol 3350 17 Gram(s) Oral daily PRN constipation   MEDICATIONS  (STANDING):  acetaminophen   Tablet .. 650 milliGRAM(s) Oral once  atorvastatin 40 milliGRAM(s) Oral at bedtime  dextrose 40% Gel 15 Gram(s) Oral once  glucagon  Injectable 1 milliGRAM(s) IntraMuscular once  insulin lispro (ADMELOG) corrective regimen sliding scale   SubCutaneous three times a day before meals  insulin lispro (ADMELOG) corrective regimen sliding scale   SubCutaneous at bedtime  metFORMIN 1000 milliGRAM(s) Oral at bedtime  senna 2 Tablet(s) Oral at bedtime  sitaGLIPtin 100 milliGRAM(s) Oral daily    MEDICATIONS  (PRN):  acetaminophen   Tablet .. 650 milliGRAM(s) Oral every 6 hours PRN Mild Pain (1 - 3), Moderate Pain (4 - 6)  chlorproMAZINE    Injectable 100 milliGRAM(s) IntraMuscular once PRN agitation or aggression  chlorproMAZINE    Tablet 100 milliGRAM(s) Oral every 4 hours PRN agitation or aggression  diphenhydrAMINE   Injectable 25 milliGRAM(s) IntraMuscular every 12 hours PRN EPS ppx  diphenhydrAMINE   Injectable 25 milliGRAM(s) IntraMuscular every 12 hours PRN EPS ppx  gabapentin 300 milliGRAM(s) Oral four times a day PRN anxiety  haloperidol     Tablet 5 milliGRAM(s) Oral every 6 hours PRN agitation  haloperidol    Injectable 5 milliGRAM(s) IntraMuscular every 12 hours PRN psychosis  haloperidol    Injectable 5 milliGRAM(s) IntraMuscular every 12 hours PRN psychosis  LORazepam   Injectable 2 milliGRAM(s) IntraMuscular once PRN agitation  melatonin. 5 milliGRAM(s) Oral at bedtime PRN Insomnia  polyethylene glycol 3350 17 Gram(s) Oral daily PRN constipation

## 2021-10-04 NOTE — BH INPATIENT PSYCHIATRY PROGRESS NOTE - NSBHMETABOLIC_PSY_ALL_CORE_FT
BMI: BMI (kg/m2): 33.1 (09-04-21 @ 07:48)  HbA1c: A1C with Estimated Average Glucose Result: 7.3 % (08-05-21 @ 10:24)    Glucose: POCT Blood Glucose.: 173 mg/dL (10-04-21 @ 08:20)    BP: 125/82 (10-03-21 @ 07:52) (125/82 - 125/82)  Lipid Panel: Date/Time: 08-05-21 @ 10:24  Cholesterol, Serum: 139  Direct LDL: --  HDL Cholesterol, Serum: 42  Total Cholesterol/HDL Ration Measurement: --  Triglycerides, Serum: 234   BMI: BMI (kg/m2): 33.1 (09-04-21 @ 07:48)  HbA1c: A1C with Estimated Average Glucose Result: 7.3 % (08-05-21 @ 10:24)    Glucose: POCT Blood Glucose.: 159 mg/dL (10-04-21 @ 12:01)    BP: 125/82 (10-03-21 @ 07:52) (125/82 - 125/82)  Lipid Panel: Date/Time: 08-05-21 @ 10:24  Cholesterol, Serum: 139  Direct LDL: --  HDL Cholesterol, Serum: 42  Total Cholesterol/HDL Ration Measurement: --  Triglycerides, Serum: 234

## 2021-10-04 NOTE — BH INPATIENT PSYCHIATRY PROGRESS NOTE - PRN MEDS
MEDICATIONS  (PRN):  acetaminophen   Tablet .. 650 milliGRAM(s) Oral every 6 hours PRN Mild Pain (1 - 3), Moderate Pain (4 - 6)  chlorproMAZINE    Injectable 100 milliGRAM(s) IntraMuscular once PRN agitation or aggression  chlorproMAZINE    Injectable 100 milliGRAM(s) IntraMuscular once PRN agitation or aggression  chlorproMAZINE    Tablet 100 milliGRAM(s) Oral every 4 hours PRN agitation or aggression  gabapentin 300 milliGRAM(s) Oral four times a day PRN anxiety  haloperidol     Tablet 5 milliGRAM(s) Oral every 6 hours PRN agitation  haloperidol    Injectable 5 milliGRAM(s) IntraMuscular once PRN agitation  LORazepam   Injectable 2 milliGRAM(s) IntraMuscular once PRN agitation  LORazepam   Injectable 2 milliGRAM(s) IntraMuscular once PRN agitation  melatonin. 5 milliGRAM(s) Oral at bedtime PRN Insomnia  OLANZapine Injectable 5 milliGRAM(s) IntraMuscular every 12 hours PRN psychosis  OLANZapine Injectable 5 milliGRAM(s) IntraMuscular every 12 hours PRN psychosis  polyethylene glycol 3350 17 Gram(s) Oral daily PRN constipation   MEDICATIONS  (PRN):  acetaminophen   Tablet .. 650 milliGRAM(s) Oral every 6 hours PRN Mild Pain (1 - 3), Moderate Pain (4 - 6)  chlorproMAZINE    Injectable 100 milliGRAM(s) IntraMuscular once PRN agitation or aggression  chlorproMAZINE    Tablet 100 milliGRAM(s) Oral every 4 hours PRN agitation or aggression  diphenhydrAMINE   Injectable 25 milliGRAM(s) IntraMuscular every 12 hours PRN EPS ppx  diphenhydrAMINE   Injectable 25 milliGRAM(s) IntraMuscular every 12 hours PRN EPS ppx  gabapentin 300 milliGRAM(s) Oral four times a day PRN anxiety  haloperidol     Tablet 5 milliGRAM(s) Oral every 6 hours PRN agitation  haloperidol    Injectable 5 milliGRAM(s) IntraMuscular every 12 hours PRN psychosis  haloperidol    Injectable 5 milliGRAM(s) IntraMuscular every 12 hours PRN psychosis  LORazepam   Injectable 2 milliGRAM(s) IntraMuscular once PRN agitation  melatonin. 5 milliGRAM(s) Oral at bedtime PRN Insomnia  polyethylene glycol 3350 17 Gram(s) Oral daily PRN constipation

## 2021-10-04 NOTE — BH INPATIENT PSYCHIATRY PROGRESS NOTE - NSBHFUPINTERVALHXFT_PSY_A_CORE
Pt refused medication last night, received Zyprexa 5mg IM as per [facility] court order.  Pt initially refused medication this morning, then took with encouragement.  Chart reviewed and case discussed with treatment team.  No other events reported overnight.  Pt more cooperative and less irritable today, greets writer.  Pt agrees to take medication, understands he will receive Zyprexa 5mg IM as per [facility] court order if he refuses Risperdal and/or Clozaril.  Pt remains disorganized with flight of ideas, talks about "how Clozaril works, I suck the juice out of it and then it becomes a piece of marble," then talks about kidney stones.  Pt denies cheeking the Clozaril.  Pt asked if treatment team can speak with his mother, patient gives verbal consent, then reports, "she is here on a ship."  Pt then reports he has took too much time and ends interview.

## 2021-10-04 NOTE — BH INPATIENT PSYCHIATRY PROGRESS NOTE - OTHER
Abnormal gait,  uses walker responding to internal stimuli and internally preoccupied less irritable partially cooperative

## 2021-10-04 NOTE — BH INPATIENT PSYCHIATRY PROGRESS NOTE - NSBHASSESSSUMMFT_PSY_ALL_CORE
Patient selectively refusing medication, remains disorganized, labile, responding to internal stimuli, minimally cooperative with interview    -clozapine changed clozpine ODT and titrated to 75mg PO BID and Risperdal M-tab titrated to 1mg PO daily and 4mg PO at bedtime and ordered mouth checks, [facility] granted yesterday, will give Zyprexa 5mg IM if patient refuses Risperdal and/or Clozaril standing medication  -group and milieu therapy  -routine checks  -CBC + diff on 10/4/21       Patient selectively refusing medication, remains disorganized, labile, responding to internal stimuli, minimally cooperative with interview    -clozapine changed clozpine ODT and titrated to 75mg PO BID and Risperdal M-tab titrated to 1mg PO daily and 4mg PO at bedtime and ordered mouth checks, [facility] granted yesterday, will give Zyprexa 5mg IM if patient refuses Risperdal and/or Clozaril standing medication  -group and milieu therapy  -routine checks  -CBC + diff on 10/4/21, discussed results with hospitalist, Dr. Newell, who recommended repeat in a few days, no acute concerns       Patient selectively refusing medication, remains disorganized, labile, responding to internal stimuli, minimally cooperative with interview    -clozapine changed clozpine ODT and titrated to 75mg PO BID and Risperdal M-tab titrated to 1mg PO daily and 4mg PO at bedtime and ordered mouth checks, M[facility] granted on 9/28/21, will change back up medication to Haldol 5mg IM and Benadryl 25mg IM if patient refuses Risperdal and/or Clozaril standing medication as per [facility] court order  -group and milieu therapy  -routine checks  -CBC + diff on 10/4/21, discussed results with hospitalist, Dr. Newell, who recommended repeat in a few days, no acute concerns

## 2021-10-04 NOTE — BH INPATIENT PSYCHIATRY PROGRESS NOTE - NSBHCHARTREVIEWVS_PSY_A_CORE FT
Vital Signs Last 24 Hrs  T(C): 35.8 (10-04-21 @ 07:57), Max: 36.2 (10-03-21 @ 19:09)  T(F): 96.5 (10-04-21 @ 07:57), Max: 97.1 (10-03-21 @ 19:09)  HR: --  BP: --  BP(mean): --  RR: --  SpO2: --    Orthostatic VS  10-04-21 @ 07:57  Lying BP: --/-- HR: --  Sitting BP: 104/76 HR: 90  Standing BP: 110/73 HR: 95  Site: upper left arm  Mode: electronic  Orthostatic VS  10-03-21 @ 19:09  Lying BP: --/-- HR: --  Sitting BP: 128/74 HR: 100  Standing BP: 133/79 HR: 106  Site: upper right arm  Mode: electronic  Orthostatic VS  10-02-21 @ 19:06  Lying BP: --/-- HR: --  Sitting BP: 110/70 HR: 111  Standing BP: 110/79 HR: 112  Site: upper right arm  Mode: electronic

## 2021-10-05 LAB
GLUCOSE BLDC GLUCOMTR-MCNC: 156 MG/DL — HIGH (ref 70–99)
GLUCOSE BLDC GLUCOMTR-MCNC: 170 MG/DL — HIGH (ref 70–99)
GLUCOSE BLDC GLUCOMTR-MCNC: 275 MG/DL — HIGH (ref 70–99)

## 2021-10-05 PROCEDURE — 99232 SBSQ HOSP IP/OBS MODERATE 35: CPT

## 2021-10-05 RX ORDER — CHLORPROMAZINE HCL 10 MG
100 TABLET ORAL ONCE
Refills: 0 | Status: DISCONTINUED | OUTPATIENT
Start: 2021-10-05 | End: 2021-10-05

## 2021-10-05 RX ADMIN — Medication 5 MILLIGRAM(S): at 22:55

## 2021-10-05 RX ADMIN — RISPERIDONE 1 MILLIGRAM(S): 4 TABLET ORAL at 11:08

## 2021-10-05 RX ADMIN — Medication 100 MILLIGRAM(S): at 14:23

## 2021-10-05 RX ADMIN — CLOZAPINE 75 MILLIGRAM(S): 150 TABLET, ORALLY DISINTEGRATING ORAL at 22:55

## 2021-10-05 RX ADMIN — CLOZAPINE 75 MILLIGRAM(S): 150 TABLET, ORALLY DISINTEGRATING ORAL at 10:05

## 2021-10-05 RX ADMIN — METFORMIN HYDROCHLORIDE 1000 MILLIGRAM(S): 850 TABLET ORAL at 22:56

## 2021-10-05 RX ADMIN — RISPERIDONE 4 MILLIGRAM(S): 4 TABLET ORAL at 22:54

## 2021-10-05 RX ADMIN — ATORVASTATIN CALCIUM 40 MILLIGRAM(S): 80 TABLET, FILM COATED ORAL at 22:55

## 2021-10-05 RX ADMIN — Medication 2 MILLIGRAM(S): at 14:23

## 2021-10-05 NOTE — BH INPATIENT PSYCHIATRY PROGRESS NOTE - NSBHMETABOLIC_PSY_ALL_CORE_FT
BMI: BMI (kg/m2): 33.1 (09-04-21 @ 07:48)  HbA1c: A1C with Estimated Average Glucose Result: 7.3 % (08-05-21 @ 10:24)    Glucose: POCT Blood Glucose.: 170 mg/dL (10-05-21 @ 08:10)    BP: 125/82 (10-03-21 @ 07:52) (125/82 - 125/82)  Lipid Panel: Date/Time: 08-05-21 @ 10:24  Cholesterol, Serum: 139  Direct LDL: --  HDL Cholesterol, Serum: 42  Total Cholesterol/HDL Ration Measurement: --  Triglycerides, Serum: 234

## 2021-10-05 NOTE — BH INPATIENT PSYCHIATRY PROGRESS NOTE - NSBHCHARTREVIEWVS_PSY_A_CORE FT
Vital Signs Last 24 Hrs  T(C): 36.4 (10-05-21 @ 07:36), Max: 36.4 (10-05-21 @ 07:36)  T(F): 97.6 (10-05-21 @ 07:36), Max: 97.6 (10-05-21 @ 07:36)  HR: --  BP: --  BP(mean): --  RR: --  SpO2: --    Orthostatic VS  10-05-21 @ 07:36  Lying BP: --/-- HR: --  Sitting BP: 127/68 HR: 78  Standing BP: 120/72 HR: 80  Site: --  Mode: --  Orthostatic VS  10-04-21 @ 07:57  Lying BP: --/-- HR: --  Sitting BP: 104/76 HR: 90  Standing BP: 110/73 HR: 95  Site: upper left arm  Mode: electronic  Orthostatic VS  10-03-21 @ 19:09  Lying BP: --/-- HR: --  Sitting BP: 128/74 HR: 100  Standing BP: 133/79 HR: 106  Site: upper right arm  Mode: electronic

## 2021-10-05 NOTE — BH INPATIENT PSYCHIATRY PROGRESS NOTE - PRN MEDS
MEDICATIONS  (PRN):  acetaminophen   Tablet .. 650 milliGRAM(s) Oral every 6 hours PRN Mild Pain (1 - 3), Moderate Pain (4 - 6)  chlorproMAZINE    Injectable 100 milliGRAM(s) IntraMuscular once PRN agitation or aggression  chlorproMAZINE    Tablet 100 milliGRAM(s) Oral every 4 hours PRN agitation or aggression  diphenhydrAMINE   Injectable 25 milliGRAM(s) IntraMuscular every 12 hours PRN EPS ppx  diphenhydrAMINE   Injectable 25 milliGRAM(s) IntraMuscular every 12 hours PRN EPS ppx  gabapentin 300 milliGRAM(s) Oral four times a day PRN anxiety  haloperidol     Tablet 5 milliGRAM(s) Oral every 6 hours PRN agitation  haloperidol    Injectable 5 milliGRAM(s) IntraMuscular every 12 hours PRN psychosis  haloperidol    Injectable 5 milliGRAM(s) IntraMuscular every 12 hours PRN psychosis  LORazepam   Injectable 2 milliGRAM(s) IntraMuscular once PRN agitation  melatonin. 5 milliGRAM(s) Oral at bedtime PRN Insomnia  polyethylene glycol 3350 17 Gram(s) Oral daily PRN constipation

## 2021-10-05 NOTE — BH INPATIENT PSYCHIATRY PROGRESS NOTE - NSBHFUPINTERVALHXFT_PSY_A_CORE
Patient's chart was reviewed and his case was discussed with the treatment team. Patient refused PM medications last night and was given Benadryl 25 mg and Haldol 5 mg IM as per [facility] court order. Reported last night that patient was irritable and cursing at staff members. Patient complaint with morning medications with encouragement from staff, allowing patient to take one pill at a time. Patient stated, "I have to make sure it is what it is (medication) and you can only take one at a time otherwise it messes with your liver". Patient stated, "I can't take too much medication because if I'm too drugged up I won't be able to drive and leave from here. My car is parked outside". Patient was more cooperate during interview and less irritable, showing writer his watch and how he changes the date/time. Patient denied SI/HI, denied AH/VH and paranoia. Patient stated that he was up all night "talking to Sharath". Patient reports that he hears music in his head. Patient requested to listen to music and was informed that if he remains behavioral control he would be given special privileges such as having headphones to listen to music. Patient remains disorganized with flight of ideas. Educated patient on the importance of continued compliance with medications, otherwise he will be given Benadryl 25 mg and Haldol 5 mg IM as per [facility] court order. Patient verbalized understanding.

## 2021-10-05 NOTE — BH INPATIENT PSYCHIATRY PROGRESS NOTE - OTHER
less irritable partially cooperative Abnormal gait,  uses walker responding to internal stimuli and internally preoccupied

## 2021-10-05 NOTE — BH INPATIENT PSYCHIATRY PROGRESS NOTE - CURRENT MEDICATION
MEDICATIONS  (STANDING):  acetaminophen   Tablet .. 650 milliGRAM(s) Oral once  atorvastatin 40 milliGRAM(s) Oral at bedtime  cloZAPine Disintegrating Tablet 75 milliGRAM(s) Oral two times a day  dextrose 40% Gel 15 Gram(s) Oral once  glucagon  Injectable 1 milliGRAM(s) IntraMuscular once  insulin lispro (ADMELOG) corrective regimen sliding scale   SubCutaneous three times a day before meals  insulin lispro (ADMELOG) corrective regimen sliding scale   SubCutaneous at bedtime  metFORMIN 1000 milliGRAM(s) Oral at bedtime  risperiDONE  Disintegrating Tablet 4 milliGRAM(s) Oral at bedtime  risperiDONE  Disintegrating Tablet 1 milliGRAM(s) Oral daily  senna 2 Tablet(s) Oral at bedtime  sitaGLIPtin 100 milliGRAM(s) Oral daily    MEDICATIONS  (PRN):  acetaminophen   Tablet .. 650 milliGRAM(s) Oral every 6 hours PRN Mild Pain (1 - 3), Moderate Pain (4 - 6)  chlorproMAZINE    Injectable 100 milliGRAM(s) IntraMuscular once PRN agitation or aggression  chlorproMAZINE    Tablet 100 milliGRAM(s) Oral every 4 hours PRN agitation or aggression  diphenhydrAMINE   Injectable 25 milliGRAM(s) IntraMuscular every 12 hours PRN EPS ppx  diphenhydrAMINE   Injectable 25 milliGRAM(s) IntraMuscular every 12 hours PRN EPS ppx  gabapentin 300 milliGRAM(s) Oral four times a day PRN anxiety  haloperidol     Tablet 5 milliGRAM(s) Oral every 6 hours PRN agitation  haloperidol    Injectable 5 milliGRAM(s) IntraMuscular every 12 hours PRN psychosis  haloperidol    Injectable 5 milliGRAM(s) IntraMuscular every 12 hours PRN psychosis  LORazepam   Injectable 2 milliGRAM(s) IntraMuscular once PRN agitation  melatonin. 5 milliGRAM(s) Oral at bedtime PRN Insomnia  polyethylene glycol 3350 17 Gram(s) Oral daily PRN constipation

## 2021-10-05 NOTE — BH INPATIENT PSYCHIATRY PROGRESS NOTE - NSBHASSESSSUMMFT_PSY_ALL_CORE
Patient selectively refusing medication, remains disorganized, labile, responding to internal stimuli, minimally cooperative with interview    -clozapine changed clozpine ODT and titrated to 75mg PO BID and Risperdal M-tab titrated to 1mg PO daily and 4mg PO at bedtime and ordered mouth checks, [facility] granted on 9/28/21, will change back up medication to Haldol 5mg IM and Benadryl 25mg IM if patient refuses Risperdal and/or Clozaril standing medication as per [facility] court order  -group and milieu therapy  -routine checks  -CBC + diff on 10/4/21, discussed results with hospitalist, Dr. Newell, who recommended repeat in a few days, no acute concerns

## 2021-10-06 LAB
BASOPHILS # BLD AUTO: 0.05 K/UL — SIGNIFICANT CHANGE UP (ref 0–0.2)
BASOPHILS NFR BLD AUTO: 0.5 % — SIGNIFICANT CHANGE UP (ref 0–2)
EOSINOPHIL # BLD AUTO: 0.14 K/UL — SIGNIFICANT CHANGE UP (ref 0–0.5)
EOSINOPHIL NFR BLD AUTO: 1.3 % — SIGNIFICANT CHANGE UP (ref 0–6)
GLUCOSE BLDC GLUCOMTR-MCNC: 292 MG/DL — HIGH (ref 70–99)
HCT VFR BLD CALC: 38.8 % — LOW (ref 39–50)
HGB BLD-MCNC: 12.8 G/DL — LOW (ref 13–17)
IANC: 7.65 K/UL — SIGNIFICANT CHANGE UP (ref 1.5–8.5)
IMM GRANULOCYTES NFR BLD AUTO: 0.6 % — SIGNIFICANT CHANGE UP (ref 0–1.5)
LYMPHOCYTES # BLD AUTO: 1.87 K/UL — SIGNIFICANT CHANGE UP (ref 1–3.3)
LYMPHOCYTES # BLD AUTO: 17.9 % — SIGNIFICANT CHANGE UP (ref 13–44)
MCHC RBC-ENTMCNC: 27 PG — SIGNIFICANT CHANGE UP (ref 27–34)
MCHC RBC-ENTMCNC: 33 GM/DL — SIGNIFICANT CHANGE UP (ref 32–36)
MCV RBC AUTO: 81.9 FL — SIGNIFICANT CHANGE UP (ref 80–100)
MONOCYTES # BLD AUTO: 0.69 K/UL — SIGNIFICANT CHANGE UP (ref 0–0.9)
MONOCYTES NFR BLD AUTO: 6.6 % — SIGNIFICANT CHANGE UP (ref 2–14)
NEUTROPHILS # BLD AUTO: 7.65 K/UL — HIGH (ref 1.8–7.4)
NEUTROPHILS NFR BLD AUTO: 73.1 % — SIGNIFICANT CHANGE UP (ref 43–77)
NRBC # BLD: 0 /100 WBCS — SIGNIFICANT CHANGE UP
NRBC # FLD: 0 K/UL — SIGNIFICANT CHANGE UP
PLATELET # BLD AUTO: 218 K/UL — SIGNIFICANT CHANGE UP (ref 150–400)
RBC # BLD: 4.74 M/UL — SIGNIFICANT CHANGE UP (ref 4.2–5.8)
RBC # FLD: 14.4 % — SIGNIFICANT CHANGE UP (ref 10.3–14.5)
WBC # BLD: 10.46 K/UL — SIGNIFICANT CHANGE UP (ref 3.8–10.5)
WBC # FLD AUTO: 10.46 K/UL — SIGNIFICANT CHANGE UP (ref 3.8–10.5)

## 2021-10-06 PROCEDURE — 99232 SBSQ HOSP IP/OBS MODERATE 35: CPT

## 2021-10-06 RX ADMIN — HALOPERIDOL DECANOATE 5 MILLIGRAM(S): 100 INJECTION INTRAMUSCULAR at 10:00

## 2021-10-06 RX ADMIN — Medication 25 MILLIGRAM(S): at 10:00

## 2021-10-06 RX ADMIN — Medication 3: at 08:30

## 2021-10-06 RX ADMIN — Medication 25 MILLIGRAM(S): at 21:28

## 2021-10-06 RX ADMIN — HALOPERIDOL DECANOATE 5 MILLIGRAM(S): 100 INJECTION INTRAMUSCULAR at 21:29

## 2021-10-06 NOTE — BH INPATIENT PSYCHIATRY PROGRESS NOTE - CURRENT MEDICATION
MEDICATIONS  (STANDING):  acetaminophen   Tablet .. 650 milliGRAM(s) Oral once  atorvastatin 40 milliGRAM(s) Oral at bedtime  cloZAPine Disintegrating Tablet 75 milliGRAM(s) Oral two times a day  dextrose 40% Gel 15 Gram(s) Oral once  glucagon  Injectable 1 milliGRAM(s) IntraMuscular once  insulin lispro (ADMELOG) corrective regimen sliding scale   SubCutaneous three times a day before meals  insulin lispro (ADMELOG) corrective regimen sliding scale   SubCutaneous at bedtime  metFORMIN 1000 milliGRAM(s) Oral at bedtime  risperiDONE  Disintegrating Tablet 4 milliGRAM(s) Oral at bedtime  risperiDONE  Disintegrating Tablet 1 milliGRAM(s) Oral daily  senna 2 Tablet(s) Oral at bedtime  sitaGLIPtin 100 milliGRAM(s) Oral daily    MEDICATIONS  (PRN):  acetaminophen   Tablet .. 650 milliGRAM(s) Oral every 6 hours PRN Mild Pain (1 - 3), Moderate Pain (4 - 6)  chlorproMAZINE    Injectable 100 milliGRAM(s) IntraMuscular once PRN agitation or aggression  chlorproMAZINE    Tablet 100 milliGRAM(s) Oral every 4 hours PRN agitation or aggression  diphenhydrAMINE   Injectable 25 milliGRAM(s) IntraMuscular every 12 hours PRN EPS ppx  diphenhydrAMINE   Injectable 25 milliGRAM(s) IntraMuscular every 12 hours PRN EPS ppx  gabapentin 300 milliGRAM(s) Oral four times a day PRN anxiety  haloperidol    Injectable 5 milliGRAM(s) IntraMuscular every 12 hours PRN psychosis  haloperidol    Injectable 5 milliGRAM(s) IntraMuscular every 12 hours PRN psychosis  LORazepam     Tablet 2 milliGRAM(s) Oral every 6 hours PRN agitation  LORazepam   Injectable 2 milliGRAM(s) IntraMuscular once PRN agitation  melatonin. 5 milliGRAM(s) Oral at bedtime PRN Insomnia  polyethylene glycol 3350 17 Gram(s) Oral daily PRN constipation

## 2021-10-06 NOTE — BH INPATIENT PSYCHIATRY PROGRESS NOTE - NSBHCHARTREVIEWVS_PSY_A_CORE FT
Vital Signs Last 24 Hrs  T(C): 36.3 (10-06-21 @ 06:20), Max: 36.4 (10-05-21 @ 18:30)  T(F): 97.4 (10-06-21 @ 06:20), Max: 97.5 (10-05-21 @ 18:30)  HR: 94 (10-06-21 @ 06:20) (94 - 94)  BP: 131/77 (10-06-21 @ 06:20) (131/77 - 131/77)  BP(mean): --  RR: --  SpO2: --    Orthostatic VS  10-05-21 @ 18:30  Lying BP: --/-- HR: --  Sitting BP: 120/74 HR: 102  Standing BP: 116/70 HR: 91  Site: --  Mode: --  Orthostatic VS  10-05-21 @ 07:36  Lying BP: --/-- HR: --  Sitting BP: 127/68 HR: 78  Standing BP: 120/72 HR: 80  Site: --  Mode: --

## 2021-10-06 NOTE — BH INPATIENT PSYCHIATRY PROGRESS NOTE - NSBHASSESSSUMMFT_PSY_ALL_CORE
Patient selectively refusing medication, remains disorganized, labile, responding to internal stimuli, minimally cooperative with interview    -clozapine changed clozpine ODT and titrated to 75mg PO BID and Risperdal M-tab titrated to 1mg PO daily and 4mg PO at bedtime and ordered mouth checks, [facility] granted on 9/28/21, will change back up medication to Haldol 5mg IM and Benadryl 25mg IM if patient refuses Risperdal and/or Clozaril standing medication as per [place] court order  -group and milieu therapy  -routine checks  -CBC + diff on 10/6/21, discussed results with hospitalist, Dr. Newell, no acute concerns can repeat weekly

## 2021-10-06 NOTE — BH INPATIENT PSYCHIATRY PROGRESS NOTE - NSBHMETABOLIC_PSY_ALL_CORE_FT
BMI: BMI (kg/m2): 33.1 (09-04-21 @ 07:48)  HbA1c: A1C with Estimated Average Glucose Result: 7.3 % (08-05-21 @ 10:24)    Glucose: POCT Blood Glucose.: 292 mg/dL (10-06-21 @ 08:08)    BP: 131/77 (10-06-21 @ 06:20) (131/77 - 131/77)  Lipid Panel: Date/Time: 08-05-21 @ 10:24  Cholesterol, Serum: 139  Direct LDL: --  HDL Cholesterol, Serum: 42  Total Cholesterol/HDL Ration Measurement: --  Triglycerides, Serum: 234

## 2021-10-06 NOTE — BH INPATIENT PSYCHIATRY PROGRESS NOTE - PRN MEDS
MEDICATIONS  (PRN):  acetaminophen   Tablet .. 650 milliGRAM(s) Oral every 6 hours PRN Mild Pain (1 - 3), Moderate Pain (4 - 6)  chlorproMAZINE    Injectable 100 milliGRAM(s) IntraMuscular once PRN agitation or aggression  chlorproMAZINE    Tablet 100 milliGRAM(s) Oral every 4 hours PRN agitation or aggression  diphenhydrAMINE   Injectable 25 milliGRAM(s) IntraMuscular every 12 hours PRN EPS ppx  diphenhydrAMINE   Injectable 25 milliGRAM(s) IntraMuscular every 12 hours PRN EPS ppx  gabapentin 300 milliGRAM(s) Oral four times a day PRN anxiety  haloperidol    Injectable 5 milliGRAM(s) IntraMuscular every 12 hours PRN psychosis  haloperidol    Injectable 5 milliGRAM(s) IntraMuscular every 12 hours PRN psychosis  LORazepam     Tablet 2 milliGRAM(s) Oral every 6 hours PRN agitation  LORazepam   Injectable 2 milliGRAM(s) IntraMuscular once PRN agitation  melatonin. 5 milliGRAM(s) Oral at bedtime PRN Insomnia  polyethylene glycol 3350 17 Gram(s) Oral daily PRN constipation

## 2021-10-06 NOTE — BH INPATIENT PSYCHIATRY PROGRESS NOTE - NSBHFUPINTERVALHXFT_PSY_A_CORE
Pt compliant with medication last night, but per nursing, patient threw and spit out AM medication, received backup IM medication as per [facility] court order.  Pt irritable and uncooperative with interview, refuses to answer interview questions, starts yelling and gesturing at writer and SW.  Pt holding an open marker, getting close to writer and SW, poke writer with it and then threatened SW with it, then yelling, "get out."  Pt later heard yelling in his bedroom, accusing staff of sitting on his chair and "sitting on a baby," referring to a bag of belongings on his chair.  Pt then laid down refused to answer as to why he was yelling, able to calm himself down.

## 2021-10-07 LAB
GLUCOSE BLDC GLUCOMTR-MCNC: 146 MG/DL — HIGH (ref 70–99)
GLUCOSE BLDC GLUCOMTR-MCNC: 158 MG/DL — HIGH (ref 70–99)
GLUCOSE BLDC GLUCOMTR-MCNC: 166 MG/DL — HIGH (ref 70–99)
GLUCOSE BLDC GLUCOMTR-MCNC: 201 MG/DL — HIGH (ref 70–99)

## 2021-10-07 PROCEDURE — 99232 SBSQ HOSP IP/OBS MODERATE 35: CPT

## 2021-10-07 RX ORDER — CHLORPROMAZINE HCL 10 MG
100 TABLET ORAL ONCE
Refills: 0 | Status: COMPLETED | OUTPATIENT
Start: 2021-10-07 | End: 2021-10-07

## 2021-10-07 RX ADMIN — SENNA PLUS 2 TABLET(S): 8.6 TABLET ORAL at 21:07

## 2021-10-07 RX ADMIN — METFORMIN HYDROCHLORIDE 1000 MILLIGRAM(S): 850 TABLET ORAL at 21:07

## 2021-10-07 RX ADMIN — RISPERIDONE 4 MILLIGRAM(S): 4 TABLET ORAL at 21:07

## 2021-10-07 RX ADMIN — Medication 100 MILLIGRAM(S): at 17:18

## 2021-10-07 RX ADMIN — CLOZAPINE 75 MILLIGRAM(S): 150 TABLET, ORALLY DISINTEGRATING ORAL at 08:55

## 2021-10-07 RX ADMIN — Medication 2 MILLIGRAM(S): at 17:18

## 2021-10-07 RX ADMIN — CLOZAPINE 75 MILLIGRAM(S): 150 TABLET, ORALLY DISINTEGRATING ORAL at 21:08

## 2021-10-07 RX ADMIN — GABAPENTIN 300 MILLIGRAM(S): 400 CAPSULE ORAL at 08:55

## 2021-10-07 RX ADMIN — RISPERIDONE 1 MILLIGRAM(S): 4 TABLET ORAL at 08:55

## 2021-10-07 NOTE — BH INPATIENT PSYCHIATRY PROGRESS NOTE - NSBHMETABOLIC_PSY_ALL_CORE_FT
BMI: BMI (kg/m2): 33.1 (09-04-21 @ 07:48)  HbA1c: A1C with Estimated Average Glucose Result: 7.3 % (08-05-21 @ 10:24)    Glucose: POCT Blood Glucose.: 146 mg/dL (10-07-21 @ 11:54)    BP: 131/77 (10-06-21 @ 06:20) (131/77 - 131/77)  Lipid Panel: Date/Time: 08-05-21 @ 10:24  Cholesterol, Serum: 139  Direct LDL: --  HDL Cholesterol, Serum: 42  Total Cholesterol/HDL Ration Measurement: --  Triglycerides, Serum: 234

## 2021-10-07 NOTE — BH INPATIENT PSYCHIATRY PROGRESS NOTE - NSBHFUPINTERVALHXFT_PSY_A_CORE
Pt compliant with medication this morning, but refused last night.  Per report, patient defecated on the floor yesterday afternoon, showered after.  Pt on interview, is more cooperative, less irritable, but remains disorganized, loose at times with some logical conversation, reports he took his AM medication, agrees to take it moving forward.  Pt reports hx of thoughts to kill himself, but none currently.  Pt denies HI/I/P, reports "only if I need to protect myself."  Pt denies AH/VH, but appears internally preoccupied and talks to self at times on the unit.  Pt reports he only hears writer's voice or music in headphones, then talks about radio waves on the outside.  When asked why he defecated on the floor, reports, "I had to clear myself out."  Pt reports normal BM otherwise.  Discussed with patient writer's conversation with his mother yesterday and how they are trying to arrange for him to transfer to a new residence.  Pt seems agreeable to this, agrees he has been having issues with staff there.  Pt talks about "Sharri," how she is someone she met at [facility], does not know if she was a staff or patient, but reports it is his girlfriend and she has been in contact with her and plans to live with her.  Pt reports he will leave the hospital when his mother feels it is safe.  Pt talks about how he needs a car battery for his walker and how he can plug it in the outlet in his room, showed him that his walker is not electronic.  Pt eventually gets tired of speaking and asks to end the conversation.

## 2021-10-07 NOTE — BH TREATMENT PLAN - NSTXCONDUCINTERRN_PSY_ALL_CORE
Suggest things that pt can do when he doesn't want to take meds rather than throwing them across the room
Encourage patient to participate in unit activities
Patient encouraged to follow unit rules and praised when he does adhere to unit rules
Encouraged to adopt better means of coping with situation that is not in his favor safely that acting out aggressively.
Suggest things that pt can do when he doesn't want to take meds rather than throwing them across the room

## 2021-10-07 NOTE — BH INPATIENT PSYCHIATRY PROGRESS NOTE - NSBHCHARTREVIEWVS_PSY_A_CORE FT
Vital Signs Last 24 Hrs  T(C): 36.3 (10-07-21 @ 07:54), Max: 36.3 (10-07-21 @ 07:54)  T(F): 97.4 (10-07-21 @ 07:54), Max: 97.4 (10-07-21 @ 07:54)  HR: --  BP: --  BP(mean): --  RR: --  SpO2: --    Orthostatic VS  10-07-21 @ 07:54  Lying BP: --/-- HR: --  Sitting BP: 127/76 HR: 79  Standing BP: --/-- HR: --  Site: --  Mode: --  Orthostatic VS  10-06-21 @ 20:07  Lying BP: --/-- HR: --  Sitting BP: 124/72 HR: 98  Standing BP: 122/69 HR: 101  Site: upper right arm  Mode: electronic  Orthostatic VS  10-05-21 @ 18:30  Lying BP: --/-- HR: --  Sitting BP: 120/74 HR: 102  Standing BP: 116/70 HR: 91  Site: --  Mode: --

## 2021-10-07 NOTE — BH INPATIENT PSYCHIATRY PROGRESS NOTE - OTHER
responding to internal stimuli and internally preoccupied less irritable normal today, hx of impaired Abnormal gait,  uses walker more logical at times more cooperative

## 2021-10-08 LAB
GLUCOSE BLDC GLUCOMTR-MCNC: 201 MG/DL — HIGH (ref 70–99)
GLUCOSE BLDC GLUCOMTR-MCNC: 201 MG/DL — HIGH (ref 70–99)
GLUCOSE BLDC GLUCOMTR-MCNC: 205 MG/DL — HIGH (ref 70–99)
GLUCOSE BLDC GLUCOMTR-MCNC: 226 MG/DL — HIGH (ref 70–99)

## 2021-10-08 PROCEDURE — 99232 SBSQ HOSP IP/OBS MODERATE 35: CPT

## 2021-10-08 RX ORDER — SENNA PLUS 8.6 MG/1
2 TABLET ORAL AT BEDTIME
Refills: 0 | Status: DISCONTINUED | OUTPATIENT
Start: 2021-10-08 | End: 2021-10-19

## 2021-10-08 RX ORDER — RISPERIDONE 4 MG/1
2 TABLET ORAL DAILY
Refills: 0 | Status: DISCONTINUED | OUTPATIENT
Start: 2021-10-09 | End: 2021-10-14

## 2021-10-08 RX ORDER — CLOZAPINE 150 MG/1
100 TABLET, ORALLY DISINTEGRATING ORAL
Refills: 0 | Status: DISCONTINUED | OUTPATIENT
Start: 2021-10-08 | End: 2021-10-13

## 2021-10-08 RX ADMIN — CLOZAPINE 100 MILLIGRAM(S): 150 TABLET, ORALLY DISINTEGRATING ORAL at 22:00

## 2021-10-08 RX ADMIN — CLOZAPINE 75 MILLIGRAM(S): 150 TABLET, ORALLY DISINTEGRATING ORAL at 08:55

## 2021-10-08 RX ADMIN — RISPERIDONE 1 MILLIGRAM(S): 4 TABLET ORAL at 08:55

## 2021-10-08 RX ADMIN — METFORMIN HYDROCHLORIDE 1000 MILLIGRAM(S): 850 TABLET ORAL at 21:38

## 2021-10-08 RX ADMIN — ATORVASTATIN CALCIUM 40 MILLIGRAM(S): 80 TABLET, FILM COATED ORAL at 21:36

## 2021-10-08 RX ADMIN — RISPERIDONE 4 MILLIGRAM(S): 4 TABLET ORAL at 21:35

## 2021-10-08 NOTE — BH INPATIENT PSYCHIATRY PROGRESS NOTE - CURRENT MEDICATION
MEDICATIONS  (STANDING):  acetaminophen   Tablet .. 650 milliGRAM(s) Oral once  atorvastatin 40 milliGRAM(s) Oral at bedtime  dextrose 40% Gel 15 Gram(s) Oral once  glucagon  Injectable 1 milliGRAM(s) IntraMuscular once  insulin lispro (ADMELOG) corrective regimen sliding scale   SubCutaneous three times a day before meals  insulin lispro (ADMELOG) corrective regimen sliding scale   SubCutaneous at bedtime  metFORMIN 1000 milliGRAM(s) Oral at bedtime  risperiDONE  Disintegrating Tablet 4 milliGRAM(s) Oral at bedtime  senna 2 Tablet(s) Oral at bedtime  sitaGLIPtin 100 milliGRAM(s) Oral daily    MEDICATIONS  (PRN):  acetaminophen   Tablet .. 650 milliGRAM(s) Oral every 6 hours PRN Mild Pain (1 - 3), Moderate Pain (4 - 6)  chlorproMAZINE    Injectable 100 milliGRAM(s) IntraMuscular once PRN agitation or aggression  chlorproMAZINE    Tablet 100 milliGRAM(s) Oral every 4 hours PRN agitation or aggression  diphenhydrAMINE   Injectable 25 milliGRAM(s) IntraMuscular every 12 hours PRN EPS ppx  diphenhydrAMINE   Injectable 25 milliGRAM(s) IntraMuscular every 12 hours PRN EPS ppx  gabapentin 300 milliGRAM(s) Oral four times a day PRN anxiety  haloperidol    Injectable 5 milliGRAM(s) IntraMuscular every 12 hours PRN psychosis  haloperidol    Injectable 5 milliGRAM(s) IntraMuscular every 12 hours PRN psychosis  LORazepam     Tablet 2 milliGRAM(s) Oral every 6 hours PRN agitation  melatonin. 5 milliGRAM(s) Oral at bedtime PRN Insomnia  polyethylene glycol 3350 17 Gram(s) Oral daily PRN constipation

## 2021-10-08 NOTE — BH INPATIENT PSYCHIATRY PROGRESS NOTE - PRN MEDS
MEDICATIONS  (PRN):  acetaminophen   Tablet .. 650 milliGRAM(s) Oral every 6 hours PRN Mild Pain (1 - 3), Moderate Pain (4 - 6)  chlorproMAZINE    Injectable 100 milliGRAM(s) IntraMuscular once PRN agitation or aggression  chlorproMAZINE    Tablet 100 milliGRAM(s) Oral every 4 hours PRN agitation or aggression  diphenhydrAMINE   Injectable 25 milliGRAM(s) IntraMuscular every 12 hours PRN EPS ppx  diphenhydrAMINE   Injectable 25 milliGRAM(s) IntraMuscular every 12 hours PRN EPS ppx  gabapentin 300 milliGRAM(s) Oral four times a day PRN anxiety  haloperidol    Injectable 5 milliGRAM(s) IntraMuscular every 12 hours PRN psychosis  haloperidol    Injectable 5 milliGRAM(s) IntraMuscular every 12 hours PRN psychosis  LORazepam     Tablet 2 milliGRAM(s) Oral every 6 hours PRN agitation  melatonin. 5 milliGRAM(s) Oral at bedtime PRN Insomnia  polyethylene glycol 3350 17 Gram(s) Oral daily PRN constipation

## 2021-10-08 NOTE — BH INPATIENT PSYCHIATRY PROGRESS NOTE - NSBHCHARTREVIEWVS_PSY_A_CORE FT
Vital Signs Last 24 Hrs  T(C): 36.4 (10-08-21 @ 07:55), Max: 36.4 (10-08-21 @ 07:55)  T(F): 97.5 (10-08-21 @ 07:55), Max: 97.5 (10-08-21 @ 07:55)  HR: --  BP: --  BP(mean): --  RR: --  SpO2: --    Orthostatic VS  10-08-21 @ 07:55  Lying BP: --/-- HR: --  Sitting BP: 127/74 HR: 101  Standing BP: 126/70 HR: 102  Site: --  Mode: --  Orthostatic VS  10-07-21 @ 07:54  Lying BP: --/-- HR: --  Sitting BP: 127/76 HR: 79  Standing BP: --/-- HR: --  Site: --  Mode: --  Orthostatic VS  10-06-21 @ 20:07  Lying BP: --/-- HR: --  Sitting BP: 124/72 HR: 98  Standing BP: 122/69 HR: 101  Site: upper right arm  Mode: electronic

## 2021-10-08 NOTE — BH INPATIENT PSYCHIATRY PROGRESS NOTE - NSBHFUPINTERVALHXFT_PSY_A_CORE
Pt compliant with medication last night and this morning.  Pt observed pulling down his pants in the day room and had to be redirected by staff.  Pt continues to be observed talking to himself.  Pt more irritable, disorganized and uncooperative today.  Pt initially refuses to speak, reports he needs to call his girlfriend, "Sharri," and gets on the phone.  Approached later, and when asked about sleep, patient talks about [place]" and the rides there.  Pt speaking softly and writer asked if patient could speak louder, he gets mad, grabs his walker and walks away, reporting, "you are dismissed," and refuses to engage further.  Writer approached patient later, discussed option of Zyprexa versus clozapine, pt gets agitated, yells, "Zyprexa makes me not be able to jerk off and I have a girlfriend!"  Then he gets up, points his finger at writer and tells writer to get out.

## 2021-10-08 NOTE — BH INPATIENT PSYCHIATRY PROGRESS NOTE - NSBHASSESSSUMMFT_PSY_ALL_CORE
Patient selectively refusing medication, remains disorganized, labile, responding to internal stimuli, minimally cooperative with interview    -clozapine changed clozpine ODT and titrated to 100mg PO BID and Risperdal M-tab titrated to 2mg PO daily and 4mg PO at bedtime and ordered mouth checks, MOO granted on 9/28/21, will change back up medication to Haldol 5mg IM and Benadryl 25mg IM if patient refuses Risperdal and/or Clozaril standing medication as per MOO court order  -group and milieu therapy  -routine checks  -CBC + diff on 10/6/21, discussed results with hospitalist, Dr. Newell, no acute concerns can repeat weekly

## 2021-10-08 NOTE — BH INPATIENT PSYCHIATRY PROGRESS NOTE - NSBHMETABOLIC_PSY_ALL_CORE_FT
BMI: BMI (kg/m2): 33.1 (09-04-21 @ 07:48)  HbA1c: A1C with Estimated Average Glucose Result: 7.3 % (08-05-21 @ 10:24)    Glucose: POCT Blood Glucose.: 205 mg/dL (10-08-21 @ 12:05)    BP: 131/77 (10-06-21 @ 06:20) (131/77 - 131/77)  Lipid Panel: Date/Time: 08-05-21 @ 10:24  Cholesterol, Serum: 139  Direct LDL: --  HDL Cholesterol, Serum: 42  Total Cholesterol/HDL Ration Measurement: --  Triglycerides, Serum: 234

## 2021-10-09 LAB — GLUCOSE BLDC GLUCOMTR-MCNC: 196 MG/DL — HIGH (ref 70–99)

## 2021-10-09 RX ADMIN — ATORVASTATIN CALCIUM 40 MILLIGRAM(S): 80 TABLET, FILM COATED ORAL at 21:00

## 2021-10-09 RX ADMIN — RISPERIDONE 4 MILLIGRAM(S): 4 TABLET ORAL at 20:56

## 2021-10-09 RX ADMIN — METFORMIN HYDROCHLORIDE 1000 MILLIGRAM(S): 850 TABLET ORAL at 21:01

## 2021-10-09 RX ADMIN — CLOZAPINE 100 MILLIGRAM(S): 150 TABLET, ORALLY DISINTEGRATING ORAL at 09:56

## 2021-10-09 RX ADMIN — CLOZAPINE 100 MILLIGRAM(S): 150 TABLET, ORALLY DISINTEGRATING ORAL at 20:56

## 2021-10-09 RX ADMIN — RISPERIDONE 2 MILLIGRAM(S): 4 TABLET ORAL at 09:56

## 2021-10-10 LAB
GLUCOSE BLDC GLUCOMTR-MCNC: 184 MG/DL — HIGH (ref 70–99)
GLUCOSE BLDC GLUCOMTR-MCNC: 191 MG/DL — HIGH (ref 70–99)
GLUCOSE BLDC GLUCOMTR-MCNC: 196 MG/DL — HIGH (ref 70–99)
GLUCOSE BLDC GLUCOMTR-MCNC: 217 MG/DL — HIGH (ref 70–99)

## 2021-10-10 RX ADMIN — RISPERIDONE 4 MILLIGRAM(S): 4 TABLET ORAL at 21:21

## 2021-10-10 RX ADMIN — METFORMIN HYDROCHLORIDE 1000 MILLIGRAM(S): 850 TABLET ORAL at 21:21

## 2021-10-10 RX ADMIN — ATORVASTATIN CALCIUM 40 MILLIGRAM(S): 80 TABLET, FILM COATED ORAL at 21:21

## 2021-10-10 RX ADMIN — CLOZAPINE 100 MILLIGRAM(S): 150 TABLET, ORALLY DISINTEGRATING ORAL at 09:10

## 2021-10-10 RX ADMIN — CLOZAPINE 100 MILLIGRAM(S): 150 TABLET, ORALLY DISINTEGRATING ORAL at 21:20

## 2021-10-10 RX ADMIN — RISPERIDONE 2 MILLIGRAM(S): 4 TABLET ORAL at 09:10

## 2021-10-11 DIAGNOSIS — Z91.19 PATIENT'S NONCOMPLIANCE WITH OTHER MEDICAL TREATMENT AND REGIMEN: ICD-10-CM

## 2021-10-11 LAB
GLUCOSE BLDC GLUCOMTR-MCNC: 144 MG/DL — HIGH (ref 70–99)
GLUCOSE BLDC GLUCOMTR-MCNC: 195 MG/DL — HIGH (ref 70–99)
GLUCOSE BLDC GLUCOMTR-MCNC: 231 MG/DL — HIGH (ref 70–99)
GLUCOSE BLDC GLUCOMTR-MCNC: 238 MG/DL — HIGH (ref 70–99)

## 2021-10-11 PROCEDURE — 99232 SBSQ HOSP IP/OBS MODERATE 35: CPT

## 2021-10-11 RX ADMIN — ATORVASTATIN CALCIUM 40 MILLIGRAM(S): 80 TABLET, FILM COATED ORAL at 20:53

## 2021-10-11 RX ADMIN — CLOZAPINE 100 MILLIGRAM(S): 150 TABLET, ORALLY DISINTEGRATING ORAL at 20:53

## 2021-10-11 RX ADMIN — METFORMIN HYDROCHLORIDE 1000 MILLIGRAM(S): 850 TABLET ORAL at 20:53

## 2021-10-11 RX ADMIN — RISPERIDONE 2 MILLIGRAM(S): 4 TABLET ORAL at 09:11

## 2021-10-11 RX ADMIN — Medication 2: at 13:04

## 2021-10-11 RX ADMIN — CLOZAPINE 100 MILLIGRAM(S): 150 TABLET, ORALLY DISINTEGRATING ORAL at 09:11

## 2021-10-11 RX ADMIN — RISPERIDONE 4 MILLIGRAM(S): 4 TABLET ORAL at 20:53

## 2021-10-11 NOTE — BH INPATIENT PSYCHIATRY PROGRESS NOTE - NSBHASSESSSUMMFT_PSY_ALL_CORE
Patient selectively refusing medication, remains disorganized, labile, irritable, psychotic, and responding to internal stimuli.      Plan:  - clozpine ODT 100mg PO BID   - Risperdal M-tab titrated to 2mg PO daily and 4mg PO at bedtime and ordered mouth checks  - MOO granted on 9/28/21, Haldol 5mg IM and Benadryl 25mg IM if patient refuses Risperdal and/or Clozaril standing medication as per MOO court order   - group and milieu therapy   - routine checks     Medical:  Dr. Newell to follow up patient.

## 2021-10-11 NOTE — BH INPATIENT PSYCHIATRY PROGRESS NOTE - NSBHCHARTREVIEWVS_PSY_A_CORE FT
Vital Signs Last 24 Hrs  T(C): 36.3 (10-11-21 @ 07:50), Max: 36.9 (10-10-21 @ 18:53)  T(F): 97.3 (10-11-21 @ 07:50), Max: 98.4 (10-10-21 @ 18:53)  HR: --  BP: --  BP(mean): --  RR: --  SpO2: --    Orthostatic VS  10-11-21 @ 07:50  Lying BP: --/-- HR: --  Sitting BP: 137/80 HR: 102  Standing BP: 121/75 HR: 104  Site: --  Mode: --  Orthostatic VS  10-10-21 @ 18:53  Lying BP: --/-- HR: --  Sitting BP: 130/80 HR: 98  Standing BP: 139/87 HR: 100  Site: --  Mode: --  Orthostatic VS  10-10-21 @ 08:45  Lying BP: --/-- HR: --  Sitting BP: 136/72 HR: 85  Standing BP: 130/70 HR: 80  Site: --  Mode: --  Orthostatic VS  10-09-21 @ 19:59  Lying BP: --/-- HR: --  Sitting BP: 126/79 HR: 96  Standing BP: 115/70 HR: 89  Site: --  Mode: --

## 2021-10-11 NOTE — BH INPATIENT PSYCHIATRY PROGRESS NOTE - NSBHMETABOLIC_PSY_ALL_CORE_FT
BMI: BMI (kg/m2): 33.1 (09-04-21 @ 07:48)  HbA1c: A1C with Estimated Average Glucose Result: 7.3 % (08-05-21 @ 10:24)    Glucose: POCT Blood Glucose.: 238 mg/dL (10-11-21 @ 12:10)    BP: --  Lipid Panel: Date/Time: 08-05-21 @ 10:24  Cholesterol, Serum: 139  Direct LDL: --  HDL Cholesterol, Serum: 42  Total Cholesterol/HDL Ration Measurement: --  Triglycerides, Serum: 234

## 2021-10-11 NOTE — BH INPATIENT PSYCHIATRY PROGRESS NOTE - NSBHFUPINTERVALHXFT_PSY_A_CORE
Chart reviewed, labs reviewed and patient interviewed in his room with RN. Pt intermittently compliant with medication. Pt continues to be observed talking to himself.  Pt is irritable, disorganized and uncooperative.  As per staff patient is refusing finger sticks and insulin. As per staff Patient is observed urinating in his room on floor. No restraint over weekend.

## 2021-10-11 NOTE — BH INPATIENT PSYCHIATRY PROGRESS NOTE - PRN MEDS
MEDICATIONS  (PRN):  acetaminophen   Tablet .. 650 milliGRAM(s) Oral every 6 hours PRN Mild Pain (1 - 3), Moderate Pain (4 - 6)  chlorproMAZINE    Injectable 100 milliGRAM(s) IntraMuscular once PRN agitation or aggression  chlorproMAZINE    Tablet 100 milliGRAM(s) Oral every 4 hours PRN agitation or aggression  diphenhydrAMINE   Injectable 25 milliGRAM(s) IntraMuscular every 12 hours PRN EPS ppx  diphenhydrAMINE   Injectable 25 milliGRAM(s) IntraMuscular every 12 hours PRN EPS ppx  haloperidol    Injectable 5 milliGRAM(s) IntraMuscular every 12 hours PRN psychosis  haloperidol    Injectable 5 milliGRAM(s) IntraMuscular every 12 hours PRN psychosis  LORazepam     Tablet 2 milliGRAM(s) Oral every 6 hours PRN agitation  melatonin. 5 milliGRAM(s) Oral at bedtime PRN Insomnia  polyethylene glycol 3350 17 Gram(s) Oral daily PRN constipation  senna 2 Tablet(s) Oral at bedtime PRN constipation

## 2021-10-12 LAB
GLUCOSE BLDC GLUCOMTR-MCNC: 133 MG/DL — HIGH (ref 70–99)
GLUCOSE BLDC GLUCOMTR-MCNC: 172 MG/DL — HIGH (ref 70–99)
GLUCOSE BLDC GLUCOMTR-MCNC: 177 MG/DL — HIGH (ref 70–99)
GLUCOSE BLDC GLUCOMTR-MCNC: 179 MG/DL — HIGH (ref 70–99)

## 2021-10-12 PROCEDURE — 99232 SBSQ HOSP IP/OBS MODERATE 35: CPT

## 2021-10-12 RX ADMIN — RISPERIDONE 2 MILLIGRAM(S): 4 TABLET ORAL at 08:26

## 2021-10-12 RX ADMIN — ATORVASTATIN CALCIUM 40 MILLIGRAM(S): 80 TABLET, FILM COATED ORAL at 20:55

## 2021-10-12 RX ADMIN — Medication 650 MILLIGRAM(S): at 15:54

## 2021-10-12 RX ADMIN — Medication 1: at 08:18

## 2021-10-12 RX ADMIN — Medication 1: at 12:35

## 2021-10-12 RX ADMIN — RISPERIDONE 4 MILLIGRAM(S): 4 TABLET ORAL at 20:54

## 2021-10-12 RX ADMIN — CLOZAPINE 100 MILLIGRAM(S): 150 TABLET, ORALLY DISINTEGRATING ORAL at 08:26

## 2021-10-12 RX ADMIN — CLOZAPINE 100 MILLIGRAM(S): 150 TABLET, ORALLY DISINTEGRATING ORAL at 20:54

## 2021-10-12 RX ADMIN — METFORMIN HYDROCHLORIDE 1000 MILLIGRAM(S): 850 TABLET ORAL at 20:54

## 2021-10-12 NOTE — BH INPATIENT PSYCHIATRY PROGRESS NOTE - NSBHMETABOLIC_PSY_ALL_CORE_FT
BMI: BMI (kg/m2): 33.1 (09-04-21 @ 07:48)  HbA1c: A1C with Estimated Average Glucose Result: 7.3 % (08-05-21 @ 10:24)    Glucose: POCT Blood Glucose.: 179 mg/dL (10-12-21 @ 12:08)    BP: --  Lipid Panel: Date/Time: 08-05-21 @ 10:24  Cholesterol, Serum: 139  Direct LDL: --  HDL Cholesterol, Serum: 42  Total Cholesterol/HDL Ration Measurement: --  Triglycerides, Serum: 234

## 2021-10-12 NOTE — BH INPATIENT PSYCHIATRY PROGRESS NOTE - OTHER
responding to internal stimuli and internally preoccupied more logical at times more cooperative but irritable Abnormal gait,  uses walker normal today, hx of impaired

## 2021-10-12 NOTE — BH INPATIENT PSYCHIATRY PROGRESS NOTE - CURRENT MEDICATION
MEDICATIONS  (STANDING):  acetaminophen   Tablet .. 650 milliGRAM(s) Oral once  atorvastatin 40 milliGRAM(s) Oral at bedtime  cloZAPine Disintegrating Tablet 100 milliGRAM(s) Oral two times a day  dextrose 40% Gel 15 Gram(s) Oral once  glucagon  Injectable 1 milliGRAM(s) IntraMuscular once  insulin lispro (ADMELOG) corrective regimen sliding scale   SubCutaneous three times a day before meals  insulin lispro (ADMELOG) corrective regimen sliding scale   SubCutaneous at bedtime  metFORMIN 1000 milliGRAM(s) Oral at bedtime  risperiDONE  Disintegrating Tablet 4 milliGRAM(s) Oral at bedtime  risperiDONE  Disintegrating Tablet 2 milliGRAM(s) Oral daily  sitaGLIPtin 100 milliGRAM(s) Oral daily    MEDICATIONS  (PRN):  acetaminophen   Tablet .. 650 milliGRAM(s) Oral every 6 hours PRN Mild Pain (1 - 3), Moderate Pain (4 - 6)  chlorproMAZINE    Injectable 100 milliGRAM(s) IntraMuscular once PRN agitation or aggression  chlorproMAZINE    Tablet 100 milliGRAM(s) Oral every 4 hours PRN agitation or aggression  diphenhydrAMINE   Injectable 25 milliGRAM(s) IntraMuscular every 12 hours PRN EPS ppx  diphenhydrAMINE   Injectable 25 milliGRAM(s) IntraMuscular every 12 hours PRN EPS ppx  haloperidol    Injectable 5 milliGRAM(s) IntraMuscular every 12 hours PRN psychosis  haloperidol    Injectable 5 milliGRAM(s) IntraMuscular every 12 hours PRN psychosis  LORazepam     Tablet 2 milliGRAM(s) Oral every 6 hours PRN agitation  melatonin. 5 milliGRAM(s) Oral at bedtime PRN Insomnia  polyethylene glycol 3350 17 Gram(s) Oral daily PRN constipation  senna 2 Tablet(s) Oral at bedtime PRN constipation

## 2021-10-12 NOTE — BH INPATIENT PSYCHIATRY PROGRESS NOTE - NSBHCHARTREVIEWVS_PSY_A_CORE FT
Vital Signs Last 24 Hrs  T(C): 36.3 (10-12-21 @ 08:08), Max: 36.4 (10-11-21 @ 18:39)  T(F): 97.4 (10-12-21 @ 08:08), Max: 97.5 (10-11-21 @ 18:39)  HR: --  BP: --  BP(mean): --  RR: --  SpO2: --    Orthostatic VS  10-12-21 @ 08:08  Lying BP: --/-- HR: --  Sitting BP: 109/78 HR: 91  Standing BP: 119/82 HR: 101  Site: --  Mode: --  Orthostatic VS  10-11-21 @ 18:39  Lying BP: --/-- HR: --  Sitting BP: 141/92 HR: 121  Standing BP: 136/79 HR: 100  Site: upper right arm  Mode: electronic  Orthostatic VS  10-11-21 @ 07:50  Lying BP: --/-- HR: --  Sitting BP: 137/80 HR: 102  Standing BP: 121/75 HR: 104  Site: --  Mode: --  Orthostatic VS  10-10-21 @ 18:53  Lying BP: --/-- HR: --  Sitting BP: 130/80 HR: 98  Standing BP: 139/87 HR: 100  Site: --  Mode: --

## 2021-10-12 NOTE — BH INPATIENT PSYCHIATRY PROGRESS NOTE - NSBHFUPINTERVALHXFT_PSY_A_CORE
Pt has been compliant with medication and tolerating it well.  Chart reviewed and case discussed with treatment team.  No events reported overnight.  Pt more cooperative with interview, but irritable upon approach.  Pt reports he is "mad" at writer, feels writer "speaks over me like my mother does."  Pt able to more appropriately answer interview questions, less disorganized than previous days, denies SI/HI/I/P or AH/VH, still talks to self at times and appears internally preoccupied.  Pt reports eating and sleeping well.  Pt reports he is unsure if he wants to go back to his residence, talks about going to [place] and how he used to drive to [place].

## 2021-10-12 NOTE — BH INPATIENT PSYCHIATRY PROGRESS NOTE - NSBHASSESSSUMMFT_PSY_ALL_CORE
Patient selectively refusing medication, remains disorganized, labile, irritable, psychotic, and responding to internal stimuli.      Plan:  - clozpine ODT 100mg PO BID   - Risperdal M-tab titrated to 2mg PO daily and 4mg PO at bedtime and ordered mouth checks  - MOO granted on 9/28/21, Haldol 5mg IM and Benadryl 25mg IM if patient refuses Risperdal and/or Clozaril standing medication as per MOO court order   - group and milieu therapy   - routine checks

## 2021-10-13 ENCOUNTER — APPOINTMENT (OUTPATIENT)
Dept: ENDOCRINOLOGY | Facility: CLINIC | Age: 56
End: 2021-10-13

## 2021-10-13 LAB
BASOPHILS # BLD AUTO: 0.08 K/UL — SIGNIFICANT CHANGE UP (ref 0–0.2)
BASOPHILS NFR BLD AUTO: 0.8 % — SIGNIFICANT CHANGE UP (ref 0–2)
EOSINOPHIL # BLD AUTO: 0.23 K/UL — SIGNIFICANT CHANGE UP (ref 0–0.5)
EOSINOPHIL NFR BLD AUTO: 2.2 % — SIGNIFICANT CHANGE UP (ref 0–6)
GLUCOSE BLDC GLUCOMTR-MCNC: 188 MG/DL — HIGH (ref 70–99)
GLUCOSE BLDC GLUCOMTR-MCNC: 216 MG/DL — HIGH (ref 70–99)
GLUCOSE BLDC GLUCOMTR-MCNC: 222 MG/DL — HIGH (ref 70–99)
GLUCOSE BLDC GLUCOMTR-MCNC: 227 MG/DL — HIGH (ref 70–99)
HCT VFR BLD CALC: 40.3 % — SIGNIFICANT CHANGE UP (ref 39–50)
HGB BLD-MCNC: 13.4 G/DL — SIGNIFICANT CHANGE UP (ref 13–17)
IANC: 6.43 K/UL — SIGNIFICANT CHANGE UP (ref 1.5–8.5)
IMM GRANULOCYTES NFR BLD AUTO: 0.9 % — SIGNIFICANT CHANGE UP (ref 0–1.5)
LYMPHOCYTES # BLD AUTO: 2.88 K/UL — SIGNIFICANT CHANGE UP (ref 1–3.3)
LYMPHOCYTES # BLD AUTO: 27.2 % — SIGNIFICANT CHANGE UP (ref 13–44)
MCHC RBC-ENTMCNC: 27.5 PG — SIGNIFICANT CHANGE UP (ref 27–34)
MCHC RBC-ENTMCNC: 33.3 GM/DL — SIGNIFICANT CHANGE UP (ref 32–36)
MCV RBC AUTO: 82.6 FL — SIGNIFICANT CHANGE UP (ref 80–100)
MONOCYTES # BLD AUTO: 0.85 K/UL — SIGNIFICANT CHANGE UP (ref 0–0.9)
MONOCYTES NFR BLD AUTO: 8 % — SIGNIFICANT CHANGE UP (ref 2–14)
NEUTROPHILS # BLD AUTO: 6.43 K/UL — SIGNIFICANT CHANGE UP (ref 1.8–7.4)
NEUTROPHILS NFR BLD AUTO: 60.9 % — SIGNIFICANT CHANGE UP (ref 43–77)
NRBC # BLD: 0 /100 WBCS — SIGNIFICANT CHANGE UP
NRBC # FLD: 0 K/UL — SIGNIFICANT CHANGE UP
PLATELET # BLD AUTO: 214 K/UL — SIGNIFICANT CHANGE UP (ref 150–400)
RBC # BLD: 4.88 M/UL — SIGNIFICANT CHANGE UP (ref 4.2–5.8)
RBC # FLD: 14.4 % — SIGNIFICANT CHANGE UP (ref 10.3–14.5)
WBC # BLD: 10.57 K/UL — HIGH (ref 3.8–10.5)
WBC # FLD AUTO: 10.57 K/UL — HIGH (ref 3.8–10.5)

## 2021-10-13 PROCEDURE — 99232 SBSQ HOSP IP/OBS MODERATE 35: CPT

## 2021-10-13 RX ORDER — CLOZAPINE 150 MG/1
100 TABLET, ORALLY DISINTEGRATING ORAL DAILY
Refills: 0 | Status: DISCONTINUED | OUTPATIENT
Start: 2021-10-13 | End: 2021-10-27

## 2021-10-13 RX ORDER — CLOZAPINE 150 MG/1
150 TABLET, ORALLY DISINTEGRATING ORAL AT BEDTIME
Refills: 0 | Status: DISCONTINUED | OUTPATIENT
Start: 2021-10-13 | End: 2021-10-20

## 2021-10-13 RX ADMIN — Medication 2: at 12:29

## 2021-10-13 RX ADMIN — RISPERIDONE 2 MILLIGRAM(S): 4 TABLET ORAL at 08:47

## 2021-10-13 RX ADMIN — ATORVASTATIN CALCIUM 40 MILLIGRAM(S): 80 TABLET, FILM COATED ORAL at 21:26

## 2021-10-13 RX ADMIN — CLOZAPINE 100 MILLIGRAM(S): 150 TABLET, ORALLY DISINTEGRATING ORAL at 08:47

## 2021-10-13 RX ADMIN — METFORMIN HYDROCHLORIDE 1000 MILLIGRAM(S): 850 TABLET ORAL at 21:27

## 2021-10-13 RX ADMIN — RISPERIDONE 4 MILLIGRAM(S): 4 TABLET ORAL at 21:26

## 2021-10-13 RX ADMIN — CLOZAPINE 150 MILLIGRAM(S): 150 TABLET, ORALLY DISINTEGRATING ORAL at 21:26

## 2021-10-13 NOTE — BH INPATIENT PSYCHIATRY PROGRESS NOTE - NSBHASSESSSUMMFT_PSY_ALL_CORE
Patient selectively refusing medication, remains disorganized, labile, irritable, psychotic, and responding to internal stimuli.      Plan:  - clozpine ODT titrated to 100mg PO daily and 150mg PO at bedtime  - Risperdal M-tab titrated to 2mg PO daily and 4mg PO at bedtime and ordered mouth checks  - MOO granted on 9/28/21, Haldol 5mg IM and Benadryl 25mg IM if patient refuses Risperdal and/or Clozaril standing medication as per MOO court order   - group and milieu therapy   - routine checks

## 2021-10-13 NOTE — BH INPATIENT PSYCHIATRY PROGRESS NOTE - CURRENT MEDICATION
MEDICATIONS  (STANDING):  acetaminophen   Tablet .. 650 milliGRAM(s) Oral once  atorvastatin 40 milliGRAM(s) Oral at bedtime  cloZAPine Disintegrating Tablet 100 milliGRAM(s) Oral daily  dextrose 40% Gel 15 Gram(s) Oral once  glucagon  Injectable 1 milliGRAM(s) IntraMuscular once  insulin lispro (ADMELOG) corrective regimen sliding scale   SubCutaneous three times a day before meals  insulin lispro (ADMELOG) corrective regimen sliding scale   SubCutaneous at bedtime  metFORMIN 1000 milliGRAM(s) Oral at bedtime  risperiDONE  Disintegrating Tablet 4 milliGRAM(s) Oral at bedtime  risperiDONE  Disintegrating Tablet 2 milliGRAM(s) Oral daily  sitaGLIPtin 100 milliGRAM(s) Oral daily    MEDICATIONS  (PRN):  acetaminophen   Tablet .. 650 milliGRAM(s) Oral every 6 hours PRN Mild Pain (1 - 3), Moderate Pain (4 - 6)  chlorproMAZINE    Injectable 100 milliGRAM(s) IntraMuscular once PRN agitation or aggression  chlorproMAZINE    Tablet 100 milliGRAM(s) Oral every 4 hours PRN agitation or aggression  diphenhydrAMINE   Injectable 25 milliGRAM(s) IntraMuscular every 12 hours PRN EPS ppx  diphenhydrAMINE   Injectable 25 milliGRAM(s) IntraMuscular every 12 hours PRN EPS ppx  haloperidol    Injectable 5 milliGRAM(s) IntraMuscular every 12 hours PRN psychosis  haloperidol    Injectable 5 milliGRAM(s) IntraMuscular every 12 hours PRN psychosis  melatonin. 5 milliGRAM(s) Oral at bedtime PRN Insomnia  polyethylene glycol 3350 17 Gram(s) Oral daily PRN constipation  senna 2 Tablet(s) Oral at bedtime PRN constipation

## 2021-10-13 NOTE — BH INPATIENT PSYCHIATRY PROGRESS NOTE - PRN MEDS
MEDICATIONS  (PRN):  acetaminophen   Tablet .. 650 milliGRAM(s) Oral every 6 hours PRN Mild Pain (1 - 3), Moderate Pain (4 - 6)  chlorproMAZINE    Injectable 100 milliGRAM(s) IntraMuscular once PRN agitation or aggression  chlorproMAZINE    Tablet 100 milliGRAM(s) Oral every 4 hours PRN agitation or aggression  diphenhydrAMINE   Injectable 25 milliGRAM(s) IntraMuscular every 12 hours PRN EPS ppx  diphenhydrAMINE   Injectable 25 milliGRAM(s) IntraMuscular every 12 hours PRN EPS ppx  haloperidol    Injectable 5 milliGRAM(s) IntraMuscular every 12 hours PRN psychosis  haloperidol    Injectable 5 milliGRAM(s) IntraMuscular every 12 hours PRN psychosis  melatonin. 5 milliGRAM(s) Oral at bedtime PRN Insomnia  polyethylene glycol 3350 17 Gram(s) Oral daily PRN constipation  senna 2 Tablet(s) Oral at bedtime PRN constipation

## 2021-10-13 NOTE — BH INPATIENT PSYCHIATRY PROGRESS NOTE - NSBHCHARTREVIEWVS_PSY_A_CORE FT
Vital Signs Last 24 Hrs  T(C): 36.4 (10-13-21 @ 07:34), Max: 36.4 (10-13-21 @ 07:34)  T(F): 97.6 (10-13-21 @ 07:34), Max: 97.6 (10-13-21 @ 07:34)  HR: --  BP: --  BP(mean): --  RR: --  SpO2: --    Orthostatic VS  10-13-21 @ 07:34  Lying BP: 115/63 HR: 64  Sitting BP: 122/71 HR: 86  Standing BP: --/-- HR: --  Site: --  Mode: --  Orthostatic VS  10-12-21 @ 19:29  Lying BP: --/-- HR: --  Sitting BP: 134/77 HR: 98  Standing BP: 128/74 HR: 105  Site: --  Mode: --  Orthostatic VS  10-12-21 @ 08:08  Lying BP: --/-- HR: --  Sitting BP: 109/78 HR: 91  Standing BP: 119/82 HR: 101  Site: --  Mode: --  Orthostatic VS  10-11-21 @ 18:39  Lying BP: --/-- HR: --  Sitting BP: 141/92 HR: 121  Standing BP: 136/79 HR: 100  Site: upper right arm  Mode: electronic

## 2021-10-13 NOTE — BH INPATIENT PSYCHIATRY PROGRESS NOTE - OTHER
more cooperative then becomes irritable when talking about ROOT Abnormal gait,  uses walker responding to internal stimuli and internally preoccupied more logical than before

## 2021-10-13 NOTE — BH INPATIENT PSYCHIATRY PROGRESS NOTE - NSBHFUPINTERVALHXFT_PSY_A_CORE
Pt compliant with medication and tolerating it well.  Chart reviewed and case discussed with treatment team.  No events reported overnight.  Pt initially refuses interview, reporting he wants to rest, but when approached later is more cooperative and able to hold a more linear conversation than previous days.  Pt got a haircut this morning and seemed happy about it.  Pt walks with writer outside, shows writer the fire exit on the patio with stairs, reports he would rather take the stairs when he is discharged as he is [employer] and it is against his [employer] to use the elevator at times.  Explained to him that the exit off the unit has stairs as well as an elevator and patient was accepting of this.  Patient explained that he talked to the mental hygiene  and has court for release next Tuesday and he would like to be discharged then.  Pt reports he has been compliant with his medication.  Talked to patient about risks and benefits of [facility] ROOT and how it is on [facility] court order and patient initially seemed receptive then got more irritable and refused to speak further.      Writer received voicemail from patient's mother, Jackelyn Edwards ([phone]), returned call, updated her on patient current medications and explained recommendation for In[employer]ga ROOT and she was in agreement.  She reported she visited patient this past weekend and reports he "seemed good for 30 minutes" and the other part of the time he was "irritable, pressured and delusional."  Discussed with her patient having court hearing for discharge next Tuesday and she does not feel patient is ready for discharge at this time.

## 2021-10-14 LAB
GLUCOSE BLDC GLUCOMTR-MCNC: 181 MG/DL — HIGH (ref 70–99)
GLUCOSE BLDC GLUCOMTR-MCNC: 193 MG/DL — HIGH (ref 70–99)
GLUCOSE BLDC GLUCOMTR-MCNC: 210 MG/DL — HIGH (ref 70–99)
GLUCOSE BLDC GLUCOMTR-MCNC: 264 MG/DL — HIGH (ref 70–99)
GLUCOSE BLDC GLUCOMTR-MCNC: 293 MG/DL — HIGH (ref 70–99)

## 2021-10-14 PROCEDURE — 99232 SBSQ HOSP IP/OBS MODERATE 35: CPT

## 2021-10-14 RX ORDER — PALIPERIDONE 1.5 MG/1
234 TABLET, EXTENDED RELEASE ORAL ONCE
Refills: 0 | Status: COMPLETED | OUTPATIENT
Start: 2021-10-14 | End: 2021-10-14

## 2021-10-14 RX ORDER — RISPERIDONE 4 MG/1
1 TABLET ORAL DAILY
Refills: 0 | Status: DISCONTINUED | OUTPATIENT
Start: 2021-10-15 | End: 2021-10-18

## 2021-10-14 RX ORDER — LITHIUM CARBONATE 300 MG/1
450 TABLET, EXTENDED RELEASE ORAL
Refills: 0 | Status: DISCONTINUED | OUTPATIENT
Start: 2021-10-14 | End: 2021-10-20

## 2021-10-14 RX ORDER — LITHIUM CARBONATE 300 MG/1
450 TABLET, EXTENDED RELEASE ORAL AT BEDTIME
Refills: 0 | Status: DISCONTINUED | OUTPATIENT
Start: 2021-10-14 | End: 2021-10-14

## 2021-10-14 RX ADMIN — Medication 3: at 11:41

## 2021-10-14 RX ADMIN — RISPERIDONE 2 MILLIGRAM(S): 4 TABLET ORAL at 08:36

## 2021-10-14 RX ADMIN — Medication 1: at 17:37

## 2021-10-14 RX ADMIN — CLOZAPINE 100 MILLIGRAM(S): 150 TABLET, ORALLY DISINTEGRATING ORAL at 08:35

## 2021-10-14 RX ADMIN — CLOZAPINE 150 MILLIGRAM(S): 150 TABLET, ORALLY DISINTEGRATING ORAL at 21:44

## 2021-10-14 RX ADMIN — Medication 2: at 08:30

## 2021-10-14 RX ADMIN — PALIPERIDONE 234 MILLIGRAM(S): 1.5 TABLET, EXTENDED RELEASE ORAL at 12:23

## 2021-10-14 RX ADMIN — ATORVASTATIN CALCIUM 40 MILLIGRAM(S): 80 TABLET, FILM COATED ORAL at 21:44

## 2021-10-14 RX ADMIN — RISPERIDONE 4 MILLIGRAM(S): 4 TABLET ORAL at 21:44

## 2021-10-14 RX ADMIN — LITHIUM CARBONATE 450 MILLIGRAM(S): 300 TABLET, EXTENDED RELEASE ORAL at 21:44

## 2021-10-14 RX ADMIN — METFORMIN HYDROCHLORIDE 1000 MILLIGRAM(S): 850 TABLET ORAL at 21:44

## 2021-10-14 NOTE — BH INPATIENT PSYCHIATRY PROGRESS NOTE - NSBHFUPINTERVALHXFT_PSY_A_CORE
Pt compliant with medication and tolerating it well.  Chart reviewed and case discussed with treatment team.  No events reported overnight.  Pt intrusive, interrupting writer in interviews with other peers, loud and demanding.  On interview, patient initially irritable and illogical, pressured, talks about how "Clozaril demoralizes my skeleton, do you know what a skeleton is?"  Then talks about how the medication is affecting his "calcium level."  Pt then able to calm self down.  Pt inquires about medication.  Writer wrote out a list of his ordered psychotropic medication.  Writer also discussed recommendation of Invega ROOT, agrees to received loading dose today and second dose Monday, and to taper down Risperdal with plan to discontinue.  Pt also also eventually agrees to start Lithium Eskalith  BID after risks and benefits discussed.  Writer wrote medication plan on piece of paper as requested by patient.  Pt denies SI/HI/I/P or AH/VH, but is observed talking to himself at times during the interview.

## 2021-10-14 NOTE — BH INPATIENT PSYCHIATRY PROGRESS NOTE - NSBHASSESSSUMMFT_PSY_ALL_CORE
Plan:  - clozpine ODT titrated to 100mg PO daily and 150mg PO at bedtime  - Risperdal M-tab tapered to 1mg PO daily and 4mg PO at bedtime and ordered mouth checks, Invega Sustenna 234mg IM today  - Start Lithium Eskalith CR 450mg PO BID  - MOO granted on 9/28/21, Haldol 5mg IM and Benadryl 25mg IM if patient refuses Risperdal and/or Clozaril standing medication as per MOO court order   - group and milieu therapy   - routine checks

## 2021-10-14 NOTE — BH INPATIENT PSYCHIATRY PROGRESS NOTE - CURRENT MEDICATION
MEDICATIONS  (STANDING):  acetaminophen   Tablet .. 650 milliGRAM(s) Oral once  atorvastatin 40 milliGRAM(s) Oral at bedtime  cloZAPine Disintegrating Tablet 100 milliGRAM(s) Oral daily  cloZAPine Disintegrating Tablet 150 milliGRAM(s) Oral at bedtime  dextrose 40% Gel 15 Gram(s) Oral once  glucagon  Injectable 1 milliGRAM(s) IntraMuscular once  insulin lispro (ADMELOG) corrective regimen sliding scale   SubCutaneous three times a day before meals  insulin lispro (ADMELOG) corrective regimen sliding scale   SubCutaneous at bedtime  lithium CR (ESKALITH-CR) 450 milliGRAM(s) Oral two times a day  metFORMIN 1000 milliGRAM(s) Oral at bedtime  risperiDONE  Disintegrating Tablet 4 milliGRAM(s) Oral at bedtime  sitaGLIPtin 100 milliGRAM(s) Oral daily    MEDICATIONS  (PRN):  acetaminophen   Tablet .. 650 milliGRAM(s) Oral every 6 hours PRN Mild Pain (1 - 3), Moderate Pain (4 - 6)  chlorproMAZINE    Injectable 100 milliGRAM(s) IntraMuscular once PRN agitation or aggression  chlorproMAZINE    Tablet 100 milliGRAM(s) Oral every 4 hours PRN agitation or aggression  diphenhydrAMINE   Injectable 25 milliGRAM(s) IntraMuscular every 12 hours PRN EPS ppx  diphenhydrAMINE   Injectable 25 milliGRAM(s) IntraMuscular every 12 hours PRN EPS ppx  haloperidol    Injectable 5 milliGRAM(s) IntraMuscular every 12 hours PRN psychosis  haloperidol    Injectable 5 milliGRAM(s) IntraMuscular every 12 hours PRN psychosis  melatonin. 5 milliGRAM(s) Oral at bedtime PRN Insomnia  polyethylene glycol 3350 17 Gram(s) Oral daily PRN constipation  senna 2 Tablet(s) Oral at bedtime PRN constipation

## 2021-10-14 NOTE — BH INPATIENT PSYCHIATRY PROGRESS NOTE - NSBHMETABOLIC_PSY_ALL_CORE_FT
BMI: BMI (kg/m2): 33.1 (09-04-21 @ 07:48)  HbA1c: A1C with Estimated Average Glucose Result: 7.3 % (08-05-21 @ 10:24)    Glucose: POCT Blood Glucose.: 293 mg/dL (10-14-21 @ 12:19)    BP: --  Lipid Panel: Date/Time: 08-05-21 @ 10:24  Cholesterol, Serum: 139  Direct LDL: --  HDL Cholesterol, Serum: 42  Total Cholesterol/HDL Ration Measurement: --  Triglycerides, Serum: 234

## 2021-10-14 NOTE — BH INPATIENT PSYCHIATRY PROGRESS NOTE - NSBHCHARTREVIEWVS_PSY_A_CORE FT
Vital Signs Last 24 Hrs  T(C): 37.1 (10-14-21 @ 07:35), Max: 37.1 (10-14-21 @ 07:35)  T(F): 98.7 (10-14-21 @ 07:35), Max: 98.7 (10-14-21 @ 07:35)  HR: --  BP: --  BP(mean): --  RR: --  SpO2: --    Orthostatic VS  10-14-21 @ 07:35  Lying BP: --/-- HR: --  Sitting BP: 130/88 HR: 97  Standing BP: 134/84 HR: 98  Site: --  Mode: --  Orthostatic VS  10-13-21 @ 20:24  Lying BP: --/-- HR: --  Sitting BP: 128/83 HR: 91  Standing BP: 132/80 HR: 96  Site: --  Mode: --  Orthostatic VS  10-13-21 @ 07:34  Lying BP: 115/63 HR: 64  Sitting BP: 122/71 HR: 86  Standing BP: --/-- HR: --  Site: --  Mode: --  Orthostatic VS  10-12-21 @ 19:29  Lying BP: --/-- HR: --  Sitting BP: 134/77 HR: 98  Standing BP: 128/74 HR: 105  Site: --  Mode: --

## 2021-10-15 LAB
GLUCOSE BLDC GLUCOMTR-MCNC: 141 MG/DL — HIGH (ref 70–99)
GLUCOSE BLDC GLUCOMTR-MCNC: 175 MG/DL — HIGH (ref 70–99)
GLUCOSE BLDC GLUCOMTR-MCNC: 177 MG/DL — HIGH (ref 70–99)
GLUCOSE BLDC GLUCOMTR-MCNC: 202 MG/DL — HIGH (ref 70–99)

## 2021-10-15 PROCEDURE — 99232 SBSQ HOSP IP/OBS MODERATE 35: CPT

## 2021-10-15 RX ADMIN — RISPERIDONE 4 MILLIGRAM(S): 4 TABLET ORAL at 21:29

## 2021-10-15 RX ADMIN — RISPERIDONE 1 MILLIGRAM(S): 4 TABLET ORAL at 08:37

## 2021-10-15 RX ADMIN — Medication 2: at 08:35

## 2021-10-15 RX ADMIN — LITHIUM CARBONATE 450 MILLIGRAM(S): 300 TABLET, EXTENDED RELEASE ORAL at 08:37

## 2021-10-15 RX ADMIN — CLOZAPINE 150 MILLIGRAM(S): 150 TABLET, ORALLY DISINTEGRATING ORAL at 21:29

## 2021-10-15 RX ADMIN — LITHIUM CARBONATE 450 MILLIGRAM(S): 300 TABLET, EXTENDED RELEASE ORAL at 21:29

## 2021-10-15 RX ADMIN — METFORMIN HYDROCHLORIDE 1000 MILLIGRAM(S): 850 TABLET ORAL at 21:29

## 2021-10-15 RX ADMIN — ATORVASTATIN CALCIUM 40 MILLIGRAM(S): 80 TABLET, FILM COATED ORAL at 21:29

## 2021-10-15 RX ADMIN — CLOZAPINE 100 MILLIGRAM(S): 150 TABLET, ORALLY DISINTEGRATING ORAL at 08:39

## 2021-10-15 NOTE — BH INPATIENT PSYCHIATRY PROGRESS NOTE - NSBHFUPINTERVALHXFT_PSY_A_CORE
Chart reviewed and patient interviewed. Case discussed with treatment team. Patient is irritable and illogical, pressured, and disorganized. Patient is internally preoccupied. Patient is illogical and irritable. Intermittently complaint with medications. No side effects reported.

## 2021-10-15 NOTE — BH INPATIENT PSYCHIATRY PROGRESS NOTE - NSBHASSESSSUMMFT_PSY_ALL_CORE
Patient continue to remain psychotic, irritable, disorganized and unable to fucntion       Plan:  - clozpine ODT t100mg PO daily and 150mg PO at bedtime   - Risperdal M-tab tapered to 1mg PO daily and 4mg PO at bedtime and ordered mouth checks, Invega Sustenna 234mg IM today   - Lithium Eskalith CR 450mg PO BID   - MOO granted on 9/28/21, Haldol 5mg IM and Benadryl 25mg IM if patient refuses Risperdal and/or Clozaril standing medication as per MOO court order    - group and milieu therapy    - routine checks

## 2021-10-15 NOTE — BH INPATIENT PSYCHIATRY PROGRESS NOTE - CURRENT MEDICATION
MEDICATIONS  (STANDING):  acetaminophen   Tablet .. 650 milliGRAM(s) Oral once  atorvastatin 40 milliGRAM(s) Oral at bedtime  cloZAPine Disintegrating Tablet 100 milliGRAM(s) Oral daily  cloZAPine Disintegrating Tablet 150 milliGRAM(s) Oral at bedtime  dextrose 40% Gel 15 Gram(s) Oral once  glucagon  Injectable 1 milliGRAM(s) IntraMuscular once  insulin lispro (ADMELOG) corrective regimen sliding scale   SubCutaneous three times a day before meals  insulin lispro (ADMELOG) corrective regimen sliding scale   SubCutaneous at bedtime  lithium CR (ESKALITH-CR) 450 milliGRAM(s) Oral two times a day  metFORMIN 1000 milliGRAM(s) Oral at bedtime  risperiDONE  Disintegrating Tablet 1 milliGRAM(s) Oral daily  risperiDONE  Disintegrating Tablet 4 milliGRAM(s) Oral at bedtime  sitaGLIPtin 100 milliGRAM(s) Oral daily    MEDICATIONS  (PRN):  acetaminophen   Tablet .. 650 milliGRAM(s) Oral every 6 hours PRN Mild Pain (1 - 3), Moderate Pain (4 - 6)  chlorproMAZINE    Injectable 100 milliGRAM(s) IntraMuscular once PRN agitation or aggression  chlorproMAZINE    Tablet 100 milliGRAM(s) Oral every 4 hours PRN agitation or aggression  diphenhydrAMINE   Injectable 25 milliGRAM(s) IntraMuscular every 12 hours PRN EPS ppx  diphenhydrAMINE   Injectable 25 milliGRAM(s) IntraMuscular every 12 hours PRN EPS ppx  haloperidol    Injectable 5 milliGRAM(s) IntraMuscular every 12 hours PRN psychosis  haloperidol    Injectable 5 milliGRAM(s) IntraMuscular every 12 hours PRN psychosis  melatonin. 5 milliGRAM(s) Oral at bedtime PRN Insomnia  polyethylene glycol 3350 17 Gram(s) Oral daily PRN constipation  senna 2 Tablet(s) Oral at bedtime PRN constipation   MEDICATIONS  (STANDING):  acetaminophen   Tablet .. 650 milliGRAM(s) Oral once  atorvastatin 40 milliGRAM(s) Oral at bedtime  cloZAPine Disintegrating Tablet 100 milliGRAM(s) Oral daily  cloZAPine Disintegrating Tablet 150 milliGRAM(s) Oral at bedtime  dextrose 40% Gel 15 Gram(s) Oral once  glucagon  Injectable 1 milliGRAM(s) IntraMuscular once  insulin lispro (ADMELOG) corrective regimen sliding scale   SubCutaneous three times a day before meals  insulin lispro (ADMELOG) corrective regimen sliding scale   SubCutaneous at bedtime  lithium CR (ESKALITH-CR) 450 milliGRAM(s) Oral two times a day  metFORMIN 1000 milliGRAM(s) Oral at bedtime  risperiDONE  Disintegrating Tablet 4 milliGRAM(s) Oral at bedtime  risperiDONE  Disintegrating Tablet 1 milliGRAM(s) Oral daily  sitaGLIPtin 100 milliGRAM(s) Oral daily    MEDICATIONS  (PRN):  acetaminophen   Tablet .. 650 milliGRAM(s) Oral every 6 hours PRN Mild Pain (1 - 3), Moderate Pain (4 - 6)  chlorproMAZINE    Injectable 100 milliGRAM(s) IntraMuscular once PRN agitation or aggression  chlorproMAZINE    Tablet 100 milliGRAM(s) Oral every 4 hours PRN agitation or aggression  diphenhydrAMINE   Injectable 25 milliGRAM(s) IntraMuscular every 12 hours PRN EPS ppx  diphenhydrAMINE   Injectable 25 milliGRAM(s) IntraMuscular every 12 hours PRN EPS ppx  haloperidol    Injectable 5 milliGRAM(s) IntraMuscular every 12 hours PRN psychosis  haloperidol    Injectable 5 milliGRAM(s) IntraMuscular every 12 hours PRN psychosis  melatonin. 5 milliGRAM(s) Oral at bedtime PRN Insomnia  polyethylene glycol 3350 17 Gram(s) Oral daily PRN constipation  senna 2 Tablet(s) Oral at bedtime PRN constipation

## 2021-10-15 NOTE — BH INPATIENT PSYCHIATRY PROGRESS NOTE - OTHER
Abnormal gait,  uses walker more logical than before intrusive, but eventually cooperative responding to internal stimuli

## 2021-10-15 NOTE — BH INPATIENT PSYCHIATRY PROGRESS NOTE - NSBHCHARTREVIEWVS_PSY_A_CORE FT
Vital Signs Last 24 Hrs  T(C): 36.3 (10-15-21 @ 08:18), Max: 36.6 (10-14-21 @ 19:41)  T(F): 97.4 (10-15-21 @ 08:18), Max: 97.8 (10-14-21 @ 19:41)  HR: --  BP: --  BP(mean): --  RR: --  SpO2: --    Orthostatic VS  10-15-21 @ 08:18  Lying BP: --/-- HR: --  Sitting BP: 119/83 HR: 99  Standing BP: --/-- HR: --  Site: --  Mode: --  Orthostatic VS  10-14-21 @ 19:41  Lying BP: --/-- HR: --  Sitting BP: 146/79 HR: 96  Standing BP: 145/88 HR: 102  Site: --  Mode: --  Orthostatic VS  10-14-21 @ 07:35  Lying BP: --/-- HR: --  Sitting BP: 130/88 HR: 97  Standing BP: 134/84 HR: 98  Site: --  Mode: --  Orthostatic VS  10-13-21 @ 20:24  Lying BP: --/-- HR: --  Sitting BP: 128/83 HR: 91  Standing BP: 132/80 HR: 96  Site: --  Mode: --   Vital Signs Last 24 Hrs  T(C): 36.4 (10-18-21 @ 07:51), Max: 36.4 (10-18-21 @ 07:51)  T(F): 97.6 (10-18-21 @ 07:51), Max: 97.6 (10-18-21 @ 07:51)  HR: --  BP: --  BP(mean): --  RR: --  SpO2: --    Orthostatic VS  10-18-21 @ 07:51  Lying BP: --/-- HR: --  Sitting BP: 128/76 HR: 101  Standing BP: --/-- HR: --  Site: --  Mode: --  Orthostatic VS  10-17-21 @ 20:36  Lying BP: --/-- HR: --  Sitting BP: 137/93 HR: 94  Standing BP: 134/86 HR: 98  Site: --  Mode: --  Orthostatic VS  10-16-21 @ 20:44  Lying BP: --/-- HR: --  Sitting BP: 128/74 HR: 96  Standing BP: 119/70 HR: 101  Site: upper right arm  Mode: electronic

## 2021-10-15 NOTE — BH INPATIENT PSYCHIATRY PROGRESS NOTE - NSBHMETABOLIC_PSY_ALL_CORE_FT
BMI: BMI (kg/m2): 33.1 (09-04-21 @ 07:48)  HbA1c: A1C with Estimated Average Glucose Result: 7.3 % (08-05-21 @ 10:24)    Glucose: POCT Blood Glucose.: 141 mg/dL (10-15-21 @ 11:36)    BP: --  Lipid Panel: Date/Time: 08-05-21 @ 10:24  Cholesterol, Serum: 139  Direct LDL: --  HDL Cholesterol, Serum: 42  Total Cholesterol/HDL Ration Measurement: --  Triglycerides, Serum: 234   BMI: BMI (kg/m2): 33.1 (09-04-21 @ 07:48)  HbA1c: A1C with Estimated Average Glucose Result: 7.3 % (08-05-21 @ 10:24)    Glucose: POCT Blood Glucose.: 182 mg/dL (10-18-21 @ 08:05)    BP: 107/75 (10-17-21 @ 06:23) (107/75 - 124/89)  Lipid Panel: Date/Time: 08-05-21 @ 10:24  Cholesterol, Serum: 139  Direct LDL: --  HDL Cholesterol, Serum: 42  Total Cholesterol/HDL Ration Measurement: --  Triglycerides, Serum: 234

## 2021-10-16 LAB
GLUCOSE BLDC GLUCOMTR-MCNC: 161 MG/DL — HIGH (ref 70–99)
GLUCOSE BLDC GLUCOMTR-MCNC: 195 MG/DL — HIGH (ref 70–99)
GLUCOSE BLDC GLUCOMTR-MCNC: 200 MG/DL — HIGH (ref 70–99)

## 2021-10-16 RX ADMIN — METFORMIN HYDROCHLORIDE 1000 MILLIGRAM(S): 850 TABLET ORAL at 21:41

## 2021-10-16 RX ADMIN — LITHIUM CARBONATE 450 MILLIGRAM(S): 300 TABLET, EXTENDED RELEASE ORAL at 09:16

## 2021-10-16 RX ADMIN — CLOZAPINE 150 MILLIGRAM(S): 150 TABLET, ORALLY DISINTEGRATING ORAL at 21:42

## 2021-10-16 RX ADMIN — Medication 1: at 09:34

## 2021-10-16 RX ADMIN — RISPERIDONE 1 MILLIGRAM(S): 4 TABLET ORAL at 09:16

## 2021-10-16 RX ADMIN — ATORVASTATIN CALCIUM 40 MILLIGRAM(S): 80 TABLET, FILM COATED ORAL at 21:41

## 2021-10-16 RX ADMIN — LITHIUM CARBONATE 450 MILLIGRAM(S): 300 TABLET, EXTENDED RELEASE ORAL at 21:41

## 2021-10-16 RX ADMIN — CLOZAPINE 100 MILLIGRAM(S): 150 TABLET, ORALLY DISINTEGRATING ORAL at 09:16

## 2021-10-16 RX ADMIN — RISPERIDONE 4 MILLIGRAM(S): 4 TABLET ORAL at 21:40

## 2021-10-17 LAB
GLUCOSE BLDC GLUCOMTR-MCNC: 159 MG/DL — HIGH (ref 70–99)
GLUCOSE BLDC GLUCOMTR-MCNC: 181 MG/DL — HIGH (ref 70–99)
GLUCOSE BLDC GLUCOMTR-MCNC: 203 MG/DL — HIGH (ref 70–99)

## 2021-10-17 RX ADMIN — Medication 0: at 20:55

## 2021-10-17 RX ADMIN — ATORVASTATIN CALCIUM 40 MILLIGRAM(S): 80 TABLET, FILM COATED ORAL at 21:13

## 2021-10-17 RX ADMIN — LITHIUM CARBONATE 450 MILLIGRAM(S): 300 TABLET, EXTENDED RELEASE ORAL at 21:13

## 2021-10-17 RX ADMIN — CLOZAPINE 150 MILLIGRAM(S): 150 TABLET, ORALLY DISINTEGRATING ORAL at 21:12

## 2021-10-17 RX ADMIN — CLOZAPINE 100 MILLIGRAM(S): 150 TABLET, ORALLY DISINTEGRATING ORAL at 08:36

## 2021-10-17 RX ADMIN — LITHIUM CARBONATE 450 MILLIGRAM(S): 300 TABLET, EXTENDED RELEASE ORAL at 08:36

## 2021-10-17 RX ADMIN — RISPERIDONE 4 MILLIGRAM(S): 4 TABLET ORAL at 21:13

## 2021-10-17 RX ADMIN — RISPERIDONE 1 MILLIGRAM(S): 4 TABLET ORAL at 08:36

## 2021-10-17 RX ADMIN — METFORMIN HYDROCHLORIDE 1000 MILLIGRAM(S): 850 TABLET ORAL at 21:31

## 2021-10-18 LAB
GLUCOSE BLDC GLUCOMTR-MCNC: 166 MG/DL — HIGH (ref 70–99)
GLUCOSE BLDC GLUCOMTR-MCNC: 182 MG/DL — HIGH (ref 70–99)
GLUCOSE BLDC GLUCOMTR-MCNC: 200 MG/DL — HIGH (ref 70–99)
GLUCOSE BLDC GLUCOMTR-MCNC: 228 MG/DL — HIGH (ref 70–99)

## 2021-10-18 PROCEDURE — 99232 SBSQ HOSP IP/OBS MODERATE 35: CPT

## 2021-10-18 RX ORDER — PALIPERIDONE 1.5 MG/1
156 TABLET, EXTENDED RELEASE ORAL ONCE
Refills: 0 | Status: COMPLETED | OUTPATIENT
Start: 2021-10-18 | End: 2021-10-18

## 2021-10-18 RX ADMIN — LITHIUM CARBONATE 450 MILLIGRAM(S): 300 TABLET, EXTENDED RELEASE ORAL at 21:04

## 2021-10-18 RX ADMIN — METFORMIN HYDROCHLORIDE 1000 MILLIGRAM(S): 850 TABLET ORAL at 21:04

## 2021-10-18 RX ADMIN — RISPERIDONE 1 MILLIGRAM(S): 4 TABLET ORAL at 09:02

## 2021-10-18 RX ADMIN — RISPERIDONE 4 MILLIGRAM(S): 4 TABLET ORAL at 21:04

## 2021-10-18 RX ADMIN — PALIPERIDONE 156 MILLIGRAM(S): 1.5 TABLET, EXTENDED RELEASE ORAL at 13:43

## 2021-10-18 RX ADMIN — ATORVASTATIN CALCIUM 40 MILLIGRAM(S): 80 TABLET, FILM COATED ORAL at 21:04

## 2021-10-18 RX ADMIN — CLOZAPINE 150 MILLIGRAM(S): 150 TABLET, ORALLY DISINTEGRATING ORAL at 21:03

## 2021-10-18 RX ADMIN — CLOZAPINE 100 MILLIGRAM(S): 150 TABLET, ORALLY DISINTEGRATING ORAL at 09:01

## 2021-10-18 RX ADMIN — LITHIUM CARBONATE 450 MILLIGRAM(S): 300 TABLET, EXTENDED RELEASE ORAL at 09:01

## 2021-10-18 NOTE — BH INPATIENT PSYCHIATRY PROGRESS NOTE - NSBHASSESSSUMMFT_PSY_ALL_CORE
Plan:  - clozpine ODT titrated to 100mg PO daily and 150mg PO at bedtime  - Risperdal M-tab tapered to 1mg PO daily and 4mg PO at bedtime and ordered mouth checks, Invega Sustenna 234mg IM today  - Start Lithium Eskalith CR 450mg PO BID  - MOO granted on 9/28/21, Haldol 5mg IM and Benadryl 25mg IM if patient refuses Risperdal and/or Clozaril standing medication as per MOO court order   - group and milieu therapy   - routine checks            Plan:  - clozpine ODT titrated to 100mg PO daily and 150mg PO at bedtime  - Risperdal M-tab tapered to 1mg PO daily and 4mg PO at bedtime and ordered mouth checks, Invega Sustenna 234mg IM on 10/15 and 156mg IM today  - Start Lithium Eskalith CR 450mg PO BID  - MOO granted on 9/28/21, Haldol 5mg IM and Benadryl 25mg IM if patient refuses Risperdal and/or Clozaril standing medication as per MOO court order   - group and milieu therapy   - routine checks   - Lithium level and CBC + diff on 10/21

## 2021-10-18 NOTE — BH INPATIENT PSYCHIATRY PROGRESS NOTE - NSBHCHARTREVIEWVS_PSY_A_CORE FT
Vital Signs Last 24 Hrs  T(C): 36.4 (10-18-21 @ 07:51), Max: 36.4 (10-18-21 @ 07:51)  T(F): 97.6 (10-18-21 @ 07:51), Max: 97.6 (10-18-21 @ 07:51)  HR: --  BP: --  BP(mean): --  RR: --  SpO2: --    Orthostatic VS  10-18-21 @ 07:51  Lying BP: --/-- HR: --  Sitting BP: 128/76 HR: 101  Standing BP: --/-- HR: --  Site: --  Mode: --  Orthostatic VS  10-17-21 @ 20:36  Lying BP: --/-- HR: --  Sitting BP: 137/93 HR: 94  Standing BP: 134/86 HR: 98  Site: --  Mode: --  Orthostatic VS  10-16-21 @ 20:44  Lying BP: --/-- HR: --  Sitting BP: 128/74 HR: 96  Standing BP: 119/70 HR: 101  Site: upper right arm  Mode: electronic

## 2021-10-18 NOTE — BH INPATIENT PSYCHIATRY PROGRESS NOTE - NSBHMETABOLIC_PSY_ALL_CORE_FT
BMI: BMI (kg/m2): 33.1 (09-04-21 @ 07:48)  HbA1c: A1C with Estimated Average Glucose Result: 7.3 % (08-05-21 @ 10:24)    Glucose: POCT Blood Glucose.: 200 mg/dL (10-18-21 @ 12:12)    BP: 107/75 (10-17-21 @ 06:23) (107/75 - 124/89)  Lipid Panel: Date/Time: 08-05-21 @ 10:24  Cholesterol, Serum: 139  Direct LDL: --  HDL Cholesterol, Serum: 42  Total Cholesterol/HDL Ration Measurement: --  Triglycerides, Serum: 234

## 2021-10-18 NOTE — BH INPATIENT PSYCHIATRY PROGRESS NOTE - CURRENT MEDICATION
MEDICATIONS  (STANDING):  acetaminophen   Tablet .. 650 milliGRAM(s) Oral once  atorvastatin 40 milliGRAM(s) Oral at bedtime  cloZAPine Disintegrating Tablet 100 milliGRAM(s) Oral daily  cloZAPine Disintegrating Tablet 150 milliGRAM(s) Oral at bedtime  dextrose 40% Gel 15 Gram(s) Oral once  glucagon  Injectable 1 milliGRAM(s) IntraMuscular once  insulin lispro (ADMELOG) corrective regimen sliding scale   SubCutaneous three times a day before meals  insulin lispro (ADMELOG) corrective regimen sliding scale   SubCutaneous at bedtime  lithium CR (ESKALITH-CR) 450 milliGRAM(s) Oral two times a day  metFORMIN 1000 milliGRAM(s) Oral at bedtime  paliperidone Injectable, Long Acting 156 milliGRAM(s) IntraMuscular once  risperiDONE  Disintegrating Tablet 4 milliGRAM(s) Oral at bedtime  sitaGLIPtin 100 milliGRAM(s) Oral daily    MEDICATIONS  (PRN):  acetaminophen   Tablet .. 650 milliGRAM(s) Oral every 6 hours PRN Mild Pain (1 - 3), Moderate Pain (4 - 6)  chlorproMAZINE    Injectable 100 milliGRAM(s) IntraMuscular once PRN agitation or aggression  chlorproMAZINE    Tablet 100 milliGRAM(s) Oral every 4 hours PRN agitation or aggression  diphenhydrAMINE   Injectable 25 milliGRAM(s) IntraMuscular every 12 hours PRN EPS ppx  diphenhydrAMINE   Injectable 25 milliGRAM(s) IntraMuscular every 12 hours PRN EPS ppx  haloperidol    Injectable 5 milliGRAM(s) IntraMuscular every 12 hours PRN psychosis  haloperidol    Injectable 5 milliGRAM(s) IntraMuscular every 12 hours PRN psychosis  melatonin. 5 milliGRAM(s) Oral at bedtime PRN Insomnia  polyethylene glycol 3350 17 Gram(s) Oral daily PRN constipation  senna 2 Tablet(s) Oral at bedtime PRN constipation

## 2021-10-18 NOTE — BH INPATIENT PSYCHIATRY PROGRESS NOTE - NSBHFUPINTERVALHXFT_PSY_A_CORE
Pt compliant with medication and tolerating it well.  Chart reviewed and case discussed with treatment team.  Per report Pt compliant with medication and tolerating it well.  Chart reviewed and case discussed with treatment team.  Per report, patient was verbally abusive towards staff on Saturday, giving them the middle finger and threatening to call the police, needed a lot of encouragement to be compliant with PO court ordered medication.  Pt on interview is anxious and irritable, talks to himself at times.  Pt reports he is waiting from a call from his  "because I don't feel up to going to court."  Then later changes his mind, says he will go if it is via Zoom.  Pt delusional, talking about how he is an , and how he is not going back to his residence, is going to [place] for unclear reasons.  Pt then reports, "my mother is a lunatic," and his "room is a time machine."  Discussed with patient plan for second dose of Invega ROOT today, patient agreeable, but then talks about how "you cannot substitute a loving relationship with medication."  Pt later got more irritable, threw his breakfast all over the table, reports he is upset because he is unsure if he wants to go to court for release tomorrow, reports he will decide by the AM.  Pt cleans up mess on the table with encouragement.

## 2021-10-19 LAB
GLUCOSE BLDC GLUCOMTR-MCNC: 161 MG/DL — HIGH (ref 70–99)
GLUCOSE BLDC GLUCOMTR-MCNC: 176 MG/DL — HIGH (ref 70–99)
GLUCOSE BLDC GLUCOMTR-MCNC: 213 MG/DL — HIGH (ref 70–99)
GLUCOSE BLDC GLUCOMTR-MCNC: 244 MG/DL — HIGH (ref 70–99)

## 2021-10-19 PROCEDURE — 99232 SBSQ HOSP IP/OBS MODERATE 35: CPT

## 2021-10-19 RX ORDER — SENNA PLUS 8.6 MG/1
2 TABLET ORAL AT BEDTIME
Refills: 0 | Status: DISCONTINUED | OUTPATIENT
Start: 2021-10-20 | End: 2021-11-30

## 2021-10-19 RX ADMIN — METFORMIN HYDROCHLORIDE 1000 MILLIGRAM(S): 850 TABLET ORAL at 21:08

## 2021-10-19 RX ADMIN — LITHIUM CARBONATE 450 MILLIGRAM(S): 300 TABLET, EXTENDED RELEASE ORAL at 21:08

## 2021-10-19 RX ADMIN — CLOZAPINE 100 MILLIGRAM(S): 150 TABLET, ORALLY DISINTEGRATING ORAL at 08:50

## 2021-10-19 RX ADMIN — LITHIUM CARBONATE 450 MILLIGRAM(S): 300 TABLET, EXTENDED RELEASE ORAL at 08:50

## 2021-10-19 RX ADMIN — ATORVASTATIN CALCIUM 40 MILLIGRAM(S): 80 TABLET, FILM COATED ORAL at 21:08

## 2021-10-19 RX ADMIN — Medication 5 MILLIGRAM(S): at 21:08

## 2021-10-19 RX ADMIN — CLOZAPINE 150 MILLIGRAM(S): 150 TABLET, ORALLY DISINTEGRATING ORAL at 21:07

## 2021-10-19 RX ADMIN — RISPERIDONE 4 MILLIGRAM(S): 4 TABLET ORAL at 21:08

## 2021-10-19 NOTE — BH INPATIENT PSYCHIATRY PROGRESS NOTE - NSBHCHARTREVIEWVS_PSY_A_CORE FT
Vital Signs Last 24 Hrs  T(C): 36.4 (10-19-21 @ 07:51), Max: 36.8 (10-18-21 @ 18:22)  T(F): 97.6 (10-19-21 @ 07:51), Max: 98.3 (10-18-21 @ 18:22)  HR: --  BP: --  BP(mean): --  RR: --  SpO2: --    Orthostatic VS  10-19-21 @ 07:51  Lying BP: --/-- HR: --  Sitting BP: 130/94 HR: 91  Standing BP: 126/96 HR: 100  Site: --  Mode: --  Orthostatic VS  10-18-21 @ 20:33  Lying BP: --/-- HR: --  Sitting BP: 123/75 HR: 98  Standing BP: 139/82 HR: 100  Site: upper right arm  Mode: electronic  Orthostatic VS  10-18-21 @ 07:51  Lying BP: --/-- HR: --  Sitting BP: 128/76 HR: 101  Standing BP: --/-- HR: --  Site: --  Mode: --  Orthostatic VS  10-17-21 @ 20:36  Lying BP: --/-- HR: --  Sitting BP: 137/93 HR: 94  Standing BP: 134/86 HR: 98  Site: --  Mode: --

## 2021-10-19 NOTE — BH INPATIENT PSYCHIATRY PROGRESS NOTE - NSBHASSESSSUMMFT_PSY_ALL_CORE
Plan:  - clozpine ODT titrated to 100mg PO daily and 150mg PO at bedtime  - Risperdal M-tab tapered to 1mg PO daily and 4mg PO at bedtime and ordered mouth checks, Invega Sustenna 234mg IM on 10/15 and 156mg IM today  - Start Lithium Eskalith CR 450mg PO BID  - MOO granted on 9/28/21, Haldol 5mg IM and Benadryl 25mg IM if patient refuses Risperdal and/or Clozaril standing medication as per MOO court order   - group and milieu therapy   - routine checks   - Lithium level and CBC + diff on 10/20

## 2021-10-19 NOTE — BH INPATIENT PSYCHIATRY PROGRESS NOTE - NSBHFUPINTERVALHXFT_PSY_A_CORE
Pt compliant with medication, needed encouragement to take Lithium Eskalith, but was compliant.  Chart reviewed and case discussed with treatment team.  No events reported overnight.  Pt reports having a small bowel movement this morning, agrees to take Senna.  Pt reports eating and sleeping well.  Pt talks about how he had "an operation" and how Lithium Eskalith is "bad for my skeleton" because he has osteogenesis imperfecta.  An alarm on patient's watch then goes off and when asked what the alarm was for, he reports, "is it 10:00, I have to wake my girlfriend Bev up," who is another patient on the unit.  He also talks about how he plans to live with her and her parents before he goes back to his residence.  Pt then gets tired of talking and asks to not speak further.

## 2021-10-19 NOTE — BH INPATIENT PSYCHIATRY PROGRESS NOTE - NSBHMETABOLIC_PSY_ALL_CORE_FT
BMI: BMI (kg/m2): 33.1 (09-04-21 @ 07:48)  HbA1c: A1C with Estimated Average Glucose Result: 7.3 % (08-05-21 @ 10:24)    Glucose: POCT Blood Glucose.: 161 mg/dL (10-19-21 @ 11:59)    BP: 107/75 (10-17-21 @ 06:23) (107/75 - 107/75)  Lipid Panel: Date/Time: 08-05-21 @ 10:24  Cholesterol, Serum: 139  Direct LDL: --  HDL Cholesterol, Serum: 42  Total Cholesterol/HDL Ration Measurement: --  Triglycerides, Serum: 234

## 2021-10-19 NOTE — BH INPATIENT PSYCHIATRY PROGRESS NOTE - CURRENT MEDICATION
MEDICATIONS  (STANDING):  acetaminophen   Tablet .. 650 milliGRAM(s) Oral once  atorvastatin 40 milliGRAM(s) Oral at bedtime  cloZAPine Disintegrating Tablet 100 milliGRAM(s) Oral daily  cloZAPine Disintegrating Tablet 150 milliGRAM(s) Oral at bedtime  dextrose 40% Gel 15 Gram(s) Oral once  glucagon  Injectable 1 milliGRAM(s) IntraMuscular once  insulin lispro (ADMELOG) corrective regimen sliding scale   SubCutaneous three times a day before meals  insulin lispro (ADMELOG) corrective regimen sliding scale   SubCutaneous at bedtime  lithium CR (ESKALITH-CR) 450 milliGRAM(s) Oral two times a day  metFORMIN 1000 milliGRAM(s) Oral at bedtime  risperiDONE  Disintegrating Tablet 4 milliGRAM(s) Oral at bedtime  sitaGLIPtin 100 milliGRAM(s) Oral daily    MEDICATIONS  (PRN):  acetaminophen   Tablet .. 650 milliGRAM(s) Oral every 6 hours PRN Mild Pain (1 - 3), Moderate Pain (4 - 6)  chlorproMAZINE    Injectable 100 milliGRAM(s) IntraMuscular once PRN agitation or aggression  chlorproMAZINE    Tablet 100 milliGRAM(s) Oral every 4 hours PRN agitation or aggression  diphenhydrAMINE   Injectable 25 milliGRAM(s) IntraMuscular every 12 hours PRN EPS ppx  diphenhydrAMINE   Injectable 25 milliGRAM(s) IntraMuscular every 12 hours PRN EPS ppx  haloperidol    Injectable 5 milliGRAM(s) IntraMuscular every 12 hours PRN psychosis  haloperidol    Injectable 5 milliGRAM(s) IntraMuscular every 12 hours PRN psychosis  melatonin. 5 milliGRAM(s) Oral at bedtime PRN Insomnia  polyethylene glycol 3350 17 Gram(s) Oral daily PRN constipation

## 2021-10-19 NOTE — BH INPATIENT PSYCHIATRY PROGRESS NOTE - PRN MEDS
MEDICATIONS  (PRN):  acetaminophen   Tablet .. 650 milliGRAM(s) Oral every 6 hours PRN Mild Pain (1 - 3), Moderate Pain (4 - 6)  chlorproMAZINE    Injectable 100 milliGRAM(s) IntraMuscular once PRN agitation or aggression  chlorproMAZINE    Tablet 100 milliGRAM(s) Oral every 4 hours PRN agitation or aggression  diphenhydrAMINE   Injectable 25 milliGRAM(s) IntraMuscular every 12 hours PRN EPS ppx  diphenhydrAMINE   Injectable 25 milliGRAM(s) IntraMuscular every 12 hours PRN EPS ppx  haloperidol    Injectable 5 milliGRAM(s) IntraMuscular every 12 hours PRN psychosis  haloperidol    Injectable 5 milliGRAM(s) IntraMuscular every 12 hours PRN psychosis  melatonin. 5 milliGRAM(s) Oral at bedtime PRN Insomnia  polyethylene glycol 3350 17 Gram(s) Oral daily PRN constipation

## 2021-10-20 LAB
ANION GAP SERPL CALC-SCNC: 12 MMOL/L — SIGNIFICANT CHANGE UP (ref 7–14)
BASOPHILS # BLD AUTO: 0.08 K/UL — SIGNIFICANT CHANGE UP (ref 0–0.2)
BASOPHILS NFR BLD AUTO: 0.6 % — SIGNIFICANT CHANGE UP (ref 0–2)
BUN SERPL-MCNC: 19 MG/DL — SIGNIFICANT CHANGE UP (ref 7–23)
CALCIUM SERPL-MCNC: 9.4 MG/DL — SIGNIFICANT CHANGE UP (ref 8.4–10.5)
CHLORIDE SERPL-SCNC: 99 MMOL/L — SIGNIFICANT CHANGE UP (ref 98–107)
CO2 SERPL-SCNC: 26 MMOL/L — SIGNIFICANT CHANGE UP (ref 22–31)
CREAT SERPL-MCNC: 0.72 MG/DL — SIGNIFICANT CHANGE UP (ref 0.5–1.3)
EOSINOPHIL # BLD AUTO: 0.18 K/UL — SIGNIFICANT CHANGE UP (ref 0–0.5)
EOSINOPHIL NFR BLD AUTO: 1.3 % — SIGNIFICANT CHANGE UP (ref 0–6)
GLUCOSE BLDC GLUCOMTR-MCNC: 182 MG/DL — HIGH (ref 70–99)
GLUCOSE BLDC GLUCOMTR-MCNC: 204 MG/DL — HIGH (ref 70–99)
GLUCOSE BLDC GLUCOMTR-MCNC: 214 MG/DL — HIGH (ref 70–99)
GLUCOSE SERPL-MCNC: 186 MG/DL — HIGH (ref 70–99)
HCT VFR BLD CALC: 43.1 % — SIGNIFICANT CHANGE UP (ref 39–50)
HGB BLD-MCNC: 13.9 G/DL — SIGNIFICANT CHANGE UP (ref 13–17)
IANC: 10.43 K/UL — HIGH (ref 1.5–8.5)
IMM GRANULOCYTES NFR BLD AUTO: 0.8 % — SIGNIFICANT CHANGE UP (ref 0–1.5)
LITHIUM SERPL-MCNC: 0.4 MMOL/L — LOW (ref 0.6–1.2)
LYMPHOCYTES # BLD AUTO: 16.3 % — SIGNIFICANT CHANGE UP (ref 13–44)
LYMPHOCYTES # BLD AUTO: 2.26 K/UL — SIGNIFICANT CHANGE UP (ref 1–3.3)
MCHC RBC-ENTMCNC: 27.1 PG — SIGNIFICANT CHANGE UP (ref 27–34)
MCHC RBC-ENTMCNC: 32.3 GM/DL — SIGNIFICANT CHANGE UP (ref 32–36)
MCV RBC AUTO: 84 FL — SIGNIFICANT CHANGE UP (ref 80–100)
MONOCYTES # BLD AUTO: 0.77 K/UL — SIGNIFICANT CHANGE UP (ref 0–0.9)
MONOCYTES NFR BLD AUTO: 5.6 % — SIGNIFICANT CHANGE UP (ref 2–14)
NEUTROPHILS # BLD AUTO: 10.43 K/UL — HIGH (ref 1.8–7.4)
NEUTROPHILS NFR BLD AUTO: 75.4 % — SIGNIFICANT CHANGE UP (ref 43–77)
NRBC # BLD: 0 /100 WBCS — SIGNIFICANT CHANGE UP
NRBC # FLD: 0 K/UL — SIGNIFICANT CHANGE UP
PLATELET # BLD AUTO: 224 K/UL — SIGNIFICANT CHANGE UP (ref 150–400)
POTASSIUM SERPL-MCNC: 3.7 MMOL/L — SIGNIFICANT CHANGE UP (ref 3.5–5.3)
POTASSIUM SERPL-SCNC: 3.7 MMOL/L — SIGNIFICANT CHANGE UP (ref 3.5–5.3)
RBC # BLD: 5.13 M/UL — SIGNIFICANT CHANGE UP (ref 4.2–5.8)
RBC # FLD: 14.7 % — HIGH (ref 10.3–14.5)
SODIUM SERPL-SCNC: 137 MMOL/L — SIGNIFICANT CHANGE UP (ref 135–145)
WBC # BLD: 13.83 K/UL — HIGH (ref 3.8–10.5)
WBC # FLD AUTO: 13.83 K/UL — HIGH (ref 3.8–10.5)

## 2021-10-20 PROCEDURE — 99232 SBSQ HOSP IP/OBS MODERATE 35: CPT

## 2021-10-20 RX ORDER — LITHIUM CARBONATE 300 MG/1
900 TABLET, EXTENDED RELEASE ORAL AT BEDTIME
Refills: 0 | Status: DISCONTINUED | OUTPATIENT
Start: 2021-10-20 | End: 2021-11-01

## 2021-10-20 RX ORDER — RISPERIDONE 4 MG/1
3 TABLET ORAL AT BEDTIME
Refills: 0 | Status: DISCONTINUED | OUTPATIENT
Start: 2021-10-20 | End: 2021-10-22

## 2021-10-20 RX ORDER — LITHIUM CARBONATE 300 MG/1
450 TABLET, EXTENDED RELEASE ORAL DAILY
Refills: 0 | Status: DISCONTINUED | OUTPATIENT
Start: 2021-10-21 | End: 2021-11-01

## 2021-10-20 RX ORDER — CLOZAPINE 150 MG/1
175 TABLET, ORALLY DISINTEGRATING ORAL AT BEDTIME
Refills: 0 | Status: DISCONTINUED | OUTPATIENT
Start: 2021-10-20 | End: 2021-10-22

## 2021-10-20 RX ADMIN — SENNA PLUS 2 TABLET(S): 8.6 TABLET ORAL at 21:09

## 2021-10-20 RX ADMIN — ATORVASTATIN CALCIUM 40 MILLIGRAM(S): 80 TABLET, FILM COATED ORAL at 21:08

## 2021-10-20 RX ADMIN — Medication 5 MILLIGRAM(S): at 21:08

## 2021-10-20 RX ADMIN — METFORMIN HYDROCHLORIDE 1000 MILLIGRAM(S): 850 TABLET ORAL at 21:09

## 2021-10-20 RX ADMIN — CLOZAPINE 175 MILLIGRAM(S): 150 TABLET, ORALLY DISINTEGRATING ORAL at 21:08

## 2021-10-20 RX ADMIN — LITHIUM CARBONATE 900 MILLIGRAM(S): 300 TABLET, EXTENDED RELEASE ORAL at 21:08

## 2021-10-20 RX ADMIN — CLOZAPINE 100 MILLIGRAM(S): 150 TABLET, ORALLY DISINTEGRATING ORAL at 09:02

## 2021-10-20 RX ADMIN — RISPERIDONE 3 MILLIGRAM(S): 4 TABLET ORAL at 21:09

## 2021-10-20 NOTE — BH INPATIENT PSYCHIATRY PROGRESS NOTE - CURRENT MEDICATION
MEDICATIONS  (STANDING):  acetaminophen   Tablet .. 650 milliGRAM(s) Oral once  atorvastatin 40 milliGRAM(s) Oral at bedtime  cloZAPine Disintegrating Tablet 100 milliGRAM(s) Oral daily  dextrose 40% Gel 15 Gram(s) Oral once  glucagon  Injectable 1 milliGRAM(s) IntraMuscular once  insulin lispro (ADMELOG) corrective regimen sliding scale   SubCutaneous three times a day before meals  insulin lispro (ADMELOG) corrective regimen sliding scale   SubCutaneous at bedtime  lithium CR (ESKALITH-CR) 900 milliGRAM(s) Oral at bedtime  metFORMIN 1000 milliGRAM(s) Oral at bedtime  risperiDONE  Disintegrating Tablet 3 milliGRAM(s) Oral at bedtime  senna 2 Tablet(s) Oral at bedtime  sitaGLIPtin 100 milliGRAM(s) Oral daily    MEDICATIONS  (PRN):  acetaminophen   Tablet .. 650 milliGRAM(s) Oral every 6 hours PRN Mild Pain (1 - 3), Moderate Pain (4 - 6)  chlorproMAZINE    Injectable 100 milliGRAM(s) IntraMuscular once PRN agitation or aggression  chlorproMAZINE    Tablet 100 milliGRAM(s) Oral every 4 hours PRN agitation or aggression  diphenhydrAMINE   Injectable 25 milliGRAM(s) IntraMuscular every 12 hours PRN EPS ppx  diphenhydrAMINE   Injectable 25 milliGRAM(s) IntraMuscular every 12 hours PRN EPS ppx  haloperidol    Injectable 5 milliGRAM(s) IntraMuscular every 12 hours PRN psychosis  haloperidol    Injectable 5 milliGRAM(s) IntraMuscular every 12 hours PRN psychosis  melatonin. 5 milliGRAM(s) Oral at bedtime PRN Insomnia  polyethylene glycol 3350 17 Gram(s) Oral daily PRN constipation

## 2021-10-20 NOTE — BH INPATIENT PSYCHIATRY PROGRESS NOTE - NSBHFUPINTERVALHXFT_PSY_A_CORE
Pt compliant with medication and tolerating it well.  Chart reviewed and case discussed with treatment team.  No events reported overnight.  Pt observed talking to himself on the unit.  Pt remains delusional, reports he has a girlfriend on the unit that he is going to live with and that "the medication is affecting my skeleton."  Pt gets irritable easily and dismisses writer from interview, reports he no longer wants to talk.  Pt then intrusive, interrupting writer when speaking with other peers.  Pt then later agrees to meet with writer to discuss medication.  Discussed results of Lithium level with patient and he agrees to titrate Lithium tonight and also to titrate clozapine.  Writer draws out a weekly schedule of his medications and adjustments per his request.

## 2021-10-20 NOTE — BH INPATIENT PSYCHIATRY PROGRESS NOTE - NSBHCHARTREVIEWVS_PSY_A_CORE FT
Vital Signs Last 24 Hrs  T(C): 36.6 (10-20-21 @ 07:48), Max: 36.6 (10-20-21 @ 07:48)  T(F): 97.8 (10-20-21 @ 07:48), Max: 97.8 (10-20-21 @ 07:48)  HR: --  BP: --  BP(mean): --  RR: 18 (10-19-21 @ 20:58) (18 - 18)  SpO2: --    Orthostatic VS  10-20-21 @ 07:48  Lying BP: --/-- HR: --  Sitting BP: 134/71 HR: 91  Standing BP: 131/75 HR: 96  Site: --  Mode: --  Orthostatic VS  10-19-21 @ 20:58  Lying BP: --/-- HR: --  Sitting BP: 145/86 HR: 103  Standing BP: 138/80 HR: 98  Site: --  Mode: --  Orthostatic VS  10-19-21 @ 07:51  Lying BP: --/-- HR: --  Sitting BP: 130/94 HR: 91  Standing BP: 126/96 HR: 100  Site: --  Mode: --  Orthostatic VS  10-18-21 @ 20:33  Lying BP: --/-- HR: --  Sitting BP: 123/75 HR: 98  Standing BP: 139/82 HR: 100  Site: upper right arm  Mode: electronic

## 2021-10-20 NOTE — BH INPATIENT PSYCHIATRY PROGRESS NOTE - NSBHASSESSSUMMFT_PSY_ALL_CORE
Plan:  - clozpine ODT titrated to 100mg PO daily and titrated to 175mg PO at bedtime  - Risperdal M-tab tapered to 1mg PO daily and 4mg PO at bedtime and ordered mouth checks, Invega Sustenna 234mg IM on 10/15 and 156mg IM on 10/19  - Start Lithium Eskalith CR and titrate to 450mg PO daily and 900mg PO at bedtime  - MOO granted on 9/28/21, Haldol 5mg IM and Benadryl 25mg IM if patient refuses Risperdal and/or Clozaril standing medication as per MOO court order   - group and milieu therapy   - routine checks   - Lithium level 0.4 on 10/20  - CBC + diff on 10/20 results discussed with hospitalist Dr. Cavanaugh, regarding elevated WBC and Auto Neutrophil #, no treatment indicated and can repeat in one week

## 2021-10-20 NOTE — BH INPATIENT PSYCHIATRY PROGRESS NOTE - NSBHMETABOLIC_PSY_ALL_CORE_FT
BMI: BMI (kg/m2): 33.1 (09-04-21 @ 07:48)  HbA1c: A1C with Estimated Average Glucose Result: 7.3 % (08-05-21 @ 10:24)    Glucose: POCT Blood Glucose.: 182 mg/dL (10-20-21 @ 12:06)    BP: --  Lipid Panel: Date/Time: 08-05-21 @ 10:24  Cholesterol, Serum: 139  Direct LDL: --  HDL Cholesterol, Serum: 42  Total Cholesterol/HDL Ration Measurement: --  Triglycerides, Serum: 234

## 2021-10-21 LAB
GLUCOSE BLDC GLUCOMTR-MCNC: 151 MG/DL — HIGH (ref 70–99)
GLUCOSE BLDC GLUCOMTR-MCNC: 153 MG/DL — HIGH (ref 70–99)
GLUCOSE BLDC GLUCOMTR-MCNC: 190 MG/DL — HIGH (ref 70–99)
GLUCOSE BLDC GLUCOMTR-MCNC: 191 MG/DL — HIGH (ref 70–99)

## 2021-10-21 PROCEDURE — 99232 SBSQ HOSP IP/OBS MODERATE 35: CPT

## 2021-10-21 RX ADMIN — LITHIUM CARBONATE 450 MILLIGRAM(S): 300 TABLET, EXTENDED RELEASE ORAL at 08:40

## 2021-10-21 RX ADMIN — Medication 5 MILLIGRAM(S): at 21:27

## 2021-10-21 RX ADMIN — LITHIUM CARBONATE 900 MILLIGRAM(S): 300 TABLET, EXTENDED RELEASE ORAL at 21:27

## 2021-10-21 RX ADMIN — ATORVASTATIN CALCIUM 40 MILLIGRAM(S): 80 TABLET, FILM COATED ORAL at 21:27

## 2021-10-21 RX ADMIN — RISPERIDONE 3 MILLIGRAM(S): 4 TABLET ORAL at 21:27

## 2021-10-21 RX ADMIN — SENNA PLUS 2 TABLET(S): 8.6 TABLET ORAL at 21:28

## 2021-10-21 RX ADMIN — CLOZAPINE 100 MILLIGRAM(S): 150 TABLET, ORALLY DISINTEGRATING ORAL at 08:40

## 2021-10-21 RX ADMIN — METFORMIN HYDROCHLORIDE 1000 MILLIGRAM(S): 850 TABLET ORAL at 21:28

## 2021-10-21 RX ADMIN — Medication 1: at 12:26

## 2021-10-21 RX ADMIN — CLOZAPINE 175 MILLIGRAM(S): 150 TABLET, ORALLY DISINTEGRATING ORAL at 21:27

## 2021-10-21 NOTE — BH INPATIENT PSYCHIATRY PROGRESS NOTE - NSBHFUPINTERVALHXFT_PSY_A_CORE
Pt compliant with medication and tolerating it well.  No events reported overnight.  Chart reviewed and case discussed with treatment team.  Pt more irritable and uncooperative with interview today.  Pt reports, "I have a hard time trusting you," when asked to go over the medication chart patient agreed on yesterday.  Pt also talks about how he is going to move in with his girlfriend, Bev, who was another peer on the unit.  Pt asks about where he will receive his ROOT after discharge, reports he does not want to deal with his ACT team, wants to go back to the MD he had last in 2011.  Pt then gets irritable, reports he no longer wants to talk and says, "you have no idea what my brain can handle, telekinesis, telepathy."

## 2021-10-21 NOTE — BH INPATIENT PSYCHIATRY PROGRESS NOTE - CURRENT MEDICATION
MEDICATIONS  (STANDING):  acetaminophen   Tablet .. 650 milliGRAM(s) Oral once  atorvastatin 40 milliGRAM(s) Oral at bedtime  cloZAPine Disintegrating Tablet 100 milliGRAM(s) Oral daily  cloZAPine Disintegrating Tablet 175 milliGRAM(s) Oral at bedtime  dextrose 40% Gel 15 Gram(s) Oral once  glucagon  Injectable 1 milliGRAM(s) IntraMuscular once  insulin lispro (ADMELOG) corrective regimen sliding scale   SubCutaneous three times a day before meals  insulin lispro (ADMELOG) corrective regimen sliding scale   SubCutaneous at bedtime  lithium CR (ESKALITH-CR) 450 milliGRAM(s) Oral daily  lithium CR (ESKALITH-CR) 900 milliGRAM(s) Oral at bedtime  metFORMIN 1000 milliGRAM(s) Oral at bedtime  risperiDONE  Disintegrating Tablet 3 milliGRAM(s) Oral at bedtime  senna 2 Tablet(s) Oral at bedtime  sitaGLIPtin 100 milliGRAM(s) Oral daily    MEDICATIONS  (PRN):  acetaminophen   Tablet .. 650 milliGRAM(s) Oral every 6 hours PRN Mild Pain (1 - 3), Moderate Pain (4 - 6)  chlorproMAZINE    Injectable 100 milliGRAM(s) IntraMuscular once PRN agitation or aggression  chlorproMAZINE    Tablet 100 milliGRAM(s) Oral every 4 hours PRN agitation or aggression  diphenhydrAMINE   Injectable 25 milliGRAM(s) IntraMuscular every 12 hours PRN EPS ppx  diphenhydrAMINE   Injectable 25 milliGRAM(s) IntraMuscular every 12 hours PRN EPS ppx  haloperidol    Injectable 5 milliGRAM(s) IntraMuscular every 12 hours PRN psychosis  haloperidol    Injectable 5 milliGRAM(s) IntraMuscular every 12 hours PRN psychosis  melatonin. 5 milliGRAM(s) Oral at bedtime PRN Insomnia  polyethylene glycol 3350 17 Gram(s) Oral daily PRN constipation

## 2021-10-21 NOTE — BH INPATIENT PSYCHIATRY PROGRESS NOTE - NSBHCHARTREVIEWVS_PSY_A_CORE FT
Vital Signs Last 24 Hrs  T(C): 36.3 (10-21-21 @ 08:31), Max: 36.4 (10-20-21 @ 19:37)  T(F): 97.4 (10-21-21 @ 08:31), Max: 97.5 (10-20-21 @ 19:37)  HR: --  BP: --  BP(mean): --  RR: --  SpO2: --    Orthostatic VS  10-21-21 @ 08:31  Lying BP: --/-- HR: --  Sitting BP: 121/86 HR: 96  Standing BP: 118/75 HR: 87  Site: --  Mode: --  Orthostatic VS  10-20-21 @ 19:37  Lying BP: --/-- HR: --  Sitting BP: 137/78 HR: 98  Standing BP: 118/77 HR: 103  Site: upper right arm  Mode: electronic  Orthostatic VS  10-20-21 @ 07:48  Lying BP: --/-- HR: --  Sitting BP: 134/71 HR: 91  Standing BP: 131/75 HR: 96  Site: --  Mode: --  Orthostatic VS  10-19-21 @ 20:58  Lying BP: --/-- HR: --  Sitting BP: 145/86 HR: 103  Standing BP: 138/80 HR: 98  Site: --  Mode: --

## 2021-10-21 NOTE — BH INPATIENT PSYCHIATRY PROGRESS NOTE - NSBHMETABOLIC_PSY_ALL_CORE_FT
BMI: BMI (kg/m2): 33.1 (09-04-21 @ 07:48)  HbA1c: A1C with Estimated Average Glucose Result: 7.3 % (08-05-21 @ 10:24)    Glucose: POCT Blood Glucose.: 153 mg/dL (10-21-21 @ 12:03)    BP: --  Lipid Panel: Date/Time: 08-05-21 @ 10:24  Cholesterol, Serum: 139  Direct LDL: --  HDL Cholesterol, Serum: 42  Total Cholesterol/HDL Ration Measurement: --  Triglycerides, Serum: 234

## 2021-10-22 LAB
GLUCOSE BLDC GLUCOMTR-MCNC: 145 MG/DL — HIGH (ref 70–99)
GLUCOSE BLDC GLUCOMTR-MCNC: 185 MG/DL — HIGH (ref 70–99)
GLUCOSE BLDC GLUCOMTR-MCNC: 192 MG/DL — HIGH (ref 70–99)
GLUCOSE BLDC GLUCOMTR-MCNC: 285 MG/DL — HIGH (ref 70–99)

## 2021-10-22 PROCEDURE — 99232 SBSQ HOSP IP/OBS MODERATE 35: CPT

## 2021-10-22 RX ORDER — DIPHENHYDRAMINE HCL 50 MG
25 CAPSULE ORAL EVERY 12 HOURS
Refills: 0 | Status: DISCONTINUED | OUTPATIENT
Start: 2021-10-22 | End: 2021-11-30

## 2021-10-22 RX ORDER — RISPERIDONE 4 MG/1
1 TABLET ORAL AT BEDTIME
Refills: 0 | Status: COMPLETED | OUTPATIENT
Start: 2021-10-24 | End: 2021-10-24

## 2021-10-22 RX ORDER — HALOPERIDOL DECANOATE 100 MG/ML
5 INJECTION INTRAMUSCULAR EVERY 12 HOURS
Refills: 0 | Status: DISCONTINUED | OUTPATIENT
Start: 2021-10-22 | End: 2021-11-30

## 2021-10-22 RX ORDER — RISPERIDONE 4 MG/1
2 TABLET ORAL AT BEDTIME
Refills: 0 | Status: COMPLETED | OUTPATIENT
Start: 2021-10-22 | End: 2021-10-23

## 2021-10-22 RX ORDER — CLOZAPINE 150 MG/1
200 TABLET, ORALLY DISINTEGRATING ORAL AT BEDTIME
Refills: 0 | Status: DISCONTINUED | OUTPATIENT
Start: 2021-10-22 | End: 2021-10-27

## 2021-10-22 RX ADMIN — CLOZAPINE 100 MILLIGRAM(S): 150 TABLET, ORALLY DISINTEGRATING ORAL at 09:59

## 2021-10-22 RX ADMIN — LITHIUM CARBONATE 450 MILLIGRAM(S): 300 TABLET, EXTENDED RELEASE ORAL at 09:59

## 2021-10-22 RX ADMIN — Medication 25 MILLIGRAM(S): at 23:16

## 2021-10-22 RX ADMIN — HALOPERIDOL DECANOATE 5 MILLIGRAM(S): 100 INJECTION INTRAMUSCULAR at 23:16

## 2021-10-22 NOTE — BH INPATIENT PSYCHIATRY PROGRESS NOTE - NSBHFUPINTERVALHXFT_PSY_A_CORE
Pt compliant with medication.  Chart reviewed and case discussed with treatment team.  No events reported overnight.  Pt irritable upon approach, reports "the medication is too much, my heart rate is 120."  Explained to him his HR this morning was 96-97.  Pt denies other physical complaints.  Pt talks about how he is waiting to "hear back to Bev (a former patient) about where I am living."  Explained to him he lives at [facility] and he reports, "I don't know if I want to go back there yet."  Pt denies SI/HI/I/P or AH/VH and reports eating and sleeping well.  Pt gets tired of interview and dismisses, writer asks her to leave then curses at her.

## 2021-10-22 NOTE — BH INPATIENT PSYCHIATRY PROGRESS NOTE - NSBHASSESSSUMMFT_PSY_ALL_CORE
Plan:  - clozpine ODT titrated to 100mg PO daily and titrated to 200mg PO at bedtime  - Risperdal M-tab tapered to 1mg PO daily and 4mg PO at bedtime and ordered mouth checks, Invega Sustenna 234mg IM on 10/15 and 156mg IM on 10/19  - Start Lithium Eskalith CR and titrate to 450mg PO daily and 900mg PO at bedtime  - MOO granted on 9/28/21, Haldol 5mg IM and Benadryl 25mg IM if patient refuses Risperdal and/or Clozaril standing medication as per MOO court order   - group and milieu therapy   - routine checks   - Lithium level 0.4 on 10/20  - CBC + diff on 10/20 results discussed with hospitalist Dr. Cavanaugh, regarding elevated WBC and Auto Neutrophil #, no treatment indicated and can repeat in one week

## 2021-10-22 NOTE — BH INPATIENT PSYCHIATRY PROGRESS NOTE - CURRENT MEDICATION
MEDICATIONS  (STANDING):  acetaminophen   Tablet .. 650 milliGRAM(s) Oral once  atorvastatin 40 milliGRAM(s) Oral at bedtime  cloZAPine Disintegrating Tablet 100 milliGRAM(s) Oral daily  cloZAPine Disintegrating Tablet 200 milliGRAM(s) Oral at bedtime  dextrose 40% Gel 15 Gram(s) Oral once  glucagon  Injectable 1 milliGRAM(s) IntraMuscular once  insulin lispro (ADMELOG) corrective regimen sliding scale   SubCutaneous three times a day before meals  insulin lispro (ADMELOG) corrective regimen sliding scale   SubCutaneous at bedtime  lithium CR (ESKALITH-CR) 450 milliGRAM(s) Oral daily  lithium CR (ESKALITH-CR) 900 milliGRAM(s) Oral at bedtime  metFORMIN 1000 milliGRAM(s) Oral at bedtime  risperiDONE  Disintegrating Tablet 2 milliGRAM(s) Oral at bedtime  senna 2 Tablet(s) Oral at bedtime  sitaGLIPtin 100 milliGRAM(s) Oral daily    MEDICATIONS  (PRN):  acetaminophen   Tablet .. 650 milliGRAM(s) Oral every 6 hours PRN Mild Pain (1 - 3), Moderate Pain (4 - 6)  chlorproMAZINE    Injectable 100 milliGRAM(s) IntraMuscular once PRN agitation or aggression  chlorproMAZINE    Tablet 100 milliGRAM(s) Oral every 4 hours PRN agitation or aggression  diphenhydrAMINE   Injectable 25 milliGRAM(s) IntraMuscular every 12 hours PRN EPS ppx  diphenhydrAMINE   Injectable 25 milliGRAM(s) IntraMuscular every 12 hours PRN EPS ppx  haloperidol    Injectable 5 milliGRAM(s) IntraMuscular every 12 hours PRN psychosis  haloperidol    Injectable 5 milliGRAM(s) IntraMuscular every 12 hours PRN psychosis  melatonin. 5 milliGRAM(s) Oral at bedtime PRN Insomnia  polyethylene glycol 3350 17 Gram(s) Oral daily PRN constipation

## 2021-10-22 NOTE — BH INPATIENT PSYCHIATRY PROGRESS NOTE - NSBHMETABOLIC_PSY_ALL_CORE_FT
BMI: BMI (kg/m2): 33.1 (09-04-21 @ 07:48)  HbA1c: A1C with Estimated Average Glucose Result: 7.3 % (08-05-21 @ 10:24)    Glucose: POCT Blood Glucose.: 145 mg/dL (10-22-21 @ 11:54)    BP: --  Lipid Panel: Date/Time: 08-05-21 @ 10:24  Cholesterol, Serum: 139  Direct LDL: --  HDL Cholesterol, Serum: 42  Total Cholesterol/HDL Ration Measurement: --  Triglycerides, Serum: 234

## 2021-10-22 NOTE — BH INPATIENT PSYCHIATRY PROGRESS NOTE - NSBHCHARTREVIEWVS_PSY_A_CORE FT
Vital Signs Last 24 Hrs  T(C): 36.3 (10-22-21 @ 06:42), Max: 36.5 (10-21-21 @ 20:37)  T(F): 97.4 (10-22-21 @ 06:42), Max: 97.7 (10-21-21 @ 20:37)  HR: --  BP: --  BP(mean): --  RR: 18 (10-21-21 @ 20:37) (18 - 18)  SpO2: --    Orthostatic VS  10-22-21 @ 06:42  Lying BP: --/-- HR: --  Sitting BP: 121/81 HR: 96  Standing BP: 121/77 HR: 97  Site: --  Mode: --  Orthostatic VS  10-21-21 @ 21:25  Lying BP: --/-- HR: --  Sitting BP: 140/83 HR: 95  Standing BP: 130/75 HR: 93  Site: --  Mode: --  Orthostatic VS  10-21-21 @ 20:37  Lying BP: --/-- HR: --  Sitting BP: 140/83 HR: 90  Standing BP: 119/77 HR: 96  Site: --  Mode: --  Orthostatic VS  10-21-21 @ 08:31  Lying BP: --/-- HR: --  Sitting BP: 121/86 HR: 96  Standing BP: 118/75 HR: 87  Site: --  Mode: --  Orthostatic VS  10-20-21 @ 19:37  Lying BP: --/-- HR: --  Sitting BP: 137/78 HR: 98  Standing BP: 118/77 HR: 103  Site: upper right arm  Mode: electronic

## 2021-10-23 LAB
GLUCOSE BLDC GLUCOMTR-MCNC: 141 MG/DL — HIGH (ref 70–99)
GLUCOSE BLDC GLUCOMTR-MCNC: 218 MG/DL — HIGH (ref 70–99)
GLUCOSE BLDC GLUCOMTR-MCNC: 253 MG/DL — HIGH (ref 70–99)
GLUCOSE BLDC GLUCOMTR-MCNC: 269 MG/DL — HIGH (ref 70–99)

## 2021-10-23 RX ADMIN — LITHIUM CARBONATE 900 MILLIGRAM(S): 300 TABLET, EXTENDED RELEASE ORAL at 21:24

## 2021-10-23 RX ADMIN — LITHIUM CARBONATE 450 MILLIGRAM(S): 300 TABLET, EXTENDED RELEASE ORAL at 08:45

## 2021-10-23 RX ADMIN — RISPERIDONE 2 MILLIGRAM(S): 4 TABLET ORAL at 21:25

## 2021-10-23 RX ADMIN — SENNA PLUS 2 TABLET(S): 8.6 TABLET ORAL at 21:25

## 2021-10-23 RX ADMIN — METFORMIN HYDROCHLORIDE 1000 MILLIGRAM(S): 850 TABLET ORAL at 21:25

## 2021-10-23 RX ADMIN — CLOZAPINE 200 MILLIGRAM(S): 150 TABLET, ORALLY DISINTEGRATING ORAL at 21:25

## 2021-10-23 RX ADMIN — ATORVASTATIN CALCIUM 40 MILLIGRAM(S): 80 TABLET, FILM COATED ORAL at 21:26

## 2021-10-23 RX ADMIN — CLOZAPINE 100 MILLIGRAM(S): 150 TABLET, ORALLY DISINTEGRATING ORAL at 08:45

## 2021-10-23 RX ADMIN — Medication 3: at 08:43

## 2021-10-23 RX ADMIN — Medication 1: at 21:00

## 2021-10-24 LAB
GLUCOSE BLDC GLUCOMTR-MCNC: 167 MG/DL — HIGH (ref 70–99)
GLUCOSE BLDC GLUCOMTR-MCNC: 204 MG/DL — HIGH (ref 70–99)
GLUCOSE BLDC GLUCOMTR-MCNC: 224 MG/DL — HIGH (ref 70–99)
GLUCOSE BLDC GLUCOMTR-MCNC: 243 MG/DL — HIGH (ref 70–99)

## 2021-10-24 RX ADMIN — Medication 2: at 12:15

## 2021-10-24 RX ADMIN — ATORVASTATIN CALCIUM 40 MILLIGRAM(S): 80 TABLET, FILM COATED ORAL at 21:16

## 2021-10-24 RX ADMIN — Medication 5 MILLIGRAM(S): at 21:16

## 2021-10-24 RX ADMIN — CLOZAPINE 100 MILLIGRAM(S): 150 TABLET, ORALLY DISINTEGRATING ORAL at 08:37

## 2021-10-24 RX ADMIN — LITHIUM CARBONATE 450 MILLIGRAM(S): 300 TABLET, EXTENDED RELEASE ORAL at 08:37

## 2021-10-24 RX ADMIN — Medication 100 MILLIGRAM(S): at 18:10

## 2021-10-24 RX ADMIN — RISPERIDONE 1 MILLIGRAM(S): 4 TABLET ORAL at 21:16

## 2021-10-24 RX ADMIN — SENNA PLUS 2 TABLET(S): 8.6 TABLET ORAL at 21:16

## 2021-10-24 RX ADMIN — Medication 2: at 08:36

## 2021-10-24 RX ADMIN — CLOZAPINE 200 MILLIGRAM(S): 150 TABLET, ORALLY DISINTEGRATING ORAL at 21:16

## 2021-10-24 RX ADMIN — LITHIUM CARBONATE 900 MILLIGRAM(S): 300 TABLET, EXTENDED RELEASE ORAL at 21:15

## 2021-10-24 RX ADMIN — METFORMIN HYDROCHLORIDE 1000 MILLIGRAM(S): 850 TABLET ORAL at 21:16

## 2021-10-25 LAB
GLUCOSE BLDC GLUCOMTR-MCNC: 158 MG/DL — HIGH (ref 70–99)
GLUCOSE BLDC GLUCOMTR-MCNC: 215 MG/DL — HIGH (ref 70–99)
GLUCOSE BLDC GLUCOMTR-MCNC: 220 MG/DL — HIGH (ref 70–99)

## 2021-10-25 PROCEDURE — 99232 SBSQ HOSP IP/OBS MODERATE 35: CPT

## 2021-10-25 RX ADMIN — CLOZAPINE 200 MILLIGRAM(S): 150 TABLET, ORALLY DISINTEGRATING ORAL at 20:50

## 2021-10-25 RX ADMIN — CLOZAPINE 100 MILLIGRAM(S): 150 TABLET, ORALLY DISINTEGRATING ORAL at 08:23

## 2021-10-25 RX ADMIN — METFORMIN HYDROCHLORIDE 1000 MILLIGRAM(S): 850 TABLET ORAL at 20:54

## 2021-10-25 RX ADMIN — LITHIUM CARBONATE 900 MILLIGRAM(S): 300 TABLET, EXTENDED RELEASE ORAL at 20:50

## 2021-10-25 RX ADMIN — SENNA PLUS 2 TABLET(S): 8.6 TABLET ORAL at 20:49

## 2021-10-25 RX ADMIN — Medication 5 MILLIGRAM(S): at 20:50

## 2021-10-25 RX ADMIN — ATORVASTATIN CALCIUM 40 MILLIGRAM(S): 80 TABLET, FILM COATED ORAL at 20:50

## 2021-10-25 RX ADMIN — LITHIUM CARBONATE 450 MILLIGRAM(S): 300 TABLET, EXTENDED RELEASE ORAL at 08:23

## 2021-10-25 NOTE — BH INPATIENT PSYCHIATRY PROGRESS NOTE - NSBHCHARTREVIEWVS_PSY_A_CORE FT
Vital Signs Last 24 Hrs  T(C): 36.4 (10-25-21 @ 06:54), Max: 36.7 (10-24-21 @ 18:32)  T(F): 97.5 (10-25-21 @ 06:54), Max: 98.1 (10-24-21 @ 18:32)  HR: --  BP: --  BP(mean): --  RR: --  SpO2: --    Orthostatic VS  10-25-21 @ 06:54  Lying BP: --/-- HR: --  Sitting BP: 120/75 HR: 87  Standing BP: 118/70 HR: 85  Site: --  Mode: --  Orthostatic VS  10-24-21 @ 18:32  Lying BP: --/-- HR: --  Sitting BP: 138/78 HR: 89  Standing BP: 139/82 HR: 94  Site: --  Mode: --  Orthostatic VS  10-24-21 @ 08:18  Lying BP: --/-- HR: --  Sitting BP: 129/80 HR: 90  Standing BP: 123/74 HR: 93  Site: --  Mode: --  Orthostatic VS  10-23-21 @ 20:01  Lying BP: --/-- HR: --  Sitting BP: 124/76 HR: 86  Standing BP: 120/74 HR: 88  Site: --  Mode: --

## 2021-10-25 NOTE — BH INPATIENT PSYCHIATRY PROGRESS NOTE - CURRENT MEDICATION
MEDICATIONS  (STANDING):  acetaminophen   Tablet .. 650 milliGRAM(s) Oral once  atorvastatin 40 milliGRAM(s) Oral at bedtime  cloZAPine Disintegrating Tablet 100 milliGRAM(s) Oral daily  cloZAPine Disintegrating Tablet 200 milliGRAM(s) Oral at bedtime  dextrose 40% Gel 15 Gram(s) Oral once  glucagon  Injectable 1 milliGRAM(s) IntraMuscular once  insulin lispro (ADMELOG) corrective regimen sliding scale   SubCutaneous three times a day before meals  insulin lispro (ADMELOG) corrective regimen sliding scale   SubCutaneous at bedtime  lithium CR (ESKALITH-CR) 450 milliGRAM(s) Oral daily  lithium CR (ESKALITH-CR) 900 milliGRAM(s) Oral at bedtime  metFORMIN 1000 milliGRAM(s) Oral at bedtime  senna 2 Tablet(s) Oral at bedtime  sitaGLIPtin 100 milliGRAM(s) Oral daily    MEDICATIONS  (PRN):  acetaminophen   Tablet .. 650 milliGRAM(s) Oral every 6 hours PRN Mild Pain (1 - 3), Moderate Pain (4 - 6)  chlorproMAZINE    Injectable 100 milliGRAM(s) IntraMuscular once PRN agitation or aggression  chlorproMAZINE    Tablet 100 milliGRAM(s) Oral every 4 hours PRN agitation or aggression  diphenhydrAMINE   Injectable 25 milliGRAM(s) IntraMuscular every 12 hours PRN EPS ppx  diphenhydrAMINE   Injectable 25 milliGRAM(s) IntraMuscular every 12 hours PRN EPS ppx  diphenhydrAMINE Injectable 25 milliGRAM(s) IntraMuscular every 12 hours PRN EPS ppx  haloperidol    Injectable 5 milliGRAM(s) IntraMuscular every 12 hours PRN psychosis  haloperidol    Injectable 5 milliGRAM(s) IntraMuscular every 12 hours PRN psychosis  haloperidol    Injectable 5 milliGRAM(s) IntraMuscular every 12 hours PRN psychosis  melatonin. 5 milliGRAM(s) Oral at bedtime PRN Insomnia  polyethylene glycol 3350 17 Gram(s) Oral daily PRN constipation

## 2021-10-25 NOTE — BH INPATIENT PSYCHIATRY PROGRESS NOTE - NSBHFUPINTERVALHXFT_PSY_A_CORE
Chart reviewed and patient interviewed. Discussed with treatment team. As per staff patient is slightly better. Patient is still disorganized. Patient is less irritable. Patient is re-direct able. Patient is sleeping better. Patient is unable to tolerate groups. Patient is taking his meds. No side effects reported. Patient continue to remain illogical and dismissive.

## 2021-10-25 NOTE — BH INPATIENT PSYCHIATRY PROGRESS NOTE - NSBHMETABOLIC_PSY_ALL_CORE_FT
BMI: BMI (kg/m2): 77.9 (10-25-21 @ 06:54)  HbA1c: A1C with Estimated Average Glucose Result: 7.3 % (08-05-21 @ 10:24)    Glucose: POCT Blood Glucose.: 158 mg/dL (10-25-21 @ 12:03)    BP: 123/77 (10-23-21 @ 08:40) (123/77 - 123/77)  Lipid Panel: Date/Time: 08-05-21 @ 10:24  Cholesterol, Serum: 139  Direct LDL: --  HDL Cholesterol, Serum: 42  Total Cholesterol/HDL Ration Measurement: --  Triglycerides, Serum: 234

## 2021-10-25 NOTE — BH INPATIENT PSYCHIATRY PROGRESS NOTE - NSBHASSESSSUMMFT_PSY_ALL_CORE
On assessment today patient is irritable, labile, disorganized, psychotic and illogical.     Plan:  - clozapine ODT 100mg PO daily and 200mg PO at bedtime   - Risperdal M-tab tapered to 1mg PO daily and 4mg PO at bedtime and ordered mouth checks, Invega Sustenna 234mg IM on 10/15 and 156mg IM on 10/19   -  Lithium Eskalith CR 450mg PO daily and 900mg PO at bedtime   - MOO granted on 9/28/21, Haldol 5mg IM and Benadryl 25mg IM if patient refuses Risperdal and/or Clozaril standing medication as per MOO court order    - group and milieu therapy    - routine checks    - Lithium level 0.4 on 10/20

## 2021-10-25 NOTE — BH INPATIENT PSYCHIATRY PROGRESS NOTE - PRN MEDS
MEDICATIONS  (PRN):  acetaminophen   Tablet .. 650 milliGRAM(s) Oral every 6 hours PRN Mild Pain (1 - 3), Moderate Pain (4 - 6)  chlorproMAZINE    Injectable 100 milliGRAM(s) IntraMuscular once PRN agitation or aggression  chlorproMAZINE    Tablet 100 milliGRAM(s) Oral every 4 hours PRN agitation or aggression  diphenhydrAMINE   Injectable 25 milliGRAM(s) IntraMuscular every 12 hours PRN EPS ppx  diphenhydrAMINE   Injectable 25 milliGRAM(s) IntraMuscular every 12 hours PRN EPS ppx  diphenhydrAMINE Injectable 25 milliGRAM(s) IntraMuscular every 12 hours PRN EPS ppx  haloperidol    Injectable 5 milliGRAM(s) IntraMuscular every 12 hours PRN psychosis  haloperidol    Injectable 5 milliGRAM(s) IntraMuscular every 12 hours PRN psychosis  haloperidol    Injectable 5 milliGRAM(s) IntraMuscular every 12 hours PRN psychosis  melatonin. 5 milliGRAM(s) Oral at bedtime PRN Insomnia  polyethylene glycol 3350 17 Gram(s) Oral daily PRN constipation

## 2021-10-26 LAB
ANION GAP SERPL CALC-SCNC: 12 MMOL/L — SIGNIFICANT CHANGE UP (ref 7–14)
BASOPHILS # BLD AUTO: 0.07 K/UL — SIGNIFICANT CHANGE UP (ref 0–0.2)
BASOPHILS NFR BLD AUTO: 0.5 % — SIGNIFICANT CHANGE UP (ref 0–2)
BUN SERPL-MCNC: 17 MG/DL — SIGNIFICANT CHANGE UP (ref 7–23)
CALCIUM SERPL-MCNC: 10.1 MG/DL — SIGNIFICANT CHANGE UP (ref 8.4–10.5)
CHLORIDE SERPL-SCNC: 98 MMOL/L — SIGNIFICANT CHANGE UP (ref 98–107)
CO2 SERPL-SCNC: 27 MMOL/L — SIGNIFICANT CHANGE UP (ref 22–31)
CREAT SERPL-MCNC: 0.68 MG/DL — SIGNIFICANT CHANGE UP (ref 0.5–1.3)
EOSINOPHIL # BLD AUTO: 0.27 K/UL — SIGNIFICANT CHANGE UP (ref 0–0.5)
EOSINOPHIL NFR BLD AUTO: 2.1 % — SIGNIFICANT CHANGE UP (ref 0–6)
GLUCOSE BLDC GLUCOMTR-MCNC: 138 MG/DL — HIGH (ref 70–99)
GLUCOSE BLDC GLUCOMTR-MCNC: 180 MG/DL — HIGH (ref 70–99)
GLUCOSE BLDC GLUCOMTR-MCNC: 199 MG/DL — HIGH (ref 70–99)
GLUCOSE BLDC GLUCOMTR-MCNC: 231 MG/DL — HIGH (ref 70–99)
GLUCOSE SERPL-MCNC: 195 MG/DL — HIGH (ref 70–99)
HCT VFR BLD CALC: 45 % — SIGNIFICANT CHANGE UP (ref 39–50)
HGB BLD-MCNC: 14.4 G/DL — SIGNIFICANT CHANGE UP (ref 13–17)
IANC: 9.31 K/UL — HIGH (ref 1.5–8.5)
IMM GRANULOCYTES NFR BLD AUTO: 0.6 % — SIGNIFICANT CHANGE UP (ref 0–1.5)
LITHIUM SERPL-MCNC: 0.8 MMOL/L — SIGNIFICANT CHANGE UP (ref 0.6–1.2)
LYMPHOCYTES # BLD AUTO: 18.1 % — SIGNIFICANT CHANGE UP (ref 13–44)
LYMPHOCYTES # BLD AUTO: 2.3 K/UL — SIGNIFICANT CHANGE UP (ref 1–3.3)
MCHC RBC-ENTMCNC: 27 PG — SIGNIFICANT CHANGE UP (ref 27–34)
MCHC RBC-ENTMCNC: 32 GM/DL — SIGNIFICANT CHANGE UP (ref 32–36)
MCV RBC AUTO: 84.4 FL — SIGNIFICANT CHANGE UP (ref 80–100)
MONOCYTES # BLD AUTO: 0.71 K/UL — SIGNIFICANT CHANGE UP (ref 0–0.9)
MONOCYTES NFR BLD AUTO: 5.6 % — SIGNIFICANT CHANGE UP (ref 2–14)
NEUTROPHILS # BLD AUTO: 9.31 K/UL — HIGH (ref 1.8–7.4)
NEUTROPHILS NFR BLD AUTO: 73.1 % — SIGNIFICANT CHANGE UP (ref 43–77)
NRBC # BLD: 0 /100 WBCS — SIGNIFICANT CHANGE UP
NRBC # FLD: 0 K/UL — SIGNIFICANT CHANGE UP
PLATELET # BLD AUTO: 219 K/UL — SIGNIFICANT CHANGE UP (ref 150–400)
POTASSIUM SERPL-MCNC: 3.6 MMOL/L — SIGNIFICANT CHANGE UP (ref 3.5–5.3)
POTASSIUM SERPL-SCNC: 3.6 MMOL/L — SIGNIFICANT CHANGE UP (ref 3.5–5.3)
RBC # BLD: 5.33 M/UL — SIGNIFICANT CHANGE UP (ref 4.2–5.8)
RBC # FLD: 15 % — HIGH (ref 10.3–14.5)
SODIUM SERPL-SCNC: 137 MMOL/L — SIGNIFICANT CHANGE UP (ref 135–145)
WBC # BLD: 12.74 K/UL — HIGH (ref 3.8–10.5)
WBC # FLD AUTO: 12.74 K/UL — HIGH (ref 3.8–10.5)

## 2021-10-26 PROCEDURE — 99232 SBSQ HOSP IP/OBS MODERATE 35: CPT

## 2021-10-26 RX ADMIN — CLOZAPINE 200 MILLIGRAM(S): 150 TABLET, ORALLY DISINTEGRATING ORAL at 21:07

## 2021-10-26 RX ADMIN — Medication 1: at 12:50

## 2021-10-26 RX ADMIN — LITHIUM CARBONATE 900 MILLIGRAM(S): 300 TABLET, EXTENDED RELEASE ORAL at 21:07

## 2021-10-26 RX ADMIN — Medication 5 MILLIGRAM(S): at 21:07

## 2021-10-26 RX ADMIN — METFORMIN HYDROCHLORIDE 1000 MILLIGRAM(S): 850 TABLET ORAL at 21:08

## 2021-10-26 RX ADMIN — SENNA PLUS 2 TABLET(S): 8.6 TABLET ORAL at 21:08

## 2021-10-26 RX ADMIN — ATORVASTATIN CALCIUM 40 MILLIGRAM(S): 80 TABLET, FILM COATED ORAL at 21:07

## 2021-10-26 RX ADMIN — LITHIUM CARBONATE 450 MILLIGRAM(S): 300 TABLET, EXTENDED RELEASE ORAL at 09:17

## 2021-10-26 NOTE — BH INPATIENT PSYCHIATRY PROGRESS NOTE - NSBHCHARTREVIEWVS_PSY_A_CORE FT
Vital Signs Last 24 Hrs  T(C): 36 (10-26-21 @ 08:06), Max: 36.6 (10-25-21 @ 18:25)  T(F): 96.8 (10-26-21 @ 08:06), Max: 97.8 (10-25-21 @ 18:25)  HR: --  BP: --  BP(mean): --  RR: --  SpO2: --    Orthostatic VS  10-26-21 @ 08:06  Lying BP: --/-- HR: --  Sitting BP: 122/91 HR: 86  Standing BP: --/-- HR: --  Site: --  Mode: --  Orthostatic VS  10-25-21 @ 18:25  Lying BP: --/-- HR: --  Sitting BP: 132/88 HR: 93  Standing BP: 136/87 HR: 95  Site: --  Mode: --  Orthostatic VS  10-25-21 @ 06:54  Lying BP: --/-- HR: --  Sitting BP: 120/75 HR: 87  Standing BP: 118/70 HR: 85  Site: --  Mode: --  Orthostatic VS  10-24-21 @ 18:32  Lying BP: --/-- HR: --  Sitting BP: 138/78 HR: 89  Standing BP: 139/82 HR: 94  Site: --  Mode: --

## 2021-10-26 NOTE — BH INPATIENT PSYCHIATRY PROGRESS NOTE - NSCGISEVERILLNESS_PSY_ALL_CORE
5 = Markedly ill - intrusive symptoms that distinctly impair social/occupational function or cause intrusive levels of distress
4 = Moderately ill – overt symptoms causing noticeable, but modest, functional impairment or distress; symptom level may warrant medication
7 = Among the most extremely ill patients – pathology drastically interferes in many life functions; may be hospitalized
7 = Among the most extremely ill patients – pathology drastically interferes in many life functions; may be hospitalized
4 = Moderately ill – overt symptoms causing noticeable, but modest, functional impairment or distress; symptom level may warrant medication
4 = Moderately ill – overt symptoms causing noticeable, but modest, functional impairment or distress; symptom level may warrant medication
5 = Markedly ill - intrusive symptoms that distinctly impair social/occupational function or cause intrusive levels of distress
7 = Among the most extremely ill patients – pathology drastically interferes in many life functions; may be hospitalized

## 2021-10-26 NOTE — BH TREATMENT PLAN - NSTXCAREGIVERPARTICIPATE_PSY_P_CORE
Family/Caregiver participated in defining interventions
Family/Caregiver participated in defining interventions
Family/Caregiver participated in identification of needs/problems/goals for treatment/Family/Caregiver participated in defining interventions
Family/Caregiver participated in defining interventions

## 2021-10-26 NOTE — BH INPATIENT PSYCHIATRY PROGRESS NOTE - NSBHFUPINTERVALHXFT_PSY_A_CORE
Chart reviewed and patient interviewed. Discussed with treatment team. Patient is still disorganized. Patient is irritable. Patient is unable to tolerate groups. Patient refused Clozaril and Januvia. Patient took lithium. No side effects reported. Patient continue to remain illogical and dismissive. Patient is illogical and paranoid.

## 2021-10-26 NOTE — BH INPATIENT PSYCHIATRY PROGRESS NOTE - NSCGIIMPROVESX_PSY_ALL_CORE

## 2021-10-26 NOTE — BH INPATIENT PSYCHIATRY PROGRESS NOTE - NSBHASSESSSUMMFT_PSY_ALL_CORE
On assessment today patient is irritable, labile, disorganized, paranoid, psychotic and illogical.          Plan:   - clozapine ODT 100mg PO daily and 200mg PO at bedtime    - Risperdal M-tab 1mg PO daily and 4mg PO at bedtime and ordered mouth checks,   - Invega Sustenna 234mg IM on 10/15 and 156mg IM on 10/19    -  Lithium Eskalith CR 450mg PO daily and 900mg PO at bedtime    - MOO granted on 9/28/21, Haldol 5mg IM and Benadryl 25mg IM if patient refuses Risperdal and/or Clozaril standing medication as per MOO court order     - group and milieu therapy     - routine checks     - Lithium level 0.4 on 10/20

## 2021-10-26 NOTE — BH INPATIENT PSYCHIATRY PROGRESS NOTE - NSBHMETABOLIC_PSY_ALL_CORE_FT
BMI: BMI (kg/m2): 77.9 (10-25-21 @ 06:54)  HbA1c: A1C with Estimated Average Glucose Result: 7.3 % (08-05-21 @ 10:24)    Glucose: POCT Blood Glucose.: 180 mg/dL (10-26-21 @ 12:12)    BP: --  Lipid Panel: Date/Time: 08-05-21 @ 10:24  Cholesterol, Serum: 139  Direct LDL: --  HDL Cholesterol, Serum: 42  Total Cholesterol/HDL Ration Measurement: --  Triglycerides, Serum: 234

## 2021-10-27 LAB
GLUCOSE BLDC GLUCOMTR-MCNC: 177 MG/DL — HIGH (ref 70–99)
GLUCOSE BLDC GLUCOMTR-MCNC: 187 MG/DL — HIGH (ref 70–99)
GLUCOSE BLDC GLUCOMTR-MCNC: 205 MG/DL — HIGH (ref 70–99)
GLUCOSE BLDC GLUCOMTR-MCNC: 217 MG/DL — HIGH (ref 70–99)

## 2021-10-27 PROCEDURE — 99231 SBSQ HOSP IP/OBS SF/LOW 25: CPT

## 2021-10-27 RX ORDER — CLOZAPINE 150 MG/1
50 TABLET, ORALLY DISINTEGRATING ORAL DAILY
Refills: 0 | Status: DISCONTINUED | OUTPATIENT
Start: 2021-10-28 | End: 2021-11-03

## 2021-10-27 RX ORDER — CLOZAPINE 150 MG/1
250 TABLET, ORALLY DISINTEGRATING ORAL AT BEDTIME
Refills: 0 | Status: DISCONTINUED | OUTPATIENT
Start: 2021-10-28 | End: 2021-11-03

## 2021-10-27 RX ORDER — CLOZAPINE 150 MG/1
200 TABLET, ORALLY DISINTEGRATING ORAL AT BEDTIME
Refills: 0 | Status: COMPLETED | OUTPATIENT
Start: 2021-10-27 | End: 2021-10-27

## 2021-10-27 RX ADMIN — Medication 5 MILLIGRAM(S): at 21:25

## 2021-10-27 RX ADMIN — ATORVASTATIN CALCIUM 40 MILLIGRAM(S): 80 TABLET, FILM COATED ORAL at 21:23

## 2021-10-27 RX ADMIN — LITHIUM CARBONATE 900 MILLIGRAM(S): 300 TABLET, EXTENDED RELEASE ORAL at 21:24

## 2021-10-27 RX ADMIN — METFORMIN HYDROCHLORIDE 1000 MILLIGRAM(S): 850 TABLET ORAL at 21:23

## 2021-10-27 RX ADMIN — CLOZAPINE 200 MILLIGRAM(S): 150 TABLET, ORALLY DISINTEGRATING ORAL at 21:23

## 2021-10-27 RX ADMIN — Medication 650 MILLIGRAM(S): at 13:37

## 2021-10-27 RX ADMIN — CLOZAPINE 100 MILLIGRAM(S): 150 TABLET, ORALLY DISINTEGRATING ORAL at 08:47

## 2021-10-27 RX ADMIN — Medication 650 MILLIGRAM(S): at 14:06

## 2021-10-27 RX ADMIN — LITHIUM CARBONATE 450 MILLIGRAM(S): 300 TABLET, EXTENDED RELEASE ORAL at 08:47

## 2021-10-27 RX ADMIN — SENNA PLUS 2 TABLET(S): 8.6 TABLET ORAL at 21:24

## 2021-10-27 NOTE — BH INPATIENT PSYCHIATRY PROGRESS NOTE - NSBHCHARTREVIEWVS_PSY_A_CORE FT
Vital Signs Last 24 Hrs  T(C): 36.4 (10-27-21 @ 08:10), Max: 36.4 (10-27-21 @ 08:10)  T(F): 97.5 (10-27-21 @ 08:10), Max: 97.5 (10-27-21 @ 08:10)  HR: --  BP: --  BP(mean): --  RR: 18 (10-27-21 @ 08:10) (18 - 18)  SpO2: --    Orthostatic VS  10-27-21 @ 08:10  Lying BP: --/-- HR: --  Sitting BP: 126/76 HR: 87  Standing BP: --/-- HR: --  Site: --  Mode: --  Orthostatic VS  10-26-21 @ 18:15  Lying BP: --/-- HR: --  Sitting BP: 116/66 HR: 101  Standing BP: 106/68 HR: 100  Site: --  Mode: --  Orthostatic VS  10-26-21 @ 08:06  Lying BP: --/-- HR: --  Sitting BP: 122/91 HR: 86  Standing BP: --/-- HR: --  Site: --  Mode: --  Orthostatic VS  10-25-21 @ 18:25  Lying BP: --/-- HR: --  Sitting BP: 132/88 HR: 93  Standing BP: 136/87 HR: 95  Site: --  Mode: --

## 2021-10-27 NOTE — BH INPATIENT PSYCHIATRY PROGRESS NOTE - NSBHMETABOLIC_PSY_ALL_CORE_FT
BMI: BMI (kg/m2): 77.9 (10-25-21 @ 06:54)  HbA1c: A1C with Estimated Average Glucose Result: 7.3 % (08-05-21 @ 10:24)    Glucose: POCT Blood Glucose.: 177 mg/dL (10-27-21 @ 12:12)    BP: --  Lipid Panel: Date/Time: 08-05-21 @ 10:24  Cholesterol, Serum: 139  Direct LDL: --  HDL Cholesterol, Serum: 42  Total Cholesterol/HDL Ration Measurement: --  Triglycerides, Serum: 234

## 2021-10-27 NOTE — BH INPATIENT PSYCHIATRY PROGRESS NOTE - NSBHASSESSSUMMFT_PSY_ALL_CORE
RN spoke with patient's wife regarding below. Gemma states she has attempted to contact the billing office, but has not heard back. Gemma states she was told by the insurance company that they would need proof that the lab was deemed medically necessary by the physician. RN advised Gemma to contact the billing office first as she has not heard back and to contact her insurance to see if a medical necessity letter from Dr. Manuel is something they would accept. Gemma states she will and will call back if needing anything further. Will route this message to HARRISON Brooks supervisor as ANIBAL.            Plan:   - clozapine ODT 100mg PO AM and 200mg PO at bedtime tonight and change to 50mg PO AM and 250mg PO at bedtime on 10/28/12  - Invega Sustenna 234mg IM on 10/15 and 156mg IM on 10/19    -  Lithium Eskalith CR 450mg PO daily and 900mg PO at bedtime    - MOO granted on 9/28/21, Haldol 5mg IM and Benadryl 25mg IM if patient refuses Clozaril standing medication as per MOO court order     - group and milieu therapy     - routine checks     - Lithium level 0.8 on 10/26

## 2021-10-27 NOTE — BH INPATIENT PSYCHIATRY PROGRESS NOTE - CURRENT MEDICATION
MEDICATIONS  (STANDING):  acetaminophen   Tablet .. 650 milliGRAM(s) Oral once  atorvastatin 40 milliGRAM(s) Oral at bedtime  cloZAPine Disintegrating Tablet 200 milliGRAM(s) Oral at bedtime  dextrose 40% Gel 15 Gram(s) Oral once  glucagon  Injectable 1 milliGRAM(s) IntraMuscular once  insulin lispro (ADMELOG) corrective regimen sliding scale   SubCutaneous three times a day before meals  insulin lispro (ADMELOG) corrective regimen sliding scale   SubCutaneous at bedtime  lithium CR (ESKALITH-CR) 450 milliGRAM(s) Oral daily  lithium CR (ESKALITH-CR) 900 milliGRAM(s) Oral at bedtime  metFORMIN 1000 milliGRAM(s) Oral at bedtime  senna 2 Tablet(s) Oral at bedtime  sitaGLIPtin 100 milliGRAM(s) Oral daily    MEDICATIONS  (PRN):  acetaminophen   Tablet .. 650 milliGRAM(s) Oral every 6 hours PRN Mild Pain (1 - 3), Moderate Pain (4 - 6)  chlorproMAZINE    Injectable 100 milliGRAM(s) IntraMuscular once PRN agitation or aggression  chlorproMAZINE    Tablet 100 milliGRAM(s) Oral every 4 hours PRN agitation or aggression  diphenhydrAMINE   Injectable 25 milliGRAM(s) IntraMuscular every 12 hours PRN EPS ppx  diphenhydrAMINE   Injectable 25 milliGRAM(s) IntraMuscular every 12 hours PRN EPS ppx  diphenhydrAMINE Injectable 25 milliGRAM(s) IntraMuscular every 12 hours PRN EPS ppx  haloperidol    Injectable 5 milliGRAM(s) IntraMuscular every 12 hours PRN psychosis  haloperidol    Injectable 5 milliGRAM(s) IntraMuscular every 12 hours PRN psychosis  haloperidol    Injectable 5 milliGRAM(s) IntraMuscular every 12 hours PRN psychosis  melatonin. 5 milliGRAM(s) Oral at bedtime PRN Insomnia  polyethylene glycol 3350 17 Gram(s) Oral daily PRN constipation

## 2021-10-27 NOTE — BH INPATIENT PSYCHIATRY PROGRESS NOTE - NSBHFUPINTERVALHXFT_PSY_A_CORE
Pt compliant with medication.  Chart reviewed and case discussed with treatment team.  No events reported overnight.  Pt calm and cooperative with interview today.  Pt showered this morning and is having his clothing washed.  Pt reports feeling too tired on the clozapine, agrees to switch clozapine to 50mg PO daily and 250mg PO at bedtime to help with daytime fatigue.  Discussed results of Lithium level yesterday, agrees to stay at current dose of Lithium Eskalith CR.  Pt reports he slept well and has been eating well.  Pt reports he is not having AH today.  Pt reports he does not know if his residence still exists.  Advised him writer spoke to his residence worker last week and he is still welcome back at his residence and that they may be trying to transfer to him to a different residence.  Pt seems agreeable to this.  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

## 2021-10-28 LAB
GLUCOSE BLDC GLUCOMTR-MCNC: 174 MG/DL — HIGH (ref 70–99)
GLUCOSE BLDC GLUCOMTR-MCNC: 188 MG/DL — HIGH (ref 70–99)
GLUCOSE BLDC GLUCOMTR-MCNC: 225 MG/DL — HIGH (ref 70–99)

## 2021-10-28 RX ADMIN — LITHIUM CARBONATE 900 MILLIGRAM(S): 300 TABLET, EXTENDED RELEASE ORAL at 20:06

## 2021-10-28 RX ADMIN — METFORMIN HYDROCHLORIDE 1000 MILLIGRAM(S): 850 TABLET ORAL at 20:06

## 2021-10-28 RX ADMIN — SENNA PLUS 2 TABLET(S): 8.6 TABLET ORAL at 20:06

## 2021-10-28 RX ADMIN — CLOZAPINE 50 MILLIGRAM(S): 150 TABLET, ORALLY DISINTEGRATING ORAL at 08:26

## 2021-10-28 RX ADMIN — LITHIUM CARBONATE 450 MILLIGRAM(S): 300 TABLET, EXTENDED RELEASE ORAL at 08:25

## 2021-10-28 RX ADMIN — Medication 5 MILLIGRAM(S): at 22:13

## 2021-10-28 RX ADMIN — CLOZAPINE 250 MILLIGRAM(S): 150 TABLET, ORALLY DISINTEGRATING ORAL at 20:05

## 2021-10-28 RX ADMIN — ATORVASTATIN CALCIUM 40 MILLIGRAM(S): 80 TABLET, FILM COATED ORAL at 20:06

## 2021-10-28 NOTE — BH INPATIENT PSYCHIATRY PROGRESS NOTE - NSBHFUPINTERVALHXFT_PSY_A_CORE
Pt compliant with medication and tolerating it well.  Chart reviewed and case discussed with treatment team.  No events reported overnight.  Pt calm and cooperative with interview, joking at times, without irritability.  Pt reports he slept well last night and has been eating well.  Pt denies AH, reports having "instincts" not AH.  Pt talks about how he used to get groceries delivered from the grocery store and which grocery store he prefers.  Pt reports he is able to cook and use the microwave himself when at his residence.  Pt talks about going back to [employer].  Pt also talks about the first book he ever read called "[employer]" and another book he read about martians in a hospital that he enjoyed.

## 2021-10-28 NOTE — BH INPATIENT PSYCHIATRY PROGRESS NOTE - NSBHASSESSSUMMFT_PSY_ALL_CORE
Plan:   - clozapine ODT 50mg PO AM and 250mg PO at bedtime  - Invega Sustenna 234mg IM on 10/15 and 156mg IM on 10/19, with Invega Sustenna 234mg IM due on 11/16  -  Lithium Eskalith CR 450mg PO daily and 900mg PO at bedtime    - MOO granted on 9/28/21, Haldol 5mg IM and Benadryl 25mg IM if patient refuses Clozaril standing medication as per MOO court order     - group and milieu therapy     - routine checks     - Lithium level 0.8 on 10/26

## 2021-10-28 NOTE — BH INPATIENT PSYCHIATRY PROGRESS NOTE - CURRENT MEDICATION
MEDICATIONS  (STANDING):  acetaminophen   Tablet .. 650 milliGRAM(s) Oral once  atorvastatin 40 milliGRAM(s) Oral at bedtime  cloZAPine Disintegrating Tablet 250 milliGRAM(s) Oral at bedtime  cloZAPine Disintegrating Tablet 50 milliGRAM(s) Oral daily  dextrose 40% Gel 15 Gram(s) Oral once  glucagon  Injectable 1 milliGRAM(s) IntraMuscular once  insulin lispro (ADMELOG) corrective regimen sliding scale   SubCutaneous three times a day before meals  insulin lispro (ADMELOG) corrective regimen sliding scale   SubCutaneous at bedtime  lithium CR (ESKALITH-CR) 450 milliGRAM(s) Oral daily  lithium CR (ESKALITH-CR) 900 milliGRAM(s) Oral at bedtime  metFORMIN 1000 milliGRAM(s) Oral at bedtime  senna 2 Tablet(s) Oral at bedtime  sitaGLIPtin 100 milliGRAM(s) Oral daily    MEDICATIONS  (PRN):  acetaminophen   Tablet .. 650 milliGRAM(s) Oral every 6 hours PRN Mild Pain (1 - 3), Moderate Pain (4 - 6)  chlorproMAZINE    Injectable 100 milliGRAM(s) IntraMuscular once PRN agitation or aggression  chlorproMAZINE    Tablet 100 milliGRAM(s) Oral every 4 hours PRN agitation or aggression  diphenhydrAMINE   Injectable 25 milliGRAM(s) IntraMuscular every 12 hours PRN EPS ppx  diphenhydrAMINE   Injectable 25 milliGRAM(s) IntraMuscular every 12 hours PRN EPS ppx  diphenhydrAMINE Injectable 25 milliGRAM(s) IntraMuscular every 12 hours PRN EPS ppx  haloperidol    Injectable 5 milliGRAM(s) IntraMuscular every 12 hours PRN psychosis  haloperidol    Injectable 5 milliGRAM(s) IntraMuscular every 12 hours PRN psychosis  haloperidol    Injectable 5 milliGRAM(s) IntraMuscular every 12 hours PRN psychosis  melatonin. 5 milliGRAM(s) Oral at bedtime PRN Insomnia  polyethylene glycol 3350 17 Gram(s) Oral daily PRN constipation

## 2021-10-28 NOTE — BH INPATIENT PSYCHIATRY PROGRESS NOTE - NSBHMETABOLIC_PSY_ALL_CORE_FT
BMI: BMI (kg/m2): 77.9 (10-25-21 @ 06:54)  HbA1c: A1C with Estimated Average Glucose Result: 7.3 % (08-05-21 @ 10:24)    Glucose: POCT Blood Glucose.: 225 mg/dL (10-28-21 @ 08:12)    BP: --  Lipid Panel: Date/Time: 08-05-21 @ 10:24  Cholesterol, Serum: 139  Direct LDL: --  HDL Cholesterol, Serum: 42  Total Cholesterol/HDL Ration Measurement: --  Triglycerides, Serum: 234

## 2021-10-28 NOTE — BH INPATIENT PSYCHIATRY PROGRESS NOTE - NSBHCHARTREVIEWVS_PSY_A_CORE FT
Vital Signs Last 24 Hrs  T(C): 36.7 (10-28-21 @ 07:52), Max: 36.7 (10-28-21 @ 07:52)  T(F): 98 (10-28-21 @ 07:52), Max: 98 (10-28-21 @ 07:52)  HR: --  BP: --  BP(mean): --  RR: --  SpO2: --    Orthostatic VS  10-28-21 @ 07:52  Lying BP: --/-- HR: --  Sitting BP: 131/80 HR: 90  Standing BP: 128/77 HR: 93  Site: --  Mode: --  Orthostatic VS  10-27-21 @ 20:47  Lying BP: --/-- HR: --  Sitting BP: 138/83 HR: 98  Standing BP: 138/76 HR: 105  Site: --  Mode: --  Orthostatic VS  10-27-21 @ 08:10  Lying BP: --/-- HR: --  Sitting BP: 126/76 HR: 87  Standing BP: --/-- HR: --  Site: --  Mode: --  Orthostatic VS  10-26-21 @ 18:15  Lying BP: --/-- HR: --  Sitting BP: 116/66 HR: 101  Standing BP: 106/68 HR: 100  Site: --  Mode: --

## 2021-10-29 LAB
GLUCOSE BLDC GLUCOMTR-MCNC: 159 MG/DL — HIGH (ref 70–99)
GLUCOSE BLDC GLUCOMTR-MCNC: 198 MG/DL — HIGH (ref 70–99)
GLUCOSE BLDC GLUCOMTR-MCNC: 198 MG/DL — HIGH (ref 70–99)

## 2021-10-29 PROCEDURE — 99231 SBSQ HOSP IP/OBS SF/LOW 25: CPT

## 2021-10-29 RX ORDER — ASCORBIC ACID 60 MG
500 TABLET,CHEWABLE ORAL DAILY
Refills: 0 | Status: DISCONTINUED | OUTPATIENT
Start: 2021-10-29 | End: 2021-11-30

## 2021-10-29 RX ORDER — CALCIUM CARBONATE 500(1250)
1 TABLET ORAL
Refills: 0 | Status: DISCONTINUED | OUTPATIENT
Start: 2021-10-29 | End: 2021-11-30

## 2021-10-29 RX ADMIN — Medication 1: at 16:33

## 2021-10-29 RX ADMIN — LITHIUM CARBONATE 450 MILLIGRAM(S): 300 TABLET, EXTENDED RELEASE ORAL at 09:10

## 2021-10-29 RX ADMIN — ATORVASTATIN CALCIUM 40 MILLIGRAM(S): 80 TABLET, FILM COATED ORAL at 21:16

## 2021-10-29 RX ADMIN — Medication 1 TABLET(S): at 21:16

## 2021-10-29 RX ADMIN — SENNA PLUS 2 TABLET(S): 8.6 TABLET ORAL at 21:16

## 2021-10-29 RX ADMIN — CLOZAPINE 250 MILLIGRAM(S): 150 TABLET, ORALLY DISINTEGRATING ORAL at 21:15

## 2021-10-29 RX ADMIN — CLOZAPINE 50 MILLIGRAM(S): 150 TABLET, ORALLY DISINTEGRATING ORAL at 09:10

## 2021-10-29 RX ADMIN — LITHIUM CARBONATE 900 MILLIGRAM(S): 300 TABLET, EXTENDED RELEASE ORAL at 21:16

## 2021-10-29 RX ADMIN — METFORMIN HYDROCHLORIDE 1000 MILLIGRAM(S): 850 TABLET ORAL at 21:16

## 2021-10-29 NOTE — BH INPATIENT PSYCHIATRY PROGRESS NOTE - NSBHFUPINTERVALHXFT_PSY_A_CORE
Pt compliant with medication and tolerating it well.  Chart reviewed and case discussed with treatment team.  No events reported overnight.  Pt remains pleasant and cooperative with interview.  Pt worries about going back to his residence, reports, "I may need another residence cause the long hallways are hard to walk."  Spoke to him about treatment teams previous conversations with his residence worker and how she may be able to move him to more supportive housing and patient agreeable to this and agreeable to have a conference call with treatment team and his residence worker next week.  Pt reports eating and sleeping well.  Pt talks about how "I am careful with my diet because of the diabetes, I try to eat low carb foods."  Pt reports he has also been thinking about the COVID booster shot and unsure at this time if he wants to receive it.  Pt also talks about how "I have a spirit girl in me, she tells me I am funny."  Pt reports at this time he is not looking for a girlfriend, but "maybe in the next five years."

## 2021-10-29 NOTE — BH INPATIENT PSYCHIATRY PROGRESS NOTE - NSBHCHARTREVIEWVS_PSY_A_CORE FT
Vital Signs Last 24 Hrs  T(C): 36.4 (10-29-21 @ 08:55), Max: 36.4 (10-29-21 @ 08:55)  T(F): 97.6 (10-29-21 @ 08:55), Max: 97.6 (10-29-21 @ 08:55)  HR: --  BP: --  BP(mean): --  RR: --  SpO2: --    Orthostatic VS  10-29-21 @ 08:55  Lying BP: --/-- HR: --  Sitting BP: 130/73 HR: 91  Standing BP: 119/69 HR: 94  Site: --  Mode: --  Orthostatic VS  10-28-21 @ 18:22  Lying BP: --/-- HR: --  Sitting BP: 121/70 HR: 91  Standing BP: 109/71 HR: 96  Site: upper right arm  Mode: electronic  Orthostatic VS  10-28-21 @ 07:52  Lying BP: --/-- HR: --  Sitting BP: 131/80 HR: 90  Standing BP: 128/77 HR: 93  Site: --  Mode: --  Orthostatic VS  10-27-21 @ 20:47  Lying BP: --/-- HR: --  Sitting BP: 138/83 HR: 98  Standing BP: 138/76 HR: 105  Site: --  Mode: --

## 2021-10-29 NOTE — BH INPATIENT PSYCHIATRY PROGRESS NOTE - NSBHMETABOLIC_PSY_ALL_CORE_FT
BMI: BMI (kg/m2): 77.9 (10-25-21 @ 06:54)  HbA1c: A1C with Estimated Average Glucose Result: 7.3 % (08-05-21 @ 10:24)    Glucose: POCT Blood Glucose.: 198 mg/dL (10-29-21 @ 12:10)    BP: --  Lipid Panel: Date/Time: 08-05-21 @ 10:24  Cholesterol, Serum: 139  Direct LDL: --  HDL Cholesterol, Serum: 42  Total Cholesterol/HDL Ration Measurement: --  Triglycerides, Serum: 234

## 2021-10-29 NOTE — BH INPATIENT PSYCHIATRY PROGRESS NOTE - PRN MEDS
MEDICATIONS  (PRN):  acetaminophen   Tablet .. 650 milliGRAM(s) Oral every 6 hours PRN Mild Pain (1 - 3), Moderate Pain (4 - 6)  chlorproMAZINE    Injectable 100 milliGRAM(s) IntraMuscular once PRN agitation or aggression  chlorproMAZINE    Tablet 100 milliGRAM(s) Oral every 4 hours PRN agitation or aggression  diphenhydrAMINE   Injectable 25 milliGRAM(s) IntraMuscular every 12 hours PRN EPS ppx  diphenhydrAMINE   Injectable 25 milliGRAM(s) IntraMuscular every 12 hours PRN EPS ppx  diphenhydrAMINE Injectable 25 milliGRAM(s) IntraMuscular every 12 hours PRN EPS ppx  haloperidol    Injectable 5 milliGRAM(s) IntraMuscular every 12 hours PRN psychosis  haloperidol    Injectable 5 milliGRAM(s) IntraMuscular every 12 hours PRN psychosis  haloperidol    Injectable 5 milliGRAM(s) IntraMuscular every 12 hours PRN psychosis  polyethylene glycol 3350 17 Gram(s) Oral daily PRN constipation

## 2021-10-29 NOTE — BH INPATIENT PSYCHIATRY PROGRESS NOTE - CURRENT MEDICATION
MEDICATIONS  (STANDING):  acetaminophen   Tablet .. 650 milliGRAM(s) Oral once  ascorbic acid 500 milliGRAM(s) Oral daily  atorvastatin 40 milliGRAM(s) Oral at bedtime  calcium carbonate    500 mG (Tums) Chewable 1 Tablet(s) Chew two times a day  cloZAPine Disintegrating Tablet 250 milliGRAM(s) Oral at bedtime  cloZAPine Disintegrating Tablet 50 milliGRAM(s) Oral daily  dextrose 40% Gel 15 Gram(s) Oral once  glucagon  Injectable 1 milliGRAM(s) IntraMuscular once  insulin lispro (ADMELOG) corrective regimen sliding scale   SubCutaneous three times a day before meals  insulin lispro (ADMELOG) corrective regimen sliding scale   SubCutaneous at bedtime  lithium CR (ESKALITH-CR) 450 milliGRAM(s) Oral daily  lithium CR (ESKALITH-CR) 900 milliGRAM(s) Oral at bedtime  metFORMIN 1000 milliGRAM(s) Oral at bedtime  senna 2 Tablet(s) Oral at bedtime  sitaGLIPtin 100 milliGRAM(s) Oral daily    MEDICATIONS  (PRN):  acetaminophen   Tablet .. 650 milliGRAM(s) Oral every 6 hours PRN Mild Pain (1 - 3), Moderate Pain (4 - 6)  chlorproMAZINE    Injectable 100 milliGRAM(s) IntraMuscular once PRN agitation or aggression  chlorproMAZINE    Tablet 100 milliGRAM(s) Oral every 4 hours PRN agitation or aggression  diphenhydrAMINE   Injectable 25 milliGRAM(s) IntraMuscular every 12 hours PRN EPS ppx  diphenhydrAMINE   Injectable 25 milliGRAM(s) IntraMuscular every 12 hours PRN EPS ppx  diphenhydrAMINE Injectable 25 milliGRAM(s) IntraMuscular every 12 hours PRN EPS ppx  haloperidol    Injectable 5 milliGRAM(s) IntraMuscular every 12 hours PRN psychosis  haloperidol    Injectable 5 milliGRAM(s) IntraMuscular every 12 hours PRN psychosis  haloperidol    Injectable 5 milliGRAM(s) IntraMuscular every 12 hours PRN psychosis  polyethylene glycol 3350 17 Gram(s) Oral daily PRN constipation

## 2021-10-30 LAB
GLUCOSE BLDC GLUCOMTR-MCNC: 180 MG/DL — HIGH (ref 70–99)
GLUCOSE BLDC GLUCOMTR-MCNC: 226 MG/DL — HIGH (ref 70–99)

## 2021-10-30 RX ADMIN — METFORMIN HYDROCHLORIDE 1000 MILLIGRAM(S): 850 TABLET ORAL at 20:14

## 2021-10-30 RX ADMIN — SENNA PLUS 2 TABLET(S): 8.6 TABLET ORAL at 20:14

## 2021-10-30 RX ADMIN — LITHIUM CARBONATE 450 MILLIGRAM(S): 300 TABLET, EXTENDED RELEASE ORAL at 08:38

## 2021-10-30 RX ADMIN — CLOZAPINE 250 MILLIGRAM(S): 150 TABLET, ORALLY DISINTEGRATING ORAL at 20:14

## 2021-10-30 RX ADMIN — Medication 500 MILLIGRAM(S): at 08:38

## 2021-10-30 RX ADMIN — Medication 1 TABLET(S): at 20:15

## 2021-10-30 RX ADMIN — ATORVASTATIN CALCIUM 40 MILLIGRAM(S): 80 TABLET, FILM COATED ORAL at 20:15

## 2021-10-30 RX ADMIN — CLOZAPINE 50 MILLIGRAM(S): 150 TABLET, ORALLY DISINTEGRATING ORAL at 08:39

## 2021-10-30 RX ADMIN — Medication 1 TABLET(S): at 08:38

## 2021-10-30 RX ADMIN — LITHIUM CARBONATE 900 MILLIGRAM(S): 300 TABLET, EXTENDED RELEASE ORAL at 20:14

## 2021-10-31 LAB
GLUCOSE BLDC GLUCOMTR-MCNC: 195 MG/DL — HIGH (ref 70–99)
GLUCOSE BLDC GLUCOMTR-MCNC: 230 MG/DL — HIGH (ref 70–99)
GLUCOSE BLDC GLUCOMTR-MCNC: 242 MG/DL — HIGH (ref 70–99)
GLUCOSE BLDC GLUCOMTR-MCNC: 307 MG/DL — HIGH (ref 70–99)

## 2021-10-31 RX ADMIN — Medication 500 MILLIGRAM(S): at 09:24

## 2021-10-31 RX ADMIN — Medication 1: at 17:03

## 2021-10-31 RX ADMIN — LITHIUM CARBONATE 900 MILLIGRAM(S): 300 TABLET, EXTENDED RELEASE ORAL at 20:52

## 2021-10-31 RX ADMIN — Medication 1 TABLET(S): at 09:24

## 2021-10-31 RX ADMIN — CLOZAPINE 50 MILLIGRAM(S): 150 TABLET, ORALLY DISINTEGRATING ORAL at 09:24

## 2021-10-31 RX ADMIN — ATORVASTATIN CALCIUM 40 MILLIGRAM(S): 80 TABLET, FILM COATED ORAL at 20:51

## 2021-10-31 RX ADMIN — Medication 1 TABLET(S): at 20:51

## 2021-10-31 RX ADMIN — SENNA PLUS 2 TABLET(S): 8.6 TABLET ORAL at 20:53

## 2021-10-31 RX ADMIN — CLOZAPINE 250 MILLIGRAM(S): 150 TABLET, ORALLY DISINTEGRATING ORAL at 20:52

## 2021-10-31 RX ADMIN — METFORMIN HYDROCHLORIDE 1000 MILLIGRAM(S): 850 TABLET ORAL at 20:52

## 2021-10-31 RX ADMIN — LITHIUM CARBONATE 450 MILLIGRAM(S): 300 TABLET, EXTENDED RELEASE ORAL at 09:24

## 2021-10-31 RX ADMIN — Medication 4: at 12:29

## 2021-11-01 LAB
GLUCOSE BLDC GLUCOMTR-MCNC: 195 MG/DL — HIGH (ref 70–99)
GLUCOSE BLDC GLUCOMTR-MCNC: 205 MG/DL — HIGH (ref 70–99)
GLUCOSE BLDC GLUCOMTR-MCNC: 213 MG/DL — HIGH (ref 70–99)
GLUCOSE BLDC GLUCOMTR-MCNC: 286 MG/DL — HIGH (ref 70–99)

## 2021-11-01 PROCEDURE — 99231 SBSQ HOSP IP/OBS SF/LOW 25: CPT

## 2021-11-01 RX ORDER — LITHIUM CARBONATE 300 MG/1
900 TABLET, EXTENDED RELEASE ORAL AT BEDTIME
Refills: 0 | Status: COMPLETED | OUTPATIENT
Start: 2021-11-01 | End: 2021-11-01

## 2021-11-01 RX ORDER — LITHIUM CARBONATE 300 MG/1
1350 TABLET, EXTENDED RELEASE ORAL AT BEDTIME
Refills: 0 | Status: DISCONTINUED | OUTPATIENT
Start: 2021-11-02 | End: 2021-11-30

## 2021-11-01 RX ADMIN — Medication 1 TABLET(S): at 20:43

## 2021-11-01 RX ADMIN — LITHIUM CARBONATE 900 MILLIGRAM(S): 300 TABLET, EXTENDED RELEASE ORAL at 20:44

## 2021-11-01 RX ADMIN — METFORMIN HYDROCHLORIDE 1000 MILLIGRAM(S): 850 TABLET ORAL at 20:47

## 2021-11-01 RX ADMIN — CLOZAPINE 50 MILLIGRAM(S): 150 TABLET, ORALLY DISINTEGRATING ORAL at 10:36

## 2021-11-01 RX ADMIN — Medication 1: at 20:46

## 2021-11-01 RX ADMIN — CLOZAPINE 250 MILLIGRAM(S): 150 TABLET, ORALLY DISINTEGRATING ORAL at 20:43

## 2021-11-01 RX ADMIN — ATORVASTATIN CALCIUM 40 MILLIGRAM(S): 80 TABLET, FILM COATED ORAL at 20:43

## 2021-11-01 RX ADMIN — LITHIUM CARBONATE 450 MILLIGRAM(S): 300 TABLET, EXTENDED RELEASE ORAL at 10:36

## 2021-11-01 RX ADMIN — SENNA PLUS 2 TABLET(S): 8.6 TABLET ORAL at 20:44

## 2021-11-01 RX ADMIN — Medication 1 TABLET(S): at 10:36

## 2021-11-01 RX ADMIN — Medication 500 MILLIGRAM(S): at 10:36

## 2021-11-01 NOTE — BH INPATIENT PSYCHIATRY PROGRESS NOTE - NSBHCHARTREVIEWVS_PSY_A_CORE FT
Vital Signs Last 24 Hrs  T(C): 36.3 (11-01-21 @ 07:42), Max: 36.3 (10-31-21 @ 20:46)  T(F): 97.3 (11-01-21 @ 07:42), Max: 97.4 (10-31-21 @ 20:46)  HR: --  BP: --  BP(mean): --  RR: 17 (10-31-21 @ 20:46) (17 - 17)  SpO2: --    Orthostatic VS  11-01-21 @ 07:42  Lying BP: --/-- HR: --  Sitting BP: 140/83 HR: 93  Standing BP: 149/89 HR: 92  Site: --  Mode: --  Orthostatic VS  10-31-21 @ 20:46  Lying BP: --/-- HR: --  Sitting BP: 128/71 HR: 96  Standing BP: 132/71 HR: 100  Site: --  Mode: --  Orthostatic VS  10-31-21 @ 07:46  Lying BP: --/-- HR: --  Sitting BP: 112/70 HR: 90  Standing BP: 100/62 HR: 89  Site: --  Mode: --

## 2021-11-01 NOTE — BH INPATIENT PSYCHIATRY PROGRESS NOTE - NSBHFUPINTERVALHXFT_PSY_A_CORE
Pt compliant with medication.  Chart reviewed and case discussed with treatment team.  No events reported overnight.  Pt continues to be calm and cooperative with interview.  Pt talks about how he is looking to get a new apartment for himself through [employer].  Discussed with him treatment team himself and his residence will have a phone meeting later this week to talk about housing options and he is agreeable to this.  Pt also talks about how he needs to pay his bills.  Pt joking at times and has an overall more linear conversation then previously, still with some psychotic sx at times, but less evident than previously.  When asked about AH, he laughs and reports, "everyone hears voices at times."  Pt also talks about a female crush he has on a previous patient and how "she is back to life" and he has now been talking to her more on the phone.  Pt talks about still feeling tired at times on the medication, agrees to change Lithium Eskalith to all HS dosing for tomorrow.

## 2021-11-01 NOTE — BH INPATIENT PSYCHIATRY PROGRESS NOTE - CURRENT MEDICATION
MEDICATIONS  (STANDING):  acetaminophen   Tablet .. 650 milliGRAM(s) Oral once  ascorbic acid 500 milliGRAM(s) Oral daily  atorvastatin 40 milliGRAM(s) Oral at bedtime  calcium carbonate    500 mG (Tums) Chewable 1 Tablet(s) Chew two times a day  cloZAPine Disintegrating Tablet 250 milliGRAM(s) Oral at bedtime  cloZAPine Disintegrating Tablet 50 milliGRAM(s) Oral daily  dextrose 40% Gel 15 Gram(s) Oral once  glucagon  Injectable 1 milliGRAM(s) IntraMuscular once  insulin lispro (ADMELOG) corrective regimen sliding scale   SubCutaneous three times a day before meals  insulin lispro (ADMELOG) corrective regimen sliding scale   SubCutaneous at bedtime  lithium CR (ESKALITH-CR) 900 milliGRAM(s) Oral at bedtime  metFORMIN 1000 milliGRAM(s) Oral at bedtime  senna 2 Tablet(s) Oral at bedtime  sitaGLIPtin 100 milliGRAM(s) Oral daily    MEDICATIONS  (PRN):  acetaminophen   Tablet .. 650 milliGRAM(s) Oral every 6 hours PRN Mild Pain (1 - 3), Moderate Pain (4 - 6)  chlorproMAZINE    Injectable 100 milliGRAM(s) IntraMuscular once PRN agitation or aggression  chlorproMAZINE    Tablet 100 milliGRAM(s) Oral every 4 hours PRN agitation or aggression  diphenhydrAMINE   Injectable 25 milliGRAM(s) IntraMuscular every 12 hours PRN EPS ppx  diphenhydrAMINE   Injectable 25 milliGRAM(s) IntraMuscular every 12 hours PRN EPS ppx  diphenhydrAMINE Injectable 25 milliGRAM(s) IntraMuscular every 12 hours PRN EPS ppx  haloperidol    Injectable 5 milliGRAM(s) IntraMuscular every 12 hours PRN psychosis  haloperidol    Injectable 5 milliGRAM(s) IntraMuscular every 12 hours PRN psychosis  haloperidol    Injectable 5 milliGRAM(s) IntraMuscular every 12 hours PRN psychosis  polyethylene glycol 3350 17 Gram(s) Oral daily PRN constipation

## 2021-11-01 NOTE — BH INPATIENT PSYCHIATRY PROGRESS NOTE - NSBHASSESSSUMMFT_PSY_ALL_CORE
Plan:   - clozapine ODT 50mg PO AM and 250mg PO at bedtime  - Invega Sustenna 234mg IM on 10/15 and 156mg IM on 10/19, with Invega Sustenna 234mg IM due on 11/16  -  Lithium Eskalith CR 450mg PO daily and 900mg PO at bedtime today then change to 1350mg PO at bedtime on 11/2  - MOO granted on 9/28/21, Haldol 5mg IM and Benadryl 25mg IM if patient refuses Clozaril standing medication as per MOO court order     - group and milieu therapy     - routine checks     - Lithium level 0.8 on 10/26

## 2021-11-01 NOTE — BH INPATIENT PSYCHIATRY PROGRESS NOTE - NSBHMETABOLIC_PSY_ALL_CORE_FT
BMI: BMI (kg/m2): 77.9 (10-25-21 @ 06:54)  HbA1c: A1C with Estimated Average Glucose Result: 7.3 % (08-05-21 @ 10:24)    Glucose: POCT Blood Glucose.: 213 mg/dL (11-01-21 @ 12:06)    BP: --  Lipid Panel: Date/Time: 08-05-21 @ 10:24  Cholesterol, Serum: 139  Direct LDL: --  HDL Cholesterol, Serum: 42  Total Cholesterol/HDL Ration Measurement: --  Triglycerides, Serum: 234

## 2021-11-02 LAB
BASOPHILS # BLD AUTO: 0.08 K/UL — SIGNIFICANT CHANGE UP (ref 0–0.2)
BASOPHILS NFR BLD AUTO: 0.5 % — SIGNIFICANT CHANGE UP (ref 0–2)
EOSINOPHIL # BLD AUTO: 0.4 K/UL — SIGNIFICANT CHANGE UP (ref 0–0.5)
EOSINOPHIL NFR BLD AUTO: 2.3 % — SIGNIFICANT CHANGE UP (ref 0–6)
GLUCOSE BLDC GLUCOMTR-MCNC: 177 MG/DL — HIGH (ref 70–99)
GLUCOSE BLDC GLUCOMTR-MCNC: 186 MG/DL — HIGH (ref 70–99)
GLUCOSE BLDC GLUCOMTR-MCNC: 213 MG/DL — HIGH (ref 70–99)
GLUCOSE BLDC GLUCOMTR-MCNC: 282 MG/DL — HIGH (ref 70–99)
HCT VFR BLD CALC: 42.7 % — SIGNIFICANT CHANGE UP (ref 39–50)
HGB BLD-MCNC: 13.7 G/DL — SIGNIFICANT CHANGE UP (ref 13–17)
IANC: 12.42 K/UL — HIGH (ref 1.5–8.5)
IMM GRANULOCYTES NFR BLD AUTO: 0.9 % — SIGNIFICANT CHANGE UP (ref 0–1.5)
LYMPHOCYTES # BLD AUTO: 17.9 % — SIGNIFICANT CHANGE UP (ref 13–44)
LYMPHOCYTES # BLD AUTO: 3.07 K/UL — SIGNIFICANT CHANGE UP (ref 1–3.3)
MCHC RBC-ENTMCNC: 27.5 PG — SIGNIFICANT CHANGE UP (ref 27–34)
MCHC RBC-ENTMCNC: 32.1 GM/DL — SIGNIFICANT CHANGE UP (ref 32–36)
MCV RBC AUTO: 85.7 FL — SIGNIFICANT CHANGE UP (ref 80–100)
MONOCYTES # BLD AUTO: 1.02 K/UL — HIGH (ref 0–0.9)
MONOCYTES NFR BLD AUTO: 6 % — SIGNIFICANT CHANGE UP (ref 2–14)
NEUTROPHILS # BLD AUTO: 12.42 K/UL — HIGH (ref 1.8–7.4)
NEUTROPHILS NFR BLD AUTO: 72.4 % — SIGNIFICANT CHANGE UP (ref 43–77)
NRBC # BLD: 0 /100 WBCS — SIGNIFICANT CHANGE UP
NRBC # FLD: 0 K/UL — SIGNIFICANT CHANGE UP
PLATELET # BLD AUTO: 208 K/UL — SIGNIFICANT CHANGE UP (ref 150–400)
RBC # BLD: 4.98 M/UL — SIGNIFICANT CHANGE UP (ref 4.2–5.8)
RBC # FLD: 15.1 % — HIGH (ref 10.3–14.5)
WBC # BLD: 17.14 K/UL — HIGH (ref 3.8–10.5)
WBC # FLD AUTO: 17.14 K/UL — HIGH (ref 3.8–10.5)

## 2021-11-02 PROCEDURE — 99231 SBSQ HOSP IP/OBS SF/LOW 25: CPT

## 2021-11-02 RX ADMIN — Medication 500 MILLIGRAM(S): at 08:37

## 2021-11-02 RX ADMIN — LITHIUM CARBONATE 1350 MILLIGRAM(S): 300 TABLET, EXTENDED RELEASE ORAL at 21:09

## 2021-11-02 RX ADMIN — SENNA PLUS 2 TABLET(S): 8.6 TABLET ORAL at 21:05

## 2021-11-02 RX ADMIN — CLOZAPINE 50 MILLIGRAM(S): 150 TABLET, ORALLY DISINTEGRATING ORAL at 08:37

## 2021-11-02 RX ADMIN — ATORVASTATIN CALCIUM 40 MILLIGRAM(S): 80 TABLET, FILM COATED ORAL at 21:09

## 2021-11-02 RX ADMIN — METFORMIN HYDROCHLORIDE 1000 MILLIGRAM(S): 850 TABLET ORAL at 21:08

## 2021-11-02 RX ADMIN — CLOZAPINE 250 MILLIGRAM(S): 150 TABLET, ORALLY DISINTEGRATING ORAL at 21:08

## 2021-11-02 RX ADMIN — Medication 1 TABLET(S): at 08:37

## 2021-11-02 RX ADMIN — Medication 1 TABLET(S): at 21:10

## 2021-11-02 NOTE — BH INPATIENT PSYCHIATRY PROGRESS NOTE - NSBHFUPINTERVALHXFT_PSY_A_CORE
Pt compliant with medication and tolerating it well.  Chart reviewed and case discussed with treatment team.  No events reported overnight.  Pt calm and cooperative with interview.  Pt reports he slept well last night and woke up early.  Pt reports in the future he would like to get his own apartment.  Discussed with patient recommendation of going back to a residence after discharge and plan to have a meeting with his residence worker later this week to discuss residence options and patient agreeable.  Pt reports he would like to wean off Clozaril in the future once "I am doing well for two or three months."  Pt agrees to have this discussion with his ACT team and not to stop medication unless under their guidance.  Pt reports AH, of positive thoughts and "music" and that "they will never go away."  Pt also talks about how he had a "girlfriend at [facility] and she disappeared before we could exchange numbers."  Pt without fever or physical complaints and reports having regular bowel movements.    Discussed results of CBC + diff as well as patient's medical hx with hospitalist, Dr. Peralta.  He recommended to continue to monitor CBC + diff weekly and more often if patient with physical complaints.  No other treatment indicated at this time.

## 2021-11-02 NOTE — BH INPATIENT PSYCHIATRY PROGRESS NOTE - NSBHASSESSSUMMFT_PSY_ALL_CORE
Plan:   - clozapine ODT 50mg PO AM and 250mg PO at bedtime  - Invega Sustenna 234mg IM on 10/15 and 156mg IM on 10/19, with Invega Sustenna 234mg IM due on 11/16  -  Lithium Eskalith CR 450mg PO daily and 900mg PO at bedtime today then change to 1350mg PO at bedtime on 11/2  - MOO granted on 9/28/21, Haldol 5mg IM and Benadryl 25mg IM if patient refuses Clozaril standing medication as per MOO court order     - group and milieu therapy     - routine checks     - Lithium level 0.8 on 10/26   - CBC + diff weekly

## 2021-11-02 NOTE — BH INPATIENT PSYCHIATRY PROGRESS NOTE - NSBHMETABOLIC_PSY_ALL_CORE_FT
BMI: BMI (kg/m2): 77.9 (10-25-21 @ 06:54)  HbA1c: A1C with Estimated Average Glucose Result: 7.3 % (08-05-21 @ 10:24)    Glucose: POCT Blood Glucose.: 282 mg/dL (11-02-21 @ 12:06)    BP: --  Lipid Panel: Date/Time: 08-05-21 @ 10:24  Cholesterol, Serum: 139  Direct LDL: --  HDL Cholesterol, Serum: 42  Total Cholesterol/HDL Ration Measurement: --  Triglycerides, Serum: 234

## 2021-11-02 NOTE — BH INPATIENT PSYCHIATRY PROGRESS NOTE - NSBHCHARTREVIEWVS_PSY_A_CORE FT
Vital Signs Last 24 Hrs  T(C): 36.4 (11-02-21 @ 08:03), Max: 36.6 (11-01-21 @ 18:15)  T(F): 97.5 (11-02-21 @ 08:03), Max: 97.9 (11-01-21 @ 18:15)  HR: --  BP: --  BP(mean): --  RR: --  SpO2: --    Orthostatic VS  11-02-21 @ 08:03  Lying BP: --/-- HR: --  Sitting BP: 130/81 HR: 94  Standing BP: 127/82 HR: 91  Site: --  Mode: --  Orthostatic VS  11-01-21 @ 20:32  Lying BP: --/-- HR: --  Sitting BP: 145/85 HR: 87  Standing BP: 145/77 HR: 99  Site: --  Mode: --  Orthostatic VS  11-01-21 @ 07:42  Lying BP: --/-- HR: --  Sitting BP: 140/83 HR: 93  Standing BP: 149/89 HR: 92  Site: --  Mode: --  Orthostatic VS  10-31-21 @ 20:46  Lying BP: --/-- HR: --  Sitting BP: 128/71 HR: 96  Standing BP: 132/71 HR: 100  Site: --  Mode: --

## 2021-11-02 NOTE — BH INPATIENT PSYCHIATRY PROGRESS NOTE - CURRENT MEDICATION
MEDICATIONS  (STANDING):  acetaminophen   Tablet .. 650 milliGRAM(s) Oral once  ascorbic acid 500 milliGRAM(s) Oral daily  atorvastatin 40 milliGRAM(s) Oral at bedtime  calcium carbonate    500 mG (Tums) Chewable 1 Tablet(s) Chew two times a day  cloZAPine Disintegrating Tablet 250 milliGRAM(s) Oral at bedtime  cloZAPine Disintegrating Tablet 50 milliGRAM(s) Oral daily  dextrose 40% Gel 15 Gram(s) Oral once  glucagon  Injectable 1 milliGRAM(s) IntraMuscular once  insulin lispro (ADMELOG) corrective regimen sliding scale   SubCutaneous three times a day before meals  insulin lispro (ADMELOG) corrective regimen sliding scale   SubCutaneous at bedtime  lithium CR (ESKALITH-CR) 1350 milliGRAM(s) Oral at bedtime  metFORMIN 1000 milliGRAM(s) Oral at bedtime  senna 2 Tablet(s) Oral at bedtime  sitaGLIPtin 100 milliGRAM(s) Oral daily    MEDICATIONS  (PRN):  acetaminophen   Tablet .. 650 milliGRAM(s) Oral every 6 hours PRN Mild Pain (1 - 3), Moderate Pain (4 - 6)  chlorproMAZINE    Injectable 100 milliGRAM(s) IntraMuscular once PRN agitation or aggression  chlorproMAZINE    Tablet 100 milliGRAM(s) Oral every 4 hours PRN agitation or aggression  diphenhydrAMINE   Injectable 25 milliGRAM(s) IntraMuscular every 12 hours PRN EPS ppx  diphenhydrAMINE   Injectable 25 milliGRAM(s) IntraMuscular every 12 hours PRN EPS ppx  diphenhydrAMINE Injectable 25 milliGRAM(s) IntraMuscular every 12 hours PRN EPS ppx  haloperidol    Injectable 5 milliGRAM(s) IntraMuscular every 12 hours PRN psychosis  haloperidol    Injectable 5 milliGRAM(s) IntraMuscular every 12 hours PRN psychosis  haloperidol    Injectable 5 milliGRAM(s) IntraMuscular every 12 hours PRN psychosis  polyethylene glycol 3350 17 Gram(s) Oral daily PRN constipation

## 2021-11-03 LAB
GLUCOSE BLDC GLUCOMTR-MCNC: 189 MG/DL — HIGH (ref 70–99)
GLUCOSE BLDC GLUCOMTR-MCNC: 194 MG/DL — HIGH (ref 70–99)
GLUCOSE BLDC GLUCOMTR-MCNC: 200 MG/DL — HIGH (ref 70–99)
GLUCOSE BLDC GLUCOMTR-MCNC: 228 MG/DL — HIGH (ref 70–99)

## 2021-11-03 PROCEDURE — 99231 SBSQ HOSP IP/OBS SF/LOW 25: CPT

## 2021-11-03 RX ORDER — CLOZAPINE 150 MG/1
250 TABLET, ORALLY DISINTEGRATING ORAL AT BEDTIME
Refills: 0 | Status: COMPLETED | OUTPATIENT
Start: 2021-11-03 | End: 2021-11-03

## 2021-11-03 RX ORDER — CLOZAPINE 150 MG/1
25 TABLET, ORALLY DISINTEGRATING ORAL DAILY
Refills: 0 | Status: DISCONTINUED | OUTPATIENT
Start: 2021-11-04 | End: 2021-11-05

## 2021-11-03 RX ORDER — CLOZAPINE 150 MG/1
275 TABLET, ORALLY DISINTEGRATING ORAL AT BEDTIME
Refills: 0 | Status: DISCONTINUED | OUTPATIENT
Start: 2021-11-04 | End: 2021-11-05

## 2021-11-03 RX ADMIN — Medication 1: at 08:55

## 2021-11-03 RX ADMIN — CLOZAPINE 50 MILLIGRAM(S): 150 TABLET, ORALLY DISINTEGRATING ORAL at 08:54

## 2021-11-03 RX ADMIN — LITHIUM CARBONATE 1350 MILLIGRAM(S): 300 TABLET, EXTENDED RELEASE ORAL at 21:16

## 2021-11-03 RX ADMIN — METFORMIN HYDROCHLORIDE 1000 MILLIGRAM(S): 850 TABLET ORAL at 21:17

## 2021-11-03 RX ADMIN — Medication 1 TABLET(S): at 21:30

## 2021-11-03 RX ADMIN — ATORVASTATIN CALCIUM 40 MILLIGRAM(S): 80 TABLET, FILM COATED ORAL at 21:16

## 2021-11-03 RX ADMIN — Medication 1 TABLET(S): at 08:54

## 2021-11-03 RX ADMIN — CLOZAPINE 250 MILLIGRAM(S): 150 TABLET, ORALLY DISINTEGRATING ORAL at 21:16

## 2021-11-03 RX ADMIN — SENNA PLUS 2 TABLET(S): 8.6 TABLET ORAL at 21:16

## 2021-11-03 RX ADMIN — Medication 1: at 12:28

## 2021-11-03 RX ADMIN — Medication 500 MILLIGRAM(S): at 08:54

## 2021-11-03 NOTE — BH INPATIENT PSYCHIATRY PROGRESS NOTE - NSBHCHARTREVIEWVS_PSY_A_CORE FT
Vital Signs Last 24 Hrs  T(C): 36.3 (11-03-21 @ 08:19), Max: 36.3 (11-03-21 @ 08:19)  T(F): 97.4 (11-03-21 @ 08:19), Max: 97.4 (11-03-21 @ 08:19)  HR: --  BP: --  BP(mean): --  RR: --  SpO2: --    Orthostatic VS  11-03-21 @ 08:19  Lying BP: --/-- HR: --  Sitting BP: 122/76 HR: 89  Standing BP: 113/71 HR: 91  Site: --  Mode: --  Orthostatic VS  11-02-21 @ 19:01  Lying BP: --/-- HR: --  Sitting BP: 131/82 HR: 97  Standing BP: 127/74 HR: 85  Site: --  Mode: --  Orthostatic VS  11-02-21 @ 08:03  Lying BP: --/-- HR: --  Sitting BP: 130/81 HR: 94  Standing BP: 127/82 HR: 91  Site: --  Mode: --  Orthostatic VS  11-01-21 @ 20:32  Lying BP: --/-- HR: --  Sitting BP: 145/85 HR: 87  Standing BP: 145/77 HR: 99  Site: --  Mode: --

## 2021-11-03 NOTE — BH INPATIENT PSYCHIATRY PROGRESS NOTE - CURRENT MEDICATION
MEDICATIONS  (STANDING):  acetaminophen   Tablet .. 650 milliGRAM(s) Oral once  ascorbic acid 500 milliGRAM(s) Oral daily  atorvastatin 40 milliGRAM(s) Oral at bedtime  calcium carbonate    500 mG (Tums) Chewable 1 Tablet(s) Chew two times a day  cloZAPine Disintegrating Tablet 250 milliGRAM(s) Oral at bedtime  dextrose 40% Gel 15 Gram(s) Oral once  glucagon  Injectable 1 milliGRAM(s) IntraMuscular once  insulin lispro (ADMELOG) corrective regimen sliding scale   SubCutaneous three times a day before meals  insulin lispro (ADMELOG) corrective regimen sliding scale   SubCutaneous at bedtime  lithium CR (ESKALITH-CR) 1350 milliGRAM(s) Oral at bedtime  metFORMIN 1000 milliGRAM(s) Oral at bedtime  senna 2 Tablet(s) Oral at bedtime  sitaGLIPtin 100 milliGRAM(s) Oral daily    MEDICATIONS  (PRN):  acetaminophen   Tablet .. 650 milliGRAM(s) Oral every 6 hours PRN Mild Pain (1 - 3), Moderate Pain (4 - 6)  chlorproMAZINE    Injectable 100 milliGRAM(s) IntraMuscular once PRN agitation or aggression  chlorproMAZINE    Tablet 100 milliGRAM(s) Oral every 4 hours PRN agitation or aggression  diphenhydrAMINE   Injectable 25 milliGRAM(s) IntraMuscular every 12 hours PRN EPS ppx  diphenhydrAMINE   Injectable 25 milliGRAM(s) IntraMuscular every 12 hours PRN EPS ppx  diphenhydrAMINE Injectable 25 milliGRAM(s) IntraMuscular every 12 hours PRN EPS ppx  haloperidol    Injectable 5 milliGRAM(s) IntraMuscular every 12 hours PRN psychosis  haloperidol    Injectable 5 milliGRAM(s) IntraMuscular every 12 hours PRN psychosis  haloperidol    Injectable 5 milliGRAM(s) IntraMuscular every 12 hours PRN psychosis  polyethylene glycol 3350 17 Gram(s) Oral daily PRN constipation

## 2021-11-03 NOTE — BH INPATIENT PSYCHIATRY PROGRESS NOTE - NSBHMETABOLIC_PSY_ALL_CORE_FT
BMI: BMI (kg/m2): 77.9 (10-25-21 @ 06:54)  HbA1c: A1C with Estimated Average Glucose Result: 7.3 % (08-05-21 @ 10:24)    Glucose: POCT Blood Glucose.: 189 mg/dL (11-03-21 @ 12:03)    BP: --  Lipid Panel: Date/Time: 08-05-21 @ 10:24  Cholesterol, Serum: 139  Direct LDL: --  HDL Cholesterol, Serum: 42  Total Cholesterol/HDL Ration Measurement: --  Triglycerides, Serum: 234

## 2021-11-03 NOTE — BH INPATIENT PSYCHIATRY PROGRESS NOTE - NSBHFUPINTERVALHXFT_PSY_A_CORE
Pt compliant with medication and tolerating it well.  Chart reviewed and case discussed with treatment team.  No events reported overnight.  Pt calm and cooperative with interview.  Pt denies SI/HI/I/P or AH/VH.  Pt reports eating and sleeping well.  Pt reports he plans to rest today because he was active the last few days making phone calls and paying some of his bills.  Pt agrees to start attending some groups.  Pt agreeable to changed clozapine to 25mg PO daily and 275mg PO at bedtime for tomorrow to help with daytime fatigue.  Pt reports he is considering going back to his current residence.

## 2021-11-03 NOTE — BH INPATIENT PSYCHIATRY PROGRESS NOTE - NSBHASSESSSUMMFT_PSY_ALL_CORE
Plan:   - clozapine ODT 50mg PO AM and 250mg PO at bedtime for today and 25mg PO daily and 275mg PO at bedtime starting 11/4  - Invega Sustenna 234mg IM on 10/15 and 156mg IM on 10/19, with Invega Sustenna 234mg IM due on 11/16  -  Lithium Eskalith CR 450mg PO daily and 900mg PO at bedtime today then change to 1350mg PO at bedtime on 11/2  - MOO granted on 9/28/21, Haldol 5mg IM and Benadryl 25mg IM if patient refuses Clozaril standing medication as per MOO court order     - group and milieu therapy     - routine checks     - Lithium level 0.8 on 10/26   - CBC + diff weekly

## 2021-11-04 LAB
GLUCOSE BLDC GLUCOMTR-MCNC: 179 MG/DL — HIGH (ref 70–99)
GLUCOSE BLDC GLUCOMTR-MCNC: 185 MG/DL — HIGH (ref 70–99)
GLUCOSE BLDC GLUCOMTR-MCNC: 196 MG/DL — HIGH (ref 70–99)
GLUCOSE BLDC GLUCOMTR-MCNC: 256 MG/DL — HIGH (ref 70–99)

## 2021-11-04 PROCEDURE — 99232 SBSQ HOSP IP/OBS MODERATE 35: CPT

## 2021-11-04 RX ADMIN — LITHIUM CARBONATE 1350 MILLIGRAM(S): 300 TABLET, EXTENDED RELEASE ORAL at 21:42

## 2021-11-04 RX ADMIN — SENNA PLUS 2 TABLET(S): 8.6 TABLET ORAL at 21:42

## 2021-11-04 RX ADMIN — Medication 500 MILLIGRAM(S): at 08:59

## 2021-11-04 RX ADMIN — Medication 1 TABLET(S): at 21:42

## 2021-11-04 RX ADMIN — Medication 1: at 17:01

## 2021-11-04 RX ADMIN — Medication 3: at 12:28

## 2021-11-04 RX ADMIN — Medication 1 TABLET(S): at 08:59

## 2021-11-04 RX ADMIN — ATORVASTATIN CALCIUM 40 MILLIGRAM(S): 80 TABLET, FILM COATED ORAL at 21:42

## 2021-11-04 RX ADMIN — CLOZAPINE 25 MILLIGRAM(S): 150 TABLET, ORALLY DISINTEGRATING ORAL at 08:59

## 2021-11-04 RX ADMIN — METFORMIN HYDROCHLORIDE 1000 MILLIGRAM(S): 850 TABLET ORAL at 21:42

## 2021-11-04 RX ADMIN — Medication 1: at 08:59

## 2021-11-04 RX ADMIN — CLOZAPINE 275 MILLIGRAM(S): 150 TABLET, ORALLY DISINTEGRATING ORAL at 21:42

## 2021-11-04 NOTE — BH INPATIENT PSYCHIATRY PROGRESS NOTE - NSBHMETABOLIC_PSY_ALL_CORE_FT
BMI: BMI (kg/m2): 77.9 (10-25-21 @ 06:54)  HbA1c: A1C with Estimated Average Glucose Result: 7.3 % (08-05-21 @ 10:24)    Glucose: POCT Blood Glucose.: 196 mg/dL (11-04-21 @ 08:08)    BP: --  Lipid Panel: Date/Time: 08-05-21 @ 10:24  Cholesterol, Serum: 139  Direct LDL: --  HDL Cholesterol, Serum: 42  Total Cholesterol/HDL Ration Measurement: --  Triglycerides, Serum: 234   BMI: BMI (kg/m2): 77.9 (10-25-21 @ 06:54)  HbA1c: A1C with Estimated Average Glucose Result: 7.3 % (08-05-21 @ 10:24)    Glucose: POCT Blood Glucose.: 256 mg/dL (11-04-21 @ 12:11)    BP: --  Lipid Panel: Date/Time: 08-05-21 @ 10:24  Cholesterol, Serum: 139  Direct LDL: --  HDL Cholesterol, Serum: 42  Total Cholesterol/HDL Ration Measurement: --  Triglycerides, Serum: 234

## 2021-11-04 NOTE — BH INPATIENT PSYCHIATRY PROGRESS NOTE - NSBHFUPINTERVALHXFT_PSY_A_CORE
Patient has been compliant with standing medications and tolerating all well. Patient's chart was reviewed and his case was discussed with the treatment team. No events were reported last night and patient has been maintaining good behavioral control. Patient reported that he slept well last night, and that he has been eating well. Patient stated, "breakfast was good as usual". He was calm and cooperative during interview. He denies SI/HI/I/P. He denies VH, denies Ah however states he hears random voices. Patient stated, " a psychiatrist once told me you either learn from them or you ignore them". States that the AH are a combination of voices and his own thoughts. States, "they tell me helpful, positive things like to finish my eggs". Patient showed writer his journal with a list of goals including "building a starship". Explained to writer that some of his goals were unrealistic, and he stated, "If I get off of Clozaril I can do my projects" then stated later on in the interview, "I think the starship might have been a little farfetched". 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 Encouraged patient to attend groups. Patient seen walking up and down hallway with his walker doing his exercises.

## 2021-11-04 NOTE — BH INPATIENT PSYCHIATRY PROGRESS NOTE - OTHER
improving reports AH of "random voices" stating they are a combination of voices and his own thoughts telling him positive things.  Abnormal gait,  uses walker

## 2021-11-04 NOTE — BH INPATIENT PSYCHIATRY PROGRESS NOTE - NSBHASSESSSUMMFT_PSY_ALL_CORE
Plan:   - clozapine ODT 50mg PO AM and 250mg PO at bedtime for today and 25mg PO daily and 275mg PO at bedtime starting 11/4  - Invega Sustenna 234mg IM on 10/15 and 156mg IM on 10/19, with Invega Sustenna 234mg IM due on 11/16  -  Lithium Eskalith CR 450mg PO daily and 900mg PO at bedtime today then change to 1350mg PO at bedtime on 11/2  - MOO granted on 9/28/21, Haldol 5mg IM and Benadryl 25mg IM if patient refuses Clozaril standing medication as per MOO court order     - group and milieu therapy     - routine checks     - Lithium level 0.8 on 10/26   - CBC + diff weekly         Plan:   - clozapine ODT 25mg PO daily and 275mg PO at bedtime   - Invega Sustenna 234mg IM on 10/15 and 156mg IM on 10/19, with Invega Sustenna 234mg IM due on 11/16  -  Lithium Eskalith CR 1350mg PO at bedtime   - MOO granted on 9/28/21, Haldol 5mg IM and Benadryl 25mg IM if patient refuses Clozaril standing medication as per MOO court order     - group and milieu therapy     - routine checks     - Lithium level 0.8 on 10/26   - CBC + diff weekly

## 2021-11-04 NOTE — BH INPATIENT PSYCHIATRY PROGRESS NOTE - NSBHCHARTREVIEWVS_PSY_A_CORE FT
Vital Signs Last 24 Hrs  T(C): 36.7 (11-04-21 @ 07:31), Max: 36.7 (11-04-21 @ 07:31)  T(F): 98.1 (11-04-21 @ 07:31), Max: 98.1 (11-04-21 @ 07:31)  HR: --  BP: --  BP(mean): --  RR: 17 (11-03-21 @ 20:41) (17 - 17)  SpO2: --    Orthostatic VS  11-04-21 @ 07:31  Lying BP: --/-- HR: --  Sitting BP: 108/68 HR: 93  Standing BP: 112/71 HR: 89  Site: --  Mode: --  Orthostatic VS  11-03-21 @ 20:41  Lying BP: --/-- HR: --  Sitting BP: 126/76 HR: 96  Standing BP: 124/74 HR: 101  Site: --  Mode: --  Orthostatic VS  11-03-21 @ 08:19  Lying BP: --/-- HR: --  Sitting BP: 122/76 HR: 89  Standing BP: 113/71 HR: 91  Site: --  Mode: --  Orthostatic VS  11-02-21 @ 19:01  Lying BP: --/-- HR: --  Sitting BP: 131/82 HR: 97  Standing BP: 127/74 HR: 85  Site: --  Mode: --

## 2021-11-04 NOTE — BH INPATIENT PSYCHIATRY PROGRESS NOTE - CURRENT MEDICATION
MEDICATIONS  (STANDING):  acetaminophen   Tablet .. 650 milliGRAM(s) Oral once  ascorbic acid 500 milliGRAM(s) Oral daily  atorvastatin 40 milliGRAM(s) Oral at bedtime  calcium carbonate    500 mG (Tums) Chewable 1 Tablet(s) Chew two times a day  cloZAPine Disintegrating Tablet 275 milliGRAM(s) Oral at bedtime  cloZAPine Disintegrating Tablet 25 milliGRAM(s) Oral daily  dextrose 40% Gel 15 Gram(s) Oral once  glucagon  Injectable 1 milliGRAM(s) IntraMuscular once  insulin lispro (ADMELOG) corrective regimen sliding scale   SubCutaneous at bedtime  insulin lispro (ADMELOG) corrective regimen sliding scale   SubCutaneous three times a day before meals  lithium CR (ESKALITH-CR) 1350 milliGRAM(s) Oral at bedtime  metFORMIN 1000 milliGRAM(s) Oral at bedtime  senna 2 Tablet(s) Oral at bedtime  sitaGLIPtin 100 milliGRAM(s) Oral daily    MEDICATIONS  (PRN):  acetaminophen   Tablet .. 650 milliGRAM(s) Oral every 6 hours PRN Mild Pain (1 - 3), Moderate Pain (4 - 6)  chlorproMAZINE    Injectable 100 milliGRAM(s) IntraMuscular once PRN agitation or aggression  chlorproMAZINE    Tablet 100 milliGRAM(s) Oral every 4 hours PRN agitation or aggression  diphenhydrAMINE   Injectable 25 milliGRAM(s) IntraMuscular every 12 hours PRN EPS ppx  diphenhydrAMINE   Injectable 25 milliGRAM(s) IntraMuscular every 12 hours PRN EPS ppx  diphenhydrAMINE Injectable 25 milliGRAM(s) IntraMuscular every 12 hours PRN EPS ppx  haloperidol    Injectable 5 milliGRAM(s) IntraMuscular every 12 hours PRN psychosis  haloperidol    Injectable 5 milliGRAM(s) IntraMuscular every 12 hours PRN psychosis  haloperidol    Injectable 5 milliGRAM(s) IntraMuscular every 12 hours PRN psychosis  polyethylene glycol 3350 17 Gram(s) Oral daily PRN constipation   MEDICATIONS  (STANDING):  acetaminophen   Tablet .. 650 milliGRAM(s) Oral once  ascorbic acid 500 milliGRAM(s) Oral daily  atorvastatin 40 milliGRAM(s) Oral at bedtime  calcium carbonate    500 mG (Tums) Chewable 1 Tablet(s) Chew two times a day  cloZAPine Disintegrating Tablet 25 milliGRAM(s) Oral daily  cloZAPine Disintegrating Tablet 275 milliGRAM(s) Oral at bedtime  dextrose 40% Gel 15 Gram(s) Oral once  glucagon  Injectable 1 milliGRAM(s) IntraMuscular once  insulin lispro (ADMELOG) corrective regimen sliding scale   SubCutaneous three times a day before meals  insulin lispro (ADMELOG) corrective regimen sliding scale   SubCutaneous at bedtime  lithium CR (ESKALITH-CR) 1350 milliGRAM(s) Oral at bedtime  metFORMIN 1000 milliGRAM(s) Oral at bedtime  senna 2 Tablet(s) Oral at bedtime  sitaGLIPtin 100 milliGRAM(s) Oral daily    MEDICATIONS  (PRN):  acetaminophen   Tablet .. 650 milliGRAM(s) Oral every 6 hours PRN Mild Pain (1 - 3), Moderate Pain (4 - 6)  chlorproMAZINE    Injectable 100 milliGRAM(s) IntraMuscular once PRN agitation or aggression  chlorproMAZINE    Tablet 100 milliGRAM(s) Oral every 4 hours PRN agitation or aggression  diphenhydrAMINE   Injectable 25 milliGRAM(s) IntraMuscular every 12 hours PRN EPS ppx  diphenhydrAMINE   Injectable 25 milliGRAM(s) IntraMuscular every 12 hours PRN EPS ppx  diphenhydrAMINE Injectable 25 milliGRAM(s) IntraMuscular every 12 hours PRN EPS ppx  haloperidol    Injectable 5 milliGRAM(s) IntraMuscular every 12 hours PRN psychosis  haloperidol    Injectable 5 milliGRAM(s) IntraMuscular every 12 hours PRN psychosis  haloperidol    Injectable 5 milliGRAM(s) IntraMuscular every 12 hours PRN psychosis  polyethylene glycol 3350 17 Gram(s) Oral daily PRN constipation

## 2021-11-05 LAB
GLUCOSE BLDC GLUCOMTR-MCNC: 195 MG/DL — HIGH (ref 70–99)
GLUCOSE BLDC GLUCOMTR-MCNC: 213 MG/DL — HIGH (ref 70–99)
GLUCOSE BLDC GLUCOMTR-MCNC: 226 MG/DL — HIGH (ref 70–99)
GLUCOSE BLDC GLUCOMTR-MCNC: 232 MG/DL — HIGH (ref 70–99)

## 2021-11-05 PROCEDURE — 99231 SBSQ HOSP IP/OBS SF/LOW 25: CPT

## 2021-11-05 RX ORDER — CLOZAPINE 150 MG/1
275 TABLET, ORALLY DISINTEGRATING ORAL AT BEDTIME
Refills: 0 | Status: COMPLETED | OUTPATIENT
Start: 2021-11-05 | End: 2021-11-05

## 2021-11-05 RX ORDER — CLOZAPINE 150 MG/1
50 TABLET, ORALLY DISINTEGRATING ORAL DAILY
Refills: 0 | Status: DISCONTINUED | OUTPATIENT
Start: 2021-11-06 | End: 2021-11-30

## 2021-11-05 RX ORDER — CLOZAPINE 150 MG/1
250 TABLET, ORALLY DISINTEGRATING ORAL AT BEDTIME
Refills: 0 | Status: DISCONTINUED | OUTPATIENT
Start: 2021-11-06 | End: 2021-11-30

## 2021-11-05 RX ADMIN — SENNA PLUS 2 TABLET(S): 8.6 TABLET ORAL at 21:20

## 2021-11-05 RX ADMIN — Medication 2: at 08:15

## 2021-11-05 RX ADMIN — ATORVASTATIN CALCIUM 40 MILLIGRAM(S): 80 TABLET, FILM COATED ORAL at 21:19

## 2021-11-05 RX ADMIN — Medication 500 MILLIGRAM(S): at 08:44

## 2021-11-05 RX ADMIN — Medication 2: at 12:24

## 2021-11-05 RX ADMIN — Medication 1 TABLET(S): at 08:44

## 2021-11-05 RX ADMIN — Medication 2: at 17:00

## 2021-11-05 RX ADMIN — METFORMIN HYDROCHLORIDE 1000 MILLIGRAM(S): 850 TABLET ORAL at 21:20

## 2021-11-05 RX ADMIN — LITHIUM CARBONATE 1350 MILLIGRAM(S): 300 TABLET, EXTENDED RELEASE ORAL at 21:20

## 2021-11-05 RX ADMIN — CLOZAPINE 275 MILLIGRAM(S): 150 TABLET, ORALLY DISINTEGRATING ORAL at 20:24

## 2021-11-05 RX ADMIN — CLOZAPINE 25 MILLIGRAM(S): 150 TABLET, ORALLY DISINTEGRATING ORAL at 08:44

## 2021-11-05 RX ADMIN — Medication 1 TABLET(S): at 21:19

## 2021-11-05 NOTE — BH INPATIENT PSYCHIATRY PROGRESS NOTE - NSBHMETABOLIC_PSY_ALL_CORE_FT
BMI: BMI (kg/m2): 77.9 (10-25-21 @ 06:54)  HbA1c: A1C with Estimated Average Glucose Result: 7.3 % (08-05-21 @ 10:24)    Glucose: POCT Blood Glucose.: 226 mg/dL (11-05-21 @ 12:15)    BP: --  Lipid Panel: Date/Time: 08-05-21 @ 10:24  Cholesterol, Serum: 139  Direct LDL: --  HDL Cholesterol, Serum: 42  Total Cholesterol/HDL Ration Measurement: --  Triglycerides, Serum: 234

## 2021-11-05 NOTE — BH INPATIENT PSYCHIATRY PROGRESS NOTE - NSBHCHARTREVIEWVS_PSY_A_CORE FT
Vital Signs Last 24 Hrs  T(C): 36.3 (11-05-21 @ 07:50), Max: 36.3 (11-05-21 @ 07:50)  T(F): 97.4 (11-05-21 @ 07:50), Max: 97.4 (11-05-21 @ 07:50)  HR: --  BP: --  BP(mean): --  RR: --  SpO2: --    Orthostatic VS  11-05-21 @ 07:50  Lying BP: --/-- HR: --  Sitting BP: 129/84 HR: 89  Standing BP: 121/69 HR: 92  Site: --  Mode: --  Orthostatic VS  11-04-21 @ 18:47  Lying BP: --/-- HR: --  Sitting BP: 147/83 HR: 102  Standing BP: 133/86 HR: 102  Site: upper right arm  Mode: electronic  Orthostatic VS  11-04-21 @ 07:31  Lying BP: --/-- HR: --  Sitting BP: 108/68 HR: 93  Standing BP: 112/71 HR: 89  Site: --  Mode: --  Orthostatic VS  11-03-21 @ 20:41  Lying BP: --/-- HR: --  Sitting BP: 126/76 HR: 96  Standing BP: 124/74 HR: 101  Site: --  Mode: --

## 2021-11-05 NOTE — BH INPATIENT PSYCHIATRY PROGRESS NOTE - NSBHFUPINTERVALHXFT_PSY_A_CORE
Pt compliant with medication and tolerating it well.  Chart reviewed and case discussed with treatment team.  No events reported overnight.  Pt more irritable today, focused on "someone is using my credit card" and stealing money from him.  Pt also talks about how he wants to get his own apartment through [address] [employer].  Treatment team tried to explain that it is our recommendation to go to his residence or a more supportive residence after discharge and if he does well for a period of time outside the hospital, then that can be considered.  He refuses to have a phone meeting with his residence worker today, agrees to do so on Tuesday.  Pt denies AH today.  Pt reports at times he has "premonitions."  Pt reports, "my thoughts are foggy."  Pt agrees that he was doing better when clozapine was at 50mg PO daily and 250mg PO at bedtime, agrees to switch it back for tomorrow.  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

## 2021-11-05 NOTE — BH INPATIENT PSYCHIATRY PROGRESS NOTE - NSBHASSESSSUMMFT_PSY_ALL_CORE
Plan:   - clozapine ODT 25mg PO daily and 275mg PO at bedtime today and starting 11/6:  clozapine 50mg PO daily and 250mg PO at bedtime  - Invega Sustenna 234mg IM on 10/15 and 156mg IM on 10/19, with Invega Sustenna 234mg IM due on 11/16  -  Lithium Eskalith CR 1350mg PO at bedtime   - MOO granted on 9/28/21, Haldol 5mg IM and Benadryl 25mg IM if patient refuses Clozaril standing medication as per MOO court order     - group and milieu therapy     - routine checks     - Lithium level 0.8 on 10/26   - CBC + diff weekly

## 2021-11-05 NOTE — BH INPATIENT PSYCHIATRY PROGRESS NOTE - CURRENT MEDICATION
MEDICATIONS  (STANDING):  acetaminophen   Tablet .. 650 milliGRAM(s) Oral once  ascorbic acid 500 milliGRAM(s) Oral daily  atorvastatin 40 milliGRAM(s) Oral at bedtime  calcium carbonate    500 mG (Tums) Chewable 1 Tablet(s) Chew two times a day  cloZAPine Disintegrating Tablet 275 milliGRAM(s) Oral at bedtime  dextrose 40% Gel 15 Gram(s) Oral once  glucagon  Injectable 1 milliGRAM(s) IntraMuscular once  insulin lispro (ADMELOG) corrective regimen sliding scale   SubCutaneous three times a day before meals  insulin lispro (ADMELOG) corrective regimen sliding scale   SubCutaneous at bedtime  lithium CR (ESKALITH-CR) 1350 milliGRAM(s) Oral at bedtime  metFORMIN 1000 milliGRAM(s) Oral at bedtime  senna 2 Tablet(s) Oral at bedtime  sitaGLIPtin 100 milliGRAM(s) Oral daily    MEDICATIONS  (PRN):  acetaminophen   Tablet .. 650 milliGRAM(s) Oral every 6 hours PRN Mild Pain (1 - 3), Moderate Pain (4 - 6)  chlorproMAZINE    Injectable 100 milliGRAM(s) IntraMuscular once PRN agitation or aggression  chlorproMAZINE    Tablet 100 milliGRAM(s) Oral every 4 hours PRN agitation or aggression  diphenhydrAMINE   Injectable 25 milliGRAM(s) IntraMuscular every 12 hours PRN EPS ppx  diphenhydrAMINE   Injectable 25 milliGRAM(s) IntraMuscular every 12 hours PRN EPS ppx  diphenhydrAMINE Injectable 25 milliGRAM(s) IntraMuscular every 12 hours PRN EPS ppx  haloperidol    Injectable 5 milliGRAM(s) IntraMuscular every 12 hours PRN psychosis  haloperidol    Injectable 5 milliGRAM(s) IntraMuscular every 12 hours PRN psychosis  haloperidol    Injectable 5 milliGRAM(s) IntraMuscular every 12 hours PRN psychosis  polyethylene glycol 3350 17 Gram(s) Oral daily PRN constipation

## 2021-11-06 LAB
GLUCOSE BLDC GLUCOMTR-MCNC: 163 MG/DL — HIGH (ref 70–99)
GLUCOSE BLDC GLUCOMTR-MCNC: 193 MG/DL — HIGH (ref 70–99)
GLUCOSE BLDC GLUCOMTR-MCNC: 217 MG/DL — HIGH (ref 70–99)
GLUCOSE BLDC GLUCOMTR-MCNC: 237 MG/DL — HIGH (ref 70–99)

## 2021-11-06 RX ADMIN — Medication 500 MILLIGRAM(S): at 08:48

## 2021-11-06 RX ADMIN — CLOZAPINE 50 MILLIGRAM(S): 150 TABLET, ORALLY DISINTEGRATING ORAL at 08:48

## 2021-11-06 RX ADMIN — Medication 0: at 20:54

## 2021-11-06 RX ADMIN — CLOZAPINE 250 MILLIGRAM(S): 150 TABLET, ORALLY DISINTEGRATING ORAL at 20:52

## 2021-11-06 RX ADMIN — Medication 100 MILLIGRAM(S): at 20:57

## 2021-11-06 RX ADMIN — Medication 1 TABLET(S): at 08:47

## 2021-11-06 RX ADMIN — METFORMIN HYDROCHLORIDE 1000 MILLIGRAM(S): 850 TABLET ORAL at 20:53

## 2021-11-06 RX ADMIN — ATORVASTATIN CALCIUM 40 MILLIGRAM(S): 80 TABLET, FILM COATED ORAL at 20:53

## 2021-11-06 RX ADMIN — LITHIUM CARBONATE 1350 MILLIGRAM(S): 300 TABLET, EXTENDED RELEASE ORAL at 20:53

## 2021-11-06 RX ADMIN — Medication 1 TABLET(S): at 20:53

## 2021-11-07 LAB
GLUCOSE BLDC GLUCOMTR-MCNC: 168 MG/DL — HIGH (ref 70–99)
GLUCOSE BLDC GLUCOMTR-MCNC: 194 MG/DL — HIGH (ref 70–99)
GLUCOSE BLDC GLUCOMTR-MCNC: 200 MG/DL — HIGH (ref 70–99)
GLUCOSE BLDC GLUCOMTR-MCNC: 212 MG/DL — HIGH (ref 70–99)

## 2021-11-07 RX ADMIN — Medication 1 TABLET(S): at 20:31

## 2021-11-07 RX ADMIN — Medication 1 TABLET(S): at 08:41

## 2021-11-07 RX ADMIN — METFORMIN HYDROCHLORIDE 1000 MILLIGRAM(S): 850 TABLET ORAL at 20:32

## 2021-11-07 RX ADMIN — Medication 500 MILLIGRAM(S): at 08:41

## 2021-11-07 RX ADMIN — CLOZAPINE 250 MILLIGRAM(S): 150 TABLET, ORALLY DISINTEGRATING ORAL at 20:31

## 2021-11-07 RX ADMIN — ATORVASTATIN CALCIUM 40 MILLIGRAM(S): 80 TABLET, FILM COATED ORAL at 20:31

## 2021-11-07 RX ADMIN — CLOZAPINE 50 MILLIGRAM(S): 150 TABLET, ORALLY DISINTEGRATING ORAL at 08:40

## 2021-11-07 RX ADMIN — LITHIUM CARBONATE 1350 MILLIGRAM(S): 300 TABLET, EXTENDED RELEASE ORAL at 20:31

## 2021-11-08 LAB
GLUCOSE BLDC GLUCOMTR-MCNC: 155 MG/DL — HIGH (ref 70–99)
GLUCOSE BLDC GLUCOMTR-MCNC: 184 MG/DL — HIGH (ref 70–99)
GLUCOSE BLDC GLUCOMTR-MCNC: 207 MG/DL — HIGH (ref 70–99)
GLUCOSE BLDC GLUCOMTR-MCNC: 218 MG/DL — HIGH (ref 70–99)

## 2021-11-08 PROCEDURE — 99232 SBSQ HOSP IP/OBS MODERATE 35: CPT

## 2021-11-08 RX ADMIN — Medication 1 TABLET(S): at 11:09

## 2021-11-08 RX ADMIN — ATORVASTATIN CALCIUM 40 MILLIGRAM(S): 80 TABLET, FILM COATED ORAL at 20:42

## 2021-11-08 RX ADMIN — LITHIUM CARBONATE 1350 MILLIGRAM(S): 300 TABLET, EXTENDED RELEASE ORAL at 20:42

## 2021-11-08 RX ADMIN — SENNA PLUS 2 TABLET(S): 8.6 TABLET ORAL at 20:42

## 2021-11-08 RX ADMIN — CLOZAPINE 50 MILLIGRAM(S): 150 TABLET, ORALLY DISINTEGRATING ORAL at 11:08

## 2021-11-08 RX ADMIN — Medication 500 MILLIGRAM(S): at 11:08

## 2021-11-08 RX ADMIN — METFORMIN HYDROCHLORIDE 1000 MILLIGRAM(S): 850 TABLET ORAL at 20:43

## 2021-11-08 RX ADMIN — Medication 1 TABLET(S): at 20:44

## 2021-11-08 RX ADMIN — CLOZAPINE 250 MILLIGRAM(S): 150 TABLET, ORALLY DISINTEGRATING ORAL at 20:42

## 2021-11-08 NOTE — BH INPATIENT PSYCHIATRY PROGRESS NOTE - CURRENT MEDICATION
MEDICATIONS  (STANDING):  acetaminophen   Tablet .. 650 milliGRAM(s) Oral once  ascorbic acid 500 milliGRAM(s) Oral daily  atorvastatin 40 milliGRAM(s) Oral at bedtime  calcium carbonate    500 mG (Tums) Chewable 1 Tablet(s) Chew two times a day  cloZAPine Disintegrating Tablet 250 milliGRAM(s) Oral at bedtime  cloZAPine Disintegrating Tablet 50 milliGRAM(s) Oral daily  dextrose 40% Gel 15 Gram(s) Oral once  glucagon  Injectable 1 milliGRAM(s) IntraMuscular once  insulin lispro (ADMELOG) corrective regimen sliding scale   SubCutaneous three times a day before meals  insulin lispro (ADMELOG) corrective regimen sliding scale   SubCutaneous at bedtime  lithium CR (ESKALITH-CR) 1350 milliGRAM(s) Oral at bedtime  metFORMIN 1000 milliGRAM(s) Oral at bedtime  senna 2 Tablet(s) Oral at bedtime  sitaGLIPtin 100 milliGRAM(s) Oral daily    MEDICATIONS  (PRN):  acetaminophen   Tablet .. 650 milliGRAM(s) Oral every 6 hours PRN Mild Pain (1 - 3), Moderate Pain (4 - 6)  chlorproMAZINE    Injectable 100 milliGRAM(s) IntraMuscular once PRN agitation or aggression  chlorproMAZINE    Tablet 100 milliGRAM(s) Oral every 4 hours PRN agitation or aggression  diphenhydrAMINE   Injectable 25 milliGRAM(s) IntraMuscular every 12 hours PRN EPS ppx  diphenhydrAMINE   Injectable 25 milliGRAM(s) IntraMuscular every 12 hours PRN EPS ppx  diphenhydrAMINE Injectable 25 milliGRAM(s) IntraMuscular every 12 hours PRN EPS ppx  haloperidol    Injectable 5 milliGRAM(s) IntraMuscular every 12 hours PRN psychosis  haloperidol    Injectable 5 milliGRAM(s) IntraMuscular every 12 hours PRN psychosis  haloperidol    Injectable 5 milliGRAM(s) IntraMuscular every 12 hours PRN psychosis  polyethylene glycol 3350 17 Gram(s) Oral daily PRN constipation   MEDICATIONS  (STANDING):  acetaminophen   Tablet .. 650 milliGRAM(s) Oral once  ascorbic acid 500 milliGRAM(s) Oral daily  atorvastatin 40 milliGRAM(s) Oral at bedtime  calcium carbonate    500 mG (Tums) Chewable 1 Tablet(s) Chew two times a day  cloZAPine Disintegrating Tablet 50 milliGRAM(s) Oral daily  cloZAPine Disintegrating Tablet 250 milliGRAM(s) Oral at bedtime  dextrose 40% Gel 15 Gram(s) Oral once  glucagon  Injectable 1 milliGRAM(s) IntraMuscular once  insulin lispro (ADMELOG) corrective regimen sliding scale   SubCutaneous at bedtime  insulin lispro (ADMELOG) corrective regimen sliding scale   SubCutaneous three times a day before meals  lithium CR (ESKALITH-CR) 1350 milliGRAM(s) Oral at bedtime  metFORMIN 1000 milliGRAM(s) Oral at bedtime  senna 2 Tablet(s) Oral at bedtime  sitaGLIPtin 100 milliGRAM(s) Oral daily    MEDICATIONS  (PRN):  acetaminophen   Tablet .. 650 milliGRAM(s) Oral every 6 hours PRN Mild Pain (1 - 3), Moderate Pain (4 - 6)  chlorproMAZINE    Injectable 100 milliGRAM(s) IntraMuscular once PRN agitation or aggression  chlorproMAZINE    Tablet 100 milliGRAM(s) Oral every 4 hours PRN agitation or aggression  diphenhydrAMINE   Injectable 25 milliGRAM(s) IntraMuscular every 12 hours PRN EPS ppx  diphenhydrAMINE   Injectable 25 milliGRAM(s) IntraMuscular every 12 hours PRN EPS ppx  diphenhydrAMINE Injectable 25 milliGRAM(s) IntraMuscular every 12 hours PRN EPS ppx  haloperidol    Injectable 5 milliGRAM(s) IntraMuscular every 12 hours PRN psychosis  haloperidol    Injectable 5 milliGRAM(s) IntraMuscular every 12 hours PRN psychosis  haloperidol    Injectable 5 milliGRAM(s) IntraMuscular every 12 hours PRN psychosis  polyethylene glycol 3350 17 Gram(s) Oral daily PRN constipation   MEDICATIONS  (STANDING):  acetaminophen   Tablet .. 650 milliGRAM(s) Oral once  ascorbic acid 500 milliGRAM(s) Oral daily  atorvastatin 40 milliGRAM(s) Oral at bedtime  calcium carbonate    500 mG (Tums) Chewable 1 Tablet(s) Chew two times a day  cloZAPine Disintegrating Tablet 50 milliGRAM(s) Oral daily  cloZAPine Disintegrating Tablet 250 milliGRAM(s) Oral at bedtime  dextrose 40% Gel 15 Gram(s) Oral once  glucagon  Injectable 1 milliGRAM(s) IntraMuscular once  insulin lispro (ADMELOG) corrective regimen sliding scale   SubCutaneous three times a day before meals  insulin lispro (ADMELOG) corrective regimen sliding scale   SubCutaneous at bedtime  lithium CR (ESKALITH-CR) 1350 milliGRAM(s) Oral at bedtime  metFORMIN 1000 milliGRAM(s) Oral at bedtime  senna 2 Tablet(s) Oral at bedtime  sitaGLIPtin 100 milliGRAM(s) Oral daily    MEDICATIONS  (PRN):  acetaminophen   Tablet .. 650 milliGRAM(s) Oral every 6 hours PRN Mild Pain (1 - 3), Moderate Pain (4 - 6)  chlorproMAZINE    Injectable 100 milliGRAM(s) IntraMuscular once PRN agitation or aggression  chlorproMAZINE    Tablet 100 milliGRAM(s) Oral every 4 hours PRN agitation or aggression  diphenhydrAMINE   Injectable 25 milliGRAM(s) IntraMuscular every 12 hours PRN EPS ppx  diphenhydrAMINE   Injectable 25 milliGRAM(s) IntraMuscular every 12 hours PRN EPS ppx  diphenhydrAMINE Injectable 25 milliGRAM(s) IntraMuscular every 12 hours PRN EPS ppx  haloperidol    Injectable 5 milliGRAM(s) IntraMuscular every 12 hours PRN psychosis  haloperidol    Injectable 5 milliGRAM(s) IntraMuscular every 12 hours PRN psychosis  haloperidol    Injectable 5 milliGRAM(s) IntraMuscular every 12 hours PRN psychosis  polyethylene glycol 3350 17 Gram(s) Oral daily PRN constipation

## 2021-11-08 NOTE — BH INPATIENT PSYCHIATRY PROGRESS NOTE - NSBHMETABOLIC_PSY_ALL_CORE_FT
BMI: BMI (kg/m2): 77.9 (10-25-21 @ 06:54)  HbA1c: A1C with Estimated Average Glucose Result: 7.3 % (08-05-21 @ 10:24)    Glucose: POCT Blood Glucose.: 155 mg/dL (11-08-21 @ 12:03)    BP: --  Lipid Panel: Date/Time: 08-05-21 @ 10:24  Cholesterol, Serum: 139  Direct LDL: --  HDL Cholesterol, Serum: 42  Total Cholesterol/HDL Ration Measurement: --  Triglycerides, Serum: 234   BMI: BMI (kg/m2): 77.9 (10-25-21 @ 06:54)  HbA1c: A1C with Estimated Average Glucose Result: 7.3 % (08-05-21 @ 10:24)    Glucose: POCT Blood Glucose.: 192 mg/dL (11-10-21 @ 12:08)    BP: 126/76 (11-09-21 @ 07:37) (126/76 - 126/76)  Lipid Panel: Date/Time: 08-05-21 @ 10:24  Cholesterol, Serum: 139  Direct LDL: --  HDL Cholesterol, Serum: 42  Total Cholesterol/HDL Ration Measurement: --  Triglycerides, Serum: 234

## 2021-11-08 NOTE — BH INPATIENT PSYCHIATRY PROGRESS NOTE - NSBHCHARTREVIEWVS_PSY_A_CORE FT
Vital Signs Last 24 Hrs  T(C): 36.3 (11-08-21 @ 07:42), Max: 36.3 (11-08-21 @ 07:42)  T(F): 97.3 (11-08-21 @ 07:42), Max: 97.3 (11-08-21 @ 07:42)  HR: --  BP: --  BP(mean): --  RR: --  SpO2: --    Orthostatic VS  11-08-21 @ 07:42  Lying BP: --/-- HR: --  Sitting BP: 134/81 HR: 90  Standing BP: 129/75 HR: 92  Site: --  Mode: --  Orthostatic VS  11-07-21 @ 19:41  Lying BP: --/-- HR: --  Sitting BP: 129/74 HR: 94  Standing BP: 132/80 HR: 107  Site: --  Mode: --  Orthostatic VS  11-07-21 @ 07:35  Lying BP: --/-- HR: --  Sitting BP: 120/83 HR: 92  Standing BP: 125/74 HR: 99  Site: --  Mode: --  Orthostatic VS  11-06-21 @ 19:41  Lying BP: --/-- HR: --  Sitting BP: 121/71 HR: 92  Standing BP: 130/80 HR: 85  Site: --  Mode: --   Vital Signs Last 24 Hrs  T(C): 36.4 (11-10-21 @ 07:58), Max: 36.8 (11-09-21 @ 20:03)  T(F): 97.6 (11-10-21 @ 07:58), Max: 98.2 (11-09-21 @ 20:03)  HR: --  BP: --  BP(mean): --  RR: --  SpO2: --    Orthostatic VS  11-10-21 @ 07:58  Lying BP: --/-- HR: --  Sitting BP: 116/81 HR: 93  Standing BP: 110/74 HR: 91  Site: upper right arm  Mode: electronic  Orthostatic VS  11-09-21 @ 20:03  Lying BP: --/-- HR: --  Sitting BP: 139/72 HR: 98  Standing BP: 135/76 HR: 101  Site: upper right arm  Mode: electronic  Orthostatic VS  11-09-21 @ 07:37  Lying BP: --/-- HR: --  Sitting BP: --/-- HR: --  Standing BP: 134/84 HR: 95  Site: --  Mode: --  Orthostatic VS  11-08-21 @ 18:31  Lying BP: --/-- HR: --  Sitting BP: 130/72 HR: 90  Standing BP: 125/69 HR: 94  Site: upper left arm  Mode: --

## 2021-11-08 NOTE — BH INPATIENT PSYCHIATRY PROGRESS NOTE - NSBHFUPINTERVALHXFT_PSY_A_CORE
Pt compliant with medication last night and this morning with encouragement.  Chart reviewed and case discussed with treatment team.  No events reported overnight.  Pt more irritable today, did not take AM Clozaril until after the interview with encouragement.  Pt talks about how he does not know where he wants to live after discharge, but realizing he may have to go back to his previous residence.  Pt talks about how he has been speaking to a female, does not disclose name, and he would like for her to visit him at his residence.  Pt also talks about how he prefers to meet with his ACT team on Wednesdays or Thursdays with his residence worker so they can all be on the same page.  Pt more irritable when talking about medication.  Pt agrees to have a phone meeting with his residence worker tomorrow to discuss discharge planning. Pt compliant with medication last night and this morning with encouragement.  Chart reviewed and case discussed with treatment team.  No events reported overnight.  Pt more irritable today, did not take AM Clozaril until after the interview with encouragement.  Pt talks about how he does not know where he wants to live after discharge, but realizing he may have to go back to his previous residence.  Pt talks about how he has been speaking to a female, does not disclose name, and he would like for her to visit him at his residence.  Pt also talks about how he prefers to meet with his ACT team on Wednesdays or Thursdays with his residence worker so they can all be on the same page.  Pt more irritable when talking about medication.  Pt agrees to have a phone meeting with his residence worker tomorrow to discuss discharge planning.  Pt has requested his mother not be involved with discharge planning. Pt compliant with medication last night and this morning with encouragement.  Chart reviewed and case discussed with treatment team.  No events reported overnight.  Pt more irritable today, did not take AM Clozaril until after the interview with encouragement.  Pt talks about how he does not know where he wants to live after discharge, but realizing he may have to go back to his previous residence.  Pt talks about how he has been speaking to a female, does not disclose name, and he would like for her to visit him at his residence.  Pt also talks about how he prefers to meet with his ACT team on Wednesdays or Thursdays with his residence worker so they can all be on the same page.  Pt more irritable when talking about medication.  Pt agrees to have a phone meeting with his residence worker tomorrow to discuss discharge planning.   Pt compliant with medication last night and this morning with encouragement.  Chart reviewed and case discussed with treatment team.  No events reported overnight.  Pt more irritable today, did not take AM Clozaril until after the interview with encouragement.  Pt talks about how he does not know where he wants to live after discharge, but realizing he may have to go back to his previous residence.  Pt talks about how he has been speaking to a female, does not disclose name, and he would like for her to visit him at his residence.  Pt also talks about how he prefers to meet with his ACT team on Wednesdays or Thursdays with his residence worker so they can all be on the same page.  Pt more irritable when talking about medication.  Pt agrees to have a phone meeting with his residence worker tomorrow to discuss discharge planning.  Pt has requested his mother not be involved in discharge planning process and that he would like to make his own decision.

## 2021-11-08 NOTE — BH INPATIENT PSYCHIATRY PROGRESS NOTE - NSBHASSESSSUMMFT_PSY_ALL_CORE
Plan:   - clozapine ODT 50mg PO daily and 250mg PO at bedtime  - Invega Sustenna 234mg IM on 10/15 and 156mg IM on 10/19, with Invega Sustenna 234mg IM due on 11/16  -  Lithium Eskalith CR 1350mg PO at bedtime   - MOO granted on 9/28/21, Haldol 5mg IM and Benadryl 25mg IM if patient refuses Clozaril standing medication as per MOO court order     - group and milieu therapy     - routine checks     - Lithium level 0.8 on 10/26   - CBC + diff weekly

## 2021-11-09 LAB
BASOPHILS # BLD AUTO: 0.08 K/UL — SIGNIFICANT CHANGE UP (ref 0–0.2)
BASOPHILS NFR BLD AUTO: 0.6 % — SIGNIFICANT CHANGE UP (ref 0–2)
EOSINOPHIL # BLD AUTO: 0.23 K/UL — SIGNIFICANT CHANGE UP (ref 0–0.5)
EOSINOPHIL NFR BLD AUTO: 1.7 % — SIGNIFICANT CHANGE UP (ref 0–6)
GLUCOSE BLDC GLUCOMTR-MCNC: 158 MG/DL — HIGH (ref 70–99)
GLUCOSE BLDC GLUCOMTR-MCNC: 191 MG/DL — HIGH (ref 70–99)
GLUCOSE BLDC GLUCOMTR-MCNC: 228 MG/DL — HIGH (ref 70–99)
GLUCOSE BLDC GLUCOMTR-MCNC: 236 MG/DL — HIGH (ref 70–99)
HCT VFR BLD CALC: 42.9 % — SIGNIFICANT CHANGE UP (ref 39–50)
HGB BLD-MCNC: 13.7 G/DL — SIGNIFICANT CHANGE UP (ref 13–17)
IANC: 10.34 K/UL — HIGH (ref 1.5–8.5)
IMM GRANULOCYTES NFR BLD AUTO: 0.7 % — SIGNIFICANT CHANGE UP (ref 0–1.5)
LYMPHOCYTES # BLD AUTO: 16.9 % — SIGNIFICANT CHANGE UP (ref 13–44)
LYMPHOCYTES # BLD AUTO: 2.35 K/UL — SIGNIFICANT CHANGE UP (ref 1–3.3)
MCHC RBC-ENTMCNC: 27.1 PG — SIGNIFICANT CHANGE UP (ref 27–34)
MCHC RBC-ENTMCNC: 31.9 GM/DL — LOW (ref 32–36)
MCV RBC AUTO: 85 FL — SIGNIFICANT CHANGE UP (ref 80–100)
MONOCYTES # BLD AUTO: 0.79 K/UL — SIGNIFICANT CHANGE UP (ref 0–0.9)
MONOCYTES NFR BLD AUTO: 5.7 % — SIGNIFICANT CHANGE UP (ref 2–14)
NEUTROPHILS # BLD AUTO: 10.34 K/UL — HIGH (ref 1.8–7.4)
NEUTROPHILS NFR BLD AUTO: 74.4 % — SIGNIFICANT CHANGE UP (ref 43–77)
NRBC # BLD: 0 /100 WBCS — SIGNIFICANT CHANGE UP
NRBC # FLD: 0 K/UL — SIGNIFICANT CHANGE UP
PLATELET # BLD AUTO: 210 K/UL — SIGNIFICANT CHANGE UP (ref 150–400)
RBC # BLD: 5.05 M/UL — SIGNIFICANT CHANGE UP (ref 4.2–5.8)
RBC # FLD: 15.1 % — HIGH (ref 10.3–14.5)
WBC # BLD: 13.89 K/UL — HIGH (ref 3.8–10.5)
WBC # FLD AUTO: 13.89 K/UL — HIGH (ref 3.8–10.5)

## 2021-11-09 PROCEDURE — 99231 SBSQ HOSP IP/OBS SF/LOW 25: CPT

## 2021-11-09 RX ADMIN — LITHIUM CARBONATE 1350 MILLIGRAM(S): 300 TABLET, EXTENDED RELEASE ORAL at 21:05

## 2021-11-09 RX ADMIN — Medication 1 TABLET(S): at 21:05

## 2021-11-09 RX ADMIN — SENNA PLUS 2 TABLET(S): 8.6 TABLET ORAL at 21:04

## 2021-11-09 RX ADMIN — CLOZAPINE 50 MILLIGRAM(S): 150 TABLET, ORALLY DISINTEGRATING ORAL at 08:53

## 2021-11-09 RX ADMIN — ATORVASTATIN CALCIUM 40 MILLIGRAM(S): 80 TABLET, FILM COATED ORAL at 21:05

## 2021-11-09 RX ADMIN — Medication 1 TABLET(S): at 08:53

## 2021-11-09 RX ADMIN — METFORMIN HYDROCHLORIDE 1000 MILLIGRAM(S): 850 TABLET ORAL at 21:05

## 2021-11-09 RX ADMIN — Medication 2: at 12:29

## 2021-11-09 RX ADMIN — Medication 1: at 08:54

## 2021-11-09 RX ADMIN — Medication 500 MILLIGRAM(S): at 08:53

## 2021-11-09 RX ADMIN — CLOZAPINE 250 MILLIGRAM(S): 150 TABLET, ORALLY DISINTEGRATING ORAL at 21:05

## 2021-11-09 NOTE — BH INPATIENT PSYCHIATRY PROGRESS NOTE - NSBHMETABOLIC_PSY_ALL_CORE_FT
BMI: BMI (kg/m2): 77.9 (10-25-21 @ 06:54)  HbA1c: A1C with Estimated Average Glucose Result: 7.3 % (08-05-21 @ 10:24)    Glucose: POCT Blood Glucose.: 228 mg/dL (11-09-21 @ 11:49)    BP: 126/76 (11-09-21 @ 07:37) (126/76 - 126/76)  Lipid Panel: Date/Time: 08-05-21 @ 10:24  Cholesterol, Serum: 139  Direct LDL: --  HDL Cholesterol, Serum: 42  Total Cholesterol/HDL Ration Measurement: --  Triglycerides, Serum: 234

## 2021-11-09 NOTE — BH INPATIENT PSYCHIATRY PROGRESS NOTE - NSBHFUPINTERVALHXFT_PSY_A_CORE
Pt compliant with medication and tolerating it well.  Chart reviewed and case discussed with treatment team.  No events reported overnight.  Pt calm and cooperative with interview today.  Pt talks about his future goals of getting an apartment of his own, working on a project and spending time with his girlfriend, Bev.  Pt also talks about how if he does well for a period of time in the community on his current medication regiment, he will talk to his ACT team about possibly reducing the dose of Clozaril.  Pt agreeable to have phone meeting with his residence worker with SW and writer present.  Writer called, Sekou Levine ([phone]).  Sekou Levine discussed recommendation of patient moving into a CR which would be more supportive housing and more staff available for assistance to help patient transition from the hospital.  She reported there is no availability in that type of housing at this time, but patient may benefit from going to a 28 day respite at time of discharge and either got to a CR if available or back to [facility] with a home health aide if not available.  Pt agreeable to this plan.  Discussed with Sekou Levine plan for discharge next week after patient receives monthly ROOT and she is agreeable and inpatient treatment team will maintain contact.

## 2021-11-09 NOTE — BH INPATIENT PSYCHIATRY PROGRESS NOTE - NSBHCHARTREVIEWVS_PSY_A_CORE FT
Vital Signs Last 24 Hrs  T(C): 36.7 (11-09-21 @ 07:37), Max: 36.7 (11-09-21 @ 07:37)  T(F): 98 (11-09-21 @ 07:37), Max: 98 (11-09-21 @ 07:37)  HR: 86 (11-09-21 @ 07:37) (86 - 86)  BP: 126/76 (11-09-21 @ 07:37) (126/76 - 126/76)  BP(mean): --  RR: --  SpO2: --    Orthostatic VS  11-09-21 @ 07:37  Lying BP: --/-- HR: --  Sitting BP: --/-- HR: --  Standing BP: 134/84 HR: 95  Site: --  Mode: --  Orthostatic VS  11-08-21 @ 18:31  Lying BP: --/-- HR: --  Sitting BP: 130/72 HR: 90  Standing BP: 125/69 HR: 94  Site: upper left arm  Mode: --  Orthostatic VS  11-08-21 @ 07:42  Lying BP: --/-- HR: --  Sitting BP: 134/81 HR: 90  Standing BP: 129/75 HR: 92  Site: --  Mode: --  Orthostatic VS  11-07-21 @ 19:41  Lying BP: --/-- HR: --  Sitting BP: 129/74 HR: 94  Standing BP: 132/80 HR: 107  Site: --  Mode: --

## 2021-11-10 LAB
GLUCOSE BLDC GLUCOMTR-MCNC: 172 MG/DL — HIGH (ref 70–99)
GLUCOSE BLDC GLUCOMTR-MCNC: 192 MG/DL — HIGH (ref 70–99)
GLUCOSE BLDC GLUCOMTR-MCNC: 194 MG/DL — HIGH (ref 70–99)
GLUCOSE BLDC GLUCOMTR-MCNC: 256 MG/DL — HIGH (ref 70–99)

## 2021-11-10 PROCEDURE — 99232 SBSQ HOSP IP/OBS MODERATE 35: CPT

## 2021-11-10 RX ADMIN — ATORVASTATIN CALCIUM 40 MILLIGRAM(S): 80 TABLET, FILM COATED ORAL at 20:50

## 2021-11-10 RX ADMIN — Medication 1: at 11:39

## 2021-11-10 RX ADMIN — Medication 500 MILLIGRAM(S): at 09:17

## 2021-11-10 RX ADMIN — LITHIUM CARBONATE 1350 MILLIGRAM(S): 300 TABLET, EXTENDED RELEASE ORAL at 20:49

## 2021-11-10 RX ADMIN — CLOZAPINE 50 MILLIGRAM(S): 150 TABLET, ORALLY DISINTEGRATING ORAL at 09:17

## 2021-11-10 RX ADMIN — SENNA PLUS 2 TABLET(S): 8.6 TABLET ORAL at 20:48

## 2021-11-10 RX ADMIN — METFORMIN HYDROCHLORIDE 1000 MILLIGRAM(S): 850 TABLET ORAL at 20:49

## 2021-11-10 RX ADMIN — Medication 1 TABLET(S): at 20:48

## 2021-11-10 RX ADMIN — Medication 1 TABLET(S): at 09:17

## 2021-11-10 RX ADMIN — CLOZAPINE 250 MILLIGRAM(S): 150 TABLET, ORALLY DISINTEGRATING ORAL at 20:49

## 2021-11-10 RX ADMIN — Medication: at 08:00

## 2021-11-10 RX ADMIN — Medication 1: at 12:22

## 2021-11-10 NOTE — BH INPATIENT PSYCHIATRY PROGRESS NOTE - CURRENT MEDICATION
MEDICATIONS  (STANDING):  acetaminophen   Tablet .. 650 milliGRAM(s) Oral once  ascorbic acid 500 milliGRAM(s) Oral daily  atorvastatin 40 milliGRAM(s) Oral at bedtime  calcium carbonate    500 mG (Tums) Chewable 1 Tablet(s) Chew two times a day  cloZAPine Disintegrating Tablet 50 milliGRAM(s) Oral daily  cloZAPine Disintegrating Tablet 250 milliGRAM(s) Oral at bedtime  dextrose 40% Gel 15 Gram(s) Oral once  glucagon  Injectable 1 milliGRAM(s) IntraMuscular once  insulin lispro (ADMELOG) corrective regimen sliding scale   SubCutaneous three times a day before meals  insulin lispro (ADMELOG) corrective regimen sliding scale   SubCutaneous at bedtime  lithium CR (ESKALITH-CR) 1350 milliGRAM(s) Oral at bedtime  metFORMIN 1000 milliGRAM(s) Oral at bedtime  senna 2 Tablet(s) Oral at bedtime  sitaGLIPtin 100 milliGRAM(s) Oral daily    MEDICATIONS  (PRN):  acetaminophen   Tablet .. 650 milliGRAM(s) Oral every 6 hours PRN Mild Pain (1 - 3), Moderate Pain (4 - 6)  chlorproMAZINE    Injectable 100 milliGRAM(s) IntraMuscular once PRN agitation or aggression  chlorproMAZINE    Tablet 100 milliGRAM(s) Oral every 4 hours PRN agitation or aggression  diphenhydrAMINE   Injectable 25 milliGRAM(s) IntraMuscular every 12 hours PRN EPS ppx  diphenhydrAMINE   Injectable 25 milliGRAM(s) IntraMuscular every 12 hours PRN EPS ppx  diphenhydrAMINE Injectable 25 milliGRAM(s) IntraMuscular every 12 hours PRN EPS ppx  haloperidol    Injectable 5 milliGRAM(s) IntraMuscular every 12 hours PRN psychosis  haloperidol    Injectable 5 milliGRAM(s) IntraMuscular every 12 hours PRN psychosis  haloperidol    Injectable 5 milliGRAM(s) IntraMuscular every 12 hours PRN psychosis  polyethylene glycol 3350 17 Gram(s) Oral daily PRN constipation

## 2021-11-10 NOTE — BH INPATIENT PSYCHIATRY PROGRESS NOTE - NSBHMETABOLIC_PSY_ALL_CORE_FT
BMI: BMI (kg/m2): 77.9 (10-25-21 @ 06:54)  HbA1c: A1C with Estimated Average Glucose Result: 7.3 % (08-05-21 @ 10:24)    Glucose: POCT Blood Glucose.: 194 mg/dL (11-10-21 @ 08:05)    BP: 126/76 (11-09-21 @ 07:37) (126/76 - 126/76)  Lipid Panel: Date/Time: 08-05-21 @ 10:24  Cholesterol, Serum: 139  Direct LDL: --  HDL Cholesterol, Serum: 42  Total Cholesterol/HDL Ration Measurement: --  Triglycerides, Serum: 234   BMI: BMI (kg/m2): 77.9 (10-25-21 @ 06:54)  HbA1c: A1C with Estimated Average Glucose Result: 7.3 % (08-05-21 @ 10:24)    Glucose: POCT Blood Glucose.: 192 mg/dL (11-10-21 @ 12:08)    BP: 126/76 (11-09-21 @ 07:37) (126/76 - 126/76)  Lipid Panel: Date/Time: 08-05-21 @ 10:24  Cholesterol, Serum: 139  Direct LDL: --  HDL Cholesterol, Serum: 42  Total Cholesterol/HDL Ration Measurement: --  Triglycerides, Serum: 234

## 2021-11-10 NOTE — BH INPATIENT PSYCHIATRY PROGRESS NOTE - NSBHFUPINTERVALHXFT_PSY_A_CORE
Pt compliant with medication and tolerating it well.  Chart reviewed and case discussed with treatment team.  No events reported overnight.  Pt calm and cooperative with interview.  Pt talks about his girlfriend and how he would like her to visit him after discharge.  Pt also talks about long term goal of getting a job.  Pt continues to be agreeable to go to respite after discharge.  Pt worries that he will be homeless, he was assured that he is not.  Pt worries about how he will get some of his belongings from his residence to bring to respite.  Informed him a residence worker will help move his belongings as discussed in meeting with residence yesterday.

## 2021-11-10 NOTE — BH INPATIENT PSYCHIATRY PROGRESS NOTE - NSBHCHARTREVIEWVS_PSY_A_CORE FT
Vital Signs Last 24 Hrs  T(C): 36.4 (11-10-21 @ 07:58), Max: 36.8 (11-09-21 @ 20:03)  T(F): 97.6 (11-10-21 @ 07:58), Max: 98.2 (11-09-21 @ 20:03)  HR: --  BP: --  BP(mean): --  RR: --  SpO2: --    Orthostatic VS  11-10-21 @ 07:58  Lying BP: --/-- HR: --  Sitting BP: 116/81 HR: 93  Standing BP: 110/74 HR: 91  Site: upper right arm  Mode: electronic  Orthostatic VS  11-09-21 @ 20:03  Lying BP: --/-- HR: --  Sitting BP: 139/72 HR: 98  Standing BP: 135/76 HR: 101  Site: upper right arm  Mode: electronic  Orthostatic VS  11-09-21 @ 07:37  Lying BP: --/-- HR: --  Sitting BP: --/-- HR: --  Standing BP: 134/84 HR: 95  Site: --  Mode: --  Orthostatic VS  11-08-21 @ 18:31  Lying BP: --/-- HR: --  Sitting BP: 130/72 HR: 90  Standing BP: 125/69 HR: 94  Site: upper left arm  Mode: --

## 2021-11-11 LAB
GLUCOSE BLDC GLUCOMTR-MCNC: 156 MG/DL — HIGH (ref 70–99)
GLUCOSE BLDC GLUCOMTR-MCNC: 172 MG/DL — HIGH (ref 70–99)
GLUCOSE BLDC GLUCOMTR-MCNC: 231 MG/DL — HIGH (ref 70–99)
GLUCOSE BLDC GLUCOMTR-MCNC: 246 MG/DL — HIGH (ref 70–99)

## 2021-11-11 RX ORDER — PALIPERIDONE 1.5 MG/1
234 TABLET, EXTENDED RELEASE ORAL ONCE
Refills: 0 | Status: COMPLETED | OUTPATIENT
Start: 2021-11-15 | End: 2021-11-15

## 2021-11-11 RX ADMIN — SENNA PLUS 2 TABLET(S): 8.6 TABLET ORAL at 21:22

## 2021-11-11 RX ADMIN — METFORMIN HYDROCHLORIDE 1000 MILLIGRAM(S): 850 TABLET ORAL at 21:22

## 2021-11-11 RX ADMIN — Medication 500 MILLIGRAM(S): at 08:50

## 2021-11-11 RX ADMIN — ATORVASTATIN CALCIUM 40 MILLIGRAM(S): 80 TABLET, FILM COATED ORAL at 21:22

## 2021-11-11 RX ADMIN — LITHIUM CARBONATE 1350 MILLIGRAM(S): 300 TABLET, EXTENDED RELEASE ORAL at 21:22

## 2021-11-11 RX ADMIN — Medication 1 TABLET(S): at 21:22

## 2021-11-11 RX ADMIN — Medication 1: at 17:02

## 2021-11-11 RX ADMIN — CLOZAPINE 50 MILLIGRAM(S): 150 TABLET, ORALLY DISINTEGRATING ORAL at 08:50

## 2021-11-11 RX ADMIN — CLOZAPINE 250 MILLIGRAM(S): 150 TABLET, ORALLY DISINTEGRATING ORAL at 21:21

## 2021-11-11 RX ADMIN — Medication 1: at 08:47

## 2021-11-11 RX ADMIN — Medication 1 TABLET(S): at 08:51

## 2021-11-11 RX ADMIN — Medication 2: at 12:21

## 2021-11-11 NOTE — BH TREATMENT PLAN - NSTXPSYCHOINTERRN_PSY_ALL_CORE
Assess mental status, mood and behavioral patterns. Redirect/reorient as needed. Encourage pt to verbalize thoughts and feelings.
Encourage pt to report worsening S/S of psychosis. Provide PRN medications as needed.
Provide reality testing as needed/tolerated to help pt think more clearly
Assess mental status, mood and behavioral patterns. Redirect/reorient as needed. Encourage pt to verbalize thoughts and feelings.
Monitor for psychotic S/S. Provide redirection and PRN medications as needed and per order.
Provide reality testing as needed/tolerated to help pt think more clearly
Monitor for psychotic S/S. Provide redirection and PRN medications as needed and per order.
Encourage patient to engage in conversatiion with staff
Monitor for psychotic S/S. Provide redirection and PRN medications as needed and per order.
Assess mental status, mood and behavioral patterns. Redirect/reorient as needed. Encourage pt to verbalize thoughts and feelings.
Monitor for psychotic S/S. Provide redirection and PRN medications as needed and per order.
Monitor for psychotic S/S. Provide redirection and PRN medications as needed and per order.
Pt encouraged to be mindful of triggers of psychotic symptoms.

## 2021-11-11 NOTE — BH TREATMENT PLAN - NSTXMEDICINTERRN_PSY_ALL_CORE
Encourage pt to continue court ordered medications consistently.
Educated and encouraged pt to comply with medication regimen goals. Provided medication teaching, monitored side effects and response. Encouraged to attend medication groups.
Educated and encouraged pt to comply with medication regimen goals. Provided medication teaching, monitored side effects and response. Encouraged to attend medication groups.
Encourage pt to continue court ordered medications consistently.
Educate patient on the importance of following a proper medication regimen
Help pt accept that he's court-ordered to accept meds & he cannot be selectively compliant anymore
Encourage pt to continue court ordered medications consistently.
Encourage pt to continue court ordered medications consistently.
Encourage and educate pt to adhere to treatment plan. Provide education on importance of medications.
Help pt accept that he's court-ordered to accept meds & he cannot be selectively compliant anymore
Educated and encouraged pt to comply with medication regimen goals. Provided medication teaching, monitored side effects and response. Encouraged to attend medication groups.
Encourage pt to continue court ordered medications consistently.
Pt. education regarding medication adherence reinforced.

## 2021-11-11 NOTE — BH TREATMENT PLAN - ANXIETY/PANIC/FEAR NURSING INTERVENTIONS/RECOMMENDATIONS
Encourage pt to report worsening anxiety S/S and to request PRN medication as needed.
Assist patient to identify coping mechanisms to cope with anxiety
Help pt identify symptoms of his anxiety so as to develop a realistic way of managing it
Encourage pt to report worsening anxiety S/S and to request PRN medication as needed.
Pt. encouraged to engage in reality testing in order to decrease paranoia.
Assess mental status, mood and behavioral patterns. Redirect/reorient as needed. De-escalate verbally or medically as needed.  Encourage pt to verbalize thoughts and feelings.
Encourage pt to report worsening anxiety S/S and to request PRN medication as needed.
Help pt identify symptoms of his anxiety so as to develop a realistic way of managing it
Encourage pt to report worsening anxiety S/S and to request PRN medication as needed.

## 2021-11-11 NOTE — BH INPATIENT PSYCHIATRY PROGRESS NOTE - NSBHCHARTREVIEWVS_PSY_A_CORE FT
Vital Signs Last 24 Hrs  T(C): 36.3 (11-11-21 @ 07:45), Max: 36.6 (11-10-21 @ 19:59)  T(F): 97.3 (11-11-21 @ 07:45), Max: 97.9 (11-10-21 @ 19:59)  HR: --  BP: --  BP(mean): --  RR: --  SpO2: --    Orthostatic VS  11-11-21 @ 08:11  Lying BP: --/-- HR: --  Sitting BP: 122/78 HR: 96  Standing BP: 129/76 HR: 98  Site: --  Mode: --  Orthostatic VS  11-11-21 @ 07:45  Lying BP: --/-- HR: --  Sitting BP: 120/77 HR: 93  Standing BP: --/-- HR: --  Site: --  Mode: --  Orthostatic VS  11-10-21 @ 19:59  Lying BP: --/-- HR: --  Sitting BP: 120/78 HR: 98  Standing BP: 114/64 HR: 101  Site: --  Mode: --  Orthostatic VS  11-10-21 @ 07:58  Lying BP: --/-- HR: --  Sitting BP: 116/81 HR: 93  Standing BP: 110/74 HR: 91  Site: upper right arm  Mode: electronic  Orthostatic VS  11-09-21 @ 20:03  Lying BP: --/-- HR: --  Sitting BP: 139/72 HR: 98  Standing BP: 135/76 HR: 101  Site: upper right arm  Mode: electronic

## 2021-11-11 NOTE — BH TREATMENT PLAN - NSTXIMPULSINTERRN_PSY_ALL_CORE
Monitor patient for worsening agitation. Provide PRN medications as needed and per order.
Assist patient to identify triggers and utilize coping skills
Monitor patient for worsening agitation. Provide PRN medications as needed and per order.
Monitor patient for worsening agitation. Provide PRN medications as needed and per order.
Help pt develop insight into his inappropriate behaviors & how they affect others
Monitor patient for worsening agitation. Provide PRN medications as needed and per order.
Monitor patient for worsening agitation. Provide PRN medications as needed and per order.
Encouraged to adopt better means of coping with situation that is not in his favor safely that acting out aggressively.
Pt. encouraged to use coping skills instead of becoming agitated.
Encouraged to adopt better means of coping with situation that is not in his favor safely that acting out aggressively.
Encouraged to adopt better means of coping with situation that is not in his favor safely that acting out aggressively.
Help pt develop insight into his inappropriate behaviors & how they affect others
Monitor patient for worsening agitation. Provide PRN medications as needed and per order.

## 2021-11-11 NOTE — BH TREATMENT PLAN - NSTXVIOLNTINTERRN_PSY_ALL_CORE
Assess mental status, mood and behavioral patterns. Redirect/reorient as needed. Encourage pt to verbalize thoughts and feelings.
Encouraged to adopt better means of coping with situation that is not in his favor safely that acting out aggressively.
Assess mental status, mood and behavioral patterns. Redirect/reorient as needed. Encourage pt to verbalize thoughts and feelings.
Pt. encouraged to use coping skills instead  of resorting to violence.
Monitor for aggression. Provide PRN medications as needed and as per order.
Help pt acknowledge that he becomes aggressive at times, & work to decrease aggressive episodes with positive coping & verbalization of feelings
Set limits with patient and help identify triggers
Monitor for aggression. Provide PRN medications as needed and as per order.
Monitor for aggression. Provide PRN medications as needed and as per order.
Help pt acknowledge that he becomes aggressive at times, & work to decrease aggressive episodes with positive coping & verbalization of feelings
Monitor for aggression. Provide PRN medications as needed and as per order.

## 2021-11-11 NOTE — BH TREATMENT PLAN - NSTXDIABINTERRN_PSY_ALL_CORE
Help pt develop insight into his dietary/exercise habits & how they affect his glycemic control
Provide diabetic education. Encourage patient to maintain healthy diet and lifestyle choices.
Monitor fingerstick. Provide insulin coverage as needed. Provide diabetic teaching.
Provide diabetic education. Encourage patient to maintain healthy diet and lifestyle choices.
Pt educated pt. regarding the importance of adherence with diabetes treatment.
Help pt develop insight into his dietary/exercise habits & how they affect his glycemic control
Provide diabetic education. Encourage patient to maintain healthy diet and lifestyle choices.

## 2021-11-11 NOTE — BH INPATIENT PSYCHIATRY PROGRESS NOTE - NSBHFUPINTERVALHXFT_PSY_A_CORE
Pt compliant with medication and tolerating it well.  Chart reviewed and case discussed with treatment team.  No events reported overnight.  Pt calm and cooperative with interview, anxious at times when talking about the future, worries about where he is going after discharge and if he will be homeless after respite.  Pt assured he will not be homeless and his residence worker is working on his placement after respite.  Pt talks about his girlfriend and how she lives far and writer and discussed how his residence worker said he can have visitors and patient understands this.  Pt continues to agree to go to respite.  Reports wanting to get his own apartment through [address], discussed how this is a future goal.  Writer inquired as to whether he would like his mother to know where he will be staying upon discharge and what was discussed in the meeting with his residence worker earlier in the week and patient reports, "it is okay, sometimes I think my mother is against me, but I doubt she is."

## 2021-11-11 NOTE — BH INPATIENT PSYCHIATRY PROGRESS NOTE - NSBHMETABOLIC_PSY_ALL_CORE_FT
BMI: BMI (kg/m2): 77.9 (10-25-21 @ 06:54)  HbA1c: A1C with Estimated Average Glucose Result: 7.3 % (08-05-21 @ 10:24)    Glucose: POCT Blood Glucose.: 172 mg/dL (11-11-21 @ 08:01)    BP: 126/76 (11-09-21 @ 07:37) (126/76 - 126/76)  Lipid Panel: Date/Time: 08-05-21 @ 10:24  Cholesterol, Serum: 139  Direct LDL: --  HDL Cholesterol, Serum: 42  Total Cholesterol/HDL Ration Measurement: --  Triglycerides, Serum: 234   Terbinafine Pregnancy And Lactation Text: This medication is Pregnancy Category B and is considered safe during pregnancy. It is also excreted in breast milk and breast feeding isn't recommended.

## 2021-11-12 LAB
GLUCOSE BLDC GLUCOMTR-MCNC: 182 MG/DL — HIGH (ref 70–99)
GLUCOSE BLDC GLUCOMTR-MCNC: 196 MG/DL — HIGH (ref 70–99)
GLUCOSE BLDC GLUCOMTR-MCNC: 229 MG/DL — HIGH (ref 70–99)

## 2021-11-12 RX ADMIN — Medication 1 TABLET(S): at 21:03

## 2021-11-12 RX ADMIN — SENNA PLUS 2 TABLET(S): 8.6 TABLET ORAL at 21:04

## 2021-11-12 RX ADMIN — LITHIUM CARBONATE 1350 MILLIGRAM(S): 300 TABLET, EXTENDED RELEASE ORAL at 21:03

## 2021-11-12 RX ADMIN — ATORVASTATIN CALCIUM 40 MILLIGRAM(S): 80 TABLET, FILM COATED ORAL at 21:04

## 2021-11-12 RX ADMIN — Medication 1: at 12:28

## 2021-11-12 RX ADMIN — METFORMIN HYDROCHLORIDE 1000 MILLIGRAM(S): 850 TABLET ORAL at 21:04

## 2021-11-12 RX ADMIN — CLOZAPINE 250 MILLIGRAM(S): 150 TABLET, ORALLY DISINTEGRATING ORAL at 21:04

## 2021-11-12 RX ADMIN — Medication 500 MILLIGRAM(S): at 08:36

## 2021-11-12 RX ADMIN — Medication 1 TABLET(S): at 08:35

## 2021-11-12 RX ADMIN — Medication 1: at 16:54

## 2021-11-12 RX ADMIN — Medication 1: at 09:12

## 2021-11-12 RX ADMIN — CLOZAPINE 50 MILLIGRAM(S): 150 TABLET, ORALLY DISINTEGRATING ORAL at 08:36

## 2021-11-12 NOTE — BH INPATIENT PSYCHIATRY PROGRESS NOTE - NSBHMETABOLIC_PSY_ALL_CORE_FT
BMI: BMI (kg/m2): 77.9 (10-25-21 @ 06:54)  HbA1c: A1C with Estimated Average Glucose Result: 7.3 % (08-05-21 @ 10:24)    Glucose: POCT Blood Glucose.: 182 mg/dL (11-12-21 @ 12:06)    BP: 118/81 (11-12-21 @ 07:50) (118/81 - 118/81)  Lipid Panel: Date/Time: 08-05-21 @ 10:24  Cholesterol, Serum: 139  Direct LDL: --  HDL Cholesterol, Serum: 42  Total Cholesterol/HDL Ration Measurement: --  Triglycerides, Serum: 234

## 2021-11-12 NOTE — BH INPATIENT PSYCHIATRY PROGRESS NOTE - NSBHFUPINTERVALHXFT_PSY_A_CORE
Pt compliant with medication and tolerating it well.  Chart reviewed and case discussed with treatment team.  No events reported overnight.  Pt showered today, changed his clothing and is having his clothing washed.  Pt observed attending and participating in AM group.  Pt calm and cooperative with the interview, but anxious at times talking about the future.  Pt reports he feels he is "adjusting to the medication."  Pt continues to be agreeable to go to respite.  Pt also agreeable to receive monthly dose of Invega ROOT on Monday.  Pt talks about his girlfriend and worries how he will see her.  Informed him again that she can visit him as per his residence worker.  Pt worries about getting his some belongings from his residence when he is at respite and assured him that his residence worker said staff would help him with that.  Pt also worries about getting a job.

## 2021-11-12 NOTE — BH INPATIENT PSYCHIATRY PROGRESS NOTE - NSBHCHARTREVIEWVS_PSY_A_CORE FT
Vital Signs Last 24 Hrs  T(C): 36.4 (11-12-21 @ 07:50), Max: 36.4 (11-12-21 @ 07:50)  T(F): 97.5 (11-12-21 @ 07:50), Max: 97.5 (11-12-21 @ 07:50)  HR: 89 (11-12-21 @ 07:50) (89 - 89)  BP: 118/81 (11-12-21 @ 07:50) (118/81 - 118/81)  BP(mean): --  RR: --  SpO2: --    Orthostatic VS  11-11-21 @ 20:14  Lying BP: --/-- HR: --  Sitting BP: 118/74 HR: 91  Standing BP: 109/62 HR: 95  Site: --  Mode: --  Orthostatic VS  11-11-21 @ 08:11  Lying BP: --/-- HR: --  Sitting BP: 122/78 HR: 96  Standing BP: 129/76 HR: 98  Site: --  Mode: --  Orthostatic VS  11-11-21 @ 07:45  Lying BP: --/-- HR: --  Sitting BP: 120/77 HR: 93  Standing BP: --/-- HR: --  Site: --  Mode: --  Orthostatic VS  11-10-21 @ 19:59  Lying BP: --/-- HR: --  Sitting BP: 120/78 HR: 98  Standing BP: 114/64 HR: 101  Site: --  Mode: --

## 2021-11-13 LAB
GLUCOSE BLDC GLUCOMTR-MCNC: 193 MG/DL — HIGH (ref 70–99)
GLUCOSE BLDC GLUCOMTR-MCNC: 214 MG/DL — HIGH (ref 70–99)
GLUCOSE BLDC GLUCOMTR-MCNC: 235 MG/DL — HIGH (ref 70–99)
GLUCOSE BLDC GLUCOMTR-MCNC: 241 MG/DL — HIGH (ref 70–99)

## 2021-11-13 RX ADMIN — Medication 2: at 08:19

## 2021-11-13 RX ADMIN — SENNA PLUS 2 TABLET(S): 8.6 TABLET ORAL at 21:14

## 2021-11-13 RX ADMIN — LITHIUM CARBONATE 1350 MILLIGRAM(S): 300 TABLET, EXTENDED RELEASE ORAL at 21:15

## 2021-11-13 RX ADMIN — Medication 2: at 11:48

## 2021-11-13 RX ADMIN — Medication 1: at 16:42

## 2021-11-13 RX ADMIN — Medication 500 MILLIGRAM(S): at 08:20

## 2021-11-13 RX ADMIN — CLOZAPINE 50 MILLIGRAM(S): 150 TABLET, ORALLY DISINTEGRATING ORAL at 08:21

## 2021-11-13 RX ADMIN — ATORVASTATIN CALCIUM 40 MILLIGRAM(S): 80 TABLET, FILM COATED ORAL at 21:14

## 2021-11-13 RX ADMIN — CLOZAPINE 250 MILLIGRAM(S): 150 TABLET, ORALLY DISINTEGRATING ORAL at 21:14

## 2021-11-13 RX ADMIN — Medication 1 TABLET(S): at 08:20

## 2021-11-13 RX ADMIN — POLYETHYLENE GLYCOL 3350 17 GRAM(S): 17 POWDER, FOR SOLUTION ORAL at 08:20

## 2021-11-13 RX ADMIN — METFORMIN HYDROCHLORIDE 1000 MILLIGRAM(S): 850 TABLET ORAL at 21:15

## 2021-11-13 RX ADMIN — Medication 1 TABLET(S): at 21:15

## 2021-11-14 LAB
GLUCOSE BLDC GLUCOMTR-MCNC: 198 MG/DL — HIGH (ref 70–99)
GLUCOSE BLDC GLUCOMTR-MCNC: 209 MG/DL — HIGH (ref 70–99)
GLUCOSE BLDC GLUCOMTR-MCNC: 231 MG/DL — HIGH (ref 70–99)
GLUCOSE BLDC GLUCOMTR-MCNC: 239 MG/DL — HIGH (ref 70–99)

## 2021-11-14 RX ADMIN — SENNA PLUS 2 TABLET(S): 8.6 TABLET ORAL at 21:59

## 2021-11-14 RX ADMIN — ATORVASTATIN CALCIUM 40 MILLIGRAM(S): 80 TABLET, FILM COATED ORAL at 21:59

## 2021-11-14 RX ADMIN — Medication 1 TABLET(S): at 22:00

## 2021-11-14 RX ADMIN — Medication 2: at 17:07

## 2021-11-14 RX ADMIN — LITHIUM CARBONATE 1350 MILLIGRAM(S): 300 TABLET, EXTENDED RELEASE ORAL at 22:00

## 2021-11-14 RX ADMIN — CLOZAPINE 50 MILLIGRAM(S): 150 TABLET, ORALLY DISINTEGRATING ORAL at 08:39

## 2021-11-14 RX ADMIN — Medication 500 MILLIGRAM(S): at 08:39

## 2021-11-14 RX ADMIN — Medication 1 TABLET(S): at 08:40

## 2021-11-14 RX ADMIN — CLOZAPINE 250 MILLIGRAM(S): 150 TABLET, ORALLY DISINTEGRATING ORAL at 22:00

## 2021-11-14 RX ADMIN — Medication 2: at 12:07

## 2021-11-14 RX ADMIN — METFORMIN HYDROCHLORIDE 1000 MILLIGRAM(S): 850 TABLET ORAL at 21:59

## 2021-11-14 RX ADMIN — Medication 1: at 08:39

## 2021-11-15 LAB
GLUCOSE BLDC GLUCOMTR-MCNC: 176 MG/DL — HIGH (ref 70–99)
GLUCOSE BLDC GLUCOMTR-MCNC: 183 MG/DL — HIGH (ref 70–99)
GLUCOSE BLDC GLUCOMTR-MCNC: 185 MG/DL — HIGH (ref 70–99)
GLUCOSE BLDC GLUCOMTR-MCNC: 252 MG/DL — HIGH (ref 70–99)

## 2021-11-15 PROCEDURE — 99231 SBSQ HOSP IP/OBS SF/LOW 25: CPT

## 2021-11-15 RX ADMIN — Medication 1 TABLET(S): at 09:23

## 2021-11-15 RX ADMIN — Medication 1: at 12:40

## 2021-11-15 RX ADMIN — CLOZAPINE 250 MILLIGRAM(S): 150 TABLET, ORALLY DISINTEGRATING ORAL at 21:30

## 2021-11-15 RX ADMIN — LITHIUM CARBONATE 1350 MILLIGRAM(S): 300 TABLET, EXTENDED RELEASE ORAL at 21:29

## 2021-11-15 RX ADMIN — SENNA PLUS 2 TABLET(S): 8.6 TABLET ORAL at 21:30

## 2021-11-15 RX ADMIN — CLOZAPINE 50 MILLIGRAM(S): 150 TABLET, ORALLY DISINTEGRATING ORAL at 09:23

## 2021-11-15 RX ADMIN — Medication 1: at 08:53

## 2021-11-15 RX ADMIN — Medication 1 TABLET(S): at 21:30

## 2021-11-15 RX ADMIN — ATORVASTATIN CALCIUM 40 MILLIGRAM(S): 80 TABLET, FILM COATED ORAL at 21:30

## 2021-11-15 RX ADMIN — Medication 1: at 17:14

## 2021-11-15 RX ADMIN — PALIPERIDONE 234 MILLIGRAM(S): 1.5 TABLET, EXTENDED RELEASE ORAL at 15:26

## 2021-11-15 RX ADMIN — Medication 500 MILLIGRAM(S): at 09:23

## 2021-11-15 RX ADMIN — METFORMIN HYDROCHLORIDE 1000 MILLIGRAM(S): 850 TABLET ORAL at 21:29

## 2021-11-15 NOTE — BH INPATIENT PSYCHIATRY PROGRESS NOTE - NSBHMETABOLIC_PSY_ALL_CORE_FT
BMI: BMI (kg/m2): 77.9 (10-25-21 @ 06:54)  HbA1c: A1C with Estimated Average Glucose Result: 7.3 % (08-05-21 @ 10:24)    Glucose: POCT Blood Glucose.: 176 mg/dL (11-15-21 @ 12:19)    BP: --  Lipid Panel: Date/Time: 08-05-21 @ 10:24  Cholesterol, Serum: 139  Direct LDL: --  HDL Cholesterol, Serum: 42  Total Cholesterol/HDL Ration Measurement: --  Triglycerides, Serum: 234

## 2021-11-15 NOTE — BH INPATIENT PSYCHIATRY PROGRESS NOTE - NSBHCHARTREVIEWVS_PSY_A_CORE FT
Vital Signs Last 24 Hrs  T(C): 36.4 (11-15-21 @ 08:24), Max: 36.4 (11-15-21 @ 08:24)  T(F): 97.6 (11-15-21 @ 08:24), Max: 97.6 (11-15-21 @ 08:24)  HR: --  BP: --  BP(mean): --  RR: --  SpO2: --    Orthostatic VS  11-15-21 @ 08:24  Lying BP: --/-- HR: --  Sitting BP: 109/75 HR: 87  Standing BP: 98/61 HR: 90  Site: --  Mode: --  Orthostatic VS  11-14-21 @ 19:09  Lying BP: --/-- HR: --  Sitting BP: 123/81 HR: 98  Standing BP: 119/78 HR: 105  Site: --  Mode: --  Orthostatic VS  11-14-21 @ 08:10  Lying BP: --/-- HR: --  Sitting BP: 122/84 HR: 90  Standing BP: 120/79 HR: 91  Site: upper left arm  Mode: --  Orthostatic VS  11-13-21 @ 20:22  Lying BP: --/-- HR: --  Sitting BP: 131/87 HR: 100  Standing BP: 126/75 HR: 98  Site: upper left arm  Mode: --

## 2021-11-15 NOTE — BH INPATIENT PSYCHIATRY PROGRESS NOTE - NSBHASSESSSUMMFT_PSY_ALL_CORE
Plan:   - clozapine ODT 50mg PO daily and 250mg PO at bedtime  - Invega Sustenna 234mg IM on 10/15 and 156mg IM on 10/19, with Invega Sustenna 234mg IM on 11/16  -  Lithium Eskalith CR 1350mg PO at bedtime   - MOO granted on 9/28/21, Haldol 5mg IM and Benadryl 25mg IM if patient refuses Clozaril standing medication as per MOO court order     - group and milieu therapy     - routine checks     - Lithium level 0.8 on 10/26   - CBC + diff weekly

## 2021-11-15 NOTE — BH INPATIENT PSYCHIATRY PROGRESS NOTE - CURRENT MEDICATION
MEDICATIONS  (STANDING):  acetaminophen   Tablet .. 650 milliGRAM(s) Oral once  ascorbic acid 500 milliGRAM(s) Oral daily  atorvastatin 40 milliGRAM(s) Oral at bedtime  calcium carbonate    500 mG (Tums) Chewable 1 Tablet(s) Chew two times a day  cloZAPine Disintegrating Tablet 50 milliGRAM(s) Oral daily  cloZAPine Disintegrating Tablet 250 milliGRAM(s) Oral at bedtime  dextrose 40% Gel 15 Gram(s) Oral once  glucagon  Injectable 1 milliGRAM(s) IntraMuscular once  insulin lispro (ADMELOG) corrective regimen sliding scale   SubCutaneous three times a day before meals  insulin lispro (ADMELOG) corrective regimen sliding scale   SubCutaneous at bedtime  lithium CR (ESKALITH-CR) 1350 milliGRAM(s) Oral at bedtime  metFORMIN 1000 milliGRAM(s) Oral at bedtime  paliperidone Injectable, Long Acting 234 milliGRAM(s) IntraMuscular once  senna 2 Tablet(s) Oral at bedtime  sitaGLIPtin 100 milliGRAM(s) Oral daily    MEDICATIONS  (PRN):  acetaminophen   Tablet .. 650 milliGRAM(s) Oral every 6 hours PRN Mild Pain (1 - 3), Moderate Pain (4 - 6)  chlorproMAZINE    Injectable 100 milliGRAM(s) IntraMuscular once PRN agitation or aggression  chlorproMAZINE    Tablet 100 milliGRAM(s) Oral every 4 hours PRN agitation or aggression  diphenhydrAMINE   Injectable 25 milliGRAM(s) IntraMuscular every 12 hours PRN EPS ppx  diphenhydrAMINE   Injectable 25 milliGRAM(s) IntraMuscular every 12 hours PRN EPS ppx  diphenhydrAMINE Injectable 25 milliGRAM(s) IntraMuscular every 12 hours PRN EPS ppx  haloperidol    Injectable 5 milliGRAM(s) IntraMuscular every 12 hours PRN psychosis  haloperidol    Injectable 5 milliGRAM(s) IntraMuscular every 12 hours PRN psychosis  haloperidol    Injectable 5 milliGRAM(s) IntraMuscular every 12 hours PRN psychosis  polyethylene glycol 3350 17 Gram(s) Oral daily PRN constipation

## 2021-11-15 NOTE — BH INPATIENT PSYCHIATRY PROGRESS NOTE - NSBHFUPINTERVALHXFT_PSY_A_CORE
Pt compliant with medication and tolerating it well.  Chart reviewed and case discussed with nursing.  No events reported overnight.  Pt calm and cooperative with interview, anxious at times, ruminating about he future.  Pt reports he would like to stay with his girlfriend, and agreed with writer that this is a long term goal. Pt reports, "the voices came back last night, they were telling me when to call Bev because she is sensitive."     Pt agreeable to be discharged to respite, worries about not having his phone on him for the cab ride there upon discharge.  Pt worries about getting his belongings from his residence to bring to respite, reports he may ask his mother to help him.  Pt agrees to make a list of a few belongings to bring to respite from his residence.

## 2021-11-16 LAB
BASOPHILS # BLD AUTO: 0.09 K/UL — SIGNIFICANT CHANGE UP (ref 0–0.2)
BASOPHILS NFR BLD AUTO: 0.6 % — SIGNIFICANT CHANGE UP (ref 0–2)
EOSINOPHIL # BLD AUTO: 0.32 K/UL — SIGNIFICANT CHANGE UP (ref 0–0.5)
EOSINOPHIL NFR BLD AUTO: 2.2 % — SIGNIFICANT CHANGE UP (ref 0–6)
GLUCOSE BLDC GLUCOMTR-MCNC: 160 MG/DL — HIGH (ref 70–99)
GLUCOSE BLDC GLUCOMTR-MCNC: 190 MG/DL — HIGH (ref 70–99)
GLUCOSE BLDC GLUCOMTR-MCNC: 203 MG/DL — HIGH (ref 70–99)
GLUCOSE BLDC GLUCOMTR-MCNC: 208 MG/DL — HIGH (ref 70–99)
HCT VFR BLD CALC: 43 % — SIGNIFICANT CHANGE UP (ref 39–50)
HGB BLD-MCNC: 14 G/DL — SIGNIFICANT CHANGE UP (ref 13–17)
IANC: 10.26 K/UL — HIGH (ref 1.5–8.5)
IMM GRANULOCYTES NFR BLD AUTO: 0.8 % — SIGNIFICANT CHANGE UP (ref 0–1.5)
LYMPHOCYTES # BLD AUTO: 19 % — SIGNIFICANT CHANGE UP (ref 13–44)
LYMPHOCYTES # BLD AUTO: 2.73 K/UL — SIGNIFICANT CHANGE UP (ref 1–3.3)
MCHC RBC-ENTMCNC: 27.9 PG — SIGNIFICANT CHANGE UP (ref 27–34)
MCHC RBC-ENTMCNC: 32.6 GM/DL — SIGNIFICANT CHANGE UP (ref 32–36)
MCV RBC AUTO: 85.8 FL — SIGNIFICANT CHANGE UP (ref 80–100)
MONOCYTES # BLD AUTO: 0.86 K/UL — SIGNIFICANT CHANGE UP (ref 0–0.9)
MONOCYTES NFR BLD AUTO: 6 % — SIGNIFICANT CHANGE UP (ref 2–14)
NEUTROPHILS # BLD AUTO: 10.26 K/UL — HIGH (ref 1.8–7.4)
NEUTROPHILS NFR BLD AUTO: 71.4 % — SIGNIFICANT CHANGE UP (ref 43–77)
NRBC # BLD: 0 /100 WBCS — SIGNIFICANT CHANGE UP
NRBC # FLD: 0 K/UL — SIGNIFICANT CHANGE UP
PLATELET # BLD AUTO: 207 K/UL — SIGNIFICANT CHANGE UP (ref 150–400)
RBC # BLD: 5.01 M/UL — SIGNIFICANT CHANGE UP (ref 4.2–5.8)
RBC # FLD: 15.3 % — HIGH (ref 10.3–14.5)
WBC # BLD: 14.38 K/UL — HIGH (ref 3.8–10.5)
WBC # FLD AUTO: 14.38 K/UL — HIGH (ref 3.8–10.5)

## 2021-11-16 PROCEDURE — 99231 SBSQ HOSP IP/OBS SF/LOW 25: CPT

## 2021-11-16 RX ADMIN — LITHIUM CARBONATE 1350 MILLIGRAM(S): 300 TABLET, EXTENDED RELEASE ORAL at 20:38

## 2021-11-16 RX ADMIN — Medication 1 TABLET(S): at 08:52

## 2021-11-16 RX ADMIN — CLOZAPINE 50 MILLIGRAM(S): 150 TABLET, ORALLY DISINTEGRATING ORAL at 08:52

## 2021-11-16 RX ADMIN — METFORMIN HYDROCHLORIDE 1000 MILLIGRAM(S): 850 TABLET ORAL at 20:34

## 2021-11-16 RX ADMIN — CLOZAPINE 250 MILLIGRAM(S): 150 TABLET, ORALLY DISINTEGRATING ORAL at 20:37

## 2021-11-16 RX ADMIN — ATORVASTATIN CALCIUM 40 MILLIGRAM(S): 80 TABLET, FILM COATED ORAL at 20:37

## 2021-11-16 RX ADMIN — Medication 500 MILLIGRAM(S): at 08:52

## 2021-11-16 RX ADMIN — Medication 2: at 16:49

## 2021-11-16 RX ADMIN — Medication 1: at 08:21

## 2021-11-16 RX ADMIN — Medication 1 TABLET(S): at 20:34

## 2021-11-16 RX ADMIN — SENNA PLUS 2 TABLET(S): 8.6 TABLET ORAL at 20:37

## 2021-11-16 RX ADMIN — Medication 2: at 12:30

## 2021-11-16 NOTE — BH INPATIENT PSYCHIATRY PROGRESS NOTE - NSBHCHARTREVIEWVS_PSY_A_CORE FT
Vital Signs Last 24 Hrs  T(C): 36.6 (11-16-21 @ 07:58), Max: 36.6 (11-16-21 @ 07:58)  T(F): 97.8 (11-16-21 @ 07:58), Max: 97.8 (11-16-21 @ 07:58)  HR: --  BP: --  BP(mean): --  RR: --  SpO2: --    Orthostatic VS  11-16-21 @ 07:58  Lying BP: --/-- HR: --  Sitting BP: 127/78 HR: 94  Standing BP: --/-- HR: --  Site: --  Mode: --  Orthostatic VS  11-15-21 @ 20:09  Lying BP: --/-- HR: --  Sitting BP: 123/84 HR: 97  Standing BP: 125/74 HR: 98  Site: --  Mode: --  Orthostatic VS  11-15-21 @ 08:24  Lying BP: --/-- HR: --  Sitting BP: 109/75 HR: 87  Standing BP: 98/61 HR: 90  Site: --  Mode: --  Orthostatic VS  11-14-21 @ 19:09  Lying BP: --/-- HR: --  Sitting BP: 123/81 HR: 98  Standing BP: 119/78 HR: 105  Site: --  Mode: --

## 2021-11-16 NOTE — BH INPATIENT PSYCHIATRY PROGRESS NOTE - NSBHFUPINTERVALHXFT_PSY_A_CORE
Pt compliant with medication and tolerating it well.  Chart reviewed and case discussed with treatment team.  No events reported overnight.  Pt denies SI/HI/I/P or AH/VH.  Pt reports eating and sleeping well.  Pt continues to perseverate about his future goal of getting a job and spending time with his new girlfriend.  Pt agreed to do phone interview with  today, seemed anxious at times, but overall tolerated it well.   reported at this time no beds for this week, but will keep SW updated.

## 2021-11-16 NOTE — BH INPATIENT PSYCHIATRY PROGRESS NOTE - NSBHMETABOLIC_PSY_ALL_CORE_FT
BMI: BMI (kg/m2): 77.9 (10-25-21 @ 06:54)  HbA1c: A1C with Estimated Average Glucose Result: 7.3 % (08-05-21 @ 10:24)    Glucose: POCT Blood Glucose.: 208 mg/dL (11-16-21 @ 11:59)    BP: --  Lipid Panel: Date/Time: 08-05-21 @ 10:24  Cholesterol, Serum: 139  Direct LDL: --  HDL Cholesterol, Serum: 42  Total Cholesterol/HDL Ration Measurement: --  Triglycerides, Serum: 234

## 2021-11-17 LAB
GLUCOSE BLDC GLUCOMTR-MCNC: 184 MG/DL — HIGH (ref 70–99)
GLUCOSE BLDC GLUCOMTR-MCNC: 193 MG/DL — HIGH (ref 70–99)
GLUCOSE BLDC GLUCOMTR-MCNC: 222 MG/DL — HIGH (ref 70–99)
GLUCOSE BLDC GLUCOMTR-MCNC: 239 MG/DL — HIGH (ref 70–99)

## 2021-11-17 PROCEDURE — 99231 SBSQ HOSP IP/OBS SF/LOW 25: CPT

## 2021-11-17 RX ADMIN — Medication 2: at 12:39

## 2021-11-17 RX ADMIN — CLOZAPINE 250 MILLIGRAM(S): 150 TABLET, ORALLY DISINTEGRATING ORAL at 21:31

## 2021-11-17 RX ADMIN — Medication 2: at 16:42

## 2021-11-17 RX ADMIN — SENNA PLUS 2 TABLET(S): 8.6 TABLET ORAL at 21:31

## 2021-11-17 RX ADMIN — Medication 1: at 08:40

## 2021-11-17 RX ADMIN — Medication 1 TABLET(S): at 21:31

## 2021-11-17 RX ADMIN — METFORMIN HYDROCHLORIDE 1000 MILLIGRAM(S): 850 TABLET ORAL at 21:30

## 2021-11-17 RX ADMIN — Medication 500 MILLIGRAM(S): at 08:45

## 2021-11-17 RX ADMIN — Medication 1 TABLET(S): at 08:45

## 2021-11-17 RX ADMIN — LITHIUM CARBONATE 1350 MILLIGRAM(S): 300 TABLET, EXTENDED RELEASE ORAL at 21:30

## 2021-11-17 RX ADMIN — CLOZAPINE 50 MILLIGRAM(S): 150 TABLET, ORALLY DISINTEGRATING ORAL at 08:45

## 2021-11-17 RX ADMIN — ATORVASTATIN CALCIUM 40 MILLIGRAM(S): 80 TABLET, FILM COATED ORAL at 21:30

## 2021-11-17 NOTE — BH INPATIENT PSYCHIATRY PROGRESS NOTE - NSBHMETABOLIC_PSY_ALL_CORE_FT
BMI: BMI (kg/m2): 77.9 (10-25-21 @ 06:54)  HbA1c: A1C with Estimated Average Glucose Result: 7.3 % (08-05-21 @ 10:24)    Glucose: POCT Blood Glucose.: 222 mg/dL (11-17-21 @ 11:27)    BP: --  Lipid Panel: Date/Time: 08-05-21 @ 10:24  Cholesterol, Serum: 139  Direct LDL: --  HDL Cholesterol, Serum: 42  Total Cholesterol/HDL Ration Measurement: --  Triglycerides, Serum: 234

## 2021-11-17 NOTE — BH INPATIENT PSYCHIATRY PROGRESS NOTE - NSBHFUPINTERVALHXFT_PSY_A_CORE
Pt compliant with medication and tolerating it well.  Chart reviewed and case discussed with treatment team.  No events reported overnight.  Pt remains anxious at times, reports he is worried about how long he can stay at respite.  Pt reassured the  said in the meeting yesterday between 14 to 28 days.  Pt also reassured that his residence worker is working on a residence placement for him and that he will not be homeless.

## 2021-11-17 NOTE — BH INPATIENT PSYCHIATRY PROGRESS NOTE - NSBHCHARTREVIEWVS_PSY_A_CORE FT
Vital Signs Last 24 Hrs  T(C): 36.6 (11-17-21 @ 07:42), Max: 36.6 (11-17-21 @ 07:42)  T(F): 97.8 (11-17-21 @ 07:42), Max: 97.8 (11-17-21 @ 07:42)  HR: --  BP: --  BP(mean): --  RR: --  SpO2: --    Orthostatic VS  11-17-21 @ 07:42  Lying BP: --/-- HR: --  Sitting BP: 120/79 HR: 91  Standing BP: 105/72 HR: 96  Site: --  Mode: --  Orthostatic VS  11-16-21 @ 19:26  Lying BP: --/-- HR: --  Sitting BP: 115/60 HR: 61  Standing BP: 113/72 HR: 80  Site: upper right arm  Mode: electronic  Orthostatic VS  11-16-21 @ 07:58  Lying BP: --/-- HR: --  Sitting BP: 127/78 HR: 94  Standing BP: --/-- HR: --  Site: --  Mode: --  Orthostatic VS  11-15-21 @ 20:09  Lying BP: --/-- HR: --  Sitting BP: 123/84 HR: 97  Standing BP: 125/74 HR: 98  Site: --  Mode: --

## 2021-11-18 LAB
GLUCOSE BLDC GLUCOMTR-MCNC: 173 MG/DL — HIGH (ref 70–99)
GLUCOSE BLDC GLUCOMTR-MCNC: 176 MG/DL — HIGH (ref 70–99)
GLUCOSE BLDC GLUCOMTR-MCNC: 215 MG/DL — HIGH (ref 70–99)
GLUCOSE BLDC GLUCOMTR-MCNC: 256 MG/DL — HIGH (ref 70–99)

## 2021-11-18 PROCEDURE — 99231 SBSQ HOSP IP/OBS SF/LOW 25: CPT

## 2021-11-18 RX ADMIN — ATORVASTATIN CALCIUM 40 MILLIGRAM(S): 80 TABLET, FILM COATED ORAL at 21:47

## 2021-11-18 RX ADMIN — CLOZAPINE 250 MILLIGRAM(S): 150 TABLET, ORALLY DISINTEGRATING ORAL at 21:45

## 2021-11-18 RX ADMIN — CLOZAPINE 50 MILLIGRAM(S): 150 TABLET, ORALLY DISINTEGRATING ORAL at 09:29

## 2021-11-18 RX ADMIN — SENNA PLUS 2 TABLET(S): 8.6 TABLET ORAL at 21:46

## 2021-11-18 RX ADMIN — Medication 3: at 13:00

## 2021-11-18 RX ADMIN — Medication 500 MILLIGRAM(S): at 09:29

## 2021-11-18 RX ADMIN — Medication 1: at 17:17

## 2021-11-18 RX ADMIN — Medication 1 TABLET(S): at 09:29

## 2021-11-18 RX ADMIN — LITHIUM CARBONATE 1350 MILLIGRAM(S): 300 TABLET, EXTENDED RELEASE ORAL at 21:46

## 2021-11-18 RX ADMIN — Medication 1: at 09:05

## 2021-11-18 RX ADMIN — Medication 1 TABLET(S): at 21:46

## 2021-11-18 RX ADMIN — METFORMIN HYDROCHLORIDE 1000 MILLIGRAM(S): 850 TABLET ORAL at 21:46

## 2021-11-18 NOTE — BH TREATMENT PLAN - NSTXIMPULSPROGRES_PSY_ALL_CORE
Improving
No Change
Improving
No Change
Improving
No Change
Met - goal discontinued
No Change
Improving
No Change

## 2021-11-18 NOTE — BH TREATMENT PLAN - NSTXANXDATENEW_PSY_ALL_CORE
02-Sep-2021

## 2021-11-18 NOTE — BH TREATMENT PLAN - NSTXPLANTHERAPYSESSIONSFT_PSY_ALL_CORE
08-26-21  Type of therapy: Other  Type of session: Individual  Level of patient participation: Minimal but improved.   Duration of participation: Less than 15 minutes  Therapy conducted by: Psych rehab  Therapy Summary: Writer met with patient to discuss progress towards psychiatric rehabilitation goals and provide supportive counseling. Patient was apparently unable to give an account of his improvements or needs when writer inquired but he denied thoughts of wanting to harm himself or others; patient did not answer questions about hallucinations. Writer will continuously encourage patient to verbalize needs/concerns to staff appropriately and to otherwise actively participate in his psychiatric treatment to the best of his ability.    08-26-21  Type of therapy: Other  Type of session: Group  Level of patient participation: Please see note  Duration of participation: Please see note  Therapy conducted by: Psych rehab  Therapy Summary: Patient has not attended daily psychiatric rehabilitation groups during the last 7 days and continuous symptom acuity presents a significant barrier to the same. Writer will encourage participation as insight and control improves.    08-28-21  Type of therapy: Other,Physical therapy  Type of session: Individual  Level of patient participation: Resistance to participation  --  Therapy conducted by: Other (specify),Physical therapy  Therapy Summary: Patient decline participation in PT session. PT session to be attempted at another time.    09-01-21  Type of therapy: Other  Type of session: Individual  Level of patient participation: Participates  Duration of participation: Less than 15 minutes  Therapy conducted by: Psych rehab  Therapy Summary: Writer attempted meet with patient to discuss progress towards psychiatric rehabilitation goals and provide supportive counseling. Patient was more receptive to discussion than at previous assessment attempts but it became clear to writer that patient believed that she was a personal acquaintance and she was unable to redirect patient or keep him on the subject of interview. Writer will continuously encourage patient to communicate needs/concerns to staff and to otherwise actively participate in his psychiatric treatment.    09-01-21  Type of therapy: Other  Type of session: Group  Level of patient participation: Please see note  Duration of participation: Please see note  Therapy conducted by: Psych rehab  Therapy Summary: Patient has attended approximately 10% of daily psychiatric rehabilitation groups during the last 7 days. Patient was mostly minimally engaged and preoccupied in environment. Writer will continuously encourage attendance as acuity decreases/insight improves.  
  09-15-21  Type of therapy: Other  Type of session: Individual  Level of patient participation: Please see note  Duration of participation: Please see note  Therapy conducted by: Psych rehab  Therapy Summary: Writer attempted to meet with patient to discuss progress towards psychiatric rehabilitation goals and to provide supportive counseling but she was repeatedly unable to wake him from sleep for interview. Writer will encourage patient to appropriately verbalize needs/concerns to staff and to otherwise actively participate in his psychiatric treatment to the best of his ability.    09-15-21  Type of therapy: Please see note  Type of session: Group  Level of patient participation: Please see note  Duration of participation: Please see note  Therapy conducted by: Psych rehab  Therapy Summary: Patient has not attended daily psychiatric rehabilitation groups during the last 7 days and writer suspects that current acuity would be a significant obstacles to his ability to benefit from or meaningfully contribute to group discussion or activity. Writer will encourage attendance as insight and behavioral control improves.  
  10-19-21  Type of therapy: Other  Type of session: Individual  Level of patient participation: Please see note  Duration of participation: Please see note  Therapy conducted by: Psych rehab  Therapy Summary: Writer attempted to meet with patient to discuss progress towards psychiatric rehabilitation goals and offer supportive counseling. However, patient was irritable and responding to internal stimuli upon approach; acuity remains such that he is unable to meaningfully contribute to psychiatric rehabilitation progress assessment. Writer will continuously encourage patient to verbalize needs/concerns appropriately to staff and to otherwise actively participate in his psychiatric treatment to the best of his ability.    10-19-21  Type of therapy: Other,Recreation  Type of session: Group  Level of patient participation: Please see note  Duration of participation: Please see note  Therapy conducted by: Psych rehab  Therapy Summary: Patient has attended two psychiatric rehabilitation groups during the last 7 days and he has required significant redirection and support from facilitators for disruptive and otherwise inappropriate language/behaviors in group environment. Patient has been consistently unable to tolerate the duration of group activity/discussion. Writer will encourage attendance as symptom acuity decreases/insight and behavioral control increases.  
  11-02-21  Type of therapy: Dialectical behavior therapy,Coping skills,Leisure development,Mindfulness,Music therapy,Peer advocate,Pet therapy,Psychoeducation  Type of session: Individual  Level of patient participation: Engaged  Duration of participation: 15 minutes  Therapy conducted by: Psych rehab  Therapy Summary: Writer met with the Patient to discuss the progress of his Psych Rehab goal. Patient reported that he is in compliance with his medications. Patient appeared to be motivated for treatment. Patient reported benefiting from the medications and reported improvement overall. Patient appeared circumstantial thoughts with his future plans. Writer required redirections to keep the patient to stay Patient was receptive. Over the past 7 days, Patient was very much visible in the unit, attended all groups and had a pleasant interactive attitude with his peers. Patient is engaged in treatment with staff and medication compliant. Patient has good behavior control and ADLs. Patient denied SI, HI, AH, and VH.  
  09-08-21  Type of therapy: Other,Physical therapy  Type of session: Individual  Level of patient participation: Participates  --  Therapy conducted by: Other (specify),Physical therapy  Therapy Summary: Patient participated in education on safety awareness    09-08-21  Type of therapy: Other  Type of session: Individual  Level of patient participation: Participated with encouragement  Duration of participation: Less than 15 minutes  Therapy conducted by: Psych rehab  Therapy Summary: Pt has made some progress towards psychiatric goal this week. Pt has demonstrated daily medication and treatment compliance with good effect. Pt goal was modified to; Pt will benefit from demonstrating related thought for at least 5 min by day seven. Pt was cooperative though minimally able to engage in session. Pt was unable to respond to writers questions without illogical content. Writer was not able to redirect pt at this time. Per staff, pt continues to be symptomatic and irritable at times. Pt remains bizarre and provocative at times. Pt is visible on the unit, interacting with select peers. At this time pt minimally attends group therapy sessions and unit activities- appropriately engaged although requiring redirection at times. Pt ADLs remains poor. Psychiatric Rehabilitation staff recommends that pt continues to explore additional coping skills to better manage symptoms.  
  10-12-21  Type of therapy: Other  Type of session: Individual  Level of patient participation: Please see note  Duration of participation: Please see note  Therapy conducted by: Psych rehab  Therapy Summary: Writer attempted to meet with patient to discuss progress towards psychiatric rehabilitation goals and provide supportive counseling. However, patient was internally preoccupied and pressured with irritable edge and he was ultimately unable to tolerate assessment. Writer will continuously encourage patient to verbalize needs/concerns to staff and to otherwise actively participate in his psychiatric treatment to the best of his ability.    10-12-21  Type of therapy: Other,Recreation  Type of session: Group  Level of patient participation: Please see note  Duration of participation: Please see note  Therapy conducted by: Psych rehab  Therapy Summary: Patient has attended 1 daily psychiatric rehabilitation group during the last 7 days. Patient continuously responded to internal stimuli and was otherwise disruptive in group environment and he was ultimately unable to tolerate session. Writer will encourage attendance as symptoms decrease/insight and behavioral control increases.  
  07-31-21  Type of therapy: Initial rehab assessment.  Type of session: Individual  Level of patient participation: Participated with encouragement  Duration of participation: Less than 15 minutes  Therapy conducted by: Psych rehab  Therapy Summary: Writer met with patient to introduce patient to psych rehab staff and services.  Patient was admitted to [place] [id number] on 07/30/2021 from [facility] with an admitting diagnosis of Schizoaffective Disorder, mixed type.  Patient was brought in by police from [facility]  due to agitation and aggression.  During initial psychiatric rehab interview, patient presented with a depressed mood and constricted affect. Patient reported he was brought in due to "verbal abuse" however did not elaborate to writer.  Patient has previous history of inpatient psychiatric admissions, last at [facility] in April 2020.  Patient has no known history of suicide attempts, no known history of violence or arrests and no active substance use.  Patient reported he is compliant with medication and treatment.  As per patient's ED chart, patient has past behavioral of exposing himself , last time 3 months ago and claims they will not take him back without a psych eval.  Patient reportedly has an ACT team.  Patient denied SI/HI as well as AH/VH to writer.  As per patient's chart, patient endorsed auditory hallucinations.     Psych rehab staff recommends patient maintain behavioral control as well as attend daily psych rehab groups for improved symptom management within seven days.  
  10-06-21  Type of therapy: Other  Type of session: Individual  Level of patient participation: Please see note  Duration of participation: Less than 15 minutes  Therapy conducted by: Psych rehab  Therapy Summary: Writer attempted to meet with patient to discuss progress towards psychiatric rehabilitation goals and to provide supportive counseling. While patient was initially receptive, he remains labile, and prominently disorganized. Patient was ultimately unable to tolerate assessment interview. Writer will continuously encourage patient to verbalize needs/concerns to staff appropriately and to otherwise actively participate in his psychiatric treatment to the best of his ability.    10-06-21  Type of therapy: Coping skills,Creative arts therapy,Music therapy,Other  Type of session: Group  Level of patient participation: Please see note  Duration of participation: Please see note  Therapy conducted by: Psych rehab  Therapy Summary: Patient has attended approximately 20% of daily psychiatric rehabilitation groups during the last 7 days. Patient has been significantly internally preoccupied/responding to internal stimuli in group environment, he has often required redirection for disruption, and has been consistently unable to tolerate duration of group discussion/activity. Writer will encourage attendance as insight improves/symptom acuity decreases.  
  08-11-21  Type of therapy: Other  Type of session: Individual  Level of patient participation: Please see note  Duration of participation: Please see note  Therapy conducted by: Psych rehab  Therapy Summary: Writer attempted to meet with patient to discuss progress toward psychiatric rehabilitation goals and to provide supportive counseling. Writer found patient abed at both attempts and while he was awake and loudly responding to internal stimuli at times, he refused to respond to writer’s questions. As patient was unable to meaningfully participate in assessment, writer was unable to assess for suicidal ideation, homicidal ideation, depth of psychosis, recovery goals, or discharge plans. Writer will continuously encourage patient to communicate needs/concerns to staff in appropriate manner and to otherwise participate in his psychiatric treatment to the best of his ability.    08-11-21  Type of therapy: Coping skills,Leisure development,Symptom management  Type of session: Group  Level of patient participation: Please see note  Duration of participation: Please see note  Therapy conducted by: Psych rehab  Therapy Summary: Patient has attended approximately 35% of daily psychiatric rehabilitation groups and he has required significant redirection for disruptive behavior and language. Writer will continuously encourage patient to appropriately participate in group sessions.  
  09-22-21  Type of therapy: Other  Type of session: Individual  Level of patient participation: Please see note  Duration of participation: Please see note  Therapy conducted by: Psych rehab  Therapy Summary: Writer attempted to meet with patient to discuss progress towards psychiatric rehabilitation goals and provide supportive counseling. However, writer found patient abed, holding up his middle finger in response to writer’s request to meet with him. Writer will encourage patient to express needs/concerns to staff appropriately and to otherwise participate in his psychiatric treatment to the best of his ability.    09-22-21  Type of therapy: Coping skills,Creative arts therapy,Music therapy  Type of session: Group  Level of patient participation: Please see note  Duration of participation: Please see note  Therapy conducted by: Psych rehab  Therapy Summary: Patient has attended approximately 10% of daily psychiatric rehabilitation groups during the last 7 days with minimal participation upon prompt and redirection for continuous response to internal stimuli during activity/discussion. Writer will continue to encourage attendance as times insight improves/acuity decreases.  
  10-27-21  Type of therapy: Other  Type of session: Individual  Level of patient participation: Participated with encouragement  Duration of participation: Less than 15 minutes  Therapy conducted by: Psych rehab  Therapy Summary: Writer met with patient to discuss progress towards psychiatric rehabilitation goals and provide supportive counseling. Patient was calmer and less elevated than during previous attempts to assess him and though he remains hyperverbal, he responded to writer’s redirection more often than during previous interactions. Patient reported delusional beliefs regarding the death of his girlfriend and living in outer space and he exhibited irritable edge at times. Patient denied suicidal ideation or homicidal ideation during session with writer and internal preoccupation/responses were less disruptive during this interaction.    10-27-21  Type of therapy: Creative arts therapy,Music therapy,Psychoeducation  Type of session: Group  Level of patient participation: Please see note  Duration of participation: Please see note  Therapy conducted by: Psych rehab  Therapy Summary: Patient has briefly attended approximately 25% of daily psychiatric rehabilitation groups during the last 7 days. Writer will encourage group attendance as symptom acuity decreases/insight and behavioral control increases.  
  11-09-21  Type of therapy: Coping skills,Leisure development,Mindfulness,Music therapy,Psychoeducation  Type of session: Individual  Level of patient participation: Participates  Duration of participation: 30 minutes  Therapy conducted by: Psych rehab  Therapy Summary: Writer met with the Pt to discuss the progress of his Psych Rehab goal.  Patient was receptive.  Patient was in his room sitting on his bed in a hospital gown and a pair of pants. His pants were folded up to his knees.  Patient appeared extremely disorganized, unkempt and paranoid. Patient appeared to have tangential thoughts throughout the session. Writer required verbal redirections to keep the Patient stay on one topic. Patient was receptive. Pt reported that he is in compliance with medications. Pt expressed his dissatisfaction regarding the side effects of his medications such as loss of coordination. Patient shown improvements and behavior control but poor ADLs. Patient continues to have poor insight. Patient got emotional while talking about his spirited girl friend who he won’t be able to see once he is out of the hospital. Patient denied AH ,HI and SI.   Over the past 7 days, Patient was visible in the unit. Patient has been able to tolerate some group discussion/activity such music and mindfulness but progress in this area has been inconsistent. Patient has limited capacity to interact/socialize appropriately with peers due to acuity and he remains an intermittent/unpredictable behavioral issue on the unit.  
  09-28-21  Type of therapy: Other  Type of session: Individual  Level of patient participation: Please see note  Duration of participation: Please see note  Therapy conducted by: Psych rehab  Therapy Summary: Writer attempted to meet with patient to discuss progress towards psychiatric rehabilitation goals and provide individual supportive counseling. However, patient responding to internal stimuli, was observed labile with nearly hostile edge, refusing to make eye contact with writer. Patient was ultimately unable to tolerate interview and writer had to terminate session. Writer will encourage patient to communicate needs/concerns to staff appropriately and to otherwise actively participate in his psychiatric treatment to the best of his ability.    09-28-21  Type of therapy: Psychoeducation,Symptom management,Other  Type of session: Group  Level of patient participation: Please see note  Duration of participation: Please see note  Therapy conducted by: Psych rehab  Therapy Summary: Patient has minimally attended 3 psychiatric rehabilitation groups during the last 7 days and was often unable to meaningfully engage in group discussion/activity. Writer will continue to encourage attendance as symptoms decrease/insight and behavioral control increases.  
  08-19-21  Type of therapy: Patient has not attended daily psycho ed groups.  Type of session: Individual  Level of patient participation: Disruptive  Duration of participation: Less than 15 minutes  Therapy conducted by: Psych rehab  Therapy Summary: Writer attempted to meet with patient for individual weekly therapy however patient appeared too symptomatic and internally preoccupied to participate in assessment.  Patient has remained on constant observation during the past seven days due to disorganization and inapprorpiate behavior.  Patient continues to be internally preoccupied and paranoid towards staff and peers.  Patient is observed with poor ADLs and appearence and is observed disheveled and malodrous.  During the past seven days patient has not attended daily psycho ed groups.    Psych rehab staff will continue to engage patient daily to improve theraputic rapport and assist patient in psych rehab goals pertaining to demonstrating improved behavioral control as well as maintaining medication and treatment compliance for improved symptom managment within seven days.  
  11-12-21  Type of therapy: Other,Physical therapy  Type of session: Individual  Level of patient participation: Participates  --  Therapy conducted by: Other (specify),Physical therapy  Therapy Summary: Patient participated in gait training    11-17-21  Type of therapy: Other  Type of session: Individual  Level of patient participation: Attentive,Engaged,Participates  Duration of participation: 15 minutes  Therapy conducted by: Psych rehab  Therapy Summary: Patient was able to identify improvements in mood and motivation resulting from medication compliance and he was able to articulate emotions regarding uncertainty about life following discharge. Patient agreed to maintain healthy habits including interacting appropriately with staff, medication compliance, and group attendance. Patient reported that staff support has been sufficient, and writer encouraged him to maintain appropriate communication with staff. Patient has been encouraged to otherwise actively participate in his psychiatric rehabilitation to the best of his ability.    11-17-21  Type of therapy: Coping skills,Music therapy,Other  Type of session: Group  Level of patient participation: Engaged,Participates  Duration of participation: 45 minutes  Therapy conducted by: Psych rehab  Therapy Summary: Patient has attended approximately 15% of daily psychiatric rehabilitation groups during the last 7 days and he has reportedly been behaviorally appropriate in sessions. Patient has agreed to attend at least one daily psychiatric rehabilitation group per day, going forward.    11-18-21  Type of therapy: Other,Physical therapy  Type of session: Individual  Level of patient participation: Participates  --  Therapy conducted by: Other (specify),Physical therapy  Therapy Summary: Patient enagged in ambulation using RW.

## 2021-11-18 NOTE — BH TREATMENT PLAN - NSTXDIABDATEEST_PSY_ALL_CORE
30-Jul-2021
06-Oct-2021
10-Nov-2021
19-Aug-2021
13-Oct-2021
20-Oct-2021
20-Oct-2021
08-Sep-2021
20-Oct-2021
15-Sep-2021
22-Sep-2021
17-Nov-2021
20-Oct-2021
29-Sep-2021

## 2021-11-18 NOTE — BH TREATMENT PLAN - NSTXVIOLNTDATETRGT_PSY_ALL_CORE
29-Sep-2021
17-Sep-2021
05-Oct-2021
13-Oct-2021
27-Oct-2021
02-Sep-2021
24-Nov-2021
27-Oct-2021
27-Oct-2021
22-Sep-2021
17-Sep-2021
27-Oct-2021
27-Oct-2021
20-Oct-2021

## 2021-11-18 NOTE — BH TREATMENT PLAN - NSTXANXPROGRES_PSY_ALL_CORE
Improving
Met - goal discontinued
Improving
Improving
No Change
Met - goal discontinued
Improving
No Change
Met - goal discontinued
Improving
Met - goal discontinued
No Change
Improving
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
## 2021-11-18 NOTE — BH TREATMENT PLAN - NSTXANXINTERMD_PSY_ALL_CORE
psychopharm and supportive therapy and goal of ROOT
psychopharm and supportive therapy and goal of ROOT
psycho pharm and supportive therapy and goal of ROOT
psychopharm and supportive therapy and goal of ROOT
psychopharm and supportive therapy and goal of ROOT
psycho pharm and supportive therapy and goal of ROOT
psychopharm and supportive therapy and goal of ROOT
psycho pharm and supportive therapy and goal of ROOT
psychopharm and supportive therapy and goal of ROOT
psycho pharm and supportive therapy and goal of ROOT
psychopharm and supportive therapy and goal of ROOT
psycho pharm and supportive therapy and goal of ROOT
psychopharm and supportive therapy and goal of ROOT

## 2021-11-18 NOTE — BH TREATMENT PLAN - NSTXPLANTYPE_PSY_ALL_CORE
Ongoing
Ongoing
Initial
Ongoing

## 2021-11-18 NOTE — BH TREATMENT PLAN - NSTXDCOPNOINTERSW_PSY_ALL_CORE
PT will comply with recommended medication while on the unit and once discharged.
Patient will agreed to recommended amount of medication and continuation once discharged.
Patient will verbalize the need to take his prescribed amount of medication once discharged..
Patient will verbalize the need for medication and follow-up treatment
ALINA participated in interdisciplinary treatment team meeting to coordinate patient care and discharge plans.    SW will provide support, insight, discharge planning, psycho-education, and maintain contact with identified supports.      ALINA informed pt's ACT team of the possibility of court discharge with court date tomorrow.
Patient will verbalize the need for medication and follow-up treatment once discharged
PT is now verbalizing the need for his current medication regiment and the need to continue once discharged.
PT will be compliant with all medications and verbalize the need for medications once discharged.
PT will take all reommended medication and verbalize the benefits to maintain compliant once discharged.
Patient should be going to court tomorrow for MOO.
Patient will be able to verbalize the benefits of medication and follow-up treatment once discharged.
SW meets with patient to provide support, psycho-education and discharge planning while maintaining contacnt with collateral supports as needed.
Patient will verbalize the need for medication and continued treatment once discharged.
SW encourages PT to be compliant with recommended medication regiment.
Patient will take medication as prescribed.
ALINA participated in interdisciplinary treatment team meeting to coordinate patient care and discharge plans.    SW will provide support, insight, discharge planning, psycho-education, and maintain contact with identified supports.      ALINA informed pt's ACT team of the possibility of court discharge with court date tomorrow.
PT will Continue to take recommended amount of medication and to continue to be compliant with this regiment once discharged.

## 2021-11-18 NOTE — BH TREATMENT PLAN - NSTXCONDUCINTERMD_PSY_ALL_CORE
psychopharm and supportive therapy and goal of ROOT
psychopharm and supportive therapy and goal of ROOT
psycho pharm and supportive therapy and goal of ROOT
psychopharm and supportive therapy and goal of ROOT
psychopharm and supportive therapy and goal of ROOT
psycho pharm and supportive therapy and goal of ROOT
psychopharm and supportive therapy and goal of ROOT
psycho pharm and supportive therapy and goal of ROOT
psychopharm and supportive therapy and goal of ROOT
psycho pharm and supportive therapy and goal of ROOT
psycho pharm and supportive therapy and goal of ROOT

## 2021-11-18 NOTE — BH TREATMENT PLAN - NSTXCONDUCDATEEST_PSY_ALL_CORE
06-Oct-2021
29-Sep-2021
17-Nov-2021
20-Oct-2021
15-Sep-2021
20-Oct-2021
26-Aug-2021
20-Oct-2021
08-Sep-2021
30-Jul-2021
06-Oct-2021
20-Oct-2021
15-Sep-2021
20-Oct-2021

## 2021-11-18 NOTE — BH TREATMENT PLAN - NSTXIMPULSGOAL_PSY_ALL_CORE
Will be able to demonstrate the ability to pause before acting out negatively
Will be able to recognize 2+ triggers
Will be able to demonstrate the ability to pause before acting out negatively
Will be able to recognize 2+ triggers
Will be able to recognize 2+ triggers
Will be able to demonstrate the ability to pause before acting out negatively
Will be able to demonstrate the ability to pause before acting out negatively
Will be able to recognize 2+ triggers
Will be able to describe/demonstrate alternative ways of managing his/her emotional state on the unit
Will be able to recognize 2+ triggers
Will be able to demonstrate the ability to pause before acting out negatively

## 2021-11-18 NOTE — BH TREATMENT PLAN - NSTXMEDICPROGRES_PSY_ALL_CORE
Improving
Improving
Met - goal discontinued
No Change
Improving
No Change
Improving
Worsening
Improving

## 2021-11-18 NOTE — BH TREATMENT PLAN - NSTXPATIENTPARTICIPATE_PSY_ALL_CORE
No, patient unwilling to participate
Patient participated in defining interventions
No, patient unwilling to participate
Patient participated in defining interventions
Patient participated in identification of needs/problems/goals for treatment
Patient participated in defining interventions
No, patient unwilling to participate
Patient participated in defining interventions
No, patient unwilling to participate
Patient participated in identification of needs/problems/goals for treatment/Patient participated in defining interventions
Patient participated in defining interventions
Patient participated in defining interventions
Patient participated in defining interventions/Patient participated in development of after care plan
Patient participated in defining interventions
Patient participated in defining interventions/Patient participated in development of after care plan

## 2021-11-18 NOTE — BH TREATMENT PLAN - NSTXDIABDATETRGT_PSY_ALL_CORE
05-Oct-2021
27-Oct-2021
17-Nov-2021
20-Oct-2021
26-Aug-2021
13-Oct-2021
15-Sep-2021
24-Nov-2021
29-Sep-2021
27-Oct-2021
17-Sep-2021
22-Sep-2021
27-Oct-2021
27-Oct-2021

## 2021-11-18 NOTE — BH TREATMENT PLAN - NSTXPSYCHODATEEST_PSY_ALL_CORE
22-Sep-2021
20-Oct-2021
06-Oct-2021
29-Sep-2021
08-Sep-2021
15-Sep-2021
20-Oct-2021
10-Nov-2021
20-Oct-2021
26-Aug-2021
13-Oct-2021
20-Oct-2021
30-Jul-2021
17-Nov-2021

## 2021-11-18 NOTE — BH TREATMENT PLAN - NSTXVIOLNTPROGRES_PSY_ALL_CORE
Improving
No Change
Improving
Improving

## 2021-11-18 NOTE — BH TREATMENT PLAN - NSTXVIOLNTGOAL_PSY_ALL_CORE
Will be able to express understanding of at least one trigger to their aggressive behavior
Will be able to express understanding of at least one trigger to their aggressive behavior
Will decrease the number/duration/intensity of angry/aggressive outbursts
Will demonstrate 2 problem solving strategies to decrease aggressive behavior
Will be able to express understanding of at least one trigger to their aggressive behavior
Will demonstrate ability to tolerate peer conflicts without aggression 3x weekly
Will be able to express understanding of at least one trigger to their aggressive behavior
Will be able to express understanding of at least one trigger to their aggressive behavior
Will decrease the number/duration/intensity of angry/aggressive outbursts
Will demonstrate ability to tolerate peer conflicts without aggression 3x weekly
Will be able to express understanding of at least one trigger to their aggressive behavior

## 2021-11-18 NOTE — BH TREATMENT PLAN - NSTXPSYCHOPROGRES_PSY_ALL_CORE
Improving
No Change
No Change
Improving
Improving
No Change
Improving
Improving
No Change
No Change
Improving

## 2021-11-18 NOTE — BH TREATMENT PLAN - NSTXPROBVIOLNT_PSY_ALL_CORE
VIOLENT/AGGRESSIVE BEHAVIOR

## 2021-11-18 NOTE — BH TREATMENT PLAN - NSTXMEDICDATEEST_PSY_ALL_CORE
10-Nov-2021
31-Jul-2021
31-Jul-2021
26-Aug-2021
01-Aug-2021
11-Aug-2021
20-Oct-2021
11-Aug-2021
31-Jul-2021
20-Oct-2021
01-Aug-2021
08-Sep-2021
13-Oct-2021
31-Jul-2021

## 2021-11-18 NOTE — BH TREATMENT PLAN - NSTXMEDICINTERMD_PSY_ALL_CORE
psychoeducation 

## 2021-11-18 NOTE — BH TREATMENT PLAN - NSCMSPTSTRENGTHS_PSY_ALL_CORE
Strong support system
Supportive family
Supportive family
Assertive/Unable to obtain (specify)
Supportive family
Supportive family
Expressive of emotions/Intact family/Interpersonal skills/Sense of Humor/Supportive family
Strong support system/Supportive family
Expressive of emotions/Intact family/Interpersonal skills/Sense of Humor/Supportive family
Expressive of emotions/Future/goal oriented/Leisure interest/Strong support system/Supportive family
Strong support system
Supportive family
Strong support system/Supportive family

## 2021-11-18 NOTE — BH INPATIENT PSYCHIATRY PROGRESS NOTE - NSBHMETABOLIC_PSY_ALL_CORE_FT
BMI: BMI (kg/m2): 77.9 (10-25-21 @ 06:54)  HbA1c: A1C with Estimated Average Glucose Result: 7.3 % (08-05-21 @ 10:24)    Glucose: POCT Blood Glucose.: 256 mg/dL (11-18-21 @ 12:01)    BP: --  Lipid Panel: Date/Time: 08-05-21 @ 10:24  Cholesterol, Serum: 139  Direct LDL: --  HDL Cholesterol, Serum: 42  Total Cholesterol/HDL Ration Measurement: --  Triglycerides, Serum: 234

## 2021-11-18 NOTE — BH TREATMENT PLAN - NSTXMEDICDATETRGT_PSY_ALL_CORE
08-Sep-2021
13-Oct-2021
09-Nov-2021
26-Aug-2021
20-Oct-2021
05-Oct-2021
22-Sep-2021
08-Sep-2021
19-Aug-2021
17-Nov-2021
02-Sep-2021
17-Nov-2021
29-Sep-2021
02-Nov-2021
27-Oct-2021
27-Oct-2021

## 2021-11-18 NOTE — BH TREATMENT PLAN - NSTXCONDUCGOAL_PSY_ALL_CORE
Will develop more adaptive coping strategies to manage stress
Will comply with unit rules
Will develop more adaptive coping strategies to manage stress
Will comply with unit rules
Will develop more adaptive coping strategies to manage stress
Will comply with unit rules
Will develop more adaptive coping strategies to manage stress

## 2021-11-18 NOTE — BH TREATMENT PLAN - NSTXDCOPNOINTERMD_PSY_ALL_CORE
psychopharm and supportive therapy and goal of ROOT
psychopharm and supportive therapy and goal of ROOT
psychopharm and supportive therapy and goal of OROT
psychopharm and supportive therapy and goal of ROOT

## 2021-11-18 NOTE — BH TREATMENT PLAN - NSTXDCOPNODATETRGT_PSY_ALL_CORE
04-Oct-2021
04-Oct-2021
18-Nov-2021
19-Oct-2021
25-Oct-2021
09-Aug-2021
09-Aug-2021
10-Sep-2021
25-Oct-2021
17-Sep-2021
09-Aug-2021
23-Nov-2021
24-Nov-2021
29-Sep-2021
17-Nov-2021
27-Sep-2021
30-Aug-2021

## 2021-11-18 NOTE — BH TREATMENT PLAN - NSTXDCOPNOGOAL_PSY_ALL_CORE
Will agree to participate in appropriate outpatient care

## 2021-11-18 NOTE — BH INPATIENT PSYCHIATRY PROGRESS NOTE - NSBHCHARTREVIEWVS_PSY_A_CORE FT
Vital Signs Last 24 Hrs  T(C): 36.5 (11-18-21 @ 07:51), Max: 36.5 (11-18-21 @ 07:51)  T(F): 97.7 (11-18-21 @ 07:51), Max: 97.7 (11-18-21 @ 07:51)  HR: --  BP: --  BP(mean): --  RR: --  SpO2: --    Orthostatic VS  11-18-21 @ 07:51  Lying BP: --/-- HR: --  Sitting BP: 110/72 HR: 89  Standing BP: 112/66 HR: 87  Site: --  Mode: --  Orthostatic VS  11-17-21 @ 19:38  Lying BP: --/-- HR: --  Sitting BP: 117/71 HR: 82  Standing BP: 120/80 HR: 84  Site: --  Mode: --  Orthostatic VS  11-17-21 @ 07:42  Lying BP: --/-- HR: --  Sitting BP: 120/79 HR: 91  Standing BP: 105/72 HR: 96  Site: --  Mode: --  Orthostatic VS  11-16-21 @ 19:26  Lying BP: --/-- HR: --  Sitting BP: 115/60 HR: 61  Standing BP: 113/72 HR: 80  Site: upper right arm  Mode: electronic

## 2021-11-18 NOTE — BH TREATMENT PLAN - NSTXDIABPROGRES_PSY_ALL_CORE
Improving
No Change
Improving
No Change
No Change
Improving
No Change
Improving
No Change

## 2021-11-18 NOTE — BH TREATMENT PLAN - NSTXIMPULSDATENEW_PSY_ALL_CORE
31-Jul-2021

## 2021-11-18 NOTE — BH TREATMENT PLAN - NSTXANXDATETRGT_PSY_ALL_CORE
05-Oct-2021
15-Sep-2021
18-Aug-2021
27-Oct-2021
22-Sep-2021
27-Oct-2021
27-Oct-2021
22-Sep-2021
24-Nov-2021
17-Sep-2021
27-Oct-2021
13-Oct-2021
18-Aug-2021
02-Sep-2021
27-Oct-2021
20-Oct-2021

## 2021-11-18 NOTE — BH TREATMENT PLAN - NSDCCRITERIA_PSY_ALL_CORE
symptom management, safety planning, care coordination
Deferred
symptom management, safety planning, care coordination

## 2021-11-18 NOTE — BH TREATMENT PLAN - NSTXDIABINTERMD_PSY_ALL_CORE
FS TID and at bedtime with Sliding scale Admelog + Metformin + Januvia

## 2021-11-18 NOTE — BH TREATMENT PLAN - NSTXCONDUCDATENEW_PSY_ALL_CORE
26-Aug-2021

## 2021-11-18 NOTE — BH TREATMENT PLAN - NSTXPROBANX_PSY_ALL_CORE
ANXIETY/PANIC/FEAR

## 2021-11-18 NOTE — BH TREATMENT PLAN - NSTXPSYCHODATETRGT_PSY_ALL_CORE
27-Oct-2021
15-Sep-2021
02-Sep-2021
17-Sep-2021
29-Sep-2021
24-Nov-2021
27-Oct-2021
20-Oct-2021
13-Oct-2021
27-Oct-2021
17-Nov-2021
05-Oct-2021
22-Sep-2021
27-Oct-2021

## 2021-11-18 NOTE — BH TREATMENT PLAN - NSTXCOPEINTERPR_PSY_ALL_CORE
Psychiatric rehabilitation staff will provide individual motivational interviewing/supportive counseling sessions and group therapy sessions to assist patient in demonstrating daily treatment compliance and identifying 2 effective coping methods to meet her needs over the next 7 days for improved symptom management and insight.

## 2021-11-18 NOTE — BH TREATMENT PLAN - NSTXDCOPNOPROGRES_PSY_ALL_CORE
No Change
Improving
No Change
Improving
No Change
Improving
Improving

## 2021-11-18 NOTE — BH TREATMENT PLAN - NSBHPRIMARYDX_PSY_ALL_CORE
Schizoaffective disorder    

## 2021-11-18 NOTE — BH INPATIENT PSYCHIATRY PROGRESS NOTE - NSBHFUPINTERVALHXFT_PSY_A_CORE
Pt compliant with medication and tolerating it well.  Chart reviewed and case discussed with treatment team.  No events reported overnight.  Pt initially grabs writer to ask if his girlfriend could visit and talks about how she lives one hour and twenty minutes from the respite he is going to.  Pt advised not to drive long distances upon discharge and patient agreeable.  Pt cooperative with interview, anxious at times, but less so than previous days, makes jokes at times.  Pt reports he spoke to his residence worker and respite is able to accept him on 11/29 and he is happy about that. He is also happy he spoke to his residence worker and feels she is supportive.

## 2021-11-18 NOTE — BH TREATMENT PLAN - NSTXPROBDIAB_PSY_ALL_CORE
DIABETES MANAGEMENT

## 2021-11-18 NOTE — BH TREATMENT PLAN - NSTXVIOLNTINTERMD_PSY_ALL_CORE
psychopharm and supportive therapy and goal of ROOT

## 2021-11-18 NOTE — BH TREATMENT PLAN - NSTXDIABGOAL_PSY_ALL_CORE
Will be able to able to describe prescribed diabetic medications and how to properly take these medications
Will identify modifiable risk factors and strategies to counteract them
Will be able to able to describe prescribed diabetic medications and how to properly take these medications

## 2021-11-18 NOTE — BH TREATMENT PLAN - NSTXIMPULSDATETRGT_PSY_ALL_CORE
27-Oct-2021
06-Aug-2021
20-Oct-2021
05-Oct-2021
15-Sep-2021
27-Oct-2021
27-Oct-2021
02-Sep-2021
27-Oct-2021
12-Aug-2021
27-Oct-2021
12-Aug-2021
22-Sep-2021
17-Sep-2021
24-Nov-2021
13-Oct-2021
22-Sep-2021

## 2021-11-18 NOTE — BH TREATMENT PLAN - NSTXMEDICGOAL_PSY_ALL_CORE
Take all medications as prescribed
Be able to describe the benefit of medication/treatment
Take all medications as prescribed
Be able to describe the benefit of medication/treatment
Take all medications as prescribed
Be able to describe the benefit of medication/treatment
Be able to describe the benefit of medication/treatment
Take all medications as prescribed
Be able to describe the benefit of medication/treatment
Take all medications as prescribed
Take all medications as prescribed

## 2021-11-18 NOTE — BH TREATMENT PLAN - NSTXCONDUCPROGRES_PSY_ALL_CORE
Improving
No Change
Improving
Met - goal discontinued
Met - goal discontinued
Improving
Improving
No Change
No Change
Met - goal discontinued
No Change
No Change
Improving
Improving

## 2021-11-18 NOTE — BH TREATMENT PLAN - NSTXDCOPNODATEEST_PSY_ALL_CORE
02-Aug-2021
02-Aug-2021
12-Oct-2021
02-Aug-2021
13-Sep-2021
18-Oct-2021
03-Sep-2021
18-Oct-2021
09-Nov-2021
02-Nov-2021
27-Sep-2021
22-Sep-2021
17-Nov-2021
30-Jul-2021
27-Oct-2021
23-Aug-2021
27-Sep-2021

## 2021-11-18 NOTE — BH TREATMENT PLAN - NSTXVIOLNTDATEEST_PSY_ALL_CORE
22-Sep-2021
20-Oct-2021
30-Jul-2021
20-Oct-2021
15-Sep-2021
06-Oct-2021
20-Oct-2021
26-Aug-2021
30-Jul-2021
17-Nov-2021
29-Sep-2021
13-Oct-2021

## 2021-11-18 NOTE — BH TREATMENT PLAN - NSTXANXDATEEST_PSY_ALL_CORE
13-Oct-2021
15-Sep-2021
20-Oct-2021
08-Sep-2021
15-Sep-2021
26-Aug-2021
20-Oct-2021
17-Nov-2021
20-Oct-2021
11-Aug-2021
11-Aug-2021
20-Oct-2021
29-Sep-2021
30-Jul-2021
06-Oct-2021
20-Oct-2021

## 2021-11-18 NOTE — BH TREATMENT PLAN - NSTXANXGOAL_PSY_ALL_CORE
Be able to perform ADLs and maintain safety despite anxiety/panic daily
Be able to perform ADLs and maintain safety despite anxiety/panic daily
Report a reduction in panic attacks and improving mood and confidence
Be able to perform ADLs and maintain safety despite anxiety/panic daily
Identify and practice 3 coping skills to manage anxiety
Be able to perform ADLs and maintain safety despite anxiety/panic daily
Identify and practice 3 coping skills to manage anxiety
Be able to perform ADLs and maintain safety despite anxiety/panic daily
Be able to participate in activities despite lingering anxiety/panic
Be able to perform ADLs and maintain safety despite anxiety/panic daily
Report a reduction in panic attacks and improving mood and confidence
Be able to perform ADLs and maintain safety despite anxiety/panic daily
Be able to participate in activities despite lingering anxiety/panic

## 2021-11-18 NOTE — BH TREATMENT PLAN - NSTXIMPULSDATEEST_PSY_ALL_CORE
20-Oct-2021
20-Oct-2021
15-Sep-2021
13-Oct-2021
31-Jul-2021
31-Jul-2021
29-Sep-2021
20-Oct-2021
15-Sep-2021
06-Oct-2021
31-Jul-2021
08-Sep-2021
17-Nov-2021
20-Oct-2021
30-Jul-2021
26-Aug-2021
20-Oct-2021

## 2021-11-18 NOTE — BH TREATMENT PLAN - NSTXPATIENTAGREEMENT_PSY_ALL_CORE
Yes
Yes
Patient unable to participate
Yes
Patient unable to participate
Patient unable to participate
Yes
Patient unable to participate
Yes
Patient unable to participate
Yes
Patient unable to participate
Yes

## 2021-11-18 NOTE — BH TREATMENT PLAN - NSTXPSYCHOGOAL_PSY_ALL_CORE
Will be able to report experiencing hallucinations to staff
Will identify 2 coping skills that assist with focus on reality
Will report using relaxation skills 3 times a day to reduce anxiety about delusions/hallucinations
Will identify 2 coping skills that help mitigate hallucinations
Will be able to report experiencing hallucinations to staff
Will identify 1 trigger/stressor that exacerbates hallucinations
Will be able to report experiencing hallucinations to staff
Will be able to report experiencing hallucinations to staff
State that he/she is only about 50% sure of the certainty of the delusions instead of 100% certain
Will identify 1 trigger/stressor that exacerbates hallucinations
Will be able to report experiencing hallucinations to staff
Other...
Will be able to report experiencing hallucinations to staff

## 2021-11-19 LAB
GLUCOSE BLDC GLUCOMTR-MCNC: 185 MG/DL — HIGH (ref 70–99)
GLUCOSE BLDC GLUCOMTR-MCNC: 189 MG/DL — HIGH (ref 70–99)
GLUCOSE BLDC GLUCOMTR-MCNC: 191 MG/DL — HIGH (ref 70–99)
GLUCOSE BLDC GLUCOMTR-MCNC: 239 MG/DL — HIGH (ref 70–99)

## 2021-11-19 PROCEDURE — 99232 SBSQ HOSP IP/OBS MODERATE 35: CPT

## 2021-11-19 RX ADMIN — Medication 1 TABLET(S): at 09:20

## 2021-11-19 RX ADMIN — LITHIUM CARBONATE 1350 MILLIGRAM(S): 300 TABLET, EXTENDED RELEASE ORAL at 21:00

## 2021-11-19 RX ADMIN — METFORMIN HYDROCHLORIDE 1000 MILLIGRAM(S): 850 TABLET ORAL at 21:00

## 2021-11-19 RX ADMIN — Medication 1: at 13:08

## 2021-11-19 RX ADMIN — CLOZAPINE 250 MILLIGRAM(S): 150 TABLET, ORALLY DISINTEGRATING ORAL at 20:59

## 2021-11-19 RX ADMIN — Medication 1: at 09:07

## 2021-11-19 RX ADMIN — SENNA PLUS 2 TABLET(S): 8.6 TABLET ORAL at 21:00

## 2021-11-19 RX ADMIN — CLOZAPINE 50 MILLIGRAM(S): 150 TABLET, ORALLY DISINTEGRATING ORAL at 09:19

## 2021-11-19 RX ADMIN — Medication 1 TABLET(S): at 20:59

## 2021-11-19 RX ADMIN — Medication 1: at 17:39

## 2021-11-19 RX ADMIN — ATORVASTATIN CALCIUM 40 MILLIGRAM(S): 80 TABLET, FILM COATED ORAL at 21:00

## 2021-11-19 RX ADMIN — Medication 500 MILLIGRAM(S): at 09:20

## 2021-11-19 NOTE — BH INPATIENT PSYCHIATRY PROGRESS NOTE - NSBHCHARTREVIEWVS_PSY_A_CORE FT
Vital Signs Last 24 Hrs  T(C): 36.4 (11-19-21 @ 08:06), Max: 36.8 (11-18-21 @ 19:17)  T(F): 97.6 (11-19-21 @ 08:06), Max: 98.2 (11-18-21 @ 19:17)  HR: --  BP: --  BP(mean): --  RR: 18 (11-19-21 @ 08:06) (18 - 18)  SpO2: --    Orthostatic VS  11-19-21 @ 08:06  Lying BP: --/-- HR: --  Sitting BP: 117/77 HR: 92  Standing BP: 124/74 HR: 94  Site: --  Mode: --  Orthostatic VS  11-18-21 @ 19:17  Lying BP: --/-- HR: --  Sitting BP: 131/81 HR: 97  Standing BP: 124/65 HR: 98  Site: upper left arm  Mode: --  Orthostatic VS  11-18-21 @ 07:51  Lying BP: --/-- HR: --  Sitting BP: 110/72 HR: 89  Standing BP: 112/66 HR: 87  Site: --  Mode: --  Orthostatic VS  11-17-21 @ 19:38  Lying BP: --/-- HR: --  Sitting BP: 117/71 HR: 82  Standing BP: 120/80 HR: 84  Site: --  Mode: --

## 2021-11-19 NOTE — BH INPATIENT PSYCHIATRY PROGRESS NOTE - NSBHASSESSSUMMFT_PSY_ALL_CORE
Plan:   - clozapine ODT 50mg PO daily and 250mg PO at bedtime  - Invega Sustenna 234mg IM on 10/15 and 156mg IM on 10/19, with Invega Sustenna 234mg IM on 11/16  -  Lithium Eskalith CR 1350mg PO at bedtime   - MOO granted on 9/28/21, Haldol 5mg IM and Benadryl 25mg IM if patient refuses Clozaril standing medication as per MOO court order     - group and milieu therapy     - routine checks     - Lithium level 0.8 on 10/26   - CBC + diff weekly  - 11/19/21:  Spoke with hospitalist, Dr. Figueroa, who reviewed patient's fingersticks, recommended sending patient home with oral DM medications that he is on in the hospital, no need for standing insulin.

## 2021-11-19 NOTE — BH INPATIENT PSYCHIATRY PROGRESS NOTE - NSBHMETABOLIC_PSY_ALL_CORE_FT
BMI: BMI (kg/m2): 77.9 (10-25-21 @ 06:54)  HbA1c: A1C with Estimated Average Glucose Result: 7.3 % (08-05-21 @ 10:24)    Glucose: POCT Blood Glucose.: 191 mg/dL (11-19-21 @ 12:16)    BP: --  Lipid Panel: Date/Time: 08-05-21 @ 10:24  Cholesterol, Serum: 139  Direct LDL: --  HDL Cholesterol, Serum: 42  Total Cholesterol/HDL Ration Measurement: --  Triglycerides, Serum: 234

## 2021-11-19 NOTE — BH INPATIENT PSYCHIATRY PROGRESS NOTE - NSBHFUPINTERVALHXFT_PSY_A_CORE
Pt compliant with medication and tolerating it well.  Chart reviewed and case discussed with treatment team.  No events reported overnight.  Pt observed more visible on the unit today and he went out briefly for fresh air break.  Pt remains calm and cooperative with interview.  Pt continues to be agreeable to go to respite on 11/29.  He reports eating and sleeping well.  Pt denies AH at time of interview, reports he steal hears them at time, but they are not distressing and "entertain me."  Pt reports he went to a music group yesterday.

## 2021-11-20 LAB
GLUCOSE BLDC GLUCOMTR-MCNC: 205 MG/DL — HIGH (ref 70–99)
GLUCOSE BLDC GLUCOMTR-MCNC: 222 MG/DL — HIGH (ref 70–99)
GLUCOSE BLDC GLUCOMTR-MCNC: 245 MG/DL — HIGH (ref 70–99)
GLUCOSE BLDC GLUCOMTR-MCNC: 249 MG/DL — HIGH (ref 70–99)

## 2021-11-20 RX ADMIN — Medication 2: at 09:01

## 2021-11-20 RX ADMIN — SENNA PLUS 2 TABLET(S): 8.6 TABLET ORAL at 21:38

## 2021-11-20 RX ADMIN — Medication 2: at 13:01

## 2021-11-20 RX ADMIN — CLOZAPINE 50 MILLIGRAM(S): 150 TABLET, ORALLY DISINTEGRATING ORAL at 09:06

## 2021-11-20 RX ADMIN — LITHIUM CARBONATE 1350 MILLIGRAM(S): 300 TABLET, EXTENDED RELEASE ORAL at 21:39

## 2021-11-20 RX ADMIN — METFORMIN HYDROCHLORIDE 1000 MILLIGRAM(S): 850 TABLET ORAL at 21:38

## 2021-11-20 RX ADMIN — ATORVASTATIN CALCIUM 40 MILLIGRAM(S): 80 TABLET, FILM COATED ORAL at 21:38

## 2021-11-20 RX ADMIN — CLOZAPINE 250 MILLIGRAM(S): 150 TABLET, ORALLY DISINTEGRATING ORAL at 21:38

## 2021-11-20 RX ADMIN — Medication 500 MILLIGRAM(S): at 09:06

## 2021-11-20 RX ADMIN — Medication 1 TABLET(S): at 21:39

## 2021-11-20 RX ADMIN — Medication 2: at 16:58

## 2021-11-20 RX ADMIN — Medication 1 TABLET(S): at 09:06

## 2021-11-21 LAB
GLUCOSE BLDC GLUCOMTR-MCNC: 215 MG/DL — HIGH (ref 70–99)
GLUCOSE BLDC GLUCOMTR-MCNC: 231 MG/DL — HIGH (ref 70–99)
GLUCOSE BLDC GLUCOMTR-MCNC: 231 MG/DL — HIGH (ref 70–99)
GLUCOSE BLDC GLUCOMTR-MCNC: 246 MG/DL — HIGH (ref 70–99)

## 2021-11-21 RX ADMIN — Medication 1 TABLET(S): at 09:00

## 2021-11-21 RX ADMIN — CLOZAPINE 50 MILLIGRAM(S): 150 TABLET, ORALLY DISINTEGRATING ORAL at 09:01

## 2021-11-21 RX ADMIN — Medication 1 TABLET(S): at 20:55

## 2021-11-21 RX ADMIN — CLOZAPINE 250 MILLIGRAM(S): 150 TABLET, ORALLY DISINTEGRATING ORAL at 20:55

## 2021-11-21 RX ADMIN — Medication 2: at 17:09

## 2021-11-21 RX ADMIN — SENNA PLUS 2 TABLET(S): 8.6 TABLET ORAL at 20:54

## 2021-11-21 RX ADMIN — Medication 500 MILLIGRAM(S): at 09:01

## 2021-11-21 RX ADMIN — Medication 2: at 12:20

## 2021-11-21 RX ADMIN — METFORMIN HYDROCHLORIDE 1000 MILLIGRAM(S): 850 TABLET ORAL at 21:03

## 2021-11-21 RX ADMIN — ATORVASTATIN CALCIUM 40 MILLIGRAM(S): 80 TABLET, FILM COATED ORAL at 20:55

## 2021-11-21 RX ADMIN — Medication 2: at 08:31

## 2021-11-21 RX ADMIN — LITHIUM CARBONATE 1350 MILLIGRAM(S): 300 TABLET, EXTENDED RELEASE ORAL at 20:54

## 2021-11-22 LAB
GLUCOSE BLDC GLUCOMTR-MCNC: 178 MG/DL — HIGH (ref 70–99)
GLUCOSE BLDC GLUCOMTR-MCNC: 182 MG/DL — HIGH (ref 70–99)
GLUCOSE BLDC GLUCOMTR-MCNC: 217 MG/DL — HIGH (ref 70–99)
GLUCOSE BLDC GLUCOMTR-MCNC: 241 MG/DL — HIGH (ref 70–99)

## 2021-11-22 PROCEDURE — 99231 SBSQ HOSP IP/OBS SF/LOW 25: CPT

## 2021-11-22 RX ADMIN — CLOZAPINE 250 MILLIGRAM(S): 150 TABLET, ORALLY DISINTEGRATING ORAL at 21:31

## 2021-11-22 RX ADMIN — Medication 1 TABLET(S): at 21:32

## 2021-11-22 RX ADMIN — LITHIUM CARBONATE 1350 MILLIGRAM(S): 300 TABLET, EXTENDED RELEASE ORAL at 21:32

## 2021-11-22 RX ADMIN — CLOZAPINE 50 MILLIGRAM(S): 150 TABLET, ORALLY DISINTEGRATING ORAL at 08:36

## 2021-11-22 RX ADMIN — Medication 1: at 08:35

## 2021-11-22 RX ADMIN — Medication 2: at 16:58

## 2021-11-22 RX ADMIN — ATORVASTATIN CALCIUM 40 MILLIGRAM(S): 80 TABLET, FILM COATED ORAL at 21:32

## 2021-11-22 RX ADMIN — SENNA PLUS 2 TABLET(S): 8.6 TABLET ORAL at 21:32

## 2021-11-22 RX ADMIN — Medication 1: at 12:26

## 2021-11-22 RX ADMIN — Medication 500 MILLIGRAM(S): at 08:36

## 2021-11-22 RX ADMIN — Medication 1 TABLET(S): at 08:36

## 2021-11-22 RX ADMIN — METFORMIN HYDROCHLORIDE 1000 MILLIGRAM(S): 850 TABLET ORAL at 21:31

## 2021-11-22 NOTE — BH INPATIENT PSYCHIATRY PROGRESS NOTE - NSBHCHARTREVIEWVS_PSY_A_CORE FT
Vital Signs Last 24 Hrs  T(C): 36.6 (11-22-21 @ 07:55), Max: 36.6 (11-22-21 @ 07:55)  T(F): 97.9 (11-22-21 @ 07:55), Max: 97.9 (11-22-21 @ 07:55)  HR: --  BP: --  BP(mean): --  RR: --  SpO2: --    Orthostatic VS  11-22-21 @ 07:55  Lying BP: --/-- HR: --  Sitting BP: 130/82 HR: 84  Standing BP: 128/74 HR: 80  Site: --  Mode: --  Orthostatic VS  11-21-21 @ 20:34  Lying BP: --/-- HR: --  Sitting BP: 131/75 HR: 89  Standing BP: 124/72 HR: 90  Site: upper left arm  Mode: --  Orthostatic VS  11-20-21 @ 20:02  Lying BP: --/-- HR: --  Sitting BP: 134/76 HR: 95  Standing BP: 138/82 HR: 103  Site: --  Mode: --

## 2021-11-22 NOTE — BH INPATIENT PSYCHIATRY PROGRESS NOTE - NSBHFUPINTERVALHXFT_PSY_A_CORE
Pt compliant with medication and tolerating it well.  Chart reviewed and case discussed with treatment team.  No events reported overnight.  Pt remains calm and cooperative with interview.  Pt continues to be agreeable to go to respite and agrees to have his mother pick him up to bring him there next week.  Pt reports he went to group yesterday and would like to go to one later today.

## 2021-11-22 NOTE — BH INPATIENT PSYCHIATRY PROGRESS NOTE - NSBHMETABOLIC_PSY_ALL_CORE_FT
BMI: BMI (kg/m2): 77.9 (10-25-21 @ 06:54)  HbA1c: A1C with Estimated Average Glucose Result: 7.3 % (08-05-21 @ 10:24)    Glucose: POCT Blood Glucose.: 182 mg/dL (11-22-21 @ 12:11)    BP: --  Lipid Panel: Date/Time: 08-05-21 @ 10:24  Cholesterol, Serum: 139  Direct LDL: --  HDL Cholesterol, Serum: 42  Total Cholesterol/HDL Ration Measurement: --  Triglycerides, Serum: 234

## 2021-11-23 LAB
BASOPHILS # BLD AUTO: 0.09 K/UL — SIGNIFICANT CHANGE UP (ref 0–0.2)
BASOPHILS NFR BLD AUTO: 0.6 % — SIGNIFICANT CHANGE UP (ref 0–2)
EOSINOPHIL # BLD AUTO: 0.29 K/UL — SIGNIFICANT CHANGE UP (ref 0–0.5)
EOSINOPHIL NFR BLD AUTO: 2 % — SIGNIFICANT CHANGE UP (ref 0–6)
GLUCOSE BLDC GLUCOMTR-MCNC: 170 MG/DL — HIGH (ref 70–99)
GLUCOSE BLDC GLUCOMTR-MCNC: 193 MG/DL — HIGH (ref 70–99)
GLUCOSE BLDC GLUCOMTR-MCNC: 230 MG/DL — HIGH (ref 70–99)
GLUCOSE BLDC GLUCOMTR-MCNC: 242 MG/DL — HIGH (ref 70–99)
HCT VFR BLD CALC: 44.6 % — SIGNIFICANT CHANGE UP (ref 39–50)
HGB BLD-MCNC: 14 G/DL — SIGNIFICANT CHANGE UP (ref 13–17)
IANC: 9.95 K/UL — HIGH (ref 1.5–8.5)
IMM GRANULOCYTES NFR BLD AUTO: 0.8 % — SIGNIFICANT CHANGE UP (ref 0–1.5)
LYMPHOCYTES # BLD AUTO: 2.95 K/UL — SIGNIFICANT CHANGE UP (ref 1–3.3)
LYMPHOCYTES # BLD AUTO: 20.5 % — SIGNIFICANT CHANGE UP (ref 13–44)
MCHC RBC-ENTMCNC: 27.2 PG — SIGNIFICANT CHANGE UP (ref 27–34)
MCHC RBC-ENTMCNC: 31.4 GM/DL — LOW (ref 32–36)
MCV RBC AUTO: 86.6 FL — SIGNIFICANT CHANGE UP (ref 80–100)
MONOCYTES # BLD AUTO: 0.96 K/UL — HIGH (ref 0–0.9)
MONOCYTES NFR BLD AUTO: 6.7 % — SIGNIFICANT CHANGE UP (ref 2–14)
NEUTROPHILS # BLD AUTO: 9.95 K/UL — HIGH (ref 1.8–7.4)
NEUTROPHILS NFR BLD AUTO: 69.4 % — SIGNIFICANT CHANGE UP (ref 43–77)
NRBC # BLD: 0 /100 WBCS — SIGNIFICANT CHANGE UP
NRBC # FLD: 0 K/UL — SIGNIFICANT CHANGE UP
PLATELET # BLD AUTO: 206 K/UL — SIGNIFICANT CHANGE UP (ref 150–400)
RBC # BLD: 5.15 M/UL — SIGNIFICANT CHANGE UP (ref 4.2–5.8)
RBC # FLD: 15.3 % — HIGH (ref 10.3–14.5)
WBC # BLD: 14.36 K/UL — HIGH (ref 3.8–10.5)
WBC # FLD AUTO: 14.36 K/UL — HIGH (ref 3.8–10.5)

## 2021-11-23 RX ADMIN — METFORMIN HYDROCHLORIDE 1000 MILLIGRAM(S): 850 TABLET ORAL at 21:19

## 2021-11-23 RX ADMIN — SENNA PLUS 2 TABLET(S): 8.6 TABLET ORAL at 21:21

## 2021-11-23 RX ADMIN — CLOZAPINE 50 MILLIGRAM(S): 150 TABLET, ORALLY DISINTEGRATING ORAL at 09:10

## 2021-11-23 RX ADMIN — Medication 1 TABLET(S): at 21:19

## 2021-11-23 RX ADMIN — ATORVASTATIN CALCIUM 40 MILLIGRAM(S): 80 TABLET, FILM COATED ORAL at 21:21

## 2021-11-23 RX ADMIN — Medication 500 MILLIGRAM(S): at 09:09

## 2021-11-23 RX ADMIN — LITHIUM CARBONATE 1350 MILLIGRAM(S): 300 TABLET, EXTENDED RELEASE ORAL at 21:22

## 2021-11-23 RX ADMIN — Medication 1: at 16:54

## 2021-11-23 RX ADMIN — CLOZAPINE 250 MILLIGRAM(S): 150 TABLET, ORALLY DISINTEGRATING ORAL at 21:22

## 2021-11-23 RX ADMIN — Medication 1: at 09:11

## 2021-11-23 NOTE — BH INPATIENT PSYCHIATRY PROGRESS NOTE - NSBHFUPINTERVALHXFT_PSY_A_CORE
Pt compliant with medication and tolerating it well.  Chart reviewed and case discussed with treatment team.  No events reported overnight.  Pt calm and cooperative with interview.  Pt reports good sleep and appetite.  Pt denies AH, reports, "I haven't had them since the Invega shot."  Pt reports, "I just have worries about going back."  Pt continues to be agreeable to go to respite after discharge and agreeable to have his mother pick him up and bring him there.

## 2021-11-23 NOTE — BH INPATIENT PSYCHIATRY PROGRESS NOTE - NSBHASSESSSUMMFT_PSY_ALL_CORE
Plan:   - clozapine ODT 50mg PO daily and 250mg PO at bedtime  - Invega Sustenna 234mg IM on 10/15 and 156mg IM on 10/19, with Invega Sustenna 234mg IM every 4 weeks on 11/16  -  Lithium Eskalith CR 1350mg PO at bedtime   - MOO granted on 9/28/21, Haldol 5mg IM and Benadryl 25mg IM if patient refuses Clozaril standing medication as per MOO court order     - group and milieu therapy     - routine checks     - Lithium level 0.8 on 10/26   - CBC + diff weekly  - 11/19/21:  Spoke with hospitalist, Dr. Figueroa, who reviewed patient's fingersticks, recommended sending patient home with oral DM medications that he is on in the hospital, no need for standing insulin.

## 2021-11-23 NOTE — BH INPATIENT PSYCHIATRY PROGRESS NOTE - NSBHMETABOLIC_PSY_ALL_CORE_FT
BMI: BMI (kg/m2): 77.9 (10-25-21 @ 06:54)  HbA1c: A1C with Estimated Average Glucose Result: 7.3 % (08-05-21 @ 10:24)    Glucose: POCT Blood Glucose.: 170 mg/dL (11-23-21 @ 08:13)    BP: --  Lipid Panel: Date/Time: 08-05-21 @ 10:24  Cholesterol, Serum: 139  Direct LDL: --  HDL Cholesterol, Serum: 42  Total Cholesterol/HDL Ration Measurement: --  Triglycerides, Serum: 234

## 2021-11-23 NOTE — BH INPATIENT PSYCHIATRY PROGRESS NOTE - NSBHCHARTREVIEWVS_PSY_A_CORE FT
Vital Signs Last 24 Hrs  T(C): 36.1 (11-23-21 @ 07:40), Max: 36.7 (11-22-21 @ 18:24)  T(F): 96.9 (11-23-21 @ 07:40), Max: 98 (11-22-21 @ 18:24)  HR: --  BP: --  BP(mean): --  RR: --  SpO2: --    Orthostatic VS  11-23-21 @ 07:40  Lying BP: --/-- HR: --  Sitting BP: 136/68 HR: 86  Standing BP: --/-- HR: --  Site: --  Mode: --  Orthostatic VS  11-22-21 @ 18:24  Lying BP: --/-- HR: --  Sitting BP: 142/76 HR: 92  Standing BP: 149/84 HR: 102  Site: upper right arm  Mode: electronic  Orthostatic VS  11-22-21 @ 07:55  Lying BP: --/-- HR: --  Sitting BP: 130/82 HR: 84  Standing BP: 128/74 HR: 80  Site: --  Mode: --  Orthostatic VS  11-21-21 @ 20:34  Lying BP: --/-- HR: --  Sitting BP: 131/75 HR: 89  Standing BP: 124/72 HR: 90  Site: upper left arm  Mode: --

## 2021-11-23 NOTE — BH INPATIENT PSYCHIATRY PROGRESS NOTE - NSTXMEDICDATENEW_PSY_ALL_CORE
10/12/2021- Patient called and needs to reschedule her 10/14/2021 procedure. Patient stated her mother passed away unexpectedly and she will call us back to reschedule. 23-Nov-2021

## 2021-11-24 LAB
GLUCOSE BLDC GLUCOMTR-MCNC: 211 MG/DL — HIGH (ref 70–99)
GLUCOSE BLDC GLUCOMTR-MCNC: 218 MG/DL — HIGH (ref 70–99)
GLUCOSE BLDC GLUCOMTR-MCNC: 249 MG/DL — HIGH (ref 70–99)
GLUCOSE BLDC GLUCOMTR-MCNC: 309 MG/DL — HIGH (ref 70–99)

## 2021-11-24 PROCEDURE — 99232 SBSQ HOSP IP/OBS MODERATE 35: CPT

## 2021-11-24 RX ORDER — SENNA PLUS 8.6 MG/1
2 TABLET ORAL
Qty: 0 | Refills: 0 | DISCHARGE
Start: 2021-11-24

## 2021-11-24 RX ORDER — SITAGLIPTIN 50 MG/1
1 TABLET, FILM COATED ORAL
Qty: 0 | Refills: 0 | DISCHARGE
Start: 2021-11-24

## 2021-11-24 RX ORDER — METFORMIN HYDROCHLORIDE 850 MG/1
1 TABLET ORAL
Qty: 0 | Refills: 0 | DISCHARGE

## 2021-11-24 RX ORDER — ATORVASTATIN CALCIUM 80 MG/1
1 TABLET, FILM COATED ORAL
Qty: 0 | Refills: 0 | DISCHARGE
Start: 2021-11-24

## 2021-11-24 RX ORDER — PALIPERIDONE 1.5 MG/1
234 TABLET, EXTENDED RELEASE ORAL ONCE
Refills: 0 | Status: CANCELLED | OUTPATIENT
Start: 2021-12-13 | End: 2021-11-30

## 2021-11-24 RX ORDER — CALCIUM CARBONATE 500(1250)
1 TABLET ORAL
Qty: 0 | Refills: 0 | DISCHARGE
Start: 2021-11-24

## 2021-11-24 RX ORDER — ASCORBIC ACID 60 MG
1 TABLET,CHEWABLE ORAL
Qty: 0 | Refills: 0 | DISCHARGE
Start: 2021-11-24

## 2021-11-24 RX ORDER — PALIPERIDONE 1.5 MG/1
234 TABLET, EXTENDED RELEASE ORAL
Qty: 0 | Refills: 0 | DISCHARGE

## 2021-11-24 RX ORDER — METFORMIN HYDROCHLORIDE 850 MG/1
1 TABLET ORAL
Qty: 0 | Refills: 0 | DISCHARGE
Start: 2021-11-24

## 2021-11-24 RX ORDER — LITHIUM CARBONATE 300 MG/1
3 TABLET, EXTENDED RELEASE ORAL
Qty: 0 | Refills: 0 | DISCHARGE
Start: 2021-11-24

## 2021-11-24 RX ADMIN — CLOZAPINE 50 MILLIGRAM(S): 150 TABLET, ORALLY DISINTEGRATING ORAL at 09:00

## 2021-11-24 RX ADMIN — Medication 2: at 20:50

## 2021-11-24 RX ADMIN — Medication 1 TABLET(S): at 09:00

## 2021-11-24 RX ADMIN — Medication 2: at 17:35

## 2021-11-24 RX ADMIN — SENNA PLUS 2 TABLET(S): 8.6 TABLET ORAL at 20:49

## 2021-11-24 RX ADMIN — Medication 2: at 12:37

## 2021-11-24 RX ADMIN — METFORMIN HYDROCHLORIDE 1000 MILLIGRAM(S): 850 TABLET ORAL at 20:49

## 2021-11-24 RX ADMIN — LITHIUM CARBONATE 1350 MILLIGRAM(S): 300 TABLET, EXTENDED RELEASE ORAL at 20:49

## 2021-11-24 RX ADMIN — Medication 2: at 09:00

## 2021-11-24 RX ADMIN — Medication 1 TABLET(S): at 20:49

## 2021-11-24 RX ADMIN — CLOZAPINE 250 MILLIGRAM(S): 150 TABLET, ORALLY DISINTEGRATING ORAL at 20:48

## 2021-11-24 RX ADMIN — ATORVASTATIN CALCIUM 40 MILLIGRAM(S): 80 TABLET, FILM COATED ORAL at 20:49

## 2021-11-24 RX ADMIN — Medication 500 MILLIGRAM(S): at 09:00

## 2021-11-24 NOTE — BH INPATIENT PSYCHIATRY DISCHARGE NOTE - NSBHDCMEDICALFT_PSY_A_CORE
Pt with elevated WBC and ANC at times without other physical complaints, hospitalist notified and no recommendations

## 2021-11-24 NOTE — BH INPATIENT PSYCHIATRY PROGRESS NOTE - NSBHASSESSSUMMFT_PSY_ALL_CORE
Plan:   - clozapine ODT 50mg PO daily and 250mg PO at bedtime  - Invega Sustenna 234mg IM on 10/15 and 156mg IM on 10/19, with Invega Sustenna 234mg IM every 4 weeks on 11/16  -  Lithium Eskalith CR 1350mg PO at bedtime   - MOO granted on 9/28/21, Haldol 5mg IM and Benadryl 25mg IM if patient refuses Clozaril standing medication as per MOO court order     - group and milieu therapy     - routine checks     - Lithium level 0.8 on 10/26   - CBC + diff weekly  - COVID 19 PCR on 11/26/21 AM in preparation for discharge Monday  - 11/19/21:  Spoke with hospitalist, Dr. Figueroa, who reviewed patient's fingersticks, recommended sending patient home with oral DM medications that he is on in the hospital, no need for standing insulin.

## 2021-11-24 NOTE — BH INPATIENT PSYCHIATRY DISCHARGE NOTE - DESCRIPTION
Pt has been residing at [facility] since discharge from [facility]

## 2021-11-24 NOTE — BH INPATIENT PSYCHIATRY DISCHARGE NOTE - NSDCMRMEDTOKEN_GEN_ALL_CORE_FT
atorvastatin 40 mg oral tablet: 1 tab(s) orally once a day  cloZAPine 200 mg oral tablet: 1 tab(s) orally once a day (at bedtime)  famotidine 20 mg oral tablet: 1 tab(s) orally once a day  Glucophage 500 mg oral tablet: 1 tab(s) orally 2 times a day  insulin glargine: 20 unit(s) subcutaneous once a day (at bedtime)  insulin lispro 100 units/mL injectable solution: 8 unit(s) injectable 3 times a day  Janumet 50 mg-500 mg oral tablet: 1 tab(s) orally 2 times a day with meals  levothyroxine 88 mcg (0.088 mg) oral tablet: 1 tab(s) orally once a day  risperiDONE 1 mg oral tablet: 1 tab(s) orally once a day (at bedtime)  tamsulosin 0.4 mg oral capsule: 1 cap(s) orally once a day (at bedtime)   ascorbic acid 500 mg oral tablet: 1 tab(s) orally once a day  atorvastatin 40 mg oral tablet: 1 tab(s) orally once a day (at bedtime)  calcium carbonate 500 mg (200 mg elemental calcium) oral tablet, chewable: 1 tab(s) orally 2 times a day  cloZAPine 200 mg oral tablet: 1 tab(s) orally once a day (at bedtime)  cloZAPine 50 mg oral tablet: 1 tab(s) orally once a day in the morning and at bedtime  Invega Sustenna 234 mg/1.5 mL intramuscular suspension, extended release: 234 milligram(s) intramuscular every 4 weeks (next due on 12/12/21)  lithium 450 mg oral tablet, extended release: 3 tab(s) orally once a day (at bedtime)  metFORMIN 1000 mg oral tablet: 1 tab(s) orally once a day (at bedtime)  senna oral tablet: 2 tab(s) orally once a day (at bedtime)  SITagliptin 100 mg oral tablet: 1 tab(s) orally once a day

## 2021-11-24 NOTE — BH INPATIENT PSYCHIATRY PROGRESS NOTE - NSBHMETABOLIC_PSY_ALL_CORE_FT
BMI: BMI (kg/m2): 77.9 (10-25-21 @ 06:54)  HbA1c: A1C with Estimated Average Glucose Result: 7.3 % (08-05-21 @ 10:24)    Glucose: POCT Blood Glucose.: 249 mg/dL (11-24-21 @ 12:03)    BP: --  Lipid Panel: Date/Time: 08-05-21 @ 10:24  Cholesterol, Serum: 139  Direct LDL: --  HDL Cholesterol, Serum: 42  Total Cholesterol/HDL Ration Measurement: --  Triglycerides, Serum: 234

## 2021-11-24 NOTE — BH INPATIENT PSYCHIATRY PROGRESS NOTE - NSBHCHARTREVIEWVS_PSY_A_CORE FT
Vital Signs Last 24 Hrs  T(C): 36.2 (11-24-21 @ 07:45), Max: 36.2 (11-23-21 @ 18:19)  T(F): 97.2 (11-24-21 @ 07:45), Max: 97.2 (11-24-21 @ 07:45)  HR: --  BP: --  BP(mean): --  RR: --  SpO2: --    Orthostatic VS  11-24-21 @ 07:45  Lying BP: --/-- HR: --  Sitting BP: 104/60 HR: 90  Standing BP: --/-- HR: --  Site: --  Mode: --  Orthostatic VS  11-23-21 @ 18:19  Lying BP: --/-- HR: --  Sitting BP: 127/75 HR: 98  Standing BP: 114/70 HR: 90  Site: upper right arm  Mode: electronic  Orthostatic VS  11-23-21 @ 07:40  Lying BP: --/-- HR: --  Sitting BP: 136/68 HR: 86  Standing BP: --/-- HR: --  Site: --  Mode: --  Orthostatic VS  11-22-21 @ 18:24  Lying BP: --/-- HR: --  Sitting BP: 142/76 HR: 92  Standing BP: 149/84 HR: 102  Site: upper right arm  Mode: electronic

## 2021-11-24 NOTE — BH INPATIENT PSYCHIATRY DISCHARGE NOTE - NSBHMETABOLIC_PSY_ALL_CORE_FT
Refill request for the following medication blood glucose (ONETOUCH VERIO) test strip    LOV: 11/6/19  RTC/FU: 3 months time       Letter sent and noted to pharmacy that pt needs to schedule f/u for future refills.      Request approved.                 BMI: BMI (kg/m2): 77.9 (10-25-21 @ 06:54)  HbA1c: A1C with Estimated Average Glucose Result: 7.3 % (08-05-21 @ 10:24)    Glucose: POCT Blood Glucose.: 249 mg/dL (11-24-21 @ 12:03)    BP: --  Lipid Panel: Date/Time: 08-05-21 @ 10:24  Cholesterol, Serum: 139  Direct LDL: --  HDL Cholesterol, Serum: 42  Total Cholesterol/HDL Ration Measurement: --  Triglycerides, Serum: 234

## 2021-11-24 NOTE — BH INPATIENT PSYCHIATRY DISCHARGE NOTE - NSBHDCRISKMITIGATE_PSY_ALL_CORE
Safety planning/Referral to ACT Team/Family/Other social support involvement/Long acting injectable medication/Other

## 2021-11-24 NOTE — BH INPATIENT PSYCHIATRY DISCHARGE NOTE - OTHER PAST PSYCHIATRIC HISTORY (INCLUDE DETAILS REGARDING ONSET, COURSE OF ILLNESS, INPATIENT/OUTPATIENT TREATMENT)
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  Last hospitalized at [facility] from 4/30-11/2 of this year. No known suicide attempts.  Currently with ACT team

## 2021-11-24 NOTE — BH INPATIENT PSYCHIATRY DISCHARGE NOTE - NSDCRECOMMENDMEDICALFT_PSY_ALL_CORE
Please follow-up with your medical providers regarding your medical comorbidities Please follow-up with your medical providers regarding your medical comorbidities including your colon cancer in remission

## 2021-11-24 NOTE — BH INPATIENT PSYCHIATRY PROGRESS NOTE - NSBHFUPINTERVALHXFT_PSY_A_CORE
Pt compliant with medication and tolerating it well.  Chart reviewed and case discussed with treatment team.  No events reported overnight.  Pt remains calm and cooperative with interview.  Pt worried at times about his future, encouraged to focus on the immediate future and going to respite and keeping up with the progress he has made during this hospitalization.  Pt continues to be agreeable to go to respite in [place] on Monday, willing to have his mother pick him up and drive him there.  Pt reports he has been making a list of what he will  from his residence to take to respite.

## 2021-11-24 NOTE — BH INPATIENT PSYCHIATRY DISCHARGE NOTE - HPI (INCLUDE ILLNESS QUALITY, SEVERITY, DURATION, TIMING, CONTEXT, MODIFYING FACTORS, ASSOCIATED SIGNS AND SYMPTOMS)
Patient is a 55  year old, male; domiciled in [facility] (Concern for independent living) ; ;  noncaregiver; past psychiatric history of schizoaffective disorder ; multiple prior psychiatric hospitalizations ([facility] on 4/30/20- 11/2/20 ); ; no known suicide attempts; no known history of violence or arrests; no active substance abuse or known history of complicated withdrawal; PMH of osteogenesis imperfecta, HTN, hypothyroidism, DM type 2 recent dx and bowel resection with ileostomy, with Stage IV colorectal cancer s/p reversal and admission to rehab at [facility] Jan to April 2021,  presented to ER from residence due to reports of agitation and aggression.    On assessment, patient was difficult to redirect, circumstantial and tangential. Patient reports he came due to staff saying he was medication nonadherent at [facility], but says this in not true.  Patient says originally he was taking Clozapine 100 mg AM and PM but was switched to taking 200 mg PM. He reports that with new change he was experiencing "SOB, palpitation and passing out." In order to avoid side effects, patient has been taking the 200 mg pills and splitting it in half, taking the second half of the medication after 2 hours. He mentioned reaching out to NP for 6 weeks trying to get a hold of her to mention side effects from Clozapine. Patient denies going a day without taking the medication. Patient says this is his 20th admission because "people always misunderstand me." Patient mentions he has auditory hallucination which are his "inner thoughts,"  and per ED telepathically communicates with GF but denies having command voices. He denies visual hallucinations. Patient denies SI and HI. Patient has paranoid delusional thought of residential home - having cameras in the speaker box, does not trust the staff, NP or ACT team, and grandiose thoughts of being more intelligent than everyone with multiple degrees. Patient often saying phrase es like "I think I've said too much."    Per ED note:  Pt interviewed in private area with RN present.  Pt speaks in circular, circumstantial manner as  to the events leading up to admission and events unrelated,  in overinclusive, detailed manner, unable to be redirected to pertinent  questions.   Pt evasive to reasons why he was brought to ED by police.  Pt claims he was talking 1/2 his Clozapine at scheduled time, then took the remainder 1 hr later, c/o staff accusing him on "not taking meds".  Pt rambling at times, difficulty getting to point or staying on topic.  Denies depressed mood, SI and denies h/o SIB.  He endorses feeling anxious and worried about his car and the papers he needs to fill out for insurance.  Pt denies HIIP and denies h/o violence.   Pt endorsing AH he claims is "normal thoughts".  He would not give specific content of AH, then reported AH is  how he communicated with "girlfriend" Sharri, "telepathically".  Pt reporting s/s delusions, reporting his NP is seeking for more than just a professional relationship with his and claims she wants more.    Pt was irritable and shouting in ED prior to  and received Versed 10 mg at 1530.      Per record Pt has h/o treatment resistant psychosis with a protracted admission at [facility] from4/2020 11/2/20.:  (During last hospitalization in 2020 pt was admitted because of suicidal ideation , command AH telling him to  kill himself., and delusional thoughts that god was telling him to do things, paranoid and had reported that he had created a system to electrocute himself.      He was described as anxious, thought blocked, disorganized and internally preoccupied.  He had a protracted hospitalization with multiple trials of medications. Pt was taken for medications over objections and was eventually stabilized on Clozapine 200 mg at night and Lithium 600 mg in am and 900 mg at night.)    Per collateral from Charline Vallejo, director of residence Pt was agitated and verbally threatening to staff and claims it was ACT team NP who contacted EMS for ED psych eval.  Charline reports past inappropriate behaviors of exposing himself, last time 3 mo ago and claims they will not take him back without a psych admission do to poor compliance and their fear of safety of others.  Charline denies Pt was physically aggressive but has h/o using cane to threaten people and of med noncompliance.  RN on call from ACT team on call did not know details by Bev, the ACT  NP who was present  at time of incident who reportedly activated 911.  She did indicate paranoid ideation recent past, claiming his mother was placing hallucinogens in his bagels last week and claiming his father is a "Nazi sympathizer".    Mother called claiming the staff at residence in particular Charline Vallejo has a "different attitude" towards Pt and that she has a "vendetta" towards him.  Mother insists Pt is "Not a danger to self or others ", and she does not feel he requires psych admission.  Pt was offered voluntary psych admission but declined need and claims he wants to get his meds titrated or changed on out pt basis.  Pt to be held overnight for reeval secondary to getting collateral from JAZMÍN Pablo from ACT team who activated 911, to monitor behavior overnight since Pt rct sedation for agitation,  and to assess if Pt meets criteria for 2PC.

## 2021-11-24 NOTE — BH INPATIENT PSYCHIATRY DISCHARGE NOTE - HOSPITAL COURSE
On the unit, pt presented with disorganized thought process, loose associations, paranoia and mood lability.  Pt observed responding to internal stimuli and appeared internally preoccupied.  His insight and judgment were impaired due to his psychosis.  Pt with poor self care.  Pt had been selectively refusing psychotropic medication during his hospitalization.  Pt was on CO 1:1 from 8/12/21 to 8/24/21 due to disorganization and sexual activity risk, was exposing himself to staff, running down hallway, barricaded himself in a female room several times, took off his pants and laid in a female's bed without her present.  He was given both PO and IM PRN medication for agitation and disorganized behavior.  On 8/6/21, patient began refusing his Clozaril and was selectively compliant with medical medications.  Medication over objection was pursued and MOO court order was granted on 9/28/21.  Pt began to become compliant with medication.  Pt was continued on Clozaril titrated to 50mg PO daily and 250mg PO at bedtime.  Pt was continued on Risperdal titrated to 6mg PO at bedtime.  Pt agreed to receive Invega Sustenna and received most recent dose of monthly Invega Sustenna 234mg IM on  11/16/21  Pt was started on Lithium Eskalith CR titrated to 1350mg PO at bedtime.  On the combination of medication, patient showed much improvement in mood and psychotic sx.  Treatment team had a conference call with his residence worker and patient was in agreement to consider more supportive housing as per recommendation of residence worker and treatment team.  Residence worker suggested respite after discharge as more supportive housing was not available at the time of discharge and patient agreed to this.  Pt's mother was in contact with the treatment team throughout his hospitalization and she was in agreement with discharge plan and agreed to pick patient up and take him to respite.    On discharge, pt denied SI/HI/I/P or AH/VH or paranoia.  Pt eating and sleeping well.  Pt endorsed motivation to continue medication as prescribed and engage in outpatient treatment with his ACT team.  Safety planning done with patient and patient agreed to call 911 or go to the nearest ED if he finds himself a danger to himself or others.  Suicide hotline information give to patient with discharge documentation.     Although patient was admitted due to risk factors for violence/suicide including psychotic and mood sx, the patient’s risk for suicide/violence was mitigated during his hospitalization and his protective factors outweigh his risk factors at this time.  Pt is currently at low acute risk for suicide/violence.  Patient’s protective factors include:  no current SI/HI/I/P, willingness to engage in treatment, ACT team in the community, future orientation, family support, no hx of suicide attempts, willingness to accept more supportive housing, and no current acute depressive, psychotic or manic sx.  Although the patient is at chronic risk for violence/suicide given her hx of psychiatric illness, hx of psychiatric hospitalizations, and chronic medical issues, this risk cannot be ameliorated by continued inpatient treatment.  The patient no longer met the criteria for psychiatric hospitalization at discharge.

## 2021-11-25 LAB
GLUCOSE BLDC GLUCOMTR-MCNC: 222 MG/DL — HIGH (ref 70–99)
GLUCOSE BLDC GLUCOMTR-MCNC: 224 MG/DL — HIGH (ref 70–99)
GLUCOSE BLDC GLUCOMTR-MCNC: 259 MG/DL — HIGH (ref 70–99)
GLUCOSE BLDC GLUCOMTR-MCNC: 277 MG/DL — HIGH (ref 70–99)

## 2021-11-25 RX ADMIN — Medication 1 TABLET(S): at 21:08

## 2021-11-25 RX ADMIN — Medication 2: at 08:46

## 2021-11-25 RX ADMIN — CLOZAPINE 50 MILLIGRAM(S): 150 TABLET, ORALLY DISINTEGRATING ORAL at 09:11

## 2021-11-25 RX ADMIN — Medication 3: at 17:20

## 2021-11-25 RX ADMIN — CLOZAPINE 250 MILLIGRAM(S): 150 TABLET, ORALLY DISINTEGRATING ORAL at 21:09

## 2021-11-25 RX ADMIN — LITHIUM CARBONATE 1350 MILLIGRAM(S): 300 TABLET, EXTENDED RELEASE ORAL at 21:09

## 2021-11-25 RX ADMIN — Medication 500 MILLIGRAM(S): at 09:12

## 2021-11-25 RX ADMIN — METFORMIN HYDROCHLORIDE 1000 MILLIGRAM(S): 850 TABLET ORAL at 21:08

## 2021-11-25 RX ADMIN — Medication 3: at 12:42

## 2021-11-25 RX ADMIN — ATORVASTATIN CALCIUM 40 MILLIGRAM(S): 80 TABLET, FILM COATED ORAL at 21:09

## 2021-11-25 RX ADMIN — Medication 1 TABLET(S): at 09:12

## 2021-11-25 RX ADMIN — SENNA PLUS 2 TABLET(S): 8.6 TABLET ORAL at 21:08

## 2021-11-26 LAB
GLUCOSE BLDC GLUCOMTR-MCNC: 176 MG/DL — HIGH (ref 70–99)
GLUCOSE BLDC GLUCOMTR-MCNC: 187 MG/DL — HIGH (ref 70–99)
GLUCOSE BLDC GLUCOMTR-MCNC: 211 MG/DL — HIGH (ref 70–99)
GLUCOSE BLDC GLUCOMTR-MCNC: 222 MG/DL — HIGH (ref 70–99)

## 2021-11-26 PROCEDURE — 99232 SBSQ HOSP IP/OBS MODERATE 35: CPT

## 2021-11-26 RX ADMIN — CLOZAPINE 50 MILLIGRAM(S): 150 TABLET, ORALLY DISINTEGRATING ORAL at 08:39

## 2021-11-26 RX ADMIN — ATORVASTATIN CALCIUM 40 MILLIGRAM(S): 80 TABLET, FILM COATED ORAL at 22:08

## 2021-11-26 RX ADMIN — METFORMIN HYDROCHLORIDE 1000 MILLIGRAM(S): 850 TABLET ORAL at 22:08

## 2021-11-26 RX ADMIN — CLOZAPINE 250 MILLIGRAM(S): 150 TABLET, ORALLY DISINTEGRATING ORAL at 22:08

## 2021-11-26 RX ADMIN — LITHIUM CARBONATE 1350 MILLIGRAM(S): 300 TABLET, EXTENDED RELEASE ORAL at 22:08

## 2021-11-26 RX ADMIN — Medication 1 TABLET(S): at 22:08

## 2021-11-26 RX ADMIN — Medication 500 MILLIGRAM(S): at 08:39

## 2021-11-26 RX ADMIN — Medication 1 TABLET(S): at 08:38

## 2021-11-26 RX ADMIN — Medication 2: at 08:39

## 2021-11-26 RX ADMIN — Medication 2: at 16:59

## 2021-11-26 RX ADMIN — SENNA PLUS 2 TABLET(S): 8.6 TABLET ORAL at 22:09

## 2021-11-26 NOTE — BH INPATIENT PSYCHIATRY PROGRESS NOTE - NSBHASSESSSUMMFT_PSY_ALL_CORE
Plan:   - clozapine ODT 50mg PO daily and 250mg PO at bedtime  - Invega Sustenna 234mg IM on 10/15 and 156mg IM on 10/19, with Invega Sustenna 234mg IM every 4 weeks on 11/16  -  Lithium Eskalith CR 1350mg PO at bedtime   - MOO granted on 9/28/21, Haldol 5mg IM and Benadryl 25mg IM if patient refuses Clozaril standing medication as per MOO court order     - group and milieu therapy     - routine checks     - Lithium level 0.8 on 10/26   - CBC + diff weekly  - COVID 19 PCR on 11/26/21 AM in preparation for discharge Monday  - 11/19/21:  Spoke with hospitalist, Dr. Figueroa, who reviewed patient's fingersticks, recommended sending patient home with oral DM medications that he is on in the hospital, no need for standing insulin. Pt remains stable. Scheduled to be discharged on 11/29.        Plan:   - clozapine ODT 50mg PO daily and 250mg PO at bedtime  - Invega Sustenna 234mg IM on 10/15 and 156mg IM on 10/19, with Invega Sustenna 234mg IM every 4 weeks on 11/16  -  Lithium Eskalith CR 1350mg PO at bedtime   - MOO granted on 9/28/21, Haldol 5mg IM and Benadryl 25mg IM if patient refuses Clozaril standing medication as per MOO court order     - group and milieu therapy     - routine checks     - Lithium level 0.8 on 10/26   - CBC + diff weekly  - COVID 19 PCR on 11/26/21 AM in preparation for discharge Monday  - 11/19/21:  Spoke with hospitalist, Dr. Figueroa, who reviewed patient's fingersticks, recommended sending patient home with oral DM medications that he is on in the hospital, no need for standing insulin.

## 2021-11-26 NOTE — BH INPATIENT PSYCHIATRY PROGRESS NOTE - MSE UNSTRUCTURED FT
Pt is casually dressed, fairly groomed.  Cooperative with good eye contact.  No PMA/PMR noted.  Mood is good with congruent affect,  in full range.  Speech is normal in rate, rhythm and volume Thought process is grossly linear.  No active SI,  no HI, no paranoia.  Good impulse control, judgment and fair insight.

## 2021-11-26 NOTE — BH INPATIENT PSYCHIATRY PROGRESS NOTE - NSICDXBHSECONDARYDX_PSY_ALL_CORE
DM2 (diabetes mellitus, type 2)   E11.9  Patient nonadherence   Z91.19  
DM2 (diabetes mellitus, type 2)   E11.9  
DM2 (diabetes mellitus, type 2)   E11.9  Patient nonadherence   Z91.19  
DM2 (diabetes mellitus, type 2)   E11.9  
DM2 (diabetes mellitus, type 2)   E11.9  Patient nonadherence   Z91.19  

## 2021-11-26 NOTE — BH INPATIENT PSYCHIATRY PROGRESS NOTE - NSBHFUPINTERVALHXFT_PSY_A_CORE
Pt compliant with medication and tolerating it well.  Chart reviewed and case discussed with treatment team.  No events reported overnight.  Pt remains calm and cooperative with interview.  Pt worried at times about his future, encouraged to focus on the immediate future and going to respite and keeping up with the progress he has made during this hospitalization.  Pt continues to be agreeable to go to respite in [place] on Monday, willing to have his mother pick him up and drive him there.  Pt reports he has been making a list of what he will  from his residence to take to respite. Pt compliant with medication and tolerating it well.  Chart reviewed and case discussed with treatment team.  No events reported overnight.      Pt states that he had "symptoms" last night, and further explained that he worried about his friend's safety when he could not get in touch with her.  Then he took night time medications and was able to calm down.  Pt denies psychotic symptoms and SI and HI.  Seems anxious about pending discharge on 11/29.  Asking what if he has symptoms.  Explained that as long as does not have any safety concerns,  he could work things out with his outpatient treatment team. Pt denies side effects of medication. Reports good sleep with PRN and good appetite.

## 2021-11-26 NOTE — BH INPATIENT PSYCHIATRY PROGRESS NOTE - NSBHMETABOLIC_PSY_ALL_CORE_FT
BMI: BMI (kg/m2): 77.9 (10-25-21 @ 06:54)  HbA1c: A1C with Estimated Average Glucose Result: 7.3 % (08-05-21 @ 10:24)    Glucose: POCT Blood Glucose.: 211 mg/dL (11-26-21 @ 08:07)    BP: --  Lipid Panel: Date/Time: 08-05-21 @ 10:24  Cholesterol, Serum: 139  Direct LDL: --  HDL Cholesterol, Serum: 42  Total Cholesterol/HDL Ration Measurement: --  Triglycerides, Serum: 234   BMI: BMI (kg/m2): 77.9 (10-25-21 @ 06:54)  HbA1c: A1C with Estimated Average Glucose Result: 7.3 % (08-05-21 @ 10:24)    Glucose: POCT Blood Glucose.: 187 mg/dL (11-26-21 @ 12:07)    BP: --  Lipid Panel: Date/Time: 08-05-21 @ 10:24  Cholesterol, Serum: 139  Direct LDL: --  HDL Cholesterol, Serum: 42  Total Cholesterol/HDL Ration Measurement: --  Triglycerides, Serum: 234

## 2021-11-26 NOTE — BH INPATIENT PSYCHIATRY PROGRESS NOTE - NSBHCHARTREVIEWVS_PSY_A_CORE FT
Vital Signs Last 24 Hrs  T(C): 36.4 (11-26-21 @ 08:27), Max: 36.4 (11-26-21 @ 08:27)  T(F): 97.6 (11-26-21 @ 08:27), Max: 97.6 (11-26-21 @ 08:27)  HR: --  BP: --  BP(mean): --  RR: --  SpO2: --    Orthostatic VS  11-26-21 @ 08:27  Lying BP: 106/74 HR: 95  Sitting BP: 122/70 HR: 90  Standing BP: --/-- HR: --  Site: --  Mode: --  Orthostatic VS  11-25-21 @ 20:43  Lying BP: --/-- HR: --  Sitting BP: 135/78 HR: 98  Standing BP: 119/70 HR: 101  Site: upper right arm  Mode: electronic  Orthostatic VS  11-25-21 @ 08:01  Lying BP: --/-- HR: --  Sitting BP: 119/68 HR: 98  Standing BP: 101/72 HR: 97  Site: upper left arm  Mode: electronic  Orthostatic VS  11-24-21 @ 19:52  Lying BP: --/-- HR: --  Sitting BP: 129/74 HR: 92  Standing BP: --/-- HR: --  Site: --  Mode: --   Home

## 2021-11-27 LAB
GLUCOSE BLDC GLUCOMTR-MCNC: 210 MG/DL — HIGH (ref 70–99)
GLUCOSE BLDC GLUCOMTR-MCNC: 239 MG/DL — HIGH (ref 70–99)
GLUCOSE BLDC GLUCOMTR-MCNC: 255 MG/DL — HIGH (ref 70–99)
GLUCOSE BLDC GLUCOMTR-MCNC: 276 MG/DL — HIGH (ref 70–99)

## 2021-11-27 RX ADMIN — Medication 2: at 08:39

## 2021-11-27 RX ADMIN — Medication 1 TABLET(S): at 08:25

## 2021-11-27 RX ADMIN — LITHIUM CARBONATE 1350 MILLIGRAM(S): 300 TABLET, EXTENDED RELEASE ORAL at 20:33

## 2021-11-27 RX ADMIN — SENNA PLUS 2 TABLET(S): 8.6 TABLET ORAL at 20:31

## 2021-11-27 RX ADMIN — CLOZAPINE 50 MILLIGRAM(S): 150 TABLET, ORALLY DISINTEGRATING ORAL at 08:25

## 2021-11-27 RX ADMIN — Medication 2: at 12:25

## 2021-11-27 RX ADMIN — CLOZAPINE 250 MILLIGRAM(S): 150 TABLET, ORALLY DISINTEGRATING ORAL at 20:32

## 2021-11-27 RX ADMIN — Medication 1: at 20:27

## 2021-11-27 RX ADMIN — Medication 500 MILLIGRAM(S): at 08:25

## 2021-11-27 RX ADMIN — METFORMIN HYDROCHLORIDE 1000 MILLIGRAM(S): 850 TABLET ORAL at 20:33

## 2021-11-27 RX ADMIN — Medication 1 TABLET(S): at 20:31

## 2021-11-27 RX ADMIN — ATORVASTATIN CALCIUM 40 MILLIGRAM(S): 80 TABLET, FILM COATED ORAL at 20:33

## 2021-11-28 LAB
GLUCOSE BLDC GLUCOMTR-MCNC: 201 MG/DL — HIGH (ref 70–99)
GLUCOSE BLDC GLUCOMTR-MCNC: 218 MG/DL — HIGH (ref 70–99)
GLUCOSE BLDC GLUCOMTR-MCNC: 222 MG/DL — HIGH (ref 70–99)
GLUCOSE BLDC GLUCOMTR-MCNC: 239 MG/DL — HIGH (ref 70–99)

## 2021-11-28 RX ADMIN — CLOZAPINE 250 MILLIGRAM(S): 150 TABLET, ORALLY DISINTEGRATING ORAL at 20:45

## 2021-11-28 RX ADMIN — SENNA PLUS 2 TABLET(S): 8.6 TABLET ORAL at 20:44

## 2021-11-28 RX ADMIN — Medication 1 TABLET(S): at 08:45

## 2021-11-28 RX ADMIN — Medication 1 TABLET(S): at 20:45

## 2021-11-28 RX ADMIN — LITHIUM CARBONATE 1350 MILLIGRAM(S): 300 TABLET, EXTENDED RELEASE ORAL at 20:43

## 2021-11-28 RX ADMIN — CLOZAPINE 50 MILLIGRAM(S): 150 TABLET, ORALLY DISINTEGRATING ORAL at 08:45

## 2021-11-28 RX ADMIN — Medication 2: at 12:19

## 2021-11-28 RX ADMIN — Medication 2: at 08:43

## 2021-11-28 RX ADMIN — Medication 2: at 16:46

## 2021-11-28 RX ADMIN — ATORVASTATIN CALCIUM 40 MILLIGRAM(S): 80 TABLET, FILM COATED ORAL at 20:45

## 2021-11-28 RX ADMIN — Medication 500 MILLIGRAM(S): at 08:45

## 2021-11-28 RX ADMIN — METFORMIN HYDROCHLORIDE 1000 MILLIGRAM(S): 850 TABLET ORAL at 20:45

## 2021-11-29 LAB
GLUCOSE BLDC GLUCOMTR-MCNC: 177 MG/DL — HIGH (ref 70–99)
GLUCOSE BLDC GLUCOMTR-MCNC: 228 MG/DL — HIGH (ref 70–99)
GLUCOSE BLDC GLUCOMTR-MCNC: 243 MG/DL — HIGH (ref 70–99)
GLUCOSE BLDC GLUCOMTR-MCNC: 288 MG/DL — HIGH (ref 70–99)
SARS-COV-2 RNA SPEC QL NAA+PROBE: SIGNIFICANT CHANGE UP

## 2021-11-29 PROCEDURE — 99231 SBSQ HOSP IP/OBS SF/LOW 25: CPT

## 2021-11-29 RX ORDER — SITAGLIPTIN 50 MG/1
1 TABLET, FILM COATED ORAL
Qty: 30 | Refills: 0
Start: 2021-11-29

## 2021-11-29 RX ORDER — METFORMIN HYDROCHLORIDE 850 MG/1
1000 TABLET ORAL
Refills: 0 | Status: DISCONTINUED | OUTPATIENT
Start: 2021-11-29 | End: 2021-11-30

## 2021-11-29 RX ORDER — CLOZAPINE 150 MG/1
1 TABLET, ORALLY DISINTEGRATING ORAL
Qty: 0 | Refills: 0 | DISCHARGE

## 2021-11-29 RX ORDER — ASCORBIC ACID 60 MG
1 TABLET,CHEWABLE ORAL
Qty: 30 | Refills: 0
Start: 2021-11-29

## 2021-11-29 RX ORDER — CLOZAPINE 150 MG/1
1 TABLET, ORALLY DISINTEGRATING ORAL
Qty: 30 | Refills: 0
Start: 2021-11-29

## 2021-11-29 RX ORDER — ATORVASTATIN CALCIUM 80 MG/1
1 TABLET, FILM COATED ORAL
Qty: 30 | Refills: 0
Start: 2021-11-29

## 2021-11-29 RX ORDER — CLOZAPINE 150 MG/1
1 TABLET, ORALLY DISINTEGRATING ORAL
Qty: 60 | Refills: 0
Start: 2021-11-29

## 2021-11-29 RX ORDER — METFORMIN HYDROCHLORIDE 850 MG/1
1 TABLET ORAL
Qty: 30 | Refills: 0
Start: 2021-11-29

## 2021-11-29 RX ORDER — LITHIUM CARBONATE 300 MG/1
3 TABLET, EXTENDED RELEASE ORAL
Qty: 90 | Refills: 0
Start: 2021-11-29

## 2021-11-29 RX ORDER — CALCIUM CARBONATE 500(1250)
1 TABLET ORAL
Qty: 60 | Refills: 0
Start: 2021-11-29

## 2021-11-29 RX ORDER — SITAGLIPTIN AND METFORMIN HYDROCHLORIDE 500; 50 MG/1; MG/1
1 TABLET, FILM COATED ORAL
Qty: 0 | Refills: 0 | DISCHARGE

## 2021-11-29 RX ORDER — SENNA PLUS 8.6 MG/1
2 TABLET ORAL
Qty: 60 | Refills: 0
Start: 2021-11-29

## 2021-11-29 RX ADMIN — Medication 1: at 17:10

## 2021-11-29 RX ADMIN — Medication 3: at 12:35

## 2021-11-29 RX ADMIN — Medication 1 TABLET(S): at 20:32

## 2021-11-29 RX ADMIN — METFORMIN HYDROCHLORIDE 1000 MILLIGRAM(S): 850 TABLET ORAL at 20:31

## 2021-11-29 RX ADMIN — CLOZAPINE 50 MILLIGRAM(S): 150 TABLET, ORALLY DISINTEGRATING ORAL at 08:38

## 2021-11-29 RX ADMIN — LITHIUM CARBONATE 1350 MILLIGRAM(S): 300 TABLET, EXTENDED RELEASE ORAL at 20:31

## 2021-11-29 RX ADMIN — Medication 1 TABLET(S): at 08:38

## 2021-11-29 RX ADMIN — SENNA PLUS 2 TABLET(S): 8.6 TABLET ORAL at 20:31

## 2021-11-29 RX ADMIN — ATORVASTATIN CALCIUM 40 MILLIGRAM(S): 80 TABLET, FILM COATED ORAL at 20:30

## 2021-11-29 RX ADMIN — Medication 500 MILLIGRAM(S): at 08:38

## 2021-11-29 RX ADMIN — CLOZAPINE 250 MILLIGRAM(S): 150 TABLET, ORALLY DISINTEGRATING ORAL at 20:31

## 2021-11-29 RX ADMIN — Medication 2: at 08:38

## 2021-11-29 NOTE — BH INPATIENT PSYCHIATRY PROGRESS NOTE - CURRENT MEDICATION
MEDICATIONS  (STANDING):  acetaminophen   Tablet .. 650 milliGRAM(s) Oral once  ascorbic acid 500 milliGRAM(s) Oral daily  atorvastatin 40 milliGRAM(s) Oral at bedtime  calcium carbonate    500 mG (Tums) Chewable 1 Tablet(s) Chew two times a day  cloZAPine Disintegrating Tablet 50 milliGRAM(s) Oral daily  cloZAPine Disintegrating Tablet 250 milliGRAM(s) Oral at bedtime  dextrose 40% Gel 15 Gram(s) Oral once  glucagon  Injectable 1 milliGRAM(s) IntraMuscular once  insulin lispro (ADMELOG) corrective regimen sliding scale   SubCutaneous three times a day before meals  insulin lispro (ADMELOG) corrective regimen sliding scale   SubCutaneous at bedtime  lithium CR (ESKALITH-CR) 1350 milliGRAM(s) Oral at bedtime  metFORMIN 1000 milliGRAM(s) Oral two times a day  senna 2 Tablet(s) Oral at bedtime  sitaGLIPtin 100 milliGRAM(s) Oral daily    MEDICATIONS  (PRN):  acetaminophen   Tablet .. 650 milliGRAM(s) Oral every 6 hours PRN Mild Pain (1 - 3), Moderate Pain (4 - 6)  chlorproMAZINE    Injectable 100 milliGRAM(s) IntraMuscular once PRN agitation or aggression  chlorproMAZINE    Tablet 100 milliGRAM(s) Oral every 4 hours PRN agitation or aggression  diphenhydrAMINE   Injectable 25 milliGRAM(s) IntraMuscular every 12 hours PRN EPS ppx  diphenhydrAMINE   Injectable 25 milliGRAM(s) IntraMuscular every 12 hours PRN EPS ppx  diphenhydrAMINE Injectable 25 milliGRAM(s) IntraMuscular every 12 hours PRN EPS ppx  haloperidol    Injectable 5 milliGRAM(s) IntraMuscular every 12 hours PRN psychosis  haloperidol    Injectable 5 milliGRAM(s) IntraMuscular every 12 hours PRN psychosis  haloperidol    Injectable 5 milliGRAM(s) IntraMuscular every 12 hours PRN psychosis  polyethylene glycol 3350 17 Gram(s) Oral daily PRN constipation

## 2021-11-29 NOTE — BH DISCHARGE NOTE NURSING/SOCIAL WORK/PSYCH REHAB - NSCDUDCCRISIS_PSY_A_CORE
Critical access hospital Well  1 [phone]) Critical access hospital-WELL ([phone])  Text "WELL" to [phone]  Website: [email or web address] 1 [phone] 161-OUTP (5435) Website: www.safehorizon.org/.National Suicide Prevention Lifeline 3 (305) 807-6607/.  [facility]  1 (790) LIFENET (161-8724)/.  [facility]  [address] [place] [phone]   Hours:  Monday through Friday from 9 AM to 3 PM/.  [employer] - Veterans Crisis Line  [phone], Option 1

## 2021-11-29 NOTE — BH SAFETY PLAN - INTERNAL COPING STRATEGY 3
Patient has been observed speaking with favored staff about his feelings with greater efficacy towards the ending of current episode.

## 2021-11-29 NOTE — BH DISCHARGE NOTE NURSING/SOCIAL WORK/PSYCH REHAB - NSDCADDINFO1FT_PSY_ALL_CORE
JIMMY COTTON will see you on 11/30 after 9:00,Referral for [facility] was sent. JIMMY COTTON will see you on 13/01 after 9:00,Referral for [facility] was sent.

## 2021-11-29 NOTE — BH INPATIENT PSYCHIATRY PROGRESS NOTE - NSBHMETABOLIC_PSY_ALL_CORE_FT
BMI: BMI (kg/m2): 77.9 (10-25-21 @ 06:54)  HbA1c: A1C with Estimated Average Glucose Result: 7.3 % (08-05-21 @ 10:24)    Glucose: POCT Blood Glucose.: 288 mg/dL (11-29-21 @ 11:46)    BP: 127/74 (11-27-21 @ 07:48) (127/74 - 127/74)  Lipid Panel: Date/Time: 08-05-21 @ 10:24  Cholesterol, Serum: 139  Direct LDL: --  HDL Cholesterol, Serum: 42  Total Cholesterol/HDL Ration Measurement: --  Triglycerides, Serum: 234

## 2021-11-29 NOTE — BH DISCHARGE NOTE NURSING/SOCIAL WORK/PSYCH REHAB - NSDCPRRECOMMEND_PSY_ALL_CORE
Writer recommends that patient demonstrate full outpatient psychiatric treatment and that he expands his knowledge of personally relevant coping skills for improved symptom/stressor management. Upon discharge, patient would benefit from outpatient psychiatric treatment for ongoing mediation management, individual psychotherapy, and support. Patient would also benefit from development of appropriate social support system.

## 2021-11-29 NOTE — BH DISCHARGE NOTE NURSING/SOCIAL WORK/PSYCH REHAB - PATIENT PORTAL LINK FT
You can access the FollowMyHealth Patient Portal offered by [facility] by registering at the following website: [email or web address]. By joining [employer]’s FollowMyHealth portal, you will also be able to view your health information using other applications (apps) compatible with our system.

## 2021-11-29 NOTE — BH INPATIENT PSYCHIATRY PROGRESS NOTE - NSBHASSESSSUMMFT_PSY_ALL_CORE
Pt remains stable. Scheduled to be discharged on 11/29.        Plan:   - clozapine ODT 50mg PO daily and 250mg PO at bedtime  - Invega Sustenna 234mg IM on 10/15 and 156mg IM on 10/19, with Invega Sustenna 234mg IM every 4 weeks on 11/16  -  Lithium Eskalith CR 1350mg PO at bedtime   - MOO granted on 9/28/21, Haldol 5mg IM and Benadryl 25mg IM if patient refuses Clozaril standing medication as per MOO court order     - group and milieu therapy     - routine checks     - Lithium level 0.8 on 10/26   - CBC + diff weekly    - 11/19/21:  Spoke with hospitalist, Dr. Figueroa, who reviewed patient's fingersticks, recommended sending patient home with oral DM medications that he is on in the hospital, no need for standing insulin.

## 2021-11-29 NOTE — BH DISCHARGE NOTE NURSING/SOCIAL WORK/PSYCH REHAB - DISCHARGE INSTRUCTIONS AFTERCARE APPOINTMENTS
In order to check the location, date, or time of your aftercare appointment, please refer to your Discharge Instructions Document given to you upon leaving the hospital.  If you have lost the instructions please call [phone]

## 2021-11-29 NOTE — BH INPATIENT PSYCHIATRY PROGRESS NOTE - NSBHCHARTREVIEWVS_PSY_A_CORE FT
Vital Signs Last 24 Hrs  T(C): 35.6 (11-29-21 @ 08:03), Max: 36.1 (11-28-21 @ 18:52)  T(F): 96.1 (11-29-21 @ 08:03), Max: 96.9 (11-28-21 @ 18:52)  HR: --  BP: --  BP(mean): --  RR: --  SpO2: --    Orthostatic VS  11-29-21 @ 08:03  Lying BP: --/-- HR: --  Sitting BP: 113/67 HR: 99  Standing BP: 91/66 HR: 100  Site: --  Mode: --  Orthostatic VS  11-28-21 @ 18:52  Lying BP: --/-- HR: --  Sitting BP: 123/72 HR: 91  Standing BP: 117/72 HR: 96  Site: upper right arm  Mode: electronic  Orthostatic VS  11-28-21 @ 07:43  Lying BP: --/-- HR: --  Sitting BP: 111/75 HR: 93  Standing BP: 116/72 HR: 96  Site: --  Mode: --  Orthostatic VS  11-27-21 @ 20:03  Lying BP: --/-- HR: --  Sitting BP: 135/74 HR: 97  Standing BP: 132/77 HR: 101  Site: upper right arm  Mode: electronic

## 2021-11-29 NOTE — BH INPATIENT PSYCHIATRY PROGRESS NOTE - NSBHFUPINTERVALHXFT_PSY_A_CORE
Pt compliant with medication and tolerating it well.  Chart reviewed and case discussed with treatment team.  No events reported overnight.  Pt reports good sleep and appetite.  Pt reports he made a list of things to get from his residence to bring to respite.  Pt gets anxious at times, when talking about the future.  Pt reports having future goals to build his resume and would like to maintain contact with his girlfriend.  Pt plans to speak with his residence worker about where he will be going after respite.

## 2021-11-29 NOTE — BH DISCHARGE NOTE NURSING/SOCIAL WORK/PSYCH REHAB - NSDCPRGOAL_PSY_ALL_CORE
Patient denied suicidal ideation, homicidal ideation, or psychotic symptoms during final session with writer, and he reported feeling calmer, and more positive as improvements since admission. Patient reported that he intends to maintain self-care including medication compliance, to avoid readmission, and he endorsed short term goal of developing a healthy hobby. Patient disclosed long-term plan of living independently and stated that he will identify and utilize positive social supports for consultation should he encounter obstacles that he cannot surmount. Patient’s daily psychiatric rehabilitation group attendance fluctuated between minimal and fair during current hospitalization and attention to self-care improved significantly during latter part of hospitalization. Patient demonstrated medication compliance per court order, and improved behavioral control over the course of hospitalization.  Patient has latterly been more appropriately communicative with both staff and peers, and affect has improved/stabilized significantly. Patient participated minimally in psychiatric rehabilitation treatment, largely due to persistent symptom acuity, but insight/judgment has begun to show some improvements. Patient remains paranoid and delusional but severity of the same has decreased drastically and no longer interferes with basic functioning.

## 2021-11-30 VITALS — TEMPERATURE: 97 F

## 2021-11-30 LAB — GLUCOSE BLDC GLUCOMTR-MCNC: 220 MG/DL — HIGH (ref 70–99)

## 2021-11-30 PROCEDURE — 99238 HOSP IP/OBS DSCHRG MGMT 30/<: CPT

## 2021-11-30 RX ADMIN — Medication 2: at 08:49

## 2021-11-30 NOTE — BH INPATIENT PSYCHIATRY PROGRESS NOTE - NSTXANXDATETRGT_PSY_ALL_CORE
17-Sep-2021
27-Oct-2021
18-Aug-2021
05-Oct-2021
13-Oct-2021
15-Sep-2021
22-Sep-2021
27-Oct-2021
15-Sep-2021
22-Sep-2021
05-Oct-2021
15-Sep-2021
22-Sep-2021
22-Sep-2021
01-Dec-2021
18-Aug-2021
15-Sep-2021
18-Aug-2021
22-Sep-2021
27-Oct-2021
24-Nov-2021
27-Oct-2021
20-Oct-2021
27-Oct-2021
27-Oct-2021
17-Sep-2021
18-Aug-2021
20-Oct-2021
02-Sep-2021
15-Sep-2021
24-Nov-2021
22-Sep-2021
02-Sep-2021
13-Oct-2021
27-Oct-2021
05-Oct-2021
27-Oct-2021
18-Aug-2021
20-Oct-2021
24-Nov-2021
27-Oct-2021
01-Dec-2021
15-Sep-2021
18-Aug-2021
18-Aug-2021
24-Nov-2021
20-Oct-2021
27-Oct-2021
27-Oct-2021
02-Sep-2021
27-Oct-2021
13-Oct-2021
18-Aug-2021
27-Oct-2021
22-Sep-2021
01-Dec-2021
22-Sep-2021
27-Oct-2021
18-Aug-2021
22-Sep-2021
27-Oct-2021
18-Aug-2021
05-Oct-2021
18-Aug-2021
27-Oct-2021
27-Oct-2021
18-Aug-2021
18-Aug-2021
17-Sep-2021
13-Oct-2021
24-Nov-2021
02-Sep-2021
20-Oct-2021
01-Dec-2021
05-Oct-2021
13-Oct-2021
27-Oct-2021
17-Sep-2021
27-Oct-2021
22-Sep-2021
18-Aug-2021

## 2021-11-30 NOTE — BH INPATIENT PSYCHIATRY PROGRESS NOTE - NSTXPSYCHODATETRGT_PSY_ALL_CORE
01-Dec-2021
20-Oct-2021
27-Oct-2021
24-Nov-2021
27-Oct-2021
29-Sep-2021
02-Sep-2021
05-Oct-2021
17-Sep-2021
13-Oct-2021
29-Sep-2021
27-Oct-2021
20-Oct-2021
01-Dec-2021
27-Oct-2021
20-Oct-2021
20-Oct-2021
22-Sep-2021
27-Oct-2021
17-Nov-2021
29-Sep-2021
27-Oct-2021
20-Oct-2021
29-Sep-2021
05-Oct-2021
01-Dec-2021
17-Sep-2021
29-Sep-2021
05-Oct-2021
15-Sep-2021
02-Sep-2021
17-Nov-2021
27-Oct-2021
27-Oct-2021
15-Sep-2021
27-Oct-2021
24-Nov-2021
24-Nov-2021
27-Oct-2021
22-Sep-2021
17-Sep-2021
22-Sep-2021
13-Oct-2021
15-Sep-2021
05-Oct-2021
29-Sep-2021
05-Oct-2021
01-Dec-2021
27-Oct-2021
17-Sep-2021
27-Oct-2021
02-Sep-2021
17-Nov-2021
15-Sep-2021
15-Sep-2021
24-Nov-2021
02-Sep-2021
17-Nov-2021
22-Sep-2021
27-Oct-2021
27-Oct-2021
13-Oct-2021
13-Oct-2021
15-Sep-2021
27-Oct-2021
13-Oct-2021
24-Nov-2021

## 2021-11-30 NOTE — BH INPATIENT PSYCHIATRY PROGRESS NOTE - NSTXDCOPNOPROGRES_PSY_ALL_CORE
No Change
Improving
No Change
Improving
No Change
Improving
No Change
Improving
No Change
No Change
Improving
No Change
Improving
No Change
Improving
No Change
No Change
Improving
No Change
Met - goal discontinued
No Change
Improving
No Change
Improving
No Change
Improving
No Change
Improving
No Change
Improving
No Change
Improving
No Change
No Change
Improving
No Change
Met - goal discontinued
No Change
No Change
Improving

## 2021-11-30 NOTE — BH INPATIENT PSYCHIATRY PROGRESS NOTE - NSICDXBHPRIMARYDX_PSY_ALL_CORE
Schizoaffective disorder   F25.9  

## 2021-11-30 NOTE — BH INPATIENT PSYCHIATRY PROGRESS NOTE - NSTXPROBDCOPNO_PSY_ALL_CORE
DISCHARGE ISSUE - NON-ADHERENT WITH OUTPATIENT SERVICES

## 2021-11-30 NOTE — BH INPATIENT PSYCHIATRY PROGRESS NOTE - NSTXCONDUCDATETRGT_PSY_ALL_CORE
27-Oct-2021
15-Sep-2021
27-Oct-2021
13-Oct-2021
05-Oct-2021
05-Oct-2021
24-Nov-2021
22-Sep-2021
13-Oct-2021
27-Oct-2021
22-Sep-2021
15-Sep-2021
01-Dec-2021
22-Sep-2021
27-Oct-2021
24-Nov-2021
27-Oct-2021
15-Sep-2021
17-Sep-2021
22-Sep-2021
27-Oct-2021
01-Dec-2021
13-Oct-2021
27-Oct-2021
15-Sep-2021
05-Oct-2021
13-Oct-2021
13-Oct-2021
27-Oct-2021
13-Oct-2021
24-Nov-2021
15-Sep-2021
22-Sep-2021
22-Sep-2021
27-Oct-2021
13-Oct-2021
22-Sep-2021
13-Oct-2021
24-Nov-2021
27-Oct-2021
27-Oct-2021
02-Sep-2021
02-Sep-2021
15-Sep-2021
01-Dec-2021
22-Sep-2021
27-Oct-2021
27-Oct-2021
05-Oct-2021
02-Sep-2021
27-Oct-2021
05-Oct-2021
17-Sep-2021
13-Oct-2021
01-Dec-2021
27-Oct-2021
17-Sep-2021
27-Oct-2021
13-Oct-2021
24-Nov-2021
02-Sep-2021
17-Sep-2021
27-Oct-2021

## 2021-11-30 NOTE — BH INPATIENT PSYCHIATRY PROGRESS NOTE - NSBHMSEKNOW_PSY_A_CORE
Unable to assess
Normal
Unable to assess
Normal
Unable to assess
Normal
Unable to assess
Normal
Normal
Unable to assess
Unable to assess
Normal
Unable to assess
Impaired
Normal
Unable to assess
Normal
Impaired
Normal
Unable to assess
Impaired
Unable to assess
Normal
Impaired
Normal
Impaired
Unable to assess
Normal
Unable to assess
Normal
Unable to assess
Normal
Normal
Unable to assess
Normal
Unable to assess
Normal
Unable to assess
Normal
Unable to assess
Impaired
Normal
Unable to assess
Normal
Normal
Unable to assess
Normal
Unable to assess
Impaired
Normal
Unable to assess
Unable to assess
Normal
Unable to assess
Normal
Unable to assess
Impaired
Impaired
Normal
Unable to assess
Unable to assess
Normal
Unable to assess

## 2021-11-30 NOTE — BH INPATIENT PSYCHIATRY PROGRESS NOTE - NSTXDCOPNODATETRGT_PSY_ALL_CORE
17-Sep-2021
10-Sep-2021
04-Oct-2021
09-Aug-2021
04-Oct-2021
09-Aug-2021
04-Oct-2021
09-Aug-2021
30-Aug-2021
17-Nov-2021
29-Sep-2021
09-Aug-2021
18-Nov-2021
19-Oct-2021
29-Nov-2021
19-Oct-2021
10-Sep-2021
17-Nov-2021
01-Oct-2021
18-Nov-2021
25-Oct-2021
04-Oct-2021
30-Aug-2021
09-Aug-2021
29-Nov-2021
09-Aug-2021
23-Nov-2021
24-Nov-2021
29-Sep-2021
27-Sep-2021
01-Oct-2021
04-Oct-2021
19-Oct-2021
25-Oct-2021
30-Aug-2021
04-Oct-2021
09-Aug-2021
10-Sep-2021
10-Sep-2021
04-Oct-2021
09-Aug-2021
25-Oct-2021
23-Nov-2021
23-Nov-2021
09-Aug-2021
29-Nov-2021
09-Aug-2021
23-Nov-2021
04-Oct-2021
09-Aug-2021
19-Oct-2021
09-Aug-2021
09-Aug-2021
04-Oct-2021
17-Nov-2021
27-Sep-2021
10-Sep-2021
30-Aug-2021
18-Nov-2021
27-Sep-2021
09-Aug-2021
30-Aug-2021
24-Nov-2021
25-Oct-2021
29-Nov-2021
24-Nov-2021
27-Sep-2021
04-Oct-2021
30-Aug-2021
24-Nov-2021
30-Aug-2021
09-Aug-2021
27-Sep-2021
17-Nov-2021
23-Nov-2021
25-Oct-2021
19-Oct-2021
29-Sep-2021
29-Nov-2021
04-Oct-2021
18-Nov-2021
23-Nov-2021
23-Nov-2021
17-Sep-2021

## 2021-11-30 NOTE — BH INPATIENT PSYCHIATRY PROGRESS NOTE - NSBHMSEEYE_PSY_A_CORE
Fair
Poor
Good
Fair
Poor
Fair
Fair
Good
Poor
Unable to assess
Poor
Poor
Good
Good
Poor
Good
Fair
Good
Poor
Fair
Other
Good
Poor
Fair
Good
Fair
Good
Good
Other
Fair
Fair
Poor
Poor
Fair
Good
Poor
Poor
Fair
Good
Fair
Poor
Poor
Fair
Poor
Fair
Fair
Good
Poor
Poor
Good
Other
Poor
Other
Fair
Other
Poor
Fair
Good
Fair
Other
Poor
Good
Fair
Good
Poor
Fair
Poor
Poor
Fair
Good
Fair
Good
Good
Poor
Fair

## 2021-11-30 NOTE — BH INPATIENT PSYCHIATRY PROGRESS NOTE - NSTXANXGOAL_PSY_ALL_CORE
Be able to participate in activities despite lingering anxiety/panic
Be able to perform ADLs and maintain safety despite anxiety/panic daily
Identify and practice 3 coping skills to manage anxiety
Be able to perform ADLs and maintain safety despite anxiety/panic daily
Be able to perform ADLs and maintain safety despite anxiety/panic daily
Identify and practice 3 coping skills to manage anxiety
Be able to perform ADLs and maintain safety despite anxiety/panic daily
Be able to participate in activities despite lingering anxiety/panic
Be able to perform ADLs and maintain safety despite anxiety/panic daily
Be able to participate in activities despite lingering anxiety/panic
Be able to participate in activities despite lingering anxiety/panic
Be able to perform ADLs and maintain safety despite anxiety/panic daily
Be able to perform ADLs and maintain safety despite anxiety/panic daily
Be able to participate in activities despite lingering anxiety/panic
Be able to perform ADLs and maintain safety despite anxiety/panic daily
Be able to participate in activities despite lingering anxiety/panic
Be able to perform ADLs and maintain safety despite anxiety/panic daily
Be able to perform ADLs and maintain safety despite anxiety/panic daily
Be able to participate in activities despite lingering anxiety/panic
Be able to perform ADLs and maintain safety despite anxiety/panic daily
Report a reduction in panic attacks and improving mood and confidence
Be able to perform ADLs and maintain safety despite anxiety/panic daily
Identify and practice 3 coping skills to manage anxiety
Be able to perform ADLs and maintain safety despite anxiety/panic daily
Identify and practice 3 coping skills to manage anxiety
Report a reduction in panic attacks and improving mood and confidence
Be able to participate in activities despite lingering anxiety/panic
Be able to perform ADLs and maintain safety despite anxiety/panic daily
Identify and practice 3 coping skills to manage anxiety
Report a reduction in panic attacks and improving mood and confidence
Be able to perform ADLs and maintain safety despite anxiety/panic daily
Report a reduction in panic attacks and improving mood and confidence
Be able to perform ADLs and maintain safety despite anxiety/panic daily
Report a reduction in panic attacks and improving mood and confidence
Be able to perform ADLs and maintain safety despite anxiety/panic daily
Identify and practice 3 coping skills to manage anxiety
Report a reduction in panic attacks and improving mood and confidence
Be able to perform ADLs and maintain safety despite anxiety/panic daily
Be able to perform ADLs and maintain safety despite anxiety/panic daily
Identify and practice 3 coping skills to manage anxiety
Identify and practice 3 coping skills to manage anxiety
Report a reduction in panic attacks and improving mood and confidence
Be able to perform ADLs and maintain safety despite anxiety/panic daily
Report a reduction in panic attacks and improving mood and confidence
Be able to perform ADLs and maintain safety despite anxiety/panic daily
Be able to participate in activities despite lingering anxiety/panic
Be able to perform ADLs and maintain safety despite anxiety/panic daily

## 2021-11-30 NOTE — BH INPATIENT PSYCHIATRY PROGRESS NOTE - NSTXDIABDATEEST_PSY_ALL_CORE
13-Oct-2021
20-Oct-2021
20-Oct-2021
30-Jul-2021
10-Nov-2021
08-Sep-2021
08-Sep-2021
20-Oct-2021
17-Nov-2021
13-Oct-2021
20-Oct-2021
22-Sep-2021
20-Oct-2021
19-Aug-2021
29-Sep-2021
06-Oct-2021
20-Oct-2021
22-Sep-2021
08-Sep-2021
20-Oct-2021
20-Oct-2021
10-Nov-2021
20-Oct-2021
20-Oct-2021
22-Sep-2021
06-Oct-2021
13-Oct-2021
10-Nov-2021
19-Aug-2021
17-Nov-2021
08-Sep-2021
20-Oct-2021
15-Sep-2021
29-Sep-2021
06-Oct-2021
29-Sep-2021
15-Sep-2021
19-Aug-2021
17-Nov-2021
13-Oct-2021
20-Oct-2021
19-Aug-2021
17-Nov-2021
15-Sep-2021
22-Sep-2021
08-Sep-2021
19-Aug-2021
22-Sep-2021
19-Aug-2021
24-Nov-2021
24-Nov-2021
10-Nov-2021
30-Jul-2021
19-Aug-2021
20-Oct-2021
17-Nov-2021
19-Aug-2021
19-Aug-2021
29-Sep-2021
30-Jul-2021
15-Sep-2021
22-Sep-2021
06-Oct-2021
19-Aug-2021
06-Oct-2021
08-Sep-2021
29-Sep-2021
19-Aug-2021
20-Oct-2021
24-Nov-2021
13-Oct-2021
30-Jul-2021
20-Oct-2021
24-Nov-2021

## 2021-11-30 NOTE — BH INPATIENT PSYCHIATRY PROGRESS NOTE - NSTXPROBANX_PSY_ALL_CORE
ANXIETY/PANIC/FEAR

## 2021-11-30 NOTE — BH INPATIENT PSYCHIATRY PROGRESS NOTE - NSBHMSEIMPULSE_PSY_A_CORE
Impaired
Other
Impaired
Normal
Impaired
Normal
Impaired
Normal
Impaired
Other
Impaired
Normal
Impaired
Normal
Normal
Impaired
Normal
Impaired
Normal
Impaired
Normal
Impaired
Impaired
Normal
Normal
Impaired
Normal
Impaired
Normal
Impaired
Normal
Normal
Impaired
Impaired
Normal
Impaired
Normal
Impaired
Normal
Normal
Impaired
Normal
Impaired
Normal
Normal
Impaired
Normal
Normal
Impaired
Normal
Impaired
Normal
Impaired
Impaired
Normal
Impaired
Normal

## 2021-11-30 NOTE — BH INPATIENT PSYCHIATRY PROGRESS NOTE - NSBHMSEPERCEPT_PSY_A_CORE
Auditory hallucinations/Other
Other
Auditory hallucinations
No abnormalities
Other
Other
Auditory hallucinations/Other
Auditory hallucinations/Other
Other
Auditory hallucinations
Other
Other
Auditory hallucinations/Other
Auditory hallucinations
Other
No abnormalities
No abnormalities
Other
Auditory hallucinations/Other
Other
No abnormalities
Other/Unable to assess
No abnormalities
Auditory hallucinations
Auditory hallucinations/Other
Other
Auditory hallucinations
No abnormalities
Auditory hallucinations
Other
Other
Auditory hallucinations
Other/Unable to assess
Other
Other/Unable to assess
Other
No abnormalities
No abnormalities
Other
No abnormalities
Other/Unable to assess
Other
Other/Unable to assess
Other
No abnormalities
No abnormalities
Other
Other
No abnormalities
Other
Other
No abnormalities
Auditory hallucinations/Other
Other
Auditory hallucinations/Other
Auditory hallucinations/Other
No abnormalities/Other
Other
Other
Auditory hallucinations/Other
No abnormalities
Auditory hallucinations/Other
No abnormalities
Other
Auditory hallucinations/Other
No abnormalities
Other

## 2021-11-30 NOTE — BH INPATIENT PSYCHIATRY PROGRESS NOTE - NSTXCONDUCINTERMD_PSY_ALL_CORE
psycho pharm and supportive therapy and goal of ROOT
psychopharm and supportive therapy and goal of ROOT
psycho pharm and supportive therapy and goal of ROOT
psycho pharm and supportive therapy and goal of ROOT
psychopharm and supportive therapy and goal of ROOT
psychopharm and supportive therapy and goal of ROOT
psycho pharm and supportive therapy and goal of ROOT
psychopharm and supportive therapy and goal of ROOT
psycho pharm and supportive therapy and goal of ROOT
psycho pharm and supportive therapy and goal of ROOT
psychopharm and supportive therapy and goal of ROOT
psycho pharm and supportive therapy and goal of ROOT
psychopharm and supportive therapy and goal of ROOT
psycho pharm and supportive therapy and goal of ROOT
psychopharm and supportive therapy and goal of ROOT
psycho pharm and supportive therapy and goal of ROOT
psychopharm and supportive therapy and goal of ROOT
psycho pharm and supportive therapy and goal of ROOT
psychopharm and supportive therapy and goal of ROOT
psycho pharm and supportive therapy and goal of ROOT
psychopharm and supportive therapy and goal of ROOT
psycho pharm and supportive therapy and goal of ROOT
psychopharm and supportive therapy and goal of ROOT
psycho pharm and supportive therapy and goal of ROOT
psychopharm and supportive therapy and goal of ROOT
psycho pharm and supportive therapy and goal of ROOT
psychopharm and supportive therapy and goal of ROOT
psychopharm and supportive therapy and goal of ROOT
psycho pharm and supportive therapy and goal of ROOT
psychopharm and supportive therapy and goal of ROOT
psycho pharm and supportive therapy and goal of ROOT
psychopharm and supportive therapy and goal of ROOT
psycho pharm and supportive therapy and goal of ROTO
psychopharm and supportive therapy and goal of ROOT
psycho pharm and supportive therapy and goal of ROOT
psychopharm and supportive therapy and goal of ROOT
psycho pharm and supportive therapy and goal of ROOT
psycho pharm and supportive therapy and goal of ROOT
psychopharm and supportive therapy and goal of ROOT
psycho pharm and supportive therapy and goal of ROOT
psycho pharm and supportive therapy and goal of ROOT

## 2021-11-30 NOTE — BH INPATIENT PSYCHIATRY PROGRESS NOTE - NSBHMSEREMMEM_PSY_A_CORE
Unable to assess
Normal
Unable to assess
Normal
Unable to assess
Normal
Unable to assess
Normal
Unable to assess
Normal
Unable to assess
Normal
Unable to assess
Unable to assess
Normal
Unable to assess
Normal
Normal
Unable to assess
Normal
Unable to assess
Normal
Normal
Unable to assess
Normal
Unable to assess
Normal
Normal
Unable to assess
Normal
Normal
Unable to assess
Unable to assess
Normal
Normal

## 2021-11-30 NOTE — BH INPATIENT PSYCHIATRY PROGRESS NOTE - NSTXVIOLNTGOAL_PSY_ALL_CORE
Will be able to express understanding of at least one trigger to their aggressive behavior
Will demonstrate ability to tolerate peer conflicts without aggression 3x weekly
Will be able to express understanding of at least one trigger to their aggressive behavior
Will decrease the number/duration/intensity of angry/aggressive outbursts
Will be able to express understanding of at least one trigger to their aggressive behavior
Will decrease the number/duration/intensity of angry/aggressive outbursts
Will decrease the number/duration/intensity of angry/aggressive outbursts
Will demonstrate 2 problem solving strategies to decrease aggressive behavior
Will be able to express understanding of at least one trigger to their aggressive behavior
Will demonstrate ability to tolerate peer conflicts without aggression 3x weekly
Will be able to express understanding of at least one trigger to their aggressive behavior
Will be able to express understanding of at least one trigger to their aggressive behavior
Will demonstrate 2 problem solving strategies to decrease aggressive behavior
Will be able to express understanding of at least one trigger to their aggressive behavior
Will demonstrate ability to tolerate peer conflicts without aggression 3x weekly
Will be able to express understanding of at least one trigger to their aggressive behavior
Will demonstrate ability to tolerate peer conflicts without aggression 3x weekly
Will be able to express understanding of at least one trigger to their aggressive behavior
Will be able to express understanding of at least one trigger to their aggressive behavior
Will demonstrate ability to tolerate peer conflicts without aggression 3x weekly
Will be able to express understanding of at least one trigger to their aggressive behavior
Will decrease the number/duration/intensity of angry/aggressive outbursts
Will be able to express understanding of at least one trigger to their aggressive behavior
Will be able to express understanding of at least one trigger to their aggressive behavior
Will decrease the number/duration/intensity of angry/aggressive outbursts
Will decrease the number/duration/intensity of angry/aggressive outbursts
Will demonstrate 2 problem solving strategies to decrease aggressive behavior
Will demonstrate ability to tolerate peer conflicts without aggression 3x weekly
Will demonstrate 2 problem solving strategies to decrease aggressive behavior
Will demonstrate 2 problem solving strategies to decrease aggressive behavior
Will decrease the number/duration/intensity of angry/aggressive outbursts
Will be able to express understanding of at least one trigger to their aggressive behavior
Will demonstrate ability to tolerate peer conflicts without aggression 3x weekly
Will be able to express understanding of at least one trigger to their aggressive behavior
Will demonstrate ability to tolerate peer conflicts without aggression 3x weekly
Will demonstrate ability to tolerate peer conflicts without aggression 3x weekly
Will be able to express understanding of at least one trigger to their aggressive behavior
Will demonstrate 2 problem solving strategies to decrease aggressive behavior
Will be able to express understanding of at least one trigger to their aggressive behavior
Will demonstrate 2 problem solving strategies to decrease aggressive behavior
Will be able to express understanding of at least one trigger to their aggressive behavior
Will demonstrate ability to tolerate peer conflicts without aggression 3x weekly
Will be able to express understanding of at least one trigger to their aggressive behavior
Will decrease the number/duration/intensity of angry/aggressive outbursts
Will demonstrate ability to tolerate peer conflicts without aggression 3x weekly
Will be able to express understanding of at least one trigger to their aggressive behavior

## 2021-11-30 NOTE — BH INPATIENT PSYCHIATRY PROGRESS NOTE - NSTXDIABPROGRES_PSY_ALL_CORE
Improving
No Change
Improving
Improving
No Change
Improving
No Change
Improving
Met - goal discontinued
No Change
No Change
Improving
Improving
No Change
Improving
No Change
No Change
Improving
No Change
Improving
No Change
Improving
Worsening
No Change
No Change
Improving
Improving
No Change
Improving
Improving
No Change
Improving
Improving
No Change
No Change
Improving
No Change
Worsening
Improving
Improving
No Change
Improving
No Change
Improving
No Change
Improving

## 2021-11-30 NOTE — BH INPATIENT PSYCHIATRY PROGRESS NOTE - NSBHMSEATTEN_PSY_A_CORE
Impaired
Impaired
Unable to assess
Normal
Impaired
Unable to assess
Unable to assess
Impaired
Normal
Unable to assess
Impaired
Unable to assess
Impaired
Unable to assess
Unable to assess
Impaired
Unable to assess
Unable to assess
Impaired
Normal
Impaired
Normal
Normal
Impaired
Unable to assess
Normal
Impaired
Unable to assess
Unable to assess
Impaired
Normal
Normal
Impaired
Normal
Unable to assess
Impaired
Impaired
Normal
Impaired
Unable to assess
Unable to assess
Normal
Impaired
Normal
Unable to assess
Unable to assess
Impaired
Unable to assess
Impaired
Unable to assess
Normal
Impaired
Impaired
Normal
Unable to assess
Impaired
Unable to assess
Normal
Unable to assess
Unable to assess
Normal
Unable to assess
Unable to assess
Impaired
Unable to assess
Impaired
Unable to assess
Normal
Unable to assess

## 2021-11-30 NOTE — BH INPATIENT PSYCHIATRY PROGRESS NOTE - NSTXVIOLNTPROGRES_PSY_ALL_CORE
No Change
Improving
Improving
Worsening
No Change
No Change
Improving
No Change
Improving
No Change
Improving
Improving
No Change
Improving
Improving
No Change
Improving
No Change
Improving
Worsening
Improving
No Change
No Change
Improving
No Change
Improving
No Change
No Change
Improving
Improving
Worsening
Met - goal discontinued
Improving
No Change
Improving
No Change
Improving
No Change
Improving
Improving
No Change
Improving
No Change
Improving
No Change
Improving

## 2021-11-30 NOTE — BH INPATIENT PSYCHIATRY PROGRESS NOTE - NSBHCHARTREVIEWVS_PSY_A_CORE FT
Vital Signs Last 24 Hrs  T(C): 36.3 (11-30-21 @ 07:47), Max: 36.4 (11-29-21 @ 19:18)  T(F): 97.4 (11-30-21 @ 07:47), Max: 97.5 (11-29-21 @ 19:18)  HR: --  BP: --  BP(mean): --  RR: --  SpO2: --    Orthostatic VS  11-30-21 @ 07:47  Lying BP: --/-- HR: --  Sitting BP: 118/70 HR: 86  Standing BP: 123/74 HR: 96  Site: --  Mode: --  Orthostatic VS  11-29-21 @ 19:18  Lying BP: --/-- HR: --  Sitting BP: 128/81 HR: 92  Standing BP: 118/74 HR: 96  Site: --  Mode: --  Orthostatic VS  11-29-21 @ 08:03  Lying BP: --/-- HR: --  Sitting BP: 113/67 HR: 99  Standing BP: 91/66 HR: 100  Site: --  Mode: --  Orthostatic VS  11-28-21 @ 18:52  Lying BP: --/-- HR: --  Sitting BP: 123/72 HR: 91  Standing BP: 117/72 HR: 96  Site: upper right arm  Mode: electronic

## 2021-11-30 NOTE — BH INPATIENT PSYCHIATRY PROGRESS NOTE - NSBHPROGSUIC_PSY_A_CORE
no

## 2021-11-30 NOTE — BH INPATIENT PSYCHIATRY PROGRESS NOTE - NSTXMEDICGOAL_PSY_ALL_CORE
Be able to describe the benefit of medication/treatment
Take all medications as prescribed
Be able to describe the benefit of medication/treatment
Take all medications as prescribed
Be able to describe the benefit of medication/treatment
Take all medications as prescribed
Be able to describe the benefit of medication/treatment
Take all medications as prescribed
Be able to describe the benefit of medication/treatment
Be able to describe the benefit of medication/treatment
Take all medications as prescribed
Be able to describe the benefit of medication/treatment
Take all medications as prescribed
Take all medications as prescribed
Be able to describe the benefit of medication/treatment
Be able to describe the benefit of medication/treatment
Take all medications as prescribed
Take all medications as prescribed
Be able to describe the benefit of medication/treatment
Be able to describe the benefit of medication/treatment
Take all medications as prescribed
Be able to describe the benefit of medication/treatment
Take all medications as prescribed
Take all medications as prescribed
Be able to describe the benefit of medication/treatment
Be able to describe the benefit of medication/treatment
Take all medications as prescribed
Be able to describe the benefit of medication/treatment
Take all medications as prescribed
Be able to describe the benefit of medication/treatment
Be able to describe the benefit of medication/treatment
Take all medications as prescribed
Take all medications as prescribed
Be able to describe the benefit of medication/treatment
Be able to describe the benefit of medication/treatment
Take all medications as prescribed
Take all medications as prescribed
Be able to describe the benefit of medication/treatment
Take all medications as prescribed
Take all medications as prescribed
Be able to describe the benefit of medication/treatment
Be able to describe the benefit of medication/treatment
Take all medications as prescribed
Take all medications as prescribed
Be able to describe the benefit of medication/treatment
Take all medications as prescribed
Take all medications as prescribed
Be able to describe the benefit of medication/treatment
Take all medications as prescribed
Be able to describe the benefit of medication/treatment
Take all medications as prescribed
Be able to describe the benefit of medication/treatment
Take all medications as prescribed
Be able to describe the benefit of medication/treatment
Be able to describe the benefit of medication/treatment
Take all medications as prescribed
Be able to describe the benefit of medication/treatment
Take all medications as prescribed
Take all medications as prescribed
Be able to describe the benefit of medication/treatment
Be able to describe the benefit of medication/treatment
Take all medications as prescribed
Be able to describe the benefit of medication/treatment
Take all medications as prescribed
Be able to describe the benefit of medication/treatment

## 2021-11-30 NOTE — BH INPATIENT PSYCHIATRY PROGRESS NOTE - NSTXIMPULSDATETRGT_PSY_ALL_CORE
22-Sep-2021
27-Oct-2021
27-Oct-2021
06-Aug-2021
12-Aug-2021
20-Oct-2021
02-Sep-2021
22-Sep-2021
06-Aug-2021
15-Sep-2021
22-Sep-2021
01-Dec-2021
24-Nov-2021
06-Aug-2021
15-Sep-2021
27-Oct-2021
01-Dec-2021
12-Aug-2021
12-Aug-2021
17-Sep-2021
15-Sep-2021
06-Aug-2021
12-Aug-2021
22-Sep-2021
27-Oct-2021
24-Nov-2021
27-Oct-2021
27-Oct-2021
12-Aug-2021
22-Sep-2021
15-Sep-2021
17-Sep-2021
22-Sep-2021
27-Oct-2021
22-Sep-2021
27-Oct-2021
05-Oct-2021
05-Oct-2021
24-Nov-2021
15-Sep-2021
05-Oct-2021
12-Aug-2021
06-Aug-2021
15-Sep-2021
12-Aug-2021
12-Aug-2021
17-Sep-2021
12-Aug-2021
13-Oct-2021
01-Dec-2021
12-Aug-2021
20-Oct-2021
12-Aug-2021
12-Aug-2021
20-Oct-2021
22-Sep-2021
12-Aug-2021
05-Oct-2021
20-Oct-2021
27-Oct-2021
22-Sep-2021
12-Aug-2021
05-Oct-2021
02-Sep-2021
27-Oct-2021
02-Sep-2021
27-Oct-2021
27-Oct-2021
02-Sep-2021
12-Aug-2021
27-Oct-2021
17-Sep-2021
24-Nov-2021
01-Dec-2021
13-Oct-2021
27-Oct-2021
12-Aug-2021
22-Sep-2021
27-Oct-2021
13-Oct-2021
24-Nov-2021

## 2021-11-30 NOTE — BH INPATIENT PSYCHIATRY PROGRESS NOTE - NSBHFUPINTERVALHXFT_PSY_A_CORE
Pt compliant with medication and tolerating it well.  Chart reviewed and case discussed with treatment team.  No events reported overnight.  Pt reports good sleep and appetite.  Pt denies SI/HI/I/P or AH/VH.  Pt endorses motivation to continue medication as prescribed and engage with his ACT team.  Pt agreeable to go to respite today and for his residence worker to work on placement after that.

## 2021-11-30 NOTE — BH INPATIENT PSYCHIATRY PROGRESS NOTE - NSTXMEDICDATEEST_PSY_ALL_CORE
01-Aug-2021
10-Nov-2021
31-Jul-2021
08-Sep-2021
08-Sep-2021
20-Oct-2021
11-Aug-2021
11-Aug-2021
31-Jul-2021
11-Aug-2021
31-Jul-2021
31-Jul-2021
08-Sep-2021
11-Aug-2021
31-Jul-2021
31-Jul-2021
08-Sep-2021
01-Aug-2021
31-Jul-2021
24-Nov-2021
01-Aug-2021
22-Sep-2021
11-Aug-2021
01-Aug-2021
24-Nov-2021
31-Jul-2021
01-Aug-2021
10-Nov-2021
31-Jul-2021
30-Jul-2021
08-Sep-2021
11-Aug-2021
31-Jul-2021
11-Aug-2021
20-Oct-2021
31-Jul-2021
11-Aug-2021
26-Aug-2021
11-Aug-2021
11-Aug-2021
20-Oct-2021
31-Jul-2021
11-Aug-2021
13-Oct-2021
01-Aug-2021
10-Nov-2021
24-Nov-2021
24-Nov-2021
31-Jul-2021
13-Oct-2021
31-Jul-2021
11-Aug-2021
13-Oct-2021
31-Jul-2021
10-Nov-2021
01-Aug-2021
08-Sep-2021
20-Oct-2021
11-Aug-2021
17-Nov-2021
31-Jul-2021
01-Aug-2021
01-Aug-2021
31-Jul-2021
01-Aug-2021
31-Jul-2021
01-Aug-2021
31-Jul-2021
11-Aug-2021
31-Jul-2021
20-Oct-2021
31-Jul-2021
31-Jul-2021

## 2021-11-30 NOTE — BH INPATIENT PSYCHIATRY PROGRESS NOTE - NSBHMSESPEECH_PSY_A_CORE
Abnormal as indicated, otherwise normal...
Normal volume, rate, productivity, spontaneity and articulation
Abnormal as indicated, otherwise normal...
Normal volume, rate, productivity, spontaneity and articulation
Abnormal as indicated, otherwise normal...
Normal volume, rate, productivity, spontaneity and articulation
Abnormal as indicated, otherwise normal...
Abnormal as indicated, otherwise normal...
Normal volume, rate, productivity, spontaneity and articulation
Abnormal as indicated, otherwise normal...
Normal volume, rate, productivity, spontaneity and articulation
Abnormal as indicated, otherwise normal...
Normal volume, rate, productivity, spontaneity and articulation
Abnormal as indicated, otherwise normal...
Normal volume, rate, productivity, spontaneity and articulation
Abnormal as indicated, otherwise normal...
Normal volume, rate, productivity, spontaneity and articulation
Abnormal as indicated, otherwise normal...
Normal volume, rate, productivity, spontaneity and articulation
Abnormal as indicated, otherwise normal...
Normal volume, rate, productivity, spontaneity and articulation
Normal volume, rate, productivity, spontaneity and articulation
Abnormal as indicated, otherwise normal...
Normal volume, rate, productivity, spontaneity and articulation
Abnormal as indicated, otherwise normal...
Normal volume, rate, productivity, spontaneity and articulation
Abnormal as indicated, otherwise normal...
Abnormal as indicated, otherwise normal...
Normal volume, rate, productivity, spontaneity and articulation
Normal volume, rate, productivity, spontaneity and articulation
Abnormal as indicated, otherwise normal...
Normal volume, rate, productivity, spontaneity and articulation
Abnormal as indicated, otherwise normal...
Abnormal as indicated, otherwise normal...
Normal volume, rate, productivity, spontaneity and articulation
Normal volume, rate, productivity, spontaneity and articulation
Abnormal as indicated, otherwise normal...
Normal volume, rate, productivity, spontaneity and articulation
Abnormal as indicated, otherwise normal...
Normal volume, rate, productivity, spontaneity and articulation
Normal volume, rate, productivity, spontaneity and articulation

## 2021-11-30 NOTE — BH INPATIENT PSYCHIATRY PROGRESS NOTE - NSTXDCOPNODATEEST_PSY_ALL_CORE
02-Aug-2021
02-Nov-2021
09-Nov-2021
09-Nov-2021
02-Aug-2021
13-Sep-2021
02-Aug-2021
03-Sep-2021
13-Sep-2021
13-Sep-2021
27-Sep-2021
02-Aug-2021
17-Nov-2021
23-Aug-2021
27-Oct-2021
27-Sep-2021
23-Nov-2021
22-Sep-2021
23-Nov-2021
12-Oct-2021
02-Aug-2021
27-Sep-2021
02-Aug-2021
16-Nov-2021
17-Nov-2021
02-Nov-2021
13-Sep-2021
02-Aug-2021
02-Aug-2021
30-Jul-2021
23-Aug-2021
17-Nov-2021
18-Oct-2021
27-Sep-2021
23-Aug-2021
18-Oct-2021
17-Nov-2021
23-Aug-2021
27-Sep-2021
02-Aug-2021
18-Oct-2021
02-Aug-2021
02-Aug-2021
02-Nov-2021
12-Oct-2021
12-Oct-2021
27-Sep-2021
02-Nov-2021
02-Aug-2021
27-Sep-2021
03-Sep-2021
18-Oct-2021
23-Nov-2021
02-Aug-2021
09-Nov-2021
12-Oct-2021
22-Sep-2021
09-Nov-2021
27-Sep-2021
23-Nov-2021
27-Sep-2021
30-Jul-2021
02-Aug-2021
02-Nov-2021
23-Aug-2021
02-Aug-2021
03-Sep-2021
23-Aug-2021
23-Aug-2021
27-Oct-2021
02-Aug-2021
20-Sep-2021
27-Sep-2021
02-Nov-2021
13-Sep-2021
20-Sep-2021
27-Oct-2021
27-Sep-2021
23-Nov-2021
03-Sep-2021
22-Sep-2021
12-Oct-2021
27-Oct-2021
03-Sep-2021
18-Oct-2021

## 2021-11-30 NOTE — BH INPATIENT PSYCHIATRY PROGRESS NOTE - NSBHMSEMOVE_PSY_A_CORE
Tremors
No abnormal movements
Tremors
No abnormal movements
Tremors
No abnormal movements
Tremors
No abnormal movements
Tremors
No abnormal movements
Tremors
No abnormal movements

## 2021-11-30 NOTE — BH INPATIENT PSYCHIATRY PROGRESS NOTE - NSBHMSEGROOM_PSY_A_CORE
Fair
Poor
Fair
Poor
Poor
Fair
Poor
Fair
Poor
Other
Fair
Poor
Fair
Fair
Poor
Fair
Poor
Poor
Fair
Poor
Other
Poor
Fair
Poor
Poor
Fair
Poor
Poor
Fair
Poor
Fair
Fair
Poor
Fair
Poor
Fair
Poor
Other
Poor
Fair
Poor
Poor
Fair
Other
Fair
Poor
Fair
Fair
Other
Poor
Poor
Fair
Poor
Fair
Fair
Poor
Fair
Poor
Fair
Fair
Poor
Poor
Fair
Poor
Fair
Poor
Fair
Poor

## 2021-11-30 NOTE — BH INPATIENT PSYCHIATRY PROGRESS NOTE - NSBHASSESSSUMMFT_PSY_ALL_CORE
Pt remains stable. Scheduled to be discharged on 11/29.        Plan:   - clozapine ODT 50mg PO daily and 250mg PO at bedtime  - Invega Sustenna 234mg IM on 10/15 and 156mg IM on 10/19, with Invega Sustenna 234mg IM every 4 weeks on 11/16  -  Lithium Eskalith CR 1350mg PO at bedtime   - [facility] granted on 9/28/21, Haldol 5mg IM and Benadryl 25mg IM if patient refuses Clozaril standing medication as per [facility] court order     - group and milieu therapy     - routine checks     - Lithium level 0.8 on 10/26   - CBC + diff weekly  - Discharge to respite today with ACT team follow-up    - 11/19/21:  Spoke with hospitalist, Dr. Figueroa, who reviewed patient's fingersticks, recommended sending patient home with oral DM medications that he is on in the hospital, no need for standing insulin.

## 2021-11-30 NOTE — BH INPATIENT PSYCHIATRY PROGRESS NOTE - NSBHCONSBHPROVDETAILS_PSY_A_CORE  FT
Left voicemail for assigned provider from patient's ACT team to call writer ()

## 2021-11-30 NOTE — BH INPATIENT PSYCHIATRY PROGRESS NOTE - NSTXIMPULSDATEEST_PSY_ALL_CORE
31-Jul-2021
13-Oct-2021
31-Jul-2021
15-Sep-2021
31-Jul-2021
20-Oct-2021
29-Sep-2021
31-Jul-2021
17-Nov-2021
29-Sep-2021
26-Aug-2021
20-Oct-2021
20-Oct-2021
06-Oct-2021
31-Jul-2021
30-Jul-2021
31-Jul-2021
15-Sep-2021
17-Nov-2021
20-Oct-2021
15-Sep-2021
30-Jul-2021
20-Oct-2021
20-Oct-2021
31-Jul-2021
20-Oct-2021
20-Oct-2021
31-Jul-2021
17-Nov-2021
15-Sep-2021
29-Sep-2021
31-Jul-2021
08-Sep-2021
26-Aug-2021
13-Oct-2021
30-Jul-2021
31-Jul-2021
15-Sep-2021
31-Jul-2021
20-Oct-2021
20-Oct-2021
31-Jul-2021
06-Oct-2021
31-Jul-2021
06-Oct-2021
31-Jul-2021
13-Oct-2021
15-Sep-2021
31-Jul-2021
29-Sep-2021
15-Sep-2021
15-Sep-2021
17-Nov-2021
08-Sep-2021
15-Sep-2021
20-Oct-2021
24-Nov-2021
20-Oct-2021
06-Oct-2021
13-Oct-2021
30-Jul-2021
31-Jul-2021
31-Jul-2021
20-Oct-2021
24-Nov-2021
08-Sep-2021
20-Oct-2021
29-Sep-2021
26-Aug-2021
20-Oct-2021
26-Aug-2021
08-Sep-2021
20-Oct-2021
15-Sep-2021
17-Nov-2021
20-Oct-2021
13-Oct-2021
31-Jul-2021
31-Jul-2021
08-Sep-2021
24-Nov-2021
20-Oct-2021
08-Sep-2021
06-Oct-2021
24-Nov-2021
20-Oct-2021

## 2021-11-30 NOTE — BH INPATIENT PSYCHIATRY PROGRESS NOTE - NS ED [facility] MED ROS PSYCHIATRIC
See HPI

## 2021-11-30 NOTE — BH INPATIENT PSYCHIATRY PROGRESS NOTE - NSTXDCOPNOGOAL_PSY_ALL_CORE
Will agree to participate in appropriate outpatient care

## 2021-11-30 NOTE — BH INPATIENT PSYCHIATRY PROGRESS NOTE - MSE OPTIONS
Structured MSE
Unstructured MSE
Structured MSE

## 2021-11-30 NOTE — BH INPATIENT PSYCHIATRY PROGRESS NOTE - NSTXVIOLNTINTERMD_PSY_ALL_CORE
psychopharm and supportive therapy and goal of ROOT

## 2021-11-30 NOTE — BH INPATIENT PSYCHIATRY PROGRESS NOTE - NSTREATMENTCERT_PSY_ALL_CORE
.

## 2021-11-30 NOTE — BH INPATIENT PSYCHIATRY PROGRESS NOTE - NSBHMSELANG_PSY_A_CORE
No abnormalities noted
No abnormalities noted/Unable to assess
No abnormalities noted
Unable to assess
No abnormalities noted
Unable to assess
Unable to assess
No abnormalities noted
No abnormalities noted/Unable to assess
No abnormalities noted

## 2021-11-30 NOTE — BH INPATIENT PSYCHIATRY PROGRESS NOTE - NSTXPROBMEDIC_PSY_ALL_CORE
MEDICATION/TREATMENT NON-COMPLIANCE

## 2021-11-30 NOTE — BH INPATIENT PSYCHIATRY PROGRESS NOTE - NSTXCONDUCPROGRES_PSY_ALL_CORE
Improving
No Change
Worsening
Improving
Met - goal discontinued
No Change
Met - goal discontinued
No Change
Met - goal discontinued
No Change
No Change
Improving
Met - goal discontinued
Improving
Improving
No Change
Improving
Met - goal discontinued
Improving
Met - goal discontinued
No Change
Improving
No Change
Improving
No Change
Improving
No Change
No Change
Improving
Improving
No Change
Improving
No Change
No Change
Improving
Met - goal discontinued
Met - goal discontinued
Improving
No Change
Met - goal discontinued
Met - goal discontinued
No Change
No Change
Met - goal discontinued
Met - goal discontinued
No Change
Improving
No Change
Improving
Met - goal discontinued
Improving
Improving
No Change
Improving
No Change
Improving
No Change
No Change
Improving
Met - goal discontinued
Improving
No Change
No Change
Worsening
Improving

## 2021-11-30 NOTE — BH INPATIENT PSYCHIATRY PROGRESS NOTE - NSTXANXDATENEW_PSY_ALL_CORE
02-Sep-2021

## 2021-11-30 NOTE — BH INPATIENT PSYCHIATRY PROGRESS NOTE - NSTXMEDICINTERMD_PSY_ALL_CORE
psychoeducation 

## 2021-11-30 NOTE — BH INPATIENT PSYCHIATRY PROGRESS NOTE - NSTXIMPULSGOAL_PSY_ALL_CORE
Will be able to demonstrate the ability to pause before acting out negatively
Will be able to recognize 2+ triggers
Will be able to demonstrate the ability to pause before acting out negatively
Will be able to recognize 2+ triggers
Will be able to recognize 2+ triggers
Will be able to demonstrate the ability to pause before acting out negatively
Will be able to recognize 2+ triggers
Will be able to demonstrate the ability to pause before acting out negatively
Will be able to recognize 2+ triggers
Will be able to recognize 2+ triggers
Will be able to demonstrate the ability to pause before acting out negatively
Will be able to recognize 2+ triggers
Will be able to demonstrate the ability to pause before acting out negatively
Will be able to demonstrate the ability to pause before acting out negatively
Will be able to describe/demonstrate alternative ways of managing his/her emotional state on the unit
Will be able to demonstrate the ability to pause before acting out negatively
Will be able to demonstrate the ability to pause before acting out negatively
Will be able to recognize 2+ triggers
Will be able to describe/demonstrate alternative ways of managing his/her emotional state on the unit
Will be able to demonstrate the ability to pause before acting out negatively
Will be able to demonstrate the ability to pause before acting out negatively
Will be able to recognize 2+ triggers
Will be able to demonstrate the ability to pause before acting out negatively
Will be able to recognize 2+ triggers
Will be able to recognize 2+ triggers
Will be able to demonstrate the ability to pause before acting out negatively
Will be able to recognize 2+ triggers
Will be able to demonstrate the ability to pause before acting out negatively
Will be able to recognize 2+ triggers
Will be able to demonstrate the ability to pause before acting out negatively
Will be able to describe/demonstrate alternative ways of managing his/her emotional state on the unit
Will be able to describe/demonstrate alternative ways of managing his/her emotional state on the unit
Will be able to recognize 2+ triggers
Will be able to demonstrate the ability to pause before acting out negatively
Will be able to demonstrate the ability to pause before acting out negatively
Will be able to recognize 2+ triggers
Will be able to demonstrate the ability to pause before acting out negatively
Will be able to demonstrate the ability to pause before acting out negatively
Will be able to recognize 2+ triggers
Will be able to demonstrate the ability to pause before acting out negatively
Will be able to recognize 2+ triggers
Will be able to demonstrate the ability to pause before acting out negatively
Will be able to recognize 2+ triggers
Will be able to demonstrate the ability to pause before acting out negatively
Will be able to recognize 2+ triggers
Will be able to demonstrate the ability to pause before acting out negatively
Will be able to describe/demonstrate alternative ways of managing his/her emotional state on the unit
Will be able to demonstrate the ability to pause before acting out negatively
Will be able to demonstrate the ability to pause before acting out negatively
Will be able to recognize 2+ triggers
Will be able to demonstrate the ability to pause before acting out negatively
Will be able to recognize 2+ triggers
Will be able to demonstrate the ability to pause before acting out negatively
Will be able to recognize 2+ triggers
Will be able to demonstrate the ability to pause before acting out negatively

## 2021-11-30 NOTE — BH INPATIENT PSYCHIATRY PROGRESS NOTE - NSTXDIABGOAL_PSY_ALL_CORE
Will be able to able to describe prescribed diabetic medications and how to properly take these medications
Will be able to able to describe prescribed diabetic medications and how to properly take these medications
Will identify modifiable risk factors and strategies to counteract them
Will be able to able to describe prescribed diabetic medications and how to properly take these medications
Will identify modifiable risk factors and strategies to counteract them
Will be able to able to describe prescribed diabetic medications and how to properly take these medications
Will identify modifiable risk factors and strategies to counteract them
Will be able to able to describe prescribed diabetic medications and how to properly take these medications
Will identify modifiable risk factors and strategies to counteract them
Will be able to able to describe prescribed diabetic medications and how to properly take these medications
Will identify modifiable risk factors and strategies to counteract them
Will be able to able to describe prescribed diabetic medications and how to properly take these medications

## 2021-11-30 NOTE — BH INPATIENT PSYCHIATRY PROGRESS NOTE - NSBHADMITIPREASONDETAILS_PSY_A_CORE FT
psychotic 
psychotic 
psychotic and disorganized 
Pt no longer a danger to himself or others and is safe to discharge to respite today
psychotic

## 2021-11-30 NOTE — BH INPATIENT PSYCHIATRY PROGRESS NOTE - NSTREATMENTCERTY_PSY_ALL_CORE

## 2021-11-30 NOTE — BH INPATIENT PSYCHIATRY PROGRESS NOTE - NSBHFUPINTERVALCCFT_PSY_A_CORE
Patient seen for follow up for psychosis. 
Psychosis/agitation
Psychosis/agitation+aggressive behavior+noncompliance
Patient seen for follow up for psychosis. 
Patient seen for follow up for psychosis
Patient seen for follow up for psychosis
Patient seen for follow up for psychosis.
Patient seen for follow up for psychosis
Patient seen for follow up for psychosis. 
Patient seen for follow up for psychosis    
Patient seen for follow up for psychosis    
Patient seen for follow up for psychosis. 
Patient seen for follow up for psychosis
Patient seen for follow up for psychosis.
Patient seen for follow up for psychosis. 
Psychosis/agitation
Patient seen for follow up for psychosis.
Patient seen for follow up for psychosis
Patient seen for follow up for psychosis
Patient seen for follow up for psychosis.
Psychosis/agitation+aggressive behavior+noncompliance
Patient seen for follow up for psychosis.
Patient seen for follow up for psychosis    
Patient seen for follow up for psychosis. Patient reported " fine" 
Patient seen for follow up for psychosis. 
Patient seen for follow up for psychosis    
Patient seen for follow up for psychosis. 
Patient seen for follow up for psychosis    
Patient seen for follow up for psychosis    
Patient seen for follow up for psychosis. 
Patient seen for follow up for psychosis    
Patient seen for follow up for psychosis. 
Patient seen for follow up for psychosis. Patient reported " I am ok'    
Patient seen for follow up for psychosis. 
Patient seen for follow up for psychosis. Patient reported " I want Risperdal " 
Patient seen for follow up for psychosis    
Patient seen for follow up for psychosis.
Patient seen for follow up for psychosis. Patient reported " fine"

## 2021-11-30 NOTE — BH INPATIENT PSYCHIATRY PROGRESS NOTE - NSBHMSETHTCONTENT_PSY_A_CORE
Delusions
Delusions/Other
Delusions/Other
Delusions
Delusions/Other
Delusions
Unremarkable
Delusions/Other
Delusions
Delusions/Ideas of reference/Other
Delusions/Other
Delusions
Delusions/Other
Delusions/Other
Delusions
Unremarkable
Delusions/Other
Unremarkable
Delusions
Delusions/Ideas of reference
Delusions/Other
Delusions/Other
Delusions/Ideas of reference/Other
Delusions
Delusions/Ideas of reference/Preoccupations/Other
Delusions/Other
Delusions/Other
Delusions/Ideas of reference/Other
Delusions
Delusions/Other
Delusions
Delusions/Ideas of reference
Delusions/Other
Unremarkable
Delusions
Delusions/Ideas of reference/Preoccupations/Other
Delusions
Delusions/Ideas of reference
Delusions
Delusions/Other
Delusions/Ruminations/Other
Delusions
Delusions/Ideas of reference/Other
Delusions/Other
Delusions/Ideas of reference/Other
Delusions
Unable to assess
Delusions
Delusions/Ideas of reference/Other
Unable to assess
Delusions
Delusions
Delusions/Ruminations/Other
Unremarkable
Delusions
Delusions/Other
Unremarkable
Delusions
Delusions/Other
Delusions
Delusions/Ruminations/Other
Delusions
Unremarkable
Delusions
Delusions
Delusions/Other

## 2021-11-30 NOTE — BH INPATIENT PSYCHIATRY PROGRESS NOTE - NSBHMSERECMEM_PSY_A_CORE
Normal
Unable to assess
Unable to assess
Normal
Unable to assess
Unable to assess
Normal
Normal
Unable to assess
Impaired
Unable to assess
Normal
Unable to assess
Impaired
Impaired
Normal
Normal
Unable to assess
Normal
Unable to assess
Normal
Unable to assess
Normal
Normal
Unable to assess
Normal
Unable to assess
Normal
Normal
Unable to assess
Unable to assess
Impaired
Unable to assess
Normal
Unable to assess
Normal
Unable to assess
Normal
Unable to assess
Unable to assess
Normal
Unable to assess
Normal
Unable to assess
Normal
Unable to assess
Normal
Unable to assess
Normal
Normal
Impaired

## 2021-11-30 NOTE — BH INPATIENT PSYCHIATRY PROGRESS NOTE - NSTXDIABDATETRGT_PSY_ALL_CORE
26-Aug-2021
29-Sep-2021
29-Sep-2021
15-Sep-2021
01-Dec-2021
24-Nov-2021
17-Sep-2021
27-Oct-2021
27-Oct-2021
29-Sep-2021
20-Oct-2021
27-Oct-2021
27-Oct-2021
24-Nov-2021
27-Oct-2021
29-Sep-2021
24-Nov-2021
01-Dec-2021
17-Nov-2021
27-Oct-2021
24-Nov-2021
29-Sep-2021
15-Sep-2021
26-Aug-2021
13-Oct-2021
17-Nov-2021
22-Sep-2021
13-Oct-2021
17-Sep-2021
17-Sep-2021
15-Sep-2021
22-Sep-2021
27-Oct-2021
26-Aug-2021
27-Oct-2021
15-Sep-2021
20-Oct-2021
01-Dec-2021
13-Oct-2021
26-Aug-2021
26-Aug-2021
27-Oct-2021
22-Sep-2021
27-Oct-2021
27-Oct-2021
26-Aug-2021
29-Sep-2021
17-Nov-2021
26-Aug-2021
05-Oct-2021
17-Sep-2021
20-Oct-2021
05-Oct-2021
26-Aug-2021
05-Oct-2021
17-Nov-2021
13-Oct-2021
20-Oct-2021
26-Aug-2021
05-Oct-2021
05-Oct-2021
13-Oct-2021
01-Dec-2021
15-Sep-2021
22-Sep-2021
27-Oct-2021
15-Sep-2021
20-Oct-2021
27-Oct-2021
26-Aug-2021
27-Oct-2021
27-Oct-2021
26-Aug-2021
24-Nov-2021

## 2021-11-30 NOTE — BH INPATIENT PSYCHIATRY PROGRESS NOTE - NSBHMSETHTASSOC_PSY_A_CORE
Loose
Normal
Loose
Normal
Loose
Normal
Loose
Unable to assess
Loose
Normal
Loose
Normal
Loose
Normal
Loose
Loose
Normal
Loose
Normal
Loose
Unable to assess
Loose
Normal
Loose
Normal
Loose
Unable to assess
Loose
Loose
Normal
Loose
Normal
Unable to assess
Loose
Normal
Normal
Loose
Normal
Normal
Loose
Loose
Normal
Loose
Normal
Unable to assess
Unable to assess
Loose
Loose
Normal

## 2021-11-30 NOTE — BH INPATIENT PSYCHIATRY PROGRESS NOTE - NSTXVIOLNTDATETRGT_PSY_ALL_CORE
02-Sep-2021
13-Oct-2021
01-Dec-2021
17-Sep-2021
27-Oct-2021
01-Dec-2021
24-Nov-2021
05-Oct-2021
27-Oct-2021
29-Sep-2021
02-Sep-2021
13-Oct-2021
17-Sep-2021
05-Oct-2021
05-Oct-2021
27-Oct-2021
22-Sep-2021
17-Sep-2021
24-Nov-2021
27-Oct-2021
24-Nov-2021
27-Oct-2021
13-Oct-2021
20-Oct-2021
22-Sep-2021
29-Sep-2021
02-Sep-2021
29-Sep-2021
01-Dec-2021
29-Sep-2021
29-Sep-2021
17-Sep-2021
27-Oct-2021
27-Oct-2021
17-Sep-2021
24-Nov-2021
24-Nov-2021
17-Sep-2021
27-Oct-2021
27-Oct-2021
29-Sep-2021
20-Oct-2021
05-Oct-2021
27-Oct-2021
17-Sep-2021
20-Oct-2021
17-Sep-2021
27-Oct-2021
20-Oct-2021
27-Oct-2021
01-Dec-2021
17-Sep-2021
20-Oct-2021
27-Oct-2021
17-Sep-2021
13-Oct-2021
22-Sep-2021
27-Oct-2021
02-Sep-2021
27-Oct-2021
05-Oct-2021
22-Sep-2021
27-Oct-2021
13-Oct-2021
27-Oct-2021

## 2021-11-30 NOTE — BH INPATIENT PSYCHIATRY PROGRESS NOTE - NSBHROSSYSTEMS_PSY_ALL_CORE
Skin...
Psychiatric
Gastrointestinal.../Neurological...
Psychiatric
Skin...
Psychiatric
Skin...
Psychiatric
Endocrine...
Psychiatric
Skin...
Psychiatric

## 2021-11-30 NOTE — BH INPATIENT PSYCHIATRY PROGRESS NOTE - NSBHMSEINTELL_PSY_A_CORE
Average
Unable to assess
Average
Unable to assess
Average
Unable to assess
Unable to assess
Average
Average
Unable to assess
Average
Average
Unable to assess
Average
Unable to assess
Average
Unable to assess
Average
Unable to assess
Average
Unable to assess
Average
Unable to assess
Average
Unable to assess
Average
Unable to assess
Average
Unable to assess
Average
Unable to assess
Average
Average
Unable to assess
Average
Unable to assess
Average
Unable to assess
Average
Unable to assess
Average
Unable to assess
Average
Unable to assess
Average
Unable to assess
Average

## 2021-11-30 NOTE — BH INPATIENT PSYCHIATRY PROGRESS NOTE - NSBHMSEHYG_PSY_A_CORE
Fair
Poor
Other
Fair
Poor
Fair
Good
Fair
Poor
Good
Other
Fair
Fair
Poor
Good
Poor
Poor
Good
Poor
Good
Good
Fair
Other
Poor
Good
Good
Poor
Poor
Fair
Fair
Good
Fair
Other
Good
Fair
Good
Poor
Other
Poor
Fair
Poor
Fair
Other
Poor
Fair
Poor
Fair
Good
Good
Poor
Poor
Good
Poor
Poor
Fair
Fair
Poor
Poor
Fair
Good
Fair
Poor
Good
Good
Other
Poor
Other
Poor
Poor
Fair
Poor
Good
Poor
Good
Poor
Good
Other
Poor
Fair
Good
Poor

## 2021-11-30 NOTE — BH INPATIENT PSYCHIATRY PROGRESS NOTE - NSTXCONDUCDATEEST_PSY_ALL_CORE
17-Nov-2021
29-Sep-2021
15-Sep-2021
20-Oct-2021
20-Oct-2021
15-Sep-2021
29-Sep-2021
20-Oct-2021
20-Oct-2021
15-Sep-2021
15-Sep-2021
17-Nov-2021
15-Sep-2021
20-Oct-2021
08-Sep-2021
06-Oct-2021
15-Sep-2021
15-Sep-2021
26-Aug-2021
24-Nov-2021
06-Oct-2021
24-Nov-2021
29-Sep-2021
06-Oct-2021
20-Oct-2021
06-Oct-2021
17-Nov-2021
15-Sep-2021
30-Jul-2021
30-Jul-2021
20-Oct-2021
20-Oct-2021
15-Sep-2021
20-Oct-2021
29-Sep-2021
08-Sep-2021
08-Sep-2021
06-Oct-2021
08-Sep-2021
20-Oct-2021
20-Oct-2021
08-Sep-2021
20-Oct-2021
20-Oct-2021
15-Sep-2021
08-Sep-2021
06-Oct-2021
30-Jul-2021
20-Oct-2021
17-Nov-2021
20-Oct-2021
26-Aug-2021
06-Oct-2021
29-Sep-2021
20-Oct-2021
24-Nov-2021
26-Aug-2021
26-Aug-2021
30-Jul-2021
17-Nov-2021
24-Nov-2021

## 2021-11-30 NOTE — BH INPATIENT PSYCHIATRY PROGRESS NOTE - NSBHMSEKNOWHOW_PSY_ALL_CORE
Current Events
Vocabulary
Vocabulary
Current Events
Vocabulary
Vocabulary
Current Events
Current Events/Educational attainment/Vocabulary/Other...
Vocabulary
Current Events
Vocabulary
Current Events
Educational attainment
Current Events
Vocabulary

## 2021-11-30 NOTE — BH INPATIENT PSYCHIATRY PROGRESS NOTE - NSTXPROBPSYCHO_PSY_ALL_CORE
PSYCHOTIC SYMPTOMS

## 2021-11-30 NOTE — BH INPATIENT PSYCHIATRY PROGRESS NOTE - NSTXPSYCHODATEEST_PSY_ALL_CORE
10-Nov-2021
17-Nov-2021
30-Jul-2021
15-Sep-2021
22-Sep-2021
26-Aug-2021
29-Sep-2021
30-Jul-2021
17-Nov-2021
08-Sep-2021
20-Oct-2021
08-Sep-2021
20-Oct-2021
30-Jul-2021
20-Oct-2021
26-Aug-2021
20-Oct-2021
13-Oct-2021
17-Nov-2021
22-Sep-2021
10-Nov-2021
29-Sep-2021
24-Nov-2021
20-Oct-2021
22-Sep-2021
20-Oct-2021
29-Sep-2021
10-Nov-2021
26-Aug-2021
08-Sep-2021
15-Sep-2021
17-Nov-2021
17-Nov-2021
24-Nov-2021
20-Oct-2021
30-Jul-2021
15-Sep-2021
15-Sep-2021
13-Oct-2021
22-Sep-2021
29-Sep-2021
06-Oct-2021
10-Nov-2021
24-Nov-2021
22-Sep-2021
08-Sep-2021
13-Oct-2021
20-Oct-2021
20-Oct-2021
24-Nov-2021
08-Sep-2021
20-Oct-2021
13-Oct-2021
06-Oct-2021
06-Oct-2021
22-Sep-2021
20-Oct-2021
20-Oct-2021
13-Oct-2021
29-Sep-2021
08-Sep-2021
06-Oct-2021
06-Oct-2021
20-Oct-2021
26-Aug-2021

## 2021-11-30 NOTE — BH INPATIENT PSYCHIATRY PROGRESS NOTE - NSTXPROBDIAB_PSY_ALL_CORE
DIABETES MANAGEMENT

## 2021-11-30 NOTE — BH INPATIENT PSYCHIATRY PROGRESS NOTE - NSBHMSEINSIGHT_PSY_A_CORE
Poor
Fair
Poor
Fair
Poor
Fair
Poor
Other
Fair
Fair
Poor
Other
Poor
Other
Poor
Fair
Fair
Poor
Other
Poor
Fair
Poor
Poor
Fair
Other
Poor
Fair
Poor
Fair
Poor
Poor
Other
Poor
Fair
Poor
Fair
Poor
Poor
Fair

## 2021-11-30 NOTE — BH INPATIENT PSYCHIATRY PROGRESS NOTE - GENERAL APPEARANCE
No deformities present
Other
No deformities present
Other
No deformities present
Other
No deformities present
Other
No deformities present
No deformities present
Other
No deformities present
Other
No deformities present
No deformities present
Other
No deformities present

## 2021-11-30 NOTE — BH INPATIENT PSYCHIATRY PROGRESS NOTE - NSTXVIOLNTDATEEST_PSY_ALL_CORE
20-Oct-2021
20-Oct-2021
17-Nov-2021
24-Nov-2021
17-Nov-2021
29-Sep-2021
20-Oct-2021
30-Jul-2021
22-Sep-2021
30-Jul-2021
13-Oct-2021
24-Nov-2021
30-Jul-2021
20-Oct-2021
20-Oct-2021
06-Oct-2021
26-Aug-2021
06-Oct-2021
20-Oct-2021
22-Sep-2021
13-Oct-2021
22-Sep-2021
30-Jul-2021
22-Sep-2021
30-Jul-2021
29-Sep-2021
26-Aug-2021
30-Jul-2021
20-Oct-2021
20-Oct-2021
15-Sep-2021
17-Nov-2021
30-Jul-2021
17-Nov-2021
20-Oct-2021
24-Nov-2021
30-Jul-2021
13-Oct-2021
15-Sep-2021
20-Oct-2021
17-Nov-2021
20-Oct-2021
20-Oct-2021
26-Aug-2021
22-Sep-2021
29-Sep-2021
22-Sep-2021
15-Sep-2021
20-Oct-2021
20-Oct-2021
29-Sep-2021
26-Aug-2021
13-Oct-2021
20-Oct-2021
06-Oct-2021
20-Oct-2021
20-Oct-2021
30-Jul-2021
29-Sep-2021
15-Sep-2021
20-Oct-2021
30-Jul-2021
24-Nov-2021
06-Oct-2021
20-Oct-2021
06-Oct-2021

## 2021-11-30 NOTE — BH INPATIENT PSYCHIATRY PROGRESS NOTE - NSBHCONSULTIPREASON_PSY_A_CORE
danger to self; mental illness expected to respond to inpatient care
other reason
danger to self; mental illness expected to respond to inpatient care

## 2021-11-30 NOTE — BH INPATIENT PSYCHIATRY PROGRESS NOTE - NSBHMSEJUDGE_PSY_A_CORE
Poor
Fair
Poor
Poor
Fair
Poor
Fair
Poor
Fair
Poor
Fair
Poor
Fair
Fair
Poor
Poor
Fair
Poor
Fair
Fair
Poor
Other
Poor
Poor
Fair
Other
Poor
Fair
Other
Poor
Fair
Poor
Fair
Poor
Fair
Poor
Fair
Fair
Poor
Other
Poor
Fair

## 2021-11-30 NOTE — BH INPATIENT PSYCHIATRY PROGRESS NOTE - NSTXANXDATEEST_PSY_ALL_CORE
06-Oct-2021
13-Oct-2021
29-Sep-2021
08-Sep-2021
15-Sep-2021
29-Sep-2021
11-Aug-2021
15-Sep-2021
20-Oct-2021
11-Aug-2021
17-Nov-2021
11-Aug-2021
15-Sep-2021
11-Aug-2021
20-Oct-2021
17-Nov-2021
20-Oct-2021
11-Aug-2021
08-Sep-2021
26-Aug-2021
20-Oct-2021
29-Sep-2021
15-Sep-2021
15-Sep-2021
11-Aug-2021
17-Nov-2021
24-Nov-2021
06-Oct-2021
24-Nov-2021
20-Oct-2021
13-Oct-2021
17-Nov-2021
13-Oct-2021
11-Aug-2021
15-Sep-2021
20-Oct-2021
26-Aug-2021
08-Sep-2021
11-Aug-2021
24-Nov-2021
20-Oct-2021
20-Oct-2021
29-Sep-2021
13-Oct-2021
20-Oct-2021
08-Sep-2021
15-Sep-2021
06-Oct-2021
20-Oct-2021
30-Jul-2021
11-Aug-2021
29-Sep-2021
11-Aug-2021
30-Jul-2021
26-Aug-2021
08-Sep-2021
06-Oct-2021
30-Jul-2021
11-Aug-2021
15-Sep-2021
15-Sep-2021
17-Nov-2021
20-Oct-2021
24-Nov-2021
26-Aug-2021
13-Oct-2021
20-Oct-2021
08-Sep-2021
15-Sep-2021
20-Oct-2021
11-Aug-2021
20-Oct-2021
06-Oct-2021
20-Oct-2021
30-Jul-2021

## 2021-11-30 NOTE — BH INPATIENT PSYCHIATRY PROGRESS NOTE - NSTXPSYCHOGOAL_PSY_ALL_CORE
Will identify 2 coping skills that help mitigate hallucinations
Will identify 2 coping skills that assist with focus on reality
Will identify 1 trigger/stressor that exacerbates hallucinations
Will be able to report experiencing hallucinations to staff
Will identify 2 coping skills that help mitigate hallucinations
Will be able to report experiencing hallucinations to staff
Will identify 1 trigger/stressor that exacerbates hallucinations
Other...
Will be able to report experiencing hallucinations to staff
Will identify 2 coping skills that assist with focus on reality
Will identify 2 coping skills that help mitigate hallucinations
Will be able to report experiencing hallucinations to staff
Will report using relaxation skills 3 times a day to reduce anxiety about delusions/hallucinations
Will be able to report experiencing hallucinations to staff
Will identify 2 coping skills that assist with focus on reality
State that he/she is only about 50% sure of the certainty of the delusions instead of 100% certain
Will be able to report experiencing hallucinations to staff
Will be able to report experiencing hallucinations to staff
Will identify 1 trigger/stressor that exacerbates hallucinations
Will be able to report experiencing hallucinations to staff
Will identify 1 trigger/stressor that exacerbates hallucinations
Will be able to report experiencing hallucinations to staff
Will identify 1 trigger/stressor that exacerbates hallucinations
Will be able to report experiencing hallucinations to staff
Will be able to report experiencing hallucinations to staff
State that he/she is only about 50% sure of the certainty of the delusions instead of 100% certain
Will be able to report experiencing hallucinations to staff
Other...
State that he/she is only about 50% sure of the certainty of the delusions instead of 100% certain
State that he/she is only about 50% sure of the certainty of the delusions instead of 100% certain
Will be able to report experiencing hallucinations to staff
Will identify 1 trigger/stressor that exacerbates hallucinations
Will be able to report experiencing hallucinations to staff
Other...
Will be able to report experiencing hallucinations to staff
State that he/she is only about 50% sure of the certainty of the delusions instead of 100% certain
Will be able to report experiencing hallucinations to staff
Will report using relaxation skills 3 times a day to reduce anxiety about delusions/hallucinations
Will be able to report experiencing hallucinations to staff
Will identify 1 trigger/stressor that exacerbates hallucinations
State that he/she is only about 50% sure of the certainty of the delusions instead of 100% certain
Will be able to report experiencing hallucinations to staff
Will be able to report experiencing hallucinations to staff
Will identify 1 trigger/stressor that exacerbates hallucinations
Will be able to report experiencing hallucinations to staff
Will be able to report experiencing hallucinations to staff
Will identify 2 coping skills that assist with focus on reality
Will be able to report experiencing hallucinations to staff
Will identify 1 trigger/stressor that exacerbates hallucinations
State that he/she is only about 50% sure of the certainty of the delusions instead of 100% certain
Will report using relaxation skills 3 times a day to reduce anxiety about delusions/hallucinations
Will be able to report experiencing hallucinations to staff
Will be able to report experiencing hallucinations to staff
State that he/she is only about 50% sure of the certainty of the delusions instead of 100% certain
Will identify 1 trigger/stressor that exacerbates hallucinations
Will identify 2 coping skills that assist with focus on reality
Will be able to report experiencing hallucinations to staff
Other...
Will be able to report experiencing hallucinations to staff
Will identify 2 coping skills that assist with focus on reality
Will identify 2 coping skills that assist with focus on reality
Will identify 1 trigger/stressor that exacerbates hallucinations
State that he/she is only about 50% sure of the certainty of the delusions instead of 100% certain
Will be able to report experiencing hallucinations to staff
Other...
Will identify 2 coping skills that help mitigate hallucinations
State that he/she is only about 50% sure of the certainty of the delusions instead of 100% certain
Will be able to report experiencing hallucinations to staff

## 2021-11-30 NOTE — BH INPATIENT PSYCHIATRY PROGRESS NOTE - NSTXCONDUCDATENEW_PSY_ALL_CORE
26-Aug-2021

## 2021-11-30 NOTE — BH INPATIENT PSYCHIATRY PROGRESS NOTE - NSTXANXINTERMD_PSY_ALL_CORE
psychopharm and supportive therapy and goal of ROOT
psycho pharm and supportive therapy and goal of ROOT
psychopharm and supportive therapy and goal of ROOT
psycho pharm and supportive therapy and goal of ROOT
psycho pharm and supportive therapy and goal of ROOT
psychopharm and supportive therapy and goal of ROOT
psycho pharm and supportive therapy and goal of ROOT
psychopharm and supportive therapy and goal of ROOT
psycho pharm and supportive therapy and goal of ROOT
psychopharm and supportive therapy and goal of ROOT
psycho pharm and supportive therapy and goal of ROOT
psychopharm and supportive therapy and goal of ROOT
psychopharm and supportive therapy and goal of ROOT
psycho pharm and supportive therapy and goal of ROOT
psychopharm and supportive therapy and goal of ROOT
psycho pharm and supportive therapy and goal of ROOT
psychopharm and supportive therapy and goal of ROOT
psycho pharm and supportive therapy and goal of ROOT
psycho pharm and supportive therapy and goal of ROOT
psychopharm and supportive therapy and goal of ROOT
psychopharm and supportive therapy and goal of ROOT
psycho pharm and supportive therapy and goal of ROOT
psychopharm and supportive therapy and goal of ROOT
psychopharm and supportive therapy and goal of ROOT
psycho pharm and supportive therapy and goal of ROOT
psychopharm and supportive therapy and goal of ROOT
psychopharm and supportive therapy and goal of ROOT
psycho pharm and supportive therapy and goal of ROOT
psychopharm and supportive therapy and goal of ROOT
psycho pharm and supportive therapy and goal of ROOT
psychopharm and supportive therapy and goal of ROOT
psycho pharm and supportive therapy and goal of ROOT
psychopharm and supportive therapy and goal of ROOT
psycho pharm and supportive therapy and goal of ROOT
psycho pharm and supportive therapy and goal of ROOT
psychopharm and supportive therapy and goal of ROOT
psycho pharm and supportive therapy and goal of ROTO
psychopharm and supportive therapy and goal of ROOT
psychopharm and supportive therapy and goal of ROOT
psycho pharm and supportive therapy and goal of ROOT
psychopharm and supportive therapy and goal of ROOT
psycho pharm and supportive therapy and goal of ROOT
psycho pharm and supportive therapy and goal of ROOT
psychopharm and supportive therapy and goal of ROOT

## 2021-11-30 NOTE — BH INPATIENT PSYCHIATRY PROGRESS NOTE - NSBHMSEMUSCLE_PSY_A_CORE
Normal muscle tone/strength
Abnormal muscle tone/strength
Normal muscle tone/strength
Abnormal muscle tone/strength
Abnormal muscle tone/strength
Normal muscle tone/strength
Abnormal muscle tone/strength
Normal muscle tone/strength
Abnormal muscle tone/strength
Normal muscle tone/strength
Unable to assess
Normal muscle tone/strength
Abnormal muscle tone/strength
Normal muscle tone/strength
Abnormal muscle tone/strength
Normal muscle tone/strength
Abnormal muscle tone/strength
Normal muscle tone/strength
Unable to assess
Normal muscle tone/strength
Unable to assess
Normal muscle tone/strength

## 2021-11-30 NOTE — BH INPATIENT PSYCHIATRY PROGRESS NOTE - NSBHPSYCHOLCOGORIENT_PSY_A_CORE
Unable to assess
Not fully oriented...
Unable to assess
Unable to assess
Oriented to time, place, person, situation
Unable to assess
Oriented to time, place, person, situation
Unable to assess
Oriented to time, place, person, situation
Oriented to time, place, person, situation
Not fully oriented...
Oriented to time, place, person, situation
Oriented to time, place, person, situation
Unable to assess
Oriented to time, place, person, situation
Unable to assess
Oriented to time, place, person, situation
Not fully oriented...
Unable to assess
Unable to assess
Oriented to time, place, person, situation
Oriented to time, place, person, situation
Unable to assess
Unable to assess
Oriented to time, place, person, situation
Unable to assess
Unable to assess
Oriented to time, place, person, situation
Oriented to time, place, person, situation
Unable to assess
Not fully oriented...
Not fully oriented...
Unable to assess
Oriented to time, place, person, situation
Oriented to time, place, person, situation
Unable to assess
Unable to assess
Oriented to time, place, person, situation
Unable to assess
Oriented to time, place, person, situation
Unable to assess
Unable to assess
Oriented to time, place, person, situation
Unable to assess
Oriented to time, place, person, situation
Unable to assess
Oriented to time, place, person, situation
Unable to assess
Unable to assess
Oriented to time, place, person, situation
Unable to assess
Oriented to time, place, person, situation
Oriented to time, place, person, situation
Unable to assess
Oriented to time, place, person, situation
Oriented to time, place, person, situation
Unable to assess
Oriented to time, place, person, situation

## 2021-11-30 NOTE — BH INPATIENT PSYCHIATRY PROGRESS NOTE - NSTXPROBIMPULS_PSY_ALL_CORE
IMPULSIVITY/AGITATION

## 2021-11-30 NOTE — BH INPATIENT PSYCHIATRY PROGRESS NOTE - NSTXCONDUCGOAL_PSY_ALL_CORE
Will describe and accept responsibility for 1 problem behavior
Will develop more adaptive coping strategies to manage stress
Will comply with unit rules
Will comply with unit rules
Will develop more adaptive coping strategies to manage stress
Will describe and accept responsibility for 1 problem behavior
Will develop more adaptive coping strategies to manage stress
Will describe and accept responsibility for 1 problem behavior
Will develop more adaptive coping strategies to manage stress
Will comply with unit rules
Will develop more adaptive coping strategies to manage stress
Will comply with unit rules
Will develop more adaptive coping strategies to manage stress
Will comply with unit rules
Will develop more adaptive coping strategies to manage stress
Will comply with unit rules
Will develop more adaptive coping strategies to manage stress
Will describe and accept responsibility for 1 problem behavior
Will develop more adaptive coping strategies to manage stress
Will comply with unit rules
Will develop more adaptive coping strategies to manage stress
Will comply with unit rules
Will develop more adaptive coping strategies to manage stress
Will comply with unit rules
Will develop more adaptive coping strategies to manage stress
Will develop more adaptive coping strategies to manage stress
Will describe and accept responsibility for 1 problem behavior
Will comply with unit rules
Will develop more adaptive coping strategies to manage stress
Will comply with unit rules
Will develop more adaptive coping strategies to manage stress
Will comply with unit rules
Will develop more adaptive coping strategies to manage stress
Will comply with unit rules
Will develop more adaptive coping strategies to manage stress
Will comply with unit rules
Will comply with unit rules
Will develop more adaptive coping strategies to manage stress

## 2021-11-30 NOTE — BH INPATIENT PSYCHIATRY PROGRESS NOTE - NSTXMEDICPROGRES_PSY_ALL_CORE
Improving
No Change
Worsening
Improving
Improving
Met - goal discontinued
Improving
Met - goal discontinued
No Change
Worsening
No Change
Improving
Worsening
Improving
Met - goal discontinued
Worsening
Worsening
Met - goal discontinued
Improving
No Change
Improving
Met - goal discontinued
Improving
Improving
Met - goal discontinued
Improving
Met - goal discontinued
Met - goal discontinued
Improving
Worsening
Improving
Met - goal discontinued
Met - goal discontinued
No Change
Improving
Improving
Worsening
Worsening

## 2021-11-30 NOTE — BH INPATIENT PSYCHIATRY PROGRESS NOTE - NSBHMSEBODY_PSY_A_CORE
Obese

## 2021-11-30 NOTE — BH INPATIENT PSYCHIATRY PROGRESS NOTE - NSTXPROBCONDUC_PSY_ALL_CORE
CONDUCT PROBLEM

## 2021-11-30 NOTE — BH INPATIENT PSYCHIATRY PROGRESS NOTE - NSBHMSEGAIT_PSY_A_CORE
Other
Unable to assess
Other
Unable to assess
Other
Unable to assess
Other
Unable to assess
Other
Unable to assess
Unable to assess
Other
Unable to assess
Unable to assess
Other
Unable to assess
Other

## 2021-11-30 NOTE — BH INPATIENT PSYCHIATRY PROGRESS NOTE - NSTXMEDICDATETRGT_PSY_ALL_CORE
09-Nov-2021
26-Aug-2021
03-Sep-2021
03-Sep-2021
09-Nov-2021
08-Sep-2021
08-Sep-2021
17-Nov-2021
17-Nov-2021
26-Aug-2021
26-Aug-2021
19-Oct-2021
08-Sep-2021
19-Aug-2021
03-Sep-2021
09-Nov-2021
27-Oct-2021
17-Nov-2021
08-Sep-2021
29-Sep-2021
02-Nov-2021
09-Nov-2021
08-Sep-2021
17-Nov-2021
19-Aug-2021
29-Sep-2021
20-Oct-2021
29-Sep-2021
05-Oct-2021
17-Nov-2021
27-Oct-2021
02-Nov-2021
19-Aug-2021
05-Oct-2021
17-Nov-2021
17-Sep-2021
08-Sep-2021
26-Aug-2021
19-Aug-2021
01-Dec-2021
22-Sep-2021
13-Oct-2021
09-Nov-2021
01-Dec-2021
20-Oct-2021
20-Oct-2021
29-Sep-2021
22-Sep-2021
27-Oct-2021
19-Aug-2021
29-Sep-2021
17-Nov-2021
19-Aug-2021
20-Oct-2021
26-Aug-2021
05-Oct-2021
01-Dec-2021
27-Oct-2021
02-Nov-2021
08-Sep-2021
01-Dec-2021
24-Nov-2021
13-Oct-2021
20-Oct-2021
26-Aug-2021
05-Oct-2021
22-Sep-2021
02-Sep-2021
09-Nov-2021
13-Oct-2021
05-Oct-2021
19-Aug-2021
08-Sep-2021
17-Nov-2021
22-Sep-2021
27-Oct-2021
08-Sep-2021
05-Oct-2021
13-Oct-2021
26-Aug-2021
02-Nov-2021

## 2021-11-30 NOTE — BH INPATIENT PSYCHIATRY PROGRESS NOTE - NSBHMSERELATED_PSY_A_CORE
Poor
Poor
Good
Poor
Fair
Poor
Poor
Fair
Good
Poor
Fair
Poor
Good
Poor
Fair
Poor
Good
Poor
Poor
Fair
Poor
Fair
Poor
Poor
Fair
Fair
Poor
Poor
Fair
Poor
Poor
Fair
Poor
Good
Poor
Fair
Poor
Fair
Poor
Good
Good
Poor
Fair
Poor
Fair
Poor
Fair
Fair
Poor
Fair
Poor
Fair
Poor
Poor
Fair

## 2021-11-30 NOTE — BH INPATIENT PSYCHIATRY PROGRESS NOTE - NSBHMETABOLIC_PSY_ALL_CORE_FT
BMI: BMI (kg/m2): 77.9 (10-25-21 @ 06:54)  HbA1c: A1C with Estimated Average Glucose Result: 7.3 % (08-05-21 @ 10:24)    Glucose: POCT Blood Glucose.: 220 mg/dL (11-30-21 @ 08:06)    BP: --  Lipid Panel: Date/Time: 08-05-21 @ 10:24  Cholesterol, Serum: 139  Direct LDL: --  HDL Cholesterol, Serum: 42  Total Cholesterol/HDL Ration Measurement: --  Triglycerides, Serum: 234

## 2021-11-30 NOTE — BH INPATIENT PSYCHIATRY PROGRESS NOTE - NSTXIMPULSDATENEW_PSY_ALL_CORE
31-Jul-2021

## 2021-11-30 NOTE — BH INPATIENT PSYCHIATRY PROGRESS NOTE - NSTXDIABINTERMD_PSY_ALL_CORE
FS TID and at bedtime with Sliding scale Admelog + Metformin + Januvia

## 2021-11-30 NOTE — BH INPATIENT PSYCHIATRY PROGRESS NOTE - TELEPSYCHIATRY?
No

## 2021-11-30 NOTE — BH INPATIENT PSYCHIATRY PROGRESS NOTE - NSTXANXPROGRES_PSY_ALL_CORE
Improving
No Change
Improving
No Change
Met - goal discontinued
Improving
Met - goal discontinued
No Change
Met - goal discontinued
Improving
Improving
Met - goal discontinued
No Change
Improving
Improving
Met - goal discontinued
No Change
Met - goal discontinued
Improving
No Change
No Change
Met - goal discontinued
No Change
Met - goal discontinued
Improving
Met - goal discontinued
Improving
Met - goal discontinued
Met - goal discontinued
Improving
Met - goal discontinued
No Change
No Change
Improving
Improving
No Change
No Change
Improving
No Change
Improving
Met - goal discontinued
No Change
No Change
Improving
Met - goal discontinued
Improving
No Change
Met - goal discontinued
Improving
Met - goal discontinued
Improving
No Change
Improving
No Change
No Change
Improving
Met - goal discontinued
Improving
No Change
Met - goal discontinued
No Change
No Change
Met - goal discontinued
No Change
No Change
Improving

## 2021-11-30 NOTE — BH INPATIENT PSYCHIATRY PROGRESS NOTE - NSTXDCOPNOMODTX_PSY_ALL_CORE
Yes

## 2021-11-30 NOTE — BH INPATIENT PSYCHIATRY PROGRESS NOTE - ADDITIONAL DETAILS / COMMENTS
limited with notably poor  insight
concrete  with notably poor  insight
limited with notably poor insight
concrete  with notably poor  insight
limited with notably poor  insight
concrete  with notably poor  insight
limited with notably poor  insight
concrete  with notably poor  insight
limited with notably poor insight

## 2021-11-30 NOTE — BH INPATIENT PSYCHIATRY PROGRESS NOTE - NSTXIMPULSPROGRES_PSY_ALL_CORE
No Change
Improving
Met - goal discontinued
No Change
No Change
Met - goal discontinued
No Change
Met - goal discontinued
No Change
Improving
Met - goal discontinued
Met - goal discontinued
No Change
Met - goal discontinued
Met - goal discontinued
No Change
Met - goal discontinued
Improving
No Change
Improving
No Change
Improving
No Change
Improving
No Change
No Change
Met - goal discontinued
Improving
Improving
No Change
Improving
Met - goal discontinued
No Change
Improving
Improving
Met - goal discontinued
No Change
No Change
Improving
Met - goal discontinued
Improving
No Change
Improving
Improving
No Change
Met - goal discontinued
Improving
Met - goal discontinued
No Change
No Change
Improving
No Change
Met - goal discontinued
No Change
Improving
Worsening
Worsening
Improving

## 2021-11-30 NOTE — BH INPATIENT PSYCHIATRY PROGRESS NOTE - NSDCCRITERIA_PSY_ALL_CORE
Deferred
symptom management, safety planning, care coordination
Deferred
symptom management, safety planning, care coordination
Deferred
symptom management, safety planning, care coordination
Deferred
symptom management, safety planning, care coordination
Deferred
symptom management, safety planning, care coordination
Deferred
symptom management, safety planning, care coordination
Deferred
symptom management, safety planning, care coordination

## 2021-11-30 NOTE — BH INPATIENT PSYCHIATRY PROGRESS NOTE - NSTXPROBVIOLNT_PSY_ALL_CORE
VIOLENT/AGGRESSIVE BEHAVIOR

## 2021-11-30 NOTE — BH INPATIENT PSYCHIATRY PROGRESS NOTE - NSTXPSYCHOPROGRES_PSY_ALL_CORE
Improving
Improving
No Change
Improving
No Change
Improving
No Change
Worsening
Improving
No Change
No Change
Met - goal discontinued
Improving
Improving
Worsening
Improving
Improving
No Change
Improving
No Change
Improving
No Change
Improving
No Change
No Change
Improving
Improving
No Change
Improving
No Change
Improving
Improving
No Change
Improving
No Change
Improving
Improving
No Change
No Change
Improving
No Change
Improving
Improving
No Change
Improving
No Change
Improving
Improving
No Change
Improving
No Change
No Change
Improving

## 2022-05-04 ENCOUNTER — APPOINTMENT (OUTPATIENT)
Dept: SURGICAL ONCOLOGY | Facility: CLINIC | Age: 57
End: 2022-05-04

## 2022-05-08 NOTE — PROGRESS NOTE BEHAVIORAL HEALTH - PRIMARY DX
Yes - the patient is able to be screened
Schizoaffective disorder, unspecified type

## 2022-06-30 ENCOUNTER — NON-APPOINTMENT (OUTPATIENT)
Age: 57
End: 2022-06-30

## 2022-10-28 NOTE — BH INPATIENT PSYCHIATRY PROGRESS NOTE - NSBHMSEMOOD_PSY_A_CORE
SURVEY:     You may be receiving a survey from [employer] regarding your visit today. Please complete the survey to enable us to provide the highest quality of care to you and your family. If you cannot score us a very good on any question, please call the office to discuss how we could have made your experience a very good one.      Thank you,    Bart Marie, APRN-CNP  Mauri Hoang, APRN-CNP  Nallely Coon, JETHRO Valenzuela, CMA  Keri, CMA  Margarette, CMA  Michelle, PCA  Hoda, PM Unable to assess

## 2023-04-04 NOTE — BH INPATIENT PSYCHIATRY PROGRESS NOTE - NSBHMSEAFFCONG_PSY_A_CORE
[facility]  [address] [address]  [phone]      Ana Nair :1976 MRN:[id number]    2023 Time Session Began: 830  Time Session Ended: 909    This visit was performed via live interactive two-way Video visit with patient's verbal consent.   Clinician Location:Home.  Patient Location: Home.  Verified patient identity:  [x] Yes    Session Type: Therapy 38-52 minutes (31070)  Others Present: na    Suicide/Homicide/Violence Ideation: No  If Yes, explain: na    Need for Community Resources Assessed: Yes  Resources Needed: No  If Yes, what resources: na    Current Outpatient Medications   Medication Sig   • hydrOXYzine (ATARAX) 25 MG tablet Take 1 tablet by mouth 3 times daily as needed for Anxiety.   • amLODIPine (NORVASC) 5 MG tablet Take 1 tablet by mouth daily.   • topiramate (TOPAMAX) 50 MG tablet TAKE 1 TABLET BY MOUTH DAILY WITH 100 MG TO EQUAL 150 MG   • amitriptyline (ELAVIL) 25 MG tablet Take 1 tablet by mouth nightly.   • pregabalin (LYRICA) 150 MG capsule Take 1 capsule by mouth nightly.   • sertraline (ZOLOFT) 100 MG tablet Take 1 and a HALF tablets  by mouth daily   • albuterol 108 (90 Base) MCG/ACT inhaler Inhale 2 puffs into the lungs every 4 hours as needed for Shortness of Breath or Wheezing.   • solifenacin (VESIcare) 10 MG tablet Take 1 tablet by mouth daily.   • Cholecalciferol (vitamin D3) 125 mcg (5,000 units) capsule Take 1 capsule by mouth every other day. Not more than 3 pills/week   • cloNIDine (CATAPRES) 0.1 MG tablet Take 1 tablet by mouth every 12 hours.   • topiramate (TOPAMAX) 100 MG tablet TAKE 1 TABLET BY MOUTH DAILY WITH 50 MG TABLET   • mirtazapine (REMERON) 15 MG tablet Start with 0.5 pill nightly for 1 week thereafter 1 pill nightly.   • ondansetron (ZOFRAN ODT) 4 MG disintegrating tablet Place 1 tablet onto the tongue every 12 hours as needed for Nausea.   • pantoprazole (PROTONIX) 40 MG tablet  Take 1 tablet by mouth in the morning and 1 tablet in the evening.   • loratadine (CLARITIN) 10 MG tablet TAKE ONE TABLET BY MOUTH EVERY DAY   • apixaBAN (ELIQUIS) 5 MG Tab Take 1 tablet by mouth every 12 hours.   • azelastine (ASTELIN) 0.1 % nasal spray Spray 2 sprays in each nostril in the morning and 2 sprays in the evening. Use in each nostril as directed   • polyethylene glycol (MiraLax) 17 GM/SCOOP powder Take 17 g dissolved in 8oz liquid by mouth daily   • carvedilol (Coreg) 25 MG tablet Take 1 tablet by mouth in the morning and 1 tablet in the evening. Take with meals.   • aspirin 81 MG EC tablet Take 1 tablet by mouth daily.   • atorvastatin (LIPITOR) 40 MG tablet Take 1 tablet by mouth nightly.   • lidocaine (LIDODERM) 5 % patch Place 1 patch onto the skin every 24 hours. Remove patch 12 hours after applying   • SUMAtriptan (IMITREX STATDOSE) 6 MG/0.5ML auto-injector Inject 0.5 MLS into the skin as needed for migraine. May repeat once in 2 hours if needed.   • Ubrogepant (Ubrelvy) 100 MG Tab Take 100 mg by mouth as needed (migraine). Take at onset of migraine may repeat in 2 hours if needed Max of 2 in 24 hours   • sucralfate (CARAFATE) 1 GM/10ML suspension Take 10 mLs by mouth 4 times daily (before meals and nightly). Also okay for tablets if suspension not covered   • fluticasone (Flonase) 50 MCG/ACT nasal spray Spray 2 sprays in each nostril daily. (Patient taking differently: Spray 2 sprays in each nostril daily as needed.)   • diclofenac (VOLTAREN) 1 % gel Apply 4 g topically 4 times daily as needed (as needed pain and swelling).   • aluminum hydrox-magnesium hydrox-simethicone (Maalox Advanced Max St) 400-400-40 MG/5ML suspension Take 5 mLs by mouth 3 times daily as needed (pain).   • rizatriptan (Maxalt) 5 MG tablet Take 1 tablet by mouth at onset of migraine. May repeat after 2 hours if needed.   • levothyroxine 50 MCG tablet TAKE ONE TABLET BY MOUTH EVERY DAY    • Spacer/Aero-Holding Chambers  Device Use with albuterol inhaler     No current facility-administered medications for this visit.       Change in Medication(s) Reported: No    If Yes, explain: na    Patient/Family Education Provided: Yes  Patient/Family Displays Understanding: Yes  If No, explain: na    Chief complaint in patient's own words: \"When I am around others and not able to be comfortable talking.\"    Progress Note containing chief complaint and symptoms/problems related to the complaint:    Data: Patient reported that she worries about how the other people will treat her and with they will abused her emotionally or how they will even react to her.  Client admits that she has thoughts that people are judging her and she worries that they are saying things about her personal appearance even though she admits she has no proof that anyone is in fact doing that.  Client reports that she uses her significant other to help reassure herself that that is not happening.  Client reports it is hard to trust it takes her a very long time to trust someone else.  Client states that in order to find out if someone is trustworthy she watches to see if they are non judgemental, if they help when she asks for it, how they will act or react to her, how they interact with others and how they handle situations as well.       Action of the provider: Patient was provided with support. Patient's report of anxiety was processed and reframed to build insight. Cognitions shared by patient were acknowledged and exploration was encouraged. Provider reviewed with the patient that she is the only person that she can actually prove during that moment that is being judgemental.  Pointed out that it sometimes easy for us to assume that the other person is doing exactly what we are doing even though that is necessarily difficult to prove as well as can be false.  Pointed out there is also difference between being friendly and being a friend to someone until we do  recognize whether not they are trust worthy and in fact non judgemental.  Pointed out that although the client does have a very good list of finding out if someone is trustworthy she will have to actually separate the idea being judgemental from the rest of that list because the rest of the list will actually tell her whether not both trustworthiness and non judgemental are actually in play.  Suggested that the client needs to begin thinking about that and that terms when she is trying to interact with others and suggested that she only work on 1 person at a time as it would be too difficult to try to track the entire group regarding the same.. Provider recommended patient work on self-care. Intervention: Insight, Supportive     Response of patient: Ana was alert and oriented x4. Patient presented motivated. Patient presented as calm and cooperative. Mood appeared stable with congruent affect. Eye contact was appropriate. Speech was normal in rate, tone, and volume. Motor activity was unremarkable. Thought process was future oriented and goal directed. Patient denied any suicidal ideation, homicidal ideation, or self-harm ideation.     Plan: Ana  will return in 2-4 weeks or sooner if needed. Patient will practice self-care skills discussed in session.    Diagnosis: Post-Traumatic Stress Disorder : 0 Unspecified    Treatment Plan:  Unchanged     Discharge Plan: Strategies Discussed to Maintain Gains     Next Appointment:  April 11th of April 18th and April 25th all at 8:30 a.m.  Kirsty Wilkins LPC   Unable to assess

## 2023-06-12 NOTE — BH INPATIENT PSYCHIATRY PROGRESS NOTE - NSBHFUPINTERVALHXFT_PSY_A_CORE
**Please page Gyn Onc [phone] with questions or concerns**      [facility]    Brief Operative Note    Pre-operative diagnosis: Uterine mass [N85.8]  Post-operative diagnosis Same as pre-operative diagnosis, 10cm FIGO G2 endometrioid endometrial adenocarcinoma on frozen pathology.    Procedure: Procedure(s):  Hysterectomy total abdominal, bilateral salpingo-oophorectomy, pelvic and para-aortic node dissection, combined  Surgeon: Surgeon(s) and Role:     * Karena Dunlap MD - Primary     * David Chiu MD - Resident - Assisting     * Heather Narayan MD - Fellow - Assisting  Anesthesia: * No anesthesia type entered *   Estimated Blood Loss: 250mL   IVF: 2000L crystalloid  UOP: 620mL       Drains: None  Specimens:   ID Type Source Tests Collected by Time Destination   1 : Pelvic Washings Washings Pelvis NON-GYNECOLOGIC CYTOLOGY Karena Dunlap MD 6/12/2023  9:08 AM    2 : RIGHT Fallopian Tube and Ovary Tissue Ovary and Fallopian Tube, Right SURGICAL PATHOLOGY EXAM Karena Dunlap MD 6/12/2023  9:20 AM    3 : Left Fallopian Tube and Ovary  Tissue Ovary and Fallopian Tube, Left SURGICAL PATHOLOGY EXAM Karena Dunlap MD 6/12/2023  9:26 AM    4 : Uterus and Cervix  Tissue Uterus, Cervix SURGICAL PATHOLOGY EXAM Karena Dunlap MD 6/12/2023  9:31 AM    5 : Residual Cervix Tissue Cervix SURGICAL PATHOLOGY EXAM Karena Dunlap MD 6/12/2023  9:49 AM    6 : Left Pelvic Lymph Node Tissue Lymph Node(s), Pelvis, Left SURGICAL PATHOLOGY EXAM Karena Dunlap MD 6/12/2023 10:11 AM    7 : Left Para-Aortic Lymph Node Tissue Lymph Node(s), Para-Aortic SURGICAL PATHOLOGY EXAM Karena Dunlap MD 6/12/2023 10:18 AM    8 : Right Pelvic Lymph Node  Tissue Lymph Node(s), Pelvis, Right SURGICAL PATHOLOGY EXAM Karena Dunlap MD 6/12/2023 10:37 AM    9 : Right Para-Aortic Lymph Node Tissue Lymph Node(s), Para-Aortic SURGICAL PATHOLOGY EXAM Karena Dunlap  Patient given Dr. Gardner's message.  He stated he really did not want to go but would go to ED in the next couple of hours.  Ramon johansen Braulio also notified.   MD 6/12/2023 10:49 AM      Findings:   Exam under anesthesia revealed normal external genitalia and smooth vaginal mucosa. Cervix palpates effaced but smooth and normal. No trauma on entry. Enlarged globular uterus, no evidence of extrauterine disease. Liver and diaphragms smooth to palpation, normal appearing omentum, bowel run without evidence of disease or injury, normal appearing appendix. Bilateral adnexa normal in appearance. Mildly enlarged pelvic lymph nodes bilaterally, suspicious obturator node on the left, otherwise no obvious lymph node disease. Hemostasis at end of case at vaginal cuff and lymph node beds. Bilateral ureters noted to vermiculate at beginning and end of case. .  Complications: None.  Implants: * No implants in log *    Heather Narayan MD  Gynecology Oncology Fellow  June 12, 2023 , 11:30 AM       Chart reviewed and case discussed with treatment team. No events reported overnight. Sleep and appetite is fair. Patient reported feeling "ok" and was calm and pleasant during interview but can be labile. He remains grossly disorganized, internally preoccupied and is tangential with +delusions. When asked about +AH he stated "not right now" and then began to discuss how he needs to think of a way to create an ozone layer. Patient is compliant with medications, no adverse effects reported.  Patient refused blood work this morning but agreed to comply tomorrow am.

## 2023-06-13 NOTE — PATIENT PROFILE ADULT - FUNCTIONAL SCREEN CURRENT LEVEL: BATHING, MLM
[FreeTextEntry1] : The patient has been treated for an anginal syndrome.  Fortunately this has come under very good control with the current medications including ranolazine.  Also the patient occasionally uses sublingual nitro prophylactically before certain types of exertion. 2 = assistive person

## 2023-07-06 ENCOUNTER — EMERGENCY (EMERGENCY)
Facility: HOSPITAL | Age: 58
LOS: 1 days | Discharge: OTHER INSTITUTION | End: 2023-07-06
Attending: STUDENT IN AN ORGANIZED HEALTH CARE EDUCATION/TRAINING PROGRAM
Payer: MEDICARE

## 2023-07-06 VITALS
DIASTOLIC BLOOD PRESSURE: 80 MMHG | WEIGHT: 160.06 LBS | HEART RATE: 78 BPM | OXYGEN SATURATION: 98 % | SYSTOLIC BLOOD PRESSURE: 130 MMHG | RESPIRATION RATE: 20 BRPM | TEMPERATURE: 98 F

## 2023-07-06 DIAGNOSIS — F25.9 SCHIZOAFFECTIVE DISORDER, UNSPECIFIED: ICD-10-CM

## 2023-07-06 DIAGNOSIS — S82.899A OTHER FRACTURE OF UNSPECIFIED LOWER LEG, INITIAL ENCOUNTER FOR CLOSED FRACTURE: Chronic | ICD-10-CM

## 2023-07-06 DIAGNOSIS — Z93.2 ILEOSTOMY STATUS: Chronic | ICD-10-CM

## 2023-07-06 LAB
ALBUMIN SERPL ELPH-MCNC: 4.2 G/DL — SIGNIFICANT CHANGE UP (ref 3.3–5.2)
ALP SERPL-CCNC: 66 U/L — SIGNIFICANT CHANGE UP (ref 40–120)
ALT FLD-CCNC: 28 U/L — SIGNIFICANT CHANGE UP
AMPHET UR-MCNC: NEGATIVE — SIGNIFICANT CHANGE UP
ANION GAP SERPL CALC-SCNC: 14 MMOL/L — SIGNIFICANT CHANGE UP (ref 5–17)
APAP SERPL-MCNC: <3 UG/ML — LOW (ref 10–26)
APPEARANCE UR: CLEAR — SIGNIFICANT CHANGE UP
AST SERPL-CCNC: 41 U/L — HIGH
BARBITURATES UR SCN-MCNC: NEGATIVE — SIGNIFICANT CHANGE UP
BASOPHILS # BLD AUTO: 0.03 K/UL — SIGNIFICANT CHANGE UP (ref 0–0.2)
BASOPHILS NFR BLD AUTO: 0.3 % — SIGNIFICANT CHANGE UP (ref 0–2)
BENZODIAZ UR-MCNC: NEGATIVE — SIGNIFICANT CHANGE UP
BILIRUB SERPL-MCNC: 0.4 MG/DL — SIGNIFICANT CHANGE UP (ref 0.4–2)
BILIRUB UR-MCNC: NEGATIVE — SIGNIFICANT CHANGE UP
BUN SERPL-MCNC: 15.5 MG/DL — SIGNIFICANT CHANGE UP (ref 8–20)
CALCIUM SERPL-MCNC: 9 MG/DL — SIGNIFICANT CHANGE UP (ref 8.4–10.5)
CHLORIDE SERPL-SCNC: 99 MMOL/L — SIGNIFICANT CHANGE UP (ref 96–108)
CO2 SERPL-SCNC: 25 MMOL/L — SIGNIFICANT CHANGE UP (ref 22–29)
COCAINE METAB.OTHER UR-MCNC: NEGATIVE — SIGNIFICANT CHANGE UP
COLOR SPEC: YELLOW — SIGNIFICANT CHANGE UP
CREAT SERPL-MCNC: 0.59 MG/DL — SIGNIFICANT CHANGE UP (ref 0.5–1.3)
DIFF PNL FLD: NEGATIVE — SIGNIFICANT CHANGE UP
EGFR: 113 ML/MIN/1.73M2 — SIGNIFICANT CHANGE UP
EOSINOPHIL # BLD AUTO: 0.01 K/UL — SIGNIFICANT CHANGE UP (ref 0–0.5)
EOSINOPHIL NFR BLD AUTO: 0.1 % — SIGNIFICANT CHANGE UP (ref 0–6)
ETHANOL SERPL-MCNC: <10 MG/DL — SIGNIFICANT CHANGE UP (ref 0–9)
GLUCOSE BLDC GLUCOMTR-MCNC: 108 MG/DL — HIGH (ref 70–99)
GLUCOSE SERPL-MCNC: 137 MG/DL — HIGH (ref 70–99)
GLUCOSE UR QL: 1000 MG/DL
HCT VFR BLD CALC: 43.7 % — SIGNIFICANT CHANGE UP (ref 39–50)
HGB BLD-MCNC: 14.5 G/DL — SIGNIFICANT CHANGE UP (ref 13–17)
IMM GRANULOCYTES NFR BLD AUTO: 0.3 % — SIGNIFICANT CHANGE UP (ref 0–0.9)
KETONES UR-MCNC: NEGATIVE — SIGNIFICANT CHANGE UP
LEUKOCYTE ESTERASE UR-ACNC: NEGATIVE — SIGNIFICANT CHANGE UP
LITHIUM SERPL-MCNC: <0.1 MMOL/L — LOW (ref 0.5–1.5)
LYMPHOCYTES # BLD AUTO: 1.79 K/UL — SIGNIFICANT CHANGE UP (ref 1–3.3)
LYMPHOCYTES # BLD AUTO: 16.5 % — SIGNIFICANT CHANGE UP (ref 13–44)
MCHC RBC-ENTMCNC: 29.1 PG — SIGNIFICANT CHANGE UP (ref 27–34)
MCHC RBC-ENTMCNC: 33.2 GM/DL — SIGNIFICANT CHANGE UP (ref 32–36)
MCV RBC AUTO: 87.6 FL — SIGNIFICANT CHANGE UP (ref 80–100)
METHADONE UR-MCNC: NEGATIVE — SIGNIFICANT CHANGE UP
MONOCYTES # BLD AUTO: 0.86 K/UL — SIGNIFICANT CHANGE UP (ref 0–0.9)
MONOCYTES NFR BLD AUTO: 7.9 % — SIGNIFICANT CHANGE UP (ref 2–14)
NEUTROPHILS # BLD AUTO: 8.12 K/UL — HIGH (ref 1.8–7.4)
NEUTROPHILS NFR BLD AUTO: 74.9 % — SIGNIFICANT CHANGE UP (ref 43–77)
NITRITE UR-MCNC: NEGATIVE — SIGNIFICANT CHANGE UP
OPIATES UR-MCNC: NEGATIVE — SIGNIFICANT CHANGE UP
PCP SPEC-MCNC: SIGNIFICANT CHANGE UP
PCP UR-MCNC: NEGATIVE — SIGNIFICANT CHANGE UP
PH UR: 6.5 — SIGNIFICANT CHANGE UP (ref 5–8)
PLATELET # BLD AUTO: 192 K/UL — SIGNIFICANT CHANGE UP (ref 150–400)
POTASSIUM SERPL-MCNC: 3.5 MMOL/L — SIGNIFICANT CHANGE UP (ref 3.5–5.3)
POTASSIUM SERPL-SCNC: 3.5 MMOL/L — SIGNIFICANT CHANGE UP (ref 3.5–5.3)
PROT SERPL-MCNC: 7 G/DL — SIGNIFICANT CHANGE UP (ref 6.6–8.7)
PROT UR-MCNC: NEGATIVE — SIGNIFICANT CHANGE UP
RBC # BLD: 4.99 M/UL — SIGNIFICANT CHANGE UP (ref 4.2–5.8)
RBC # FLD: 13.2 % — SIGNIFICANT CHANGE UP (ref 10.3–14.5)
SALICYLATES SERPL-MCNC: <0.6 MG/DL — LOW (ref 10–20)
SARS-COV-2 RNA SPEC QL NAA+PROBE: SIGNIFICANT CHANGE UP
SODIUM SERPL-SCNC: 138 MMOL/L — SIGNIFICANT CHANGE UP (ref 135–145)
SP GR SPEC: 1.01 — SIGNIFICANT CHANGE UP (ref 1.01–1.02)
THC UR QL: NEGATIVE — SIGNIFICANT CHANGE UP
UROBILINOGEN FLD QL: NEGATIVE MG/DL — SIGNIFICANT CHANGE UP
WBC # BLD: 10.84 K/UL — HIGH (ref 3.8–10.5)
WBC # FLD AUTO: 10.84 K/UL — HIGH (ref 3.8–10.5)

## 2023-07-06 PROCEDURE — 99223 1ST HOSP IP/OBS HIGH 75: CPT

## 2023-07-06 PROCEDURE — 90792 PSYCH DIAG EVAL W/MED SRVCS: CPT

## 2023-07-06 RX ORDER — LITHIUM CARBONATE 300 MG/1
4 TABLET, EXTENDED RELEASE ORAL
Refills: 0 | DISCHARGE

## 2023-07-06 RX ORDER — DIPHENHYDRAMINE HCL 50 MG
50 CAPSULE ORAL EVERY 6 HOURS
Refills: 0 | Status: DISCONTINUED | OUTPATIENT
Start: 2023-07-06 | End: 2023-07-14

## 2023-07-06 RX ORDER — SENNA PLUS 8.6 MG/1
2 TABLET ORAL AT BEDTIME
Refills: 0 | Status: DISCONTINUED | OUTPATIENT
Start: 2023-07-06 | End: 2023-07-14

## 2023-07-06 RX ORDER — DEXTROSE 50 % IN WATER 50 %
12.5 SYRINGE (ML) INTRAVENOUS ONCE
Refills: 0 | Status: DISCONTINUED | OUTPATIENT
Start: 2023-07-06 | End: 2023-07-14

## 2023-07-06 RX ORDER — DEXTROSE 50 % IN WATER 50 %
25 SYRINGE (ML) INTRAVENOUS ONCE
Refills: 0 | Status: DISCONTINUED | OUTPATIENT
Start: 2023-07-06 | End: 2023-07-14

## 2023-07-06 RX ORDER — GLUCAGON INJECTION, SOLUTION 0.5 MG/.1ML
1 INJECTION, SOLUTION SUBCUTANEOUS ONCE
Refills: 0 | Status: DISCONTINUED | OUTPATIENT
Start: 2023-07-06 | End: 2023-07-14

## 2023-07-06 RX ORDER — CLOZAPINE 150 MG/1
50 TABLET, ORALLY DISINTEGRATING ORAL AT BEDTIME
Refills: 0 | Status: DISCONTINUED | OUTPATIENT
Start: 2023-07-06 | End: 2023-07-14

## 2023-07-06 RX ORDER — HALOPERIDOL DECANOATE 100 MG/ML
5 INJECTION INTRAMUSCULAR EVERY 6 HOURS
Refills: 0 | Status: DISCONTINUED | OUTPATIENT
Start: 2023-07-06 | End: 2023-07-14

## 2023-07-06 RX ORDER — ASPIRIN/CALCIUM CARB/MAGNESIUM 324 MG
81 TABLET ORAL DAILY
Refills: 0 | Status: DISCONTINUED | OUTPATIENT
Start: 2023-07-06 | End: 2023-07-14

## 2023-07-06 RX ORDER — DEXTROSE 50 % IN WATER 50 %
15 SYRINGE (ML) INTRAVENOUS ONCE
Refills: 0 | Status: DISCONTINUED | OUTPATIENT
Start: 2023-07-06 | End: 2023-07-14

## 2023-07-06 RX ORDER — ASPIRIN/CALCIUM CARB/MAGNESIUM 324 MG
1 TABLET ORAL
Refills: 0 | DISCHARGE

## 2023-07-06 RX ORDER — ATORVASTATIN CALCIUM 80 MG/1
80 TABLET, FILM COATED ORAL AT BEDTIME
Refills: 0 | Status: DISCONTINUED | OUTPATIENT
Start: 2023-07-06 | End: 2023-07-14

## 2023-07-06 RX ORDER — LITHIUM CARBONATE 300 MG/1
300 TABLET, EXTENDED RELEASE ORAL
Refills: 0 | Status: DISCONTINUED | OUTPATIENT
Start: 2023-07-06 | End: 2023-07-14

## 2023-07-06 RX ORDER — LINAGLIPTIN 5 MG/1
5 TABLET, FILM COATED ORAL DAILY
Refills: 0 | Status: DISCONTINUED | OUTPATIENT
Start: 2023-07-06 | End: 2023-07-14

## 2023-07-06 RX ORDER — SODIUM CHLORIDE 9 MG/ML
1000 INJECTION, SOLUTION INTRAVENOUS
Refills: 0 | Status: DISCONTINUED | OUTPATIENT
Start: 2023-07-06 | End: 2023-07-14

## 2023-07-06 RX ORDER — CLOZAPINE 150 MG/1
1 TABLET, ORALLY DISINTEGRATING ORAL
Refills: 0 | DISCHARGE

## 2023-07-06 RX ORDER — CHOLECALCIFEROL (VITAMIN D3) 125 MCG
1000 CAPSULE ORAL DAILY
Refills: 0 | Status: DISCONTINUED | OUTPATIENT
Start: 2023-07-06 | End: 2023-07-14

## 2023-07-06 RX ORDER — INSULIN LISPRO 100/ML
VIAL (ML) SUBCUTANEOUS
Refills: 0 | Status: DISCONTINUED | OUTPATIENT
Start: 2023-07-06 | End: 2023-07-14

## 2023-07-06 RX ORDER — ROSUVASTATIN CALCIUM 5 MG/1
1 TABLET ORAL
Refills: 0 | DISCHARGE

## 2023-07-06 RX ADMIN — LITHIUM CARBONATE 300 MILLIGRAM(S): 300 TABLET, EXTENDED RELEASE ORAL at 22:39

## 2023-07-06 NOTE — ED BEHAVIORAL HEALTH ASSESSMENT NOTE - PAST PSYCHOTROPIC MEDICATION
Loxapine, Depakene, Haldol, Geodon, Zyprexa, Lithium, others Loxapine, Depakene, Haldol, Geodon, Zyprexa, Lithium

## 2023-07-06 NOTE — ED BEHAVIORAL HEALTH ASSESSMENT NOTE - NSSUICPROTFACT_PSY_ALL_CORE
None known Responsibility to children, family, or others/Supportive social network of family or friends/Positive therapeutic relationships

## 2023-07-06 NOTE — ED BEHAVIORAL HEALTH ASSESSMENT NOTE - CURRENT MEDICATION
ascorbic acid 500 mg oral tablet: 1 tab(s) orally once a day  · 	senna oral tablet: 2 tab(s) orally once a day (at bedtime)  · 	cloZAPine 50 mg oral tablet: 1 tab(s) orally once a day in the morning and at bedtime  · 	cloZAPine 200 mg oral tablet: 1 tab(s) orally once a day (at bedtime)  · 	lithium 450 mg oral tablet, extended release: 3 tab(s) orally once a day (at bedtime)  · 	atorvastatin 40 mg oral tablet: 1 tab(s) orally once a day (at bedtime)  · 	SITagliptin 100 mg oral tablet: 1 tab(s) orally once a day  · 	metFORMIN 1000 mg oral tablet: 1 tab(s) orally once a day (at bedtime)  · 	calcium carbonate 500 mg (200 mg elemental calcium) oral tablet, chewable: 1 tab(s) orally 2 times a day  · 	Invega Sustenna 234 mg/1.5 mL intramuscular suspension, extended release: 234 milligram(s) intramuscular every 4 weeks (next due on 12/12/21) Lithium ER 4 tabs hs (none for one week) Clozapine 50 mg am and 200 hs self tapered but dose unk, Deepak-Gest 500 2 tabs daily, Senna 8.6 2 tabs prn, Invega Sustenna 234 IM ER q4 weeks last dose unk, Sitagliptin 100 qd, Cody C qd, ASA er qd, Rosuvastatin 20 mg qd, Colace 100 qd, Vit d3 1000 u qd, Jardiance 25 mg qd

## 2023-07-06 NOTE — ED ADULT NURSE NOTE - OBJECTIVE STATEMENT
Pt is A/Ox3. Pt states he was brought to the hospital because Pt A/Ox3 (time, person, place). Pt states he was brought to the hospital because he "may have been given an incorrect medication dose from the pharmacy and overdosed". Pt has a flight of thoughts. Pt states he has a headache. Pt states he hears "spirit guides" but no suicidal or homicidal ideations. Pt states he has a psych history but accurate information is unable to be given.

## 2023-07-06 NOTE — ED BEHAVIORAL HEALTH ASSESSMENT NOTE - NSBHATTESTAPPBILLTIME_PSY_A_CORE
I attest my time as RHETT is greater than 50% of the total combined time spent on qualifying patient care activities. I have reviewed and verified the documentation.

## 2023-07-06 NOTE — ED BEHAVIORAL HEALTH ASSESSMENT NOTE - NSBHATTESTTYPEVISIT_PSY_A_CORE
Attending with Resident/Fellow/Student On-site Attending with Resident/Fellow/Student and RHETT (99XXX codes)

## 2023-07-06 NOTE — ED BEHAVIORAL HEALTH ASSESSMENT NOTE - NSBHPSYCHOLCOGORIENT_PSY_A_CORE
patient's thoughts are disorganized/Unable to assess patient's thoughts are disorganized/Not fully oriented...

## 2023-07-06 NOTE — ED BEHAVIORAL HEALTH ASSESSMENT NOTE - OTHER
patient answers to questions are disorganized Group home group home Adult home care Adult home care [place] pending

## 2023-07-06 NOTE — ED BEHAVIORAL HEALTH ASSESSMENT NOTE - DESCRIPTION
Vital Signs Last 24 Hrs  T(C): 36.8 (06 Jul 2023 14:28), Max: 36.9 (06 Jul 2023 13:03)  T(F): 98.2 (06 Jul 2023 14:28), Max: 98.4 (06 Jul 2023 13:03)  HR: 71 (06 Jul 2023 14:28) (71 - 78)  BP: 126/77 (06 Jul 2023 14:28) (126/77 - 130/80)  BP(mean): --  RR: 15 (06 Jul 2023 14:28) (15 - 20)  SpO2: 98% (06 Jul 2023 14:28) (98% - 98%)    Parameters below as of 06 Jul 2023 14:28  Patient On (Oxygen Delivery Method): room air osteogenesis imperfecta, HTN, hypothyroidism, DM type 2, HLD patient resides in a group home

## 2023-07-06 NOTE — ED ADULT TRIAGE NOTE - CHIEF COMPLAINT QUOTE
Brought in from adult home after refusing to take psych meds for schizoaffective disorder. PT withe racing thoughts. States he hears voices but does not wish to talk about it at this time. denies SI/HI.

## 2023-07-06 NOTE — ED BEHAVIORAL HEALTH ASSESSMENT NOTE - NSBHMSEMUSCLE_PSY_A_CORE
patient requires assistance ambulating/Abnormal muscle tone/strength Abnormal muscle tone/strength Normal muscle tone/strength

## 2023-07-06 NOTE — ED PROVIDER NOTE - OBJECTIVE STATEMENT
57 year old male with PMH DM, HLD, schizoaffective disorder, osteogenesis imperfecta presents with hallucinations. Pt reports that he believed his clozaril dose to be too high so he began to titrate it himself. He states that he then began to hear things, beliving that there were speakers hidden in the walls. he called and spoke to the NP of his ACT team who advoised that he go to the ED.

## 2023-07-06 NOTE — ED BEHAVIORAL HEALTH ASSESSMENT NOTE - SUMMARY
57 year old male with PMH DM, HLD, schizoaffective disorder, osteogenesis imperfecta presents with auditory hallucinations. reports he has been "feeling down." Denies SI or intent to harm himself. Endorses the thought of harming a nurse he encountered earlier. Patient states he feels anxious, believes that there are speakers in his walls and his basement is a portal. Speech is irritable, disorganized and has racing thought, flight of ideas.      rec admission for further psych treatment 57 year old male lives in group home, [facility], disabled, PPH, schizoaffective disorder,  no known suicide attempt, h/o agitation aggression unk,  prior psych admissions, last known [facility] x 3 mo stay, no drug or alcohol use, PMH osteogenesis imperfecta (uses rollator to amb though observed walking independently),  DM, HLD, Hypothyroid, HTN, eith [facility] ACT team , bib EMS for  hallucinations and med noncompliance.      Pt reports that he believed his Clozaril dose to be too high so he began to titrate it himself.  He also stopped his Lithium approx one week ago because "It was not Lithium, it was radium Chlorinate,  a radioactive element".  LL 0.10., now presenting with disorganized thoughts, impaired thinking and psychosis.  Pt requires psych admission for tx of psychosis and will be involuntary.

## 2023-07-06 NOTE — ED PROVIDER NOTE - CLINICAL SUMMARY MEDICAL DECISION MAKING FREE TEXT BOX
Pt presents with medication non-compliance causing decompensated schizoaffective disorder, will require psychiatric evaluation
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
## 2023-07-06 NOTE — ED BEHAVIORAL HEALTH ASSESSMENT NOTE - OTHER PAST PSYCHIATRIC HISTORY (INCLUDE DETAILS REGARDING ONSET, COURSE OF ILLNESS, INPATIENT/OUTPATIENT TREATMENT)
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 Pt with h/o multiple prior psychiatric hospitalization last known  at [facility] x 3 mo. Aug 2021 to Nov. 30 20

## 2023-07-06 NOTE — ED BEHAVIORAL HEALTH ASSESSMENT NOTE - VIOLENCE RISK FACTORS:
Violent ideation/threat/speech/Impulsivity/Lack of insight into violence risk/need for treatment/Noncompliance with treatment/Irritability Violent ideation/threat/speech/Affective dysregulation/Impulsivity/Lack of insight into violence risk/need for treatment/Noncompliance with treatment/Irritability

## 2023-07-07 ENCOUNTER — EMERGENCY (EMERGENCY)
Facility: HOSPITAL | Age: 58
LOS: 1 days | Discharge: HOME | End: 2023-07-07
Attending: EMERGENCY MEDICINE | Admitting: EMERGENCY MEDICINE
Payer: MEDICARE

## 2023-07-07 ENCOUNTER — EMERGENCY (EMERGENCY)
Facility: HOSPITAL | Age: 58
LOS: 1 days | End: 2023-07-07
Attending: EMERGENCY MEDICINE
Payer: MEDICARE

## 2023-07-07 VITALS
RESPIRATION RATE: 18 BRPM | TEMPERATURE: 98 F | OXYGEN SATURATION: 97 % | SYSTOLIC BLOOD PRESSURE: 120 MMHG | HEART RATE: 70 BPM | DIASTOLIC BLOOD PRESSURE: 82 MMHG

## 2023-07-07 DIAGNOSIS — Q78.0 OSTEOGENESIS IMPERFECTA: ICD-10-CM

## 2023-07-07 DIAGNOSIS — E11.9 TYPE 2 DIABETES MELLITUS WITHOUT COMPLICATIONS: ICD-10-CM

## 2023-07-07 DIAGNOSIS — R44.0 AUDITORY HALLUCINATIONS: ICD-10-CM

## 2023-07-07 DIAGNOSIS — Z93.2 ILEOSTOMY STATUS: ICD-10-CM

## 2023-07-07 DIAGNOSIS — Z20.822 CONTACT WITH AND (SUSPECTED) EXPOSURE TO COVID-19: ICD-10-CM

## 2023-07-07 DIAGNOSIS — Z93.2 ILEOSTOMY STATUS: Chronic | ICD-10-CM

## 2023-07-07 DIAGNOSIS — E03.9 HYPOTHYROIDISM, UNSPECIFIED: ICD-10-CM

## 2023-07-07 DIAGNOSIS — Z88.8 ALLERGY STATUS TO OTHER DRUGS, MEDICAMENTS AND BIOLOGICAL SUBSTANCES: ICD-10-CM

## 2023-07-07 DIAGNOSIS — Z88.0 ALLERGY STATUS TO PENICILLIN: ICD-10-CM

## 2023-07-07 DIAGNOSIS — F25.9 SCHIZOAFFECTIVE DISORDER, UNSPECIFIED: ICD-10-CM

## 2023-07-07 DIAGNOSIS — Z80.42 FAMILY HISTORY OF MALIGNANT NEOPLASM OF PROSTATE: ICD-10-CM

## 2023-07-07 DIAGNOSIS — E78.5 HYPERLIPIDEMIA, UNSPECIFIED: ICD-10-CM

## 2023-07-07 DIAGNOSIS — Z79.82 LONG TERM (CURRENT) USE OF ASPIRIN: ICD-10-CM

## 2023-07-07 DIAGNOSIS — S82.899A OTHER FRACTURE OF UNSPECIFIED LOWER LEG, INITIAL ENCOUNTER FOR CLOSED FRACTURE: Chronic | ICD-10-CM

## 2023-07-07 DIAGNOSIS — Z85.038 PERSONAL HISTORY OF OTHER MALIGNANT NEOPLASM OF LARGE INTESTINE: ICD-10-CM

## 2023-07-07 DIAGNOSIS — Z88.1 ALLERGY STATUS TO OTHER ANTIBIOTIC AGENTS STATUS: ICD-10-CM

## 2023-07-07 LAB
GLUCOSE BLDC GLUCOMTR-MCNC: 118 MG/DL — HIGH (ref 70–99)
GLUCOSE BLDC GLUCOMTR-MCNC: 125 MG/DL — HIGH (ref 70–99)

## 2023-07-07 PROCEDURE — 80307 DRUG TEST PRSMV CHEM ANLYZR: CPT

## 2023-07-07 PROCEDURE — 93005 ELECTROCARDIOGRAM TRACING: CPT

## 2023-07-07 PROCEDURE — 99285 EMERGENCY DEPT VISIT HI MDM: CPT

## 2023-07-07 PROCEDURE — 99284 EMERGENCY DEPT VISIT MOD MDM: CPT | Mod: 25

## 2023-07-07 PROCEDURE — 80178 ASSAY OF LITHIUM: CPT

## 2023-07-07 PROCEDURE — 36415 COLL VENOUS BLD VENIPUNCTURE: CPT

## 2023-07-07 PROCEDURE — 85025 COMPLETE CBC W/AUTO DIFF WBC: CPT

## 2023-07-07 PROCEDURE — 93010 ELECTROCARDIOGRAM REPORT: CPT

## 2023-07-07 PROCEDURE — 99233 SBSQ HOSP IP/OBS HIGH 50: CPT

## 2023-07-07 PROCEDURE — 81003 URINALYSIS AUTO W/O SCOPE: CPT

## 2023-07-07 PROCEDURE — 80053 COMPREHEN METABOLIC PANEL: CPT

## 2023-07-07 PROCEDURE — G0378: CPT

## 2023-07-07 PROCEDURE — 82962 GLUCOSE BLOOD TEST: CPT

## 2023-07-07 PROCEDURE — 87635 SARS-COV-2 COVID-19 AMP PRB: CPT

## 2023-07-07 RX ADMIN — LITHIUM CARBONATE 300 MILLIGRAM(S): 300 TABLET, EXTENDED RELEASE ORAL at 05:57

## 2023-07-07 NOTE — ED ADULT NURSE REASSESSMENT NOTE - COMFORT CARE
ambulated to bathroom/darkened lights/plan of care explained
ambulated to bathroom/darkened lights/meal provided

## 2023-07-07 NOTE — ED ADULT NURSE REASSESSMENT NOTE - NSFALLCONCLUSION_ED_ALL_ED
Universal Safety Interventions
Fall with Harm Risk
Universal Safety Interventions
Fall with Harm Risk

## 2023-07-07 NOTE — ED ADULT NURSE REASSESSMENT NOTE - NS ED NURSE REASSESS COMMENT FT1
Endorse patient care at 9:00 PM. Patient in the community area talking with NP. Patient irritable, looks frustrated over his plan of care. PM medication offered to patient, patient refused stating " I have right, you can not force me". Patient was educated about medication  benefits,  patient refused, nursing will attempt a second time. Patient made no attempt to harm self or others, safety maintained.
Patient in bed sleeping, safety maintained.
Patient remain disorganized and delusional, no compliant with nursing care, refused medication except Lithium he is taking "to fix his chemical" patient is illogical acting bizarre , he was able to sleep a few hours, no attempt made to harm self and others, safety maintained.
Pt changed into a yellow gown, security arrived and ANNABEL Espana brought pt back to . Pt left without incident, Group home paperwork sent with patient, EKG performed prior to transfer.
Pt is being transferred to behavioral health, report given to callie, tests performed, to be performed were reviewed, pt educated about the transfer to behavioral health for evaluation and states understanding, pt is able to teach back successfully which shows proficiency, pt has no complaints, is able to ambulate without assistance, pt is A&O3. (person, place, time) VS recorded prior to transfer.
Second attempt made to give patient medication, patient refused.
awake alert and oriented x3, pt has flight of ideas and labile mood. pt provided meal and consumed it. pt educated on plan to be transferred to inpatient treatment facility. pt safety maintained.
pt received medically cleared by the ed attending. pt states that "I need my rollator" pt ambulates with a steady gait at this time. HIWOT Suero states that nursing leadership supports pt to be held in the  unit pending transfer for inpatient tx, leadership reports that pt is independent and pt is safe ambulating without assistive device. pt is a&o x3 reporting that he does not want to go to "ray". pt denies suicidal or homicidal ideations and auditory or visual hallucinations. pt oriented to the unit and screened by security.
Patient has been in room and resting intermittently. Rambling speech, tangential and disorganized thought content. Reports reversed ileostomy, and that he no longer needs or uses a bag. Plan is to transfer to inpatient unit when bed available. Pt cooperative with repeat EKG as ordered.
Patient in room sleeping safety maintained.

## 2023-07-07 NOTE — ED CDU PROVIDER DISPOSITION NOTE - NS_OBSORDERDATE_ED_A_ED
06-Jul-2023 16:28
pt has extensive psychiatric history   as per psych note from March, pt was discharged on olanzapine but pt likely not compliant  will resume olanzapine 10mg for now  PSYCH CONSULT ON MONDAY

## 2023-07-07 NOTE — ED PROVIDER NOTE - CLINICAL SUMMARY MEDICAL DECISION MAKING FREE TEXT BOX
Pt has a port that has not been accessed in years. no indication to have it flushed.  Pt medically cleared.  Pt signed out to dr. boudreaux pending call back from [facility] and disposition.

## 2023-07-07 NOTE — ED ADULT NURSE NOTE - CHIEF COMPLAINT QUOTE
Pt sent back to [facility] from [facility] due to having a R chest wall chemo port.  provider made aware.

## 2023-07-07 NOTE — ED ADULT NURSE NOTE - OBJECTIVE STATEMENT
Pt is a 57YOM who was sent to [facility] for treatment of his schizophrenia, but [facility] had sent him back to the hospital because they are unable to manage his right chest port wall, pt has no complaints, pt placed on a 1:1 and yellow gown, belongings secured.

## 2023-07-07 NOTE — CHART NOTE - NSCHARTNOTEFT_GEN_A_CORE
SW Note: Plan is to transfer pt for IP psychiatric care. Contacted [facility], [facility] and [facility] no available beds for transfer. Referral made to [facility] and pt accepted for admit today. Accepting MD Lala. ED provider and RN aware. RN notified pts mother. [facility] legals completed. [facility] arranged. [facility] letter provided. No auth needed for admit, medicare listed.

## 2023-07-07 NOTE — ED ADULT TRIAGE NOTE - CHIEF COMPLAINT QUOTE
Pt sent back to [facility] from [facility] due to having a R chest wall chemo port.  provider made aware.

## 2023-07-07 NOTE — ED PROVIDER NOTE - OBJECTIVE STATEMENT
Pt is a 56 yo M sent from [facility].  Pt was just transferred there today for inpatient psychiatric stabilization.  Pt was sent back to the ED as he has a port and stated that he needs to have it flushed once a month, but port has not been accessed for years and is does not have indication for monthly access and flushes.

## 2023-07-07 NOTE — ED ADULT NURSE REASSESSMENT NOTE - NSFALLRISKASMT_ED_ALL_ED_DT
07-Jul-2023 06:30
07-Jul-2023 00:54
06-Jul-2023 21:39
06-Jul-2023 23:11
07-Jul-2023 10:41
06-Jul-2023 15:56
07-Jul-2023 02:57

## 2023-07-07 NOTE — ED ADULT NURSE REASSESSMENT NOTE - NSFALLHARMRISKINTERV_ED_ALL_ED
enhanced supervision/Communicate risk of Fall with Harm to all staff, patient, and family/Provide visual cue: red socks, yellow wristband, yellow gown, etc/Reinforce activity limits and safety measures with patient and family/Bed in lowest position, wheels locked, appropriate side rails in place/Call bell, personal items and telephone in reach/Instruct patient to call for assistance before getting out of bed/chair/stretcher/Non-slip footwear applied when patient is off stretcher/[place] to call system/Physically safe environment - no spills, clutter or unnecessary equipment/Purposeful Proactive Rounding/Room/bathroom lighting operational, light cord in reach
Monitor gait and stability/Reinforce activity limits and safety measures with patient and family/Physically safe environment - no spills, clutter or unnecessary equipment

## 2023-07-07 NOTE — ED ADULT NURSE NOTE - NSSEPSISSUSPECTED_ED_A_ED
Aneesh Penny), Internal Medicine  [address] [place] [address]  Phone: [phone]  Fax: [phone]    Te Cervantes (), Cardiology; Internal Medicine  [address] [place] [address]  Phone: [phone]  Fax: [phone]    Rubén Julien), Gastroenterology; Internal Medicine  [address] [place] [address]  Phone: [phone]  Fax: [phone] No

## 2023-07-07 NOTE — ED ADULT NURSE REASSESSMENT NOTE - NSFALLUNIVINTERV_ED_ALL_ED
Bed/Stretcher in lowest position, wheels locked, appropriate side rails in place/Call bell, personal items and telephone in reach/Instruct patient to call for assistance before getting out of bed/chair/stretcher/Non-slip footwear applied when patient is off stretcher/[place] to call system/Physically safe environment - no spills, clutter or unnecessary equipment/Purposeful proactive rounding/Room/bathroom lighting operational, light cord in reach
Bed/Stretcher in lowest position, wheels locked, appropriate side rails in place/Call bell, personal items and telephone in reach/Instruct patient to call for assistance before getting out of bed/chair/stretcher/Non-slip footwear applied when patient is off stretcher/[place] to call system/Physically safe environment - no spills, clutter or unnecessary equipment/Purposeful proactive rounding/Room/bathroom lighting operational, light cord in reach
Bed/Stretcher in lowest position, wheels locked, appropriate side rails in place/Call bell, personal items and telephone in reach/Instruct patient to call for assistance before getting out of bed/chair/stretcher/Non-slip footwear applied when patient is off stretcher/[place] to call system/Physically safe environment - no spills, clutter or unnecessary equipment/Purposeful proactive rounding/Room/bathroom lighting operational, light cord in reach
Bed/Stretcher in lowest position, wheels locked, appropriate side rails in place/Call bell, personal items and telephone in reach/Instruct patient to call for assistance before getting out of bed/chair/stretcher/Non-slip footwear applied when patient is off stretcher/[place] to call system/Physically safe environment - no spills, clutter or unnecessary equipment/Purposeful proactive rounding/Room/bathroom lighting operational, light cord in reach
Bed/Stretcher in lowest position, wheels locked, appropriate side rails in place/Call bell, personal items and telephone in reach/Instruct patient to call for assistance before getting out of bed/chair/stretcher/Non-slip footwear applied when patient is off stretcher/[place] to call system/Physically safe environment - no spills, clutter or unnecessary equipment/Purposeful proactive rounding/Room/bathroom lighting operational, light cord in reach

## 2023-07-07 NOTE — ED ADULT NURSE REASSESSMENT NOTE - NSFALLRISKASMTTYPE_ED_ALL_ED
Routine Reassessment

## 2023-07-07 NOTE — ED ADULT NURSE REASSESSMENT NOTE - NSFALLRISKFACTORS_ED_ALL_ED
No indicators present
osteogenesis imperfecta/Bone Condition: Including osteoporosis, prolonged steroid use or metastatic bone disease/cancer
Bone Condition: Including osteoporosis, prolonged steroid use or metastatic bone disease/cancer
No indicators present

## 2023-07-07 NOTE — ED CDU PROVIDER DISPOSITION NOTE - CLINICAL COURSE
Pt medically cleared.  pt with psychosis.  dr. simons accepting to [facility] for stabilization.

## 2023-07-07 NOTE — ED ADULT NURSE REASSESSMENT NOTE - NS ED NURSE REASSESS COMMENT FT1
assumed care of pt at 1930 from ANNABEL Lal , charting as noted. respirations even and unlabored. 1:1 in place. pt shows no s/s of acute distress at this time. safety precautions maintained.

## 2023-07-07 NOTE — ED PROVIDER NOTE - PROGRESS NOTE DETAILS
AJM; spoke with [facility] who notes they cannot accept pt because of port and no longer have available beds. pt has not used port recently per prior MD moe

## 2023-07-07 NOTE — ED ADULT NURSE REASSESSMENT NOTE - NS ED NURSE REASSESS COMMENT FT1
assumed care of pt at 1930 from ANNABEL Lal , charting as noted. 1:1 in place. pt in yellow gown, respirations even and unlabored. pt shows no s/s of acute distress at this time. safety precautions maintained.

## 2023-07-08 DIAGNOSIS — F25.0 SCHIZOAFFECTIVE DISORDER, BIPOLAR TYPE: ICD-10-CM

## 2023-07-08 LAB
ALBUMIN SERPL ELPH-MCNC: 3.8 G/DL — SIGNIFICANT CHANGE UP (ref 3.3–5.2)
ALP SERPL-CCNC: 62 U/L — SIGNIFICANT CHANGE UP (ref 40–120)
ALT FLD-CCNC: 30 U/L — SIGNIFICANT CHANGE UP
AMPHET UR-MCNC: NEGATIVE — SIGNIFICANT CHANGE UP
ANION GAP SERPL CALC-SCNC: 10 MMOL/L — SIGNIFICANT CHANGE UP (ref 5–17)
APAP SERPL-MCNC: <3 UG/ML — LOW (ref 10–26)
APPEARANCE UR: CLEAR — SIGNIFICANT CHANGE UP
AST SERPL-CCNC: 54 U/L — HIGH
BARBITURATES UR SCN-MCNC: NEGATIVE — SIGNIFICANT CHANGE UP
BASOPHILS # BLD AUTO: 0.03 K/UL — SIGNIFICANT CHANGE UP (ref 0–0.2)
BASOPHILS NFR BLD AUTO: 0.3 % — SIGNIFICANT CHANGE UP (ref 0–2)
BENZODIAZ UR-MCNC: NEGATIVE — SIGNIFICANT CHANGE UP
BILIRUB SERPL-MCNC: 0.5 MG/DL — SIGNIFICANT CHANGE UP (ref 0.4–2)
BILIRUB UR-MCNC: NEGATIVE — SIGNIFICANT CHANGE UP
BUN SERPL-MCNC: 15.2 MG/DL — SIGNIFICANT CHANGE UP (ref 8–20)
CALCIUM SERPL-MCNC: 8.7 MG/DL — SIGNIFICANT CHANGE UP (ref 8.4–10.5)
CHLORIDE SERPL-SCNC: 103 MMOL/L — SIGNIFICANT CHANGE UP (ref 96–108)
CO2 SERPL-SCNC: 27 MMOL/L — SIGNIFICANT CHANGE UP (ref 22–29)
COCAINE METAB.OTHER UR-MCNC: NEGATIVE — SIGNIFICANT CHANGE UP
COLOR SPEC: YELLOW — SIGNIFICANT CHANGE UP
CREAT SERPL-MCNC: 0.61 MG/DL — SIGNIFICANT CHANGE UP (ref 0.5–1.3)
DIFF PNL FLD: NEGATIVE — SIGNIFICANT CHANGE UP
EGFR: 112 ML/MIN/1.73M2 — SIGNIFICANT CHANGE UP
EOSINOPHIL # BLD AUTO: 0 K/UL — SIGNIFICANT CHANGE UP (ref 0–0.5)
EOSINOPHIL NFR BLD AUTO: 0 % — SIGNIFICANT CHANGE UP (ref 0–6)
ETHANOL SERPL-MCNC: <10 MG/DL — SIGNIFICANT CHANGE UP (ref 0–9)
GLUCOSE SERPL-MCNC: 139 MG/DL — HIGH (ref 70–99)
GLUCOSE UR QL: 1000 MG/DL
HCT VFR BLD CALC: 42.7 % — SIGNIFICANT CHANGE UP (ref 39–50)
HGB BLD-MCNC: 14.1 G/DL — SIGNIFICANT CHANGE UP (ref 13–17)
IMM GRANULOCYTES NFR BLD AUTO: 0.4 % — SIGNIFICANT CHANGE UP (ref 0–0.9)
KETONES UR-MCNC: ABNORMAL
LEUKOCYTE ESTERASE UR-ACNC: NEGATIVE — SIGNIFICANT CHANGE UP
LYMPHOCYTES # BLD AUTO: 2.37 K/UL — SIGNIFICANT CHANGE UP (ref 1–3.3)
LYMPHOCYTES # BLD AUTO: 21.5 % — SIGNIFICANT CHANGE UP (ref 13–44)
MCHC RBC-ENTMCNC: 28.8 PG — SIGNIFICANT CHANGE UP (ref 27–34)
MCHC RBC-ENTMCNC: 33 GM/DL — SIGNIFICANT CHANGE UP (ref 32–36)
MCV RBC AUTO: 87.1 FL — SIGNIFICANT CHANGE UP (ref 80–100)
METHADONE UR-MCNC: NEGATIVE — SIGNIFICANT CHANGE UP
MONOCYTES # BLD AUTO: 0.84 K/UL — SIGNIFICANT CHANGE UP (ref 0–0.9)
MONOCYTES NFR BLD AUTO: 7.6 % — SIGNIFICANT CHANGE UP (ref 2–14)
NEUTROPHILS # BLD AUTO: 7.74 K/UL — HIGH (ref 1.8–7.4)
NEUTROPHILS NFR BLD AUTO: 70.2 % — SIGNIFICANT CHANGE UP (ref 43–77)
NITRITE UR-MCNC: NEGATIVE — SIGNIFICANT CHANGE UP
OPIATES UR-MCNC: NEGATIVE — SIGNIFICANT CHANGE UP
PCP SPEC-MCNC: SIGNIFICANT CHANGE UP
PCP UR-MCNC: NEGATIVE — SIGNIFICANT CHANGE UP
PH UR: 6 — SIGNIFICANT CHANGE UP (ref 5–8)
PLATELET # BLD AUTO: 174 K/UL — SIGNIFICANT CHANGE UP (ref 150–400)
POTASSIUM SERPL-MCNC: 3 MMOL/L — LOW (ref 3.5–5.3)
POTASSIUM SERPL-SCNC: 3 MMOL/L — LOW (ref 3.5–5.3)
PROT SERPL-MCNC: 6.4 G/DL — LOW (ref 6.6–8.7)
PROT UR-MCNC: NEGATIVE — SIGNIFICANT CHANGE UP
RBC # BLD: 4.9 M/UL — SIGNIFICANT CHANGE UP (ref 4.2–5.8)
RBC # FLD: 13.1 % — SIGNIFICANT CHANGE UP (ref 10.3–14.5)
SALICYLATES SERPL-MCNC: <0.6 MG/DL — LOW (ref 10–20)
SODIUM SERPL-SCNC: 140 MMOL/L — SIGNIFICANT CHANGE UP (ref 135–145)
SP GR SPEC: 1.01 — SIGNIFICANT CHANGE UP (ref 1.01–1.02)
THC UR QL: NEGATIVE — SIGNIFICANT CHANGE UP
UROBILINOGEN FLD QL: NEGATIVE MG/DL — SIGNIFICANT CHANGE UP
WBC # BLD: 11.02 K/UL — HIGH (ref 3.8–10.5)
WBC # FLD AUTO: 11.02 K/UL — HIGH (ref 3.8–10.5)

## 2023-07-08 PROCEDURE — 90792 PSYCH DIAG EVAL W/MED SRVCS: CPT

## 2023-07-08 PROCEDURE — 99223 1ST HOSP IP/OBS HIGH 75: CPT

## 2023-07-08 RX ORDER — DEXTROSE 50 % IN WATER 50 %
25 SYRINGE (ML) INTRAVENOUS ONCE
Refills: 0 | Status: DISCONTINUED | OUTPATIENT
Start: 2023-07-08 | End: 2023-07-15

## 2023-07-08 RX ORDER — GLUCAGON INJECTION, SOLUTION 0.5 MG/.1ML
1 INJECTION, SOLUTION SUBCUTANEOUS ONCE
Refills: 0 | Status: DISCONTINUED | OUTPATIENT
Start: 2023-07-08 | End: 2023-07-15

## 2023-07-08 RX ORDER — CLOZAPINE 150 MG/1
50 TABLET, ORALLY DISINTEGRATING ORAL
Refills: 0 | Status: DISCONTINUED | OUTPATIENT
Start: 2023-07-08 | End: 2023-07-15

## 2023-07-08 RX ORDER — DEXTROSE 50 % IN WATER 50 %
12.5 SYRINGE (ML) INTRAVENOUS ONCE
Refills: 0 | Status: DISCONTINUED | OUTPATIENT
Start: 2023-07-08 | End: 2023-07-15

## 2023-07-08 RX ORDER — DEXTROSE 50 % IN WATER 50 %
15 SYRINGE (ML) INTRAVENOUS ONCE
Refills: 0 | Status: DISCONTINUED | OUTPATIENT
Start: 2023-07-08 | End: 2023-07-15

## 2023-07-08 RX ORDER — SODIUM CHLORIDE 9 MG/ML
1000 INJECTION, SOLUTION INTRAVENOUS
Refills: 0 | Status: DISCONTINUED | OUTPATIENT
Start: 2023-07-08 | End: 2023-07-15

## 2023-07-08 RX ORDER — INSULIN LISPRO 100/ML
VIAL (ML) SUBCUTANEOUS
Refills: 0 | Status: DISCONTINUED | OUTPATIENT
Start: 2023-07-08 | End: 2023-07-15

## 2023-07-08 RX ORDER — CALCIUM CARBONATE 500(1250)
1 TABLET ORAL DAILY
Refills: 0 | Status: DISCONTINUED | OUTPATIENT
Start: 2023-07-08 | End: 2023-07-15

## 2023-07-08 RX ORDER — LITHIUM CARBONATE 300 MG/1
300 TABLET, EXTENDED RELEASE ORAL THREE TIMES A DAY
Refills: 0 | Status: DISCONTINUED | OUTPATIENT
Start: 2023-07-08 | End: 2023-07-15

## 2023-07-08 RX ORDER — ATORVASTATIN CALCIUM 80 MG/1
80 TABLET, FILM COATED ORAL AT BEDTIME
Refills: 0 | Status: DISCONTINUED | OUTPATIENT
Start: 2023-07-08 | End: 2023-07-15

## 2023-07-08 RX ORDER — ASCORBIC ACID 60 MG
500 TABLET,CHEWABLE ORAL DAILY
Refills: 0 | Status: DISCONTINUED | OUTPATIENT
Start: 2023-07-08 | End: 2023-07-15

## 2023-07-08 RX ORDER — CHOLECALCIFEROL (VITAMIN D3) 125 MCG
1000 CAPSULE ORAL DAILY
Refills: 0 | Status: DISCONTINUED | OUTPATIENT
Start: 2023-07-08 | End: 2023-07-15

## 2023-07-08 RX ORDER — LINAGLIPTIN 5 MG/1
5 TABLET, FILM COATED ORAL DAILY
Refills: 0 | Status: DISCONTINUED | OUTPATIENT
Start: 2023-07-08 | End: 2023-07-15

## 2023-07-08 NOTE — ED BEHAVIORAL HEALTH ASSESSMENT NOTE - OTHER PAST PSYCHIATRIC HISTORY (INCLUDE DETAILS REGARDING ONSET, COURSE OF ILLNESS, INPATIENT/OUTPATIENT TREATMENT)
Pt with h/o multiple prior psychiatric hospitalization last known  at [facility] x 3 mo. Aug 2021 to Nov. 30 20

## 2023-07-08 NOTE — ED ADULT NURSE REASSESSMENT NOTE - NS ED NURSE REASSESS COMMENT FT1
pt resting in bed, pt is calm and cooperative. respirations even and unlabored. pt shows no s/s of acute distress at this time. safety precautions maintained. 1:1 in place

## 2023-07-08 NOTE — ED BEHAVIORAL HEALTH ASSESSMENT NOTE - PATIENT'S CHIEF COMPLAINT
"The brain works with axions, dendrites and electrical pulses, I am digesting my food, please don't disturb me..

## 2023-07-08 NOTE — ED ADULT NURSE REASSESSMENT NOTE - NS ED NURSE REASSESS COMMENT FT1
Pt resting comfortably with eyes closed. 1:1 at bedside. Pt provided crayons and paper. Pt breathing even and unlabored.

## 2023-07-08 NOTE — ED ADULT NURSE REASSESSMENT NOTE - NS ED NURSE REASSESS COMMENT FT1
pt resting in bed, 1:1 in place, respirations even and unlabored. pt shows no s/s of acute distress at this time. safety precautions maintained.

## 2023-07-08 NOTE — ED BEHAVIORAL HEALTH ASSESSMENT NOTE - SUMMARY
57 year old male lives in group home, [facility], disabled, PPH, schizoaffective disorder,  no known suicide attempt, h/o agitation aggression unk,  prior psych admissions, last known H x 3 mo stay, no drug or alcohol use, PMH osteogenesis imperfecta (uses rollator to amb though observed walking independently),  DM, HLD, Hypothyroid, HTN, eith [facility] ACT team , bib EMS for  hallucinations and med noncompliance and sent to [facility] on 7[facility] and was returned to ED due to having an inactive port under his skin on right chest.  Per reports [facility] will not accept Pt even though port inactive for some time.    Pt reassessed today and continues bizarre, disorganized with labile mood.  He is berating to staff for not titrating his meds correctly when he realized he was "overmedicated" and titrated his own Clozapine.  Clozapine was restarted to 50 mg hs.  Pt stopped his Lithium 1 week ago claiming it was radioactive.  Lithium restarted at 300 bid.  Other meds were untouched per Pt.  Pt is poor historian and was on a "med hold" which means unsupervised by staff.  Pt now acutely psychotic, intrusive, internally preoccupied and in need of psych admission for stabilization or mood and tx of psychosis.    Will restart meds and titrate Clozaril to 50 mg bid.  Restart Lithium to 300 tid.  Continue current medical meds.  Defer to Dr Cassius Luis which is non-formulary.

## 2023-07-08 NOTE — ED BEHAVIORAL HEALTH ASSESSMENT NOTE - CURRENT MEDICATION
Lithium 300 ER 4 tabs hs (none for one week) Clozapine 50 mg am and 200 hs self tapered but dose unk, Deepak-Gest 500 2 tabs daily, Senna 8.6 2 tabs prn, Invega Sustenna 234 IM ER q4 weeks last dose unk, Sitagliptin 100 qd, Cody C qd, ASA er qd, Rosuvastatin 20 mg qd, Colace 100 qd, Vit d3 1000 u qd, Jardiance 25 mg qd
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
## 2023-07-08 NOTE — ED CDU PROVIDER INITIAL DAY NOTE - OBJECTIVE STATEMENT
Pt is a 58 yo M sent from [facility].  Pt was just transferred there today for inpatient psychiatric stabilization.  Pt was sent back to the ED as he has a port and stated that he needs to have it flushed once a month, but port has not been accessed for years and is does not have indication for monthly access and flushes.

## 2023-07-08 NOTE — ED BEHAVIORAL HEALTH ASSESSMENT NOTE - OTHER
pending Group home [place] [facility] group home patient answers to questions are disorganized [facility] psychosis

## 2023-07-08 NOTE — ED CDU PROVIDER INITIAL DAY NOTE - CLINICAL SUMMARY MEDICAL DECISION MAKING FREE TEXT BOX
pt pending BH re-eval for placement . medically cleared for BH admission as port is no longer actively used.

## 2023-07-08 NOTE — ED ADULT NURSE REASSESSMENT NOTE - NS ED NURSE REASSESS COMMENT FT1
Assumed care of pt [id number]. Pt resting comfortably with eyes open. Pt breathing even and unlabored and denies chest pain, SOB, and pain. Pt pending moving to . Assumed care of pt o740. Pt resting comfortably with eyes open. Pt breathing even and unlabored and denies chest pain, SOB, and pain. 1:1 at bedside. Pt pending moving to . Assumed care of pt o740. Pt resting comfortably with eyes open. Pt breathing even and unlabored and denies SI/HI, chest pain, SOB, and pain. 1:1 at bedside. Pt pending moving to . Assumed care of pt [id number]. Pt resting comfortably with eyes open. Pt breathing even and unlabored and denies SI/HI, chest pain, SOB, and pain. 1:1 at bedside. Pt pending SW

## 2023-07-08 NOTE — ED BEHAVIORAL HEALTH ASSESSMENT NOTE - DESCRIPTION
T(C): 36.8 (07-08-23 @ 07:47), Max: 36.8 (07-07-23 @ 14:04)  T(F): 98.3 (07-08-23 @ 07:47), Max: 98.3 (07-07-23 @ 14:04)  HR: 61 (07-08-23 @ 07:47) (61 - 72)  BP: 110/68 (07-08-23 @ 07:47) (97/61 - 132/87)  RR: 18 (07-08-23 @ 07:47) (18 - 19)  SpO2: 94% (07-08-23 @ 07:47) (94% - 98%) patient resides in a group home osteogenesis imperfecta, HTN, hypothyroidism, DM type 2, HLD

## 2023-07-08 NOTE — ED BEHAVIORAL HEALTH ASSESSMENT NOTE - RISK ASSESSMENT
RF med noncompliance, medical comorbidities, psychosis  PF no h/o suicide attempt, in supportive housing

## 2023-07-09 LAB
GLUCOSE BLDC GLUCOMTR-MCNC: 123 MG/DL — HIGH (ref 70–99)
GLUCOSE BLDC GLUCOMTR-MCNC: 127 MG/DL — HIGH (ref 70–99)
GLUCOSE BLDC GLUCOMTR-MCNC: 142 MG/DL — HIGH (ref 70–99)

## 2023-07-09 PROCEDURE — 99232 SBSQ HOSP IP/OBS MODERATE 35: CPT

## 2023-07-09 RX ORDER — HALOPERIDOL DECANOATE 100 MG/ML
5 INJECTION INTRAMUSCULAR ONCE
Refills: 0 | Status: COMPLETED | OUTPATIENT
Start: 2023-07-09 | End: 2023-07-14

## 2023-07-09 RX ADMIN — LITHIUM CARBONATE 300 MILLIGRAM(S): 300 TABLET, EXTENDED RELEASE ORAL at 15:56

## 2023-07-09 RX ADMIN — Medication 0: at 12:03

## 2023-07-09 RX ADMIN — Medication 500 MILLIGRAM(S): at 11:53

## 2023-07-09 RX ADMIN — Medication 1 TABLET(S): at 11:53

## 2023-07-09 RX ADMIN — Medication 0: at 17:09

## 2023-07-09 RX ADMIN — Medication 1000 UNIT(S): at 11:54

## 2023-07-09 NOTE — ED ADULT NURSE REASSESSMENT NOTE - NS ED NURSE REASSESS COMMENT FT1
pt is awake and appears restless coming to the nurses station to make comments or coming to the windows and staring at staff. pt continues to refuse certain medications and exhibit flight of ideas. no attempts to harm self or others pt ambulating independently with a steady gait. pt safety is being maintained.

## 2023-07-09 NOTE — ED ADULT NURSE REASSESSMENT NOTE - NS ED NURSE REASSESS COMMENT FT1
awake and alert awaiting transfer for inpatient tx. pt educated that he will be transferred when a bed becomes available. no attempts to harm self or others.

## 2023-07-09 NOTE — ED ADULT NURSE REASSESSMENT NOTE - NS ED NURSE REASSESS COMMENT FT1
Patient presented in  area dressed in yellow gowns.  Patient cooperative with security contraband assessment.  Patient irritable about being in  area.  Patient pleasant and then verbally hostile.  No attempts to harm self or others.

## 2023-07-09 NOTE — ED ADULT NURSE REASSESSMENT NOTE - NS ED NURSE REASSESS COMMENT FT1
pt awake and alert oriented x3. pt has flight of ideas. pt compliant with finger stick bs 142 requiring no coverage but refusing oral anti hyperglycemic. pt is irritable with delusional comments stating he is a doctor. pt makes no attempts to harm self or others.

## 2023-07-09 NOTE — ED ADULT NURSE REASSESSMENT NOTE - NS ED NURSE REASSESS COMMENT FT1
Patient continues to exhibit mood lability with intermittent anger verbal outbursts.  No attempts to harm self or others.  Patient provided breakfast and ate 100% of his meal.

## 2023-07-09 NOTE — ED ADULT NURSE REASSESSMENT NOTE - NS ED NURSE REASSESS COMMENT FT1
received report from Dieudonne melissa and assumed care of pt. pt sitting calm in bed. a and o x3. breathing even and unlabored. 1 to 1 at bedside.

## 2023-07-10 PROCEDURE — 99231 SBSQ HOSP IP/OBS SF/LOW 25: CPT

## 2023-07-10 PROCEDURE — 93010 ELECTROCARDIOGRAM REPORT: CPT

## 2023-07-10 RX ADMIN — LITHIUM CARBONATE 300 MILLIGRAM(S): 300 TABLET, EXTENDED RELEASE ORAL at 23:27

## 2023-07-10 NOTE — ED ADULT NURSE REASSESSMENT NOTE - NS ED NURSE REASSESS COMMENT FT1
pt continues to be awake walking in and out of his room. pt reports that it's his right to refuse medications with a labile affect. no attempts to harm self or others.

## 2023-07-10 NOTE — ED ADULT NURSE REASSESSMENT NOTE - NS ED NURSE REASSESS COMMENT FT1
Patient in room much of the time, mood is irritable upon approach. Tends to mumble and is difficult to understand at times, refusing all meds and accuchecks despite encouragement and attempts by staff to educate the patient regarding the importance of medication compliance. Stood by exit to , and appeared to be trying to get out of unit. Was able to redirect without incident, and pt did not return to area. Awaiting bed for inpatient transfer. Ambulates ad teagan with limp, but gait steady. Ate well at meals. No distress noted.

## 2023-07-10 NOTE — ED ADULT NURSE REASSESSMENT NOTE - NS ED NURSE REASSESS COMMENT FT1
pt received sitting in the common area awake and alert. pt is irritable and appears internally preoccupied. pt on report continues to refuse medications. attempts to reason with the pt has been unsuccessful thus far. pt's mother Jackelyn called and pt was advised and assisted in calling her back. pt is delusional reporting that a blanket that he is carrying around is "my baby". redirection attempted with pt with limited success. the pt has made no physical attempts to harm himself or others.

## 2023-07-10 NOTE — ED ADULT NURSE REASSESSMENT NOTE - NS ED NURSE REASSESS COMMENT FT1
pt continues to be guarded and paranoid. attempted to obtain an EKG but pt would not stay still to complete procedure. pt is noted to have chemo port under skin to right upper chest, no redness or swelling noted. pt also noted to have a distended lower abdomen at the umbilicus and below. pt reports that he has had a hernia and was wearing a belt for it. No attempts to harm self or others. ambulates independently with a steady gait. safety is being maintained.

## 2023-07-11 LAB
GLUCOSE BLDC GLUCOMTR-MCNC: 120 MG/DL — HIGH (ref 70–99)
GLUCOSE BLDC GLUCOMTR-MCNC: 121 MG/DL — HIGH (ref 70–99)
GLUCOSE BLDC GLUCOMTR-MCNC: 133 MG/DL — HIGH (ref 70–99)

## 2023-07-11 PROCEDURE — 99232 SBSQ HOSP IP/OBS MODERATE 35: CPT

## 2023-07-11 RX ADMIN — Medication 0: at 08:54

## 2023-07-11 RX ADMIN — LITHIUM CARBONATE 300 MILLIGRAM(S): 300 TABLET, EXTENDED RELEASE ORAL at 18:12

## 2023-07-11 RX ADMIN — CLOZAPINE 50 MILLIGRAM(S): 150 TABLET, ORALLY DISINTEGRATING ORAL at 18:12

## 2023-07-11 RX ADMIN — CLOZAPINE 50 MILLIGRAM(S): 150 TABLET, ORALLY DISINTEGRATING ORAL at 04:59

## 2023-07-11 RX ADMIN — LITHIUM CARBONATE 300 MILLIGRAM(S): 300 TABLET, EXTENDED RELEASE ORAL at 04:59

## 2023-07-11 NOTE — ED ADULT NURSE REASSESSMENT NOTE - NS ED NURSE REASSESS COMMENT FT1
pt awake resting on a chair in his room. pt compliant with am medications. pt appeared to sleep very little last night. no attempts to harm self or others.

## 2023-07-11 NOTE — ED ADULT NURSE REASSESSMENT NOTE - NS ED NURSE REASSESS COMMENT FT1
pt received sitting in the common area appears tired resting head on table and then went to room and is now lying on the bed. pt offers no complaints at this time. Independently ambulating with steady gait. no attempts to harm self or others. safety is being maintained.

## 2023-07-11 NOTE — ED ADULT NURSE REASSESSMENT NOTE - NS ED NURSE REASSESS COMMENT FT1
pt is awake appearing restless at times in his room while intermittently coming to the nurses station c/o "odd noises". pt refusing medications except lithium 300mg at this time with several attempts to encourage pt to take prescribed Clozaril without success. pt has disorganized thinking and flight of ideas. pt offered fluids which he accepts and drinks.

## 2023-07-11 NOTE — ED CDU PROVIDER SUBSEQUENT DAY NOTE - DATE/TIME 1
[facility] contacting about post transplant labs, if pt needs to have these done.  Reviewed with homecare RN that because of pt being combative with any lab draws, labs have not been done, though pt has INR checks done by homecare.   Even doing INR can be difficult, pt won't hold still or particpate.  Homecare will be continuing homevisits for INR only.  
11-Jul-2023 03:24
10-Jul-2023 22:06

## 2023-07-11 NOTE — ED CDU PROVIDER SUBSEQUENT DAY NOTE - NS ED ROS FT
No fever/chills   No photophobia/eye pain/changes in visio,   No ear pain/sore throat/dysphagia   No chest pain/palpitations  No SOB/cough/wheeze/stridor   No abdominal pain, No N/V/D  No dysuria/frequency/discharge   No neck/back pain,   No rash  No changes in neurological status/function.

## 2023-07-11 NOTE — ED ADULT NURSE REASSESSMENT NOTE - NS ED NURSE REASSESS COMMENT FT1
Bedside and Verbal shift change report given to [facility], [address] [place] [address] (oncoming nurse) by Rosemarie Gutierrez RN (offgoing nurse). Report included the following information SBAR, ED Summary, MAR and Recent Results. Patient up and ambulates ad teagan about the unit. His mood is labile, tends to oppose staff at times until he's is content to comply. Needs strong encouragement over lengthy period of time before pt agreeable to take his meds as ordered. Refused vitamins scheduled at noon, but accepted Lithium and Clozapine after much encouragement. Ate well at a lunch and dinner. Accuchecks outside of parameters, no insulin coverage needed. Continuing to monitor.

## 2023-07-11 NOTE — ED ADULT NURSE REASSESSMENT NOTE - NSFALLRISKFACTORS_ED_ALL_ED
Osteogenesis imperfecta/Bone Condition: Including osteoporosis, prolonged steroid use or metastatic bone disease/cancer

## 2023-07-11 NOTE — ED ADULT NURSE REASSESSMENT NOTE - NS ED NURSE REASSESS COMMENT FT1
pt continues to sleep in no acute distress respirations even and unlabored. Will attempt to medicate with hs meds when pt is more awake. pt has made no attempts to harm self or others. safety is being maintained.

## 2023-07-11 NOTE — ED BEHAVIORAL HEALTH NOTE - BEHAVIORAL HEALTH NOTE
PROGRESS NOTE: 07-11-23 @ 13:59  	  • Reason for Ongoing Consultation: 	    ID: 57yyo Male with HEALTH ISSUES - PROBLEM Dx:  Schizoaffective disorder, bipolar type    INTERVAL DATA:   • Interval Chief Complaint: "all the hospitals are connected"    • Interval History:   Chart reviewed, seen and evaluated this afternoon for follow up. Involuntary status, pending bed availability. Per chart, has been selectively compliant with medications, reportedly engaging in self dialogue. Throughout encounter, noted to be bizarre, internally preoccupied appearing, disorganized. Limited history/interview due to patient condition.     REVIEW OF SYSTEMS:   Limited due to patient condition    REVIEW OF VITALS/LABS/IMAGING/INVESTIGATIONS:   • Vital signs reviewed: Yes  • Vital Signs:	    T(C): 36.5 (07-11-23 @ 11:36), Max: 37.1 (07-11-23 @ 04:55)  HR: 91 (07-11-23 @ 11:36) (63 - 91)  BP: 125/74 (07-11-23 @ 11:36) (115/72 - 153/90)  RR: 18 (07-11-23 @ 11:36) (18 - 20)  SpO2: 99% (07-11-23 @ 11:36) (97% - 99%)    • Available labs reviewed: Yes  • Available Lab Results:     MEDICATIONS:      PRN Medications:  • PRN Medications since last evaluation	  • PRN Details	    Current Medications:   ascorbic acid 500 milliGRAM(s) Oral daily  atorvastatin 80 milliGRAM(s) Oral at bedtime  calcium carbonate    500 mG (Tums) Chewable 1 Tablet(s) Chew daily  cholecalciferol 1000 Unit(s) Oral daily  cloZAPine 50 milliGRAM(s) Oral two times a day  dextrose 5%. 1000 milliLiter(s) IV Continuous <Continuous>  dextrose 5%. 1000 milliLiter(s) IV Continuous <Continuous>  dextrose 50% Injectable 25 Gram(s) IV Push once  dextrose 50% Injectable 25 Gram(s) IV Push once  dextrose 50% Injectable 12.5 Gram(s) IV Push once  dextrose Oral Gel 15 Gram(s) Oral once PRN  glucagon  Injectable 1 milliGRAM(s) IntraMuscular once  haloperidol    Injectable 5 milliGRAM(s) IntraMuscular once PRN  insulin lispro (ADMELOG) corrective regimen sliding scale   SubCutaneous three times a day before meals  linagliptin 5 milliGRAM(s) Oral daily  lithium 300 milliGRAM(s) Oral three times a day     Medication Side Effects:  • Medication Side Effects or Adverse Reactions (new or ongoing)	None known    MENTAL STATUS EXAM:   • Level of Consciousness	Alert  • General Appearance	deformities present  • Body Habitus	overweight  • Hygiene	poor  • Grooming	poor  • Behavior	other, disorganized  • Eye Contact	limited  • Relatedness	poor  • Impulse Control	tenuous, restless  • Muscle Tone / Strength	Normal muscle tone/strength  • Abnormal Movements	No abnormal movements  • Gait / Station	Normal gait / station  • Speech Volume	soft  • Speech Rate	latency noted  • Speech Spontaneity	limited  • Speech Articulation	Normal  • Mood	irritable  • Affect Quality	irritable  • Affect Range	labile  • Affect Congruence	Congruent  • Thought Process	disorganized  • Thought Associations	loosening  • Thought Content	various delusions  • Perceptions	appears internally preoccupied  • Oriented to Time	unable to fully assess  • Oriented to Place	unable to fully assess  • Oriented to Situation	unable to fully assess  • Oriented to Person	Yes  • Attention / Concentration	impaired  • Estimated Intelligence	average  • Recent Memory	unable to fully assess  • Remote Memory	unable to fully assess  • Fund of Knowledge	impaired  • Language	No abnormalities noted  • Judgment (regarding everyday events)	poor  • Insight (regarding psychiatric illness)	limited    SUICIDALITY:   • Suicidality (Interval)	none known    HOMICIDALITY/AGGRESSION:   • Homicidality/Aggression	none known    DIAGNOSIS DSM-V:    Psychiatric Diagnosis (Corresponds to DSM-IV Axis I, II):   HEALTH ISSUES - PROBLEM Dx:  Schizoaffective disorder, bipolar type    ASSESSMENT OF CURRENT CONDITION:   Summary (include case differential, formulation and patient response to therapy):   57 year old male lives in group home, [facility], disabled, PPH, schizoaffective disorder,  no known suicide attempt, h/o agitation aggression unk,  prior psych admissions, last known [facility] x 3 mo stay, no drug or alcohol use, PMH osteogenesis imperfecta (uses rollator to amb though observed walking independently),  DM, HLD, Hypothyroid, HTN, eith [facility] ACT team , bib EMS for  hallucinations and med noncompliance and sent to [facility] on 7/7/23 and was returned to ED due to having an inactive port under his skin on right chest.  Per reports [facility] will not accept Pt even though port inactive for some time.  Selectively compliant with medications, remains bizarre and disorganized, appearing internally preoccupied.   Risk Assessment (consider static vs modifiable risk factors and protective factors; comment on level of risk for dangerous behavior):     PLAN    involuntary status, pending bed availability  continue current psychotropic medications at same dosages    clozapine 50 mg po bid for psychosis    lithium 300 mg po tid for mood stabilization  rest of care as per primary

## 2023-07-11 NOTE — ED ADULT NURSE REASSESSMENT NOTE - NS ED NURSE REASSESS COMMENT FT1
received pt sitting in the common area at the table appearing tired. pt encouraged to go to his room and sleep. No attempts to harm self or others noted.

## 2023-07-11 NOTE — ED ADULT NURSE REASSESSMENT NOTE - NS ED NURSE REASSESS COMMENT FT1
pt awake and continues to be restless alternating from his room to the common area with a steady gait. pt continues to appear disorganized and is noted to be talking to himself. no attempts to harm self or others.

## 2023-07-11 NOTE — ED CDU PROVIDER SUBSEQUENT DAY NOTE - PROGRESS NOTE DETAILS
Patient has known right chest port from prior chemo; it is not currently being accessed. Patient with no overlying erythema or signs of infection. Chest port does not pose a contraindication to psychiatric admission.
Patient has known right chest port from prior chemo; it is not currently being accessed. Patient with no overlying erythema or signs of infection. Chest port does not pose a contraindication to psychiatric admission.

## 2023-07-12 VITALS
TEMPERATURE: 98 F | SYSTOLIC BLOOD PRESSURE: 107 MMHG | OXYGEN SATURATION: 98 % | DIASTOLIC BLOOD PRESSURE: 68 MMHG | HEART RATE: 79 BPM | RESPIRATION RATE: 18 BRPM

## 2023-07-12 LAB
ANION GAP SERPL CALC-SCNC: 10 MMOL/L — SIGNIFICANT CHANGE UP (ref 5–17)
ANION GAP SERPL CALC-SCNC: 13 MMOL/L — SIGNIFICANT CHANGE UP (ref 5–17)
BUN SERPL-MCNC: 12.6 MG/DL — SIGNIFICANT CHANGE UP (ref 8–20)
BUN SERPL-MCNC: 16.3 MG/DL — SIGNIFICANT CHANGE UP (ref 8–20)
CALCIUM SERPL-MCNC: 8.9 MG/DL — SIGNIFICANT CHANGE UP (ref 8.4–10.5)
CALCIUM SERPL-MCNC: 9.4 MG/DL — SIGNIFICANT CHANGE UP (ref 8.4–10.5)
CHLORIDE SERPL-SCNC: 100 MMOL/L — SIGNIFICANT CHANGE UP (ref 96–108)
CHLORIDE SERPL-SCNC: 102 MMOL/L — SIGNIFICANT CHANGE UP (ref 96–108)
CO2 SERPL-SCNC: 26 MMOL/L — SIGNIFICANT CHANGE UP (ref 22–29)
CO2 SERPL-SCNC: 26 MMOL/L — SIGNIFICANT CHANGE UP (ref 22–29)
CREAT SERPL-MCNC: 0.64 MG/DL — SIGNIFICANT CHANGE UP (ref 0.5–1.3)
CREAT SERPL-MCNC: 0.9 MG/DL — SIGNIFICANT CHANGE UP (ref 0.5–1.3)
EGFR: 100 ML/MIN/1.73M2 — SIGNIFICANT CHANGE UP
EGFR: 110 ML/MIN/1.73M2 — SIGNIFICANT CHANGE UP
GLUCOSE BLDC GLUCOMTR-MCNC: 173 MG/DL — HIGH (ref 70–99)
GLUCOSE SERPL-MCNC: 134 MG/DL — HIGH (ref 70–99)
GLUCOSE SERPL-MCNC: 164 MG/DL — HIGH (ref 70–99)
MAGNESIUM SERPL-MCNC: 1.8 MG/DL — SIGNIFICANT CHANGE UP (ref 1.6–2.6)
MAGNESIUM SERPL-MCNC: 2.1 MG/DL — SIGNIFICANT CHANGE UP (ref 1.6–2.6)
POTASSIUM SERPL-MCNC: 2.7 MMOL/L — CRITICAL LOW (ref 3.5–5.3)
POTASSIUM SERPL-MCNC: 3.3 MMOL/L — LOW (ref 3.5–5.3)
POTASSIUM SERPL-MCNC: 4.2 MMOL/L — SIGNIFICANT CHANGE UP (ref 3.5–5.3)
POTASSIUM SERPL-SCNC: 2.7 MMOL/L — CRITICAL LOW (ref 3.5–5.3)
POTASSIUM SERPL-SCNC: 3.3 MMOL/L — LOW (ref 3.5–5.3)
POTASSIUM SERPL-SCNC: 4.2 MMOL/L — SIGNIFICANT CHANGE UP (ref 3.5–5.3)
SARS-COV-2 RNA SPEC QL NAA+PROBE: SIGNIFICANT CHANGE UP
SODIUM SERPL-SCNC: 138 MMOL/L — SIGNIFICANT CHANGE UP (ref 135–145)
SODIUM SERPL-SCNC: 139 MMOL/L — SIGNIFICANT CHANGE UP (ref 135–145)

## 2023-07-12 PROCEDURE — 93010 ELECTROCARDIOGRAM REPORT: CPT

## 2023-07-12 PROCEDURE — 99232 SBSQ HOSP IP/OBS MODERATE 35: CPT

## 2023-07-12 RX ORDER — POTASSIUM CHLORIDE 20 MEQ
10 PACKET (EA) ORAL
Refills: 0 | Status: COMPLETED | OUTPATIENT
Start: 2023-07-12 | End: 2023-07-12

## 2023-07-12 RX ORDER — POTASSIUM CHLORIDE 20 MEQ
40 PACKET (EA) ORAL EVERY 4 HOURS
Refills: 0 | Status: DISCONTINUED | OUTPATIENT
Start: 2023-07-12 | End: 2023-07-12

## 2023-07-12 RX ADMIN — Medication 100 MILLIEQUIVALENT(S): at 13:16

## 2023-07-12 RX ADMIN — Medication 100 MILLIEQUIVALENT(S): at 13:14

## 2023-07-12 RX ADMIN — Medication 100 MILLIEQUIVALENT(S): at 11:53

## 2023-07-12 RX ADMIN — Medication 1 MILLIGRAM(S): at 13:10

## 2023-07-12 RX ADMIN — LITHIUM CARBONATE 300 MILLIGRAM(S): 300 TABLET, EXTENDED RELEASE ORAL at 22:10

## 2023-07-12 RX ADMIN — CLOZAPINE 50 MILLIGRAM(S): 150 TABLET, ORALLY DISINTEGRATING ORAL at 18:16

## 2023-07-12 RX ADMIN — CLOZAPINE 50 MILLIGRAM(S): 150 TABLET, ORALLY DISINTEGRATING ORAL at 04:37

## 2023-07-12 RX ADMIN — LITHIUM CARBONATE 300 MILLIGRAM(S): 300 TABLET, EXTENDED RELEASE ORAL at 04:37

## 2023-07-12 RX ADMIN — ATORVASTATIN CALCIUM 80 MILLIGRAM(S): 80 TABLET, FILM COATED ORAL at 22:11

## 2023-07-12 NOTE — ED ADULT NURSE REASSESSMENT NOTE - NS ED NURSE REASSESS COMMENT FT1
Endorse patient care at 7:00 PM. Patient alert,  awake appearing restless at times with disorganized thinking and flight of ideas. Patient complain of room that is cold, extra blankets given and was accepted by patient. No compliance with nursing care. No agitation noted, nursing unable to assess patient for Hallucination and suicidal thoughts, patient is too disorganized. No evidence of self harm, safety maintained.

## 2023-07-12 NOTE — ED ADULT NURSE REASSESSMENT NOTE - NS ED NURSE REASSESS COMMENT FT1
Patient educated about abnormal lab level of potassium.  Patient educated about potassium replacement.  Patient refused replacement although educated about importance of medication compliance.  Patient refused insulin coverage for POC glucose of 173.

## 2023-07-12 NOTE — ED ADULT NURSE REASSESSMENT NOTE - NS ED NURSE REASSESS COMMENT FT1
Patient transferred from main ED after medical treatment.  Patient ambulating with a steady independent gait.  Patient initially pleasant and then abruptly irritable.  Patient refused POC testing prior to dinner but complied with Clozaril as ordered post eating 100% of his dinner.  Constant observation discontinued on  presentation.  Safety of patient maintained.

## 2023-07-12 NOTE — ED ADULT NURSE REASSESSMENT NOTE - NS ED NURSE REASSESS COMMENT FT1
Patient received PM medication Lipitor and Lithium. Patient was compliant in receiving medication, patient was sleepy and was in bed when received medication. Patient voiced no complains, all needs attend, safety maintained.

## 2023-07-12 NOTE — ED ADULT NURSE REASSESSMENT NOTE - NS ED NURSE REASSESS COMMENT FT1
pt is now awake sitting up in bed. pt cooperative with vitals but needed coaching. pt was agreeable to take lithium and Clozaril. Attempted to obtain an ekg which pt states come back later. pt has made no attempts to harm self or others.

## 2023-07-12 NOTE — ED ADULT NURSE REASSESSMENT NOTE - NS ED NURSE REASSESS COMMENT FT1
Assumed care of patient at 0715.  Patient awake sitting on his bed with an angry affect but was pleasant during interaction.  Patient oriented to staff and educated about plan of care.  No attempts to harm self or others and safety maintained.

## 2023-07-12 NOTE — ED ADULT NURSE REASSESSMENT NOTE - NS ED NURSE REASSESS COMMENT FT1
Patient being transferred to the main ED for treatment of hypokalemia.  Patient reeducated about plan of care.  Report called to Sonido Collazo RN.  Patient to be monitored on constant observation in Main ED.

## 2023-07-13 LAB — GLUCOSE BLDC GLUCOMTR-MCNC: 111 MG/DL — HIGH (ref 70–99)

## 2023-07-13 PROCEDURE — 99232 SBSQ HOSP IP/OBS MODERATE 35: CPT

## 2023-07-13 PROCEDURE — 99213 OFFICE O/P EST LOW 20 MIN: CPT

## 2023-07-13 RX ORDER — POTASSIUM CHLORIDE 20 MEQ
40 PACKET (EA) ORAL ONCE
Refills: 0 | Status: COMPLETED | OUTPATIENT
Start: 2023-07-13 | End: 2023-07-13

## 2023-07-13 RX ORDER — POTASSIUM CHLORIDE 20 MEQ
40 PACKET (EA) ORAL ONCE
Refills: 0 | Status: DISCONTINUED | OUTPATIENT
Start: 2023-07-13 | End: 2023-07-13

## 2023-07-13 RX ADMIN — Medication 1 TABLET(S): at 13:48

## 2023-07-13 RX ADMIN — CLOZAPINE 50 MILLIGRAM(S): 150 TABLET, ORALLY DISINTEGRATING ORAL at 17:50

## 2023-07-13 RX ADMIN — Medication 40 MILLIEQUIVALENT(S): at 09:33

## 2023-07-13 RX ADMIN — Medication 500 MILLIGRAM(S): at 13:47

## 2023-07-13 RX ADMIN — LITHIUM CARBONATE 300 MILLIGRAM(S): 300 TABLET, EXTENDED RELEASE ORAL at 14:15

## 2023-07-13 RX ADMIN — CLOZAPINE 50 MILLIGRAM(S): 150 TABLET, ORALLY DISINTEGRATING ORAL at 05:58

## 2023-07-13 RX ADMIN — Medication 1 MILLIGRAM(S): at 21:29

## 2023-07-13 RX ADMIN — ATORVASTATIN CALCIUM 80 MILLIGRAM(S): 80 TABLET, FILM COATED ORAL at 21:23

## 2023-07-13 RX ADMIN — LITHIUM CARBONATE 300 MILLIGRAM(S): 300 TABLET, EXTENDED RELEASE ORAL at 21:24

## 2023-07-13 RX ADMIN — LITHIUM CARBONATE 300 MILLIGRAM(S): 300 TABLET, EXTENDED RELEASE ORAL at 05:57

## 2023-07-13 NOTE — ED ADULT NURSE REASSESSMENT NOTE - NS ED NURSE REASSESS COMMENT FT1
Patient refused POC testing prior to lunch.  Patient ate 100% of his lunch.  Patient reused Vitamin D and Tradjenta as order although encouraged to comply and educated about medications.  Patients mood remains labile shifting between pleasant and angry during interactions.  Patient refused to wear a gown when ambulating to the bathroom wearing a blanket over his underwear.  No attempts to harm self or others and safety maintained.

## 2023-07-13 NOTE — ED BEHAVIORAL HEALTH NOTE - BEHAVIORAL HEALTH NOTE
PROGRESS NOTE: 07-13-23 @ 08:58  	  • Reason for Ongoing Consultation: Reevaluation, Pending bed placement    ID: 57yyo Male with HEALTH ISSUES - PROBLEM Dx:  Schizoaffective disorder, bipolar type      INTERVAL DATA:   • Interval Chief Complaint: "Leave me alone to eat my breakfast"   • Interval History: Patient with significant mood lability today upon interview. Unable to concentrate on questions being asked and unable to give clear responses. Responds with disorganized speech and muttering; frequently preoccupied in his thoughts today. Denies SI or HI. When asked about A/H, states "everyone hears voices", but refuse to share further details. Attempted to discuss taking his potassium pill today, which led to the patient becoming frustrated and yelling.     REVIEW OF SYSTEMS:   • Constitutional Symptoms	No complaints  • Eyes	No complaints  • Ears / Nose / Throat / Mouth	No complaints  • Cardiovascular	No complaints  • Respiratory	No complaints  • Gastrointestinal	No complaints  • Genitourinary	No complaints  • Musculoskeletal	No complaints  • Skin	No complaints  • Neurological	No complaints  • Psychiatric (see HPI)	See HPI  • Endocrine	No complaints  • Hematologic / Lymphatic	No complaints  • Allergic / Immunologic	No complaints    REVIEW OF VITALS/LABS/IMAGING/INVESTIGATIONS:   • Vital signs reviewed: Yes  • Vital Signs:	    T(C): 36.6 (07-13-23 @ 07:32), Max: 36.7 (07-12-23 @ 11:17)  HR: 88 (07-13-23 @ 07:32) (70 - 90)  BP: 103/71 (07-13-23 @ 07:32) (103/71 - 145/79)  RR: 18 (07-13-23 @ 07:32) (18 - 19)  SpO2: 98% (07-13-23 @ 07:32) (96% - 100%)    • Available labs reviewed: Yes  • Available Lab Results:       07-12    138  |  102  |  16.3  ----------------------------<  134<H>  3.3<L>   |  26.0  |  0.90    Ca    8.9      12 Jul 2023 20:40  Mg     1.8     07-12          Urinalysis Basic - ( 12 Jul 2023 20:40 )    Color: x / Appearance: x / SG: x / pH: x  Gluc: 134 mg/dL / Ketone: x  / Bili: x / Urobili: x   Blood: x / Protein: x / Nitrite: x   Leuk Esterase: x / RBC: x / WBC x   Sq Epi: x / Non Sq Epi: x / Bacteria: x          MEDICATIONS:      PRN Medications:  • PRN Medications since last evaluation	  • PRN Details	    Current Medications:   ascorbic acid 500 milliGRAM(s) Oral daily  atorvastatin 80 milliGRAM(s) Oral at bedtime  calcium carbonate    500 mG (Tums) Chewable 1 Tablet(s) Chew daily  cholecalciferol 1000 Unit(s) Oral daily  cloZAPine 50 milliGRAM(s) Oral two times a day  dextrose 5%. 1000 milliLiter(s) IV Continuous <Continuous>  dextrose 5%. 1000 milliLiter(s) IV Continuous <Continuous>  dextrose 50% Injectable 25 Gram(s) IV Push once  dextrose 50% Injectable 25 Gram(s) IV Push once  dextrose 50% Injectable 12.5 Gram(s) IV Push once  dextrose Oral Gel 15 Gram(s) Oral once PRN  glucagon  Injectable 1 milliGRAM(s) IntraMuscular once  haloperidol    Injectable 5 milliGRAM(s) IntraMuscular once PRN  insulin lispro (ADMELOG) corrective regimen sliding scale   SubCutaneous three times a day before meals  linagliptin 5 milliGRAM(s) Oral daily  lithium 300 milliGRAM(s) Oral three times a day  LORazepam     Tablet 1 milliGRAM(s) Oral once PRN     Medication Side Effects:  • Medication Side Effects or Adverse Reactions (new or ongoing)	None known    MENTAL STATUS EXAM:   • Level of Consciousness	Alert  • General Appearance	Deformities present  • Body Habitus	Overweight  • Hygiene	Poor  • Grooming	Poor  • Behavior	Disorganized, intermittently cooperative  • Eye Contact	Poor   • Relatedness	Poor  • Impulse Control	Poor, restless  • Muscle Tone / Strength	Normal muscle tone/strength  • Abnormal Movements	No abnormal movements  • Gait / Station	Normal gait / station  • Speech Volume	Variable  • Speech Rate	Slow   • Speech Spontaneity	Limited  • Speech Articulation	Normal  • Mood	Irritable  • Affect Quality	Irritable  • Affect Range	Labile  • Affect Congruence	Congruent  • Thought Process	Disorganized  • Thought Associations	Loosened  • Thought Content	 Delusions  • Perceptions	Appears internally preoccupied  • Oriented to Time	  unable to fully assess  • Oriented to Place	  unable to fully assess  • Oriented to Situation	unable to fully assess  • Oriented to Person	Yes  • Attention / Concentration	Impaired  • Estimated Intelligence	Average  • Recent Memory	 unable to fully assess  • Remote Memory	 unable to fully assess  • Fund of Knowledge	Impaired  • Language	No abnormalities noted  • Judgment (regarding everyday events)	Poor    • Insight (regarding psychiatric illness)	   Poor    SUICIDALITY:   • Suicidality (Interval)	none known    HOMICIDALITY/AGGRESSION:   • Homicidality/Aggression	none known    DIAGNOSIS DSM-V:    Psychiatric Diagnosis (Corresponds to DSM-IV Axis I, II):   HEALTH ISSUES - PROBLEM Dx:  Schizoaffective disorder, bipolar type       Medical Diagnosis (Corresponds to DSM-IV Axis III):  • Axis III	      ASSESSMENT OF CURRENT CONDITION:   Summary (include case differential, formulation and patient response to therapy):     56 y/o male with pphx schizoaffective disorder and previous psychiatric hospitalizations, and extensive PMH including s/p colon CA and ostomy, present in  ED for initial presentation and complaint of racing thoughts, A/H, and refusal to take meds at adult home where he lives. Over the course of his stay in the  ED, patient has had significant mood lability and instability, various delusions, abnormal perceptions, and preoccupation with internal thoughts. Patient has been consistently non-compliant with his medications, both those psychiatrically and medically necessary. Patient has not been physically violent but is not psychiatrically stable to be safe to both himself and other members of his residence/the community. Pending involuntary admission placement at this time; awaiting medical clearance due to ongoing but improving hypokalemia.     Risk Assessment (consider static vs modifiable risk factors and protective factors; comment on level of risk for dangerous behavior):    Risk factors: previous psychiatric admissions and diagnoses, medication non-compliance, unstable/lack of social supports   Protective factors: stable residence    PLAN   Patient pending transfer for involuntary admission    To c/w K supplementation per medical recommendations, pending med clearance

## 2023-07-13 NOTE — ED ADULT NURSE REASSESSMENT NOTE - COMFORT CARE
meal provided
meal provided/plan of care explained
plan of care explained
ambulated to bathroom/darkened lights/meal provided/plan of care explained/po fluids offered
meal provided/plan of care explained
plan of care explained/po fluids offered
meal provided
darkened lights
ambulated to bathroom/darkened lights/meal provided/plan of care explained/po fluids offered
plan of care explained
meal provided
plan of care explained

## 2023-07-13 NOTE — ED ADULT NURSE REASSESSMENT NOTE - NS ED NURSE REASSESS COMMENT FT1
Assumed care of pt at 19:15 as stated in report from ANNABEL Silver. Charting as noted. Patient awake, denies pain/discomfort, denies CP/SOB. Updated on the plan of care. Bed locked in lowest position. No signs of acute distress noted, safety maintained. Pt in yellow gown.

## 2023-07-13 NOTE — ED ADULT NURSE REASSESSMENT NOTE - NS ED NURSE REASSESS COMMENT FT1
Patient uncooperative at times with scanning for medication identification and refused POC glucose testing before dinner.  Patient has no signs or symptoms of hyper or hypoglycemia.  Patient initially refused to take his Clozaril but after continued encouragement and education he complied.  Provided the phone to contact his family.  No attempts to harm self or others and safety maintained.

## 2023-07-13 NOTE — ED ADULT NURSE REASSESSMENT NOTE - NS ED NURSE REASSESS COMMENT FT1
Patient refused PO liquid potassium as ordered.  Dr. Bey notified and potassium changed to tablet.  Patient complied after multiple repeated attempts at medication education and encouragement to comply. Patient jumps from topic to topic during interactions and exhibits mood lability shifting from being calm to hostile.  No attempts to harm self or others and safety maintained.  Patient ate 75% of his meal at breakfast.

## 2023-07-13 NOTE — ED ADULT NURSE REASSESSMENT NOTE - NSFALLRISKFACTORS_ED_ALL_ED
No indicators present Bone Condition: Including osteoporosis, prolonged steroid use or metastatic bone disease/cancer

## 2023-07-13 NOTE — ED ADULT NURSE REASSESSMENT NOTE - NSFALLRISK_ED_ALL_ED
[facility] NEONATOLOGY  PROGRESS NOTE       Today's Date: 2023     Patient Name: DALI Britt   MRN: [id number]   YOB: 2022   Room/Bed: [facility]     GA at Birth: Gestational Age: 31w2d   DOL: 29 days   CGA: 35w 3d   Birth Weight: 1845 g (4 lb 1.1 oz)   Current Weight:  Weight: 2580 g (5 lb 11 oz)   Weight change: 100 g (3.5 oz)     PE and plan of care reviewed with attending physician.    Vital Signs:  Vital Signs (Most Recent):  Temp: 98.2 °F (36.8 °C) (23 1200)  Pulse: (!) 180 (23 1200)  Resp: 51 (23 1200)  BP: (!) 83/41 (23 0900)  SpO2: (!) 100 % (23 1200)   Vital Signs (24h Range):  Temp:  [97.9 °F (36.6 °C)-98.7 °F (37.1 °C)] 98.2 °F (36.8 °C)  Pulse:  [154-188] 180  Resp:  [38-61] 51  SpO2:  [90 %-100 %] 100 %  BP: (51-83)/(37-41) 83/41     Assessment and Plan:  /AGA:  31 2/7 weeks , twin gestation   Plan:  Provide appropriate developmental care.      Cardioresp:  RRR, no murmur, precordium quiet, pulses 2+ and equal, capillary refill 2-3 seconds, BP wnl.  BBS clear and good air exchange. Stable in room air with comfortable appearing clinical presentation.    Plan:  Follow clinically.      FEN:  Abdomen soft, nondistended, active bowel sounds, no masses, no HSM. Tolerating feedings of EBM+HMF 24k at 49 ml q 3 h. PO per IDF protocol, completed 1 of 1 attempts.  ml/kg/d + 0 breast feedings.  UOP 4.2 ml/kg/hr and stool x 5.   On PVS with Fe and Vit D 400.  CMP: 140/5.1/109/22/17.7/0.48/10.2, d/s 74, alp 517(increased)  Plan:  Increase feeds to 51 ml q 3hr. Continue IDF..  ml/kg/day.  PO per IDF. Follow intake and UOP.  Continue PVS w/ Fe and Vit D 400 IU. Follow CMP weekly      Heme/ID/Bili:      CBC wbc 9.3 (s57, b0) Hct 47.9, Plt 202K.     Plan: Follow clinically.      Neuro/HEENT: AFSF, normal tone and activity for gestational age.    CUS: normal.   Plan: Follow clinically.  CUS at 6 weeks or PTD. Positioning aids for  head per OT     Discharge planning:  OB: OSMAN Geiger  Pedi: unknown.   NBS: unsat. Specimen.   T4: 1.11 TSH: 1.424.  Repeat NBS normal with MPS I, Pompe Disease and SMA pending.          Plan:    Follow results of NBS on .  ABR, CCHD screening, car seat study and CPR instruction prior to discharge. Hepatitis B immunization at 30 days of life. Repeat ABR outpatient at 9 months of age.        Problems:  Patient Active Problem List    Diagnosis Date Noted    At risk for osteopenia 2023    Twin pregnancy, delivered by  section, current hospitalization 2022    Prematurity, birth weight 1,750-1,999 grams, with 31-32 completed weeks of gestation 2022    At risk for alteration of nutrition in  2022        Medications:   Scheduled   ergocalciferol  400 Units Oral Daily    pediatric multivitamin with iron  1 mL Oral Daily           PRN       Labs:    No results found for this or any previous visit (from the past 12 hour(s)).                 Microbiology:   Microbiology Results (last 7 days)       ** No results found for the last 168 hours. **                                      No

## 2023-07-14 LAB
GLUCOSE BLDC GLUCOMTR-MCNC: 102 MG/DL — HIGH (ref 70–99)
GLUCOSE BLDC GLUCOMTR-MCNC: 174 MG/DL — HIGH (ref 70–99)

## 2023-07-14 PROCEDURE — 99232 SBSQ HOSP IP/OBS MODERATE 35: CPT

## 2023-07-14 PROCEDURE — 99213 OFFICE O/P EST LOW 20 MIN: CPT

## 2023-07-14 RX ADMIN — CLOZAPINE 50 MILLIGRAM(S): 150 TABLET, ORALLY DISINTEGRATING ORAL at 17:33

## 2023-07-14 RX ADMIN — LITHIUM CARBONATE 300 MILLIGRAM(S): 300 TABLET, EXTENDED RELEASE ORAL at 21:33

## 2023-07-14 RX ADMIN — CLOZAPINE 50 MILLIGRAM(S): 150 TABLET, ORALLY DISINTEGRATING ORAL at 06:27

## 2023-07-14 RX ADMIN — Medication 1 TABLET(S): at 11:56

## 2023-07-14 RX ADMIN — Medication 500 MILLIGRAM(S): at 11:56

## 2023-07-14 RX ADMIN — LITHIUM CARBONATE 300 MILLIGRAM(S): 300 TABLET, EXTENDED RELEASE ORAL at 06:27

## 2023-07-14 RX ADMIN — HALOPERIDOL DECANOATE 5 MILLIGRAM(S): 100 INJECTION INTRAMUSCULAR at 22:28

## 2023-07-14 RX ADMIN — ATORVASTATIN CALCIUM 80 MILLIGRAM(S): 80 TABLET, FILM COATED ORAL at 21:19

## 2023-07-14 RX ADMIN — LITHIUM CARBONATE 300 MILLIGRAM(S): 300 TABLET, EXTENDED RELEASE ORAL at 14:55

## 2023-07-14 NOTE — ED BEHAVIORAL HEALTH NOTE - BEHAVIORAL HEALTH NOTE
PROGRESS NOTE: 07-14-23 @ 11:16    • Reason for Ongoing Consultation: Reevaluation, Pending bed placement    ID: [id number] Male with HEALTH ISSUES - PROBLEM Dx:  Schizoaffective disorder, bipolar type      INTERVAL DATA:   • Interval Chief Complaint: "Am I going to be leaving soon"   • Interval History: Patient with significant mood lability today upon interview. Properly concentrates on questions being asked but does not give clear responses. Responds with disorganized speech and yelling. Denies SI or HI. When asked about A/H yesterday, states "everyone hears voices", but refuse to share further details. Denies AVH today.     REVIEW OF SYSTEMS:   • Constitutional Symptoms	No complaints  • Eyes	No complaints  • Ears / Nose / Throat / Mouth	No complaints  • Cardiovascular	No complaints  • Respiratory	No complaints  • Gastrointestinal	No complaints  • Genitourinary	No complaints  • Musculoskeletal	No complaints  • Skin	No complaints  • Neurological	No complaints  • Psychiatric (see HPI)	See HPI  • Endocrine	No complaints  • Hematologic / Lymphatic	No complaints  • Allergic / Immunologic	No complaints    REVIEW OF VITALS/LABS/IMAGING/INVESTIGATIONS:   • Vital signs reviewed: Yes  • Vital Signs:	    T(C): 36.6 (07-13-23 @ 07:32), Max: 36.7 (07-12-23 @ 11:17)  HR: 88 (07-13-23 @ 07:32) (70 - 90)  BP: 103/71 (07-13-23 @ 07:32) (103/71 - 145/79)  RR: 18 (07-13-23 @ 07:32) (18 - 19)  SpO2: 98% (07-13-23 @ 07:32) (96% - 100%)    • Available labs reviewed: Yes  • Available Lab Results:       07-12    138  |  102  |  16.3  ----------------------------<  134<H>  3.3<L>   |  26.0  |  0.90    Ca    8.9      12 Jul 2023 20:40  Mg     1.8     07-12          Urinalysis Basic - ( 12 Jul 2023 20:40 )    Color: x / Appearance: x / SG: x / pH: x  Gluc: 134 mg/dL / Ketone: x  / Bili: x / Urobili: x   Blood: x / Protein: x / Nitrite: x   Leuk Esterase: x / RBC: x / WBC x   Sq Epi: x / Non Sq Epi: x / Bacteria: x          MEDICATIONS:      PRN Medications:  • PRN Medications since last evaluation	  • PRN Details	    Current Medications:   ascorbic acid 500 milliGRAM(s) Oral daily  atorvastatin 80 milliGRAM(s) Oral at bedtime  calcium carbonate    500 mG (Tums) Chewable 1 Tablet(s) Chew daily  cholecalciferol 1000 Unit(s) Oral daily  cloZAPine 50 milliGRAM(s) Oral two times a day  dextrose 5%. 1000 milliLiter(s) IV Continuous <Continuous>  dextrose 5%. 1000 milliLiter(s) IV Continuous <Continuous>  dextrose 50% Injectable 25 Gram(s) IV Push once  dextrose 50% Injectable 25 Gram(s) IV Push once  dextrose 50% Injectable 12.5 Gram(s) IV Push once  dextrose Oral Gel 15 Gram(s) Oral once PRN  glucagon  Injectable 1 milliGRAM(s) IntraMuscular once  haloperidol    Injectable 5 milliGRAM(s) IntraMuscular once PRN  insulin lispro (ADMELOG) corrective regimen sliding scale   SubCutaneous three times a day before meals  linagliptin 5 milliGRAM(s) Oral daily  lithium 300 milliGRAM(s) Oral three times a day  LORazepam     Tablet 1 milliGRAM(s) Oral once PRN     Medication Side Effects:  • Medication Side Effects or Adverse Reactions (new or ongoing)	None known    MENTAL STATUS EXAM:   • Level of Consciousness	Alert  • General Appearance	Deformities present  • Body Habitus	Overweight  • Hygiene	Poor  • Grooming	Poor  • Behavior	Disorganized, intermittently cooperative  • Eye Contact	Poor   • Relatedness	Poor  • Impulse Control	Poor, restless  • Muscle Tone / Strength	Normal muscle tone/strength  • Abnormal Movements	No abnormal movements  • Gait / Station	Normal gait / station  • Speech Volume	Variable  • Speech Rate	Slow   • Speech Spontaneity	Limited  • Speech Articulation	Normal  • Mood	Irritable  • Affect Quality	Irritable  • Affect Range	Labile  • Affect Congruence	Congruent  • Thought Process	Disorganized  • Thought Associations	Loosened  • Thought Content	 Delusions  • Perceptions	Appears internally preoccupied  • Oriented to Time	  unable to fully assess  • Oriented to Place	  unable to fully assess  • Oriented to Situation	unable to fully assess  • Oriented to Person	Yes  • Attention / Concentration	Impaired  • Estimated Intelligence	Average  • Recent Memory	 unable to fully assess  • Remote Memory	 unable to fully assess  • Fund of Knowledge	Impaired  • Language	No abnormalities noted  • Judgment (regarding everyday events)	Poor    • Insight (regarding psychiatric illness)	   Poor    SUICIDALITY:   • Suicidality (Interval)	none known    HOMICIDALITY/AGGRESSION:   • Homicidality/Aggression	none known    DIAGNOSIS DSM-V:    Psychiatric Diagnosis (Corresponds to DSM-IV Axis I, II):   HEALTH ISSUES - PROBLEM Dx:  Schizoaffective disorder, bipolar type       Medical Diagnosis (Corresponds to DSM-IV Axis III):  • Axis III	      ASSESSMENT OF CURRENT CONDITION:   Summary (include case differential, formulation and patient response to therapy):     56 y/o male with pphx schizoaffective disorder and previous psychiatric hospitalizations, and extensive PMH including s/p colon CA and ostomy, present in  ED for initial presentation and complaint of racing thoughts, A/H, and refusal to take meds at adult home where he lives. Over the course of his stay in the  ED, patient has had significant mood lability and instability, various delusions, abnormal perceptions, and preoccupation with internal thoughts. Patient has been consistently non-compliant with his medications, both those psychiatrically and medically necessary. Patient has not been physically violent but is not psychiatrically stable to be safe to both himself and other members of his residence/the community. Pending involuntary admission placement at this time; awaiting medical clearance due to ongoing but improving hypokalemia.     Risk Assessment (consider static vs modifiable risk factors and protective factors; comment on level of risk for dangerous behavior):    Risk factors: previous psychiatric admissions and diagnoses, medication non-compliance, unstable/lack of social supports   Protective factors: stable residence    PLAN   Patient pending transfer for involuntary admission    To c/w K supplementation per medical recommendations, pending med clearance PROGRESS NOTE: 07-14-23 @ 11:16    • Reason for Ongoing Consultation: Reevaluation, Pending bed placement    ID: [id number] Male with HEALTH ISSUES - PROBLEM Dx:  Schizoaffective disorder, bipolar type      INTERVAL DATA:   • Interval Chief Complaint: "Am I going to be leaving soon"   • Interval History: Patient with significant mood lability today upon interview. Properly concentrates on questions being asked but does not give clear responses. Responds with disorganized speech and yelling. Denies SI or HI. When asked about A/H yesterday, states "everyone hears voices", but refuse to share further details. Denies AVH today.     REVIEW OF SYSTEMS:   • Constitutional Symptoms	No complaints  • Eyes	No complaints  • Ears / Nose / Throat / Mouth	No complaints  • Cardiovascular	No complaints  • Respiratory	No complaints  • Gastrointestinal	No complaints  • Genitourinary	No complaints  • Musculoskeletal	No complaints  • Skin	No complaints  • Neurological	No complaints  • Psychiatric (see HPI)	See HPI  • Endocrine	No complaints  • Hematologic / Lymphatic	No complaints  • Allergic / Immunologic	No complaints    REVIEW OF VITALS/LABS/IMAGING/INVESTIGATIONS:   • Vital signs reviewed: Yes  • Vital Signs:	    T(C): 36.6 (07-13-23 @ 07:32), Max: 36.7 (07-12-23 @ 11:17)  HR: 88 (07-13-23 @ 07:32) (70 - 90)  BP: 103/71 (07-13-23 @ 07:32) (103/71 - 145/79)  RR: 18 (07-13-23 @ 07:32) (18 - 19)  SpO2: 98% (07-13-23 @ 07:32) (96% - 100%)    • Available labs reviewed: Yes  • Available Lab Results:       07-12    138  |  102  |  16.3  ----------------------------<  134<H>  3.3<L>   |  26.0  |  0.90    Ca    8.9      12 Jul 2023 20:40  Mg     1.8     07-12          Urinalysis Basic - ( 12 Jul 2023 20:40 )    Color: x / Appearance: x / SG: x / pH: x  Gluc: 134 mg/dL / Ketone: x  / Bili: x / Urobili: x   Blood: x / Protein: x / Nitrite: x   Leuk Esterase: x / RBC: x / WBC x   Sq Epi: x / Non Sq Epi: x / Bacteria: x          MEDICATIONS:      PRN Medications:  • PRN Medications since last evaluation	  • PRN Details	    Current Medications:   ascorbic acid 500 milliGRAM(s) Oral daily  atorvastatin 80 milliGRAM(s) Oral at bedtime  calcium carbonate    500 mG (Tums) Chewable 1 Tablet(s) Chew daily  cholecalciferol 1000 Unit(s) Oral daily  cloZAPine 50 milliGRAM(s) Oral two times a day  dextrose 5%. 1000 milliLiter(s) IV Continuous <Continuous>  dextrose 5%. 1000 milliLiter(s) IV Continuous <Continuous>  dextrose 50% Injectable 25 Gram(s) IV Push once  dextrose 50% Injectable 25 Gram(s) IV Push once  dextrose 50% Injectable 12.5 Gram(s) IV Push once  dextrose Oral Gel 15 Gram(s) Oral once PRN  glucagon  Injectable 1 milliGRAM(s) IntraMuscular once  haloperidol    Injectable 5 milliGRAM(s) IntraMuscular once PRN  insulin lispro (ADMELOG) corrective regimen sliding scale   SubCutaneous three times a day before meals  linagliptin 5 milliGRAM(s) Oral daily  lithium 300 milliGRAM(s) Oral three times a day  LORazepam     Tablet 1 milliGRAM(s) Oral once PRN     Medication Side Effects:  • Medication Side Effects or Adverse Reactions (new or ongoing)	None known    MENTAL STATUS EXAM:   • Level of Consciousness	Alert  • General Appearance	Deformities present  • Body Habitus	Overweight  • Hygiene	Poor  • Grooming	Poor  • Behavior	Disorganized, intermittently cooperative  • Eye Contact	Poor   • Relatedness	Poor  • Impulse Control	Poor, restless  • Muscle Tone / Strength	Normal muscle tone/strength  • Abnormal Movements	No abnormal movements  • Gait / Station	Normal gait / station  • Speech Volume	Variable  • Speech Rate	Slow   • Speech Spontaneity	Limited  • Speech Articulation	Normal  • Mood	Irritable  • Affect Quality	Irritable  • Affect Range	Labile  • Affect Congruence	Congruent  • Thought Process	Disorganized  • Thought Associations	Loosened  • Thought Content	 Delusions  • Perceptions	Appears internally preoccupied  • Oriented to Time	  unable to fully assess  • Oriented to Place	  unable to fully assess  • Oriented to Situation	unable to fully assess  • Oriented to Person	Yes  • Attention / Concentration	Impaired  • Estimated Intelligence	Average  • Recent Memory	 unable to fully assess  • Remote Memory	 unable to fully assess  • Fund of Knowledge	Impaired  • Language	No abnormalities noted  • Judgment (regarding everyday events)	Poor    • Insight (regarding psychiatric illness)	   Poor    SUICIDALITY:   • Suicidality (Interval)	none known    HOMICIDALITY/AGGRESSION:   • Homicidality/Aggression	none known    DIAGNOSIS DSM-V:    Psychiatric Diagnosis (Corresponds to DSM-IV Axis I, II):   HEALTH ISSUES - PROBLEM Dx:  Schizoaffective disorder, bipolar type       Medical Diagnosis (Corresponds to DSM-IV Axis III):  • Axis III	      ASSESSMENT OF CURRENT CONDITION:   Summary (include case differential, formulation and patient response to therapy):     56 y/o male with pphx schizoaffective disorder and previous psychiatric hospitalizations, and extensive PMH including s/p colon CA and ostomy, present in  ED for initial presentation and complaint of racing thoughts, A/H, and refusal to take meds at adult home where he lives. Over the course of his stay in the  ED, patient has had significant mood lability and instability, various delusions, abnormal perceptions, and preoccupation with internal thoughts. Patient has been consistently non-compliant with his medications, both those psychiatrically and medically necessary. Patient has not been physically violent but is not psychiatrically stable to be safe to both himself and other members of his residence/the community. Pending involuntary admission placement at this time; awaiting medical clearance due to ongoing but improving hypokalemia.     Risk Assessment (consider static vs modifiable risk factors and protective factors; comment on level of risk for dangerous behavior):    Risk factors: previous psychiatric admissions and diagnoses, medication non-compliance, unstable/lack of social supports   Protective factors: stable residence    PLAN   Patient pending transfer for involuntary admission

## 2023-07-14 NOTE — ED ADULT NURSE REASSESSMENT NOTE - GENERAL PATIENT STATE
anxious/resting/sleeping
comfortable appearance/resting/sleeping
resting/sleeping
labile
labile
anxious/comfortable appearance
irritable
no change observed/resting/sleeping
labile
comfortable appearance/cooperative

## 2023-07-14 NOTE — ED ADULT NURSE REASSESSMENT NOTE - NS ED NURSE REASSESS COMMENT FT1
Pt reassessed.  Pt ambulatory around unit.  Pt is upset he was woken up to take meds, but calm and cooperative.

## 2023-07-14 NOTE — ED ADULT NURSE REASSESSMENT NOTE - NSFALLRISKASMT_ED_ALL_ED_DT
10-Jul-2023 20:58
13-Jul-2023 06:15
09-Jul-2023 12:18
12-Jul-2023 19:46
13-Jul-2023 02:09
13-Jul-2023 07:35
14-Jul-2023 22:32
13-Jul-2023 14:09
13-Jul-2023 17:58
09-Jul-2023 10:25
10-Jul-2023 00:07
12-Jul-2023 22:25
14-Jul-2023 20:29
11-Jul-2023 19:50
12-Jul-2023 08:01
12-Jul-2023 09:01
12-Jul-2023 20:39
09-Jul-2023 10:42
12-Jul-2023 18:21
13-Jul-2023 09:38
10-Jul-2023 17:59
11-Jul-2023 18:44
12-Jul-2023 10:14
09-Jul-2023 10:14

## 2023-07-14 NOTE — ED CDU PROVIDER SUBSEQUENT DAY NOTE - PHYSICAL EXAMINATION
Constitutional - well-developed.   Head - NCAT. Airway patent.   Eyes - PERRL.   CV - RRR. no murmur. no edema.   Pulm - CTAB.   Abd - soft, nt. no rebound. no guarding.   Neuro - A&Ox3. strength 5/5 x4. sensation intact x4. normal gait.   Skin - No rash. .  MSK - normal ROM.

## 2023-07-14 NOTE — ED ADULT NURSE REASSESSMENT NOTE - NSFALLUNIVINTERV_ED_ALL_ED
Bed/Stretcher in lowest position, wheels locked, appropriate side rails in place/Call bell, personal items and telephone in reach/Instruct patient to call for assistance before getting out of bed/chair/stretcher/Non-slip footwear applied when patient is off stretcher/[place] to call system/Physically safe environment - no spills, clutter or unnecessary equipment/Purposeful proactive rounding/Room/bathroom lighting operational, light cord in reach
Bed/Stretcher in lowest position, wheels locked, appropriate side rails in place/Call bell, personal items and telephone in reach/Instruct patient to call for assistance before getting out of bed/chair/stretcher/Non-slip footwear applied when patient is off stretcher/[place] to call system/Physically safe environment - no spills, clutter or unnecessary equipment/Purposeful proactive rounding/Room/bathroom lighting operational, light cord in reach
Bed/Stretcher in lowest position, wheels locked, appropriate side rails in place/Call bell, personal items and telephone in reach/Instruct patient to call for assistance before getting out of bed/chair/stretcher/Non-slip footwear applied when patient is off stretcher/[place] to call system/Physically safe environment - no spills, clutter or unnecessary equipment/Purposeful proactive rounding/Room/bathroom lighting operational, light cord in reach
Bed/Stretcher in lowest position, wheels locked, appropriate side rails in place/Call bell, personal items and telephone in reach/Instruct patient to call for assistance before getting out of bed/chair/stretcher/Non-slip footwear applied when patient is off stretcher/[place] to call system/Physically safe environment - no spills, clutter or unnecessary equipment/Purposeful proactive rounding/Room/bathroom lighting operational, light cord in reach
Bed/Stretcher in lowest position, wheels locked, appropriate side rails in place/Call bell, personal items and telephone in reach/Instruct patient to call for assistance before getting out of bed/chair/stretcher/Non-slip footwear applied when patient is off stretcher/[place] to call system/Physically safe environment - no spills, clutter or unnecessary equipment/Purposeful proactive rounding/Room/bathroom lighting operational, light cord in reach
Bed/Stretcher in lowest position, wheels locked, appropriate side rails in place/Call bell, personal items and telephone in reach/Instruct patient to call for assistance before getting out of bed/chair/stretcher/Non-slip footwear applied when patient is off stretcher/[place] to call system/Physically safe environment - no spills, clutter or unnecessary equipment/Purposeful proactive rounding/Room/bathroom lighting operational, light cord in reach
Bed/Stretcher in lowest position, wheels locked, appropriate side rails in place/Call bell, personal items and telephone in reach/Instruct patient to call for assistance before getting out of bed/chair/stretcher/Non-slip footwear applied when patient is off stretcher/[place] to call system/Physically safe environment - no spills, clutter or unnecessary equipment/Purposeful proactive rounding/Room/bathroom lighting operational, light cord in reach
Bed/Stretcher in lowest position, wheels locked, appropriate side rails in place/Call bell, personal items and telephone in reach/Instruct patient to call for assistance before getting out of bed/chair/stretcher/Non-slip footwear applied when patient is off stretcher/[place] to call system/Physically safe environment - no spills, clutter or unnecessary equipment/Purposeful proactive rounding/Room/bathroom lighting operational, light cord in reach
Bed/Stretcher in lowest position, wheels locked, appropriate side rails in place/Call bell, personal items and telephone in reach/Instruct patient to call for assistance before getting out of bed/chair/stretcher/Non-slip footwear applied when patient is off stretcher/[place] to call system/Physically safe environment - no spills, clutter or unnecessary equipment/Purposeful proactive rounding/Room/bathroom lighting operational, light cord in reach
Bed/Stretcher in lowest position, wheels locked, appropriate side rails in place/Call bell, personal items and telephone in reach/Instruct patient to call for assistance before getting out of bed/chair/stretcher/Non-slip footwear applied when patient is off stretcher/[place] to call system/Physically safe environment - no spills, clutter or unnecessary equipment/Purposeful proactive rounding/Room/bathroom lighting operational, light cord in reach
Bed/Stretcher in lowest position, wheels locked, appropriate side rails in place/Call bell, personal items and telephone in reach/Instruct patient to call for assistance before getting out of bed/chair/stretcher/Non-slip footwear applied when patient is off stretcher/[place] to call system/Physically safe environment - no spills, clutter or unnecessary equipment/Purposeful proactive rounding/Room/bathroom lighting operational, light cord in reach
Monitor gait and stability/Monitor for mental status changes and reorient to person, place, and time, as needed/Physically safe environment - no spills, clutter or unnecessary equipment
Bed/Stretcher in lowest position, wheels locked, appropriate side rails in place/Call bell, personal items and telephone in reach/Instruct patient to call for assistance before getting out of bed/chair/stretcher/Non-slip footwear applied when patient is off stretcher/[place] to call system/Physically safe environment - no spills, clutter or unnecessary equipment/Purposeful proactive rounding/Room/bathroom lighting operational, light cord in reach
Bed/Stretcher in lowest position, wheels locked, appropriate side rails in place/Call bell, personal items and telephone in reach/Instruct patient to call for assistance before getting out of bed/chair/stretcher/Non-slip footwear applied when patient is off stretcher/[place] to call system/Physically safe environment - no spills, clutter or unnecessary equipment/Purposeful proactive rounding/Room/bathroom lighting operational, light cord in reach
Bed/Stretcher in lowest position, wheels locked, appropriate side rails in place/Call bell, personal items and telephone in reach/Instruct patient to call for assistance before getting out of bed/chair/stretcher/Non-slip footwear applied when patient is off stretcher/[place] to call system/Physically safe environment - no spills, clutter or unnecessary equipment/Purposeful proactive rounding/Room/bathroom lighting operational, light cord in reach
Bed/Stretcher in lowest position, wheels locked, appropriate side rails in place/Call bell, personal items and telephone in reach/Instruct patient to call for assistance before getting out of bed/chair/stretcher/Non-slip footwear applied when patient is off stretcher/[place] to call system/Physically safe environment - no spills, clutter or unnecessary equipment/Purposeful proactive rounding/Room/bathroom lighting operational, light cord in reach
Bed/Stretcher in lowest position, wheels locked, appropriate side rails in place/Call bell, personal items and telephone in reach/Instruct patient to call for assistance before getting out of bed/chair/stretcher/Non-slip footwear applied when patient is off stretcher/[place] to call system/Physically safe environment - no spills, clutter or unnecessary equipment/Purposeful proactive rounding/Room/bathroom lighting operational, light cord in reach

## 2023-07-14 NOTE — ED ADULT NURSE REASSESSMENT NOTE - NS ED NURSE REASSESS COMMENT FT1
Pt noted to be agitated and restless. Pt at this time pacing outside and inside room.  PRN haldol given and tolerated well.  Pt easily redirected.  Will continue to monitor.

## 2023-07-14 NOTE — ED ADULT NURSE REASSESSMENT NOTE - CONDITION
unchanged

## 2023-07-14 NOTE — ED ADULT NURSE REASSESSMENT NOTE - NS ED NURSE REASSESS COMMENT FT1
Assumed care of pt.  Pt ambulating independently. Pt in yellow gown, talking to himself stating 'I need to go home my computer misses me'. No complaints of pain or discomfort.  VSS. Safety maintained. Will continue to monitor.

## 2023-07-14 NOTE — ED ADULT NURSE REASSESSMENT NOTE - STATUS
awaiting transfer, no change

## 2023-07-15 VITALS
SYSTOLIC BLOOD PRESSURE: 121 MMHG | TEMPERATURE: 98 F | DIASTOLIC BLOOD PRESSURE: 68 MMHG | HEART RATE: 82 BPM | OXYGEN SATURATION: 98 % | RESPIRATION RATE: 18 BRPM

## 2023-07-15 LAB
GLUCOSE BLDC GLUCOMTR-MCNC: 110 MG/DL — HIGH (ref 70–99)
GLUCOSE BLDC GLUCOMTR-MCNC: 145 MG/DL — HIGH (ref 70–99)

## 2023-07-15 PROCEDURE — 99232 SBSQ HOSP IP/OBS MODERATE 35: CPT

## 2023-07-15 RX ADMIN — LITHIUM CARBONATE 300 MILLIGRAM(S): 300 TABLET, EXTENDED RELEASE ORAL at 21:05

## 2023-07-15 RX ADMIN — CLOZAPINE 50 MILLIGRAM(S): 150 TABLET, ORALLY DISINTEGRATING ORAL at 09:24

## 2023-07-15 RX ADMIN — LITHIUM CARBONATE 300 MILLIGRAM(S): 300 TABLET, EXTENDED RELEASE ORAL at 09:24

## 2023-07-15 RX ADMIN — LITHIUM CARBONATE 300 MILLIGRAM(S): 300 TABLET, EXTENDED RELEASE ORAL at 14:45

## 2023-07-15 RX ADMIN — CLOZAPINE 50 MILLIGRAM(S): 150 TABLET, ORALLY DISINTEGRATING ORAL at 19:09

## 2023-07-15 NOTE — ED ADULT NURSE REASSESSMENT NOTE - NSFALLRISKASMTTYPE_ED_ALL_ED
Routine Reassessment

## 2023-07-15 NOTE — ED CDU PROVIDER SUBSEQUENT DAY NOTE - WET READ LAUNCH FT
There are no Wet Read(s) to document.

## 2023-07-15 NOTE — ED ADULT NURSE REASSESSMENT NOTE - NSFALLHARMRISKINTERV_ED_ALL_ED
Communicate risk of Fall with Harm to all staff, patient, and family/Provide visual cue: red socks, yellow wristband, yellow gown, etc/Reinforce activity limits and safety measures with patient and family/Bed in lowest position, wheels locked, appropriate side rails in place/Call bell, personal items and telephone in reach/Instruct patient to call for assistance before getting out of bed/chair/stretcher/Non-slip footwear applied when patient is off stretcher/[place] to call system/Physically safe environment - no spills, clutter or unnecessary equipment/Purposeful Proactive Rounding/Room/bathroom lighting operational, light cord in reach
Encourage patient to sit up slowly, dangle for a short time, stand at bedside before walking/Monitor gait and stability/Monitor for mental status changes and reorient to person, place, and time, as needed/Provide visual cue: red socks, yellow wristband, yellow gown, etc/Non-slip footwear applied when patient is off stretcher/Physically safe environment - no spills, clutter or unnecessary equipment/Purposeful Proactive Rounding
Monitor gait and stability/Monitor for mental status changes and reorient to person, place, and time, as needed/Non-slip footwear applied when patient is off stretcher/Physically safe environment - no spills, clutter or unnecessary equipment/Purposeful Proactive Rounding
Communicate risk of Fall with Harm to all staff, patient, and family/Provide visual cue: red socks, yellow wristband, yellow gown, etc/Reinforce activity limits and safety measures with patient and family/Bed in lowest position, wheels locked, appropriate side rails in place/Call bell, personal items and telephone in reach/Instruct patient to call for assistance before getting out of bed/chair/stretcher/Non-slip footwear applied when patient is off stretcher/[place] to call system/Physically safe environment - no spills, clutter or unnecessary equipment/Purposeful Proactive Rounding/Room/bathroom lighting operational, light cord in reach
Communicate risk of Fall with Harm to all staff, patient, and family/Provide visual cue: red socks, yellow wristband, yellow gown, etc/Reinforce activity limits and safety measures with patient and family/Bed in lowest position, wheels locked, appropriate side rails in place/Call bell, personal items and telephone in reach/Instruct patient to call for assistance before getting out of bed/chair/stretcher/Non-slip footwear applied when patient is off stretcher/[place] to call system/Physically safe environment - no spills, clutter or unnecessary equipment/Purposeful Proactive Rounding/Room/bathroom lighting operational, light cord in reach
Monitor gait and stability/Non-slip footwear applied when patient is off stretcher/Physically safe environment - no spills, clutter or unnecessary equipment
Monitor gait and stability/Physically safe environment - no spills, clutter or unnecessary equipment
Communicate risk of Fall with Harm to all staff, patient, and family/Provide visual cue: red socks, yellow wristband, yellow gown, etc/Reinforce activity limits and safety measures with patient and family/Bed in lowest position, wheels locked, appropriate side rails in place/Call bell, personal items and telephone in reach/Instruct patient to call for assistance before getting out of bed/chair/stretcher/Non-slip footwear applied when patient is off stretcher/[place] to call system/Physically safe environment - no spills, clutter or unnecessary equipment/Purposeful Proactive Rounding/Room/bathroom lighting operational, light cord in reach
Communicate risk of Fall with Harm to all staff, patient, and family/Provide visual cue: red socks, yellow wristband, yellow gown, etc/Reinforce activity limits and safety measures with patient and family/Bed in lowest position, wheels locked, appropriate side rails in place/Call bell, personal items and telephone in reach/Instruct patient to call for assistance before getting out of bed/chair/stretcher/Non-slip footwear applied when patient is off stretcher/[place] to call system/Physically safe environment - no spills, clutter or unnecessary equipment/Purposeful Proactive Rounding/Room/bathroom lighting operational, light cord in reach
Communicate risk of Fall with Harm to all staff, patient, and family/Provide visual cue: red socks, yellow wristband, yellow gown, etc/Reinforce activity limits and safety measures with patient and family/Bed in lowest position, wheels locked, appropriate side rails in place/Call bell, personal items and telephone in reach/Instruct patient to call for assistance before getting out of bed/chair/stretcher/Non-slip footwear applied when patient is off stretcher/[place] to call system/Physically safe environment - no spills, clutter or unnecessary equipment/Purposeful Proactive Rounding/Room/bathroom lighting operational, light cord in reach

## 2023-07-15 NOTE — ED ADULT NURSE REASSESSMENT NOTE - NSFALLLASTSIX_ED_ALL_ED
No.
Unable to determine.
No.

## 2023-07-15 NOTE — ED ADULT NURSE REASSESSMENT NOTE - NS ED NURSE REASSESS COMMENT FT1
surgifoam applied to LMF lac- bleeding controlled; adaptic, telfa and bulky tubegauze drsg applied   patient calm/cooperative, ambulating around unit with steady gait. patient denies further needs at this time, on patient phone with family member.

## 2023-07-15 NOTE — ED ADULT NURSE REASSESSMENT NOTE - NURSING NEURO LEVEL OF CONSCIOUSNESS
Patient called and needs forms to be completed , per him [employer] have emailed and mailed the forms to the office , call patient if forms were received ?  
alert and awake/follows commands
alert and awake

## 2023-07-15 NOTE — ED CDU PROVIDER SUBSEQUENT DAY NOTE - CROS ED MARK PERT SYS NEG
rene all pertinent systems negative

## 2023-07-15 NOTE — ED CDU PROVIDER SUBSEQUENT DAY NOTE - ENMT NEGATIVE STATEMENT, MLM
2.09
Ears: no ear pain and no hearing problems. Nose: no nasal congestion and no nasal drainage. Mouth/Throat: no dysphagia, no hoarseness and no throat pain. Neck: no lumps, no pain, no stiffness and no swollen glands

## 2023-07-15 NOTE — ED CDU PROVIDER SUBSEQUENT DAY NOTE - HEME/LYMPH NEGATIVE STATEMENT, MLM
no anemia, no easy bruising, no jaundice, no swollen lymph nodes.

## 2023-07-15 NOTE — ED CDU PROVIDER SUBSEQUENT DAY NOTE - ALLERGIC/IMMUNOLOGIC NEGATIVE STATEMENT, MLM
no dermatitis, no environmental allergies, no food allergies, no immunosuppressive disorder, and no pruritus.

## 2023-07-15 NOTE — CHART NOTE - NSCHARTNOTEFT_GEN_A_CORE
ALINA Note: SW following pt; pt was trx to [facility] yesterday for inpt psych however sent back to ED as pt has a chemoport, they cannot medically manage or accept pt's with this. Due to this pt will need trx to medical based facility. ALINA placed call to [facility] ([facility]), [facility] (Gaby), [facility] (Morgan), no beds, ALINA will follow, ALINA in process of completing new 2PC legals (old ones no longer valid as they were for [facility])
SW NOTE: Plan is for IP psych transfer, invol status, needs unit that can accommodate medical due to chemoport. [facility], [facility], and [facility] have no beds presently. SW to continue following for IP psych transfer.
SW NOTE: Plan is for invol status for IP psych transfer, came from [facility], has chemoport that [facility] cannot accommodate. [facility] and  have no beds. Message left for [facility]. SW to continue following for IP psych transfer.
SW NOTE: Plan will be for IP psych transfer - invol status. As per AM rounds, pt's potassium is low at 2.7, needs to be redone. SW to continue following for next steps for IP psych transfer.
SWNote: pt needs transfer 9.27 status . No bed available at [facility] (per Adjen AD N ). SW to follow in the am.
SWNote: pt needs transfer 9.27, no bed at [facility] (Nayely MANN) , faxed to [facility] for possible consideration in the am . SW to follow.
ALINA Note: ALINA reviewed pt chart - plan for 9.27 inpt psych tx. ALINA reached out to [facility], , [facility], [facility], [facility], and [facility] - no beds. ALINA will follow.
SW NOTE: Plan is for IP psych transfer, Invol status, needs medical bed, no beds available at this time at [facility],  or [facility]. SW to continue following for IP psych transfer.
SWNote: pt needs transfer 9.27 status. No bed available at [facility] per  AD N (Adjen ) . SW to follow in the am .
Social Work Note: SW following for coordination of care. Per psych team plain remains for patient to go for inpatient psychiatric admission at this time. This writer placed call to [facility], [facility], , [facility], [facility], [facility], [facility], [facility], all with no beds available at this time. SW will continue to follow.
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
## 2023-07-15 NOTE — ED CDU PROVIDER SUBSEQUENT DAY NOTE - CLINICAL SUMMARY MEDICAL DECISION MAKING FREE TEXT BOX
. medically cleared for  admission as port is no longer actively used. pending inpatient placement.
Patient presented with psychosis. Medically cleared for  admission, pending involuntary inpatient placement.    Patient has a chest port from prior chemo treatment; no signs of overlying infection. PRESENCE OF CHEST PORT DOES NOT POSE A CONTRAINDICATION TO PSYCHIATRIC ADMISSION.
Patient presented with psychosis. Medically cleared for  admission, pending involuntary inpatient placement.    Patient has a chest port from prior chemo treatment; no signs of overlying infection. PRESENCE OF CHEST PORT DOES NOT POSE A CONTRAINDICATION TO PSYCHIATRIC ADMISSION.
medically cleared for  admission. pending involuntary inpatient placement.
Patient presented with psychosis. Medically cleared for  admission, pending involuntary inpatient placement.    Patient has a chest port from prior chemo treatment; no signs of overlying infection. PRESENCE OF CHEST PORT DOES NOT POSE A CONTRAINDICATION TO PSYCHIATRIC ADMISSION.
Pt remains in  on OBS awaiting in-patient psych bed availability for transfer
medcially stable, awaiting inpt psych bed

## 2023-07-15 NOTE — CHART NOTE - NSCHARTNOTESELECT_GEN_ALL_CORE
Event Note
Event Note
SW ED NOTE/Event Note
Social Work Note/Event Note
Event Note
Event Note
SW ED NOTE/Event Note
SW Note/Event Note

## 2023-07-15 NOTE — ED CDU PROVIDER SUBSEQUENT DAY NOTE - HISTORY
patient brought to [facility] for hypokalemia, refusing PO replacement. received 3 K riders with improvement in potassium. was agitated and required 1amg ativan. patient resting comfortably. now medically cleared to return to  for placement -[facility]
patient pending inpatient psych placement. no acute event overnight.
patient pending inpatient psych placement. no acute events overnight.
No acute events overnight
in  no acute events overnight
in  no acute events overnight
in  no acute eventrs overnight

## 2023-07-15 NOTE — ED CDU PROVIDER SUBSEQUENT DAY NOTE - ENDOCRINE NEGATIVE STATEMENT, MLM
no diabetes and no thyroid trouble.

## 2023-07-16 VITALS — RESPIRATION RATE: 19 BRPM | TEMPERATURE: 98 F | OXYGEN SATURATION: 96 %

## 2023-07-16 LAB
BASOPHILS # BLD AUTO: 0.05 K/UL — SIGNIFICANT CHANGE UP (ref 0–0.2)
BASOPHILS NFR BLD AUTO: 0.5 % — SIGNIFICANT CHANGE UP (ref 0–2)
EOSINOPHIL # BLD AUTO: 0.01 K/UL — SIGNIFICANT CHANGE UP (ref 0–0.5)
EOSINOPHIL NFR BLD AUTO: 0.1 % — SIGNIFICANT CHANGE UP (ref 0–6)
GLUCOSE BLDC GLUCOMTR-MCNC: 122 MG/DL — HIGH (ref 70–99)
GLUCOSE BLDC GLUCOMTR-MCNC: 147 MG/DL — HIGH (ref 70–99)
GLUCOSE BLDC GLUCOMTR-MCNC: 174 MG/DL — HIGH (ref 70–99)
HCT VFR BLD CALC: 43.6 % — SIGNIFICANT CHANGE UP (ref 39–50)
HGB BLD-MCNC: 14.6 G/DL — SIGNIFICANT CHANGE UP (ref 13–17)
IMM GRANULOCYTES NFR BLD AUTO: 0.2 % — SIGNIFICANT CHANGE UP (ref 0–0.9)
LYMPHOCYTES # BLD AUTO: 2.99 K/UL — SIGNIFICANT CHANGE UP (ref 1–3.3)
LYMPHOCYTES # BLD AUTO: 29.3 % — SIGNIFICANT CHANGE UP (ref 13–44)
MCHC RBC-ENTMCNC: 29.5 PG — SIGNIFICANT CHANGE UP (ref 27–34)
MCHC RBC-ENTMCNC: 33.5 GM/DL — SIGNIFICANT CHANGE UP (ref 32–36)
MCV RBC AUTO: 88.1 FL — SIGNIFICANT CHANGE UP (ref 80–100)
MONOCYTES # BLD AUTO: 0.72 K/UL — SIGNIFICANT CHANGE UP (ref 0–0.9)
MONOCYTES NFR BLD AUTO: 7.1 % — SIGNIFICANT CHANGE UP (ref 2–14)
NEUTROPHILS # BLD AUTO: 6.41 K/UL — SIGNIFICANT CHANGE UP (ref 1.8–7.4)
NEUTROPHILS NFR BLD AUTO: 62.8 % — SIGNIFICANT CHANGE UP (ref 43–77)
PLATELET # BLD AUTO: 175 K/UL — SIGNIFICANT CHANGE UP (ref 150–400)
POTASSIUM SERPL-MCNC: 3.8 MMOL/L — SIGNIFICANT CHANGE UP (ref 3.5–5.3)
POTASSIUM SERPL-SCNC: 3.8 MMOL/L — SIGNIFICANT CHANGE UP (ref 3.5–5.3)
RBC # BLD: 4.95 M/UL — SIGNIFICANT CHANGE UP (ref 4.2–5.8)
RBC # FLD: 12.7 % — SIGNIFICANT CHANGE UP (ref 10.3–14.5)
WBC # BLD: 10.2 K/UL — SIGNIFICANT CHANGE UP (ref 3.8–10.5)
WBC # FLD AUTO: 10.2 K/UL — SIGNIFICANT CHANGE UP (ref 3.8–10.5)

## 2023-07-16 PROCEDURE — 99282 EMERGENCY DEPT VISIT SF MDM: CPT

## 2023-07-16 PROCEDURE — 99222 1ST HOSP IP/OBS MODERATE 55: CPT

## 2023-07-16 RX ORDER — INSULIN LISPRO 100/ML
VIAL (ML) SUBCUTANEOUS
Refills: 0 | Status: DISCONTINUED | OUTPATIENT
Start: 2023-07-16 | End: 2023-07-23

## 2023-07-16 RX ORDER — GLUCAGON INJECTION, SOLUTION 0.5 MG/.1ML
1 INJECTION, SOLUTION SUBCUTANEOUS ONCE
Refills: 0 | Status: DISCONTINUED | OUTPATIENT
Start: 2023-07-16 | End: 2023-07-23

## 2023-07-16 RX ORDER — DIPHENHYDRAMINE HCL 50 MG
50 CAPSULE ORAL EVERY 6 HOURS
Refills: 0 | Status: DISCONTINUED | OUTPATIENT
Start: 2023-07-16 | End: 2023-07-23

## 2023-07-16 RX ORDER — DEXTROSE 50 % IN WATER 50 %
15 SYRINGE (ML) INTRAVENOUS ONCE
Refills: 0 | Status: DISCONTINUED | OUTPATIENT
Start: 2023-07-16 | End: 2023-07-23

## 2023-07-16 RX ORDER — ATORVASTATIN CALCIUM 80 MG/1
80 TABLET, FILM COATED ORAL AT BEDTIME
Refills: 0 | Status: DISCONTINUED | OUTPATIENT
Start: 2023-07-16 | End: 2023-07-23

## 2023-07-16 RX ORDER — INSULIN LISPRO 100/ML
VIAL (ML) SUBCUTANEOUS AT BEDTIME
Refills: 0 | Status: DISCONTINUED | OUTPATIENT
Start: 2023-07-16 | End: 2023-07-23

## 2023-07-16 RX ORDER — DEXTROSE 50 % IN WATER 50 %
25 SYRINGE (ML) INTRAVENOUS ONCE
Refills: 0 | Status: DISCONTINUED | OUTPATIENT
Start: 2023-07-16 | End: 2023-07-23

## 2023-07-16 RX ORDER — CLOZAPINE 150 MG/1
100 TABLET, ORALLY DISINTEGRATING ORAL AT BEDTIME
Refills: 0 | Status: DISCONTINUED | OUTPATIENT
Start: 2023-07-17 | End: 2023-07-23

## 2023-07-16 RX ORDER — POLYETHYLENE GLYCOL 3350 17 G/17G
17 POWDER, FOR SOLUTION ORAL DAILY
Refills: 0 | Status: DISCONTINUED | OUTPATIENT
Start: 2023-07-16 | End: 2023-07-23

## 2023-07-16 RX ORDER — DEXTROSE 50 % IN WATER 50 %
12.5 SYRINGE (ML) INTRAVENOUS ONCE
Refills: 0 | Status: DISCONTINUED | OUTPATIENT
Start: 2023-07-16 | End: 2023-07-23

## 2023-07-16 RX ORDER — SODIUM CHLORIDE 9 MG/ML
1000 INJECTION, SOLUTION INTRAVENOUS
Refills: 0 | Status: DISCONTINUED | OUTPATIENT
Start: 2023-07-16 | End: 2023-07-23

## 2023-07-16 RX ORDER — CHOLECALCIFEROL (VITAMIN D3) 125 MCG
1000 CAPSULE ORAL DAILY
Refills: 0 | Status: DISCONTINUED | OUTPATIENT
Start: 2023-07-16 | End: 2023-07-23

## 2023-07-16 RX ORDER — ASPIRIN/CALCIUM CARB/MAGNESIUM 324 MG
81 TABLET ORAL DAILY
Refills: 0 | Status: DISCONTINUED | OUTPATIENT
Start: 2023-07-16 | End: 2023-07-23

## 2023-07-16 RX ORDER — SENNA PLUS 8.6 MG/1
2 TABLET ORAL AT BEDTIME
Refills: 0 | Status: DISCONTINUED | OUTPATIENT
Start: 2023-07-16 | End: 2023-07-23

## 2023-07-16 RX ORDER — CLOZAPINE 150 MG/1
50 TABLET, ORALLY DISINTEGRATING ORAL
Refills: 0 | Status: DISCONTINUED | OUTPATIENT
Start: 2023-07-16 | End: 2023-07-16

## 2023-07-16 RX ORDER — CLOZAPINE 150 MG/1
50 TABLET, ORALLY DISINTEGRATING ORAL ONCE
Refills: 0 | Status: COMPLETED | OUTPATIENT
Start: 2023-07-16 | End: 2023-07-16

## 2023-07-16 RX ORDER — HALOPERIDOL DECANOATE 100 MG/ML
5 INJECTION INTRAMUSCULAR EVERY 6 HOURS
Refills: 0 | Status: DISCONTINUED | OUTPATIENT
Start: 2023-07-16 | End: 2023-07-23

## 2023-07-16 RX ORDER — LINAGLIPTIN 5 MG/1
5 TABLET, FILM COATED ORAL DAILY
Refills: 0 | Status: DISCONTINUED | OUTPATIENT
Start: 2023-07-16 | End: 2023-07-23

## 2023-07-16 RX ORDER — ASCORBIC ACID 60 MG
500 TABLET,CHEWABLE ORAL DAILY
Refills: 0 | Status: DISCONTINUED | OUTPATIENT
Start: 2023-07-16 | End: 2023-07-23

## 2023-07-16 RX ORDER — LITHIUM CARBONATE 300 MG/1
300 TABLET, EXTENDED RELEASE ORAL THREE TIMES A DAY
Refills: 0 | Status: DISCONTINUED | OUTPATIENT
Start: 2023-07-16 | End: 2023-07-23

## 2023-07-16 RX ORDER — CLOZAPINE 150 MG/1
50 TABLET, ORALLY DISINTEGRATING ORAL DAILY
Refills: 0 | Status: DISCONTINUED | OUTPATIENT
Start: 2023-07-17 | End: 2023-07-23

## 2023-07-16 RX ADMIN — LITHIUM CARBONATE 300 MILLIGRAM(S): 300 TABLET, EXTENDED RELEASE ORAL at 21:51

## 2023-07-16 RX ADMIN — LITHIUM CARBONATE 300 MILLIGRAM(S): 300 TABLET, EXTENDED RELEASE ORAL at 14:58

## 2023-07-16 RX ADMIN — CLOZAPINE 50 MILLIGRAM(S): 150 TABLET, ORALLY DISINTEGRATING ORAL at 19:20

## 2023-07-16 RX ADMIN — Medication 81 MILLIGRAM(S): at 14:57

## 2023-07-16 RX ADMIN — CLOZAPINE 50 MILLIGRAM(S): 150 TABLET, ORALLY DISINTEGRATING ORAL at 05:16

## 2023-07-16 RX ADMIN — ATORVASTATIN CALCIUM 80 MILLIGRAM(S): 80 TABLET, FILM COATED ORAL at 21:51

## 2023-07-16 RX ADMIN — Medication 0: at 21:52

## 2023-07-16 RX ADMIN — Medication 1000 UNIT(S): at 14:57

## 2023-07-16 RX ADMIN — LINAGLIPTIN 5 MILLIGRAM(S): 5 TABLET, FILM COATED ORAL at 14:58

## 2023-07-16 RX ADMIN — Medication 0: at 13:06

## 2023-07-16 RX ADMIN — CLOZAPINE 50 MILLIGRAM(S): 150 TABLET, ORALLY DISINTEGRATING ORAL at 21:51

## 2023-07-16 RX ADMIN — Medication 500 MILLIGRAM(S): at 14:57

## 2023-07-16 RX ADMIN — LITHIUM CARBONATE 300 MILLIGRAM(S): 300 TABLET, EXTENDED RELEASE ORAL at 05:16
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
## 2023-07-16 NOTE — ED CDU PROVIDER INITIAL DAY NOTE - OBJECTIVE STATEMENT
Continuation of visit [id number], see prior documentation    57 year old male with PMH DM, HLD, schizoaffective disorder, osteogenesis imperfecta presents with hallucinations. Pt reports that he believed his clozaril dose to be too high so he began to titrate it himself. He states that he then began to hear things, beliving that there were speakers hidden in the walls. he called and spoke to the NP of his ACT team who advoised that he go to the ED.

## 2023-07-17 LAB
GLUCOSE BLDC GLUCOMTR-MCNC: 117 MG/DL — HIGH (ref 70–99)
GLUCOSE BLDC GLUCOMTR-MCNC: 149 MG/DL — HIGH (ref 70–99)

## 2023-07-17 PROCEDURE — 82962 GLUCOSE BLOOD TEST: CPT

## 2023-07-17 PROCEDURE — 93005 ELECTROCARDIOGRAM TRACING: CPT

## 2023-07-17 PROCEDURE — 99213 OFFICE O/P EST LOW 20 MIN: CPT

## 2023-07-17 PROCEDURE — G0378: CPT

## 2023-07-17 PROCEDURE — 36415 COLL VENOUS BLD VENIPUNCTURE: CPT

## 2023-07-17 PROCEDURE — 81003 URINALYSIS AUTO W/O SCOPE: CPT

## 2023-07-17 PROCEDURE — 80053 COMPREHEN METABOLIC PANEL: CPT

## 2023-07-17 PROCEDURE — 85025 COMPLETE CBC W/AUTO DIFF WBC: CPT

## 2023-07-17 PROCEDURE — 99285 EMERGENCY DEPT VISIT HI MDM: CPT | Mod: 25

## 2023-07-17 PROCEDURE — 99232 SBSQ HOSP IP/OBS MODERATE 35: CPT

## 2023-07-17 PROCEDURE — 80048 BASIC METABOLIC PNL TOTAL CA: CPT

## 2023-07-17 PROCEDURE — 87635 SARS-COV-2 COVID-19 AMP PRB: CPT

## 2023-07-17 PROCEDURE — 80307 DRUG TEST PRSMV CHEM ANLYZR: CPT

## 2023-07-17 PROCEDURE — 84132 ASSAY OF SERUM POTASSIUM: CPT

## 2023-07-17 PROCEDURE — 83735 ASSAY OF MAGNESIUM: CPT

## 2023-07-17 RX ADMIN — Medication 0: at 22:46

## 2023-07-17 RX ADMIN — Medication 81 MILLIGRAM(S): at 11:48

## 2023-07-17 RX ADMIN — CLOZAPINE 100 MILLIGRAM(S): 150 TABLET, ORALLY DISINTEGRATING ORAL at 22:45

## 2023-07-17 RX ADMIN — ATORVASTATIN CALCIUM 80 MILLIGRAM(S): 80 TABLET, FILM COATED ORAL at 22:45

## 2023-07-17 RX ADMIN — SENNA PLUS 2 TABLET(S): 8.6 TABLET ORAL at 23:22

## 2023-07-17 RX ADMIN — LITHIUM CARBONATE 300 MILLIGRAM(S): 300 TABLET, EXTENDED RELEASE ORAL at 22:16

## 2023-07-17 RX ADMIN — LINAGLIPTIN 5 MILLIGRAM(S): 5 TABLET, FILM COATED ORAL at 11:47

## 2023-07-17 RX ADMIN — LITHIUM CARBONATE 300 MILLIGRAM(S): 300 TABLET, EXTENDED RELEASE ORAL at 07:15

## 2023-07-17 RX ADMIN — CLOZAPINE 50 MILLIGRAM(S): 150 TABLET, ORALLY DISINTEGRATING ORAL at 12:32

## 2023-07-17 RX ADMIN — Medication 500 MILLIGRAM(S): at 11:46

## 2023-07-17 RX ADMIN — Medication 1000 UNIT(S): at 11:46

## 2023-07-17 RX ADMIN — LITHIUM CARBONATE 300 MILLIGRAM(S): 300 TABLET, EXTENDED RELEASE ORAL at 13:45

## 2023-07-17 NOTE — ED CDU PROVIDER SUBSEQUENT DAY NOTE - CLINICAL SUMMARY MEDICAL DECISION MAKING FREE TEXT BOX
57M hx schizoaffective presenting with auditory hallucinations in the setting of titrating his own clozaril doses; also had stopped his lithium. Thoughts are disorganized and appears to have psychosis, in BH for ongoing eval / safe dispo planning.

## 2023-07-17 NOTE — ED BEHAVIORAL HEALTH NOTE - BEHAVIORAL HEALTH NOTE
PROGRESS NOTE: 07-17-23 @ 09:30  	  • Reason for Ongoing Consultation: Reevaluation    ID: 58 y/o Male with HEALTH ISSUES - PROBLEM Dx:  Schizoaffective disorder, unspecified type      INTERVAL DATA:   • Interval Chief Complaint: "I feel good"  • Interval History: Patient seen today resting comfortably in bed, very calm and cooperative with significant improvement in thought/speech today. Patient able to respond coherently with full, mostly organized sentences to questioning. Seemingly proper insight into situation and current progress. Reports he feels better and that "it seems like we've figured out the proper dosing" in reference to his doing well on his current medications. Does display some intermittently impaired concentration but with significant improvement from his previous state. No irritability today, limited mood lability. Able to identify person of contact at his adult home and able to express concern for how he will get his medications. When asked if he will take his medications by himself, he states "of course!" enthusiastically. Denies AVH, SI, HI, paranoid thoughts at this time. No apparent violence or risk to himself or others.     REVIEW OF SYSTEMS:   • Constitutional Symptoms	No complaints  • Eyes	No complaints  • Ears / Nose / Throat / Mouth	No complaints  • Cardiovascular	No complaints  • Respiratory	No complaints  • Gastrointestinal	No complaints  • Genitourinary	No complaints  • Musculoskeletal	No complaints  • Skin	No complaints  • Neurological	No complaints  • Psychiatric (see HPI)	See HPI  • Endocrine	No complaints  • Hematologic / Lymphatic	No complaints  • Allergic / Immunologic	No complaints    REVIEW OF VITALS/LABS/IMAGING/INVESTIGATIONS:   • Vital signs reviewed: Yes  • Vital Signs:	    T(C): 36.7 (07-17-23 @ 07:34), Max: 36.7 (07-16-23 @ 19:17)  HR: 77 (07-17-23 @ 07:34) (77 - 87)  BP: 117/77 (07-17-23 @ 07:34) (98/56 - 130/80)  RR: 18 (07-17-23 @ 07:34) (16 - 19)  SpO2: 100% (07-17-23 @ 07:34) (97% - 100%)    • Available labs reviewed: Yes  • Available Lab Results:                           14.6   10.20 )-----------( 175      ( 16 Jul 2023 03:15 )             43.6     07-16    x   |  x   |  x   ----------------------------<  x   3.8   |  x   |  x                   MEDICATIONS:      PRN Medications:  • PRN Medications since last evaluation	  • PRN Details	    Current Medications:   ascorbic acid 500 milliGRAM(s) Oral daily  aspirin  chewable 81 milliGRAM(s) Oral daily  atorvastatin 80 milliGRAM(s) Oral at bedtime  cholecalciferol 1000 Unit(s) Oral daily  cloZAPine 100 milliGRAM(s) Oral at bedtime  cloZAPine 50 milliGRAM(s) Oral daily  dextrose 5%. 1000 milliLiter(s) IV Continuous <Continuous>  dextrose 5%. 1000 milliLiter(s) IV Continuous <Continuous>  dextrose 50% Injectable 25 Gram(s) IV Push once  dextrose 50% Injectable 12.5 Gram(s) IV Push once  dextrose 50% Injectable 25 Gram(s) IV Push once  dextrose Oral Gel 15 Gram(s) Oral once PRN  diphenhydrAMINE Injectable 50 milliGRAM(s) IntraMuscular every 6 hours PRN  glucagon  Injectable 1 milliGRAM(s) IntraMuscular once  haloperidol    Injectable 5 milliGRAM(s) IntraMuscular every 6 hours PRN  insulin lispro (ADMELOG) corrective regimen sliding scale   SubCutaneous three times a day before meals  insulin lispro (ADMELOG) corrective regimen sliding scale   SubCutaneous at bedtime  linagliptin 5 milliGRAM(s) Oral daily  lithium 300 milliGRAM(s) Oral three times a day  polyethylene glycol 3350 17 Gram(s) Oral daily PRN  senna 2 Tablet(s) Oral at bedtime PRN     Medication Side Effects:  • Medication Side Effects or Adverse Reactions (new or ongoing)	None known    MENTAL STATUS EXAM:   • Level of Consciousness	Alert  • General Appearance	Well developed  • Body Habitus	Overweight  • Hygiene	Fair  • Grooming	Fair  • Behavior	Cooperative  • Eye Contact	Fair  • Relatedness	Good  • Impulse Control	Normal  • Muscle Tone / Strength	Impaired secondary to medical history  • Abnormal Movements	No abnormal movements  • Gait / Station	Normal gait / station  • Speech Volume	Normal  • Speech Rate	Normal  • Speech Spontaneity	Normal  • Speech Articulation	Normal  • Mood	Normal  • Affect Quality	Euthymic  • Affect Range	Full  • Affect Congruence	Congruent  • Thought Process	Linear  • Thought Associations	Mildly impaired  • Thought Content	Unremarkable  • Perceptions	No abnormalities  • Oriented to Time	Yes  • Oriented to Place	Yes  • Oriented to Situation	Yes  • Oriented to Person	Yes  • Attention / Concentration	Moderately impaired  • Estimated Intelligence	Average  • Recent Memory	Normal  • Remote Memory	Normal  • Fund of Knowledge	Normal  • Language	No abnormalities noted  • Judgment (regarding everyday events)	Fair  • Insight (regarding psychiatric illness)	Fair    SUICIDALITY:   • Suicidality (Interval)	none known    HOMICIDALITY/AGGRESSION:   • Homicidality/Aggression	none known    DIAGNOSIS DSM-V:    Psychiatric Diagnosis (Corresponds to DSM-IV Axis I, II)   Schizoaffective d/o     HEALTH ISSUES - PROBLEM Dx: PAST MEDICAL & SURGICAL HISTORY:  Osteogenesis imperfecta    Diabetes    High cholesterol    Hypothyroidism    Colon cancer    Fracture of ankle    Ileostomy status         Medical Diagnosis (Corresponds to DSM-IV Axis III):  • Axis III	As above      ASSESSMENT OF CURRENT CONDITION:   Summary (include case differential, formulation and patient response to therapy):   58 y/o male with pphx schizoaffective disorder and previous psychiatric hospitalizations, and extensive PMH including s/p colon CA and ostomy, presented in  ED for initial presentation and complaint of racing thoughts, A/H, and refusal to take meds at adult home where he lives. Over the course of his stay in the  ED, patient has had significant mood lability and instability, various delusions, abnormal perceptions, and preoccupation with internal thoughts. Patient has been consistently non-compliant with his medications, both those psychiatrically and medically necessary. Patient has not been physically violent. Currently, patient displaying significant improvement in mental status including coherent thought/speech, improved concentration and ability to respond accurately to questioning, and improvement in mood lability. Seemingly proper insight into situation and current progress. Reports he feels better and that "it seems like we've figured out the proper dosing" in reference to his doing well on his current medications. Does display some intermittently impaired concentration but with significant improvement from his previous state. No irritability today, limited mood lability. Able to identify person of contact at his adult home and able to express concern for how he will get his medications. When asked if he will take his medications by himself, he states "of course!" enthusiastically. Denies AVH, SI, HI, paranoid thoughts at this time. No apparent violence or risk to himself or others.     Risk Assessment (consider static vs modifiable risk factors and protective factors; comment on level of risk for dangerous behavior):   Risk factors: previous psychiatric admissions and diagnoses, medication non-compliance, unstable/lack of social supports  Protective factors: stable residence    PLAN  Patient to be discharged back to adult home today.   Will contact Babita at adult home and send medications with dosage changes if necessary. PROGRESS NOTE: 07-17-23 @ 09:30  	  • Reason for Ongoing Consultation: Reevaluation    ID: 56 y/o Male with HEALTH ISSUES - PROBLEM Dx:  Schizoaffective disorder, unspecified type      INTERVAL DATA:   • Interval Chief Complaint: "I feel good"  • Interval History: Patient seen today resting comfortably in bed, very calm and cooperative with significant improvement in thought/speech today. Patient able to respond coherently with full, mostly organized sentences to questioning. Seemingly proper insight into situation and current progress. Reports he feels better and that "it seems like we've figured out the proper dosing" in reference to his doing well on his current medications. Does display some intermittently impaired concentration but with significant improvement from his previous state. No irritability today, limited mood lability. Able to identify person of contact at his adult home and able to express concern for how he will get his medications. When asked if he will take his medications by himself, he states "of course!" enthusiastically. Denies AVH, SI, HI, paranoid thoughts at this time. No apparent violence or risk to himself or others.     REVIEW OF SYSTEMS:   • Constitutional Symptoms	No complaints  • Eyes	No complaints  • Ears / Nose / Throat / Mouth	No complaints  • Cardiovascular	No complaints  • Respiratory	No complaints  • Gastrointestinal	No complaints  • Genitourinary	No complaints  • Musculoskeletal	No complaints  • Skin	No complaints  • Neurological	No complaints  • Psychiatric (see HPI)	See HPI  • Endocrine	No complaints  • Hematologic / Lymphatic	No complaints  • Allergic / Immunologic	No complaints    REVIEW OF VITALS/LABS/IMAGING/INVESTIGATIONS:   • Vital signs reviewed: Yes  • Vital Signs:	    T(C): 36.7 (07-17-23 @ 07:34), Max: 36.7 (07-16-23 @ 19:17)  HR: 77 (07-17-23 @ 07:34) (77 - 87)  BP: 117/77 (07-17-23 @ 07:34) (98/56 - 130/80)  RR: 18 (07-17-23 @ 07:34) (16 - 19)  SpO2: 100% (07-17-23 @ 07:34) (97% - 100%)    • Available labs reviewed: Yes  • Available Lab Results:                           14.6   10.20 )-----------( 175      ( 16 Jul 2023 03:15 )             43.6     07-16    x   |  x   |  x   ----------------------------<  x   3.8   |  x   |  x                   MEDICATIONS:      PRN Medications:  • PRN Medications since last evaluation	  • PRN Details	    Current Medications:   ascorbic acid 500 milliGRAM(s) Oral daily  aspirin  chewable 81 milliGRAM(s) Oral daily  atorvastatin 80 milliGRAM(s) Oral at bedtime  cholecalciferol 1000 Unit(s) Oral daily  clozapine 100 milliGRAM(s) Oral at bedtime  clozapine 50 milliGRAM(s) Oral daily  dextrose 5%. 1000 milliLiter(s) IV Continuous <Continuous>  dextrose 5%. 1000 milliLiter(s) IV Continuous <Continuous>  dextrose 50% Injectable 25 Gram(s) IV Push once  dextrose 50% Injectable 12.5 Gram(s) IV Push once  dextrose 50% Injectable 25 Gram(s) IV Push once  dextrose Oral Gel 15 Gram(s) Oral once PRN  diphenhydrAMINE Injectable 50 milliGRAM(s) IntraMuscular every 6 hours PRN  glucagon  Injectable 1 milliGRAM(s) IntraMuscular once  haloperidol    Injectable 5 milliGRAM(s) IntraMuscular every 6 hours PRN  insulin lispro (ADMELOG) corrective regimen sliding scale   SubCutaneous three times a day before meals  insulin lispro (ADMELOG) corrective regimen sliding scale   SubCutaneous at bedtime  linagliptin 5 milliGRAM(s) Oral daily  lithium 300 milliGRAM(s) Oral three times a day  polyethylene glycol 3350 17 Gram(s) Oral daily PRN  senna 2 Tablet(s) Oral at bedtime PRN     Medication Side Effects:  • Medication Side Effects or Adverse Reactions (new or ongoing)	None known    MENTAL STATUS EXAM:   • Level of Consciousness	Alert  • General Appearance	Well developed  • Body Habitus	Overweight  • Hygiene	Fair  • Grooming	Fair  • Behavior	Cooperative  • Eye Contact	Fair  • Relatedness	Good  • Impulse Control	Normal  • Muscle Tone / Strength	Impaired secondary to medical history  • Abnormal Movements	No abnormal movements  • Gait / Station	Normal gait / station  • Speech Volume	Normal  • Speech Rate	Normal  • Speech Spontaneity	Normal  • Speech Articulation	Normal  • Mood	Normal  • Affect Quality	Euthymic  • Affect Range	Full  • Affect Congruence	Congruent  • Thought Process	Linear  • Thought Associations	Mildly impaired  • Thought Content	Unremarkable  • Perceptions	No abnormalities  • Oriented to Time	Yes  • Oriented to Place	Yes  • Oriented to Situation	Yes  • Oriented to Person	Yes  • Attention / Concentration	Moderately impaired  • Estimated Intelligence	Average  • Recent Memory	Normal  • Remote Memory	Normal  • Fund of Knowledge	Normal  • Language	No abnormalities noted  • Judgment (regarding everyday events)	Fair  • Insight (regarding psychiatric illness)	Fair    SUICIDALITY:   • Suicidality (Interval)	none known    HOMICIDALITY/AGGRESSION:   • Homicidality/Aggression	none known    DIAGNOSIS DSM-V:    Psychiatric Diagnosis (Corresponds to DSM-IV Axis I, II)   Schizoaffective d/o     HEALTH ISSUES - PROBLEM Dx: PAST MEDICAL & SURGICAL HISTORY:  Osteogenesis imperfecta    Diabetes    High cholesterol    Hypothyroidism    Colon cancer    Fracture of ankle    Ileostomy status         Medical Diagnosis (Corresponds to DSM-IV Axis III):  • Axis III	As above      ASSESSMENT OF CURRENT CONDITION:   Summary (include case differential, formulation and patient response to therapy):   56 y/o male with pphx schizoaffective disorder and previous psychiatric hospitalizations, and extensive PMH including s/p colon CA and ostomy, presented in  ED for initial presentation and complaint of racing thoughts, A/H, and refusal to take meds at adult home where he lives. Over the course of his stay in the  ED, patient has had significant mood lability and instability, various delusions, abnormal perceptions, and preoccupation with internal thoughts. Patient has been consistently non-compliant with his medications, both those psychiatrically and medically necessary. Patient has not been physically violent. Currently, patient displaying significant improvement in mental status including coherent thought/speech, improved concentration and ability to respond accurately to questioning, and improvement in mood lability. Seemingly proper insight into situation and current progress. Reports he feels better and that "it seems like we've figured out the proper dosing" in reference to his doing well on his current medications. Does display some intermittently impaired concentration but with significant improvement from his previous state. No irritability today, limited mood lability. Able to identify person of contact at his adult home and able to express concern for how he will get his medications. When asked if he will take his medications by himself, he states "of course!" enthusiastically. Denies AVH, SI, HI, paranoid thoughts at this time. No apparent violence or risk to himself or others.     Risk Assessment (consider static vs modifiable risk factors and protective factors; comment on level of risk for dangerous behavior):   Risk factors: previous psychiatric admissions and diagnoses, medication non-compliance, unstable/lack of social supports  Protective factors: stable residence    PLAN  Patient to be discharged back to adult home today.   Will contact Babita at adult home and send medications with dosage changes if necessary.

## 2023-07-17 NOTE — ED CDU PROVIDER SUBSEQUENT DAY NOTE - PROGRESS NOTE DETAILS
Jay: Patient signed out to me by previous team. PT in observation status and physically in obs. Chart reviewed and  nurse contacted. No concerns. Vitals stable. Pending evaluation by psych provider.

## 2023-07-17 NOTE — ED BEHAVIORAL HEALTH NOTE - BEHAVIORAL HEALTH NOTE
Spoke with manager, Rachel, at [facility] today ([phone]). Discussed discharge planning; expressed concerns for lack of a supervisor present today and requested discharge for tomorrow. Discussed medication dose changes for Clozaril. Plans for medications to be delivered to [facility] later today, with pending discharge tomorrow.

## 2023-07-17 NOTE — ED ADULT NURSE REASSESSMENT NOTE - NSFALLHARMRISKINTERV_ED_ALL_ED
Monitor gait and stability/Monitor for mental status changes and reorient to person, place, and time, as needed/Physically safe environment - no spills, clutter or unnecessary equipment/Purposeful Proactive Rounding
Communicate risk of Fall with Harm to all staff, patient, and family/Monitor gait and stability/Monitor for mental status changes and reorient to person, place, and time, as needed/Physically safe environment - no spills, clutter or unnecessary equipment
Communicate risk of Fall with Harm to all staff, patient, and family/Provide visual cue: red socks, yellow wristband, yellow gown, etc/Reinforce activity limits and safety measures with patient and family/Bed in lowest position, wheels locked, appropriate side rails in place/Call bell, personal items and telephone in reach/Instruct patient to call for assistance before getting out of bed/chair/stretcher/Non-slip footwear applied when patient is off stretcher/[place] to call system/Physically safe environment - no spills, clutter or unnecessary equipment/Purposeful Proactive Rounding/Room/bathroom lighting operational, light cord in reach
Communicate risk of Fall with Harm to all staff, patient, and family/Provide visual cue: red socks, yellow wristband, yellow gown, etc/Reinforce activity limits and safety measures with patient and family/Bed in lowest position, wheels locked, appropriate side rails in place/Call bell, personal items and telephone in reach/Instruct patient to call for assistance before getting out of bed/chair/stretcher/Non-slip footwear applied when patient is off stretcher/[place] to call system/Physically safe environment - no spills, clutter or unnecessary equipment/Purposeful Proactive Rounding/Room/bathroom lighting operational, light cord in reach

## 2023-07-17 NOTE — ED CDU PROVIDER SUBSEQUENT DAY NOTE - HISTORY
Hx schizoaffective disorder, having hallucinations / psychosis, in  for ongoing monitoring / safe dispo planning

## 2023-07-18 VITALS
OXYGEN SATURATION: 99 % | SYSTOLIC BLOOD PRESSURE: 110 MMHG | HEART RATE: 87 BPM | RESPIRATION RATE: 18 BRPM | TEMPERATURE: 98 F | DIASTOLIC BLOOD PRESSURE: 76 MMHG

## 2023-07-18 PROCEDURE — 99213 OFFICE O/P EST LOW 20 MIN: CPT

## 2023-07-18 PROCEDURE — 99238 HOSP IP/OBS DSCHRG MGMT 30/<: CPT

## 2023-07-18 RX ORDER — CLOZAPINE 150 MG/1
1 TABLET, ORALLY DISINTEGRATING ORAL
Qty: 30 | Refills: 0
Start: 2023-07-18 | End: 2023-08-16

## 2023-07-18 RX ORDER — LITHIUM CARBONATE 300 MG/1
1 TABLET, EXTENDED RELEASE ORAL
Qty: 90 | Refills: 0
Start: 2023-07-18 | End: 2023-08-16

## 2023-07-18 RX ADMIN — LITHIUM CARBONATE 300 MILLIGRAM(S): 300 TABLET, EXTENDED RELEASE ORAL at 08:26

## 2023-07-18 NOTE — ED BEHAVIORAL HEALTH NOTE - BEHAVIORAL HEALTH NOTE
PROGRESS NOTE: 07-18-23 @ 08:15  	  • Reason for Ongoing Consultation: Re-evaluation    ID: 58 y/o Male with HEALTH ISSUES - PROBLEM Dx:  Schizoaffective disorder, unspecified type      INTERVAL DATA:   • Interval Chief Complaint: "When am I going back to [facility]"  • Interval History: Patient found sleeping in room this morning. Upon waking to interview patient was mildly irritable but consistent with baseline. Was able to speak coherently and answer questions. Oriented to time/place/situation. Able to cooperate with nursing staff for blood sugar and meds this morning. Has been compliant with medications and continues to appear stable.    Interval History 07/17: Patient seen today resting comfortably in bed, very calm and cooperative with significant improvement in thought/speech today. Patient able to respond coherently with full, mostly organized sentences to questioning. Seemingly proper insight into situation and current progress. Reports he feels better and that "it seems like we've figured out the proper dosing" in reference to his doing well on his current medications. Does display some intermittently impaired concentration but with significant improvement from his previous state. No irritability today, limited mood lability. Able to identify person of contact at his adult home and able to express concern for how he will get his medications. When asked if he will take his medications by himself, he states "of course!" enthusiastically. Denies AVH, SI, HI, paranoid thoughts at this time. No apparent violence or risk to himself or others.     REVIEW OF SYSTEMS:   • Constitutional Symptoms	No complaints  • Eyes	No complaints  • Ears / Nose / Throat / Mouth	No complaints  • Cardiovascular	No complaints  • Respiratory	No complaints  • Gastrointestinal	No complaints  • Genitourinary	No complaints  • Musculoskeletal	No complaints  • Skin	No complaints  • Neurological	No complaints  • Psychiatric (see HPI)	See HPI  • Endocrine	No complaints  • Hematologic / Lymphatic	No complaints  • Allergic / Immunologic	No complaints    REVIEW OF VITALS/LABS/IMAGING/INVESTIGATIONS:   • Vital signs reviewed: Yes  • Vital Signs:	    T(C): 36.7 (07-17-23 @ 07:34), Max: 36.7 (07-16-23 @ 19:17)  HR: 77 (07-17-23 @ 07:34) (77 - 87)  BP: 117/77 (07-17-23 @ 07:34) (98/56 - 130/80)  RR: 18 (07-17-23 @ 07:34) (16 - 19)  SpO2: 100% (07-17-23 @ 07:34) (97% - 100%)    • Available labs reviewed: Yes  • Available Lab Results:                           14.6   10.20 )-----------( 175      ( 16 Jul 2023 03:15 )             43.6     07-16    x   |  x   |  x   ----------------------------<  x   3.8   |  x   |  x                   MEDICATIONS:      PRN Medications:  • PRN Medications since last evaluation	  • PRN Details	    Current Medications:   ascorbic acid 500 milliGRAM(s) Oral daily  aspirin  chewable 81 milliGRAM(s) Oral daily  atorvastatin 80 milliGRAM(s) Oral at bedtime  cholecalciferol 1000 Unit(s) Oral daily  clozapine 100 milliGRAM(s) Oral at bedtime  clozapine 50 milliGRAM(s) Oral daily  dextrose 5%. 1000 milliLiter(s) IV Continuous <Continuous>  dextrose 5%. 1000 milliLiter(s) IV Continuous <Continuous>  dextrose 50% Injectable 25 Gram(s) IV Push once  dextrose 50% Injectable 12.5 Gram(s) IV Push once  dextrose 50% Injectable 25 Gram(s) IV Push once  dextrose Oral Gel 15 Gram(s) Oral once PRN  diphenhydrAMINE Injectable 50 milliGRAM(s) IntraMuscular every 6 hours PRN  glucagon  Injectable 1 milliGRAM(s) IntraMuscular once  haloperidol    Injectable 5 milliGRAM(s) IntraMuscular every 6 hours PRN  insulin lispro (ADMELOG) corrective regimen sliding scale   SubCutaneous three times a day before meals  insulin lispro (ADMELOG) corrective regimen sliding scale   SubCutaneous at bedtime  linagliptin 5 milliGRAM(s) Oral daily  lithium 300 milliGRAM(s) Oral three times a day  polyethylene glycol 3350 17 Gram(s) Oral daily PRN  senna 2 Tablet(s) Oral at bedtime PRN     Medication Side Effects:  • Medication Side Effects or Adverse Reactions (new or ongoing)	None known    MENTAL STATUS EXAM:   • Level of Consciousness	Alert  • General Appearance	Well developed  • Body Habitus	Overweight  • Hygiene	Fair  • Grooming	Fair  • Behavior	Cooperative  • Eye Contact	Fair  • Relatedness	Good  • Impulse Control	Normal  • Muscle Tone / Strength	Impaired secondary to medical history  • Abnormal Movements	No abnormal movements  • Gait / Station	Normal gait / station  • Speech Volume	Normal  • Speech Rate	Normal  • Speech Spontaneity	Normal  • Speech Articulation	Normal  • Mood	Normal  • Affect Quality	Euthymic  • Affect Range	Full  • Affect Congruence	Congruent  • Thought Process	Linear  • Thought Associations	Mildly impaired  • Thought Content	Unremarkable  • Perceptions	No abnormalities  • Oriented to Time	Yes  • Oriented to Place	Yes  • Oriented to Situation	Yes  • Oriented to Person	Yes  • Attention / Concentration	Moderately impaired  • Estimated Intelligence	Average  • Recent Memory	Normal  • Remote Memory	Normal  • Fund of Knowledge	Normal  • Language	No abnormalities noted  • Judgment (regarding everyday events)	Fair  • Insight (regarding psychiatric illness)	Fair    SUICIDALITY:   • Suicidality (Interval)	none known    HOMICIDALITY/AGGRESSION:   • Homicidality/Aggression	none known    DIAGNOSIS DSM-V:    Psychiatric Diagnosis (Corresponds to DSM-IV Axis I, II)   Schizoaffective d/o     HEALTH ISSUES - PROBLEM Dx: PAST MEDICAL & SURGICAL HISTORY:  Osteogenesis imperfecta    Diabetes    High cholesterol    Hypothyroidism    Colon cancer    Fracture of ankle    Ileostomy status         Medical Diagnosis (Corresponds to DSM-IV Axis III):  • Axis III	As above      ASSESSMENT OF CURRENT CONDITION:   Summary (include case differential, formulation and patient response to therapy):   58 y/o male with pphx schizoaffective disorder and previous psychiatric hospitalizations, and extensive PMH including s/p colon CA and ostomy, presented in  ED for initial presentation and complaint of racing thoughts, A/H, and refusal to take meds at adult home where he lives. Over the course of his stay in the  ED, patient has had significant mood lability and instability, various delusions, abnormal perceptions, and preoccupation with internal thoughts. Patient has been consistently non-compliant with his medications, both those psychiatrically and medically necessary. Patient has not been physically violent. Currently, patient displaying significant improvement in mental status including coherent thought/speech, improved concentration and ability to respond accurately to questioning, and improvement in mood lability. Seemingly proper insight into situation and current progress. Reported yesterday 7/17 he feels better and that "it seems like we've figured out the proper dosing" in reference to his doing well on his current medications. Does display some intermittently impaired concentration and irritability, but with significant improvement from his previous state. Limited/significantly decreased mood lability today. Per yesterday's interview, patient able to identify person of contact at his adult home and able to express concern for how he will get his medications. When asked if he will take his medications by himself, he states "of course!" enthusiastically. Denies AVH, SI, HI, paranoid thoughts at this time. No apparent violence or risk to himself or others.    Patient not discharged yesterday 7/17 due to staffing issue at [facility] in which they requested he be sent back today 7/18. Spoke with Rachel, manager ([phone]). Will follow up today.     Risk Assessment (consider static vs modifiable risk factors and protective factors; comment on level of risk for dangerous behavior):   Risk factors: previous psychiatric admissions and diagnoses, medication non-compliance, unstable/lack of social supports  Protective factors: stable residence    PLAN  Patient to be discharged back to adult home today.   To discuss meds with Rachel at [facility] this AM.

## 2023-07-18 NOTE — ED ADULT NURSE NOTE - NSFALLHARMRISKINTERV_ED_ALL_ED

## 2023-07-18 NOTE — ED ADULT NURSE REASSESSMENT NOTE - NS ED NURSE REASSESS COMMENT FT1
Assumed care of patient at 0715.  Patient awake out of his bed, ambulating around BH area with a slow steady independent gait.  Patient mood is subdued and calm during interaction.  Patient oriented to staff and educated about plan of care.  No attempts to harm self or others and safety maintained.
patient remains restless, unable to sleep. patient sitting in day room, frequently using attention seeking behaviors and perseverating on medications. patient verbally redirectable, given updated POC.
pt awake and alert re-oriented to time, day and month. pt is interactive and compliant with medication regimen. pt denies suicidal or homicidal ideations and auditory or visual hallucinations. pt has made no attempts to harm himself or others. pt ambulates independently with a steady gait.
pt awake and appearing restless pacing around the common area. pt requesting snacks which were provided. no attempts to harm self or others.
pt sleeping at this time since approx. 430am. no acute distress noted. reschedule am medication for when pt awakes.
Patient ate 100% of his breakfast and lunch.  Patient mood is subdued.  Patient compliant with his medications.  Patient offers no complaints and safety maintained.
received pt awake, alert and in no acute distress. pt continues to ambulate independently with a steady gait. pt is compliant with medications dispensed and staffs requests. no attempts to harm self or others.
Patient in bed sleeping, safety maintained.
Patient slept on and on last night woke up around 5 AM pacing the floor at time, requested snack, cookies and juice given, patient received AM medication, Lithium, was compliant, , patient voiced no complain, safety maintained.
Endorse patient care at 7:00 PM. Patient sitting in room, alert and awake. Patient remain disorganized, able to recognise my face, patient unable to voice any suicidal thoughts, unable to recall any hallucination. Patient tolerates meals and PO fluids, voiced no complains, no agitation noted, safety maintained.
Patient received all PM medications tolerated well, patient remain disorganized, no attempt to harm self or others, safety maintained.
Patient awoken for POC testing with results of 153.  Patient refused insulin coverage stating "it's not necessary for that number".  Dr. Zhong notified of refusal.  Patient compliant with his lithium.  No attempts to harm self or others.  Safety of patient maintained.  Patient eating breakfast at bedside.
Patient ate 100% of his dinner.  Patient mood is subdued.  Patient reports he wants to return home and has to take care of his rent issues.  No attempts to harm self or others.  Safety of patient maintained.
Reviewed discharge instruction and provided a copy of same.  Patient denies suicidal or homicidal ideations.  Patient agrees to return to local ED or call 911 if symptoms worsen or thoughts to harm self or others develop.
Assumed care of patient at 0715.  Patient resting in bed, appears to be sleeping with no distress and regular nonlabored breathing noted.  Safety of patient maintained.

## 2023-07-18 NOTE — ED ADULT NURSE REASSESSMENT NOTE - NSFALLUNIVINTERV_ED_ALL_ED
Bed/Stretcher in lowest position, wheels locked, appropriate side rails in place/Call bell, personal items and telephone in reach/Instruct patient to call for assistance before getting out of bed/chair/stretcher/Non-slip footwear applied when patient is off stretcher/[place] to call system/Physically safe environment - no spills, clutter or unnecessary equipment/Purposeful proactive rounding/Room/bathroom lighting operational, light cord in reach
Bed/Stretcher in lowest position, wheels locked, appropriate side rails in place/Call bell, personal items and telephone in reach/Instruct patient to call for assistance before getting out of bed/chair/stretcher/Non-slip footwear applied when patient is off stretcher/[place] to call system/Physically safe environment - no spills, clutter or unnecessary equipment/Purposeful proactive rounding/Room/bathroom lighting operational, light cord in reach
Bed/Stretcher in lowest position, wheels locked, appropriate side rails in place/Call bell, personal items and telephone in reach/Instruct patient to call for assistance before getting out of bed/chair/stretcher/Non-slip footwear applied when patient is off stretcher/[place] to call system/Physically safe environment - no spills, clutter or unnecessary equipment/Purposeful proactive rounding/Room/bathroom lighting operational, light cord in reach
Bed/Stretcher in lowest position, wheels locked, appropriate side rails in place/Call bell, personal items and telephone in reach/Instruct patient to call for assistance before getting out of bed/chair/stretcher/Non-slip footwear applied when patient is off stretcher/[place] to call system/Physically safe environment - no spills, clutter or unnecessary equipment/Purposeful proactive rounding/Room/bathroom lighting operational, light cord in reach
Bed/Stretcher in lowest position, wheels locked, appropriate side rails in place/Call bell, personal items and telephone in reach/Instruct patient to call for assistance before getting out of bed/chair/stretcher/Non-slip footwear applied when patient is off stretcher/[place] to call system/Physically safe environment - no spills, clutter or unnecessary equipment/Purposeful proactive rounding/Room/bathroom lighting operational, light cord in reach
Bed/Stretcher in lowest position, wheels locked, appropriate side rails in place/Call bell, personal items and telephone in reach/Instruct patient to call for assistance before getting out of bed/chair/stretcher/Non-slip footwear applied when patient is off stretcher/[place] to call system/Physically safe environment - no spills, clutter or unnecessary equipment/Purposeful proactive rounding/Room/bathroom lighting operational, light cord in reach
Bed/Stretcher in lowest position, wheels locked, appropriate side rails in place/Call bell, personal items and telephone in reach/Instruct patient to call for assistance before getting out of bed/chair/stretcher/Non-slip footwear applied when patient is off stretcher/[place] to call system/Physically safe environment - no spills, clutter or unnecessary equipment/Purposeful proactive rounding/Room/bathroom lighting operational, light cord in reach
Bed/Stretcher in lowest position, wheels locked, appropriate side rails in place/Call bell, personal items and telephone in reach/Instruct patient to call for assistance before getting out of bed/chair/stretcher/Non-slip footwear applied when patient is off stretcher/[place] to call system/Physically safe environment - no spills, clutter or unnecessary equipment/Purposeful proactive rounding/Room/bathroom lighting operational, light cord in reach

## 2023-07-18 NOTE — ED ADULT NURSE REASSESSMENT NOTE - STATUS
awaiting discharge, no change
awaiting transfer, no change
awaiting transfer, no change

## 2023-07-18 NOTE — ED ADULT NURSE REASSESSMENT NOTE - COMFORT CARE
darkened lights
meal provided/plan of care explained
meal provided/plan of care explained
plan of care explained
ambulated to bathroom/plan of care explained

## 2023-07-18 NOTE — ED ADULT NURSE REASSESSMENT NOTE - NSFALLCONCLUSION_ED_ALL_ED
Universal Safety Interventions
Universal Safety Interventions
Fall with Harm Risk
Universal Safety Interventions
Fall with Harm Risk
Fall with Harm Risk
Universal Safety Interventions
Fall with Harm Risk
Universal Safety Interventions

## 2023-07-18 NOTE — CHART NOTE - NSCHARTNOTEFT_GEN_A_CORE
ALINA Note: ALINA reviewed chart - plan is for 9.27 inpt psych tx. ALINA reached out to [facility], , Yara, Jeremy, [facility], [facility], and [facility] at this time. ALINA was unable to reach [facility]. Jeremy informed SW they could fax over clinicals for review for tomorrow. ALINA will follow.
SW Note: Notified by Dr. Zhong and his student that the pt was re-evaluated and can be cleared for discharge but his housing program did not have staff available to assess and give approval to return. Plan is to hold and contact "[employer]s tomorrow for D/C planning.
SW Note: Pt has been cleared for D/C back to his residence by  provider and ED provider.  team spoke with staff from [facility] and discussed discharge today and current meds. They also addressed meds that needed to be sent to pts pharmacy.   Contacted [facility] and confirmed address for transport. Spoke with Rachel [phone]. Address: [address] [place] [address].   [facility] requested but [facility] will arrange a [facility]z due to psychiatric issues and their transport requirements. [facility] provided in D/C packet.

## 2023-07-18 NOTE — ED ADULT NURSE REASSESSMENT NOTE - NSFALLRISKFACTORS_ED_ALL_ED
No indicators present
OI
No indicators present
OI
Bone Condition: Including osteoporosis, prolonged steroid use or metastatic bone disease/cancer
No indicators present
No indicators present
Bone Condition: Including osteoporosis, prolonged steroid use or metastatic bone disease/cancer
No indicators present

## 2023-07-18 NOTE — ED ADULT NURSE REASSESSMENT NOTE - NSFALLRISKASMT_ED_ALL_ED_DT
17-Jul-2023 00:21
16-Jul-2023 22:29
17-Jul-2023 06:32
17-Jul-2023 05:17
18-Jul-2023 08:33
16-Jul-2023 20:04
18-Jul-2023 10:00
17-Jul-2023 07:42
17-Jul-2023 20:09
17-Jul-2023 12:39
18-Jul-2023 07:31
17-Jul-2023 18:54

## 2023-07-18 NOTE — ED ADULT NURSE REASSESSMENT NOTE - GENERAL PATIENT STATE
cooperative
cooperative
comfortable appearance/cooperative
comfortable appearance/resting/sleeping
cooperative
comfortable appearance/cooperative

## 2023-07-18 NOTE — ED CDU PROVIDER DISPOSITION NOTE - ATTENDING CONTRIBUTION TO CARE
I, Patricio Hammonds, performed the initial face to face bedside interview with this patient regarding history of present illness, review of symptoms and relevant past medical, social and family history.  I completed an independent physical examination.  I was the initial provider who evaluated this patient. I have signed out the follow up of any pending tests (i.e. labs, radiological studies) to the resident.  I have communicated the patient’s plan of care and disposition with the resident. I, Patricio Hammonds, performed the initial face to face bedside interview with this patient regarding history of present illness, review of symptoms and relevant past medical, social and family history.  I completed an independent physical examination.  I was the initial provider who evaluated this patient. I have signed out the follow up of any pending tests (i.e. labs, radiological studies) to the resident.  I have communicated the patient’s plan of care and disposition with the resident

## 2023-07-18 NOTE — ED ADULT NURSE REASSESSMENT NOTE - NSFALLRISKASMTTYPE_ED_ALL_ED
Routine Reassessment

## 2023-07-18 NOTE — ED CDU PROVIDER DISPOSITION NOTE - PATIENT PORTAL LINK FT
You can access the FollowMyHealth Patient Portal offered by [facility] by registering at the following website: [email or web address]. By joining [facility]’s FollowMyHealth portal, you will also be able to view your health information using other applications (apps) compatible with our system.

## 2023-08-08 NOTE — BH TREATMENT PLAN - NSTXCONDUCDATETRGT_PSY_ALL_CORE
This [facility] message has been forwarded to you from a monitored pool.  Please do not reply to this message.  All responses should be sent directly to the patient.  e  
13-Oct-2021
27-Oct-2021
22-Sep-2021
27-Oct-2021
24-Nov-2021
15-Sep-2021
27-Oct-2021
22-Sep-2021
27-Oct-2021
02-Sep-2021
17-Sep-2021
27-Oct-2021
05-Oct-2021
13-Oct-2021

## 2023-09-14 NOTE — ED BEHAVIORAL HEALTH ASSESSMENT NOTE - PSYCHIATRIC ISSUES AND PLAN (INCLUDE STANDING AND PRN MEDICATION)
Subjective     Patient ID: Arti Macias is a 35 y.o. female who presents for Physical.  HPI    35F here for preventive care visit, last seen in April out of concern for left sided back pain after training sessions. Seen by  thought perhaps related to posture or subtle movements during exercise is flaring her symptoms. She stopped exercising for one month without any pain, restarted exercises in the last week after doing sit ups upside down where she has to touch her knees and is now cautious, these have since improved.     4/23: no sig hematuria, mild anemia repeat one month.  6/23: anemia resolved iron deficiency, no menorrhagia, recommended iron supplementation and recheck in 3 months   8/23: ordered stone protocol   CT with gallstones, benign bone island on L1        PMHx:  - Iron deficiency - has been taking an iron supplement every other days, feels less tired than she used to be.    -Class 3 obesity - finds phentermine to have improved energy, eats very little until dinnertime. Has not lost any additional weight after she achieved weight loss to 290.   - HTN  - on labetolol trying to conceive has an appointment with Dr. Palma at [facility].   - DM2 with neuropathy A1C 7.6% diagnosed at 18, type 2. Takes metformin 1000mg BID followed by endocrinology Vickie Phelps who started her on Phentermine recently (concerned regarding issues with mounjaro). On phentermine   - Hypertension on labetolol improved at last visit interested in becoming pregnant discussed ARB at last visit   - Gallstones for the past 10 years. not an issue for her last flare over 20 years ago. Gets intermittent flares with Chik-Andrey-A   - JACKIE - chronic - previously on several medications discontinued in adulthood, provided BHI resources. Mainly job related unable to deal with the stress at work that she matilde by eating at home, interested in seeking behavioral health resource.   - Sleep disorder referred to sleep medicine  unable to afford to be seen.   - Lower extremity edema thought secondary to venous insufficiency     Social:   - Lives at home with ,  at [facility]   - no children, goal is for her to have a child      Review of Systems:   General: No constitutional symptoms, significant changes in weight  HEENT: No headaches, changes in vision or hearing, no sinus or dental issues   Cardiac: No chest pain, palpitations, dyspnea on exertion   Lungs: No cough, wheezing, shortness of breath   Abdomen: No abdominal pain, nausea/vomiting, diarrhea or constipation   : No urinary complaints   Musculoskeletal: no joint pains, swelling or tenderness   Heme: No bleeding or thrombosis issues   Lymph: No swollen lymph glands   Skin: No rashes or lesions   Psych: No reported anxiety or depression     Objective       Current Outpatient Medications:     labetalol (Normodyne) 100 mg tablet, Take 1 tablet (100 mg) by mouth in the morning and 1 tablet (100 mg) in the evening., Disp: 180 tablet, Rfl: 1    metFORMIN (Glucophage) 1,000 mg tablet, Take by mouth., Disp: , Rfl:     phentermine (Adipex-P) 37.5 mg tablet, Take 1 tablet (37.5 mg) by mouth once daily., Disp: , Rfl:       /72   Pulse 95   Temp 36.5 °C (97.7 °F)   Wt 133 kg (292 lb 8 oz)   BMI 47.93 kg/m²       Physical Examination:   General: Awake, alert, appears stated age   Head/eyes/ears: NCAT, EOMI, PERRL, TM WNL, no cerumen  Throat: moist mucus membranes, no pharyngeal erythema  Neck: Supple, nontender, no lymphadenopathy, thyroid exam unremarkable   Breast exam:   Heart: RRR, no murmurs, rubs or gallops  Lungs: CTA bilaterally, no rhonchi rales or wheezes   Abdomen: Soft, NT/ND  Extremities: No edema, 2+ DP pulses   Skin: No concerning skin lesions on visualized skin   Neuro: AAO x 3, no FND, gait unremarkable    Assessment/Plan   Problem List Items Addressed This Visit        Adult Health Examination  Age appropriate screening performed   Healthy lifestyle  reviewed.   Depression screen negative   No additional pertinent family history or toxic habits   No high risk sexual behavior,   Cancer screening:   - Pap smear UTD   - Discussed self breast exams   - Skin cancer prevention strategies reviewed follows with derm and had FSE in May.   Immunizations: Flu shot declined, declined Tdap, declined   Dental and visual examinations UTD  Discussed adequate Vitamin D intake      Hypertension     Controlled on labetalol, actively trying to conceive.         RESOLVED: Obesity, morbid (more than 100 lbs over ideal weight or BMI > 40) (CMS/HCC)    Severe obesity (BMI >= 40) (CMS/HCC)     Continuing phentermine, no significant weight loss since last being seen.  Continue lifestyle modifications         Type 2 diabetes mellitus with diabetic neuropathy, without long-term current use of insulin (CMS/[facility])     Followed by Vickie Blevins on metformin, phentermine for weight reduction, last A1c 7.6% no known complications besides for neuropathy         Relevant Orders    Hemoglobin A1C    Gallstones     Declined surgical intervention at present.  Minimally symptomatic.  Warning signs reviewed.          Other Visit Diagnoses       JACKIE (generalized anxiety disorder)    -  Primary  Interested in following up with behavioral health, JACKIE-7 performed.    Relevant Orders    Follow Up In [facility] - [facility]    Follow Up In [facility] - PCP - Established    Encounter for preventative adult health care examination        Iron deficiency        Recheck iron indices on iron supplement if remains low would pursue further workup. If normal, recheck again in 3 months.          Follow-up 6 months   Haldol 5 mg PO/IM, Ativan 2 mg PO/IM, and Benadryl 50 mg PO/IM PRN Q6H for agitation.

## 2023-09-16 NOTE — PHYSICAL THERAPY INITIAL EVALUATION ADULT - ADDITIONAL COMMENTS
Left Lower Lobe
Right Lower Lobe/Left Lower Lobe
Pt states that he lives alone in an apartment, no steps. Independent with all PTA, with rollator.

## 2023-09-27 NOTE — BEHAVIORAL HEALTH ASSESSMENT NOTE - HYGIENE
Spoke with pt and  to review MRI brain findings- most consistent with radiation therapy necrosis.  RTC 2 months with MRI brain. All questions answered.    Fair

## 2023-11-17 NOTE — ED ADULT NURSE NOTE - NS ED NURSE RECORD ANOTHER HT AND WT
- Take nifedipine at night    - Restarting Allopurinol 100 mg if pt can tolerate then increase to 300 mg daily   - Start K-Citrate 10 meq PO TID  4.        - Stop Spironolactone and start hydrochlorothiazide 25 mg daily    Labs next week  
Yes

## 2023-11-22 NOTE — BH INPATIENT PSYCHIATRY PROGRESS NOTE - NSBHATTESTSEENBY_PSY_A_CORE
Hgb improved to 9.0 on 11/22. No signs of active bleeding. BP stable.   Hgb 8.7 on recheck on 11/20 and Hgb 8.5 on 11/21. BP stable at this time on 11/21. No signs of active bleeding so will monitor for now. Patient asymptomatic.   Hgb decreased to 9.2 on 11/20 from 9.9 on 11/19 and 11.3 on admit. Blood loss expected and related to hip fracture and surgery. Patient asymptomatic from her anemia. Recheck Hgb on afternoon of 11/20 due to persistent post-op hypotension.   Patient's anemia is currently controlled. Has not received any PRBCs to date. Etiology likely d/t acute blood loss which was from hip fracture and hip fracture surgery and expected blood loss.  Current CBC reviewed-   Lab Results   Component Value Date    HGB 9.0 (L) 11/22/2023    HCT 27.3 (L) 11/22/2023     Monitor serial CBC and transfuse if patient becomes hemodynamically unstable, symptomatic or H/H drops below 7/21.   NP without Attending Psychiatrist

## 2023-12-14 NOTE — ED ADULT NURSE NOTE - NSFALLRSKASSISTTYPE_ED_ALL_ED
Physical Therapy Evaluation    Visit Type: Initial Evaluation  Visit: 1  Referring Provider: Valentina De La Fuente CNP  Medical Diagnosis (from order): M54.42 - Acute left-sided low back pain with left-sided sciatica  M54.16 - Lumbar radiculopathy  M51.26 - Lumbar discogenic pain syndrome  M51.26 - HNP (herniated nucleus pulposus), lumbar  M71.38 - Synovial cyst of lumbar facet joint   Treatment Diagnosis: lumbar Lumbar radiculopathy - increased pain/symptoms, impaired posture, impaired range of motion, impaired muscle length/flexibility, impaired tissue mobility, impaired strength, impaired activity tolerance, impaired joint play/mobility, impaired mobility and impaired body mechanics.  Onset  - Date of onset: June 2023  Chart reviewed at time of initial evaluation (relevant co-morbidities, allergies, tests and medications listed):   - Diagnostic tests reviewed: MRI studies      SUBJECTIVE                                                                                                               Reports he jumped out of bed one day and re-aggravated his lower back pain. He was receiving injections and had some PT a few months ago which was all helpful. He has been using his stretches intermittently. Has trouble doing them at work and wants to figure out ways to do that better. 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 Limited in his ability to do what he wants to do even just chores around the house but anything recreational he cannot do.    Pain / Symptoms  - Pain/symptom is: constant  - Pain rating (out of 10): Current: 2 ; Best: 2; Worst: 8  - Location: Left buttock, lateral hip and down the outer leg  - Quality / Description: radiating, ache  - Alleviating Factors: rest     - Sitting  - Progression since onset: improved    Function:   Limitations / Exacerbation Factors:   - Patient reports pain and difficulty with function reported below.  - bed mobility, lower body dressing, grooming/hygiene/self-care activities,  sleep disturbed, house/yard work, standing tasks, grocery shopping, walking, standing, bending/squatting/lifting, pushing/pulling and lifting/carrying, low transfer (toilet/couch) and floor transfers, community distances, stairs  Prior Level of Function: declining function, therefore referred to therapy,    Patient Goals: decreased pain, increased motion, return to sport/leisure activities and increased strength.    Prior treatment  - outpatient PT and injections  - Discharged from hospital, home health, or skilled nursing facility in last 30 days: no  Home Environment   - Patient lives with: alone  - Type of home: apartment  - Assistance available: as needed  - Denies 2 or more falls or an unexplained fall with injury in the last year.  - Feel safe at home / work / school: yes      OBJECTIVE                                                                                                                     Range of Motion (ROM)   (degrees unless noted; active unless noted; norms in ( ); negative=lacking to 0, positive=beyond 0)  Lumbar:    - Flexion (60-80):  50%  pain     - Extension (25):  25%  pain     - Side Bend (25-35):        • Left:  50%  pain         • Right:  WNL        Palpation  Left  - Lumbar Paraspinals: hypertonic  Right  - Lumbar Paraspinals: hypertonic  Joint Play   Lumbar   - L1-L2: WFL  - L2-L3: WFL  - L3-L4: hypomobile  - L4-L5: hypomobile  - L5-S1: hypomobile  - Sacroiliac Joint: • Left WFL • Right WFL     Special Tests  Lumbar: Nerve Tests  - Femoral Nerve Tension: Left: positive Right: negative            Outcome/Assessments  Outcome Measures:   Lower Extremity Functional Scale: LEFS Calculated Total: 34 (0=extreme difficulty; 80=no difficulty) see flowsheet for additional documentation    Treatment     Therapeutic Exercise  Review of examination findings, reason for symptoms, prognosis for therapy and coordination of care.    Neuromuscular Re-Education  Prone press up 3 sec hold x 15  Prone  glute set 3 sec hold x 10  Cat/cow with breathing x 5    Skilled input: verbal instruction/cues, tactile instruction/cues and posture correction    Writer verbally educated and received verbal consent for hand placement, positioning of patient, and techniques to be performed today from patient for clothing adjustments for techniques and hand placement and palpation for techniques as described above and how they are pertinent to the patient's plan of care.  Home Exercise Program  Access Code: [email or web address]  URL: [email or web address]  Date: 12/14/2023  Prepared by: Nick Mustafa    Exercises  - Prone Press Up On Elbows  - 1-2 x daily - 7 x weekly - 1-2 sets - 8-12 reps - 3-5 sec hold  - Prone Gluteal Sets  - 1-2 x daily - 7 x weekly - 1-2 sets - 8-12 reps - 3-5 sec hold  - Cat Cow  - 1-2 x daily - 7 x weekly - 1-2 sets - 8-12 reps - 3-5 sec hold      ASSESSMENT                                                                                                          48 year old patient has reported functional limitations listed above impacted by signs and symptoms consistent with treatment diagnosis below.  Treatment Diagnosis:   - Involved: lumbar.  - Lumbar radiculopathy  - Symptoms/impairments: increased pain/symptoms, impaired posture, impaired range of motion, impaired muscle length/flexibility, impaired tissue mobility, impaired strength, impaired activity tolerance, impaired joint play/mobility, impaired mobility and impaired body mechanics.    Prognosis: Patient will benefit from skilled therapy.  Rehabilitative potential is: good.  Predicted patient presentation: Low (stable) - Patient comorbidities and complexities, as defined above, will have little effect on progress for prescribed plan of care.  Education:   - Present and ready to learn: patient  - Results of above outlined education: Verbalizes understanding and Demonstrates understanding    PLAN                                                                                                                          The following skilled interventions to be implemented to achieve goals listed below:  Neuromuscular Re-Education (09460)  Therapeutic Activity (48264)  Therapeutic Exercise (57551)  Manual Therapy (04872)  Electrical Stimulation Unattended (40457 or )  Heat/Cold (86069)    Frequency / Duration  2 times per week tapering as patient progresses for 6 weeks for an estimated total of 8 visits    Patient involved in and agreed to plan of care and goals.  Patient given attendance policy at time of initial evaluation.    Suggestions for next session as indicated: Progress per plan of care      Goals  Long Term Goals: to be met by end of plan of care  1. Patient will be able to stand after prolonged sitting without feelings of stiffness and increased pain no greater than 2-3/10 to enhance transitional mobility.  2. Patient will be able to stand for 30 minutes without increased back or leg pain to enhance meal prep and other household chores.  3. Patient will be able to walk for 15 minutes without pain to enhance shopping and community mobility.  4. Patient will be able to squat and lift 20 lb from the floor 5x without pain to showcase enhanced functional strength and mobility.    Therapy procedure time and total treatment time can be found documented on the Time Entry flowsheet     Standing/Walking/Toileting

## 2024-01-01 NOTE — BH INPATIENT PSYCHIATRY PROGRESS NOTE - NSTXMEDICDATENEW_PSY_ALL_CORE
Regardin week - F - [place] - possible eye infection, or clogged tear duct - right eye - gooped closed with yel  ----- Message from Tiffany Prado sent at 2024  8:19 AM CDT -----  Patient Name: Avani Dickerson    Specialist or PCP Name: Cele SHARP, Sanjuana NAJERA    Symptoms: 2 week - F - [place] - possible eye infection, or clogged tear duct - right eye - gooped closed with yellow - calling to make an appointment     Pregnant (females aged 13-60. If Yes, how long?) : NA     Call Back # : [phone]    Which State are you currently located in?: [place]     Name of Clinic Site / Acct# : [facility]     23-Nov-2021

## 2024-01-12 ENCOUNTER — APPOINTMENT (OUTPATIENT)
Dept: CT IMAGING | Facility: CLINIC | Age: 59
End: 2024-01-12

## 2024-02-13 NOTE — ED ADULT NURSE NOTE - IS THE PATIENT ABLE TO BE SCREENED?
Detail Level: Zone Render In Strict Bullet Format?: No Plan: [facility] not sending patient prescription. Samples Given: Skyrizi sample today Yes

## 2024-02-21 ENCOUNTER — APPOINTMENT (OUTPATIENT)
Dept: CT IMAGING | Facility: CLINIC | Age: 59
End: 2024-02-21

## 2024-02-24 NOTE — CONSULT NOTE ADULT - NSICDXPILOT_GEN_ALL_CORE
[place] Influenza in Children    WHAT YOU NEED TO KNOW:    What is influenza? Influenza (the flu) is an infection caused by the influenza virus. The virus spreads through direct contact with someone who has the flu. For example, a person with the virus on his or her hands can spread it by shaking hands with someone. Your child may be able to spread the flu to others for 1 week or longer after signs or symptoms appear.    What are the signs and symptoms of the flu? Severe symptoms are more likely in children younger than 5 years. They are also more likely in children who have heart or lung disease, or a weak immune system. Signs and symptoms include the following:    Fever and chills    Headaches, body aches, earaches, and muscle or joint pain    Dry cough, runny or stuffy nose, and sore throat    Loss of appetite, nausea, vomiting, or diarrhea    Tiredness    Fast breathing, trouble breathing, or chest pain  How is the flu diagnosed? Your child's healthcare provider will examine your child. Tell the provider if your child has health problems such as epilepsy or asthma. Tell the provider if your child has traveled recently or been around anyone who is sick. A sample of fluid may be collected from your child's nose or throat to be tested for the flu virus.    How is the flu treated? Most healthy children get better within a week. Your child may need any of the following:    Acetaminophen decreases pain and fever. It is available without a doctor's order. Ask how much to give your child and how often to give it. Follow directions. Read the labels of all other medicines your child uses to see if they also contain acetaminophen, or ask your child's doctor or pharmacist. Acetaminophen can cause liver damage if not taken correctly.    NSAIDs, such as ibuprofen, help decrease swelling, pain, and fever. This medicine is available with or without a doctor's order. NSAIDs can cause stomach bleeding or kidney problems in certain people. If your child takes blood thinner medicine, always ask if NSAIDs are safe for him or her. Always read the medicine label and follow directions. Do not give these medicines to children younger than 6 months without direction from a healthcare provider.    Antivirals help fight a viral infection.  How can I manage my child's symptoms?    Help your child rest and sleep as much as possible as he or she recovers.    Give your child liquids as directed to help prevent dehydration. Your child may need to drink more than usual. Ask your child's healthcare provider how much liquid your child should drink each day. Good liquids include water, fruit juice, and broth.    Use a cool mist humidifier to increase air moisture in your home. This may make it easier for your child to breathe and help decrease his or her cough.  What can I do to prevent the spread of germs?      Keep your child away from other people while he or she is sick. This is especially important during the first 3 to 5 days of illness. The virus is most contagious during this time.    Have your child wash his or her hands often. He or she should wash after using the bathroom and before preparing or eating food. Have your child use soap and water. Show him or her how to rub soapy hands together, lacing the fingers. Wash the front and back of the hands, and in between the fingers. The fingers of one hand can scrub under the fingernails of the other hand. Teach your child to wash for at least 20 seconds. Use a timer, or sing a song that is at least 20 seconds. An example is the happy birthday song 2 times. Have your child rinse with warm, running water for several seconds. Then dry with a clean towel or paper towel. Your older child can use hand  with alcohol if soap and water are not available.  Handwashing      Remind your child to cover a sneeze or cough. Show your child how to use a tissue to cover his or her mouth and nose. Have your child throw the tissue away in a trash can right away. Then your child should wash his or her hands well or use a hand . Show your child how to use the bend of his or her arm if a tissue is not available.    Tell your child not to share items. Examples include toys, drinks, and food.    Ask about vaccines your child needs. Vaccines help prevent some infections that cause disease. Have your child get a yearly flu vaccine as soon as recommended. The vaccine is usually available starting in September or October. Your child's provider can tell you other vaccines your child should get, and when to get them.  Recommended Immunization Schedule 2022  Call your local emergency number (911 in the [place]) if:    Your child has fast breathing, trouble breathing, or chest pain.    Your child has a seizure.    Your child does not want to be held and does not respond to you.    You cannot wake your child.  When should I seek immediate care?    Your child has a fever with a rash.    Your child's skin is blue or gray.    Your child's symptoms got better, but then came back with a fever or a worse cough.    Your child will not drink liquids, is not urinating, or has no tears when he or she cries.    Your child has trouble breathing, a cough, and vomits blood.    Your child's symptoms get worse.  When should I call my child's doctor?    Your child has new symptoms, such as muscle pain or weakness.    You have questions or concerns about your child's condition or care.  CARE AGREEMENT:    You have the right to help plan your child's care. Learn about your child's health condition and how it may be treated. Discuss treatment options with your child's healthcare providers to decide what care you want for your child.

## 2024-03-10 NOTE — BH PATIENT PROFILE - FUNCTIONAL SCREEN CURRENT LEVEL: COMMUNICATION, MLM
Continuity of Care Form    Patient Name: Trav Jain   :  1967  MRN:  [id number]    Admit date:  3/10/2024  Discharge date:  ***    Code Status Order: Prior   Advance Directives:     Admitting Physician:  No admitting provider for patient encounter.  PCP: Radha Hou, APRN - CNP    Discharging Nurse: ***  Discharging Hospital Unit/Room#: 34/034A  Discharging Unit Phone Number: ***    Emergency Contact:   Extended Emergency Contact Information  Primary Emergency Contact: UZIEL JAIN  Address: [address] [place] [address]  Home Phone: [phone]  Mobile Phone: [phone]  Relation: Spouse  Preferred language: English   needed? No    Past Surgical History:  Past Surgical History:   Procedure Laterality Date    ABDOMINAL AORTIC ANEURYSM REPAIR      BRONCHOSCOPY N/A 10/6/2022    BRONCHOSCOPY performed by Theodore Ng MD at [facility]    DIALYSIS FISTULA CREATION Left 2016    EKG 12-LEAD  2015         HAND TENDON SURGERY Left 2019    LEFT THUMB FLEXOR TENDON REPAIR performed by Edu Cervantes MD at [facility] OR    LEG TENDON SURGERY      THORACOTOMY Right 10/10/2022    Right Thoracotomy & Decortication with Partial Lung Resection performed by Ralph Becerra MD at [facility] OR    VASCULAR SURGERY Left 2016    AV Fistula    VASCULAR SURGERY Right     Surgery on Achilles tendon       Immunization History:   Immunization History   Administered Date(s) Administered    Influenza A (H1S0-96) Vaccine PF IM 10/30/2009    Influenza Virus Vaccine 2014, 10/10/2015, 2017    Influenza, AFLURIA (age 3 yrs+), FLUZONE, (age 6 mo+), MDV, 0.5mL 2017    Influenza, Triv, 3 Years and older, IM (Afluria (5 yrs and older) 10/07/2016    Pneumococcal, PCV-13, PREVNAR 13, (age 6w+), IM, 0.5mL 2017, 2017    Pneumococcal, PPSV23, PNEUMOVAX 23, (age 2y+), SC/IM, 0.5mL 10/06/2013    TDaP, ADACEL (age 10y-64y), BOOSTRIX (age 10y+), IM, 
0 = understands/communicates without difficulty

## 2024-04-15 NOTE — PATIENT PROFILE ADULT. - HEALTH/HEALTHCARE ANXIETIES, PROFILE
The following changes were made to your medications today:   Continue all present medications     The following lifestyle modifications are encouraged:  Continue regular exercise at least 30 minutes daily.  Lowfat/Low Cholesterol diet advised.      The following tests have been ordered:  Recommend home sleep study     Return to Clinic in 1 year or sooner if needed.   
none

## 2024-04-30 NOTE — PHYSICAL THERAPY INITIAL EVALUATION ADULT - SITTING BALANCE: DYNAMIC
If you are a smoker, it is important for your health to stop smoking. Please be aware that second hand smoke is also harmful.
normal balance

## 2024-06-13 NOTE — BH INPATIENT PSYCHIATRY PROGRESS NOTE - OTHER
Discharge instructions reviewed with patient. Understanding verbalized.      Abnormal gait,  uses walker much improved  internally preoccupied at times

## 2024-06-26 ENCOUNTER — APPOINTMENT (OUTPATIENT)
Dept: ENDOCRINOLOGY | Facility: CLINIC | Age: 59
End: 2024-06-26
Payer: MEDICARE

## 2024-06-26 VITALS
SYSTOLIC BLOOD PRESSURE: 110 MMHG | HEIGHT: 60 IN | BODY MASS INDEX: 32.39 KG/M2 | OXYGEN SATURATION: 99 % | HEART RATE: 70 BPM | DIASTOLIC BLOOD PRESSURE: 70 MMHG | WEIGHT: 165 LBS

## 2024-06-26 DIAGNOSIS — E03.9 HYPOTHYROIDISM, UNSPECIFIED: ICD-10-CM

## 2024-06-26 DIAGNOSIS — E11.9 TYPE 2 DIABETES MELLITUS W/OUT COMPLICATIONS: ICD-10-CM

## 2024-06-26 DIAGNOSIS — E78.5 HYPERLIPIDEMIA, UNSPECIFIED: ICD-10-CM

## 2024-06-26 DIAGNOSIS — Q78.0 OSTEOGENESIS IMPERFECTA: ICD-10-CM

## 2024-06-26 LAB — GLUCOSE BLDC GLUCOMTR-MCNC: 146

## 2024-06-26 PROCEDURE — 82962 GLUCOSE BLOOD TEST: CPT

## 2024-06-26 PROCEDURE — 99214 OFFICE O/P EST MOD 30 MIN: CPT

## 2024-06-26 PROCEDURE — G2211 COMPLEX E/M VISIT ADD ON: CPT

## 2024-06-26 RX ORDER — CLOZAPINE 100 MG/1
100 TABLET ORAL
Refills: 0 | Status: DISCONTINUED | COMMUNITY
End: 2024-06-26

## 2024-06-26 RX ORDER — CAPECITABINE 500 MG/1
500 TABLET ORAL TWICE DAILY
Qty: 84 | Refills: 5 | Status: DISCONTINUED | COMMUNITY
Start: 2019-01-29 | End: 2024-06-26

## 2024-06-26 RX ORDER — HYDROCORTISONE 10 MG/G
1 CREAM TOPICAL
Refills: 0 | Status: DISCONTINUED | COMMUNITY
End: 2024-06-26

## 2024-06-26 RX ORDER — MULTIVIT-MIN/FOLIC/VIT K/LYCOP 400-300MCG
50 MCG TABLET ORAL
Refills: 0 | Status: ACTIVE | COMMUNITY

## 2024-06-26 RX ORDER — SITAGLIPTIN 100 MG/1
100 TABLET ORAL
Refills: 0 | Status: ACTIVE | COMMUNITY

## 2024-06-26 RX ORDER — PINDOLOL 5 MG/1
5 TABLET ORAL
Refills: 0 | Status: DISCONTINUED | COMMUNITY
End: 2024-06-26

## 2024-06-26 RX ORDER — MULTIVIT-MIN/FOLIC/VIT K/LYCOP 400-300MCG
25 MCG TABLET ORAL
Qty: 270 | Refills: 1 | Status: DISCONTINUED | COMMUNITY
End: 2024-06-26

## 2024-06-26 RX ORDER — LOPERAMIDE HYDROCHLORIDE 2 MG/1
2 CAPSULE ORAL
Qty: 200 | Refills: 0 | Status: DISCONTINUED | COMMUNITY
Start: 2019-02-26 | End: 2024-06-26

## 2024-06-26 RX ORDER — ACETAMINOPHEN 325 MG/1
325 TABLET ORAL EVERY 6 HOURS
Refills: 0 | Status: DISCONTINUED | COMMUNITY
End: 2024-06-26

## 2024-06-26 RX ORDER — POLYETHYLENE GLYCOL 3350 17 G/17G
17 POWDER, FOR SOLUTION ORAL
Refills: 0 | Status: ACTIVE | COMMUNITY

## 2024-06-26 RX ORDER — HALOPERIDOL LACTATE 5 MG/ML
5 INJECTION, SOLUTION INTRAMUSCULAR
Refills: 0 | Status: ACTIVE | COMMUNITY

## 2024-06-26 RX ORDER — ANTACID TABLETS 500 MG/1
500 TABLET, CHEWABLE ORAL
Refills: 0 | Status: ACTIVE | COMMUNITY

## 2024-06-26 RX ORDER — ONDANSETRON 8 MG/1
8 TABLET, ORALLY DISINTEGRATING ORAL EVERY 8 HOURS
Qty: 90 | Refills: 0 | Status: DISCONTINUED | COMMUNITY
Start: 2019-01-28 | End: 2024-06-26

## 2024-06-26 RX ORDER — ATORVASTATIN CALCIUM 40 MG/1
40 TABLET, FILM COATED ORAL
Qty: 30 | Refills: 2 | Status: DISCONTINUED | COMMUNITY
End: 2024-06-26

## 2024-06-26 RX ORDER — LEVOTHYROXINE SODIUM 0.05 MG/1
50 TABLET ORAL
Refills: 0 | Status: ACTIVE | COMMUNITY

## 2024-06-26 RX ORDER — MULTIVIT-MIN/FOLIC/VIT K/LYCOP 400-300MCG
500 TABLET ORAL
Refills: 0 | Status: ACTIVE | COMMUNITY

## 2024-06-26 RX ORDER — RISPERIDONE 0.5 MG/1
0.5 TABLET ORAL
Refills: 0 | Status: DISCONTINUED | COMMUNITY
End: 2024-06-26

## 2024-06-26 RX ORDER — OMEGA-3-ACID ETHYL ESTERS CAPSULES 1 G/1
1 CAPSULE, LIQUID FILLED ORAL
Qty: 120 | Refills: 2 | Status: ACTIVE | COMMUNITY
Start: 2024-06-26

## 2024-06-26 RX ORDER — BLOOD SUGAR DIAGNOSTIC
STRIP MISCELLANEOUS DAILY
Qty: 100 | Refills: 1 | Status: DISCONTINUED | COMMUNITY
Start: 2019-08-22 | End: 2024-06-26

## 2024-06-26 RX ORDER — ATORVASTATIN CALCIUM 20 MG/1
20 TABLET, FILM COATED ORAL
Refills: 0 | Status: ACTIVE | COMMUNITY

## 2024-06-26 RX ORDER — SENNOSIDES 8.6 MG
8.6 TABLET ORAL
Refills: 0 | Status: DISCONTINUED | COMMUNITY
End: 2024-06-26

## 2024-06-26 RX ORDER — LITHIUM CARBONATE 300 MG/1
300 CAPSULE ORAL
Refills: 0 | Status: ACTIVE | COMMUNITY

## 2024-06-26 RX ORDER — HALOPERIDOL DECANOATE 100 MG/ML
100 INJECTION INTRAMUSCULAR
Refills: 0 | Status: ACTIVE | COMMUNITY

## 2024-06-26 RX ORDER — MAG HYDROX/ALUMINUM HYD/SIMETH 200-200-20
SUSPENSION, ORAL (FINAL DOSE FORM) ORAL
Refills: 0 | Status: DISCONTINUED | COMMUNITY
End: 2024-06-26

## 2024-06-26 RX ORDER — MAGNESIUM HYDROXIDE 400 MG/5ML
400 SUSPENSION ORAL
Refills: 0 | Status: DISCONTINUED | COMMUNITY
End: 2024-06-26

## 2024-06-26 RX ORDER — DIPHENHYDRAMINE HCL 50 MG/ML
50 VIAL (ML) INJECTION
Refills: 0 | Status: DISCONTINUED | COMMUNITY
End: 2024-06-26

## 2024-06-26 RX ORDER — NORMAL SALT TABLETS 1 G/G
1 TABLET ORAL
Refills: 0 | Status: DISCONTINUED | COMMUNITY
End: 2024-06-26

## 2024-06-26 RX ORDER — LITHIUM CARBONATE 150 MG/1
150 CAPSULE ORAL
Refills: 0 | Status: ACTIVE | COMMUNITY

## 2024-06-26 RX ORDER — AMANTADINE HCL 100 MG
100 TABLET ORAL
Refills: 0 | Status: DISCONTINUED | COMMUNITY
End: 2024-06-26

## 2024-06-26 RX ORDER — METFORMIN HYDROCHLORIDE 500 MG/1
500 TABLET, COATED ORAL
Refills: 0 | Status: DISCONTINUED | COMMUNITY
End: 2024-06-26

## 2024-06-26 RX ORDER — PROCHLORPERAZINE MALEATE 10 MG/1
10 TABLET ORAL EVERY 6 HOURS
Qty: 120 | Refills: 5 | Status: DISCONTINUED | COMMUNITY
Start: 2019-01-28 | End: 2024-06-26

## 2024-06-28 NOTE — ASU PATIENT PROFILE, ADULT - SPIRITUAL CULTURAL, CURRENT SITUATION, PROFILE
Treatment Goal Explanation (Does Not Render In The Note): Stable for the purposes of categorizing medical decision making is defined by the specific treatment goals for an individual patient. A patient that is not at their treatment goal is not stable, even if the condition has not changed and there is no short- term threat to life or function. None

## 2024-07-12 ENCOUNTER — OUTPATIENT (OUTPATIENT)
Dept: OUTPATIENT SERVICES | Facility: HOSPITAL | Age: 59
LOS: 1 days | End: 2024-07-12

## 2024-07-12 ENCOUNTER — APPOINTMENT (OUTPATIENT)
Dept: CT IMAGING | Facility: CLINIC | Age: 59
End: 2024-07-12

## 2024-07-12 DIAGNOSIS — S82.899A OTHER FRACTURE OF UNSPECIFIED LOWER LEG, INITIAL ENCOUNTER FOR CLOSED FRACTURE: Chronic | ICD-10-CM

## 2024-07-12 DIAGNOSIS — Z93.2 ILEOSTOMY STATUS: Chronic | ICD-10-CM

## 2024-07-12 DIAGNOSIS — Z00.00 ENCOUNTER FOR GENERAL ADULT MEDICAL EXAMINATION WITHOUT ABNORMAL FINDINGS: ICD-10-CM

## 2024-09-11 DIAGNOSIS — M25.561 PAIN IN RIGHT KNEE: ICD-10-CM

## 2024-09-12 ENCOUNTER — APPOINTMENT (OUTPATIENT)
Dept: ORTHOPEDIC SURGERY | Facility: CLINIC | Age: 59
End: 2024-09-12

## 2024-09-25 ENCOUNTER — APPOINTMENT (OUTPATIENT)
Dept: ENDOCRINOLOGY | Facility: CLINIC | Age: 59
End: 2024-09-25

## 2024-10-17 NOTE — PATIENT PROFILE ADULT - FALL HARM RISK TYPE OF ASSESSMENT
----- Message from Alyson [phone] or [phone].    THANK YOU   [facility] Progress Note    1420  Pt arrived in stable condition and in no distress for Prolia.  Patient denies any recent or future dental procedures.    Assessment completed. No complaints voiced today.    Labs done 5/11/24 with calcium 9.5 and Dr. Yanes is ok with using that result for today.    Patient Vitals for the past 12 hrs:   Temp Pulse Resp BP SpO2   06/17/24 1428 97.9 °F (36.6 °C) 69 16 125/75 99 %      The following medications administered:  Medications Administered         denosumab (PROLIA) SC injection 60 mg Admin Date  06/17/2024 Action  Given Dose  60 mg Route  SubCUTAneous Administered By  Mono Cotto RN          Pt tolerated treatment well. No s/s of adverse reaction noted.    Pt provided with education on possible side effects of medication along with discharge instructions. Pt verbalized understanding.     1430  Pt discharged in no acute distress. Next appointment:    Future Appointments   Date Time Provider Department Center   6/18/2024  2:00 PM [facility] MARISELA 1 [facility] RLMAMMO [place] Dx   6/19/2024  8:00 AM MRM TECH/OVERFLOW PT [facility] OPPT [facility]   6/27/2024 10:45 AM Flower Tolbert RP [facility] BS AMB   12/10/2024 10:00 AM Rm Yanes III, DO [facility] BS AMB   12/16/2024  3:00 PM YINKA GORDON 2 [facility]       daily assessment

## 2024-10-28 NOTE — BH INPATIENT PSYCHIATRY PROGRESS NOTE - NSBHMSEBEHAV_PSY_A_CORE
Detail Level: Simple Render Risk Assessment In Note?: no Additional Notes: Performing Provider: Dr Stovall\\nRepairing Provider (if applicable):\\nProcedure: Mohs\\nSurgical Location: left posterior ankle\\nDiagnosis (per pathology report): bcc\\nSize of lesion (size provided on pathology report):\\n\\nSee a physician for any heart conditions (If yes, who and for what): N\\nArtificial Heart Valves: N\\nMitral valve prolapse: N\\nHeart Murmur: N\\nPacemaker: N\\nDefibrillator: N\\nArtificial joints (if yes, indicate location and year): N\\nDoes the patient pre-op medicate with antibiotics (if yes, what medication): N\\n**Pharmacy: N\\nHistory of renal disease or on dialysis: N\\nHistory of liver disease: N\\nHistory of bleeding disorder: N\\nLow platelet count (if the patient can provider):N\\nWill patient discontinue blood thinners, including NSAIDS (Advil, Motrin, ibuprofen & fish oil) (discontinue only by provider order):N\\nImmunosuppressed: N\\nPatient verbalizes understanding of pre-operative instructions: Y\\n\\nNotes (if applicable): Hostile/Other

## 2024-10-31 NOTE — ED ADULT NURSE REASSESSMENT NOTE - GENERAL PATIENT STATE
comfortable appearance Post-Care Instructions: I reviewed with the patient in detail post-care instructions. Patient is to wear sunprotection, and avoid picking at any of the treated lesions. Pt may apply Vaseline to crusted or scabbing areas. Consent: The patient's consent was obtained including but not limited to risks of crusting, scabbing, blistering, scarring, darker or lighter pigmentary change, recurrence, incomplete removal and infection. Treatment Number (Will Not Render If 0): 0 Render Post-Care Instructions In Note?: no Detail Level: Detailed Medical Necessity Information: It is in your best interest to select a reason for this procedure from the list below. All of these items fulfill various CMS LCD requirements except the new and changing color options. Medical Necessity Clause: This procedure was medically necessary because the lesions that were treated were:

## 2025-01-10 NOTE — BH INPATIENT PSYCHIATRY PROGRESS NOTE - NSBHCHARTREVIEWVS_PSY_A_CORE FT
Problem: Pain - Adult  Goal: Verbalizes/displays adequate comfort level or baseline comfort level  Outcome: Progressing     Problem: Discharge Planning  Goal: Discharge to home or other facility with appropriate resources  Outcome: Progressing     Problem: Chronic Conditions and Co-morbidities  Goal: Patient's chronic conditions and co-morbidity symptoms are monitored and maintained or improved  Outcome: Progressing     Problem: Diabetes  Goal: Achieve decreasing blood glucose levels by end of shift  Outcome: Progressing  Goal: Increase stability of blood glucose readings by end of shift  Outcome: Progressing  Goal: Decrease in ketones present in urine by end of shift  Outcome: Progressing  Goal: Maintain electrolyte levels within acceptable range throughout shift  Outcome: Progressing  Goal: Maintain glucose levels >70mg/dl to <250mg/dl throughout shift  Outcome: Progressing  Goal: No changes in neurological exam by end of shift  Outcome: Progressing  Goal: Learn about and adhere to nutrition recommendations by end of shift  Outcome: Progressing  Goal: Vital signs within normal range for age by end of shift  Outcome: Progressing  Goal: Increase self care and/or family involovement by end of shift  Outcome: Progressing  Goal: Receive DSME education by end of shift  Outcome: Progressing     Problem: Pain  Goal: Takes deep breaths with improved pain control throughout the shift  Outcome: Progressing  Goal: Turns in bed with improved pain control throughout the shift  Outcome: Progressing  Goal: Walks with improved pain control throughout the shift  Outcome: Progressing  Goal: Performs ADL's with improved pain control throughout shift  Outcome: Progressing  Goal: Participates in PT with improved pain control throughout the shift  Outcome: Progressing  Goal: Free from opioid side effects throughout the shift  Outcome: Progressing  Goal: Free from acute confusion related to pain meds throughout the shift  Outcome:  Progressing   The patient's goals for the shift include  rest    The clinical goals for the shift include pt will remain hemodynamically stable throughout shift.       Vital Signs Last 24 Hrs  T(C): 36.3 (09-13-21 @ 07:45), Max: 36.7 (09-12-21 @ 19:38)  T(F): 97.3 (09-13-21 @ 07:45), Max: 98 (09-12-21 @ 19:38)  HR: --  BP: --  BP(mean): --  RR: --  SpO2: --    Orthostatic VS  09-13-21 @ 07:45  Lying BP: --/-- HR: --  Sitting BP: 148/88 HR: 90  Standing BP: 135/86 HR: 88  Site: --  Mode: --  Orthostatic VS  09-12-21 @ 19:38  Lying BP: --/-- HR: --  Sitting BP: 110/84 HR: 111  Standing BP: 111/87 HR: 116  Site: --  Mode: --  Orthostatic VS  09-12-21 @ 07:59  Lying BP: --/-- HR: --  Sitting BP: 121/73 HR: 89  Standing BP: 124/80 HR: 90  Site: --  Mode: --  Orthostatic VS  09-11-21 @ 20:30  Lying BP: --/-- HR: --  Sitting BP: 132/95 HR: 97  Standing BP: 124/83 HR: 105  Site: --  Mode: --

## 2025-01-17 ENCOUNTER — RX RENEWAL (OUTPATIENT)
Age: 60
End: 2025-01-17

## 2025-01-17 RX ORDER — SITAGLIPTIN 100 MG/1
100 TABLET, FILM COATED ORAL
Qty: 30 | Refills: 0 | Status: ACTIVE | COMMUNITY
Start: 2025-01-17 | End: 1900-01-01

## 2025-02-05 LAB
HBA1C MFR BLD HPLC: 6.1
TSH SERPL-ACNC: 5.47

## 2025-02-06 ENCOUNTER — APPOINTMENT (OUTPATIENT)
Dept: ENDOCRINOLOGY | Facility: CLINIC | Age: 60
End: 2025-02-06

## 2025-02-12 ENCOUNTER — APPOINTMENT (OUTPATIENT)
Dept: ENDOCRINOLOGY | Facility: CLINIC | Age: 60
End: 2025-02-12

## 2025-02-17 ENCOUNTER — NON-APPOINTMENT (OUTPATIENT)
Age: 60
End: 2025-02-17

## 2025-02-19 ENCOUNTER — APPOINTMENT (OUTPATIENT)
Dept: ENDOCRINOLOGY | Facility: CLINIC | Age: 60
End: 2025-02-19
Payer: MEDICARE

## 2025-02-19 VITALS
OXYGEN SATURATION: 97 % | WEIGHT: 169 LBS | DIASTOLIC BLOOD PRESSURE: 60 MMHG | BODY MASS INDEX: 33.18 KG/M2 | HEIGHT: 60 IN | HEART RATE: 77 BPM | SYSTOLIC BLOOD PRESSURE: 110 MMHG

## 2025-02-19 DIAGNOSIS — R79.89 OTHER SPECIFIED ABNORMAL FINDINGS OF BLOOD CHEMISTRY: ICD-10-CM

## 2025-02-19 DIAGNOSIS — E78.5 HYPERLIPIDEMIA, UNSPECIFIED: ICD-10-CM

## 2025-02-19 DIAGNOSIS — Q78.0 OSTEOGENESIS IMPERFECTA: ICD-10-CM

## 2025-02-19 DIAGNOSIS — E11.9 TYPE 2 DIABETES MELLITUS W/OUT COMPLICATIONS: ICD-10-CM

## 2025-02-19 DIAGNOSIS — E03.9 HYPOTHYROIDISM, UNSPECIFIED: ICD-10-CM

## 2025-02-19 LAB — GLUCOSE BLDC GLUCOMTR-MCNC: 165

## 2025-02-19 PROCEDURE — 99214 OFFICE O/P EST MOD 30 MIN: CPT

## 2025-02-19 PROCEDURE — G2211 COMPLEX E/M VISIT ADD ON: CPT

## 2025-02-19 PROCEDURE — 82962 GLUCOSE BLOOD TEST: CPT

## 2025-02-20 ENCOUNTER — NON-APPOINTMENT (OUTPATIENT)
Age: 60
End: 2025-02-20

## 2025-02-24 ENCOUNTER — RX RENEWAL (OUTPATIENT)
Age: 60
End: 2025-02-24

## 2025-02-24 RX ORDER — ASCORBIC ACID 500 MG
500 TABLET ORAL
Qty: 30 | Refills: 1 | Status: ACTIVE | COMMUNITY
Start: 2025-02-24 | End: 1900-01-01

## 2025-02-24 RX ORDER — 70%ISOPROPYL ALCOHOL 0.7 ML/ML
70 SWAB TOPICAL
Qty: 3 | Refills: 0 | Status: ACTIVE | COMMUNITY
Start: 2025-02-24 | End: 1900-01-01

## 2025-02-24 RX ORDER — ADHESIVE TAPE 3"X 2.3 YD
3"X3" TAPE, NON-MEDICATED TOPICAL
Qty: 10 | Refills: 0 | Status: ACTIVE | COMMUNITY
Start: 2025-02-24 | End: 1900-01-01

## 2025-02-25 ENCOUNTER — NON-APPOINTMENT (OUTPATIENT)
Age: 60
End: 2025-02-25
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
## 2025-03-25 LAB
HBA1C MFR BLD HPLC: 7.6
LDLC SERPL DIRECT ASSAY-MCNC: 63
TSH SERPL-ACNC: 8.77

## 2025-04-02 ENCOUNTER — RX RENEWAL (OUTPATIENT)
Age: 60
End: 2025-04-02

## 2025-04-04 ENCOUNTER — NON-APPOINTMENT (OUTPATIENT)
Age: 60
End: 2025-04-04

## 2025-04-05 ENCOUNTER — NON-APPOINTMENT (OUTPATIENT)
Age: 60
End: 2025-04-05

## 2025-04-07 ENCOUNTER — NON-APPOINTMENT (OUTPATIENT)
Age: 60
End: 2025-04-07

## 2025-04-12 ENCOUNTER — NON-APPOINTMENT (OUTPATIENT)
Age: 60
End: 2025-04-12

## 2025-04-14 ENCOUNTER — RX RENEWAL (OUTPATIENT)
Age: 60
End: 2025-04-14

## 2025-04-16 ENCOUNTER — NON-APPOINTMENT (OUTPATIENT)
Age: 60
End: 2025-04-16

## 2025-04-18 NOTE — ASU PREOP CHECKLIST - AICD PRESENT
Patient: Ruben White    Procedure Information       Date/Time: 04/18/25 1215    Procedure: Laparoscopic Left Inguinal Hernia Repair (Left: Abdomen)    Location: [facility] A OR 12 / [facility] A OR    Surgeons: Tay Lin MD            Relevant Problems   Cardiac   (+) Long QT syndrome   (+) WPW (Samara-Parkinson-White syndrome)      Pulmonary   (+) Mild intermittent asthma without complication (HHS-HCC)      ID   (+) HSV-2 infection       Clinical information reviewed:   Tobacco  Allergies  Meds   Med Hx  Surg Hx   Fam Hx  Soc Hx         Medical History[1]   Surgical History[2]  Social History[3]   Current Outpatient Medications   Medication Instructions    albuterol 90 mcg/actuation inhaler 2 puffs, inhalation, Every 4 hours PRN    buPROPion SR (WELLBUTRIN SR) 150 mg, oral, 2 times daily, Do not crush, chew, or split.    EPINEPHrine (EPIPEN) 0.3 mg, intramuscular, Once, As Directed    ibuprofen 600 mg, oral, Every 8 hours PRN    loratadine (CLARITIN) 10 mg, oral, Daily PRN    methocarbamol (ROBAXIN) 500 mg, oral, Every 12 hours PRN, May cause sleepiness    miconazole (Micotin) 2 % powder Topical, As needed    valACYclovir (VALTREX) 1,000 mg, oral, Daily    varenicline tartrate (CHANTIX) 1 mg, oral, 2 times daily, Take with full glass of water.      RX Allergies[4]     Chemistry    Lab Results   Component Value Date/Time     03/24/2025 1519    K 4.3 03/24/2025 1519     03/24/2025 1519    CO2 28 03/24/2025 1519    BUN 13 03/24/2025 1519    CREATININE 0.74 03/24/2025 1519    Lab Results   Component Value Date/Time    CALCIUM 9.4 03/24/2025 1519    ALKPHOS 57 03/24/2025 1519    AST 20 03/24/2025 1519    ALT 18 03/24/2025 1519    BILITOT 0.4 03/24/2025 1519          Lab Results   Component Value Date    HGBA1C 5.2 12/10/2020     Lab Results   Component Value Date/Time    WBC 9.8 08/27/2024 1924    HGB 13.7 08/27/2024 1924    HCT 41.4 08/27/2024 1924     08/27/2024 1924     Lab Results  "  Component Value Date/Time    PROTIME 11.6 07/24/2018 0948    INR 1.0 07/24/2018 0948     No results found for: \"ABORH\"  No results found for this or any previous visit (from the past 4464 hours).  No results found for this or any previous visit from the past 1095 days.    Echo 11/12/2020:   Left Ventricle: The left ventricular systolic function is low normal, with an estimated ejection fraction of 50-55%. Estimated left ventricular mass is normal. There are no regional wall motion abnormalities. The left ventricular cavity size is normal. The left ventricular septal wall thickness is normal. There is normal left ventricular posterior wall thickness. Spectral Doppler shows an impaired relaxation pattern of left ventricular diastolic filling.  Left Atrium: The left atrium is upper limits of normal in size.  Right Ventricle: The right ventricle is normal in size. There is normal right ventricular global systolic function.  Right Atrium: The right atrium is mildly dilated.  Aortic Valve: The aortic valve appears structurally normal. The aortic valve appears tricuspid. There is mild aortic valve cusp calcification. There is no evidence of aortic valve regurgitation. The peak instantaneous gradient of the aortic valve is 5.2 mmHg.  Mitral Valve: The mitral valve is mildly thickened. There is no evidence of mitral valve regurgitation.  Tricuspid Valve: The tricuspid valve is structurally normal. There is trace tricuspid regurgitation.  Pulmonic Valve: The pulmonic valve is structurally normal. There is physiologic pulmonic valve regurgitation.  Pericardium: There is no pericardial effusion noted.  Aorta: The aortic root is normal.  Pulmonary Artery: The tricuspid regurgitant velocity is 2.11 m/s, and with an estimated right atrial pressure of 3 mmHg, the estimated pulmonary artery pressure is normal with the RVSP at 15.2 mmHg.  Systemic Veins: The inferior vena cava appears to be of normal size. There is IVC inspiratory " "collapse greater than 50%.  In comparison to the previous echocardiogram(s): Compared with study from 11/7/2016, no significant change.     CONCLUSIONS:   1. The left ventricular systolic function is low normal with a 50-55% estimated ejection fraction.   2. Spectral Doppler shows an impaired relaxation pattern of left ventricular diastolic filling.    Visit Vitals  BP (!) 133/95   Pulse 82   Temp 36.6 °C (97.9 °F)   Resp 16   Ht 1.702 m (5' 7\")   Wt 74.6 kg (164 lb 7.4 oz)   SpO2 98%   BMI 25.76 kg/m²   Smoking Status Every Day   BSA 1.88 m²     NPO/Void Status  Date of Last Liquid: 04/17/25  Time of Last Liquid: 2200  Date of Last Solid: 04/17/25  Time of Last Solid: 2200        Physical Exam    Airway  Mallampati: III  TM distance: >3 FB  Neck ROM: full  Mouth opening: 3 or more finger widths     Cardiovascular - normal exam  Rhythm: regular  Rate: normal     Dental    Pulmonary - normal exam   Abdominal - normal exam           Anesthesia Plan    History of general anesthesia?: yes  History of complications of general anesthesia?: no    ASA 2     general   (GETA with standard ASA monitoring)  intravenous induction   Postoperative pain plan includes opioids.  Anesthetic plan and risks discussed with patient.    Plan discussed with CAA and CRNA.             [1]   Past Medical History:  Diagnosis Date    ADHD     Asthma     History of atrial fibrillation 05/2009    Personal history of sudden cardiac arrest 05/03/2009    Found down by family, EMS called, found to be in Afib with RVR, transferred to PICU    Prolonged QT interval     Rheumatoid arthritis     WPW (Samara-Parkinson-White syndrome)     s/p ablation x2 5/2009   [2]   Past Surgical History:  Procedure Laterality Date    CARDIAC ELECTROPHYSIOLOGY MAPPING AND ABLATION  05/2009    x2   [3]   Social History  Tobacco Use    Smoking status: Every Day     Current packs/day: 0.50     Types: Cigarettes    Smokeless tobacco: Never   Substance Use Topics    Alcohol " use: Yes     Alcohol/week: 2.0 standard drinks of alcohol     Types: 2 Shots of liquor per week    Drug use: Yes     Types: Marijuana     Comment: smokes everyday per patient   [4]   Allergies  Allergen Reactions    Fish Containing Products Anaphylaxis      no

## 2025-04-22 NOTE — ED PROVIDER NOTE - CONSTITUTIONAL APPEARANCE HYGIENE, MLM
[facility] Medication Management Clinic Note   Spoke with patient's granddaughter, Luana, who confirmed the patient will be at her INR appointment on 4/25.    Gayatri Delgadillo [facility], PharmD, BCACP  4/22/2025  5:20 PM     well appearing

## 2025-05-05 ENCOUNTER — NON-APPOINTMENT (OUTPATIENT)
Age: 60
End: 2025-05-05
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
## 2025-05-16 NOTE — ED PROVIDER NOTE - CARE PLAN
Patient scheduled on 5/21/25 with PCP.   Principal Discharge DX:	Leg pain  Secondary Diagnosis:	Diabetes  Secondary Diagnosis:	High cholesterol

## 2025-06-17 ENCOUNTER — APPOINTMENT (OUTPATIENT)
Dept: ENDOCRINOLOGY | Facility: CLINIC | Age: 60
End: 2025-06-17

## 2025-06-30 ENCOUNTER — RX RENEWAL (OUTPATIENT)
Age: 60
End: 2025-06-30

## 2025-07-08 ENCOUNTER — RX RENEWAL (OUTPATIENT)
Age: 60
End: 2025-07-08

## 2025-07-21 NOTE — ED ADULT TRIAGE NOTE - INTERNATIONAL TRAVEL
Hypertension with current blood pressure 120/82 heart rate of 86 O2 saturation 98% on lisinopril 5 mg daily denies headache or cough continue therapy  CMP normal of June 16, 2025  Current EKG shows sinus rhythm with left atrial enlargement no change from June 20, 2024   No

## 2025-08-01 ENCOUNTER — NON-APPOINTMENT (OUTPATIENT)
Age: 60
End: 2025-08-01

## 2025-09-03 ENCOUNTER — RX RENEWAL (OUTPATIENT)
Age: 60
End: 2025-09-03

## 2025-09-08 ENCOUNTER — NON-APPOINTMENT (OUTPATIENT)
Age: 60
End: 2025-09-08

## 2025-09-08 LAB — TSH SERPL-ACNC: 5.87

## 2025-09-09 ENCOUNTER — APPOINTMENT (OUTPATIENT)
Dept: ENDOCRINOLOGY | Facility: CLINIC | Age: 60
End: 2025-09-09